# Patient Record
Sex: MALE | Race: WHITE | Employment: FULL TIME | URBAN - NONMETROPOLITAN AREA
[De-identification: names, ages, dates, MRNs, and addresses within clinical notes are randomized per-mention and may not be internally consistent; named-entity substitution may affect disease eponyms.]

---

## 2017-01-04 DIAGNOSIS — M54.2 CERVICALGIA: ICD-10-CM

## 2017-01-04 DIAGNOSIS — M54.16 LUMBAR RADICULOPATHY: ICD-10-CM

## 2017-01-04 DIAGNOSIS — M50.10 HERNIATION OF CERVICAL INTERVERTEBRAL DISC WITH RADICULOPATHY: ICD-10-CM

## 2017-01-04 RX ORDER — HYDROCODONE BITARTRATE AND ACETAMINOPHEN 10; 325 MG/1; MG/1
1 TABLET ORAL EVERY 6 HOURS PRN
Qty: 120 TABLET | Refills: 0 | Status: SHIPPED | OUTPATIENT
Start: 2017-01-04 | End: 2017-02-14 | Stop reason: SDUPTHER

## 2017-01-26 ENCOUNTER — OFFICE VISIT (OUTPATIENT)
Dept: CARDIOLOGY | Age: 52
End: 2017-01-26

## 2017-01-26 VITALS
HEART RATE: 70 BPM | DIASTOLIC BLOOD PRESSURE: 82 MMHG | SYSTOLIC BLOOD PRESSURE: 148 MMHG | WEIGHT: 196 LBS | HEIGHT: 67 IN | BODY MASS INDEX: 30.76 KG/M2

## 2017-01-26 DIAGNOSIS — Z95.2 S/P AVR: Primary | ICD-10-CM

## 2017-01-26 DIAGNOSIS — E78.01 FAMILIAL HYPERCHOLESTEROLEMIA: ICD-10-CM

## 2017-01-26 PROCEDURE — 99213 OFFICE O/P EST LOW 20 MIN: CPT | Performed by: NUCLEAR MEDICINE

## 2017-02-14 ENCOUNTER — TELEPHONE (OUTPATIENT)
Dept: FAMILY MEDICINE CLINIC | Age: 52
End: 2017-02-14

## 2017-02-14 DIAGNOSIS — M54.2 CERVICALGIA: ICD-10-CM

## 2017-02-14 DIAGNOSIS — M50.10 HERNIATION OF CERVICAL INTERVERTEBRAL DISC WITH RADICULOPATHY: ICD-10-CM

## 2017-02-14 DIAGNOSIS — M54.16 LUMBAR RADICULOPATHY: ICD-10-CM

## 2017-02-14 RX ORDER — HYDROCODONE BITARTRATE AND ACETAMINOPHEN 10; 325 MG/1; MG/1
1 TABLET ORAL EVERY 6 HOURS PRN
Qty: 120 TABLET | Refills: 0 | Status: SHIPPED | OUTPATIENT
Start: 2017-02-14 | End: 2017-02-14 | Stop reason: CLARIF

## 2017-02-14 RX ORDER — HYDROCODONE BITARTRATE AND ACETAMINOPHEN 10; 325 MG/1; MG/1
1 TABLET ORAL EVERY 6 HOURS PRN
Qty: 120 TABLET | Refills: 0 | Status: SHIPPED | OUTPATIENT
Start: 2017-02-14 | End: 2017-03-21 | Stop reason: SDUPTHER

## 2017-03-02 ENCOUNTER — OFFICE VISIT (OUTPATIENT)
Dept: PHYSICAL MEDICINE AND REHAB | Age: 52
End: 2017-03-02

## 2017-03-02 VITALS
WEIGHT: 193 LBS | HEIGHT: 67 IN | HEART RATE: 77 BPM | DIASTOLIC BLOOD PRESSURE: 83 MMHG | BODY MASS INDEX: 30.29 KG/M2 | SYSTOLIC BLOOD PRESSURE: 134 MMHG

## 2017-03-02 DIAGNOSIS — M48.061 LUMBAR SPINAL STENOSIS: ICD-10-CM

## 2017-03-02 DIAGNOSIS — M54.16 LUMBAR RADICULITIS: Primary | ICD-10-CM

## 2017-03-02 DIAGNOSIS — M54.12 CERVICAL RADICULITIS: ICD-10-CM

## 2017-03-02 DIAGNOSIS — G89.4 CHRONIC PAIN SYNDROME: ICD-10-CM

## 2017-03-02 DIAGNOSIS — Z98.1 HISTORY OF FUSION OF CERVICAL SPINE: ICD-10-CM

## 2017-03-02 PROCEDURE — 99244 OFF/OP CNSLTJ NEW/EST MOD 40: CPT | Performed by: PAIN MEDICINE

## 2017-03-02 RX ORDER — CELECOXIB 200 MG/1
200 CAPSULE ORAL DAILY
COMMUNITY
Start: 2017-02-27 | End: 2017-08-17

## 2017-03-02 ASSESSMENT — ENCOUNTER SYMPTOMS
SORE THROAT: 0
BACK PAIN: 1
COUGH: 0
CONSTIPATION: 0
DIARRHEA: 0
EYE PAIN: 0
SHORTNESS OF BREATH: 0
CHEST TIGHTNESS: 0
NAUSEA: 0
WHEEZING: 0
VOMITING: 0
PHOTOPHOBIA: 0
RHINORRHEA: 0
BOWEL INCONTINENCE: 0
SINUS PRESSURE: 0
COLOR CHANGE: 0
ABDOMINAL PAIN: 0

## 2017-03-03 ENCOUNTER — TELEPHONE (OUTPATIENT)
Dept: CARDIOLOGY | Age: 52
End: 2017-03-03

## 2017-03-15 ENCOUNTER — OFFICE VISIT (OUTPATIENT)
Dept: FAMILY MEDICINE CLINIC | Age: 52
End: 2017-03-15

## 2017-03-15 VITALS
DIASTOLIC BLOOD PRESSURE: 76 MMHG | HEIGHT: 67 IN | SYSTOLIC BLOOD PRESSURE: 132 MMHG | WEIGHT: 191.6 LBS | OXYGEN SATURATION: 98 % | BODY MASS INDEX: 30.07 KG/M2 | RESPIRATION RATE: 14 BRPM | HEART RATE: 89 BPM

## 2017-03-15 DIAGNOSIS — K28.4 GASTROINTESTINAL HEMORRHAGE ASSOCIATED WITH GASTROJEJUNAL ULCER: ICD-10-CM

## 2017-03-15 DIAGNOSIS — E03.8 OTHER SPECIFIED HYPOTHYROIDISM: ICD-10-CM

## 2017-03-15 DIAGNOSIS — K21.9 GASTROESOPHAGEAL REFLUX DISEASE WITHOUT ESOPHAGITIS: ICD-10-CM

## 2017-03-15 DIAGNOSIS — I10 ESSENTIAL HYPERTENSION: Primary | ICD-10-CM

## 2017-03-15 DIAGNOSIS — F34.1 DYSTHYMIA (OR DEPRESSIVE NEUROSIS): ICD-10-CM

## 2017-03-15 DIAGNOSIS — Z98.890 H/O CERVICAL SPINE SURGERY: ICD-10-CM

## 2017-03-15 DIAGNOSIS — Z95.2 S/P AVR (AORTIC VALVE REPLACEMENT): ICD-10-CM

## 2017-03-15 DIAGNOSIS — Z51.81 MEDICATION MONITORING ENCOUNTER: ICD-10-CM

## 2017-03-15 DIAGNOSIS — R73.01 IFG (IMPAIRED FASTING GLUCOSE): ICD-10-CM

## 2017-03-15 PROCEDURE — 99214 OFFICE O/P EST MOD 30 MIN: CPT | Performed by: FAMILY MEDICINE

## 2017-03-15 RX ORDER — ESOMEPRAZOLE MAGNESIUM 40 MG/1
40 FOR SUSPENSION ORAL 2 TIMES DAILY
Qty: 60 PACKET | Refills: 5 | Status: SHIPPED | OUTPATIENT
Start: 2017-03-15 | End: 2018-06-05 | Stop reason: SDUPTHER

## 2017-03-15 ASSESSMENT — ENCOUNTER SYMPTOMS
BACK PAIN: 1
ABDOMINAL PAIN: 1
SHORTNESS OF BREATH: 0
COUGH: 0
DIARRHEA: 0
CONSTIPATION: 0
NAUSEA: 0
VOMITING: 0

## 2017-03-15 ASSESSMENT — PATIENT HEALTH QUESTIONNAIRE - PHQ9
1. LITTLE INTEREST OR PLEASURE IN DOING THINGS: 0
2. FEELING DOWN, DEPRESSED OR HOPELESS: 0
SUM OF ALL RESPONSES TO PHQ QUESTIONS 1-9: 0
SUM OF ALL RESPONSES TO PHQ9 QUESTIONS 1 & 2: 0

## 2017-03-16 ENCOUNTER — TELEPHONE (OUTPATIENT)
Dept: FAMILY MEDICINE CLINIC | Age: 52
End: 2017-03-16

## 2017-03-16 RX ORDER — LEVOTHYROXINE SODIUM 0.07 MG/1
TABLET ORAL
Qty: 30 TABLET | Refills: 2 | Status: SHIPPED | OUTPATIENT
Start: 2017-03-16 | End: 2017-04-13 | Stop reason: SDUPTHER

## 2017-03-21 DIAGNOSIS — M54.16 LUMBAR RADICULOPATHY: ICD-10-CM

## 2017-03-21 DIAGNOSIS — M54.2 CERVICALGIA: ICD-10-CM

## 2017-03-21 DIAGNOSIS — M50.10 HERNIATION OF CERVICAL INTERVERTEBRAL DISC WITH RADICULOPATHY: ICD-10-CM

## 2017-03-21 RX ORDER — HYDROCODONE BITARTRATE AND ACETAMINOPHEN 10; 325 MG/1; MG/1
1 TABLET ORAL EVERY 6 HOURS PRN
Qty: 120 TABLET | Refills: 0 | Status: SHIPPED | OUTPATIENT
Start: 2017-03-21 | End: 2017-04-21 | Stop reason: SDUPTHER

## 2017-04-13 ENCOUNTER — OFFICE VISIT (OUTPATIENT)
Dept: PHYSICAL MEDICINE AND REHAB | Age: 52
End: 2017-04-13

## 2017-04-13 VITALS
DIASTOLIC BLOOD PRESSURE: 75 MMHG | HEIGHT: 67 IN | BODY MASS INDEX: 29.66 KG/M2 | SYSTOLIC BLOOD PRESSURE: 136 MMHG | WEIGHT: 189 LBS | HEART RATE: 71 BPM

## 2017-04-13 DIAGNOSIS — Z98.1 HISTORY OF FUSION OF CERVICAL SPINE: ICD-10-CM

## 2017-04-13 DIAGNOSIS — G89.4 CHRONIC PAIN SYNDROME: ICD-10-CM

## 2017-04-13 DIAGNOSIS — M54.12 CERVICAL RADICULITIS: ICD-10-CM

## 2017-04-13 DIAGNOSIS — M48.061 LUMBAR SPINAL STENOSIS: ICD-10-CM

## 2017-04-13 DIAGNOSIS — M54.16 LUMBAR RADICULITIS: Primary | ICD-10-CM

## 2017-04-13 PROCEDURE — 99213 OFFICE O/P EST LOW 20 MIN: CPT | Performed by: NURSE PRACTITIONER

## 2017-04-13 RX ORDER — TIZANIDINE 2 MG/1
2 TABLET ORAL EVERY 8 HOURS PRN
Qty: 90 TABLET | Refills: 0 | Status: SHIPPED | OUTPATIENT
Start: 2017-04-13 | End: 2017-05-25 | Stop reason: SDUPTHER

## 2017-04-13 ASSESSMENT — ENCOUNTER SYMPTOMS
SINUS PRESSURE: 0
PHOTOPHOBIA: 0
VOMITING: 0
ANAL BLEEDING: 0
APNEA: 0
TROUBLE SWALLOWING: 0
WHEEZING: 0
CHEST TIGHTNESS: 0
ABDOMINAL DISTENTION: 0
COUGH: 0
RECTAL PAIN: 0
EYE PAIN: 0
FACIAL SWELLING: 0
BLOOD IN STOOL: 0
ABDOMINAL PAIN: 0
NAUSEA: 0
CHOKING: 0
RHINORRHEA: 0
CONSTIPATION: 0
EYE DISCHARGE: 0
SORE THROAT: 0
STRIDOR: 0
BACK PAIN: 1
EYE REDNESS: 0
COLOR CHANGE: 0
VOICE CHANGE: 0
SHORTNESS OF BREATH: 0
DIARRHEA: 0
EYE ITCHING: 0

## 2017-04-20 DIAGNOSIS — Z95.2 H/O HEART VALVE REPLACEMENT WITH MECHANICAL VALVE: ICD-10-CM

## 2017-04-20 RX ORDER — METOPROLOL SUCCINATE 25 MG/1
TABLET, EXTENDED RELEASE ORAL
Qty: 30 TABLET | Refills: 4 | Status: SHIPPED | OUTPATIENT
Start: 2017-04-20 | End: 2017-12-11 | Stop reason: SDUPTHER

## 2017-04-21 DIAGNOSIS — M50.10 HERNIATION OF CERVICAL INTERVERTEBRAL DISC WITH RADICULOPATHY: ICD-10-CM

## 2017-04-21 DIAGNOSIS — M54.2 CERVICALGIA: ICD-10-CM

## 2017-04-21 DIAGNOSIS — M54.16 LUMBAR RADICULOPATHY: ICD-10-CM

## 2017-04-21 RX ORDER — HYDROCODONE BITARTRATE AND ACETAMINOPHEN 10; 325 MG/1; MG/1
1 TABLET ORAL EVERY 6 HOURS PRN
Qty: 120 TABLET | Refills: 0 | Status: SHIPPED | OUTPATIENT
Start: 2017-04-21 | End: 2017-05-18 | Stop reason: SDUPTHER

## 2017-05-03 RX ORDER — BUTALBITAL, ACETAMINOPHEN AND CAFFEINE 50; 325; 40 MG/1; MG/1; MG/1
TABLET ORAL
Qty: 30 TABLET | Refills: 0 | Status: SHIPPED | OUTPATIENT
Start: 2017-05-03 | End: 2018-01-22

## 2017-05-18 DIAGNOSIS — M54.2 CERVICALGIA: ICD-10-CM

## 2017-05-18 DIAGNOSIS — M54.16 LUMBAR RADICULOPATHY: ICD-10-CM

## 2017-05-18 DIAGNOSIS — M50.10 HERNIATION OF CERVICAL INTERVERTEBRAL DISC WITH RADICULOPATHY: ICD-10-CM

## 2017-05-18 RX ORDER — HYDROCODONE BITARTRATE AND ACETAMINOPHEN 10; 325 MG/1; MG/1
1 TABLET ORAL EVERY 6 HOURS PRN
Qty: 120 TABLET | Refills: 0 | Status: SHIPPED | OUTPATIENT
Start: 2017-05-18 | End: 2017-06-15 | Stop reason: SDUPTHER

## 2017-05-25 ENCOUNTER — OFFICE VISIT (OUTPATIENT)
Dept: PHYSICAL MEDICINE AND REHAB | Age: 52
End: 2017-05-25

## 2017-05-25 VITALS
WEIGHT: 183 LBS | HEART RATE: 77 BPM | HEIGHT: 67 IN | BODY MASS INDEX: 28.72 KG/M2 | DIASTOLIC BLOOD PRESSURE: 81 MMHG | SYSTOLIC BLOOD PRESSURE: 143 MMHG

## 2017-05-25 DIAGNOSIS — M48.061 LUMBAR SPINAL STENOSIS: ICD-10-CM

## 2017-05-25 DIAGNOSIS — M54.16 LUMBAR RADICULITIS: ICD-10-CM

## 2017-05-25 DIAGNOSIS — M47.814 SPONDYLOSIS OF THORACIC REGION WITHOUT MYELOPATHY OR RADICULOPATHY: ICD-10-CM

## 2017-05-25 DIAGNOSIS — M54.12 CERVICAL RADICULITIS: ICD-10-CM

## 2017-05-25 DIAGNOSIS — M51.34 THORACIC DEGENERATIVE DISC DISEASE: Primary | ICD-10-CM

## 2017-05-25 DIAGNOSIS — G89.4 CHRONIC PAIN SYNDROME: ICD-10-CM

## 2017-05-25 DIAGNOSIS — Z98.1 HISTORY OF FUSION OF CERVICAL SPINE: ICD-10-CM

## 2017-05-25 PROCEDURE — 99214 OFFICE O/P EST MOD 30 MIN: CPT | Performed by: NURSE PRACTITIONER

## 2017-05-25 RX ORDER — TIZANIDINE 2 MG/1
4 TABLET ORAL EVERY 8 HOURS PRN
Qty: 90 TABLET | Refills: 0 | Status: SHIPPED | OUTPATIENT
Start: 2017-05-25 | End: 2020-02-13

## 2017-05-25 ASSESSMENT — ENCOUNTER SYMPTOMS
RHINORRHEA: 0
EYE PAIN: 0
SORE THROAT: 0
CHOKING: 0
FACIAL SWELLING: 0
APNEA: 0
COLOR CHANGE: 0
PHOTOPHOBIA: 0
NAUSEA: 0
EYE REDNESS: 0
EYE DISCHARGE: 0
STRIDOR: 0
BACK PAIN: 0
SHORTNESS OF BREATH: 0
TROUBLE SWALLOWING: 0
ABDOMINAL DISTENTION: 0
ABDOMINAL PAIN: 0
EYE ITCHING: 0
BLOOD IN STOOL: 0
DIARRHEA: 0
VOICE CHANGE: 0
ANAL BLEEDING: 0
VOMITING: 0
CONSTIPATION: 0
RECTAL PAIN: 0
COUGH: 0
CHEST TIGHTNESS: 0
WHEEZING: 0
SINUS PRESSURE: 0

## 2017-06-13 ENCOUNTER — TELEPHONE (OUTPATIENT)
Dept: PHYSICAL MEDICINE AND REHAB | Age: 52
End: 2017-06-13

## 2017-06-15 ENCOUNTER — OFFICE VISIT (OUTPATIENT)
Dept: FAMILY MEDICINE CLINIC | Age: 52
End: 2017-06-15

## 2017-06-15 VITALS
RESPIRATION RATE: 13 BRPM | WEIGHT: 171.6 LBS | OXYGEN SATURATION: 98 % | HEIGHT: 67 IN | SYSTOLIC BLOOD PRESSURE: 128 MMHG | DIASTOLIC BLOOD PRESSURE: 80 MMHG | HEART RATE: 96 BPM | BODY MASS INDEX: 26.93 KG/M2

## 2017-06-15 DIAGNOSIS — F41.9 CHRONIC ANXIETY: ICD-10-CM

## 2017-06-15 DIAGNOSIS — I10 ESSENTIAL HYPERTENSION: Primary | ICD-10-CM

## 2017-06-15 DIAGNOSIS — M54.2 CERVICALGIA: ICD-10-CM

## 2017-06-15 DIAGNOSIS — E78.2 MIXED HYPERLIPIDEMIA: ICD-10-CM

## 2017-06-15 DIAGNOSIS — Z23 NEED FOR VACCINATION WITH 13-POLYVALENT PNEUMOCOCCAL CONJUGATE VACCINE: ICD-10-CM

## 2017-06-15 DIAGNOSIS — Z11.59 NEED FOR HEPATITIS C SCREENING TEST: ICD-10-CM

## 2017-06-15 DIAGNOSIS — M54.16 LUMBAR RADICULOPATHY: ICD-10-CM

## 2017-06-15 DIAGNOSIS — F34.1 DYSTHYMIA (OR DEPRESSIVE NEUROSIS): ICD-10-CM

## 2017-06-15 DIAGNOSIS — Z11.4 SCREENING FOR HIV WITHOUT PRESENCE OF RISK FACTORS: ICD-10-CM

## 2017-06-15 DIAGNOSIS — Z95.2 S/P AVR (AORTIC VALVE REPLACEMENT): ICD-10-CM

## 2017-06-15 DIAGNOSIS — E03.9 ACQUIRED HYPOTHYROIDISM: ICD-10-CM

## 2017-06-15 DIAGNOSIS — R73.01 IFG (IMPAIRED FASTING GLUCOSE): ICD-10-CM

## 2017-06-15 DIAGNOSIS — Z51.81 MEDICATION MONITORING ENCOUNTER: ICD-10-CM

## 2017-06-15 DIAGNOSIS — M50.10 HERNIATION OF CERVICAL INTERVERTEBRAL DISC WITH RADICULOPATHY: ICD-10-CM

## 2017-06-15 PROCEDURE — 99214 OFFICE O/P EST MOD 30 MIN: CPT | Performed by: FAMILY MEDICINE

## 2017-06-15 PROCEDURE — 90471 IMMUNIZATION ADMIN: CPT | Performed by: FAMILY MEDICINE

## 2017-06-15 PROCEDURE — 90670 PCV13 VACCINE IM: CPT | Performed by: FAMILY MEDICINE

## 2017-06-15 RX ORDER — HYDROCODONE BITARTRATE AND ACETAMINOPHEN 10; 325 MG/1; MG/1
1 TABLET ORAL EVERY 6 HOURS PRN
Qty: 120 TABLET | Refills: 0 | Status: SHIPPED | OUTPATIENT
Start: 2017-06-15 | End: 2017-07-14 | Stop reason: SDUPTHER

## 2017-06-15 ASSESSMENT — ENCOUNTER SYMPTOMS
GASTROINTESTINAL NEGATIVE: 1
BACK PAIN: 1
COUGH: 0
WHEEZING: 0
RESPIRATORY NEGATIVE: 1
SHORTNESS OF BREATH: 0

## 2017-07-14 DIAGNOSIS — M54.2 CERVICALGIA: ICD-10-CM

## 2017-07-14 DIAGNOSIS — M50.10 HERNIATION OF CERVICAL INTERVERTEBRAL DISC WITH RADICULOPATHY: ICD-10-CM

## 2017-07-14 DIAGNOSIS — M54.16 LUMBAR RADICULOPATHY: ICD-10-CM

## 2017-07-14 RX ORDER — HYDROCODONE BITARTRATE AND ACETAMINOPHEN 10; 325 MG/1; MG/1
1 TABLET ORAL EVERY 6 HOURS PRN
Qty: 120 TABLET | Refills: 0 | Status: SHIPPED | OUTPATIENT
Start: 2017-07-14 | End: 2017-08-14 | Stop reason: SDUPTHER

## 2017-07-17 RX ORDER — ALPRAZOLAM 0.5 MG/1
TABLET ORAL
Qty: 90 TABLET | Refills: 3 | Status: SHIPPED | OUTPATIENT
Start: 2017-07-17 | End: 2018-01-23 | Stop reason: SDUPTHER

## 2017-07-17 RX ORDER — CELECOXIB 200 MG/1
CAPSULE ORAL
Qty: 30 CAPSULE | Refills: 4 | Status: SHIPPED | OUTPATIENT
Start: 2017-07-17 | End: 2017-08-17

## 2017-07-25 ENCOUNTER — HOSPITAL ENCOUNTER (OUTPATIENT)
Dept: GENERAL RADIOLOGY | Age: 52
Discharge: HOME | End: 2017-07-25

## 2017-07-25 ENCOUNTER — HOSPITAL ENCOUNTER (OUTPATIENT)
Age: 52
Setting detail: OUTPATIENT SURGERY
Discharge: HOME | End: 2017-07-25
Attending: PAIN MEDICINE | Admitting: PAIN MEDICINE
Payer: COMMERCIAL

## 2017-07-25 ENCOUNTER — ANESTHESIA EVENT (OUTPATIENT)
Dept: OPERATING ROOM | Age: 52
End: 2017-07-25
Payer: COMMERCIAL

## 2017-07-25 ENCOUNTER — ANESTHESIA (OUTPATIENT)
Dept: OPERATING ROOM | Age: 52
End: 2017-07-25
Payer: COMMERCIAL

## 2017-07-25 VITALS
HEIGHT: 67 IN | HEART RATE: 58 BPM | OXYGEN SATURATION: 97 % | TEMPERATURE: 98.5 F | WEIGHT: 164 LBS | DIASTOLIC BLOOD PRESSURE: 64 MMHG | SYSTOLIC BLOOD PRESSURE: 129 MMHG | BODY MASS INDEX: 25.74 KG/M2 | RESPIRATION RATE: 20 BRPM

## 2017-07-25 VITALS — DIASTOLIC BLOOD PRESSURE: 70 MMHG | OXYGEN SATURATION: 99 % | SYSTOLIC BLOOD PRESSURE: 116 MMHG

## 2017-07-25 PROCEDURE — 2580000003 HC RX 258: Performed by: NURSE ANESTHETIST, CERTIFIED REGISTERED

## 2017-07-25 PROCEDURE — 7100000011 HC PHASE II RECOVERY - ADDTL 15 MIN: Performed by: PAIN MEDICINE

## 2017-07-25 PROCEDURE — 64492 INJ PARAVERT F JNT C/T 3 LEV: CPT | Performed by: PAIN MEDICINE

## 2017-07-25 PROCEDURE — 3700000000 HC ANESTHESIA ATTENDED CARE: Performed by: PAIN MEDICINE

## 2017-07-25 PROCEDURE — 64491 INJ PARAVERT F JNT C/T 2 LEV: CPT | Performed by: PAIN MEDICINE

## 2017-07-25 PROCEDURE — 7100000010 HC PHASE II RECOVERY - FIRST 15 MIN: Performed by: PAIN MEDICINE

## 2017-07-25 PROCEDURE — 6360000002 HC RX W HCPCS: Performed by: PAIN MEDICINE

## 2017-07-25 PROCEDURE — 2500000003 HC RX 250 WO HCPCS: Performed by: PAIN MEDICINE

## 2017-07-25 PROCEDURE — 64490 INJ PARAVERT F JNT C/T 1 LEV: CPT | Performed by: PAIN MEDICINE

## 2017-07-25 PROCEDURE — 2500000003 HC RX 250 WO HCPCS: Performed by: NURSE ANESTHETIST, CERTIFIED REGISTERED

## 2017-07-25 PROCEDURE — 6360000002 HC RX W HCPCS: Performed by: NURSE ANESTHETIST, CERTIFIED REGISTERED

## 2017-07-25 PROCEDURE — 3600000058 HC PAIN LEVEL 5 BASE: Performed by: PAIN MEDICINE

## 2017-07-25 RX ORDER — SODIUM CHLORIDE 9 MG/ML
INJECTION, SOLUTION INTRAVENOUS CONTINUOUS PRN
Status: DISCONTINUED | OUTPATIENT
Start: 2017-07-25 | End: 2017-07-25 | Stop reason: SDUPTHER

## 2017-07-25 RX ORDER — BUPIVACAINE HYDROCHLORIDE 2.5 MG/ML
INJECTION, SOLUTION EPIDURAL; INFILTRATION; INTRACAUDAL PRN
Status: DISCONTINUED | OUTPATIENT
Start: 2017-07-25 | End: 2017-07-25 | Stop reason: HOSPADM

## 2017-07-25 RX ORDER — PROPOFOL 10 MG/ML
INJECTION, EMULSION INTRAVENOUS PRN
Status: DISCONTINUED | OUTPATIENT
Start: 2017-07-25 | End: 2017-07-25 | Stop reason: SDUPTHER

## 2017-07-25 RX ORDER — BETAMETHASONE SODIUM PHOSPHATE AND BETAMETHASONE ACETATE 3; 3 MG/ML; MG/ML
INJECTION, SUSPENSION INTRA-ARTICULAR; INTRALESIONAL; INTRAMUSCULAR; SOFT TISSUE PRN
Status: DISCONTINUED | OUTPATIENT
Start: 2017-07-25 | End: 2017-07-25 | Stop reason: HOSPADM

## 2017-07-25 RX ORDER — LIDOCAINE HYDROCHLORIDE 20 MG/ML
INJECTION, SOLUTION INFILTRATION; PERINEURAL PRN
Status: DISCONTINUED | OUTPATIENT
Start: 2017-07-25 | End: 2017-07-25 | Stop reason: SDUPTHER

## 2017-07-25 RX ORDER — LIDOCAINE HYDROCHLORIDE 10 MG/ML
INJECTION, SOLUTION INFILTRATION; PERINEURAL PRN
Status: DISCONTINUED | OUTPATIENT
Start: 2017-07-25 | End: 2017-07-25 | Stop reason: HOSPADM

## 2017-07-25 RX ADMIN — PROPOFOL 30 MG: 10 INJECTION, EMULSION INTRAVENOUS at 13:50

## 2017-07-25 RX ADMIN — SODIUM CHLORIDE: 9 INJECTION, SOLUTION INTRAVENOUS at 13:50

## 2017-07-25 RX ADMIN — PROPOFOL 30 MG: 10 INJECTION, EMULSION INTRAVENOUS at 13:55

## 2017-07-25 RX ADMIN — LIDOCAINE HYDROCHLORIDE 80 MG: 20 INJECTION, SOLUTION INFILTRATION; PERINEURAL at 13:50

## 2017-07-25 ASSESSMENT — PAIN SCALES - GENERAL: PAINLEVEL_OUTOF10: 4

## 2017-07-25 ASSESSMENT — PULMONARY FUNCTION TESTS
PIF_VALUE: 0

## 2017-07-25 ASSESSMENT — PAIN DESCRIPTION - DESCRIPTORS: DESCRIPTORS: NUMBNESS;RADIATING

## 2017-07-25 ASSESSMENT — PAIN - FUNCTIONAL ASSESSMENT: PAIN_FUNCTIONAL_ASSESSMENT: 0-10

## 2017-07-26 ENCOUNTER — TELEPHONE (OUTPATIENT)
Dept: FAMILY MEDICINE CLINIC | Age: 52
End: 2017-07-26

## 2017-07-26 DIAGNOSIS — E78.2 MIXED HYPERLIPIDEMIA: Primary | ICD-10-CM

## 2017-07-26 RX ORDER — ASPIRIN 325 MG
325 TABLET, DELAYED RELEASE (ENTERIC COATED) ORAL DAILY
Qty: 30 TABLET | Refills: 11 | Status: SHIPPED | OUTPATIENT
Start: 2017-07-26 | End: 2018-08-12 | Stop reason: SDUPTHER

## 2017-07-26 RX ORDER — ATORVASTATIN CALCIUM 20 MG/1
20 TABLET, FILM COATED ORAL NIGHTLY
Qty: 30 TABLET | Refills: 11 | Status: SHIPPED | OUTPATIENT
Start: 2017-07-26 | End: 2018-09-27 | Stop reason: SDUPTHER

## 2017-08-14 DIAGNOSIS — M54.16 LUMBAR RADICULOPATHY: ICD-10-CM

## 2017-08-14 DIAGNOSIS — M54.2 CERVICALGIA: ICD-10-CM

## 2017-08-14 DIAGNOSIS — M50.10 HERNIATION OF CERVICAL INTERVERTEBRAL DISC WITH RADICULOPATHY: ICD-10-CM

## 2017-08-14 RX ORDER — HYDROCODONE BITARTRATE AND ACETAMINOPHEN 10; 325 MG/1; MG/1
1 TABLET ORAL EVERY 6 HOURS PRN
Qty: 120 TABLET | Refills: 0 | Status: SHIPPED | OUTPATIENT
Start: 2017-08-14 | End: 2017-09-14 | Stop reason: SDUPTHER

## 2017-08-17 ENCOUNTER — HOSPITAL ENCOUNTER (EMERGENCY)
Age: 52
Discharge: HOME | End: 2017-08-17
Attending: INTERNAL MEDICINE
Payer: COMMERCIAL

## 2017-08-17 ENCOUNTER — OFFICE VISIT (OUTPATIENT)
Dept: PHYSICAL MEDICINE AND REHAB | Age: 52
End: 2017-08-17
Payer: COMMERCIAL

## 2017-08-17 ENCOUNTER — APPOINTMENT (OUTPATIENT)
Dept: GENERAL RADIOLOGY | Age: 52
End: 2017-08-17
Payer: COMMERCIAL

## 2017-08-17 ENCOUNTER — APPOINTMENT (OUTPATIENT)
Dept: CT IMAGING | Age: 52
End: 2017-08-17
Payer: COMMERCIAL

## 2017-08-17 VITALS
WEIGHT: 164.02 LBS | DIASTOLIC BLOOD PRESSURE: 80 MMHG | BODY MASS INDEX: 25.74 KG/M2 | SYSTOLIC BLOOD PRESSURE: 133 MMHG | HEIGHT: 67 IN | HEART RATE: 85 BPM

## 2017-08-17 VITALS
BODY MASS INDEX: 25.43 KG/M2 | WEIGHT: 162 LBS | DIASTOLIC BLOOD PRESSURE: 74 MMHG | RESPIRATION RATE: 14 BRPM | TEMPERATURE: 98.3 F | SYSTOLIC BLOOD PRESSURE: 159 MMHG | HEART RATE: 52 BPM | OXYGEN SATURATION: 99 % | HEIGHT: 67 IN

## 2017-08-17 DIAGNOSIS — M47.816 SPONDYLOSIS OF LUMBAR REGION WITHOUT MYELOPATHY OR RADICULOPATHY: ICD-10-CM

## 2017-08-17 DIAGNOSIS — E03.9 ACQUIRED HYPOTHYROIDISM: Primary | ICD-10-CM

## 2017-08-17 DIAGNOSIS — M47.814 SPONDYLOSIS OF THORACIC REGION WITHOUT MYELOPATHY OR RADICULOPATHY: ICD-10-CM

## 2017-08-17 DIAGNOSIS — M51.34 THORACIC DEGENERATIVE DISC DISEASE: ICD-10-CM

## 2017-08-17 DIAGNOSIS — M48.04 THORACIC STENOSIS: Primary | ICD-10-CM

## 2017-08-17 DIAGNOSIS — M54.16 LUMBAR RADICULITIS: ICD-10-CM

## 2017-08-17 DIAGNOSIS — N39.0 URINARY TRACT INFECTION WITHOUT HEMATURIA, SITE UNSPECIFIED: Primary | ICD-10-CM

## 2017-08-17 LAB
ALBUMIN SERPL-MCNC: 4.2 G/DL (ref 3.5–5.1)
ALP BLD-CCNC: 71 U/L (ref 38–126)
ALT SERPL-CCNC: 8 U/L (ref 11–66)
AMYLASE: 49 U/L (ref 20–104)
ANION GAP SERPL CALCULATED.3IONS-SCNC: 11 MEQ/L (ref 8–16)
ANISOCYTOSIS: ABNORMAL
AST SERPL-CCNC: 11 U/L (ref 5–40)
BACTERIA: ABNORMAL
BASOPHILS # BLD: 0.4 %
BASOPHILS ABSOLUTE: 0.1 THOU/MM3 (ref 0–0.1)
BILIRUB SERPL-MCNC: 0.3 MG/DL (ref 0.3–1.2)
BILIRUBIN DIRECT: < 0.2 MG/DL (ref 0–0.3)
BILIRUBIN URINE: ABNORMAL
BLOOD, URINE: NEGATIVE
BUN BLDV-MCNC: 19 MG/DL (ref 7–22)
CALCIUM SERPL-MCNC: 9.1 MG/DL (ref 8.5–10.5)
CASTS: ABNORMAL /LPF
CASTS: ABNORMAL /LPF
CHARACTER, URINE: CLEAR
CHLORIDE BLD-SCNC: 104 MEQ/L (ref 98–111)
CO2: 26 MEQ/L (ref 23–33)
COLOR: ABNORMAL
CREAT SERPL-MCNC: 0.6 MG/DL (ref 0.4–1.2)
CRYSTALS: ABNORMAL
EKG ATRIAL RATE: 72 BPM
EKG P AXIS: 29 DEGREES
EKG P-R INTERVAL: 150 MS
EKG Q-T INTERVAL: 410 MS
EKG QRS DURATION: 98 MS
EKG QTC CALCULATION (BAZETT): 448 MS
EKG R AXIS: 73 DEGREES
EKG T AXIS: 13 DEGREES
EKG VENTRICULAR RATE: 72 BPM
EOSINOPHIL # BLD: 1.1 %
EOSINOPHILS ABSOLUTE: 0.1 THOU/MM3 (ref 0–0.4)
EPITHELIAL CELLS, UA: ABNORMAL /HPF
GFR SERPL CREATININE-BSD FRML MDRD: > 90 ML/MIN/1.73M2
GLUCOSE BLD-MCNC: 95 MG/DL (ref 70–108)
GLUCOSE, URINE: NEGATIVE MG/DL
HCT VFR BLD CALC: 42.9 % (ref 42–52)
HEMOGLOBIN: 14.5 GM/DL (ref 14–18)
ICTOTEST: NEGATIVE
KETONES, URINE: ABNORMAL
LACTIC ACID: 1.2 MMOL/L (ref 0.5–2.2)
LEUKOCYTE EST, POC: ABNORMAL
LIPASE: 30.2 U/L (ref 5.6–51.3)
LYMPHOCYTES # BLD: 7.5 %
LYMPHOCYTES ABSOLUTE: 1 THOU/MM3 (ref 1–4.8)
MCH RBC QN AUTO: 30.3 PG (ref 27–31)
MCHC RBC AUTO-ENTMCNC: 33.8 GM/DL (ref 33–37)
MCV RBC AUTO: 89.6 FL (ref 80–94)
MISCELLANEOUS LAB TEST RESULT: ABNORMAL
MONOCYTES # BLD: 7.7 %
MONOCYTES ABSOLUTE: 1 THOU/MM3 (ref 0.4–1.3)
NITRITE, URINE: NEGATIVE
NUCLEATED RED BLOOD CELLS: 0 /100 WBC
OSMOLALITY CALCULATION: 283.3 MOSMOL/KG (ref 275–300)
PDW BLD-RTO: 16.1 % (ref 11.5–14.5)
PH UA: 5
PLATELET # BLD: 193 THOU/MM3 (ref 130–400)
PMV BLD AUTO: 9.1 MCM (ref 7.4–10.4)
POTASSIUM SERPL-SCNC: 4.7 MEQ/L (ref 3.5–5.2)
PRO-BNP: 150.9 PG/ML (ref 0–900)
PROTEIN UA: ABNORMAL MG/DL
RBC # BLD: 4.79 MILL/MM3 (ref 4.7–6.1)
RBC # BLD: NORMAL 10*6/UL
RBC URINE: ABNORMAL /HPF
RENAL EPITHELIAL, UA: ABNORMAL
SEG NEUTROPHILS: 83.3 %
SEGMENTED NEUTROPHILS ABSOLUTE COUNT: 11.2 THOU/MM3 (ref 1.8–7.7)
SODIUM BLD-SCNC: 141 MEQ/L (ref 135–145)
SPECIFIC GRAVITY UA: > 1.03 (ref 1–1.03)
TOTAL PROTEIN: 6.8 G/DL (ref 6.1–8)
TROPONIN T: < 0.01 NG/ML
UROBILINOGEN, URINE: 1 EU/DL
WBC # BLD: 13.4 THOU/MM3 (ref 4.8–10.8)
WBC UA: ABNORMAL /HPF
YEAST: ABNORMAL

## 2017-08-17 PROCEDURE — 82248 BILIRUBIN DIRECT: CPT

## 2017-08-17 PROCEDURE — 36415 COLL VENOUS BLD VENIPUNCTURE: CPT

## 2017-08-17 PROCEDURE — 82150 ASSAY OF AMYLASE: CPT

## 2017-08-17 PROCEDURE — 93005 ELECTROCARDIOGRAM TRACING: CPT

## 2017-08-17 PROCEDURE — 2580000003 HC RX 258: Performed by: INTERNAL MEDICINE

## 2017-08-17 PROCEDURE — 87086 URINE CULTURE/COLONY COUNT: CPT

## 2017-08-17 PROCEDURE — 83880 ASSAY OF NATRIURETIC PEPTIDE: CPT

## 2017-08-17 PROCEDURE — 74177 CT ABD & PELVIS W/CONTRAST: CPT

## 2017-08-17 PROCEDURE — 85025 COMPLETE CBC W/AUTO DIFF WBC: CPT

## 2017-08-17 PROCEDURE — 6360000004 HC RX CONTRAST MEDICATION: Performed by: INTERNAL MEDICINE

## 2017-08-17 PROCEDURE — 96374 THER/PROPH/DIAG INJ IV PUSH: CPT

## 2017-08-17 PROCEDURE — 96361 HYDRATE IV INFUSION ADD-ON: CPT

## 2017-08-17 PROCEDURE — 83690 ASSAY OF LIPASE: CPT

## 2017-08-17 PROCEDURE — 83605 ASSAY OF LACTIC ACID: CPT

## 2017-08-17 PROCEDURE — 99285 EMERGENCY DEPT VISIT HI MDM: CPT

## 2017-08-17 PROCEDURE — 71020 XR CHEST STANDARD TWO VW: CPT

## 2017-08-17 PROCEDURE — 6360000002 HC RX W HCPCS: Performed by: INTERNAL MEDICINE

## 2017-08-17 PROCEDURE — 99213 OFFICE O/P EST LOW 20 MIN: CPT | Performed by: NURSE PRACTITIONER

## 2017-08-17 PROCEDURE — 80053 COMPREHEN METABOLIC PANEL: CPT

## 2017-08-17 PROCEDURE — 81001 URINALYSIS AUTO W/SCOPE: CPT

## 2017-08-17 PROCEDURE — 84484 ASSAY OF TROPONIN QUANT: CPT

## 2017-08-17 PROCEDURE — 96375 TX/PRO/DX INJ NEW DRUG ADDON: CPT

## 2017-08-17 RX ORDER — MORPHINE SULFATE 2 MG/ML
2 INJECTION, SOLUTION INTRAMUSCULAR; INTRAVENOUS
Status: DISCONTINUED | OUTPATIENT
Start: 2017-08-17 | End: 2017-08-17 | Stop reason: HOSPADM

## 2017-08-17 RX ORDER — SULFAMETHOXAZOLE AND TRIMETHOPRIM 800; 160 MG/1; MG/1
1 TABLET ORAL 2 TIMES DAILY
Qty: 20 TABLET | Refills: 0 | Status: SHIPPED | OUTPATIENT
Start: 2017-08-17 | End: 2017-08-27

## 2017-08-17 RX ORDER — ONDANSETRON 2 MG/ML
4 INJECTION INTRAMUSCULAR; INTRAVENOUS EVERY 30 MIN PRN
Status: DISCONTINUED | OUTPATIENT
Start: 2017-08-17 | End: 2017-08-17 | Stop reason: HOSPADM

## 2017-08-17 RX ORDER — 0.9 % SODIUM CHLORIDE 0.9 %
1000 INTRAVENOUS SOLUTION INTRAVENOUS ONCE
Status: COMPLETED | OUTPATIENT
Start: 2017-08-17 | End: 2017-08-17

## 2017-08-17 RX ORDER — LEVOTHYROXINE SODIUM 0.07 MG/1
TABLET ORAL
Qty: 30 TABLET | Refills: 1 | Status: SHIPPED | OUTPATIENT
Start: 2017-08-17 | End: 2017-09-14 | Stop reason: SDUPTHER

## 2017-08-17 RX ADMIN — IOPAMIDOL 85 ML: 755 INJECTION, SOLUTION INTRAVENOUS at 12:26

## 2017-08-17 RX ADMIN — SODIUM CHLORIDE 1000 ML: 9 INJECTION, SOLUTION INTRAVENOUS at 12:05

## 2017-08-17 RX ADMIN — MORPHINE SULFATE 2 MG: 2 INJECTION, SOLUTION INTRAMUSCULAR; INTRAVENOUS at 12:05

## 2017-08-17 RX ADMIN — ONDANSETRON 4 MG: 2 INJECTION INTRAMUSCULAR; INTRAVENOUS at 12:06

## 2017-08-17 ASSESSMENT — ENCOUNTER SYMPTOMS
RHINORRHEA: 0
COUGH: 0
EYE PAIN: 0
NAUSEA: 1
PHOTOPHOBIA: 0
BACK PAIN: 0
SORE THROAT: 0
EYE DISCHARGE: 0
NAUSEA: 0
BACK PAIN: 1
CHEST TIGHTNESS: 0
WHEEZING: 0
SINUS PRESSURE: 0
EYE REDNESS: 0
COLOR CHANGE: 0
COUGH: 0
RHINORRHEA: 0
WHEEZING: 0
SORE THROAT: 0
VOMITING: 0
CONSTIPATION: 0
ABDOMINAL PAIN: 1
SHORTNESS OF BREATH: 0
DIARRHEA: 0
DIARRHEA: 0
SHORTNESS OF BREATH: 0
ABDOMINAL PAIN: 1
VOMITING: 0

## 2017-08-17 ASSESSMENT — PAIN DESCRIPTION - LOCATION
LOCATION: ABDOMEN
LOCATION: HEAD

## 2017-08-17 ASSESSMENT — PAIN DESCRIPTION - DESCRIPTORS
DESCRIPTORS: HEADACHE
DESCRIPTORS: ACHING;DULL
DESCRIPTORS: BURNING
DESCRIPTORS: CRAMPING

## 2017-08-17 ASSESSMENT — PAIN SCALES - GENERAL
PAINLEVEL_OUTOF10: 6
PAINLEVEL_OUTOF10: 9
PAINLEVEL_OUTOF10: 8
PAINLEVEL_OUTOF10: 5
PAINLEVEL_OUTOF10: 7

## 2017-08-17 ASSESSMENT — PAIN DESCRIPTION - FREQUENCY: FREQUENCY: CONTINUOUS

## 2017-08-17 ASSESSMENT — PAIN DESCRIPTION - PROGRESSION: CLINICAL_PROGRESSION: GRADUALLY IMPROVING

## 2017-08-17 ASSESSMENT — PAIN DESCRIPTION - ORIENTATION: ORIENTATION: LEFT;LOWER

## 2017-08-17 ASSESSMENT — PAIN DESCRIPTION - PAIN TYPE: TYPE: ACUTE PAIN

## 2017-08-19 LAB — URINE CULTURE, ROUTINE: NORMAL

## 2017-09-12 ENCOUNTER — HOSPITAL ENCOUNTER (OUTPATIENT)
Age: 52
Setting detail: OUTPATIENT SURGERY
Discharge: HOME | End: 2017-09-12
Attending: PAIN MEDICINE | Admitting: PAIN MEDICINE
Payer: COMMERCIAL

## 2017-09-12 ENCOUNTER — ANESTHESIA EVENT (OUTPATIENT)
Dept: OPERATING ROOM | Age: 52
End: 2017-09-12
Payer: COMMERCIAL

## 2017-09-12 ENCOUNTER — APPOINTMENT (OUTPATIENT)
Dept: GENERAL RADIOLOGY | Age: 52
End: 2017-09-12
Attending: PAIN MEDICINE
Payer: COMMERCIAL

## 2017-09-12 ENCOUNTER — ANESTHESIA (OUTPATIENT)
Dept: OPERATING ROOM | Age: 52
End: 2017-09-12
Payer: COMMERCIAL

## 2017-09-12 VITALS — DIASTOLIC BLOOD PRESSURE: 69 MMHG | SYSTOLIC BLOOD PRESSURE: 121 MMHG | OXYGEN SATURATION: 98 %

## 2017-09-12 VITALS
HEART RATE: 57 BPM | WEIGHT: 167.6 LBS | RESPIRATION RATE: 16 BRPM | OXYGEN SATURATION: 97 % | TEMPERATURE: 97.8 F | DIASTOLIC BLOOD PRESSURE: 75 MMHG | HEIGHT: 67 IN | SYSTOLIC BLOOD PRESSURE: 130 MMHG | BODY MASS INDEX: 26.3 KG/M2

## 2017-09-12 PROCEDURE — 7100000011 HC PHASE II RECOVERY - ADDTL 15 MIN: Performed by: PAIN MEDICINE

## 2017-09-12 PROCEDURE — 6360000002 HC RX W HCPCS: Performed by: PAIN MEDICINE

## 2017-09-12 PROCEDURE — 3600000055 HC PAIN LEVEL 3 ADDL 15 MIN: Performed by: PAIN MEDICINE

## 2017-09-12 PROCEDURE — 2580000003 HC RX 258: Performed by: NURSE ANESTHETIST, CERTIFIED REGISTERED

## 2017-09-12 PROCEDURE — 3700000000 HC ANESTHESIA ATTENDED CARE: Performed by: PAIN MEDICINE

## 2017-09-12 PROCEDURE — 7100000010 HC PHASE II RECOVERY - FIRST 15 MIN: Performed by: PAIN MEDICINE

## 2017-09-12 PROCEDURE — 2500000003 HC RX 250 WO HCPCS: Performed by: NURSE ANESTHETIST, CERTIFIED REGISTERED

## 2017-09-12 PROCEDURE — 3209999900 FLUORO FOR SURGICAL PROCEDURES

## 2017-09-12 PROCEDURE — 2580000003 HC RX 258: Performed by: PAIN MEDICINE

## 2017-09-12 PROCEDURE — 3600000054 HC PAIN LEVEL 3 BASE: Performed by: PAIN MEDICINE

## 2017-09-12 PROCEDURE — 3700000001 HC ADD 15 MINUTES (ANESTHESIA): Performed by: PAIN MEDICINE

## 2017-09-12 PROCEDURE — 6360000004 HC RX CONTRAST MEDICATION: Performed by: PAIN MEDICINE

## 2017-09-12 PROCEDURE — 62321 NJX INTERLAMINAR CRV/THRC: CPT | Performed by: PAIN MEDICINE

## 2017-09-12 PROCEDURE — 6360000002 HC RX W HCPCS: Performed by: NURSE ANESTHETIST, CERTIFIED REGISTERED

## 2017-09-12 PROCEDURE — 2500000003 HC RX 250 WO HCPCS: Performed by: PAIN MEDICINE

## 2017-09-12 RX ORDER — PROPOFOL 10 MG/ML
INJECTION, EMULSION INTRAVENOUS PRN
Status: DISCONTINUED | OUTPATIENT
Start: 2017-09-12 | End: 2017-09-12 | Stop reason: SDUPTHER

## 2017-09-12 RX ORDER — LIDOCAINE HYDROCHLORIDE 10 MG/ML
INJECTION, SOLUTION INFILTRATION; PERINEURAL PRN
Status: DISCONTINUED | OUTPATIENT
Start: 2017-09-12 | End: 2017-09-12 | Stop reason: HOSPADM

## 2017-09-12 RX ORDER — LIDOCAINE HYDROCHLORIDE 10 MG/ML
INJECTION, SOLUTION INFILTRATION; PERINEURAL PRN
Status: DISCONTINUED | OUTPATIENT
Start: 2017-09-12 | End: 2017-09-12 | Stop reason: SDUPTHER

## 2017-09-12 RX ORDER — BETAMETHASONE SODIUM PHOSPHATE AND BETAMETHASONE ACETATE 3; 3 MG/ML; MG/ML
INJECTION, SUSPENSION INTRA-ARTICULAR; INTRALESIONAL; INTRAMUSCULAR; SOFT TISSUE PRN
Status: DISCONTINUED | OUTPATIENT
Start: 2017-09-12 | End: 2017-09-12 | Stop reason: HOSPADM

## 2017-09-12 RX ORDER — 0.9 % SODIUM CHLORIDE 0.9 %
VIAL (ML) INJECTION PRN
Status: DISCONTINUED | OUTPATIENT
Start: 2017-09-12 | End: 2017-09-12 | Stop reason: HOSPADM

## 2017-09-12 RX ORDER — SODIUM CHLORIDE 9 MG/ML
INJECTION, SOLUTION INTRAVENOUS CONTINUOUS PRN
Status: DISCONTINUED | OUTPATIENT
Start: 2017-09-12 | End: 2017-09-12 | Stop reason: SDUPTHER

## 2017-09-12 ASSESSMENT — PAIN - FUNCTIONAL ASSESSMENT: PAIN_FUNCTIONAL_ASSESSMENT: 0-10

## 2017-09-12 ASSESSMENT — PULMONARY FUNCTION TESTS
PIF_VALUE: 0

## 2017-09-12 ASSESSMENT — PAIN DESCRIPTION - DESCRIPTORS: DESCRIPTORS: CONSTANT;BURNING;NUMBNESS

## 2017-09-12 ASSESSMENT — PAIN SCALES - GENERAL: PAINLEVEL_OUTOF10: 0

## 2017-09-14 ENCOUNTER — OFFICE VISIT (OUTPATIENT)
Dept: FAMILY MEDICINE CLINIC | Age: 52
End: 2017-09-14
Payer: COMMERCIAL

## 2017-09-14 VITALS
HEIGHT: 67 IN | SYSTOLIC BLOOD PRESSURE: 138 MMHG | HEART RATE: 64 BPM | OXYGEN SATURATION: 98 % | BODY MASS INDEX: 26.74 KG/M2 | DIASTOLIC BLOOD PRESSURE: 70 MMHG | WEIGHT: 170.4 LBS | RESPIRATION RATE: 16 BRPM

## 2017-09-14 DIAGNOSIS — M47.814 SPONDYLOSIS OF THORACIC REGION WITHOUT MYELOPATHY OR RADICULOPATHY: ICD-10-CM

## 2017-09-14 DIAGNOSIS — M48.061 LUMBAR SPINAL STENOSIS: ICD-10-CM

## 2017-09-14 DIAGNOSIS — M54.16 LUMBAR RADICULOPATHY: ICD-10-CM

## 2017-09-14 DIAGNOSIS — F34.1 DYSTHYMIA (OR DEPRESSIVE NEUROSIS): ICD-10-CM

## 2017-09-14 DIAGNOSIS — F41.9 CHRONIC ANXIETY: ICD-10-CM

## 2017-09-14 DIAGNOSIS — F43.21 SITUATIONAL DEPRESSION: ICD-10-CM

## 2017-09-14 DIAGNOSIS — I10 ESSENTIAL HYPERTENSION: ICD-10-CM

## 2017-09-14 DIAGNOSIS — M54.2 CERVICALGIA: ICD-10-CM

## 2017-09-14 DIAGNOSIS — E78.2 MIXED HYPERLIPIDEMIA: ICD-10-CM

## 2017-09-14 DIAGNOSIS — G89.4 CHRONIC PAIN SYNDROME: ICD-10-CM

## 2017-09-14 DIAGNOSIS — M54.16 LUMBAR RADICULITIS: ICD-10-CM

## 2017-09-14 DIAGNOSIS — Z95.2 S/P AVR (AORTIC VALVE REPLACEMENT): ICD-10-CM

## 2017-09-14 DIAGNOSIS — E03.9 ACQUIRED HYPOTHYROIDISM: ICD-10-CM

## 2017-09-14 DIAGNOSIS — M48.04 THORACIC SPINAL STENOSIS: Primary | ICD-10-CM

## 2017-09-14 DIAGNOSIS — Z51.81 MEDICATION MONITORING ENCOUNTER: ICD-10-CM

## 2017-09-14 DIAGNOSIS — M50.10 HERNIATION OF CERVICAL INTERVERTEBRAL DISC WITH RADICULOPATHY: ICD-10-CM

## 2017-09-14 PROCEDURE — 99214 OFFICE O/P EST MOD 30 MIN: CPT | Performed by: FAMILY MEDICINE

## 2017-09-14 RX ORDER — HYDROCODONE BITARTRATE AND ACETAMINOPHEN 10; 325 MG/1; MG/1
1 TABLET ORAL EVERY 6 HOURS PRN
Qty: 120 TABLET | Refills: 0 | Status: SHIPPED | OUTPATIENT
Start: 2017-09-14 | End: 2017-10-11 | Stop reason: SDUPTHER

## 2017-09-14 ASSESSMENT — ENCOUNTER SYMPTOMS
ABDOMINAL PAIN: 1
COUGH: 0
BACK PAIN: 1
SHORTNESS OF BREATH: 0

## 2017-09-28 ENCOUNTER — HOSPITAL ENCOUNTER (OUTPATIENT)
Age: 52
Discharge: HOME | End: 2017-09-28
Payer: COMMERCIAL

## 2017-09-28 DIAGNOSIS — E78.2 MIXED HYPERLIPIDEMIA: ICD-10-CM

## 2017-09-28 LAB
CHOLESTEROL, TOTAL: 101 MG/DL (ref 100–199)
HDLC SERPL-MCNC: 37 MG/DL
LDL CHOLESTEROL CALCULATED: 51 MG/DL
TRIGL SERPL-MCNC: 63 MG/DL (ref 0–199)

## 2017-09-28 PROCEDURE — 80061 LIPID PANEL: CPT

## 2017-09-28 PROCEDURE — 36415 COLL VENOUS BLD VENIPUNCTURE: CPT

## 2017-10-11 ENCOUNTER — IMMUNIZATION (OUTPATIENT)
Dept: FAMILY MEDICINE CLINIC | Age: 52
End: 2017-10-11
Payer: COMMERCIAL

## 2017-10-11 DIAGNOSIS — M54.16 LUMBAR RADICULOPATHY: ICD-10-CM

## 2017-10-11 DIAGNOSIS — Z23 NEED FOR INFLUENZA VACCINATION: Primary | ICD-10-CM

## 2017-10-11 DIAGNOSIS — M50.10 HERNIATION OF CERVICAL INTERVERTEBRAL DISC WITH RADICULOPATHY: ICD-10-CM

## 2017-10-11 DIAGNOSIS — M54.2 CERVICALGIA: ICD-10-CM

## 2017-10-11 DIAGNOSIS — G89.4 CHRONIC PAIN SYNDROME: ICD-10-CM

## 2017-10-11 PROCEDURE — 90471 IMMUNIZATION ADMIN: CPT | Performed by: FAMILY MEDICINE

## 2017-10-11 PROCEDURE — 90688 IIV4 VACCINE SPLT 0.5 ML IM: CPT | Performed by: FAMILY MEDICINE

## 2017-10-11 RX ORDER — HYDROCODONE BITARTRATE AND ACETAMINOPHEN 10; 325 MG/1; MG/1
1 TABLET ORAL EVERY 6 HOURS PRN
Qty: 120 TABLET | Refills: 0 | Status: SHIPPED | OUTPATIENT
Start: 2017-10-11 | End: 2017-10-16 | Stop reason: SDUPTHER

## 2017-10-11 NOTE — TELEPHONE ENCOUNTER
Patient understanding [place] refill not due to the 14 th asking if you will send to [place]. Patient was at the office received flu vaccine today. Patient will check with pharmacy please advise.

## 2017-10-16 DIAGNOSIS — M54.2 CERVICALGIA: ICD-10-CM

## 2017-10-16 DIAGNOSIS — M50.10 HERNIATION OF CERVICAL INTERVERTEBRAL DISC WITH RADICULOPATHY: ICD-10-CM

## 2017-10-16 DIAGNOSIS — M54.16 LUMBAR RADICULOPATHY: ICD-10-CM

## 2017-10-16 DIAGNOSIS — G89.4 CHRONIC PAIN SYNDROME: ICD-10-CM

## 2017-10-16 RX ORDER — HYDROCODONE BITARTRATE AND ACETAMINOPHEN 10; 325 MG/1; MG/1
1 TABLET ORAL EVERY 6 HOURS PRN
Qty: 120 TABLET | Refills: 0 | Status: SHIPPED | OUTPATIENT
Start: 2017-10-16 | End: 2017-11-14 | Stop reason: SDUPTHER

## 2017-10-16 NOTE — TELEPHONE ENCOUNTER
Margot Chakraborty called requesting a refill on the following medications:  Requested Prescriptions     Pending Prescriptions Disp Refills    HYDROcodone-acetaminophen (NORCO)  MG per tablet 120 tablet 0     Sig: Take 1 tablet by mouth every 6 hours as needed for Pain  Fill on 10/14/17. Martita Galan      Pharmacy verified:  [email or web address]      Date of last visit: 9/14/17  Date of next visit (if applicable): 69/80/1224        Date of last fill and quantity (to be completed by clinical staff)  Pharmacy name: [facility]

## 2017-11-14 DIAGNOSIS — M50.10 HERNIATION OF CERVICAL INTERVERTEBRAL DISC WITH RADICULOPATHY: ICD-10-CM

## 2017-11-14 DIAGNOSIS — G89.4 CHRONIC PAIN SYNDROME: ICD-10-CM

## 2017-11-14 DIAGNOSIS — M54.2 CERVICALGIA: ICD-10-CM

## 2017-11-14 DIAGNOSIS — M54.16 LUMBAR RADICULOPATHY: ICD-10-CM

## 2017-11-14 RX ORDER — HYDROCODONE BITARTRATE AND ACETAMINOPHEN 10; 325 MG/1; MG/1
1 TABLET ORAL EVERY 6 HOURS PRN
Qty: 120 TABLET | Refills: 0 | Status: SHIPPED | OUTPATIENT
Start: 2017-11-14 | End: 2017-12-11 | Stop reason: SDUPTHER

## 2017-11-14 NOTE — TELEPHONE ENCOUNTER
Jeff Linn called requesting a refill on the following medications:  Requested Prescriptions     Pending Prescriptions Disp Refills    HYDROcodone-acetaminophen (NORCO)  MG per tablet 120 tablet 0     Sig: Take 1 tablet by mouth every 6 hours as needed for Pain  Fill on 10/14/17. Jeremy Avelar      Pharmacy verified:  [email or web address]      Date of last visit: 9/14/17  Date of next visit (if applicable): 31/33/8640        Date of last fill and quantity (to be completed by clinical staff)  Pharmacy name: [facility]

## 2017-12-11 ENCOUNTER — OFFICE VISIT (OUTPATIENT)
Dept: FAMILY MEDICINE CLINIC | Age: 52
End: 2017-12-11
Payer: COMMERCIAL

## 2017-12-11 ENCOUNTER — TELEPHONE (OUTPATIENT)
Dept: CARDIOLOGY CLINIC | Age: 52
End: 2017-12-11

## 2017-12-11 VITALS
WEIGHT: 170 LBS | HEART RATE: 93 BPM | OXYGEN SATURATION: 98 % | RESPIRATION RATE: 14 BRPM | DIASTOLIC BLOOD PRESSURE: 78 MMHG | SYSTOLIC BLOOD PRESSURE: 128 MMHG | BODY MASS INDEX: 26.68 KG/M2 | HEIGHT: 67 IN

## 2017-12-11 DIAGNOSIS — M50.10 HERNIATION OF CERVICAL INTERVERTEBRAL DISC WITH RADICULOPATHY: ICD-10-CM

## 2017-12-11 DIAGNOSIS — M54.16 LUMBAR RADICULOPATHY: ICD-10-CM

## 2017-12-11 DIAGNOSIS — E78.2 MIXED HYPERLIPIDEMIA: ICD-10-CM

## 2017-12-11 DIAGNOSIS — F34.1 DYSTHYMIA (OR DEPRESSIVE NEUROSIS): ICD-10-CM

## 2017-12-11 DIAGNOSIS — E03.9 ACQUIRED HYPOTHYROIDISM: ICD-10-CM

## 2017-12-11 DIAGNOSIS — R00.2 HEART PALPITATIONS: Primary | ICD-10-CM

## 2017-12-11 DIAGNOSIS — I10 ESSENTIAL HYPERTENSION: Primary | ICD-10-CM

## 2017-12-11 DIAGNOSIS — Z51.81 MEDICATION MONITORING ENCOUNTER: ICD-10-CM

## 2017-12-11 DIAGNOSIS — R06.02 SOB (SHORTNESS OF BREATH): ICD-10-CM

## 2017-12-11 DIAGNOSIS — Z95.2 H/O HEART VALVE REPLACEMENT WITH MECHANICAL VALVE: ICD-10-CM

## 2017-12-11 DIAGNOSIS — G89.4 CHRONIC PAIN SYNDROME: ICD-10-CM

## 2017-12-11 DIAGNOSIS — F41.9 CHRONIC ANXIETY: ICD-10-CM

## 2017-12-11 DIAGNOSIS — M54.2 CERVICALGIA: ICD-10-CM

## 2017-12-11 DIAGNOSIS — Z72.0 TOBACCO ABUSE: ICD-10-CM

## 2017-12-11 PROCEDURE — 99214 OFFICE O/P EST MOD 30 MIN: CPT | Performed by: FAMILY MEDICINE

## 2017-12-11 RX ORDER — HYDROCODONE BITARTRATE AND ACETAMINOPHEN 10; 325 MG/1; MG/1
1 TABLET ORAL EVERY 6 HOURS PRN
Qty: 120 TABLET | Refills: 0 | Status: SHIPPED | OUTPATIENT
Start: 2017-12-11 | End: 2018-01-15 | Stop reason: SDUPTHER

## 2017-12-11 RX ORDER — METOPROLOL SUCCINATE 25 MG/1
TABLET, EXTENDED RELEASE ORAL
Qty: 30 TABLET | Refills: 6 | Status: SHIPPED | OUTPATIENT
Start: 2017-12-11 | End: 2017-12-19 | Stop reason: DRUGHIGH

## 2017-12-11 ASSESSMENT — ENCOUNTER SYMPTOMS
ANAL BLEEDING: 0
CONSTIPATION: 0
BACK PAIN: 1
ABDOMINAL PAIN: 1
BLOOD IN STOOL: 0
SHORTNESS OF BREATH: 0
CHEST TIGHTNESS: 0
NAUSEA: 0
EYES NEGATIVE: 1
DIARRHEA: 0
WHEEZING: 0
VOMITING: 0
ALLERGIC/IMMUNOLOGIC NEGATIVE: 1

## 2017-12-11 NOTE — PROGRESS NOTES
changed or started any medications since your last visit including any over-the-counter medicines, vitamins, or herbal medicines? no   Are you having any side effects from any of your medications? -  no  Have you stopped taking any of your medications? Is so, why? -  no    Have you seen any other physician or provider since your last visit? No  Have you had any other diagnostic tests since your last visit? No  Have you been seen in the emergency room and/or had an admission to a hospital since we last saw you? No  Have you had your routine dental cleaning in the past 6 months? no    Have you activated your Shareable Social account? If not, what are your barriers?  No: declined     Patient Care Team:  Ziggy Flynn MD as PCP - General (Family Medicine)  Ziggy Flynn MD as PCP - [facility] Attributed Provider  Sydney Easley MD as Surgeon (Cardiothoracic Surgery)  Elizabeth Cordova MD as Physician (Gastroenterology)    Current Tobacco Use    History   Smoking Status    Current Every Day Smoker    Years: 25.00    Types: Cigarettes, Cigars   Smokeless Tobacco    Never Used     Ready to quit: No  Counseling given: Yes     Are you motivated to quit? - no  If yes, have you set a date to quite? - no    Have you tried any anti-smoking aids in the past? -  no     1-800-QUIT-NOW (05 [phone])     Medical History Review  Past Medical, Family, and Social History reviewed and does contribute to the patient presenting condition    Health Maintenance   Topic Date Due    DTaP/Tdap/Td vaccine (1 - Tdap) 04/02/1982    Pneumococcal med risk (1 of 1 - PPSV23) 04/02/1982    Lipid screen  09/28/2022    Colon cancer screen colonoscopy  03/27/2026    Flu vaccine  Completed    Hepatitis C screen  Completed    HIV screen  Completed

## 2017-12-11 NOTE — PATIENT INSTRUCTIONS
Patient Education        Stopping Smoking: Care Instructions  Your Care Instructions    Cigarette smokers crave the nicotine in cigarettes. Giving it up is much harder than simply changing a habit. Your body has to stop craving the nicotine. It is hard to quit, but you can do it. There are many tools that people use to quit smoking. You may find that combining tools works best for you. There are several steps to quitting. First you get ready to quit. Then you get support to help you. After that, you learn new skills and behaviors to become a nonsmoker. For many people, a necessary step is getting and using medicine. Your doctor will help you set up the plan that best meets your needs. You may want to attend a smoking cessation program to help you quit smoking. When you choose a program, look for one that has proven success. Ask your doctor for ideas. You will greatly increase your chances of success if you take medicine as well as get counseling or join a cessation program.  Some of the changes you feel when you first quit tobacco are uncomfortable. Your body will miss the nicotine at first, and you may feel short-tempered and grumpy. You may have trouble sleeping or concentrating. Medicine can help you deal with these symptoms. You may struggle with changing your smoking habits and rituals. The last step is the tricky one: Be prepared for the smoking urge to continue for a time. This is a lot to deal with, but keep at it. You will feel better. Follow-up care is a key part of your treatment and safety. Be sure to make and go to all appointments, and call your doctor if you are having problems. It's also a good idea to know your test results and keep a list of the medicines you take. How can you care for yourself at home? · Ask your family, friends, and coworkers for support. You have a better chance of quitting if you have help and support.   · Join a support group, such as Nicotine Anonymous, for people who problems. It's also a good idea to know your test results and keep a list of the medicines you take. How can you care for yourself at home? Medical treatment  · If you stop taking your medicine, your blood pressure will go back up. You may take one or more types of medicine to lower your blood pressure. Be safe with medicines. Take your medicine exactly as prescribed. Call your doctor if you think you are having a problem with your medicine. · Talk to your doctor before you start taking aspirin every day. Aspirin can help certain people lower their risk of a heart attack or stroke. But taking aspirin isn't right for everyone, because it can cause serious bleeding. · See your doctor regularly. You may need to see the doctor more often at first or until your blood pressure comes down. · If you are taking blood pressure medicine, talk to your doctor before you take decongestants or anti-inflammatory medicine, such as ibuprofen. Some of these medicines can raise blood pressure. · Learn how to check your blood pressure at home. Lifestyle changes  · Stay at a healthy weight. This is especially important if you put on weight around the waist. Losing even 10 pounds can help you lower your blood pressure. · If your doctor recommends it, get more exercise. Walking is a good choice. Bit by bit, increase the amount you walk every day. Try for at least 30 minutes on most days of the week. You also may want to swim, bike, or do other activities. · Avoid or limit alcohol. Talk to your doctor about whether you can drink any alcohol. · Try to limit how much sodium you eat to less than 2,300 milligrams (mg) a day. Your doctor may ask you to try to eat less than 1,500 mg a day. · Eat plenty of fruits (such as bananas and oranges), vegetables, legumes, whole grains, and low-fat dairy products. · Lower the amount of saturated fat in your diet. Saturated fat is found in animal products such as milk, cheese, and meat.  Limiting higher or the bottom number is 90 or higher, or both. ? · You think you may be having side effects from your blood pressure medicine. ? · Your blood pressure is usually normal, but it goes above normal at least 2 times. Where can you learn more? Go to [email or web address]://[email or web address]. org and sign in to your [employer] account. Enter M443 in the [facility] box to learn more about \"High Blood Pressure: Care Instructions. \"     If you do not have an account, please click on the \"Sign Up Now\" link. Current as of: September 21, 2016  Content Version: 11.4  © 7154-2379 [employer]. Care instructions adapted under license by [employer]. If you have questions about a medical condition or this instruction, always ask your healthcare professional. [address] any warranty or liability for your use of this information.

## 2017-12-11 NOTE — PROGRESS NOTES
Subjective:      Patient ID: Jessica Henriquez is a 45 y.o. male. HPI  Follow up of chronic conditions. Encounter Diagnoses   Name Primary?  Herniation of cervical intervertebral disc with radiculopathy     Cervicalgia, C5-7     Lumbar radiculopathy     Chronic pain syndrome     H/O heart valve replacement with mechanical valve, 2007     Essential hypertension Yes    Mixed hyperlipidemia     Acquired hypothyroidism     Chronic anxiety     Dysthymia (or depressive neurosis)     Tobacco abuse     Medication monitoring encounter    Controlled Substances Monitoring:     Documentation: No signs of potential drug abuse or diversion identified., Existing medication contract. Sedrick Garvey MD)  Chronic Pain: Functional status reviewed - continues with improved or maintaining ADL's. Sedrick Garvey MD)    HTN is stable with current medications. Pt has no medication side effects nor orthostatic symptoms. BP Readings from Last 3 Encounters:   12/11/17 128/78   09/14/17 138/70   09/12/17 130/75     Lipid values are at excellent benchmarks. He has no difficulties taking Lipitor 20 mg nightly and has had no statin myalgias. Cholesterol, Total (mg/dL)   Date Value   09/28/2017 101     HDL (mg/dL)   Date Value   09/28/2017 37     Triglycerides (mg/dL)   Date Value   09/28/2017 63     Lab Results   Component Value Date    LDLCALC 51 09/28/2017     Lab Results   Component Value Date    TSH 2.740 03/24/2016     He continues to have cervical pain and has been getting some increasing right elbow pain secondary to repetitive lifting and cleaning. I suggested he start using an elastic sleeve on the elbow and attempt to try and support it as well as keep the joint warm.     With colder weather, he has been getting some worsening sciatic pain but taking his pain medications, the hydrocodone decreases the pain by at least 50% and keeps him functional.  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 He is actively looking for a way of getting a different job which is less physically demanding. Unfortunately, he continues to smoke a little under a pack of cigarettes a day and it seems that he simply can't get below that amount on a regular basis. He has a daughter who is a nurse who \"is on me all the time\" to stop smoking. There is been no change in his depressive symptoms but he states that he is doing well on his current dose of Xanax in that it controls his anxiety which he identifies as his major psychological issue. The rest of this patient's conditions are stable. Past medical and surgical hx reviewed. Past Medical History:   Diagnosis Date    Arthritis     general    Bilateral sciatica     Bleeding     with coumadin    CAD (coronary artery disease)     Carpal tunnel syndrome     RIGHT    Cervicalgia, C5-7     Chronic anxiety     Dysthymia (or depressive neurosis) 2/1/2016    ED (erectile dysfunction)     GERD (gastroesophageal reflux disease)     Headache(784.0)     History of blood transfusion 03/2016    HTN (hypertension)     Hyperlipidemia     Hypothyroidism     Iron deficiency anemia, blood loss     Lumbago     Lumbar radiculopathy     S/P AVR, coumadin Tx     Spondylosis of thoracic region without myelopathy or radiculopathy      Past Surgical History:   Procedure Laterality Date    AORTIC VALVE REPLACEMENT  2007    [facility]    AORTIC VALVE REPLACEMENT  07/20/2016    extraction of mechanical valve and replacement with tissue valve    APPENDECTOMY  1980    CARDIAC CATHETERIZATION  5 20 2010    Patent coronary arteries. Possible causes of the patient's chest pain is likely noncardiac at least based on this angiogram. Patient chould be able to proceed w/ scheduled surgery w/ paying attention to anticoagulation and trying to minimize the period of no anticoagulation.     [address]  2012, 6/9/16    [facility]    CARDIOVASCULAR STRESS TEST  4 15 2010    Moderate fixed inferior defect, possibly attenuation, cannot exclude a previous MI. Correlation w/ EKG is recommented. Significant degree of gut uptake which is likely to be the cause of the attenuation artifact seen. EF 46%    CERVICAL FUSION  2010    CERVICAL FUSION  03/09/2016    REMOVAL HARDWARE C5-7, ACDF C4-6 WITH ATLANTIS CORNERSTONE    COLONOSCOPY  2010    CORONARY ARTERY BYPASS GRAFT      DILATATION, ESOPHAGUS      small resection    ENDOSCOPY, COLON, DIAGNOSTIC      NERVE SURGERY Bilateral 07/25/2017    THORACIC FACET BLOCK T7-8, T8-9, T11-12    OTHER SURGICAL HISTORY  05/02/2017    LESI L5    OTHER SURGICAL HISTORY  09/12/2017    thoracic epidural T11    CO INJ,PARAVERTEBRAL L/S,1 LEVEL Bilateral 7/25/2017    T-FACET MBB T7-8, T8-9, T11-12 BILATERAL performed by Devaughn Barrow MD at [address] [facility] DX/THER SBST [address] LUMBAR/SACRAL N/A 9/12/2017    TESI T11 performed by Devaughn Barrow MD at [address] [facility] [address]  2010    8 INCHES REMOVED    UPPER GASTROINTESTINAL ENDOSCOPY  2010     Portions of this note were completed with a voice recording program.  Efforts were made to edit the dictations but occasionally words are mis-transcribed. Review of Systems   Constitutional: Negative. HENT: Negative. Eyes: Negative. Respiratory: Negative for chest tightness, shortness of breath and wheezing. Cardiovascular: Negative for chest pain, palpitations and leg swelling. Gastrointestinal: Positive for abdominal pain. Negative for anal bleeding, blood in stool, constipation, diarrhea, nausea and vomiting. Endocrine: Negative. Genitourinary: Negative. Negative for difficulty urinating. Musculoskeletal: Positive for arthralgias, back pain, myalgias, neck pain and neck stiffness. Skin: Negative. Allergic/Immunologic: Negative.     Neurological: Positive for headaches. Negative for dizziness, light-headedness and numbness. Hematological: Negative. Psychiatric/Behavioral: Positive for dysphoric mood and sleep disturbance. Negative for suicidal ideas. The patient is nervous/anxious. All other systems reviewed and are negative. Objective:   Physical Exam   Constitutional: He is oriented to person, place, and time. He appears well-developed and well-nourished. HENT:   Right Ear: External ear normal.   Left Ear: External ear normal.   Nose: Nose normal.   Mouth/Throat: Oropharynx is clear and moist.   Eyes: Conjunctivae and EOM are normal. Pupils are equal, round, and reactive to light. Neck: Spinous process tenderness and muscular tenderness present. Carotid bruit is not present. No neck rigidity. Decreased range of motion present. No thyromegaly present. Cardiovascular: Normal rate, regular rhythm and normal heart sounds. Exam reveals no gallop. No murmur heard. Pulmonary/Chest: He has wheezes. He has no rales. Abdominal: Soft. Bowel sounds are normal. There is no tenderness. Musculoskeletal: He exhibits tenderness. He exhibits no edema. Cervical back: He exhibits decreased range of motion, tenderness and spasm. Lumbar back: He exhibits decreased range of motion, tenderness and pain. Lymphadenopathy:     He has no cervical adenopathy. Neurological: He is alert and oriented to person, place, and time. Skin: Skin is warm and dry. No rash noted. Psychiatric: His speech is normal and behavior is normal. Judgment and thought content normal. His mood appears anxious. Cognition and memory are normal. He exhibits a depressed mood. Nursing note and vitals reviewed. Assessment:      1. Essential hypertension  Lipid Panel    Basic Metabolic Panel   2. Herniation of cervical intervertebral disc with radiculopathy  HYDROcodone-acetaminophen (NORCO)  MG per tablet   3.  Cervicalgia, C5-7  HYDROcodone-acetaminophen (NORCO)  MG per tablet   4. Lumbar radiculopathy  HYDROcodone-acetaminophen (NORCO)  MG per tablet   5. Chronic pain syndrome  HYDROcodone-acetaminophen (NORCO)  MG per tablet   6. H/O heart valve replacement with mechanical valve, 2007  metoprolol succinate (TOPROL XL) 25 MG extended release tablet   7. Mixed hyperlipidemia  Lipid Panel    Basic Metabolic Panel   8. Acquired hypothyroidism     9. Chronic anxiety     10. Dysthymia (or depressive neurosis)     11. Tobacco abuse     12. Medication monitoring encounter  Lipid Panel    Basic Metabolic Panel           Plan:      Orders Placed This Encounter   Procedures    Lipid Panel     Standing Status:   Future     Standing Expiration Date:   12/11/2018     Order Specific Question:   Is Patient Fasting?/# of Hours     Answer:   12    Basic Metabolic Panel     Standing Status:   Future     Standing Expiration Date:   12/11/2018     Medications Discontinued During This Encounter   Medication Reason    HYDROcodone-acetaminophen (NORCO)  MG per tablet Reorder    metoprolol succinate (TOPROL XL) 25 MG extended release tablet Reorder     Current Outpatient Prescriptions   Medication Sig Dispense Refill    HYDROcodone-acetaminophen (NORCO)  MG per tablet Take 1 tablet by mouth every 6 hours as needed for Pain  Fill on 10/14/17. . 120 tablet 0    metoprolol succinate (TOPROL XL) 25 MG extended release tablet TAKE 1 TABLET BY MOUTH ONE TIME A DAY 30 tablet 6    aspirin 325 MG EC tablet Take 1 tablet by mouth daily 30 tablet 11    atorvastatin (LIPITOR) 20 MG tablet Take 1 tablet by mouth nightly 30 tablet 11    ALPRAZolam (XANAX) 0.5 MG tablet TAKE 1 TABLET BY MOUTH THREE TIMES A DAY AS NEEDED 90 tablet 3    tiZANidine (ZANAFLEX) 2 MG tablet Take 2 tablets by mouth every 8 hours as needed (spasms) 90 tablet 0    butalbital-acetaminophen-caffeine (FIORICET, ESGIC) -40 MG per tablet 1 tablet every 4-6 hours PRN for headache 30 tablet 0    esomeprazole Magnesium (NEXIUM) 40 MG PACK Take 1 packet by mouth 2 times daily 60 packet 5    IRON PO Take 65 mg by mouth daily       docusate sodium (COLACE) 100 MG capsule Take 1 capsule by mouth daily as needed for Constipation 30 capsule 11    gabapentin (NEURONTIN) 300 MG capsule Take 300 mg by mouth 3 times daily      levothyroxine (SYNTHROID) 75 MCG tablet Take 75 mcg by mouth Daily      Multiple Vitamin (MULTIVITAMIN PO) Take 1 tablet by mouth daily        No current facility-administered medications for this visit. Get labs this week. Continue present medications. Discussed use, benefit, and side effects of prescribed medications. Barriers to compliance discussed. All patient questions answered. Pt voiced understanding.

## 2017-12-12 ENCOUNTER — HOSPITAL ENCOUNTER (OUTPATIENT)
Age: 52
Discharge: HOME | End: 2017-12-12
Payer: COMMERCIAL

## 2017-12-12 DIAGNOSIS — I10 ESSENTIAL HYPERTENSION: ICD-10-CM

## 2017-12-12 DIAGNOSIS — Z51.81 MEDICATION MONITORING ENCOUNTER: ICD-10-CM

## 2017-12-12 DIAGNOSIS — E78.2 MIXED HYPERLIPIDEMIA: ICD-10-CM

## 2017-12-12 LAB
ANION GAP SERPL CALCULATED.3IONS-SCNC: 8 MEQ/L (ref 8–16)
BUN BLDV-MCNC: 20 MG/DL (ref 7–22)
CALCIUM SERPL-MCNC: 9 MG/DL (ref 8.5–10.5)
CHLORIDE BLD-SCNC: 104 MEQ/L (ref 98–111)
CHOLESTEROL, TOTAL: 113 MG/DL (ref 100–199)
CO2: 30 MEQ/L (ref 23–33)
CREAT SERPL-MCNC: 0.6 MG/DL (ref 0.4–1.2)
GFR SERPL CREATININE-BSD FRML MDRD: > 90 ML/MIN/1.73M2
GLUCOSE BLD-MCNC: 103 MG/DL (ref 70–108)
HDLC SERPL-MCNC: 36 MG/DL
LDL CHOLESTEROL CALCULATED: 58 MG/DL
POTASSIUM SERPL-SCNC: 4.6 MEQ/L (ref 3.5–5.2)
SODIUM BLD-SCNC: 142 MEQ/L (ref 135–145)
TRIGL SERPL-MCNC: 95 MG/DL (ref 0–199)

## 2017-12-12 PROCEDURE — 36415 COLL VENOUS BLD VENIPUNCTURE: CPT

## 2017-12-12 PROCEDURE — 80061 LIPID PANEL: CPT

## 2017-12-12 PROCEDURE — 80048 BASIC METABOLIC PNL TOTAL CA: CPT

## 2017-12-12 NOTE — TELEPHONE ENCOUNTER
Holter 24 hours  Keep caffeine down   Keep alcohol down   Will decide after holter if need seen earlier

## 2017-12-14 ENCOUNTER — HOSPITAL ENCOUNTER (OUTPATIENT)
Dept: NON INVASIVE DIAGNOSTICS | Age: 52
Discharge: HOME | End: 2017-12-14
Payer: COMMERCIAL

## 2017-12-14 DIAGNOSIS — R06.02 SOB (SHORTNESS OF BREATH): ICD-10-CM

## 2017-12-14 DIAGNOSIS — R00.2 HEART PALPITATIONS: ICD-10-CM

## 2017-12-14 PROCEDURE — 93226 XTRNL ECG REC<48 HR SCAN A/R: CPT

## 2017-12-14 PROCEDURE — 93225 XTRNL ECG REC<48 HRS REC: CPT

## 2017-12-18 NOTE — TELEPHONE ENCOUNTER
Please see holter  Next OV 2/1/18    CONCLUSION:  This is a normal sinus rhythm. Average heart rate 83 beats  per minute, ranging from 55 to 119 beats per minute. No significant pause  of more than 1.6 second noted. Rare ventricular ectopic beats, total of  38, all isolated. Frequent supraventricular ectopic beats, isolated  couplets, and supraventricular bigeminy. Frequent supraventricular ectopic  run has been noted. So basically, the palpitation could be explained by  frequent supraventricular ectopic beats which accounts for 6% of the  cardiac cycle. Consider beta blocker if clinically indicated.

## 2017-12-18 NOTE — PROCEDURES
[address] [place] [address]                                  HOLTER MONITOR    PATIENT NAME: Mariama Hill                    :        1965  MED REC NO:   [id number]                           ROOM:  ACCOUNT NO:   [de-identified]                           ADMIT DATE: 2017  PROVIDER:     BETTY Paez 24-HOURS 31 R Jenni 39:  2017    DURATION:  24 hours and 31 minutes. QUALITY:  Reasonable. INDICATION:  Palpitation. Diary presented and few entries noted. FINDINGS:  The patient is in normal sinus rhythm. Average heart rate is 83  beats per minute ranging from 55 to 119 beats per minute. Maximum R to R  interval is 1.6 seconds at 12 hours. There are 38 ventricular ectopic  beats, all isolated; 7814 supraventricular ectopic beats, of which 6076  isolated, 765 couplet, 62 supraventricular bigeminy, and there are 56 runs  of supraventricular ectopic runs along with composed of 5 beats, rate 133  beats per minute. The fastest composed of 3 beats, rate 170 beats per  minute. DIARY:  Diary presented. The patient was complaining of shortness of  breath at one time, fluttering; at other time, tightness in the chest.  So,  basically, this Holter monitor does not explain the shortness of breath or  the tightness in the chest of the patient, but it may explain fluttering or  palpitations of the patient. There is no consistent correlation. CONCLUSION:  This is a normal sinus rhythm. Average heart rate 83 beats  per minute, ranging from 55 to 119 beats per minute. No significant pause  of more than 1.6 second noted. Rare ventricular ectopic beats, total of  38, all isolated. Frequent supraventricular ectopic beats, isolated  couplets, and supraventricular bigeminy. Frequent supraventricular ectopic  run has been noted.   So basically, the palpitation could be explained by  frequent supraventricular ectopic beats which accounts for 6% of the  cardiac cycle. Consider beta blocker if clinically indicated. Gilma Adams.  Ken Chow M.D.    D: 12/17/2017 54:88:88       T: 12/17/2017 22:24:05     [email or web address]  Job#: [id number]     Doc#: [id number]    CC:

## 2017-12-19 RX ORDER — METOPROLOL SUCCINATE 50 MG/1
50 TABLET, EXTENDED RELEASE ORAL DAILY
COMMUNITY
End: 2017-12-19 | Stop reason: SDUPTHER

## 2017-12-19 RX ORDER — METOPROLOL SUCCINATE 50 MG/1
50 TABLET, EXTENDED RELEASE ORAL DAILY
Qty: 30 TABLET | Refills: 3 | Status: SHIPPED | OUTPATIENT
Start: 2017-12-19 | End: 2018-08-30 | Stop reason: DRUGHIGH

## 2017-12-19 NOTE — TELEPHONE ENCOUNTER
Spoke with pt voiced understanding. Pt states does not drink alcohol. Med list updated. Rx pended for Dr. Wellington Cazares to sign.

## 2018-01-15 DIAGNOSIS — M54.2 CERVICALGIA: ICD-10-CM

## 2018-01-15 DIAGNOSIS — M50.10 HERNIATION OF CERVICAL INTERVERTEBRAL DISC WITH RADICULOPATHY: ICD-10-CM

## 2018-01-15 DIAGNOSIS — G89.4 CHRONIC PAIN SYNDROME: ICD-10-CM

## 2018-01-15 DIAGNOSIS — M54.16 LUMBAR RADICULOPATHY: ICD-10-CM

## 2018-01-15 RX ORDER — HYDROCODONE BITARTRATE AND ACETAMINOPHEN 10; 325 MG/1; MG/1
1 TABLET ORAL EVERY 6 HOURS PRN
Qty: 120 TABLET | Refills: 0 | Status: SHIPPED | OUTPATIENT
Start: 2018-01-15 | End: 2018-02-14

## 2018-01-22 ENCOUNTER — OFFICE VISIT (OUTPATIENT)
Dept: FAMILY MEDICINE CLINIC | Age: 53
End: 2018-01-22
Payer: COMMERCIAL

## 2018-01-22 VITALS
BODY MASS INDEX: 27 KG/M2 | TEMPERATURE: 98.3 F | DIASTOLIC BLOOD PRESSURE: 60 MMHG | HEIGHT: 67 IN | HEART RATE: 78 BPM | RESPIRATION RATE: 16 BRPM | SYSTOLIC BLOOD PRESSURE: 116 MMHG | OXYGEN SATURATION: 99 % | WEIGHT: 172 LBS

## 2018-01-22 DIAGNOSIS — J40 BRONCHITIS: ICD-10-CM

## 2018-01-22 DIAGNOSIS — J01.90 ACUTE RHINOSINUSITIS: Primary | ICD-10-CM

## 2018-01-22 PROCEDURE — 99213 OFFICE O/P EST LOW 20 MIN: CPT | Performed by: FAMILY MEDICINE

## 2018-01-22 RX ORDER — LEVOTHYROXINE SODIUM 0.07 MG/1
TABLET ORAL
Qty: 30 TABLET | Refills: 0 | Status: SHIPPED | OUTPATIENT
Start: 2018-01-22 | End: 2018-06-05 | Stop reason: SDUPTHER

## 2018-01-22 RX ORDER — BENZONATATE 100 MG/1
100 CAPSULE ORAL 3 TIMES DAILY PRN
Qty: 21 CAPSULE | Refills: 0 | Status: SHIPPED | OUTPATIENT
Start: 2018-01-22 | End: 2018-02-16 | Stop reason: SDUPTHER

## 2018-01-22 RX ORDER — AMOXICILLIN AND CLAVULANATE POTASSIUM 875; 125 MG/1; MG/1
1 TABLET, FILM COATED ORAL 2 TIMES DAILY
Qty: 14 TABLET | Refills: 0 | Status: SHIPPED | OUTPATIENT
Start: 2018-01-22 | End: 2018-01-29

## 2018-01-22 ASSESSMENT — ENCOUNTER SYMPTOMS
SHORTNESS OF BREATH: 0
COUGH: 1
RHINORRHEA: 1
CHEST TIGHTNESS: 1
GASTROINTESTINAL NEGATIVE: 1
SINUS PAIN: 1
WHEEZING: 0
SINUS PRESSURE: 1

## 2018-01-22 NOTE — PROGRESS NOTES
Visit Information    Have you changed or started any medications since your last visit including any over-the-counter medicines, vitamins, or herbal medicines? no   Have you stopped taking any of your medications? Is so, why? -  no  Are you having any side effects from any of your medications? - no    Have you seen any other physician or provider since your last visit? yes -  Dr Katy Yoder   Have you had any other diagnostic tests since your last visit?  no   Have you been seen in the emergency room and/or had an admission in a hospital since we last saw you?  no   Have you had your routine dental cleaning in the past 6 months?  no     Do you have an active MyChart account? If no, what is the barrier?   No: pt refused    Patient Care Team:  Evelyn Pat MD as PCP - General (Family Medicine)  Evelyn Pat MD as PCP - [facility] Attributed Provider  Matt Riojas MD as Surgeon (Cardiothoracic Surgery)  Bianka Monterroso MD as Physician (Gastroenterology)    Medical History Review  Past Medical, Family, and Social History reviewed and does not contribute to the patient presenting condition    Health Maintenance   Topic Date Due    DTaP/Tdap/Td vaccine (1 - Tdap) 04/02/1982    Pneumococcal med risk (1 of 1 - PPSV23) 04/02/1982    TSH testing  03/24/2017    A1C test (Diabetic or Prediabetic)  03/23/2018    Lipid screen  12/12/2022    Colon cancer screen colonoscopy  03/27/2026    Flu vaccine  Completed    Hepatitis C screen  Completed    HIV screen  Completed

## 2018-01-22 NOTE — PROGRESS NOTES
Subjective:      Patient ID: America Sol is a 45 y.o. male. HPI:    Chief Complaint   Patient presents with    Cough     green mucous, terrible headache, sore throat, runny nose  onset 2 weeks  took Lata seltzer plus and Robitussin     Pt here for cough, sinus drainage and pressure for 2 weeks. Taking OTC meds which seem to help some. Cough is productive. No wheezing. No fevers. Does have some chills. Patient Active Problem List   Diagnosis    HTN (hypertension)    Lumbago    Lumbar radiculopathy    Cervicalgia, C5-7    Hyperlipidemia    Hypothyroidism    Chronic anxiety    Iron deficiency anemia, blood loss    ED (erectile dysfunction)    Bilateral sciatica    Trigger finger, right, ring     Medication monitoring encounter    Nocturnal leg cramps    GERD (gastroesophageal reflux disease)    IFG (impaired fasting glucose)    Situational depression    Dysthymia (or depressive neurosis)    Herniation of cervical intervertebral disc with radiculopathy    H/O cervical spine surgery, C4-5 fusion, 3/2/16. 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  Tobacco abuse    Lymphomatoid papulosis     Lumbar radiculitis    Lumbar spinal stenosis    Spondylosis of thoracic region without myelopathy or radiculopathy    Thoracic spinal stenosis    Chronic pain syndrome     Past Surgical History:   Procedure Laterality Date    [facility]    AORTIC VALVE REPLACEMENT  07/20/2016    extraction of mechanical valve and replacement with tissue valve    APPENDECTOMY  1980    CARDIAC CATHETERIZATION  5 20 2010    Patent coronary arteries. Possible causes of the patient's chest pain is likely noncardiac at least based on this angiogram. Patient chould be able to proceed w/ scheduled surgery w/ paying attention to anticoagulation and trying to minimize the period of no anticoagulation.  [facility]    CARDIOVASCULAR STRESS TEST  4 15 2010    Moderate fixed inferior defect, possibly attenuation, cannot exclude a previous MI. Correlation w/ EKG is recommented. Significant degree of gut uptake which is likely to be the cause of the attenuation artifact seen. EF 46%    CERVICAL FUSION  2010    CERVICAL FUSION  03/09/2016    REMOVAL HARDWARE C5-7, ACDF C4-6 WITH ATLANTIS CORNERSTONE    COLONOSCOPY  2010    CORONARY ARTERY BYPASS GRAFT      DILATATION, ESOPHAGUS      small resection    ENDOSCOPY, COLON, DIAGNOSTIC      NERVE SURGERY Bilateral 07/25/2017    THORACIC FACET BLOCK T7-8, T8-9, T11-12    OTHER SURGICAL HISTORY  05/02/2017    LESI L5    OTHER SURGICAL HISTORY  09/12/2017    thoracic epidural T11    NM INJ,PARAVERTEBRAL L/S,1 LEVEL Bilateral 7/25/2017    T-FACET MBB T7-8, T8-9, T11-12 BILATERAL performed by Trixie Pemberton MD at [address] [facility] DX/THER SB[address] LUMBAR/SACRAL N/A 9/12/2017    TESI T11 performed by Trixie Pemberton MD at [address] [facility] [address]  2010    8 INCHES REMOVED    UPPER GASTROINTESTINAL ENDOSCOPY  2010     Prior to Admission medications    Medication Sig Start Date End Date Taking? Authorizing Provider   HYDROcodone-acetaminophen (NORCO)  MG per tablet Take 1 tablet by mouth every 6 hours as needed for Pain for up to 30 days Fill on 10/14/17. . 1/15/18 2/14/18 Yes Aga Weber MD   metoprolol succinate (TOPROL XL) 50 MG extended release tablet Take 1 tablet by mouth daily 12/19/17  Yes Peggy Conley MD   aspirin 325 MG EC tablet Take 1 tablet by mouth daily 7/26/17  Yes Aga Weber MD   atorvastatin (LIPITOR) 20 MG tablet Take 1 tablet by mouth nightly 7/26/17  Yes Aga Weber MD   ALPRAZolam Werner Ninfa) 0.5 MG tablet TAKE 1 TABLET BY MOUTH THREE TIMES A DAY AS NEEDED 7/17/17  Yes Aga Weber MD   tiZANidine (ZANAFLEX) 2 MG tablet Take 2

## 2018-01-22 NOTE — LETTER
[facility] [address] [place] [address] Road [facility]  Phone: [phone]  Fax: 97 Derrickv[address] Buck Schwartz DO        January 22, 2018     Patient: Andre Mclaughlin   YOB: 1965   Date of Visit: 1/22/2018       To Whom It May Concern: It is my medical opinion that P[employer] Loges may return to work on 1/23/18. If you have any questions or concerns, please don't hesitate to call.     Sincerely,        Lucy December, DO

## 2018-01-23 ENCOUNTER — TELEPHONE (OUTPATIENT)
Dept: FAMILY MEDICINE CLINIC | Age: 53
End: 2018-01-23

## 2018-01-23 ENCOUNTER — HOSPITAL ENCOUNTER (OUTPATIENT)
Age: 53
Discharge: HOME | End: 2018-01-23
Payer: COMMERCIAL

## 2018-01-23 DIAGNOSIS — E03.9 ACQUIRED HYPOTHYROIDISM: ICD-10-CM

## 2018-01-23 LAB — TSH SERPL DL<=0.05 MIU/L-ACNC: 2.02 UIU/ML (ref 0.4–4.2)

## 2018-01-23 PROCEDURE — 36415 COLL VENOUS BLD VENIPUNCTURE: CPT

## 2018-01-23 PROCEDURE — 84443 ASSAY THYROID STIM HORMONE: CPT

## 2018-01-23 RX ORDER — ALPRAZOLAM 0.5 MG/1
0.5 TABLET ORAL 3 TIMES DAILY PRN
Qty: 90 TABLET | Refills: 3 | Status: SHIPPED | OUTPATIENT
Start: 2018-01-23 | End: 2018-08-12 | Stop reason: SDUPTHER

## 2018-02-16 ENCOUNTER — OFFICE VISIT (OUTPATIENT)
Dept: FAMILY MEDICINE CLINIC | Age: 53
End: 2018-02-16
Payer: COMMERCIAL

## 2018-02-16 VITALS
DIASTOLIC BLOOD PRESSURE: 72 MMHG | BODY MASS INDEX: 26.97 KG/M2 | HEIGHT: 67 IN | OXYGEN SATURATION: 99 % | WEIGHT: 171.8 LBS | HEART RATE: 84 BPM | SYSTOLIC BLOOD PRESSURE: 120 MMHG | RESPIRATION RATE: 20 BRPM | TEMPERATURE: 98.1 F

## 2018-02-16 DIAGNOSIS — J01.90 ACUTE RHINOSINUSITIS: Primary | ICD-10-CM

## 2018-02-16 DIAGNOSIS — J40 BRONCHITIS: ICD-10-CM

## 2018-02-16 DIAGNOSIS — M54.2 CERVICALGIA: ICD-10-CM

## 2018-02-16 PROCEDURE — 99213 OFFICE O/P EST LOW 20 MIN: CPT | Performed by: FAMILY MEDICINE

## 2018-02-16 RX ORDER — LEVOFLOXACIN 750 MG/1
750 TABLET ORAL DAILY
Qty: 5 TABLET | Refills: 0 | Status: SHIPPED | OUTPATIENT
Start: 2018-02-16 | End: 2018-02-21

## 2018-02-16 RX ORDER — PREDNISONE 20 MG/1
20 TABLET ORAL 2 TIMES DAILY
Qty: 10 TABLET | Refills: 0 | Status: SHIPPED | OUTPATIENT
Start: 2018-02-16 | End: 2018-02-21

## 2018-02-16 RX ORDER — HYDROCODONE BITARTRATE AND ACETAMINOPHEN 10; 325 MG/1; MG/1
1 TABLET ORAL EVERY 6 HOURS PRN
COMMUNITY
End: 2018-02-16 | Stop reason: SDUPTHER

## 2018-02-16 RX ORDER — HYDROCODONE BITARTRATE AND ACETAMINOPHEN 10; 325 MG/1; MG/1
1 TABLET ORAL EVERY 6 HOURS PRN
Qty: 120 TABLET | Refills: 0 | Status: SHIPPED | OUTPATIENT
Start: 2018-02-16 | End: 2018-03-14 | Stop reason: SDUPTHER

## 2018-02-16 RX ORDER — BENZONATATE 100 MG/1
100 CAPSULE ORAL 3 TIMES DAILY PRN
Qty: 15 CAPSULE | Refills: 0 | Status: SHIPPED | OUTPATIENT
Start: 2018-02-16 | End: 2018-02-21

## 2018-02-16 RX ORDER — ALBUTEROL SULFATE 90 UG/1
2 AEROSOL, METERED RESPIRATORY (INHALATION) EVERY 4 HOURS PRN
Qty: 1 INHALER | Refills: 0 | Status: SHIPPED | OUTPATIENT
Start: 2018-02-16 | End: 2018-04-11 | Stop reason: ALTCHOICE

## 2018-02-16 ASSESSMENT — ENCOUNTER SYMPTOMS
GASTROINTESTINAL NEGATIVE: 1
SHORTNESS OF BREATH: 1
COUGH: 1
WHEEZING: 1
SINUS PAIN: 1
SINUS PRESSURE: 1

## 2018-02-16 NOTE — PROGRESS NOTES
Subjective:      Patient ID: Triston Rivera is a 45 y.o. male. HPI:    Chief Complaint   Patient presents with    Facial Pain     x 3 weeks    Headache    Chest Congestion    Cough    Pharyngitis     Pt here for above symptoms for 3 weeks. Recently treated for sinus infection in January, felt fine after the abx but about a week later, symptoms returned. Taking Lata-seltzer plus. No fevers. Increased wheezing. Still smoking. No inhalers. Patient Active Problem List   Diagnosis    HTN (hypertension)    Lumbago    Lumbar radiculopathy    Cervicalgia, C5-7    Hyperlipidemia    Hypothyroidism    Chronic anxiety    Iron deficiency anemia, blood loss    ED (erectile dysfunction)    Bilateral sciatica    Trigger finger, right, ring     Medication monitoring encounter    Nocturnal leg cramps    GERD (gastroesophageal reflux disease)    IFG (impaired fasting glucose)    Situational depression    Dysthymia (or depressive neurosis)    Herniation of cervical intervertebral disc with radiculopathy    H/O cervical spine surgery, C4-5 fusion, 3/2/16. 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  Tobacco abuse    Lymphomatoid papulosis     Lumbar radiculitis    Lumbar spinal stenosis    Spondylosis of thoracic region without myelopathy or radiculopathy    Thoracic spinal stenosis    Chronic pain syndrome     Past Surgical History:   Procedure Laterality Date    [facility]    AORTIC VALVE REPLACEMENT  07/20/2016    extraction of mechanical valve and replacement with tissue valve    APPENDECTOMY  1980    CARDIAC CATHETERIZATION  5 20 2010    Patent coronary arteries.  Possible causes of the patient's chest pain is likely noncardiac at least based on this angiogram. Patient chould be able to proceed w/ scheduled surgery w/ paying attention to anticoagulation and Gabriel Fernandes MD   atorvastatin (LIPITOR) 20 MG tablet Take 1 tablet by mouth nightly 7/26/17  Yes Amador Le MD   tiZANidine (ZANAFLEX) 2 MG tablet Take 2 tablets by mouth every 8 hours as needed (spasms) 5/25/17  Yes Cathi Roblero CNP   esomeprazole Magnesium (NEXIUM) 40 MG PACK Take 1 packet by mouth 2 times daily 3/15/17  Yes Amador Le MD   IRON PO Take 65 mg by mouth daily    Yes Historical Provider, MD   docusate sodium (COLACE) 100 MG capsule Take 1 capsule by mouth daily as needed for Constipation 12/5/16  Yes Amador Le MD   gabapentin (NEURONTIN) 300 MG capsule Take 300 mg by mouth 3 times daily   Yes Historical Provider, MD   levothyroxine (SYNTHROID) 75 MCG tablet Take 75 mcg by mouth Daily   Yes Historical Provider, MD   Multiple Vitamin (MULTIVITAMIN PO) Take 1 tablet by mouth daily    Yes Historical Provider, MD         Review of Systems   Constitutional: Negative. Negative for fever. HENT: Positive for congestion, sinus pain and sinus pressure. Respiratory: Positive for cough, shortness of breath and wheezing. Cardiovascular: Negative. Gastrointestinal: Negative. Musculoskeletal: Negative. All other systems reviewed and are negative. Objective:   Physical Exam   Constitutional: He is oriented to person, place, and time. He appears well-developed and well-nourished. HENT:   Head: Normocephalic and atraumatic. Right Ear: A middle ear effusion is present. Left Ear: A middle ear effusion is present. Nose: Mucosal edema and rhinorrhea present. Right sinus exhibits maxillary sinus tenderness and frontal sinus tenderness. Mouth/Throat: Oropharynx is clear and moist and mucous membranes are normal.   Cardiovascular: Normal rate, regular rhythm and normal heart sounds. No murmur heard. Pulmonary/Chest: Effort normal. He has decreased breath sounds. He has wheezes. Abdominal: Soft. Bowel sounds are normal.   Musculoskeletal: He exhibits no edema.    Neurological:

## 2018-03-14 ENCOUNTER — TELEPHONE (OUTPATIENT)
Dept: FAMILY MEDICINE CLINIC | Age: 53
End: 2018-03-14

## 2018-03-14 ENCOUNTER — OFFICE VISIT (OUTPATIENT)
Dept: FAMILY MEDICINE CLINIC | Age: 53
End: 2018-03-14
Payer: COMMERCIAL

## 2018-03-14 VITALS
RESPIRATION RATE: 16 BRPM | BODY MASS INDEX: 26.59 KG/M2 | DIASTOLIC BLOOD PRESSURE: 74 MMHG | SYSTOLIC BLOOD PRESSURE: 148 MMHG | HEART RATE: 83 BPM | WEIGHT: 169.4 LBS | TEMPERATURE: 98.1 F | HEIGHT: 67 IN

## 2018-03-14 DIAGNOSIS — R39.11 URINARY HESITANCY: ICD-10-CM

## 2018-03-14 DIAGNOSIS — M54.2 CERVICALGIA: ICD-10-CM

## 2018-03-14 DIAGNOSIS — Z95.2 S/P AVR (AORTIC VALVE REPLACEMENT): ICD-10-CM

## 2018-03-14 DIAGNOSIS — I10 ESSENTIAL HYPERTENSION: Primary | ICD-10-CM

## 2018-03-14 DIAGNOSIS — E78.2 MIXED HYPERLIPIDEMIA: ICD-10-CM

## 2018-03-14 DIAGNOSIS — K21.9 GASTROESOPHAGEAL REFLUX DISEASE WITHOUT ESOPHAGITIS: ICD-10-CM

## 2018-03-14 DIAGNOSIS — E03.9 ACQUIRED HYPOTHYROIDISM: ICD-10-CM

## 2018-03-14 DIAGNOSIS — F34.1 DYSTHYMIA (OR DEPRESSIVE NEUROSIS): ICD-10-CM

## 2018-03-14 DIAGNOSIS — N52.01 ERECTILE DYSFUNCTION DUE TO ARTERIAL INSUFFICIENCY: ICD-10-CM

## 2018-03-14 DIAGNOSIS — Z51.81 MEDICATION MONITORING ENCOUNTER: ICD-10-CM

## 2018-03-14 DIAGNOSIS — D50.8 IRON DEFICIENCY ANEMIA SECONDARY TO INADEQUATE DIETARY IRON INTAKE: ICD-10-CM

## 2018-03-14 DIAGNOSIS — M54.16 LUMBAR RADICULOPATHY: ICD-10-CM

## 2018-03-14 DIAGNOSIS — Z72.0 TOBACCO ABUSE: ICD-10-CM

## 2018-03-14 DIAGNOSIS — F41.9 CHRONIC ANXIETY: ICD-10-CM

## 2018-03-14 LAB
BILIRUBIN, POC: NORMAL
BLOOD URINE, POC: NEGATIVE
CLARITY, POC: CLEAR
COLOR, POC: NORMAL
GLUCOSE URINE, POC: NEGATIVE
KETONES, POC: NORMAL
LEUKOCYTE EST, POC: NEGATIVE
NITRITE, POC: NEGATIVE
PH, POC: 5
PROTEIN, POC: NORMAL
SPECIFIC GRAVITY, POC: 1.02
UROBILINOGEN, POC: 0.2

## 2018-03-14 PROCEDURE — 81003 URINALYSIS AUTO W/O SCOPE: CPT | Performed by: FAMILY MEDICINE

## 2018-03-14 PROCEDURE — 99214 OFFICE O/P EST MOD 30 MIN: CPT | Performed by: FAMILY MEDICINE

## 2018-03-14 RX ORDER — TAMSULOSIN HYDROCHLORIDE 0.4 MG/1
0.4 CAPSULE ORAL DAILY
Qty: 30 CAPSULE | Refills: 3 | Status: SHIPPED | OUTPATIENT
Start: 2018-03-14 | End: 2018-03-21 | Stop reason: ALTCHOICE

## 2018-03-14 RX ORDER — HYDROCODONE BITARTRATE AND ACETAMINOPHEN 10; 325 MG/1; MG/1
1 TABLET ORAL EVERY 6 HOURS PRN
Qty: 120 TABLET | Refills: 0 | Status: SHIPPED | OUTPATIENT
Start: 2018-03-14 | End: 2018-04-16 | Stop reason: SDUPTHER

## 2018-03-14 ASSESSMENT — ENCOUNTER SYMPTOMS
SHORTNESS OF BREATH: 0
BACK PAIN: 1
EYES NEGATIVE: 1
WHEEZING: 0
NAUSEA: 0
ABDOMINAL PAIN: 1
DIARRHEA: 0
STRIDOR: 0

## 2018-03-14 NOTE — PROGRESS NOTES
Tobacco Use Visit Information:    Have you changed or started any medications since your last visit including any over-the-counter medicines, vitamins, or herbal medicines? no   Are you having any side effects from any of your medications? -  no  Have you stopped taking any of your medications? Is so, why? -  no    Have you seen any other physician or provider since your last visit? No  Have you had any other diagnostic tests since your last visit? No  Have you been seen in the emergency room and/or had an admission to a hospital since we last saw you? No  Have you had your routine dental cleaning in the past 6 months? no    Have you activated your [employer] account? If not, what are your barriers?  Yes     Patient Care Team:  Laya Doherty MD as PCP - General (Family Medicine)  Laya Doherty MD as PCP - S Attributed Provider  Israel Frank MD as Surgeon (Cardiothoracic Surgery)  Michael Orozco MD as Physician (Gastroenterology)    Current Tobacco Use    History   Smoking Status    Current Every Day Smoker    Years: 25.00    Types: Cigarettes, Cigars   Smokeless Tobacco    Never Used     Ready to quit: No  Counseling given: Yes     Are you motivated to quit? - no  If yes, have you set a date to quite? - no    Have you tried any anti-smoking aids in the past? -  no     1-800-QUIT-NOW (01 [phone])     Medical History Review  Past Medical, Family, and Social History reviewed and does contribute to the patient presenting condition    Health Maintenance   Topic Date Due    DTaP/Tdap/Td vaccine (1 - Tdap) 04/02/1982    Pneumococcal med risk (1 of 1 - PPSV23) 04/02/1982    Shingles Vaccine (1 of 2 - 2 Dose Series) 04/02/2013    A1C test (Diabetic or Prediabetic)  03/23/2018    TSH testing  01/23/2019    Lipid screen  12/12/2022    Colon cancer screen colonoscopy  03/27/2026    Flu vaccine  Completed    Hepatitis C screen  Completed    HIV screen  Completed

## 2018-03-14 NOTE — PROGRESS NOTES
get full-time employment at a job that he is doing part-time currently I believe he would go with that which would pay more and be less physically demanding. He continues to have issues with erectile dysfunction but he also continues to smoke and the combination of that and opiate therapy along with multiple cardiac medications, the likelihood of having reasonable erectile function has many issues working against him. He states that he tries to cut down on smoking but whenever he is nervous or feels stressed, he returns to smoking without giving a second thought. I don't have high hopes at this point of him quitting. He has been noticing increasing urinary hesitation over the last year or 2. This has not been associated with dysuria hematuria or pyuria. He does not get up frequently to urinate at night but during the day he notices symptoms which are suggestive of incomplete bladder emptying. I will start him on Flomax and see how he responds to the medication. He will be continued if he tolerates it and gets a good clinical response. He continues to have fatigue issues but his thyroid function has been good. He continues to be a very poor sleeper which I believe contributes to her mount of his fatigue also. His stress coping mechanisms are not very good so there are many things that do stress him out and keep him from being able to go to sleep because \"I can't turn my mind off\". He is taking his alprazolam 0.5 mg 3 times a day currently. The rest of this patient's conditions are stable. Past medical and surgical hx reviewed.   Past Medical History:   Diagnosis Date    Arthritis     general    Bilateral sciatica     Bleeding     with coumadin    CAD (coronary artery disease)     Carpal tunnel syndrome     RIGHT    Cervicalgia, C5-7     Chronic anxiety     Dysthymia (or depressive neurosis) 2/1/2016    ED (erectile dysfunction)     GERD (gastroesophageal reflux disease)     HWVSMLUG(255.3)     History of blood transfusion 03/2016    HTN (hypertension)     Hyperlipidemia     Hypothyroidism     Iron deficiency anemia, blood loss     Lumbago     Lumbar radiculopathy     S/P AVR, coumadin Tx     Spondylosis of thoracic region without myelopathy or radiculopathy      Past Surgical History:   Procedure Laterality Date    [facility]  [facility]    AORTIC VALVE REPLACEMENT  07/20/2016    extraction of mechanical valve and replacement with tissue valve    APPENDECTOMY  1980    CARDIAC CATHETERIZATION  5 20 2010    Patent coronary arteries. Possible causes of the patient's chest pain is likely noncardiac at least based on this angiogram. Patient chould be able to proceed w/ scheduled surgery w/ paying attention to anticoagulation and trying to minimize the period of no anticoagulation.  CARDIAC CATHETERIZATION  2012, 6/9/16    [facility]    CARDIOVASCULAR STRESS TEST  4 15 2010    Moderate fixed inferior defect, possibly attenuation, cannot exclude a previous MI. Correlation w/ EKG is recommented. Significant degree of gut uptake which is likely to be the cause of the attenuation artifact seen.  EF 46%    CERVICAL FUSION  2010    CERVICAL FUSION  03/09/2016    REMOVAL HARDWARE C5-7, ACDF C4-6 WITH ATLANTIS CORNERSTONE    COLONOSCOPY  2010    CORONARY ARTERY BYPASS GRAFT      DILATATION, ESOPHAGUS      small resection    ENDOSCOPY, COLON, DIAGNOSTIC      NERVE SURGERY Bilateral 07/25/2017    THORACIC FACET BLOCK T7-8, T8-9, T11-12    OTHER SURGICAL HISTORY  05/02/2017    LESI L5    OTHER SURGICAL HISTORY  09/12/2017    thoracic epidural T11    [place] INJ,PARAVERTEBRAL L/S,1 LEVEL Bilateral 7/25/2017    T-FACET MBB T7-8, T8-9, T11-12 BILATERAL performed by Audra Sanchez MD at [address] DX/THER SBST EPIDURAL/SUBARACH LUMBAR/SACRAL N/A 9/12/2017    TESI T11 performed by Audra Sanchez MD at [address] [facility]

## 2018-03-20 ENCOUNTER — TELEPHONE (OUTPATIENT)
Dept: FAMILY MEDICINE CLINIC | Age: 53
End: 2018-03-20

## 2018-03-20 DIAGNOSIS — R39.11 URINARY HESITANCY: Primary | ICD-10-CM

## 2018-03-21 RX ORDER — TERAZOSIN 2 MG/1
2 CAPSULE ORAL NIGHTLY
Qty: 30 CAPSULE | Refills: 3 | Status: SHIPPED | OUTPATIENT
Start: 2018-03-21 | End: 2019-01-12 | Stop reason: SDUPTHER

## 2018-03-21 NOTE — TELEPHONE ENCOUNTER
Patient returned call. Yes Flomax was helping with bladder emptying. Advised a medication change to Hytrin low dose. If no call patient will check with [employer] [facility]. after 1. Please advise.

## 2018-03-28 RX ORDER — DOCUSATE SODIUM 100 MG/1
CAPSULE, LIQUID FILLED ORAL
Qty: 30 CAPSULE | Refills: 10 | Status: SHIPPED | OUTPATIENT
Start: 2018-03-28

## 2018-04-11 ENCOUNTER — OFFICE VISIT (OUTPATIENT)
Dept: FAMILY MEDICINE CLINIC | Age: 53
End: 2018-04-11
Payer: COMMERCIAL

## 2018-04-11 VITALS
WEIGHT: 166.5 LBS | HEIGHT: 66 IN | HEART RATE: 76 BPM | SYSTOLIC BLOOD PRESSURE: 136 MMHG | BODY MASS INDEX: 26.76 KG/M2 | RESPIRATION RATE: 16 BRPM | OXYGEN SATURATION: 94 % | DIASTOLIC BLOOD PRESSURE: 76 MMHG

## 2018-04-11 DIAGNOSIS — M48.062 SPINAL STENOSIS OF LUMBAR REGION WITH NEUROGENIC CLAUDICATION: ICD-10-CM

## 2018-04-11 DIAGNOSIS — M54.16 LUMBAR RADICULOPATHY: ICD-10-CM

## 2018-04-11 DIAGNOSIS — R39.11 URINARY HESITANCY: Primary | ICD-10-CM

## 2018-04-11 PROCEDURE — G0444 DEPRESSION SCREEN ANNUAL: HCPCS | Performed by: FAMILY MEDICINE

## 2018-04-11 PROCEDURE — 99213 OFFICE O/P EST LOW 20 MIN: CPT | Performed by: FAMILY MEDICINE

## 2018-04-11 ASSESSMENT — PATIENT HEALTH QUESTIONNAIRE - PHQ9
SUM OF ALL RESPONSES TO PHQ QUESTIONS 1-9: 9
3. TROUBLE FALLING OR STAYING ASLEEP: 3
6. FEELING BAD ABOUT YOURSELF - OR THAT YOU ARE A FAILURE OR HAVE LET YOURSELF OR YOUR FAMILY DOWN: 0
5. POOR APPETITE OR OVEREATING: 0
1. LITTLE INTEREST OR PLEASURE IN DOING THINGS: 3
9. THOUGHTS THAT YOU WOULD BE BETTER OFF DEAD, OR OF HURTING YOURSELF: 0
10. IF YOU CHECKED OFF ANY PROBLEMS, HOW DIFFICULT HAVE THESE PROBLEMS MADE IT FOR YOU TO DO YOUR WORK, TAKE CARE OF THINGS AT HOME, OR GET ALONG WITH OTHER PEOPLE: 0
8. MOVING OR SPEAKING SO SLOWLY THAT OTHER PEOPLE COULD HAVE NOTICED. OR THE OPPOSITE, BEING SO FIGETY OR RESTLESS THAT YOU HAVE BEEN MOVING AROUND A LOT MORE THAN USUAL: 3
SUM OF ALL RESPONSES TO PHQ9 QUESTIONS 1 & 2: 3
7. TROUBLE CONCENTRATING ON THINGS, SUCH AS READING THE NEWSPAPER OR WATCHING TELEVISION: 0
4. FEELING TIRED OR HAVING LITTLE ENERGY: 0
2. FEELING DOWN, DEPRESSED OR HOPELESS: 0

## 2018-04-11 ASSESSMENT — ENCOUNTER SYMPTOMS: BACK PAIN: 1

## 2018-04-16 ENCOUNTER — HOSPITAL ENCOUNTER (OUTPATIENT)
Dept: MRI IMAGING | Age: 53
Discharge: HOME | End: 2018-04-16
Payer: COMMERCIAL

## 2018-04-16 DIAGNOSIS — S33.140A SUBLUXATION OF L4-L5 LUMBAR VERTEBRA, INITIAL ENCOUNTER: ICD-10-CM

## 2018-04-16 DIAGNOSIS — M54.5 LOW BACK PAIN, UNSPECIFIED BACK PAIN LATERALITY, UNSPECIFIED CHRONICITY, WITH SCIATICA PRESENCE UNSPECIFIED: ICD-10-CM

## 2018-04-16 DIAGNOSIS — M54.2 CERVICALGIA: ICD-10-CM

## 2018-04-16 PROCEDURE — 72148 MRI LUMBAR SPINE W/O DYE: CPT

## 2018-04-16 RX ORDER — HYDROCODONE BITARTRATE AND ACETAMINOPHEN 10; 325 MG/1; MG/1
1 TABLET ORAL EVERY 6 HOURS PRN
Qty: 120 TABLET | Refills: 0 | Status: SHIPPED | OUTPATIENT
Start: 2018-04-16 | End: 2018-05-17 | Stop reason: SDUPTHER

## 2018-05-17 DIAGNOSIS — M54.2 CERVICALGIA: ICD-10-CM

## 2018-05-17 RX ORDER — HYDROCODONE BITARTRATE AND ACETAMINOPHEN 10; 325 MG/1; MG/1
1 TABLET ORAL EVERY 6 HOURS PRN
Qty: 120 TABLET | Refills: 0 | Status: SHIPPED | OUTPATIENT
Start: 2018-05-17 | End: 2018-06-15 | Stop reason: SDUPTHER

## 2018-06-05 DIAGNOSIS — K28.4 GASTROINTESTINAL HEMORRHAGE ASSOCIATED WITH GASTROJEJUNAL ULCER: ICD-10-CM

## 2018-06-05 DIAGNOSIS — K21.9 GASTROESOPHAGEAL REFLUX DISEASE WITHOUT ESOPHAGITIS: ICD-10-CM

## 2018-06-06 RX ORDER — ESOMEPRAZOLE MAGNESIUM 40 MG/1
CAPSULE, DELAYED RELEASE ORAL
Qty: 60 CAPSULE | Refills: 4 | Status: SHIPPED | OUTPATIENT
Start: 2018-06-06 | End: 2019-07-16 | Stop reason: SDUPTHER

## 2018-06-06 RX ORDER — LEVOTHYROXINE SODIUM 0.07 MG/1
TABLET ORAL
Qty: 30 TABLET | Refills: 0 | Status: SHIPPED | OUTPATIENT
Start: 2018-06-06 | End: 2018-08-12 | Stop reason: SDUPTHER

## 2018-06-15 DIAGNOSIS — M54.2 CERVICALGIA: ICD-10-CM

## 2018-06-15 RX ORDER — HYDROCODONE BITARTRATE AND ACETAMINOPHEN 10; 325 MG/1; MG/1
1 TABLET ORAL EVERY 6 HOURS PRN
Qty: 120 TABLET | Refills: 0 | Status: SHIPPED | OUTPATIENT
Start: 2018-06-15 | End: 2018-07-16 | Stop reason: SDUPTHER

## 2018-06-28 ENCOUNTER — HOSPITAL ENCOUNTER (OUTPATIENT)
Age: 53
Discharge: HOME | End: 2018-06-28
Payer: COMMERCIAL

## 2018-06-28 LAB
ALBUMIN SERPL-MCNC: 4.1 G/DL (ref 3.5–5.1)
ALP BLD-CCNC: 58 U/L (ref 38–126)
ALT SERPL-CCNC: 9 U/L (ref 11–66)
ANION GAP SERPL CALCULATED.3IONS-SCNC: 11 MEQ/L (ref 8–16)
AST SERPL-CCNC: 11 U/L (ref 5–40)
BASOPHILS # BLD: 0.5 %
BASOPHILS ABSOLUTE: 0 THOU/MM3 (ref 0–0.1)
BILIRUB SERPL-MCNC: 0.3 MG/DL (ref 0.3–1.2)
BUN BLDV-MCNC: 22 MG/DL (ref 7–22)
C-REACTIVE PROTEIN: 0.26 MG/DL (ref 0–1)
CALCIUM SERPL-MCNC: 9.1 MG/DL (ref 8.5–10.5)
CHLORIDE BLD-SCNC: 105 MEQ/L (ref 98–111)
CO2: 25 MEQ/L (ref 23–33)
CREAT SERPL-MCNC: 0.6 MG/DL (ref 0.4–1.2)
EOSINOPHIL # BLD: 1.5 %
EOSINOPHILS ABSOLUTE: 0.1 THOU/MM3 (ref 0–0.4)
ERYTHROCYTE [DISTWIDTH] IN BLOOD BY AUTOMATED COUNT: 14.8 % (ref 11.5–14.5)
ERYTHROCYTE [DISTWIDTH] IN BLOOD BY AUTOMATED COUNT: 48.2 FL (ref 35–45)
GFR SERPL CREATININE-BSD FRML MDRD: > 90 ML/MIN/1.73M2
GLUCOSE BLD-MCNC: 94 MG/DL (ref 70–108)
HCT VFR BLD CALC: 39.9 % (ref 42–52)
HEMOGLOBIN: 13.5 GM/DL (ref 14–18)
IMMATURE GRANS (ABS): 0.01 THOU/MM3 (ref 0–0.07)
IMMATURE GRANULOCYTES: 0.1 %
LYMPHOCYTES # BLD: 17.9 %
LYMPHOCYTES ABSOLUTE: 1.3 THOU/MM3 (ref 1–4.8)
MCH RBC QN AUTO: 30.4 PG (ref 26–33)
MCHC RBC AUTO-ENTMCNC: 33.8 GM/DL (ref 32.2–35.5)
MCV RBC AUTO: 89.9 FL (ref 80–94)
MONOCYTES # BLD: 9.3 %
MONOCYTES ABSOLUTE: 0.7 THOU/MM3 (ref 0.4–1.3)
NUCLEATED RED BLOOD CELLS: 0 /100 WBC
PLATELET # BLD: 234 THOU/MM3 (ref 130–400)
PMV BLD AUTO: 10.3 FL (ref 9.4–12.4)
POTASSIUM SERPL-SCNC: 3.9 MEQ/L (ref 3.5–5.2)
RBC # BLD: 4.44 MILL/MM3 (ref 4.7–6.1)
RHEUMATOID FACTOR: < 10 IU/ML (ref 0–13)
SEDIMENTATION RATE, ERYTHROCYTE: 16 MM/HR (ref 0–10)
SEG NEUTROPHILS: 70.7 %
SEGMENTED NEUTROPHILS ABSOLUTE COUNT: 5.3 THOU/MM3 (ref 1.8–7.7)
SODIUM BLD-SCNC: 141 MEQ/L (ref 135–145)
TOTAL PROTEIN: 6.8 G/DL (ref 6.1–8)
URIC ACID: 4.9 MG/DL (ref 3.7–7)
VITAMIN D 25-HYDROXY: 27 NG/ML (ref 30–100)
WBC # BLD: 7.5 THOU/MM3 (ref 4.8–10.8)

## 2018-06-28 PROCEDURE — 85025 COMPLETE CBC W/AUTO DIFF WBC: CPT

## 2018-06-28 PROCEDURE — 80053 COMPREHEN METABOLIC PANEL: CPT

## 2018-06-28 PROCEDURE — 86430 RHEUMATOID FACTOR TEST QUAL: CPT

## 2018-06-28 PROCEDURE — 84550 ASSAY OF BLOOD/URIC ACID: CPT

## 2018-06-28 PROCEDURE — 82306 VITAMIN D 25 HYDROXY: CPT

## 2018-06-28 PROCEDURE — 86812 HLA TYPING A B OR C: CPT

## 2018-06-28 PROCEDURE — 86140 C-REACTIVE PROTEIN: CPT

## 2018-06-28 PROCEDURE — 86200 CCP ANTIBODY: CPT

## 2018-06-28 PROCEDURE — 36415 COLL VENOUS BLD VENIPUNCTURE: CPT

## 2018-06-28 PROCEDURE — 85651 RBC SED RATE NONAUTOMATED: CPT

## 2018-06-28 PROCEDURE — 86038 ANTINUCLEAR ANTIBODIES: CPT

## 2018-06-30 LAB
ANA SCREEN: NORMAL
CYCLIC CITRULLIN PEPTIDE AB: 9 UNITS (ref 0–19)
HLA-B27: NORMAL

## 2018-07-16 DIAGNOSIS — M54.2 CERVICALGIA: ICD-10-CM

## 2018-07-16 NOTE — TELEPHONE ENCOUNTER
Lexa Velazquez called requesting a refill on the following medications:  Requested Prescriptions     Pending Prescriptions Disp Refills    HYDROcodone-acetaminophen (NORCO)  MG per tablet 120 tablet 0     Sig: Take 1 tablet by mouth every 6 hours as needed for Pain for up to 30 days. .     Pharmacy verified:  [email or web address]      Date of last visit: 4/11/2018  Date of next visit (if applicable): 8/76/1299

## 2018-07-17 RX ORDER — HYDROCODONE BITARTRATE AND ACETAMINOPHEN 10; 325 MG/1; MG/1
1 TABLET ORAL EVERY 6 HOURS PRN
Qty: 120 TABLET | Refills: 0 | Status: SHIPPED | OUTPATIENT
Start: 2018-07-17 | End: 2018-08-15 | Stop reason: SDUPTHER

## 2018-08-09 DIAGNOSIS — M54.2 CERVICALGIA: ICD-10-CM

## 2018-08-09 DIAGNOSIS — M50.10 HERNIATION OF CERVICAL INTERVERTEBRAL DISC WITH RADICULOPATHY: ICD-10-CM

## 2018-08-09 DIAGNOSIS — M54.16 LUMBAR RADICULOPATHY: ICD-10-CM

## 2018-08-09 RX ORDER — CELECOXIB 200 MG/1
CAPSULE ORAL
Qty: 30 CAPSULE | Refills: 3 | Status: SHIPPED | OUTPATIENT
Start: 2018-08-09 | End: 2019-02-22 | Stop reason: SDUPTHER

## 2018-08-12 DIAGNOSIS — E78.2 MIXED HYPERLIPIDEMIA: ICD-10-CM

## 2018-08-12 DIAGNOSIS — F41.9 ANXIETY: Primary | ICD-10-CM

## 2018-08-13 RX ORDER — ASPIRIN 325 MG
TABLET, DELAYED RELEASE (ENTERIC COATED) ORAL
Qty: 30 TABLET | Refills: 10 | Status: SHIPPED | OUTPATIENT
Start: 2018-08-13 | End: 2021-02-25 | Stop reason: DRUGHIGH

## 2018-08-13 RX ORDER — LEVOTHYROXINE SODIUM 0.07 MG/1
TABLET ORAL
Qty: 30 TABLET | Refills: 0 | Status: SHIPPED | OUTPATIENT
Start: 2018-08-13 | End: 2018-09-27 | Stop reason: SDUPTHER

## 2018-08-13 RX ORDER — ALPRAZOLAM 0.5 MG/1
TABLET ORAL
Qty: 90 TABLET | Refills: 2 | Status: SHIPPED | OUTPATIENT
Start: 2018-08-13 | End: 2019-01-03 | Stop reason: SDUPTHER

## 2018-08-15 DIAGNOSIS — M54.2 CERVICALGIA: ICD-10-CM

## 2018-08-15 RX ORDER — HYDROCODONE BITARTRATE AND ACETAMINOPHEN 10; 325 MG/1; MG/1
1 TABLET ORAL EVERY 6 HOURS PRN
Qty: 120 TABLET | Refills: 0 | Status: SHIPPED | OUTPATIENT
Start: 2018-08-15 | End: 2018-09-13 | Stop reason: SDUPTHER

## 2018-08-17 ENCOUNTER — OFFICE VISIT (OUTPATIENT)
Dept: CARDIOLOGY CLINIC | Age: 53
End: 2018-08-17
Payer: COMMERCIAL

## 2018-08-17 VITALS
SYSTOLIC BLOOD PRESSURE: 134 MMHG | HEIGHT: 67 IN | HEART RATE: 70 BPM | WEIGHT: 174.5 LBS | DIASTOLIC BLOOD PRESSURE: 68 MMHG | BODY MASS INDEX: 27.39 KG/M2

## 2018-08-17 DIAGNOSIS — R06.09 DYSPNEA ON EXERTION: ICD-10-CM

## 2018-08-17 DIAGNOSIS — I10 ESSENTIAL HYPERTENSION: ICD-10-CM

## 2018-08-17 DIAGNOSIS — Z95.2 S/P AVR (AORTIC VALVE REPLACEMENT): ICD-10-CM

## 2018-08-17 DIAGNOSIS — R42 DIZZINESS: Primary | ICD-10-CM

## 2018-08-17 PROCEDURE — 99213 OFFICE O/P EST LOW 20 MIN: CPT | Performed by: NUCLEAR MEDICINE

## 2018-08-17 PROCEDURE — 93000 ELECTROCARDIOGRAM COMPLETE: CPT | Performed by: NUCLEAR MEDICINE

## 2018-08-17 NOTE — PROGRESS NOTES
[address] [facility] [address]  [place] [address]  Dept: [phone]  Dept Fax: [phone]  Loc: [phone]    Visit Date: 8/17/2018    Nichole iVtal is a 52 y.o. male who presents today for:  Chief Complaint   Patient presents with    Check-Up    Hypertension    Cardiac Valve Problem    Hyperlipidemia   known redo AVR  Some chest pain at times  Some times exertional   Limited by his back   Not very active  Some dyspnea on exertion   Redo AVR 2016        HPI:  HPI  Past Medical History:   Diagnosis Date    Arthritis     general    Bilateral sciatica     Bleeding     with coumadin    CAD (coronary artery disease)     Carpal tunnel syndrome     RIGHT    Cervicalgia, C5-7     Chronic anxiety     Dysthymia (or depressive neurosis) 2/1/2016    ED (erectile dysfunction)     GERD (gastroesophageal reflux disease)     Headache(784.0)     History of blood transfusion 03/2016    HTN (hypertension)     Hyperlipidemia     Hypothyroidism     Iron deficiency anemia, blood loss     Lumbago     Lumbar radiculopathy     S/P AVR, coumadin Tx     Spondylosis of thoracic region without myelopathy or radiculopathy       Past Surgical History:   Procedure Laterality Date    [facility]  [facility]    [facility]    AORTIC VALVE REPLACEMENT  07/20/2016    extraction of mechanical valve and replacement with tissue valve    APPENDECTOMY  1980    CARDIAC CATHETERIZATION  5 20 2010    Patent coronary arteries. Possible causes of the patient's chest pain is likely noncardiac at least based on this angiogram. Patient chould be able to proceed w/ scheduled surgery w/ paying attention to anticoagulation and trying to minimize the period of no anticoagulation.      CARDIAC CATHETERIZATION  2012, 6/9/16    [facility]    CARDIOVASCULAR STRESS TEST  4 15 2010    Moderate fixed inferior defect, possibly attenuation, cannot exclude a previous MI. Correlation w/ EKG is recommented. Significant degree of gut uptake which is likely to be the cause of the attenuation artifact seen. EF 46%    CERVICAL FUSION  2010    CERVICAL FUSION  03/09/2016    REMOVAL HARDWARE C5-7, ACDF C4-6 WITH ATLANTIS CORNERSTONE    COLONOSCOPY  2010    CORONARY ARTERY BYPASS GRAFT      DILATATION, ESOPHAGUS      small resection    ENDOSCOPY, COLON, DIAGNOSTIC      NERVE SURGERY Bilateral 07/25/2017    THORACIC FACET BLOCK T7-8, T8-9, T11-12    OTHER SURGICAL HISTORY  05/02/2017    LESI L5    OTHER SURGICAL HISTORY  09/12/2017    thoracic epidural T11    [place] INJ,PARAVERTEBRAL L/S,1 LEVEL Bilateral 7/25/2017    T-FACET MBB T7-8, T8-9, T11-12 BILATERAL performed by Isaac Santiago MD at [address] [facility] DX/THER SBST EPIDURAL/SUBARACH LUMBAR/SACRAL N/A 9/12/2017    TESI T11 performed by Isaac Santiago MD at [address] [facility] [address]  2010    8 INCHES REMOVED    UPPER GASTROINTESTINAL ENDOSCOPY  2010     Family History   Problem Relation Age of Onset    Cancer Mother     Diabetes Father     Cancer Father     Heart Disease Father 28        CABG    High Blood Pressure Father     Diabetes Sister         AS A CHILD    Kidney Disease Sister     High Blood Pressure Sister     Cancer Brother     Stroke Neg Hx      Social History   Substance Use Topics    Smoking status: Current Every Day Smoker     Years: 25.00     Types: Cigarettes, Cigars    Smokeless tobacco: Never Used    Alcohol use 0.0 oz/week      Comment: not for 3 mo      Current Outpatient Prescriptions   Medication Sig Dispense Refill    HYDROcodone-acetaminophen (NORCO)  MG per tablet Take 1 tablet by mouth every 6 hours as needed for Pain for up to 30 days. . 120 tablet 0    levothyroxine (SYNTHROID) 75 MCG tablet TAKE 1 TABLET BY MOUTH IN THE MORNING  30 tablet 0    V-R ASPIRIN  MG EC tablet TAKE 1 TABLET BY MOUTH ONE TIME A DAY  30 tablet 10  ALPRAZolam (XANAX) 0.5 MG tablet TAKE 1 TABLET BY MOUTH THREE TIMES A DAY AS NEEDED FOR SLEEP FOR UP TO 90 DAYS 90 tablet 2    celecoxib (CELEBREX) 200 MG capsule TAKE 1 CAPSULE BY MOUTH ONE TIME A DAY  30 capsule 3    esomeprazole (NEXIUM) 40 MG delayed release capsule TAKE 1 CAPSULE BY MOUTH TWO TIMES A DAY  60 capsule 4     MG capsule TAKE 1 CAPSULE BY MOUTH ONE TIME A DAY AS NEEDED for constipation 30 capsule 10    terazosin (HYTRIN) 2 MG capsule Take 1 capsule by mouth nightly 30 capsule 3    metoprolol succinate (TOPROL XL) 50 MG extended release tablet Take 1 tablet by mouth daily 30 tablet 3    atorvastatin (LIPITOR) 20 MG tablet Take 1 tablet by mouth nightly 30 tablet 11    tiZANidine (ZANAFLEX) 2 MG tablet Take 2 tablets by mouth every 8 hours as needed (spasms) 90 tablet 0    IRON PO Take 65 mg by mouth daily       gabapentin (NEURONTIN) 300 MG capsule Take 300 mg by mouth 3 times daily      Multiple Vitamin (MULTIVITAMIN PO) Take 1 tablet by mouth daily        No current facility-administered medications for this visit.       No Known Allergies  Health Maintenance   Topic Date Due    DTaP/Tdap/Td vaccine (1 - Tdap) 04/02/1982    Pneumococcal med risk (1 of 1 - PPSV23) 04/02/1982    Shingles Vaccine (1 of 2 - 2 Dose Series) 04/02/2013    A1C test (Diabetic or Prediabetic)  03/23/2018    Flu vaccine (1) 09/01/2018    TSH testing  01/23/2019    Lipid screen  12/12/2022    Colon cancer screen colonoscopy  03/27/2026    Hepatitis C screen  Completed    HIV screen  Completed       Subjective:  Review of Systems  General:   No fever, no chills, No fatigue or weight loss  Pulmonary:    some dyspnea, no wheezing  Cardiac:    Does h ave some  recent chest pain,   GI:     No nausea or vomiting, no abdominal pain  Neuro:    No dizziness or light headedness,   Musculoskeletal:  No recent active issues  Extremities:   No edema, good peripheral pulses      Objective:  Physical Exam  BP 134/68   Pulse 70   Ht 5' 7\" (1.702 m)   Wt 174 lb 8 oz (79.2 kg)   BMI 27.33 kg/m²   General:   Well developed, well nourished  Lungs:   Clear to auscultation  Heart:    Normal S1 S2, Slight murmur. no rubs, no gallops  Abdomen:   Soft, non tender, no organomegalies, positive bowel sounds  Extremities:   No edema, no cyanosis, good peripheral pulses  Neurological:   Awake, alert, oriented. No obvious focal deficits  Musculoskelatal:  No obvious deformities    Assessment:      Diagnosis Orders   1. Dizziness  EKG 12 lead   2. Essential hypertension     3. S/P AVR (aortic valve replacement)     atypical chest pain   ? ? Non cardiac  Dyspnea  ECG in office was done today. I reviewed the ECG. No acute findings      Plan:  No Follow-up on file. Echo   Continue risk factor modification and medical management  Thank you for allowing me to participate in the care of your patient. Please don't hesitate to contact me regarding any further issues related to the patient care    Orders Placed:  Orders Placed This Encounter   Procedures    EKG 12 lead     Order Specific Question:   Reason for Exam?     Answer: Other       Medications Prescribed:  No orders of the defined types were placed in this encounter. Discussed use, benefit, and side effects of prescribed medications. All patient questions answered. Pt voiced understanding. Instructed to continue current medications, diet and exercise. Continue risk factor modification and medical management. Patient agreed with treatment plan. Follow up as directed.     Electronically signed by Eveline Toney MD on 8/17/2018 at 10:45 AM

## 2018-08-23 ENCOUNTER — HOSPITAL ENCOUNTER (OUTPATIENT)
Dept: NON INVASIVE DIAGNOSTICS | Age: 53
Discharge: HOME | End: 2018-08-23
Payer: COMMERCIAL

## 2018-08-23 DIAGNOSIS — R06.09 DYSPNEA ON EXERTION: ICD-10-CM

## 2018-08-23 DIAGNOSIS — R42 DIZZINESS: ICD-10-CM

## 2018-08-23 DIAGNOSIS — I10 ESSENTIAL HYPERTENSION: ICD-10-CM

## 2018-08-23 LAB
LV EF: 45 %
LVEF MODALITY: NORMAL

## 2018-08-23 PROCEDURE — 93306 TTE W/DOPPLER COMPLETE: CPT

## 2018-08-30 ENCOUNTER — OFFICE VISIT (OUTPATIENT)
Dept: FAMILY MEDICINE CLINIC | Age: 53
End: 2018-08-30
Payer: COMMERCIAL

## 2018-08-30 VITALS
HEART RATE: 72 BPM | BODY MASS INDEX: 27.83 KG/M2 | SYSTOLIC BLOOD PRESSURE: 144 MMHG | HEIGHT: 67 IN | WEIGHT: 177.3 LBS | DIASTOLIC BLOOD PRESSURE: 68 MMHG | RESPIRATION RATE: 16 BRPM

## 2018-08-30 DIAGNOSIS — F41.9 CHRONIC ANXIETY: ICD-10-CM

## 2018-08-30 DIAGNOSIS — M48.04 THORACIC SPINAL STENOSIS: ICD-10-CM

## 2018-08-30 DIAGNOSIS — Z95.2 S/P AVR (AORTIC VALVE REPLACEMENT): ICD-10-CM

## 2018-08-30 DIAGNOSIS — M50.10 HERNIATION OF CERVICAL INTERVERTEBRAL DISC WITH RADICULOPATHY: ICD-10-CM

## 2018-08-30 DIAGNOSIS — E03.9 ACQUIRED HYPOTHYROIDISM: ICD-10-CM

## 2018-08-30 DIAGNOSIS — F34.1 DYSTHYMIA (OR DEPRESSIVE NEUROSIS): ICD-10-CM

## 2018-08-30 DIAGNOSIS — M54.16 LUMBAR RADICULITIS: ICD-10-CM

## 2018-08-30 DIAGNOSIS — F43.21 SITUATIONAL DEPRESSION: ICD-10-CM

## 2018-08-30 DIAGNOSIS — I10 ESSENTIAL HYPERTENSION: Primary | ICD-10-CM

## 2018-08-30 DIAGNOSIS — G89.4 CHRONIC PAIN SYNDROME: ICD-10-CM

## 2018-08-30 PROCEDURE — 99214 OFFICE O/P EST MOD 30 MIN: CPT | Performed by: FAMILY MEDICINE

## 2018-08-30 RX ORDER — METOPROLOL SUCCINATE 25 MG/1
TABLET, EXTENDED RELEASE ORAL
Refills: 6 | COMMUNITY
Start: 2018-07-12 | End: 2020-10-08 | Stop reason: SDUPTHER

## 2018-08-30 RX ORDER — CLOBETASOL PROPIONATE 0.5 MG/G
CREAM TOPICAL
Refills: 2 | COMMUNITY
Start: 2018-06-15

## 2018-08-30 RX ORDER — BUPROPION HYDROCHLORIDE 100 MG/1
100 TABLET ORAL 2 TIMES DAILY
Qty: 60 TABLET | Refills: 3 | Status: SHIPPED | OUTPATIENT
Start: 2018-08-30 | End: 2020-10-08 | Stop reason: SDUPTHER

## 2018-08-30 RX ORDER — MELATONIN
2000 DAILY
COMMUNITY

## 2018-08-30 ASSESSMENT — ENCOUNTER SYMPTOMS
ALLERGIC/IMMUNOLOGIC NEGATIVE: 1
BACK PAIN: 1
SHORTNESS OF BREATH: 0
COUGH: 0

## 2018-08-30 NOTE — PROGRESS NOTES
ongoing issue and he continues to be followed by pain management clinic. He continues to work full-time but it is a physical job at [employer]. He is  therefore is involved in a lot of lifting of product. He tried to do some side work out at [employer]. He had signed on with a halfway service but apparently was unable to make his time schedule workout. He admits that his anxiety levels have continued to rise so hopefully starting treatment of his depression will also impact his level of anxiety and a positive manner. The rest of this patient's conditions are stable. Past medical and surgical hx reviewed. Past Medical History:   Diagnosis Date    Arthritis     general    Bilateral sciatica     Bleeding     with coumadin    CAD (coronary artery disease)     Carpal tunnel syndrome     RIGHT    Cervicalgia, C5-7     Chronic anxiety     Dysthymia (or depressive neurosis) 2/1/2016    ED (erectile dysfunction)     GERD (gastroesophageal reflux disease)     Headache(784.0)     History of blood transfusion 03/2016    HTN (hypertension)     Hyperlipidemia     Hypothyroidism     Iron deficiency anemia, blood loss     Lumbago     Lumbar radiculopathy     Osteoarthritis (arthritis due to wear and tear of joints)     S/P AVR, coumadin Tx     Spondylosis of thoracic region without myelopathy or radiculopathy      Past Surgical History:   Procedure Laterality Date    AORTIC VALVE [facility]PLACEMENT  2007    [facility]    AORTIC VALVE REPLACEMENT  07/20/2016    extraction of mechanical valve and replacement with tissue valve    APPENDECTOMY  1980    CARDIAC CATHETERIZATION  5 20 2010    Patent coronary arteries. Possible causes of the patient's chest pain is likely noncardiac at least based on this angiogram. Patient chould be able to proceed w/ scheduled surgery w/ paying attention to anticoagulation and trying to minimize the period of no anticoagulation.      CARDIAC [facility]HETERIZATION  2012, 6/9/16    [facility]    CARDIOVASCULAR STRESS TEST  4 15 2010    Moderate fixed inferior defect, possibly attenuation, cannot exclude a previous MI. Correlation w/ EKG is recommented. Significant degree of gut uptake which is likely to be the cause of the attenuation artifact seen. EF 46%    CERVICAL FUSION  2010    CERVICAL FUSION  03/09/2016    REMOVAL HARDWARE C5-7, ACDF C4-6 WITH ATLANTIS CORNERSTONE    COLONOSCOPY  2010    CORONARY ARTERY BYPASS GRAFT      DILATATION, ESOPHAGUS      small resection    ENDOSCOPY, COLON, DIAGNOSTIC      NERVE SURGERY Bilateral 07/25/2017    THORACIC FACET BLOCK T7-8, T8-9, T11-12    OTHER SURGICAL HISTORY  05/02/2017    LESI L5    OTHER SURGICAL HISTORY  09/12/2017    thoracic epidural T11    TX INJ,PARAVERTEBRAL L/S,1 LEVEL Bilateral 7/25/2017    T-FACET MBB T7-8, T8-9, T11-12 BILATERAL performed by Mic Whatley MD at [address] [facility] DX/THER [address] LUMBAR/SACRAL N/A 9/12/2017    TESI T11 performed by Mic Whatley MD at [address] [facility] [address]  2010    8 INCHES REMOVED    UPPER GASTROINTESTINAL ENDOSCOPY  2010     Portions of this note were completed with a voice recording program.  Efforts were made to edit the dictations but occasionally words are mis-transcribed. Review of Systems   Constitutional: Positive for fatigue. HENT: Negative. Respiratory: Negative for cough and shortness of breath. Cardiovascular: Negative for chest pain, palpitations and leg swelling. Endocrine: Negative. Genitourinary:        See HPI. Musculoskeletal: Positive for arthralgias, back pain and neck pain. Allergic/Immunologic: Negative. Neurological: Positive for headaches. Negative for dizziness. Hematological: Negative. Does not bruise/bleed easily. Psychiatric/Behavioral: Positive for dysphoric mood. The patient is nervous/anxious.     All other systems reviewed and are negative. Objective:   Physical Exam   Constitutional: He is oriented to person, place, and time. He appears well-developed and well-nourished. HENT:   Right Ear: External ear normal.   Left Ear: External ear normal.   Nose: Nose normal.   Mouth/Throat: Oropharynx is clear and moist.   Eyes: Conjunctivae are normal.   Neck: No thyromegaly present. Cardiovascular: Normal rate, regular rhythm and normal heart sounds. Exam reveals no gallop. No murmur heard. AVR click is present. Pulmonary/Chest: Effort normal and breath sounds normal. He has no wheezes. He has no rales. Abdominal: Soft. Bowel sounds are normal. There is no tenderness. Musculoskeletal: He exhibits no edema. Lymphadenopathy:     He has no cervical adenopathy. Neurological: He is alert and oriented to person, place, and time. Skin: Skin is warm and dry. No rash noted. Psychiatric: His speech is normal. Thought content normal. His mood appears anxious. Cognition and memory are normal. He exhibits a depressed mood. He expresses no homicidal and no suicidal ideation. He expresses no suicidal plans and no homicidal plans. Nursing note and vitals reviewed. Assessment:       Diagnosis Orders   1. Essential hypertension     2. Dysthymia (or depressive neurosis)  buPROPion (WELLBUTRIN) 100 MG tablet   3. Acquired hypothyroidism     4. Chronic anxiety     5. Chronic pain syndrome     6. Herniation of cervical intervertebral disc with radiculopathy     7. Thoracic spinal stenosis     8. Lumbar radiculitis     9. S/P AVR (aortic valve replacement), 7/20/16. 10. Situational depression  buPROPion (WELLBUTRIN) 100 MG tablet           Plan:      No orders of the defined types were placed in this encounter.     Medications Discontinued During This Encounter   Medication Reason    metoprolol succinate (TOPROL XL) 50 MG extended release tablet DOSE ADJUSTMENT     Current Outpatient Prescriptions   Medication Sig Dispense Refill    clobetasol (TEMOVATE) 0.05 % cream APPLY TOPICALLY TO RIGHT INDEX FINGER 3 TIMES DAILY  2    metoprolol succinate (TOPROL XL) 25 MG extended release tablet TAKE 1 TABLET BY MOUTH ONE TIME A DAY  6    Cholecalciferol (VITAMIN D3) 1000 units TABS Take 2,000 Units by mouth Daily      buPROPion (WELLBUTRIN) 100 MG tablet Take 1 tablet by mouth 2 times daily 60 tablet 3    HYDROcodone-acetaminophen (NORCO)  MG per tablet Take 1 tablet by mouth every 6 hours as needed for Pain for up to 30 days. . 120 tablet 0    levothyroxine (SYNTHROID) 75 MCG tablet TAKE 1 TABLET BY MOUTH IN THE MORNING  30 tablet 0    V-R ASPIRIN  MG EC tablet TAKE 1 TABLET BY MOUTH ONE TIME A DAY  30 tablet 10    ALPRAZolam (XANAX) 0.5 MG tablet TAKE 1 TABLET BY MOUTH THREE TIMES A DAY AS NEEDED FOR SLEEP FOR UP TO 90 DAYS 90 tablet 2    celecoxib (CELEBREX) 200 MG capsule TAKE 1 CAPSULE BY MOUTH ONE TIME A DAY  30 capsule 3    esomeprazole (NEXIUM) 40 MG delayed release capsule TAKE 1 CAPSULE BY MOUTH TWO TIMES A DAY  60 capsule 4     MG capsule TAKE 1 CAPSULE BY MOUTH ONE TIME A DAY AS NEEDED for constipation 30 capsule 10    terazosin (HYTRIN) 2 MG capsule Take 1 capsule by mouth nightly 30 capsule 3    atorvastatin (LIPITOR) 20 MG tablet Take 1 tablet by mouth nightly 30 tablet 11    tiZANidine (ZANAFLEX) 2 MG tablet Take 2 tablets by mouth every 8 hours as needed (spasms) 90 tablet 0    IRON PO Take 65 mg by mouth daily       gabapentin (NEURONTIN) 300 MG capsule Take 300 mg by mouth 3 times daily      Multiple Vitamin (MULTIVITAMIN PO) Take 1 tablet by mouth daily        No current facility-administered medications for this visit. Try to cut back on smoking. Start bupropion 100 mg twice daily. Continue present medications. Discussed use, benefit, and side effects of prescribed medications. Barriers to compliance discussed. All patient questions answered.   Pt voiced understanding.           Rosalind Eric MD

## 2018-09-13 DIAGNOSIS — M54.2 CERVICALGIA: ICD-10-CM

## 2018-09-13 RX ORDER — HYDROCODONE BITARTRATE AND ACETAMINOPHEN 10; 325 MG/1; MG/1
1 TABLET ORAL EVERY 6 HOURS PRN
Qty: 120 TABLET | Refills: 0 | Status: SHIPPED | OUTPATIENT
Start: 2018-09-13 | End: 2018-10-15 | Stop reason: SDUPTHER

## 2018-09-13 NOTE — TELEPHONE ENCOUNTER
Francesca Little called requesting a refill on the following medications:  Requested Prescriptions     Pending Prescriptions Disp Refills    HYDROcodone-acetaminophen (NORCO)  MG per tablet 120 tablet 0     Sig: Take 1 tablet by mouth every 6 hours as needed for Pain for up to 30 days. .     Pharmacy verified:  [facility]     Date of last visit: 8/30/18  Date of next visit (if applicable): 79/5/9201

## 2018-09-27 DIAGNOSIS — E78.2 MIXED HYPERLIPIDEMIA: ICD-10-CM

## 2018-09-27 RX ORDER — ATORVASTATIN CALCIUM 20 MG/1
TABLET, FILM COATED ORAL
Qty: 90 TABLET | Refills: 10 | Status: SHIPPED | OUTPATIENT
Start: 2018-09-27 | End: 2020-01-13 | Stop reason: SDUPTHER

## 2018-09-27 RX ORDER — LEVOTHYROXINE SODIUM 0.07 MG/1
TABLET ORAL
Qty: 30 TABLET | Refills: 0 | Status: SHIPPED | OUTPATIENT
Start: 2018-09-27 | End: 2018-11-16 | Stop reason: SDUPTHER

## 2018-10-15 DIAGNOSIS — M54.2 CERVICALGIA: ICD-10-CM

## 2018-10-15 RX ORDER — HYDROCODONE BITARTRATE AND ACETAMINOPHEN 10; 325 MG/1; MG/1
1 TABLET ORAL EVERY 6 HOURS PRN
Qty: 120 TABLET | Refills: 0 | Status: SHIPPED | OUTPATIENT
Start: 2018-10-15 | End: 2018-11-15 | Stop reason: SDUPTHER

## 2018-11-08 ENCOUNTER — OFFICE VISIT (OUTPATIENT)
Dept: FAMILY MEDICINE CLINIC | Age: 53
End: 2018-11-08
Payer: COMMERCIAL

## 2018-11-08 VITALS
WEIGHT: 190.2 LBS | RESPIRATION RATE: 16 BRPM | HEIGHT: 67 IN | HEART RATE: 64 BPM | BODY MASS INDEX: 29.85 KG/M2 | DIASTOLIC BLOOD PRESSURE: 62 MMHG | SYSTOLIC BLOOD PRESSURE: 130 MMHG

## 2018-11-08 DIAGNOSIS — M54.16 LUMBAR RADICULOPATHY: ICD-10-CM

## 2018-11-08 DIAGNOSIS — M50.10 HERNIATION OF CERVICAL INTERVERTEBRAL DISC WITH RADICULOPATHY: ICD-10-CM

## 2018-11-08 DIAGNOSIS — Z23 NEED FOR INFLUENZA VACCINATION: Primary | ICD-10-CM

## 2018-11-08 DIAGNOSIS — Z51.81 MEDICATION MONITORING ENCOUNTER: ICD-10-CM

## 2018-11-08 DIAGNOSIS — F34.1 DYSTHYMIA (OR DEPRESSIVE NEUROSIS): ICD-10-CM

## 2018-11-08 DIAGNOSIS — K21.9 GASTROESOPHAGEAL REFLUX DISEASE WITHOUT ESOPHAGITIS: ICD-10-CM

## 2018-11-08 DIAGNOSIS — E78.2 MIXED HYPERLIPIDEMIA: ICD-10-CM

## 2018-11-08 DIAGNOSIS — Z72.0 TOBACCO ABUSE: ICD-10-CM

## 2018-11-08 DIAGNOSIS — E03.9 ACQUIRED HYPOTHYROIDISM: ICD-10-CM

## 2018-11-08 DIAGNOSIS — I10 ESSENTIAL HYPERTENSION: ICD-10-CM

## 2018-11-08 PROCEDURE — 90471 IMMUNIZATION ADMIN: CPT | Performed by: FAMILY MEDICINE

## 2018-11-08 PROCEDURE — 99214 OFFICE O/P EST MOD 30 MIN: CPT | Performed by: FAMILY MEDICINE

## 2018-11-08 PROCEDURE — 90688 IIV4 VACCINE SPLT 0.5 ML IM: CPT | Performed by: FAMILY MEDICINE

## 2018-11-08 ASSESSMENT — ENCOUNTER SYMPTOMS
ALLERGIC/IMMUNOLOGIC NEGATIVE: 1
SHORTNESS OF BREATH: 0
BACK PAIN: 1
ABDOMINAL PAIN: 0
RESPIRATORY NEGATIVE: 1
COUGH: 0
GASTROINTESTINAL NEGATIVE: 1

## 2018-11-08 NOTE — PROGRESS NOTES
cancer screen colonoscopy  03/27/2026    Hepatitis C screen  Completed    HIV screen  Completed     Tobacco Use Visit Information:    Have you changed or started any medications since your last visit including any over-the-counter medicines, vitamins, or herbal medicines? no   Are you having any side effects from any of your medications? -  no  Have you stopped taking any of your medications? Is so, why? -  no    Have you seen any other physician or provider since your last visit? No  Have you had any other diagnostic tests since your last visit? No  Have you been seen in the emergency room and/or had an admission to a hospital since we last saw you? No  Have you had your routine dental cleaning in the past 6 months? no    Have you activated your [employer] account? If not, what are your barriers?  No: pt choice     Patient Care Team:  Vic Soler MD as PCP - General (Family Medicine)  Vic Soler MD as PCP - [facility] Attributed Provider  Luis Contreras MD as Surgeon (Cardiothoracic Surgery)  Teressa Barthel, MD as Physician (Gastroenterology)    Current Tobacco Use    History   Smoking Status    Current Every Day Smoker    Packs/day: 1.00    Years: 25.00    Types: Cigarettes, Cigars   Smokeless Tobacco    Never Used     Ready to quit: Yes  Counseling given: Yes     Are you motivated to quit? - yes  If yes, have you set a date to quite? - no    Have you tried any anti-smoking aids in the past? -  yes - Wellbutrin     1-800-QUIT-NOW ([phone])     Medical History Review  Past Medical, Family, and Social History reviewed and does contribute to the patient presenting condition    Health Maintenance   Topic Date Due    DTaP/Tdap/Td vaccine (1 - Tdap) 04/02/1982    Pneumococcal med risk (1 of 1 - PPSV23) 04/02/1982    Shingles Vaccine (1 of 2 - 2 Dose Series) 04/02/2013    A1C test (Diabetic or Prediabetic)  03/23/2018    Flu vaccine (1) 09/01/2018    TSH testing  01/23/2019    Lipid screen  12/12/2022   
prescriptions requested or ordered in this encounter       All patient questions answered. Patient voiced understanding. Quality Measures    Body mass index is 29.79 kg/m². Elevated. Weight control planned discussed Healthy diet and regular exercise. BP: 130/62 Blood pressure is normal. Treatment plan consists of No treatment change needed.     Lab Results   Component Value Date    LDLCALC 58 12/12/2017    (goal LDL reduction with dx if diabetes is 50% LDL reduction)      PHQ Scores 4/11/2018 3/15/2017 9/2/2016   PHQ2 Score 3 0 0   PHQ9 Score 9 0 0     Interpretation of Total Score Depression Severity: 1-4 = Minimal depression, 5-9 = Mild depression, 10-14 = Moderate depression, 15-19 = Moderately severe depression, 20-27 = Severe depression            Ana Szymanski MD

## 2018-11-15 DIAGNOSIS — M54.2 CERVICALGIA: Primary | ICD-10-CM

## 2018-11-15 DIAGNOSIS — M54.16 LUMBAR RADICULOPATHY: ICD-10-CM

## 2018-11-15 RX ORDER — HYDROCODONE BITARTRATE AND ACETAMINOPHEN 10; 325 MG/1; MG/1
1 TABLET ORAL EVERY 6 HOURS PRN
Qty: 120 TABLET | Refills: 0 | Status: SHIPPED | OUTPATIENT
Start: 2018-11-15 | End: 2018-12-13 | Stop reason: SDUPTHER

## 2018-11-15 RX ORDER — OXYCODONE HYDROCHLORIDE AND ACETAMINOPHEN 5; 325 MG/1; MG/1
1 TABLET ORAL EVERY 4 HOURS PRN
Qty: 30 TABLET | Refills: 0 | Status: CANCELLED | OUTPATIENT
Start: 2018-11-15 | End: 2018-11-22

## 2018-12-10 ENCOUNTER — TELEPHONE (OUTPATIENT)
Dept: FAMILY MEDICINE CLINIC | Age: 53
End: 2018-12-10

## 2018-12-13 ENCOUNTER — TELEPHONE (OUTPATIENT)
Dept: FAMILY MEDICINE CLINIC | Age: 53
End: 2018-12-13

## 2018-12-13 DIAGNOSIS — M54.16 LUMBAR RADICULOPATHY: ICD-10-CM

## 2018-12-13 DIAGNOSIS — M54.2 CERVICALGIA: ICD-10-CM

## 2018-12-13 RX ORDER — HYDROCODONE BITARTRATE AND ACETAMINOPHEN 10; 325 MG/1; MG/1
1 TABLET ORAL EVERY 6 HOURS PRN
Qty: 120 TABLET | Refills: 0 | Status: SHIPPED | OUTPATIENT
Start: 2018-12-13 | End: 2019-01-12

## 2018-12-19 ENCOUNTER — HOSPITAL ENCOUNTER (EMERGENCY)
Age: 53
Discharge: HOME | End: 2018-12-19
Payer: COMMERCIAL

## 2018-12-19 VITALS
DIASTOLIC BLOOD PRESSURE: 86 MMHG | SYSTOLIC BLOOD PRESSURE: 176 MMHG | RESPIRATION RATE: 16 BRPM | OXYGEN SATURATION: 96 % | WEIGHT: 170 LBS | TEMPERATURE: 97.6 F | BODY MASS INDEX: 26.63 KG/M2 | HEART RATE: 73 BPM

## 2018-12-19 DIAGNOSIS — J01.00 ACUTE MAXILLARY SINUSITIS, RECURRENCE NOT SPECIFIED: Primary | ICD-10-CM

## 2018-12-19 PROCEDURE — 99214 OFFICE O/P EST MOD 30 MIN: CPT

## 2018-12-19 PROCEDURE — 99212 OFFICE O/P EST SF 10 MIN: CPT | Performed by: NURSE PRACTITIONER

## 2018-12-19 RX ORDER — AMOXICILLIN AND CLAVULANATE POTASSIUM 875; 125 MG/1; MG/1
1 TABLET, FILM COATED ORAL 2 TIMES DAILY
Qty: 14 TABLET | Refills: 0 | Status: SHIPPED | OUTPATIENT
Start: 2018-12-19 | End: 2018-12-26

## 2018-12-19 RX ORDER — FLUTICASONE PROPIONATE 50 MCG
1 SPRAY, SUSPENSION (ML) NASAL DAILY
Qty: 1 BOTTLE | Refills: 0 | Status: SHIPPED | OUTPATIENT
Start: 2018-12-19 | End: 2020-02-13

## 2018-12-19 ASSESSMENT — ENCOUNTER SYMPTOMS
SHORTNESS OF BREATH: 0
RHINORRHEA: 1
COUGH: 1
VOMITING: 0
FACIAL SWELLING: 0
ABDOMINAL PAIN: 0
NAUSEA: 0
TROUBLE SWALLOWING: 0
COLOR CHANGE: 0
SINUS PAIN: 1
WHEEZING: 0
DIARRHEA: 0
SINUS PRESSURE: 1
SORE THROAT: 1

## 2018-12-19 ASSESSMENT — PAIN DESCRIPTION - LOCATION: LOCATION: FACE

## 2018-12-19 ASSESSMENT — PAIN SCALES - GENERAL: PAINLEVEL_OUTOF10: 7

## 2018-12-19 ASSESSMENT — PAIN DESCRIPTION - DESCRIPTORS: DESCRIPTORS: ACHING;BURNING

## 2018-12-19 NOTE — ED PROVIDER NOTES
[facility] 89  Urgent Care Encounter       CHIEF COMPLAINT       Chief Complaint   Patient presents with    Cough     onset 1 week -     Headache     sinus \"burning\"       Nurses Notes reviewed and I agree except as noted in the HPI. HISTORY OF PRESENT ILLNESS   Jil Quiroga is a 52 y.o. male who presents To the urgent care for complaints of cough, sinus congestion, sinus pain, congestion. The patient states is been ongoing for 2 weeks. He is been using over-the-counter Mucinex without relief. The patient denies chest pain, shortness of breath, nausea, vomiting, diarrhea, abdominal pain. HPI    REVIEW OF SYSTEMS     Review of Systems   Constitutional: Positive for activity change. Negative for appetite change, chills, fever and unexpected weight change. HENT: Positive for congestion, rhinorrhea, sinus pain, sinus pressure and sore throat. Negative for ear pain, facial swelling and trouble swallowing. Respiratory: Positive for cough. Negative for shortness of breath and wheezing. Cardiovascular: Negative for chest pain. Gastrointestinal: Negative for abdominal pain, diarrhea, nausea and vomiting. Skin: Negative for color change, pallor and rash.        PAST MEDICAL HISTORY         Diagnosis Date    Arthritis     general    Bilateral sciatica     Bleeding     with coumadin    CAD (coronary artery disease)     Carpal tunnel syndrome     RIGHT    Cervicalgia, C5-7     Chronic anxiety     Dysthymia (or depressive neurosis) 2/1/2016    ED (erectile dysfunction)     GERD (gastroesophageal reflux disease)     Headache(784.0)     History of blood transfusion 03/2016    HTN (hypertension)     Hyperlipidemia     Hypothyroidism     Iron deficiency anemia, blood loss     Lumbago     Lumbar radiculopathy     Osteoarthritis (arthritis due to wear and tear of joints)     S/P AVR, coumadin Tx     Spondylosis of thoracic region without myelopathy or radiculopathy SURGICALHISTORY     Patient  has a past surgical history that includes cardiovascular stress test (4 15 2010); Appendectomy (1980); Colonoscopy (2010); Small intestine surgery (2010); Upper gastrointestinal endoscopy (2010); Aortic valve replacement (2007); Endoscopy, colon, diagnostic; cervical fusion (2010); cervical fusion (03/09/2016); Cardiac catheterization (5 20 2010); Cardiac catheterization (2012, 6/9/16); Dilatation, esophagus; Aortic valve replacement (07/20/2016); Coronary artery bypass graft; other surgical history (05/02/2017); Nerve Surgery (Bilateral, 07/25/2017); pr inj,paravertebral l/s,1 level (Bilateral, 7/25/2017); other surgical history (09/12/2017); and pr njx dx/ther sbst epidural/subarach lumbar/sacral (N/A, 9/12/2017). CURRENT MEDICATIONS       Discharge Medication List as of 12/19/2018  2:22 PM      CONTINUE these medications which have NOT CHANGED    Details   HYDROcodone-acetaminophen (NORCO)  MG per tablet Take 1 tablet by mouth every 6 hours as needed for Pain for up to 30 days. ., Disp-120 tablet, R-0Normal      levothyroxine (SYNTHROID) 75 MCG tablet TAKE 1 TABLET BY MOUTH IN THE MORNING , Disp-30 tablet, R-5Normal      atorvastatin (LIPITOR) 20 MG tablet TAKE 1 TABLET BY MOUTH ONE TIME A DAY AT NIGHT, Disp-90 tablet, R-10Normal      clobetasol (TEMOVATE) 0.05 % cream APPLY TOPICALLY TO RIGHT INDEX FINGER 3 TIMES DAILY, R-2, Historical Med      metoprolol succinate (TOPROL XL) 25 MG extended release tablet TAKE 1 TABLET BY MOUTH ONE TIME A DAY, R-6Historical Med      Cholecalciferol (VITAMIN D3) 1000 units TABS Take 2,000 Units by mouth DailyHistorical Med      buPROPion (WELLBUTRIN) 100 MG tablet Take 1 tablet by mouth 2 times daily, Disp-60 tablet, R-3Normal      V-R ASPIRIN  MG EC tablet TAKE 1 TABLET BY MOUTH ONE TIME A DAY , Disp-30 tablet, R-10Normal      celecoxib (CELEBREX) 200 MG capsule TAKE 1 CAPSULE BY MOUTH ONE TIME A DAY , Disp-30 capsule, R-3Normal place, and time. Vital signs are normal. He appears well-developed and well-nourished. He is active and cooperative. Non-toxic appearance. He does not have a sickly appearance. He appears ill. No distress. HENT:   Head: Normocephalic and atraumatic. Right Ear: Hearing, external ear and ear canal normal. A middle ear effusion is present. Left Ear: Hearing, external ear and ear canal normal. A middle ear effusion is present. Nose: Rhinorrhea present. Right sinus exhibits maxillary sinus tenderness and frontal sinus tenderness. Left sinus exhibits maxillary sinus tenderness and frontal sinus tenderness. Mouth/Throat: Uvula is midline and mucous membranes are normal. Posterior oropharyngeal erythema present. Cardiovascular: Normal rate and regular rhythm. Pulmonary/Chest: Effort normal and breath sounds normal.   Lymphadenopathy:        Head (right side): No submental, no submandibular, no tonsillar, no preauricular, no posterior auricular and no occipital adenopathy present. Head (left side): No submental, no submandibular, no tonsillar, no preauricular, no posterior auricular and no occipital adenopathy present. He has no cervical adenopathy. He has no axillary adenopathy. Neurological: He is alert and oriented to person, place, and time. Skin: Skin is warm, dry and intact. No rash noted. Nursing note and vitals reviewed. DIAGNOSTIC RESULTS     Labs:No results found for this visit on 12/19/18. IMAGING:    No orders to display         EKG:      URGENT CARE COURSE:     Vitals:    12/19/18 1359   BP: (!) 176/86   Pulse: 73   Resp: 16   Temp: 97.6 °F (36.4 °C)   TempSrc: Temporal   SpO2: 96%   Weight: 170 lb (77.1 kg)       Medications - No data to display         PROCEDURES:  None    FINAL IMPRESSION      1. Acute maxillary sinusitis, recurrence not specified          DISPOSITION/ PLAN     The patient's physical exam is consistent with acute maxillary sinusitis.   He was

## 2019-01-03 DIAGNOSIS — F41.9 ANXIETY: ICD-10-CM

## 2019-01-03 RX ORDER — ALPRAZOLAM 0.5 MG/1
TABLET ORAL
Qty: 90 TABLET | Refills: 1 | Status: SHIPPED | OUTPATIENT
Start: 2019-01-03 | End: 2019-05-05 | Stop reason: SDUPTHER

## 2019-01-12 ENCOUNTER — HOSPITAL ENCOUNTER (EMERGENCY)
Age: 54
Discharge: HOME | End: 2019-01-12
Payer: COMMERCIAL

## 2019-01-12 VITALS
RESPIRATION RATE: 18 BRPM | TEMPERATURE: 97.7 F | DIASTOLIC BLOOD PRESSURE: 67 MMHG | SYSTOLIC BLOOD PRESSURE: 145 MMHG | HEART RATE: 58 BPM | WEIGHT: 175 LBS | BODY MASS INDEX: 27.47 KG/M2 | OXYGEN SATURATION: 99 % | HEIGHT: 67 IN

## 2019-01-12 DIAGNOSIS — Z95.2 H/O HEART VALVE REPLACEMENT WITH MECHANICAL VALVE: ICD-10-CM

## 2019-01-12 DIAGNOSIS — K02.9 PAIN DUE TO DENTAL CARIES: Primary | ICD-10-CM

## 2019-01-12 DIAGNOSIS — R39.11 URINARY HESITANCY: ICD-10-CM

## 2019-01-12 PROCEDURE — 96372 THER/PROPH/DIAG INJ SC/IM: CPT

## 2019-01-12 PROCEDURE — 6360000002 HC RX W HCPCS: Performed by: NURSE PRACTITIONER

## 2019-01-12 PROCEDURE — 6370000000 HC RX 637 (ALT 250 FOR IP): Performed by: NURSE PRACTITIONER

## 2019-01-12 PROCEDURE — 99282 EMERGENCY DEPT VISIT SF MDM: CPT

## 2019-01-12 RX ORDER — LABETALOL HYDROCHLORIDE 5 MG/ML
20 INJECTION, SOLUTION INTRAVENOUS ONCE
Status: DISCONTINUED | OUTPATIENT
Start: 2019-01-12 | End: 2019-01-12

## 2019-01-12 RX ORDER — KETOROLAC TROMETHAMINE 30 MG/ML
30 INJECTION, SOLUTION INTRAMUSCULAR; INTRAVENOUS ONCE
Status: COMPLETED | OUTPATIENT
Start: 2019-01-12 | End: 2019-01-12

## 2019-01-12 RX ADMIN — PENICILLIN G BENZATHINE 1.2 MILLION UNITS: 1200000 INJECTION, SUSPENSION INTRAMUSCULAR at 04:18

## 2019-01-12 RX ADMIN — LIDOCAINE HYDROCHLORIDE 15 ML: 20 SOLUTION ORAL; TOPICAL at 04:18

## 2019-01-12 RX ADMIN — KETOROLAC TROMETHAMINE 30 MG: 30 INJECTION, SOLUTION INTRAMUSCULAR at 04:18

## 2019-01-12 ASSESSMENT — ENCOUNTER SYMPTOMS
RHINORRHEA: 0
SHORTNESS OF BREATH: 0
NAUSEA: 0
PHOTOPHOBIA: 0
SORE THROAT: 0
COUGH: 0
FACIAL SWELLING: 0
VOMITING: 0
ABDOMINAL PAIN: 0

## 2019-01-12 ASSESSMENT — PAIN DESCRIPTION - PAIN TYPE: TYPE: ACUTE PAIN

## 2019-01-12 ASSESSMENT — PAIN DESCRIPTION - LOCATION: LOCATION: TEETH

## 2019-01-12 ASSESSMENT — PAIN DESCRIPTION - ORIENTATION: ORIENTATION: RIGHT

## 2019-01-12 ASSESSMENT — PAIN SCALES - GENERAL: PAINLEVEL_OUTOF10: 8

## 2019-01-13 ENCOUNTER — NURSE TRIAGE (OUTPATIENT)
Dept: ADMINISTRATIVE | Age: 54
End: 2019-01-13

## 2019-01-14 ENCOUNTER — CARE COORDINATION (OUTPATIENT)
Dept: CASE MANAGEMENT | Age: 54
End: 2019-01-14

## 2019-01-14 DIAGNOSIS — M54.16 LUMBAR RADICULOPATHY: ICD-10-CM

## 2019-01-14 DIAGNOSIS — M54.2 CERVICALGIA: ICD-10-CM

## 2019-01-14 RX ORDER — TERAZOSIN 2 MG/1
CAPSULE ORAL
Qty: 30 CAPSULE | Refills: 2 | Status: SHIPPED | OUTPATIENT
Start: 2019-01-14 | End: 2019-10-07 | Stop reason: SDUPTHER

## 2019-01-14 RX ORDER — HYDROCODONE BITARTRATE AND ACETAMINOPHEN 10; 325 MG/1; MG/1
1 TABLET ORAL EVERY 6 HOURS PRN
Qty: 120 TABLET | Refills: 0 | Status: SHIPPED | OUTPATIENT
Start: 2019-01-14 | End: 2019-02-14 | Stop reason: SDUPTHER

## 2019-01-14 RX ORDER — METOPROLOL SUCCINATE 25 MG/1
TABLET, EXTENDED RELEASE ORAL
Qty: 30 TABLET | Refills: 5 | Status: SHIPPED | OUTPATIENT
Start: 2019-01-14 | End: 2019-02-07 | Stop reason: SDUPTHER

## 2019-01-23 ENCOUNTER — TELEPHONE (OUTPATIENT)
Dept: FAMILY MEDICINE CLINIC | Age: 54
End: 2019-01-23

## 2019-02-07 ENCOUNTER — HOSPITAL ENCOUNTER (OUTPATIENT)
Age: 54
Discharge: HOME | End: 2019-02-07
Payer: COMMERCIAL

## 2019-02-07 ENCOUNTER — OFFICE VISIT (OUTPATIENT)
Dept: FAMILY MEDICINE CLINIC | Age: 54
End: 2019-02-07
Payer: COMMERCIAL

## 2019-02-07 VITALS
HEART RATE: 68 BPM | WEIGHT: 182.7 LBS | RESPIRATION RATE: 16 BRPM | BODY MASS INDEX: 28.61 KG/M2 | OXYGEN SATURATION: 95 % | DIASTOLIC BLOOD PRESSURE: 62 MMHG | SYSTOLIC BLOOD PRESSURE: 120 MMHG

## 2019-02-07 DIAGNOSIS — Z51.81 MEDICATION MONITORING ENCOUNTER: ICD-10-CM

## 2019-02-07 DIAGNOSIS — R73.01 IFG (IMPAIRED FASTING GLUCOSE): ICD-10-CM

## 2019-02-07 DIAGNOSIS — M48.062 SPINAL STENOSIS OF LUMBAR REGION WITH NEUROGENIC CLAUDICATION: ICD-10-CM

## 2019-02-07 DIAGNOSIS — E03.9 ACQUIRED HYPOTHYROIDISM: ICD-10-CM

## 2019-02-07 DIAGNOSIS — Z95.2 S/P AVR (AORTIC VALVE REPLACEMENT): ICD-10-CM

## 2019-02-07 DIAGNOSIS — F34.1 DYSTHYMIA (OR DEPRESSIVE NEUROSIS): ICD-10-CM

## 2019-02-07 DIAGNOSIS — M54.16 LUMBAR RADICULOPATHY: ICD-10-CM

## 2019-02-07 DIAGNOSIS — Z98.890 H/O CERVICAL SPINE SURGERY: ICD-10-CM

## 2019-02-07 DIAGNOSIS — I10 ESSENTIAL HYPERTENSION: ICD-10-CM

## 2019-02-07 DIAGNOSIS — Z72.0 TOBACCO ABUSE: ICD-10-CM

## 2019-02-07 DIAGNOSIS — E78.2 MIXED HYPERLIPIDEMIA: ICD-10-CM

## 2019-02-07 DIAGNOSIS — I10 ESSENTIAL HYPERTENSION: Primary | ICD-10-CM

## 2019-02-07 LAB
BASOPHILS # BLD: 0.6 %
BASOPHILS ABSOLUTE: 0 THOU/MM3 (ref 0–0.1)
EOSINOPHIL # BLD: 3.3 %
EOSINOPHILS ABSOLUTE: 0.2 THOU/MM3 (ref 0–0.4)
ERYTHROCYTE [DISTWIDTH] IN BLOOD BY AUTOMATED COUNT: 15.7 % (ref 11.5–14.5)
ERYTHROCYTE [DISTWIDTH] IN BLOOD BY AUTOMATED COUNT: 53.1 FL (ref 35–45)
HBA1C MFR BLD: 5.6 %
HCT VFR BLD CALC: 42.5 % (ref 42–52)
HEMOGLOBIN: 13.7 GM/DL (ref 14–18)
IMMATURE GRANS (ABS): 0.02 THOU/MM3 (ref 0–0.07)
IMMATURE GRANULOCYTES: 0.3 %
LYMPHOCYTES # BLD: 16.4 %
LYMPHOCYTES ABSOLUTE: 1.2 THOU/MM3 (ref 1–4.8)
MCH RBC QN AUTO: 29.9 PG (ref 26–33)
MCHC RBC AUTO-ENTMCNC: 32.2 GM/DL (ref 32.2–35.5)
MCV RBC AUTO: 92.8 FL (ref 80–94)
MONOCYTES # BLD: 11.7 %
MONOCYTES ABSOLUTE: 0.9 THOU/MM3 (ref 0.4–1.3)
NUCLEATED RED BLOOD CELLS: 0 /100 WBC
PLATELET # BLD: 202 THOU/MM3 (ref 130–400)
PMV BLD AUTO: 11.3 FL (ref 9.4–12.4)
RBC # BLD: 4.58 MILL/MM3 (ref 4.7–6.1)
SEG NEUTROPHILS: 67.7 %
SEGMENTED NEUTROPHILS ABSOLUTE COUNT: 4.9 THOU/MM3 (ref 1.8–7.7)
TSH SERPL DL<=0.05 MIU/L-ACNC: 1.51 UIU/ML (ref 0.4–4.2)
WBC # BLD: 7.3 THOU/MM3 (ref 4.8–10.8)

## 2019-02-07 PROCEDURE — 84443 ASSAY THYROID STIM HORMONE: CPT

## 2019-02-07 PROCEDURE — 99214 OFFICE O/P EST MOD 30 MIN: CPT | Performed by: FAMILY MEDICINE

## 2019-02-07 PROCEDURE — 36415 COLL VENOUS BLD VENIPUNCTURE: CPT

## 2019-02-07 PROCEDURE — 83036 HEMOGLOBIN GLYCOSYLATED A1C: CPT | Performed by: FAMILY MEDICINE

## 2019-02-07 PROCEDURE — 85025 COMPLETE CBC W/AUTO DIFF WBC: CPT

## 2019-02-07 ASSESSMENT — ENCOUNTER SYMPTOMS
GASTROINTESTINAL NEGATIVE: 1
COUGH: 0
SHORTNESS OF BREATH: 0
BACK PAIN: 1
RESPIRATORY NEGATIVE: 1

## 2019-02-11 RX ORDER — GABAPENTIN 300 MG/1
CAPSULE ORAL
Qty: 90 CAPSULE | Refills: 5 | Status: SHIPPED | OUTPATIENT
Start: 2019-02-11 | End: 2020-10-08 | Stop reason: SDUPTHER

## 2019-02-14 DIAGNOSIS — M54.2 CERVICALGIA: ICD-10-CM

## 2019-02-14 DIAGNOSIS — M54.16 LUMBAR RADICULOPATHY: ICD-10-CM

## 2019-02-14 RX ORDER — HYDROCODONE BITARTRATE AND ACETAMINOPHEN 10; 325 MG/1; MG/1
1 TABLET ORAL EVERY 6 HOURS PRN
Qty: 120 TABLET | Refills: 0 | Status: SHIPPED | OUTPATIENT
Start: 2019-02-14 | End: 2019-03-15 | Stop reason: SDUPTHER

## 2019-03-11 ENCOUNTER — HOSPITAL ENCOUNTER (OUTPATIENT)
Age: 54
Discharge: HOME | End: 2019-03-11
Payer: COMMERCIAL

## 2019-03-11 LAB
ALBUMIN SERPL-MCNC: 4.1 G/DL (ref 3.5–5.1)
ALP BLD-CCNC: 71 U/L (ref 38–126)
ALT SERPL-CCNC: 12 U/L (ref 11–66)
ANION GAP SERPL CALCULATED.3IONS-SCNC: 13 MEQ/L (ref 8–16)
AST SERPL-CCNC: 12 U/L (ref 5–40)
BILIRUB SERPL-MCNC: 0.3 MG/DL (ref 0.3–1.2)
BUN BLDV-MCNC: 16 MG/DL (ref 7–22)
CALCIUM SERPL-MCNC: 8.8 MG/DL (ref 8.5–10.5)
CHLORIDE BLD-SCNC: 109 MEQ/L (ref 98–111)
CHOLESTEROL, TOTAL: 88 MG/DL (ref 100–199)
CO2: 23 MEQ/L (ref 23–33)
CREAT SERPL-MCNC: 0.6 MG/DL (ref 0.4–1.2)
ERYTHROCYTE [DISTWIDTH] IN BLOOD BY AUTOMATED COUNT: 15.8 % (ref 11.5–14.5)
ERYTHROCYTE [DISTWIDTH] IN BLOOD BY AUTOMATED COUNT: 54.4 FL (ref 35–45)
GFR SERPL CREATININE-BSD FRML MDRD: > 90 ML/MIN/1.73M2
GLUCOSE BLD-MCNC: 108 MG/DL (ref 70–108)
HCT VFR BLD CALC: 40.7 % (ref 42–52)
HDLC SERPL-MCNC: 36 MG/DL
HEMOGLOBIN: 13.2 GM/DL (ref 14–18)
LDL CHOLESTEROL CALCULATED: 47 MG/DL
MCH RBC QN AUTO: 30.5 PG (ref 26–33)
MCHC RBC AUTO-ENTMCNC: 32.4 GM/DL (ref 32.2–35.5)
MCV RBC AUTO: 94 FL (ref 80–94)
PLATELET # BLD: 193 THOU/MM3 (ref 130–400)
PMV BLD AUTO: 11.3 FL (ref 9.4–12.4)
POTASSIUM SERPL-SCNC: 4.4 MEQ/L (ref 3.5–5.2)
RBC # BLD: 4.33 MILL/MM3 (ref 4.7–6.1)
SODIUM BLD-SCNC: 145 MEQ/L (ref 135–145)
TOTAL PROTEIN: 6.7 G/DL (ref 6.1–8)
TRIGL SERPL-MCNC: 27 MG/DL (ref 0–199)
TSH SERPL DL<=0.05 MIU/L-ACNC: 2.01 UIU/ML (ref 0.4–4.2)
WBC # BLD: 9.1 THOU/MM3 (ref 4.8–10.8)

## 2019-03-11 PROCEDURE — 84443 ASSAY THYROID STIM HORMONE: CPT

## 2019-03-11 PROCEDURE — 80061 LIPID PANEL: CPT

## 2019-03-11 PROCEDURE — 80053 COMPREHEN METABOLIC PANEL: CPT

## 2019-03-11 PROCEDURE — 36415 COLL VENOUS BLD VENIPUNCTURE: CPT

## 2019-03-11 PROCEDURE — 85027 COMPLETE CBC AUTOMATED: CPT

## 2019-03-15 DIAGNOSIS — M54.16 LUMBAR RADICULOPATHY: ICD-10-CM

## 2019-03-15 DIAGNOSIS — M54.2 CERVICALGIA: ICD-10-CM

## 2019-03-15 RX ORDER — HYDROCODONE BITARTRATE AND ACETAMINOPHEN 10; 325 MG/1; MG/1
1 TABLET ORAL EVERY 6 HOURS PRN
Qty: 120 TABLET | Refills: 0 | Status: SHIPPED | OUTPATIENT
Start: 2019-03-15 | End: 2019-04-17 | Stop reason: SDUPTHER

## 2019-04-11 ENCOUNTER — APPOINTMENT (OUTPATIENT)
Dept: GENERAL RADIOLOGY | Age: 54
End: 2019-04-11
Payer: COMMERCIAL

## 2019-04-11 ENCOUNTER — HOSPITAL ENCOUNTER (EMERGENCY)
Age: 54
Discharge: HOME | End: 2019-04-12
Payer: COMMERCIAL

## 2019-04-11 VITALS
BODY MASS INDEX: 28.04 KG/M2 | TEMPERATURE: 98.4 F | HEART RATE: 71 BPM | RESPIRATION RATE: 17 BRPM | HEIGHT: 68 IN | DIASTOLIC BLOOD PRESSURE: 84 MMHG | SYSTOLIC BLOOD PRESSURE: 157 MMHG | WEIGHT: 185 LBS | OXYGEN SATURATION: 97 %

## 2019-04-11 DIAGNOSIS — M25.551 RIGHT HIP PAIN: Primary | ICD-10-CM

## 2019-04-11 PROCEDURE — 72100 X-RAY EXAM L-S SPINE 2/3 VWS: CPT

## 2019-04-11 PROCEDURE — 99283 EMERGENCY DEPT VISIT LOW MDM: CPT

## 2019-04-11 PROCEDURE — 73502 X-RAY EXAM HIP UNI 2-3 VIEWS: CPT

## 2019-04-11 ASSESSMENT — PAIN DESCRIPTION - ORIENTATION: ORIENTATION: RIGHT

## 2019-04-11 ASSESSMENT — PAIN DESCRIPTION - LOCATION: LOCATION: GROIN;HIP

## 2019-04-11 ASSESSMENT — PAIN DESCRIPTION - PAIN TYPE: TYPE: CHRONIC PAIN

## 2019-04-12 PROCEDURE — 6370000000 HC RX 637 (ALT 250 FOR IP): Performed by: NURSE PRACTITIONER

## 2019-04-12 PROCEDURE — 6360000002 HC RX W HCPCS: Performed by: NURSE PRACTITIONER

## 2019-04-12 PROCEDURE — 96372 THER/PROPH/DIAG INJ SC/IM: CPT

## 2019-04-12 RX ORDER — KETOROLAC TROMETHAMINE 30 MG/ML
30 INJECTION, SOLUTION INTRAMUSCULAR; INTRAVENOUS ONCE
Status: COMPLETED | OUTPATIENT
Start: 2019-04-12 | End: 2019-04-12

## 2019-04-12 RX ORDER — ORPHENADRINE CITRATE 100 MG/1
100 TABLET, EXTENDED RELEASE ORAL 2 TIMES DAILY
Qty: 20 TABLET | Refills: 0 | Status: SHIPPED | OUTPATIENT
Start: 2019-04-12 | End: 2019-04-22

## 2019-04-12 RX ORDER — LIDOCAINE 4 G/G
1 PATCH TOPICAL ONCE
Status: DISCONTINUED | OUTPATIENT
Start: 2019-04-12 | End: 2019-04-12 | Stop reason: HOSPADM

## 2019-04-12 RX ORDER — ORPHENADRINE CITRATE 100 MG/1
100 TABLET, EXTENDED RELEASE ORAL 2 TIMES DAILY
Status: DISCONTINUED | OUTPATIENT
Start: 2019-04-12 | End: 2019-04-12 | Stop reason: HOSPADM

## 2019-04-12 RX ADMIN — ORPHENADRINE CITRATE 100 MG: 100 TABLET, EXTENDED RELEASE ORAL at 01:27

## 2019-04-12 RX ADMIN — KETOROLAC TROMETHAMINE 30 MG: 30 INJECTION, SOLUTION INTRAMUSCULAR; INTRAVENOUS at 01:27

## 2019-04-12 ASSESSMENT — ENCOUNTER SYMPTOMS
SHORTNESS OF BREATH: 0
DIARRHEA: 0
SORE THROAT: 0
EYE DISCHARGE: 0
RHINORRHEA: 0
ABDOMINAL PAIN: 0
EYE REDNESS: 0
WHEEZING: 0
BACK PAIN: 0
NAUSEA: 0
COUGH: 0
VOMITING: 0

## 2019-04-12 ASSESSMENT — PAIN SCALES - GENERAL: PAINLEVEL_OUTOF10: 9

## 2019-04-12 NOTE — ED TRIAGE NOTES
Patient to ED for exacerbation of chronic pain. Patient has followed with pain management in the past, states that  He has had nerves burned, takes norco for chronic pain. Patient denies injury, states that he was on his feet all day.

## 2019-04-12 NOTE — ED PROVIDER NOTES
[facility]  eMERGENCY dEPARTMENT eNCOUnter          CHIEF COMPLAINT       Chief Complaint   Patient presents with    Groin Pain    Hip Pain       Nurses Notes reviewed and I agree except as noted in the HPI. HISTORY OF PRESENT ILLNESS    Marco Antonio Xiong is a 62 y.o. male who presents to the Emergency Department for the evaluation of right groin pain. The patient states that his symptoms started earlier today at the end of his work day when he attempted to climb into his truck. The patient states that he was unable to lift his leg using his groin muscles. The patient states that he has remained unable to lift his left leg since that time secondary to his pain. The patient describes his pain as an intermittent shooting pain which he rates as a 9/10. The patient's pain only occurs with certain movements. The patient's pain improves with standing. The patient admits taking a Norco for his pain at 1800 last night without improvement. The patient admits cramping to his left leg last night and chronic back pain which intermittently flares. The patient denies any hematochezia, melena, or any other signs or symptoms at this time. The patient admits a history of multiple pinched nerves, sciatica, nerve ablations in his back, CABG, and a porcine aortic valve replacement. 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 The HPI was provided by the patient. REVIEW OF SYSTEMS     Review of Systems   Constitutional: Negative for appetite change, chills, fatigue and fever. HENT: Negative for congestion, ear pain, rhinorrhea and sore throat. Eyes: Negative for discharge, redness and visual disturbance. Respiratory: Negative for cough, shortness of breath and wheezing. Cardiovascular: Negative for chest pain, palpitations and leg swelling. Gastrointestinal: Negative for abdominal pain, diarrhea, nausea and vomiting. Genitourinary: Negative for decreased urine volume, difficulty urinating, dysuria and hematuria. Musculoskeletal: Negative for arthralgias, back pain, joint swelling and neck pain. Right groin pain   Skin: Negative for pallor and rash. Allergic/Immunologic: Negative for environmental allergies. Neurological: Negative for dizziness, syncope, weakness, light-headedness, numbness and headaches. Hematological: Negative for adenopathy. Psychiatric/Behavioral: Negative for confusion and suicidal ideas. The patient is not nervous/anxious. PAST MEDICAL HISTORY    has a past medical history of Arthritis, Bilateral sciatica, Bleeding, CAD (coronary artery disease), Carpal tunnel syndrome, Cervicalgia, C5-7, Chronic anxiety, Dysthymia (or depressive neurosis), ED (erectile dysfunction), GERD (gastroesophageal reflux disease), Headache(784.0), History of blood transfusion, HTN (hypertension), Hyperlipidemia, Hypothyroidism, Iron deficiency anemia, blood loss, Lumbago, Lumbar radiculopathy, Osteoarthritis (arthritis due to wear and tear of joints), S/P AVR, coumadin Tx, and Spondylosis of thoracic region without myelopathy or radiculopathy. SURGICAL HISTORY      has a past surgical history that includes cardiovascular stress test (4 15 2010); Appendectomy (1980); Colonoscopy (2010); Small intestine surgery (2010); Upper gastrointestinal endoscopy (2010); Aortic valve replacement (2007); Endoscopy, colon, diagnostic; cervical fusion (2010); cervical fusion (03/09/2016); Cardiac catheterization (5 20 2010); Cardiac catheterization (2012, 6/9/16); Dilatation, esophagus; Aortic valve replacement (07/20/2016); Coronary artery bypass graft; other surgical history (05/02/2017); Nerve Surgery (Bilateral, 07/25/2017); pr inj,paravertebral l/s,1 level (Bilateral, 7/25/2017); other surgical history (09/12/2017); and pr njx dx/ther sbst epidural/subarach lumbar/sacral (N/A, 9/12/2017).     CURRENT MEDICATIONS Discharge Medication List as of 4/12/2019  1:18 AM      CONTINUE these medications which have NOT CHANGED    Details   HYDROcodone-acetaminophen (NORCO)  MG per tablet Take 1 tablet by mouth every 6 hours as needed for Pain for up to 30 days. , Disp-120 tablet, R-0Normal      celecoxib (CELEBREX) 200 MG capsule TAKE 1 CAPSULE BY MOUTH ONE TIME A DAY , Disp-30 capsule, R-5Normal      !! gabapentin (NEURONTIN) 300 MG capsule TAKE 1 CAPSULE BY MOUTH THREE TIMES A DAY , Disp-90 capsule, R-5Normal      terazosin (HYTRIN) 2 MG capsule TAKE 1 CAPSULE BY MOUTH ONE TIME A DAY AT NIGHT, Disp-30 capsule, R-2Normal      lidocaine viscous (XYLOCAINE) 2 % solution Take 15 mLs by mouth as needed for Irritation or Dental Pain Apply to a cotton ball and place on affected tooth as needed for pain, Disp-1 Bottle, R-0Print      ALPRAZolam (XANAX) 0.5 MG tablet TAKE 1 TABLET BY MOUTH THREE TIMES A DAY AS NEEDED for sleep, Disp-90 tablet, R-1Normal      fluticasone (FLONASE) 50 MCG/ACT nasal spray 1 spray by Nasal route daily for 7 days, Disp-1 Bottle, R-0Normal      levothyroxine (SYNTHROID) 75 MCG tablet TAKE 1 TABLET BY MOUTH IN THE MORNING , Disp-30 tablet, R-5Normal      atorvastatin (LIPITOR) 20 MG tablet TAKE 1 TABLET BY MOUTH ONE TIME A DAY AT NIGHT, Disp-90 tablet, R-10Normal      clobetasol (TEMOVATE) 0.05 % cream APPLY TOPICALLY TO RIGHT INDEX FINGER 3 TIMES DAILY, R-2, Historical Med      metoprolol succinate (TOPROL XL) 25 MG extended release tablet TAKE 1 TABLET BY MOUTH ONE TIME A DAY, R-6Historical Med      Cholecalciferol (VITAMIN D3) 1000 units TABS Take 2,000 Units by mouth DailyHistorical Med      buPROPion (WELLBUTRIN) 100 MG tablet Take 1 tablet by mouth 2 times daily, Disp-60 tablet, R-3Normal      V-R ASPIRIN  MG EC tablet TAKE 1 TABLET BY MOUTH ONE TIME A DAY , Disp-30 tablet, R-10Normal      esomeprazole (NEXIUM) 40 MG delayed release capsule TAKE 1 CAPSULE BY MOUTH TWO TIMES A DAY , Disp-60 capsule, R-4Normal       MG capsule TAKE 1 CAPSULE BY MOUTH ONE TIME A DAY AS NEEDED for constipation, Disp-30 capsule, R-10Normal      tiZANidine (ZANAFLEX) 2 MG tablet Take 2 tablets by mouth every 8 hours as needed (spasms), Disp-90 tablet, R-0Normal      IRON PO Take 65 mg by mouth daily Historical Med      !! gabapentin (NEURONTIN) 300 MG capsule Take 300 mg by mouth 3 times daily      Multiple Vitamin (MULTIVITAMIN PO) Take 1 tablet by mouth daily        ! ! - Potential duplicate medications found. Please discuss with provider. ALLERGIES     has No Known Allergies. FAMILY HISTORY     indicated that his mother is . He indicated that his father is . He indicated that his sister is . He indicated that his brother is alive. He indicated that the status of his neg hx is unknown.   family history includes Cancer in his brother, father, and mother; Diabetes in his father and sister; Heart Disease (age of onset: 28) in his father; High Blood Pressure in his father and sister; Kidney Disease in his sister. SOCIAL HISTORY      reports that he has been smoking cigarettes and cigars. He has a 25.00 pack-year smoking history. He has never used smokeless tobacco. He reports that he drinks alcohol. He reports that he does not use drugs. PHYSICAL EXAM     INITIAL VITALS:  height is 5' 8\" (1.727 m) and weight is 185 lb (83.9 kg). His oral temperature is 98.4 °F (36.9 °C). His blood pressure is 157/84 (abnormal) and his pulse is 71. His respiration is 17 and oxygen saturation is 97%. Physical Exam   Constitutional: He is oriented to person, place, and time. He appears well-developed and well-nourished. Non-toxic appearance. HENT:   Head: Normocephalic and atraumatic.    Right Ear: Tympanic membrane and external ear normal.   Left Ear: Tympanic membrane and external ear normal.   Nose: Nose normal.   Mouth/Throat: Oropharynx is clear and moist and mucous membranes are normal. range of motion to the right leg secondary to the reported pain. Neurological: He is alert and oriented to person, place, and time. He exhibits normal muscle tone. Coordination normal.   Skin: Skin is warm and dry. No rash noted. He is not diaphoretic. Nursing note and vitals reviewed. DIFFERENTIAL DIAGNOSIS:   Neuropathy, exacerbation of chronic pain, muscle spasm,     DIAGNOSTIC RESULTS     EKG: All EKG's are interpreted by the Emergency Department Physician who either signs or Co-signs this chart in the absence of a cardiologist.    None    RADIOLOGY: non-plainfilm images(s) such as CT, Ultrasound and MRI are read by the radiologist.       XR LUMBAR SPINE (2-3 VIEWS)   Final Result   1. Mild progressive disc space narrowing at L5-S1 and facet arthropathy. 2. No acute appearing fracture deformity. 3. Stable mild superior endplate changes of L2. **This report has been created using voice recognition software. It may contain minor errors which are inherent in voice recognition technology. **      Final report electronically signed by Dr. Jazmin Yanes on 4/12/2019 12:15 AM      XR HIP 2-3 VW W PELVIS RIGHT   Final Result   1. Negative exam for fracture or dislocation of the right hip. 2. The bony pelvis is unremarkable for acute pathology. 3. Degenerative arthropathy of left hip also noted      Final report electronically signed by Dr. Jazmin Yanes on 4/12/2019 12:10 AM          LABS:     Labs Reviewed - No data to display    EMERGENCY DEPARTMENT COURSE:   Vitals:    Vitals:    04/11/19 2223   BP: (!) 157/84   Pulse: 71   Resp: 17   Temp: 98.4 °F (36.9 °C)   TempSrc: Oral   SpO2: 97%   Weight: 185 lb (83.9 kg)   Height: 5' 8\" (1.727 m)       1:16 AM: The patient was seen and evaluated. MDM:  The patient was seen and evaluated in a timely manner for right groin pain. His vital signs were stable with some hypertension noted.  The patient's blood pressure was elevated while in the ED and at the time of discharge. The patient has a medical history of hypertension and is compliant with their antihypertensive medications. The patient was informed of their elevated blood pressure while in the ED and was advised to follow up with their PCP for blood pressure recheck and further management of their antihypertensive medications. During the physical exam I noted the patient had subjective right hip pain and a limited range of motion secondary to that pain. I ordered x-rays of the lumbar spin and pelvis. The x-rays showed chronic, degenerative changes, without acute findings. Imaging results were reviewed and discussed with the patient. Within the department, the patient was treated with toradol. The patient responded well to treatment, and I noted his condition to improve. I decided that the patient could be discharged home with instructions to follow up with Dr. Jaime Shrestha, neurosurgeon, and his PCP as written below. I wrote him a prescription for norflex which will be taken as directed. I explained my proposed course of discharge to the patient, and he verbalized understanding and agreement with my proposed plan. All his questions were addressed at bedside. The patient will return to the emergency department for any new or worsening symptoms. CRITICAL CARE:   None     CONSULTS:  None    PROCEDURES:  None    FINAL IMPRESSION      1. Right hip pain          DISPOSITION/PLAN   Discharge home in stable condition.      PATIENT REFERRED TO:  Carey Galvez MD  [facility], [address] [place] [address]          Sergio Lindquist MD  [address] [phone]            DISCHARGE MEDICATIONS:  Discharge Medication List as of 4/12/2019  1:18 AM      START taking these medications    Details   orphenadrine (NORFLEX) 100 MG extended release tablet Take 1 tablet by mouth 2 times daily for 10 days, Disp-20 tablet, R-0Print             (Please note that portions of

## 2019-04-15 ENCOUNTER — TELEPHONE (OUTPATIENT)
Dept: FAMILY MEDICINE CLINIC | Age: 54
End: 2019-04-15

## 2019-04-16 ENCOUNTER — CARE COORDINATION (OUTPATIENT)
Dept: CASE MANAGEMENT | Age: 54
End: 2019-04-16

## 2019-04-17 DIAGNOSIS — M54.2 CERVICALGIA: ICD-10-CM

## 2019-04-17 DIAGNOSIS — M54.16 LUMBAR RADICULOPATHY: ICD-10-CM

## 2019-04-17 RX ORDER — HYDROCODONE BITARTRATE AND ACETAMINOPHEN 10; 325 MG/1; MG/1
1 TABLET ORAL EVERY 6 HOURS PRN
Qty: 120 TABLET | Refills: 0 | Status: SHIPPED | OUTPATIENT
Start: 2019-04-17 | End: 2019-05-16 | Stop reason: SDUPTHER

## 2019-04-17 NOTE — TELEPHONE ENCOUNTER
Francisca Mcintosh called requesting a refill on the following medications:  Requested Prescriptions     Pending Prescriptions Disp Refills    HYDROcodone-acetaminophen (NORCO)  MG per tablet 120 tablet 0     Sig: Take 1 tablet by mouth every 6 hours as needed for Pain for up to 30 days.      Pharmacy verified:  [email or web address]      Date of last visit: 2/7/19  Date of next visit (if applicable): 9/5/5407

## 2019-05-05 DIAGNOSIS — F41.9 ANXIETY: ICD-10-CM

## 2019-05-06 RX ORDER — ALPRAZOLAM 0.5 MG/1
TABLET ORAL
Qty: 90 TABLET | Refills: 0 | Status: SHIPPED | OUTPATIENT
Start: 2019-05-06 | End: 2019-06-14 | Stop reason: SDUPTHER

## 2019-05-09 ENCOUNTER — OFFICE VISIT (OUTPATIENT)
Dept: FAMILY MEDICINE CLINIC | Age: 54
End: 2019-05-09
Payer: COMMERCIAL

## 2019-05-09 VITALS
SYSTOLIC BLOOD PRESSURE: 110 MMHG | BODY MASS INDEX: 29.82 KG/M2 | RESPIRATION RATE: 20 BRPM | HEART RATE: 72 BPM | DIASTOLIC BLOOD PRESSURE: 50 MMHG | HEIGHT: 67 IN | WEIGHT: 190 LBS

## 2019-05-09 DIAGNOSIS — G89.4 CHRONIC PAIN SYNDROME: ICD-10-CM

## 2019-05-09 DIAGNOSIS — R73.01 IFG (IMPAIRED FASTING GLUCOSE): ICD-10-CM

## 2019-05-09 DIAGNOSIS — E78.2 MIXED HYPERLIPIDEMIA: ICD-10-CM

## 2019-05-09 DIAGNOSIS — F34.1 DYSTHYMIA (OR DEPRESSIVE NEUROSIS): ICD-10-CM

## 2019-05-09 DIAGNOSIS — I10 ESSENTIAL HYPERTENSION: Primary | ICD-10-CM

## 2019-05-09 DIAGNOSIS — M54.16 LUMBAR RADICULOPATHY: ICD-10-CM

## 2019-05-09 DIAGNOSIS — Z51.81 MEDICATION MONITORING ENCOUNTER: ICD-10-CM

## 2019-05-09 DIAGNOSIS — Z95.2 S/P AVR (AORTIC VALVE REPLACEMENT): ICD-10-CM

## 2019-05-09 DIAGNOSIS — F41.9 ANXIETY: ICD-10-CM

## 2019-05-09 DIAGNOSIS — M50.10 HERNIATION OF CERVICAL INTERVERTEBRAL DISC WITH RADICULOPATHY: ICD-10-CM

## 2019-05-09 DIAGNOSIS — E03.9 ACQUIRED HYPOTHYROIDISM: ICD-10-CM

## 2019-05-09 DIAGNOSIS — M48.062 SPINAL STENOSIS OF LUMBAR REGION WITH NEUROGENIC CLAUDICATION: ICD-10-CM

## 2019-05-09 PROCEDURE — 99214 OFFICE O/P EST MOD 30 MIN: CPT | Performed by: FAMILY MEDICINE

## 2019-05-09 ASSESSMENT — ENCOUNTER SYMPTOMS
COUGH: 0
GASTROINTESTINAL NEGATIVE: 1
BACK PAIN: 1
SHORTNESS OF BREATH: 0
RESPIRATORY NEGATIVE: 1
ALLERGIC/IMMUNOLOGIC NEGATIVE: 1

## 2019-05-09 NOTE — PROGRESS NOTES
Chronic Disease Visit Information    BP Readings from Last 3 Encounters:   04/11/19 (!) 157/84   02/07/19 120/62   01/12/19 (!) 145/67          Hemoglobin A1C (%)   Date Value   02/07/2019 5.6   03/23/2017 5.5   07/19/2016 5.3     LDL Calculated (mg/dL)   Date Value   03/11/2019 47     HDL (mg/dL)   Date Value   03/11/2019 36     BUN (mg/dL)   Date Value   03/11/2019 16     CREATININE (mg/dL)   Date Value   03/11/2019 0.6     Glucose (mg/dL)   Date Value   03/11/2019 108            Have you changed or started any medications since your last visit including any over-the-counter medicines, vitamins, or herbal medicines? no   Are you having any side effects from any of your medications? -  no  Have you stopped taking any of your medications? Is so, why? -  yes - tx completed    Have you seen any other physician or provider since your last visit? No  Have you had any other diagnostic tests since your last visit? Yes - Records Obtained  Have you been seen in the emergency room and/or had an admission to a hospital since we last saw you? Yes - Records Obtained  Have you had your annual diabetic retinal (eye) exam? No  Have you had your routine dental cleaning in the past 6 months? yes - 3/2019    Have you activated your [employer] account? If not, what are your barriers?  No: pt choice     Patient Care Team:  Marcos Yang MD as PCP - General (Family Medicine)  Marcos Yang MD as PCP - S Attributed Provider  Rosa Luther MD as Surgeon (Cardiothoracic Surgery)  Sangeetha Sapp MD as Physician (Gastroenterology)         Medical History Review  Past Medical, Family, and Social History reviewed and does contribute to the patient presenting condition    Health Maintenance   Topic Date Due    DTaP/Tdap/Td vaccine (1 - Tdap) 04/02/1982    Shingles Vaccine (1 of 2) 04/02/2013    Pneumococcal 0-64 years Vaccine (2 of 3 - PPSV23) 08/10/2017    A1C test (Diabetic or Prediabetic)  02/07/2020    TSH testing  03/11/2020    Lipid screen  03/11/2024    Colon cancer screen colonoscopy  03/27/2026    Flu vaccine  Completed    Hepatitis C screen  Completed    HIV screen  Completed

## 2019-05-09 NOTE — PROGRESS NOTES
Subjective:      Patient ID: Shawna Cohen is a 62 y.o. male. HPI  Follow up of chronic conditions. Encounter Diagnoses   Name Primary?  Essential hypertension Yes    Acquired hypothyroidism     Mixed hyperlipidemia     Anxiety     Lumbar radiculopathy     IFG (impaired fasting glucose)     S/P AVR (aortic valve replacement), 7/20/16.  Spinal stenosis of lumbar region with neurogenic claudication     Herniation of cervical intervertebral disc with radiculopathy     Dysthymia (or depressive neurosis)     Chronic pain syndrome     Medication monitoring encounter      HTN is stable with current medications. Pt has no medication side effects nor orthostatic symptoms. BP Readings from Last 3 Encounters:   05/09/19 (!) 110/50   04/11/19 (!) 157/84   02/07/19 120/62     He remains euthyroid on levothyroxine 75 µg daily. Lab Results   Component Value Date    TSH 2.010 03/11/2019     His impaired fasting glucose remains stable with a recent hemoglobin A1c of 5.6%. Lab Results   Component Value Date    LABA1C 5.6 02/07/2019     No results found for: EAG     Is continued to be well-controlled with atorvastatin 20 mg nightly. He has a lot of chronic pain but states he doesn't feel like it's statin myalgia pain. He has been getting some nocturnal cramps and nonmedication approaches to control of these were discussed including quinine/tonic water, yellow mustard, and apple cider vinegar. Cholesterol, Total (mg/dL)   Date Value   03/11/2019 88 (L)     HDL (mg/dL)   Date Value   03/11/2019 36     Triglycerides (mg/dL)   Date Value   03/11/2019 27     Lab Results   Component Value Date    LDLCALC 47 03/11/2019     Otherwise, his chronic pain is controlled with his current dose of Norco 10/325 every 6 hours and the combination with gabapentin.   His chiropractor continues to adjust them for additional pain management and is suggesting he start massage therapy at least monthly as an additional means of muscle and spinal pain management. Depressive neurosis/chronic anxiety controlled on present medications. He continues to try to back off smoking but when he does, he starts putting weight back on. He is up 8 pounds in the last 3 months and although this is difficult as a means of trying to avoid smoking. He has a daughter who is a nurse who \"is always on me\" trying to have him quit. He does notice increased spinal lumbar pain with weight gain. Wt Readings from Last 3 Encounters:   05/09/19 190 lb (86.2 kg)   04/11/19 185 lb (83.9 kg)   02/07/19 182 lb 11.2 oz (82.9 kg)   Body mass index is 29.76 kg/m². The rest of this patient's conditions are stable. Past medical and surgical hx reviewed. Past Medical History:   Diagnosis Date    Arthritis     general    Bilateral sciatica     Bleeding     with coumadin    CAD (coronary artery disease)     Carpal tunnel syndrome     RIGHT    Cervicalgia, C5-7     Chronic anxiety     Dysthymia (or depressive neurosis) 2/1/2016    ED (erectile dysfunction)     GERD (gastroesophageal reflux disease)     Headache(784.0)     History of blood transfusion 03/2016    HTN (hypertension)     Hyperlipidemia     Hypothyroidism     Iron deficiency anemia, blood loss     Lumbago     Lumbar radiculopathy     Osteoarthritis (arthritis due to wear and tear of joints)     S/P AVR, coumadin Tx     Spondylosis of thoracic region without myelopathy or radiculopathy      Past Surgical History:   Procedure Laterality Date    [facility]    AORTIC VALVE REPLACEMENT  07/20/2016    extraction of mechanical valve and replacement with tissue valve    APPENDECTOMY  1980    CARDIAC CATHETERIZATION  5 20 2010    Patent coronary arteries.  Possible causes of the patient's chest pain is likely noncardiac at least based on this angiogram. Patient chould be able to proceed w/ scheduled surgery w/ paying attention to anticoagulation and trying to minimize the period of no anticoagulation.  CARDIAC CATHETERIZATION  2012, 6/9/16    [facility]    CARDIOVASCULAR STRESS TEST  4 15 2010    Moderate fixed inferior defect, possibly attenuation, cannot exclude a previous MI. Correlation w/ EKG is recommented. Significant degree of gut uptake which is likely to be the cause of the attenuation artifact seen. EF 46%    CERVICAL FUSION  2010    CERVICAL FUSION  03/09/2016    REMOVAL HARDWARE C5-7, ACDF C4-6 WITH ATLANTIS CORNERSTONE    COLONOSCOPY  2010    CORONARY ARTERY BYPASS GRAFT      DILATATION, ESOPHAGUS      small resection    ENDOSCOPY, COLON, DIAGNOSTIC      NERVE SURGERY Bilateral 07/25/2017    THORACIC FACET BLOCK T7-8, T8-9, T11-12    OTHER SURGICAL HISTORY  05/02/2017    LESI L5    OTHER SURGICAL HISTORY  09/12/2017    thoracic epidural T11    ND INJ,PARAVERTEBRAL L/S,1 LEVEL Bilateral 7/25/2017    T-FACET MBB T7-8, T8-9, T11-12 BILATERAL performed by Nakul Patel MD at [facility] DX/THER [address] LUMBAR/SACRAL N/A 9/12/2017    TESI T11 performed by Nakul Patel MD at [address] [facility] [address]  2010    [address] EXTRACTION  02/2019    UPPER GASTROINTESTINAL ENDOSCOPY  2010     Portions of this note were completed with a voice recording program.  Efforts were made to edit the dictations but occasionally words are mis-transcribed. Review of Systems   Constitutional: Negative. HENT: Negative. Negative for congestion. Respiratory: Negative. Negative for cough and shortness of breath. Cardiovascular: Negative for chest pain, palpitations and leg swelling. Gastrointestinal: Negative. Genitourinary: Negative. Negative for difficulty urinating. Musculoskeletal: Positive for arthralgias, back pain and neck pain. Negative for gait problem. Skin: Negative. Allergic/Immunologic: Negative.     Neurological: Positive for Essential hypertension     2. Acquired hypothyroidism     3. Mixed hyperlipidemia     4. Anxiety     5. Lumbar radiculopathy     6. IFG (impaired fasting glucose)     7. S/P AVR (aortic valve replacement), 7/20/16. 8. Spinal stenosis of lumbar region with neurogenic claudication     9. Herniation of cervical intervertebral disc with radiculopathy     10. Dysthymia (or depressive neurosis)     11. Chronic pain syndrome     12. Medication monitoring encounter             Plan:      No orders of the defined types were placed in this encounter. Medications Discontinued During This Encounter   Medication Reason    gabapentin (NEURONTIN) 849 MG capsule DUPLICATE    lidocaine viscous (XYLOCAINE) 2 % solution Therapy completed     Current Outpatient Medications   Medication Sig Dispense Refill    ALPRAZolam (XANAX) 0.5 MG tablet TAKE 1 TABLET BY MOUTH THREE TIMES A DAY AS NEEDED FOR SLEEP 90 tablet 0    HYDROcodone-acetaminophen (NORCO)  MG per tablet Take 1 tablet by mouth every 6 hours as needed for Pain for up to 30 days.  120 tablet 0    celecoxib (CELEBREX) 200 MG capsule TAKE 1 CAPSULE BY MOUTH ONE TIME A DAY  30 capsule 5    gabapentin (NEURONTIN) 300 MG capsule TAKE 1 CAPSULE BY MOUTH THREE TIMES A DAY  90 capsule 5    terazosin (HYTRIN) 2 MG capsule TAKE 1 CAPSULE BY MOUTH ONE TIME A DAY AT NIGHT 30 capsule 2    levothyroxine (SYNTHROID) 75 MCG tablet TAKE 1 TABLET BY MOUTH IN THE MORNING  30 tablet 5    atorvastatin (LIPITOR) 20 MG tablet TAKE 1 TABLET BY MOUTH ONE TIME A DAY AT NIGHT 90 tablet 10    clobetasol (TEMOVATE) 0.05 % cream APPLY TOPICALLY TO RIGHT INDEX FINGER 3 TIMES DAILY  2    metoprolol succinate (TOPROL XL) 25 MG extended release tablet TAKE 1 TABLET BY MOUTH ONE TIME A DAY  6    Cholecalciferol (VITAMIN D3) 1000 units TABS Take 2,000 Units by mouth Daily      buPROPion (WELLBUTRIN) 100 MG tablet Take 1 tablet by mouth 2 times daily 60 tablet 3    V-R ASPIRIN  MG EC tablet TAKE 1 TABLET BY MOUTH ONE TIME A DAY  30 tablet 10    esomeprazole (NEXIUM) 40 MG delayed release capsule TAKE 1 CAPSULE BY MOUTH TWO TIMES A DAY  60 capsule 4     MG capsule TAKE 1 CAPSULE BY MOUTH ONE TIME A DAY AS NEEDED for constipation 30 capsule 10    tiZANidine (ZANAFLEX) 2 MG tablet Take 2 tablets by mouth every 8 hours as needed (spasms) 90 tablet 0    IRON PO Take 65 mg by mouth daily       Multiple Vitamin (MULTIVITAMIN PO) Take 1 tablet by mouth daily       fluticasone (FLONASE) 50 MCG/ACT nasal spray 1 spray by Nasal route daily for 7 days 1 Bottle 0     No current facility-administered medications for this visit. Continue present medications. Get vascular screening at [facility] - outpatient. Consider tonic water, yellow mustard or apple cider vinegar for muscle spasms. Discussed use, benefit, and side effects of prescribed medications. Barriers to compliance discussed. All patient questions answered. Pt voiced understanding.             Crispin Dubois MD

## 2019-05-09 NOTE — PATIENT INSTRUCTIONS
You may receive a survey about your visit with us today. The feedback from our patients helps us identify what is working well and where the service to all patients can be enhanced. Thank you! Patient Education        Continue present medications. Get vascular screening at [facility] - outpatient. Consider tonic water, yellow mustard or apple cider vinegar for muscle spasms. Nighttime Leg Cramps: Care Instructions  Your Care Instructions    Nighttime leg cramps happen when a leg muscle tightens up suddenly. This most often happens in the calf. But cramps in the thigh or foot are also common. Cramps often occur just as you fall asleep or wake up. Leg cramps can be painful. They can last a few seconds to a few minutes. Though they are common, experts don't know exactly what causes them. To treat muscle cramps, you can stretch and massage the muscle. If cramps keep coming back, your doctor may prescribe medicine that relaxes your muscles. Follow-up care is a key part of your treatment and safety. Be sure to make and go to all appointments, and call your doctor if you are having problems. It's also a good idea to know your test results and keep a list of the medicines you take. How can you care for yourself at home? · To stop a leg cramp, sit down and straighten your leg as you bend your foot up toward your knee. It may help to place a rolled towel under the ball of your foot and, while you hold the towel at both ends, gently pull the towel toward you while you keep your knee straight. This stretches the calf muscles. The cramp usually goes away after a few minutes. · Take a warm shower or bath to relax the muscle. Some people find that a heating pad placed on the muscle can also help. Others get relief by rubbing the calf with an ice pack. · Stretch your muscles every day, especially before and after exercise and at bedtime. Regular stretching can relax your muscles and may prevent cramps.   · Do not suddenly increase the amount of exercise you get. Increase your exercise a little each week. · If your doctor prescribes medicine, take it exactly as prescribed. Call your doctor if you think you are having a problem with your medicine. · Ask your doctor if you can take an over-the-counter pain medicine, such as acetaminophen (Tylenol), ibuprofen (Advil, Motrin), or naproxen (Aleve). Be safe with medicines. Read and follow all instructions on the label. · Drink plenty of fluids. If you have kidney, heart, or liver disease and have to limit fluids, talk with your doctor before you increase the amount of fluids you drink. When should you call for help? Watch closely for changes in your health, and be sure to contact your doctor if:    · You often have muscle cramps that do not go away after home treatment.     · Your muscle cramps often wake you up at night.     · You do not get better as expected. Where can you learn more? Go to https://[employer]. org and sign in to your [employer] account. Enter S910 in the BeavEx box to learn more about \"Nighttime Leg Cramps: Care Instructions. \"     If you do not have an account, please click on the \"Sign Up Now\" link. Current as of: Carina 3, 2018  Content Version: 12.0  © 6104-1807 Healthwise, Incorporated. Care instructions adapted under license by [facility] [place] ([facility]). If you have questions about a medical condition or this instruction, always ask your healthcare professional. [place] [address] any warranty or liability for your use of this information.

## 2019-05-16 DIAGNOSIS — M54.16 LUMBAR RADICULOPATHY: ICD-10-CM

## 2019-05-16 DIAGNOSIS — M54.2 CERVICALGIA: ICD-10-CM

## 2019-05-16 RX ORDER — HYDROCODONE BITARTRATE AND ACETAMINOPHEN 10; 325 MG/1; MG/1
1 TABLET ORAL EVERY 6 HOURS PRN
Qty: 120 TABLET | Refills: 0 | Status: SHIPPED | OUTPATIENT
Start: 2019-05-16 | End: 2019-06-14 | Stop reason: SDUPTHER

## 2019-05-16 NOTE — TELEPHONE ENCOUNTER
[address] called requesting a refill on the following medications:  Requested Prescriptions     Pending Prescriptions Disp Refills    HYDROcodone-acetaminophen (NORCO)  MG per tablet 120 tablet 0     Sig: Take 1 tablet by mouth every 6 hours as needed for Pain for up to 30 days. Pharmacy verified: [facility]   . pv      Date of last visit: 5/9/19  Date of next visit (if applicable): 4/51/6237

## 2019-06-14 DIAGNOSIS — M54.2 CERVICALGIA: ICD-10-CM

## 2019-06-14 DIAGNOSIS — M54.16 LUMBAR RADICULOPATHY: ICD-10-CM

## 2019-06-14 DIAGNOSIS — F41.9 ANXIETY: ICD-10-CM

## 2019-06-14 RX ORDER — ALPRAZOLAM 0.5 MG/1
TABLET ORAL
Qty: 90 TABLET | Refills: 0 | Status: SHIPPED | OUTPATIENT
Start: 2019-06-14 | End: 2019-08-15 | Stop reason: SDUPTHER

## 2019-06-14 RX ORDER — HYDROCODONE BITARTRATE AND ACETAMINOPHEN 10; 325 MG/1; MG/1
1 TABLET ORAL EVERY 6 HOURS PRN
Qty: 120 TABLET | Refills: 0 | Status: SHIPPED | OUTPATIENT
Start: 2019-06-14 | End: 2019-07-14

## 2019-07-16 DIAGNOSIS — K21.9 GASTROESOPHAGEAL REFLUX DISEASE WITHOUT ESOPHAGITIS: ICD-10-CM

## 2019-07-16 DIAGNOSIS — M54.2 CERVICALGIA: ICD-10-CM

## 2019-07-16 DIAGNOSIS — M54.16 LUMBAR RADICULOPATHY: ICD-10-CM

## 2019-07-16 DIAGNOSIS — K28.4 GASTROINTESTINAL HEMORRHAGE ASSOCIATED WITH GASTROJEJUNAL ULCER: ICD-10-CM

## 2019-07-16 RX ORDER — ESOMEPRAZOLE MAGNESIUM 40 MG/1
CAPSULE, DELAYED RELEASE ORAL
Qty: 60 CAPSULE | Refills: 4 | Status: SHIPPED | OUTPATIENT
Start: 2019-07-16 | End: 2020-02-13 | Stop reason: SDUPTHER

## 2019-07-16 RX ORDER — HYDROCODONE BITARTRATE AND ACETAMINOPHEN 10; 325 MG/1; MG/1
1 TABLET ORAL EVERY 6 HOURS PRN
Qty: 120 TABLET | Refills: 0 | Status: SHIPPED | OUTPATIENT
Start: 2019-07-16 | End: 2019-08-15 | Stop reason: SDUPTHER

## 2019-08-15 ENCOUNTER — OFFICE VISIT (OUTPATIENT)
Dept: CARDIOLOGY CLINIC | Age: 54
End: 2019-08-15
Payer: COMMERCIAL

## 2019-08-15 ENCOUNTER — OFFICE VISIT (OUTPATIENT)
Dept: FAMILY MEDICINE CLINIC | Age: 54
End: 2019-08-15
Payer: COMMERCIAL

## 2019-08-15 VITALS
SYSTOLIC BLOOD PRESSURE: 122 MMHG | RESPIRATION RATE: 16 BRPM | WEIGHT: 188 LBS | HEART RATE: 76 BPM | HEIGHT: 67 IN | DIASTOLIC BLOOD PRESSURE: 68 MMHG | BODY MASS INDEX: 29.51 KG/M2

## 2019-08-15 VITALS
DIASTOLIC BLOOD PRESSURE: 80 MMHG | WEIGHT: 186 LBS | BODY MASS INDEX: 29.19 KG/M2 | HEIGHT: 67 IN | HEART RATE: 66 BPM | SYSTOLIC BLOOD PRESSURE: 138 MMHG

## 2019-08-15 DIAGNOSIS — F41.9 CHRONIC ANXIETY: ICD-10-CM

## 2019-08-15 DIAGNOSIS — K28.4 GASTROINTESTINAL HEMORRHAGE ASSOCIATED WITH GASTROJEJUNAL ULCER: ICD-10-CM

## 2019-08-15 DIAGNOSIS — F34.1 DYSTHYMIA (OR DEPRESSIVE NEUROSIS): ICD-10-CM

## 2019-08-15 DIAGNOSIS — I10 ESSENTIAL HYPERTENSION: Primary | ICD-10-CM

## 2019-08-15 DIAGNOSIS — M54.16 LUMBAR RADICULOPATHY: ICD-10-CM

## 2019-08-15 DIAGNOSIS — E03.9 ACQUIRED HYPOTHYROIDISM: ICD-10-CM

## 2019-08-15 DIAGNOSIS — M54.2 CERVICALGIA: ICD-10-CM

## 2019-08-15 DIAGNOSIS — G89.4 CHRONIC PAIN SYNDROME: ICD-10-CM

## 2019-08-15 DIAGNOSIS — E78.2 MIXED HYPERLIPIDEMIA: ICD-10-CM

## 2019-08-15 DIAGNOSIS — E78.01 FAMILIAL HYPERCHOLESTEROLEMIA: ICD-10-CM

## 2019-08-15 DIAGNOSIS — R73.01 IFG (IMPAIRED FASTING GLUCOSE): ICD-10-CM

## 2019-08-15 DIAGNOSIS — Z95.2 S/P AVR (AORTIC VALVE REPLACEMENT): ICD-10-CM

## 2019-08-15 PROCEDURE — 99213 OFFICE O/P EST LOW 20 MIN: CPT | Performed by: NUCLEAR MEDICINE

## 2019-08-15 PROCEDURE — 93000 ELECTROCARDIOGRAM COMPLETE: CPT | Performed by: NUCLEAR MEDICINE

## 2019-08-15 PROCEDURE — 99214 OFFICE O/P EST MOD 30 MIN: CPT | Performed by: FAMILY MEDICINE

## 2019-08-15 RX ORDER — HYDROCODONE BITARTRATE AND ACETAMINOPHEN 10; 325 MG/1; MG/1
1 TABLET ORAL EVERY 6 HOURS PRN
Qty: 120 TABLET | Refills: 0 | Status: SHIPPED | OUTPATIENT
Start: 2019-08-15 | End: 2019-09-16 | Stop reason: SDUPTHER

## 2019-08-15 RX ORDER — ALPRAZOLAM 0.5 MG/1
TABLET ORAL
Qty: 90 TABLET | Refills: 5 | Status: SHIPPED | OUTPATIENT
Start: 2019-08-15 | End: 2020-02-15

## 2019-08-15 ASSESSMENT — ENCOUNTER SYMPTOMS
BACK PAIN: 1
GASTROINTESTINAL NEGATIVE: 1
BLOOD IN STOOL: 0
COUGH: 0
RESPIRATORY NEGATIVE: 1
ALLERGIC/IMMUNOLOGIC NEGATIVE: 1
SHORTNESS OF BREATH: 0

## 2019-08-15 NOTE — PROGRESS NOTES
previous MI. Correlation w/ EKG is recommented. Significant degree of gut uptake which is likely to be the cause of the attenuation artifact seen. EF 46%    CERVICAL FUSION  2010    CERVICAL FUSION  03/09/2016    REMOVAL HARDWARE C5-7, ACDF C4-6 WITH ATLANTIS CORNERSTONE    COLONOSCOPY  2010    CORONARY ARTERY BYPASS GRAFT      DILATATION, ESOPHAGUS      small resection    ENDOSCOPY, COLON, DIAGNOSTIC      NERVE SURGERY Bilateral 07/25/2017    THORACIC FACET BLOCK T7-8, T8-9, T11-12    OTHER SURGICAL HISTORY  05/02/2017    LESI L5    OTHER SURGICAL HISTORY  09/12/2017    thoracic epidural T11    [place] INJ,PARAVERTEBRAL L/S,1 LEVEL Bilateral 7/25/2017    T-FACET MBB T7-8, T8-9, T11-12 BILATERAL performed by Yung Salazar MD at [address] [place] [address]/THER [address] LUMBAR/SACRAL N/A 9/12/2017    TESI T11 performed by Yung Salazar MD at [address] [facility] [address]  2010    8 INCHES REMOVED    TOOTH EXTRACTION  02/2019    UPPER GASTROINTESTINAL ENDOSCOPY  2010     Family History   Problem Relation Age of Onset    Cancer Mother     Diabetes Father     Cancer Father     Heart Disease Father 28        CABG    High Blood Pressure Father     Diabetes Sister         AS A CHILD    Kidney Disease Sister     High Blood Pressure Sister     Cancer Brother     COPD Sister     Heart Disease Sister     Stroke Neg Hx      Social History     Tobacco Use    Smoking status: Current Every Day Smoker     Packs/day: 0.25     Years: 25.00     Pack years: 6.25     Types: Cigarettes, Cigars     Start date: 6/9/1978    Smokeless tobacco: Never Used   Substance Use Topics    Alcohol use: Yes     Alcohol/week: 0.0 standard drinks     Comment: not for 3 mo      Current Outpatient Medications   Medication Sig Dispense Refill    HYDROcodone-acetaminophen (NORCO)  MG per tablet Take 1 tablet by mouth every 6 hours as needed for Pain for up to 30 days. continue current medications, diet and exercise. Continue risk factor modification and medical management. Patient agreed with treatment plan. Follow up as directed.     Electronically signedby Shimon Salter MD on 8/15/2019 at 7:56 AM

## 2019-08-15 NOTE — PROGRESS NOTES
 A1C test (Diabetic or Prediabetic)  02/07/2020    TSH testing  03/11/2020    Lipid screen  03/11/2024    Colon cancer screen colonoscopy  03/27/2026    Hepatitis C screen  Completed    HIV screen  Completed
pulses. No extrasystoles are present. Exam reveals no gallop. No murmur heard. Pulmonary/Chest: Effort normal and breath sounds normal. He has no wheezes. He has no rhonchi. He has no rales. Abdominal: Soft. Bowel sounds are normal.   Musculoskeletal: He exhibits no edema. Lumbar back: He exhibits decreased range of motion, tenderness and pain. He exhibits no swelling and no spasm. Lymphadenopathy:     He has no cervical adenopathy. Neurological: He is alert and oriented to person, place, and time. Skin: Skin is warm and dry. Psychiatric: His speech is normal and behavior is normal. Judgment and thought content normal. His mood appears anxious. Cognition and memory are normal. Cognition and memory are not impaired. Nursing note and vitals reviewed. Assessment:       Diagnosis Orders   1. Essential hypertension     2. Cervicalgia, C5-7  HYDROcodone-acetaminophen (NORCO)  MG per tablet   3. Lumbar radiculopathy  HYDROcodone-acetaminophen (NORCO)  MG per tablet   4. Acquired hypothyroidism     5. Gastrointestinal hemorrhage associated with gastrojejunal ulcer     6. Chronic pain syndrome     7. Dysthymia (or depressive neurosis)     8. Mixed hyperlipidemia     9. IFG (impaired fasting glucose)     10. Chronic anxiety             Plan:      No orders of the defined types were placed in this encounter. Medications Discontinued During This Encounter   Medication Reason    HYDROcodone-acetaminophen (NORCO)  MG per tablet REORDER    ALPRAZolam (XANAX) 0.5 MG tablet REORDER     Current Outpatient Medications   Medication Sig Dispense Refill    HYDROcodone-acetaminophen (NORCO)  MG per tablet Take 1 tablet by mouth every 6 hours as needed for Pain for up to 30 days.  120 tablet 0    ALPRAZolam (XANAX) 0.5 MG tablet TAKE 1 TABLET BY MOUTH THREE TIMES A DAY AS NEEDED FOR SLEEP 90 tablet 5    esomeprazole (NEXIUM) 40 MG delayed release capsule TAKE 1 CAPSULE BY MOUTH TWO

## 2019-09-16 ENCOUNTER — TELEPHONE (OUTPATIENT)
Dept: FAMILY MEDICINE CLINIC | Age: 54
End: 2019-09-16

## 2019-09-16 DIAGNOSIS — M54.16 LUMBAR RADICULOPATHY: ICD-10-CM

## 2019-09-16 DIAGNOSIS — M54.2 CERVICALGIA: ICD-10-CM

## 2019-09-16 RX ORDER — HYDROCODONE BITARTRATE AND ACETAMINOPHEN 10; 325 MG/1; MG/1
1 TABLET ORAL EVERY 6 HOURS PRN
Qty: 120 TABLET | Refills: 0 | Status: SHIPPED | OUTPATIENT
Start: 2019-09-16 | End: 2019-10-17 | Stop reason: SDUPTHER

## 2019-10-07 DIAGNOSIS — R39.11 URINARY HESITANCY: ICD-10-CM

## 2019-10-07 RX ORDER — LEVOTHYROXINE SODIUM 0.07 MG/1
TABLET ORAL
Qty: 30 TABLET | Refills: 5 | Status: SHIPPED | OUTPATIENT
Start: 2019-10-07 | End: 2020-10-08 | Stop reason: SDUPTHER

## 2019-10-07 RX ORDER — TERAZOSIN 2 MG/1
CAPSULE ORAL
Qty: 30 CAPSULE | Refills: 2 | Status: SHIPPED | OUTPATIENT
Start: 2019-10-07 | End: 2020-02-13

## 2019-10-14 ENCOUNTER — OFFICE VISIT (OUTPATIENT)
Dept: FAMILY MEDICINE CLINIC | Age: 54
End: 2019-10-14
Payer: COMMERCIAL

## 2019-10-14 VITALS
BODY MASS INDEX: 30.17 KG/M2 | HEART RATE: 76 BPM | SYSTOLIC BLOOD PRESSURE: 130 MMHG | DIASTOLIC BLOOD PRESSURE: 68 MMHG | WEIGHT: 192.6 LBS | RESPIRATION RATE: 16 BRPM

## 2019-10-14 DIAGNOSIS — E78.2 MIXED HYPERLIPIDEMIA: ICD-10-CM

## 2019-10-14 DIAGNOSIS — Z23 NEED FOR INFLUENZA VACCINATION: ICD-10-CM

## 2019-10-14 DIAGNOSIS — F17.200 SMOKING: Primary | ICD-10-CM

## 2019-10-14 DIAGNOSIS — Z95.2 S/P AVR (AORTIC VALVE REPLACEMENT): ICD-10-CM

## 2019-10-14 PROCEDURE — 90686 IIV4 VACC NO PRSV 0.5 ML IM: CPT | Performed by: NURSE PRACTITIONER

## 2019-10-14 PROCEDURE — 90471 IMMUNIZATION ADMIN: CPT | Performed by: NURSE PRACTITIONER

## 2019-10-14 PROCEDURE — 99213 OFFICE O/P EST LOW 20 MIN: CPT | Performed by: NURSE PRACTITIONER

## 2019-10-14 RX ORDER — VARENICLINE TARTRATE 25 MG
KIT ORAL
Qty: 53 TABLET | Refills: 0 | Status: SHIPPED | OUTPATIENT
Start: 2019-10-14 | End: 2019-11-14

## 2019-10-14 RX ORDER — VARENICLINE TARTRATE 1 MG/1
1 TABLET, FILM COATED ORAL 2 TIMES DAILY
Qty: 60 TABLET | Refills: 4 | Status: SHIPPED | OUTPATIENT
Start: 2019-10-14 | End: 2020-08-20

## 2019-10-15 ASSESSMENT — ENCOUNTER SYMPTOMS
COUGH: 0
SHORTNESS OF BREATH: 0
ABDOMINAL PAIN: 0
NAUSEA: 0

## 2019-10-17 ENCOUNTER — TELEPHONE (OUTPATIENT)
Dept: FAMILY MEDICINE CLINIC | Age: 54
End: 2019-10-17

## 2019-10-17 DIAGNOSIS — M54.2 CERVICALGIA: ICD-10-CM

## 2019-10-17 DIAGNOSIS — M54.16 LUMBAR RADICULOPATHY: ICD-10-CM

## 2019-10-17 RX ORDER — HYDROCODONE BITARTRATE AND ACETAMINOPHEN 10; 325 MG/1; MG/1
1 TABLET ORAL EVERY 6 HOURS PRN
Qty: 120 TABLET | Refills: 0 | Status: SHIPPED | OUTPATIENT
Start: 2019-10-17 | End: 2019-11-14 | Stop reason: SDUPTHER

## 2019-11-14 ENCOUNTER — OFFICE VISIT (OUTPATIENT)
Dept: FAMILY MEDICINE CLINIC | Age: 54
End: 2019-11-14
Payer: COMMERCIAL

## 2019-11-14 VITALS
DIASTOLIC BLOOD PRESSURE: 72 MMHG | HEART RATE: 68 BPM | SYSTOLIC BLOOD PRESSURE: 128 MMHG | RESPIRATION RATE: 16 BRPM | BODY MASS INDEX: 29.84 KG/M2 | WEIGHT: 190.5 LBS

## 2019-11-14 DIAGNOSIS — F41.9 CHRONIC ANXIETY: ICD-10-CM

## 2019-11-14 DIAGNOSIS — M54.16 LUMBAR RADICULOPATHY: ICD-10-CM

## 2019-11-14 DIAGNOSIS — K21.9 GASTROESOPHAGEAL REFLUX DISEASE WITHOUT ESOPHAGITIS: ICD-10-CM

## 2019-11-14 DIAGNOSIS — E03.9 ACQUIRED HYPOTHYROIDISM: ICD-10-CM

## 2019-11-14 DIAGNOSIS — M54.2 CERVICALGIA: ICD-10-CM

## 2019-11-14 DIAGNOSIS — Z51.81 MEDICATION MONITORING ENCOUNTER: ICD-10-CM

## 2019-11-14 DIAGNOSIS — F34.1 DYSTHYMIA (OR DEPRESSIVE NEUROSIS): ICD-10-CM

## 2019-11-14 DIAGNOSIS — M48.062 SPINAL STENOSIS OF LUMBAR REGION WITH NEUROGENIC CLAUDICATION: ICD-10-CM

## 2019-11-14 DIAGNOSIS — I10 ESSENTIAL HYPERTENSION: Primary | ICD-10-CM

## 2019-11-14 DIAGNOSIS — E78.2 MIXED HYPERLIPIDEMIA: ICD-10-CM

## 2019-11-14 PROCEDURE — 99214 OFFICE O/P EST MOD 30 MIN: CPT | Performed by: FAMILY MEDICINE

## 2019-11-14 RX ORDER — HYDROCODONE BITARTRATE AND ACETAMINOPHEN 10; 325 MG/1; MG/1
1 TABLET ORAL EVERY 6 HOURS PRN
Qty: 120 TABLET | Refills: 0 | Status: SHIPPED | OUTPATIENT
Start: 2019-11-14 | End: 2019-12-16 | Stop reason: SDUPTHER

## 2019-11-14 ASSESSMENT — ENCOUNTER SYMPTOMS
SHORTNESS OF BREATH: 0
BACK PAIN: 1
GASTROINTESTINAL NEGATIVE: 1
ALLERGIC/IMMUNOLOGIC NEGATIVE: 1
ABDOMINAL PAIN: 0
RESPIRATORY NEGATIVE: 1

## 2019-12-16 DIAGNOSIS — M54.2 CERVICALGIA: ICD-10-CM

## 2019-12-16 DIAGNOSIS — M54.16 LUMBAR RADICULOPATHY: ICD-10-CM

## 2019-12-16 RX ORDER — HYDROCODONE BITARTRATE AND ACETAMINOPHEN 10; 325 MG/1; MG/1
1 TABLET ORAL EVERY 6 HOURS PRN
Qty: 120 TABLET | Refills: 0 | Status: SHIPPED | OUTPATIENT
Start: 2019-12-16 | End: 2020-01-15 | Stop reason: SDUPTHER

## 2020-01-03 RX ORDER — CELECOXIB 200 MG/1
CAPSULE ORAL
Qty: 30 CAPSULE | Refills: 5 | Status: SHIPPED | OUTPATIENT
Start: 2020-01-03 | End: 2020-07-30

## 2020-01-13 RX ORDER — ATORVASTATIN CALCIUM 20 MG/1
TABLET, FILM COATED ORAL
Qty: 90 TABLET | Refills: 10 | Status: SHIPPED | OUTPATIENT
Start: 2020-01-13 | End: 2022-01-10 | Stop reason: SDUPTHER

## 2020-01-15 RX ORDER — HYDROCODONE BITARTRATE AND ACETAMINOPHEN 10; 325 MG/1; MG/1
1 TABLET ORAL EVERY 6 HOURS PRN
Qty: 120 TABLET | Refills: 0 | Status: SHIPPED | OUTPATIENT
Start: 2020-01-15 | End: 2020-02-13 | Stop reason: SDUPTHER

## 2020-02-13 ENCOUNTER — OFFICE VISIT (OUTPATIENT)
Dept: FAMILY MEDICINE CLINIC | Age: 55
End: 2020-02-13
Payer: COMMERCIAL

## 2020-02-13 VITALS
HEART RATE: 72 BPM | RESPIRATION RATE: 20 BRPM | WEIGHT: 191.4 LBS | DIASTOLIC BLOOD PRESSURE: 76 MMHG | SYSTOLIC BLOOD PRESSURE: 134 MMHG | BODY MASS INDEX: 29.98 KG/M2

## 2020-02-13 PROCEDURE — 99214 OFFICE O/P EST MOD 30 MIN: CPT | Performed by: FAMILY MEDICINE

## 2020-02-13 RX ORDER — HYDROCODONE BITARTRATE AND ACETAMINOPHEN 10; 325 MG/1; MG/1
1 TABLET ORAL EVERY 6 HOURS PRN
Qty: 120 TABLET | Refills: 0 | Status: SHIPPED | OUTPATIENT
Start: 2020-02-13 | End: 2020-03-12 | Stop reason: SDUPTHER

## 2020-02-13 RX ORDER — ESOMEPRAZOLE MAGNESIUM 40 MG/1
CAPSULE, DELAYED RELEASE ORAL
Qty: 60 CAPSULE | Refills: 4 | Status: SHIPPED | OUTPATIENT
Start: 2020-02-13 | End: 2020-08-20 | Stop reason: ALTCHOICE

## 2020-02-13 SDOH — ECONOMIC STABILITY: FOOD INSECURITY: WITHIN THE PAST 12 MONTHS, YOU WORRIED THAT YOUR FOOD WOULD RUN OUT BEFORE YOU GOT MONEY TO BUY MORE.: SOMETIMES TRUE

## 2020-02-13 SDOH — ECONOMIC STABILITY: TRANSPORTATION INSECURITY
IN THE PAST 12 MONTHS, HAS THE LACK OF TRANSPORTATION KEPT YOU FROM MEDICAL APPOINTMENTS OR FROM GETTING MEDICATIONS?: NO

## 2020-02-13 SDOH — ECONOMIC STABILITY: FOOD INSECURITY: WITHIN THE PAST 12 MONTHS, THE FOOD YOU BOUGHT JUST DIDN'T LAST AND YOU DIDN'T HAVE MONEY TO GET MORE.: SOMETIMES TRUE

## 2020-02-13 SDOH — ECONOMIC STABILITY: INCOME INSECURITY: HOW HARD IS IT FOR YOU TO PAY FOR THE VERY BASICS LIKE FOOD, HOUSING, MEDICAL CARE, AND HEATING?: SOMEWHAT HARD

## 2020-02-13 SDOH — ECONOMIC STABILITY: TRANSPORTATION INSECURITY
IN THE PAST 12 MONTHS, HAS LACK OF TRANSPORTATION KEPT YOU FROM MEETINGS, WORK, OR FROM GETTING THINGS NEEDED FOR DAILY LIVING?: NO

## 2020-02-13 ASSESSMENT — ENCOUNTER SYMPTOMS
SHORTNESS OF BREATH: 0
WHEEZING: 0
EYES NEGATIVE: 1
GASTROINTESTINAL NEGATIVE: 1
DIARRHEA: 0
COUGH: 0
ALLERGIC/IMMUNOLOGIC NEGATIVE: 1
NAUSEA: 0

## 2020-02-13 NOTE — PROGRESS NOTES
Visit Information    Have you changed or started any medications since your last visit including any over-the-counter medicines, vitamins, or herbal medicines? no   Are you having any side effects from any of your medications? -  no  Have you stopped taking any of your medications? Is so, why? -  yes - see updated med list    Have you seen any other physician or provider since your last visit? No  Have you had any other diagnostic tests since your last visit? No  Have you been seen in the emergency room and/or had an admission to a hospital since we last saw you? No  Have you had your routine dental cleaning in the past 6 months? n/a    Have you activated your Doc[employer] account? If not, what are your barriers?  No declined    Patient Care Team:  Wilber Hilton MD as PCP - General (Family Medicine)  Wilber Hilton MD as PCP - [facility]  Michele Salter MD as Surgeon (Cardiothoracic Surgery)  Lenore Watts MD as Physician (Gastroenterology)  Beryl Hammans, MD as Consulting Physician (Cardiology)  Nitesh Ho MD (Dermatology)  Imtiaz George DPM as Consulting Physician (Podiatry)    Medical History Review  Past Medical, Family, and Social History reviewed and does contribute to the patient presenting condition    Health Maintenance   Topic Date Due    DTaP/Tdap/Td vaccine (1 - Tdap) 04/02/1974    Shingles Vaccine (1 of 2) 04/02/2013    Pneumococcal 0-64 years Vaccine (2 of 3 - PPSV23) 08/10/2017    A1C test (Diabetic or Prediabetic)  02/07/2020    Lipid screen  03/11/2020    TSH testing  03/11/2020    Colon cancer screen colonoscopy  03/27/2026    Flu vaccine  Completed    Hepatitis C screen  Completed    HIV screen  Completed    Hepatitis A vaccine  Aged Out    Hepatitis B vaccine  Aged Out    Hib vaccine  Aged Out    Meningococcal (ACWY) vaccine  Aged Out

## 2020-02-13 NOTE — PROGRESS NOTES
bleeding episodes. His weight is remaining stable around 190 and he states this is a good weight for him from a strength standpoint. Wt Readings from Last 3 Encounters:   02/13/20 191 lb 6.4 oz (86.8 kg)   11/14/19 190 lb 8 oz (86.4 kg)   10/14/19 192 lb 9.6 oz (87.4 kg)   Body mass index is 29.98 kg/m². I    The rest of this patient's conditions are stable. Past medical and surgical hx reviewed. Past Medical History:   Diagnosis Date    Arthritis     general    Bilateral sciatica     Bleeding     with coumadin    CAD (coronary artery disease)     Carpal tunnel syndrome     RIGHT    Cervicalgia, C5-7     Chronic anxiety     Dysthymia (or depressive neurosis) 2/1/2016    ED (erectile dysfunction)     GERD (gastroesophageal reflux disease)     Headache(784.0)     History of blood transfusion 03/2016    HTN (hypertension)     Hyperlipidemia     Hypothyroidism     Iron deficiency anemia, blood loss     Lumbago     Lumbar radiculopathy     Osteoarthritis (arthritis due to wear and tear of joints)     S/P AVR, coumadin Tx     Spondylosis of thoracic region without myelopathy or radiculopathy      Past Surgical History:   Procedure Laterality Date    AORT[facility] VALVE [facility]PLACEMENT  2007    [facility]    AORTIC VALVE REPLACEMENT  07/20/2016    extraction of mechanical valve and replacement with tissue valve    APPENDECTOMY  1980    CARDIAC CATHETERIZATION  5 20 2010    Patent coronary arteries. Possible causes of the patient's chest pain is likely noncardiac at least based on this angiogram. Patient chould be able to proceed w/ scheduled surgery w/ paying attention to anticoagulation and trying to minimize the period of no anticoagulation.  CARDIAC CATHETERIZATION  2012, 6/9/16    [facility]    CARDIOVASCULAR STRESS TEST  4 15 2010    Moderate fixed inferior defect, possibly attenuation, cannot exclude a previous MI. Correlation w/ EKG is recommented.  Significant degree of gut uptake which is likely to be the cause of the attenuation artifact seen. EF 46%    CERVICAL FUSION  2010    CERVICAL FUSION  03/09/2016    REMOVAL HARDWARE C5-7, ACDF C4-6 WITH ATLANTIS CORNERSTONE    COLONOSCOPY  2010    CORONARY ARTERY BYPASS GRAFT      DILATATION, ESOPHAGUS      small resection    ENDOSCOPY, COLON, DIAGNOSTIC      NERVE SURGERY Bilateral 07/25/2017    THORACIC FACET BLOCK T7-8, T8-9, T11-12    OTHER SURGICAL HISTORY  05/02/2017    LESI L5    OTHER SURGICAL HISTORY  09/12/2017    thoracic epidural T11    [place] INJ,PARAVERTEBRAL L/S,1 LEVEL Bilateral 7/25/2017    T-FACET MBB T7-8, T8-9, T11-12 BILATERAL performed by Jessica Graves MD at [address] [facility] DX/THER [address] LUMBAR/SACRAL N/A 9/12/2017    TESI T11 performed by Jessica Graves MD at [address] [facility] [address]  2010    [address] EXTRACTION  02/2019    UPPER GASTROINTESTINAL ENDOSCOPY  2010     Portions of this note were completed with a voice recording program.  Efforts were made to edit the dictations but occasionally words are mis-transcribed. Review of Systems   Constitutional: Negative. Negative for fatigue. HENT: Negative. Negative for congestion. Eyes: Negative. Respiratory: Negative for cough, shortness of breath and wheezing. Cardiovascular: Negative. Negative for leg swelling. Gastrointestinal: Negative. Negative for diarrhea and nausea. Endocrine: Negative. Negative for polydipsia and polyphagia. Genitourinary: Negative. Musculoskeletal: Positive for arthralgias, neck pain and neck stiffness. Skin: Negative. Allergic/Immunologic: Negative. Neurological: Positive for headaches. Negative for dizziness, syncope, facial asymmetry and light-headedness. Hematological: Does not bruise/bleed easily. Psychiatric/Behavioral: Negative. All other systems reviewed and are negative.       Objective:   Physical Exam  Vitals Essential hypertension     2. Cervicalgia, C5-7  HYDROcodone-acetaminophen (NORCO)  MG per tablet   3. Lumbar radiculopathy  HYDROcodone-acetaminophen (NORCO)  MG per tablet   4. Gastroesophageal reflux disease without esophagitis  esomeprazole (NEXIUM) 40 MG delayed release capsule   5. Gastrointestinal hemorrhage associated with gastrojejunal ulcer  esomeprazole (NEXIUM) 40 MG delayed release capsule   6. Mixed hyperlipidemia     7. Acquired hypothyroidism     8. Chronic anxiety     9. Spinal stenosis of lumbar region with neurogenic claudication     10. Medication monitoring encounter             Plan:      No orders of the defined types were placed in this encounter. Medications Discontinued During This Encounter   Medication Reason    HYDROcodone-acetaminophen (NORCO)  MG per tablet REORDER    esomeprazole (NEXIUM) 40 MG delayed release capsule REORDER    fluticasone (FLONASE) 50 MCG/ACT nasal spray LIST CLEANUP    terazosin (HYTRIN) 2 MG capsule LIST CLEANUP    tiZANidine (ZANAFLEX) 2 MG tablet LIST CLEANUP     Current Outpatient Medications   Medication Sig Dispense Refill    HYDROcodone-acetaminophen (NORCO)  MG per tablet Take 1 tablet by mouth every 6 hours as needed for Pain for up to 30 days.  120 tablet 0    esomeprazole (NEXIUM) 40 MG delayed release capsule TAKE 1 CAPSULE BY MOUTH TWO TIMES A DAY 60 capsule 4    atorvastatin (LIPITOR) 20 MG tablet TAKE 1 TABLET BY MOUTH ONE TIME A DAY AT NIGHT 90 tablet 10    celecoxib (CELEBREX) 200 MG capsule TAKE 1 CAPSULE BY MOUTH ONE TIME A DAY 30 capsule 5    varenicline (CHANTIX CONTINUING MONTH FLAVIA) 1 MG tablet Take 1 tablet by mouth 2 times daily 60 tablet 4    levothyroxine (SYNTHROID) 75 MCG tablet TAKE 1 TABLET BY MOUTH IN THE MORNING 30 tablet 5    ALPRAZolam (XANAX) 0.5 MG tablet TAKE 1 TABLET BY MOUTH THREE TIMES A DAY AS NEEDED FOR SLEEP 90 tablet 5    gabapentin (NEURONTIN) 300 MG capsule TAKE 1 CAPSULE BY MOUTH

## 2020-02-26 RX ORDER — ALPRAZOLAM 0.5 MG/1
0.5 TABLET ORAL 3 TIMES DAILY PRN
Qty: 90 TABLET | Refills: 3 | Status: SHIPPED | OUTPATIENT
Start: 2020-02-26 | End: 2020-10-05

## 2020-03-12 RX ORDER — HYDROCODONE BITARTRATE AND ACETAMINOPHEN 10; 325 MG/1; MG/1
1 TABLET ORAL EVERY 6 HOURS PRN
Qty: 120 TABLET | Refills: 0 | Status: SHIPPED | OUTPATIENT
Start: 2020-03-12 | End: 2020-04-13 | Stop reason: SDUPTHER

## 2020-04-13 RX ORDER — HYDROCODONE BITARTRATE AND ACETAMINOPHEN 10; 325 MG/1; MG/1
1 TABLET ORAL EVERY 6 HOURS PRN
Qty: 120 TABLET | Refills: 0 | Status: SHIPPED | OUTPATIENT
Start: 2020-04-13 | End: 2020-05-13 | Stop reason: SDUPTHER

## 2020-04-27 ENCOUNTER — HOSPITAL ENCOUNTER (OUTPATIENT)
Age: 55
Discharge: HOME | End: 2020-04-27
Payer: COMMERCIAL

## 2020-04-27 LAB
ERYTHROCYTE [DISTWIDTH] IN BLOOD BY AUTOMATED COUNT: 14.8 % (ref 11.5–14.5)
ERYTHROCYTE [DISTWIDTH] IN BLOOD BY AUTOMATED COUNT: 51.4 FL (ref 35–45)
HCT VFR BLD CALC: 43.5 % (ref 42–52)
HEMOGLOBIN: 14 GM/DL (ref 14–18)
MCH RBC QN AUTO: 30.2 PG (ref 26–33)
MCHC RBC AUTO-ENTMCNC: 32.2 GM/DL (ref 32.2–35.5)
MCV RBC AUTO: 93.8 FL (ref 80–94)
PLATELET # BLD: 205 THOU/MM3 (ref 130–400)
PMV BLD AUTO: 11.8 FL (ref 9.4–12.4)
RBC # BLD: 4.64 MILL/MM3 (ref 4.7–6.1)
WBC # BLD: 12.2 THOU/MM3 (ref 4.8–10.8)

## 2020-04-27 PROCEDURE — 85027 COMPLETE CBC AUTOMATED: CPT

## 2020-04-27 PROCEDURE — 36415 COLL VENOUS BLD VENIPUNCTURE: CPT

## 2020-05-13 RX ORDER — HYDROCODONE BITARTRATE AND ACETAMINOPHEN 10; 325 MG/1; MG/1
1 TABLET ORAL EVERY 6 HOURS PRN
Qty: 120 TABLET | Refills: 0 | Status: SHIPPED | OUTPATIENT
Start: 2020-05-13 | End: 2020-06-15 | Stop reason: SDUPTHER

## 2020-06-15 ENCOUNTER — TELEPHONE (OUTPATIENT)
Dept: FAMILY MEDICINE CLINIC | Age: 55
End: 2020-06-15

## 2020-06-15 RX ORDER — HYDROCODONE BITARTRATE AND ACETAMINOPHEN 10; 325 MG/1; MG/1
1 TABLET ORAL EVERY 6 HOURS PRN
Qty: 120 TABLET | Refills: 0 | Status: SHIPPED | OUTPATIENT
Start: 2020-06-15 | End: 2020-07-15 | Stop reason: SDUPTHER

## 2020-07-09 ENCOUNTER — HOSPITAL ENCOUNTER (OUTPATIENT)
Age: 55
Discharge: HOME | End: 2020-07-09
Payer: COMMERCIAL

## 2020-07-09 ENCOUNTER — OFFICE VISIT (OUTPATIENT)
Dept: FAMILY MEDICINE CLINIC | Age: 55
End: 2020-07-09
Payer: COMMERCIAL

## 2020-07-09 VITALS
DIASTOLIC BLOOD PRESSURE: 74 MMHG | BODY MASS INDEX: 28.52 KG/M2 | WEIGHT: 182.1 LBS | HEART RATE: 64 BPM | SYSTOLIC BLOOD PRESSURE: 128 MMHG | TEMPERATURE: 97.9 F | RESPIRATION RATE: 16 BRPM

## 2020-07-09 LAB
ALBUMIN SERPL-MCNC: 4 G/DL (ref 3.5–5.1)
ALP BLD-CCNC: 70 U/L (ref 38–126)
ALT SERPL-CCNC: 13 U/L (ref 11–66)
ANION GAP SERPL CALCULATED.3IONS-SCNC: 11 MEQ/L (ref 8–16)
AST SERPL-CCNC: 17 U/L (ref 5–40)
AVERAGE GLUCOSE: 114 MG/DL (ref 70–126)
BASOPHILS # BLD: 0.6 %
BASOPHILS ABSOLUTE: 0.1 THOU/MM3 (ref 0–0.1)
BILIRUB SERPL-MCNC: 0.3 MG/DL (ref 0.3–1.2)
BUN BLDV-MCNC: 15 MG/DL (ref 7–22)
CALCIUM SERPL-MCNC: 9 MG/DL (ref 8.5–10.5)
CHLORIDE BLD-SCNC: 103 MEQ/L (ref 98–111)
CHOLESTEROL, FASTING: 119 MG/DL (ref 100–199)
CO2: 27 MEQ/L (ref 23–33)
CREAT SERPL-MCNC: 0.7 MG/DL (ref 0.4–1.2)
EOSINOPHIL # BLD: 1.6 %
EOSINOPHILS ABSOLUTE: 0.2 THOU/MM3 (ref 0–0.4)
ERYTHROCYTE [DISTWIDTH] IN BLOOD BY AUTOMATED COUNT: 15.4 % (ref 11.5–14.5)
ERYTHROCYTE [DISTWIDTH] IN BLOOD BY AUTOMATED COUNT: 51.8 FL (ref 35–45)
GFR SERPL CREATININE-BSD FRML MDRD: > 90 ML/MIN/1.73M2
GLUCOSE FASTING: 97 MG/DL (ref 70–108)
HBA1C MFR BLD: 5.8 % (ref 4.4–6.4)
HCT VFR BLD CALC: 44.7 % (ref 42–52)
HDLC SERPL-MCNC: 41 MG/DL
HEMOGLOBIN: 14.9 GM/DL (ref 14–18)
IMMATURE GRANS (ABS): 0.03 THOU/MM3 (ref 0–0.07)
IMMATURE GRANULOCYTES: 0.3 %
LDL CHOLESTEROL CALCULATED: 68 MG/DL
LYMPHOCYTES # BLD: 12.3 %
LYMPHOCYTES ABSOLUTE: 1.5 THOU/MM3 (ref 1–4.8)
MCH RBC QN AUTO: 30.7 PG (ref 26–33)
MCHC RBC AUTO-ENTMCNC: 33.3 GM/DL (ref 32.2–35.5)
MCV RBC AUTO: 92 FL (ref 80–94)
MONOCYTES # BLD: 7.6 %
MONOCYTES ABSOLUTE: 0.9 THOU/MM3 (ref 0.4–1.3)
NUCLEATED RED BLOOD CELLS: 0 /100 WBC
PLATELET # BLD: 228 THOU/MM3 (ref 130–400)
PMV BLD AUTO: 11.2 FL (ref 9.4–12.4)
POTASSIUM SERPL-SCNC: 4.6 MEQ/L (ref 3.5–5.2)
RBC # BLD: 4.86 MILL/MM3 (ref 4.7–6.1)
SEG NEUTROPHILS: 77.6 %
SEGMENTED NEUTROPHILS ABSOLUTE COUNT: 9.2 THOU/MM3 (ref 1.8–7.7)
SODIUM BLD-SCNC: 141 MEQ/L (ref 135–145)
TOTAL PROTEIN: 6.7 G/DL (ref 6.1–8)
TRIGLYCERIDE, FASTING: 49 MG/DL (ref 0–199)
TSH SERPL DL<=0.05 MIU/L-ACNC: 2.33 UIU/ML (ref 0.4–4.2)
WBC # BLD: 11.8 THOU/MM3 (ref 4.8–10.8)

## 2020-07-09 PROCEDURE — 80061 LIPID PANEL: CPT

## 2020-07-09 PROCEDURE — 36415 COLL VENOUS BLD VENIPUNCTURE: CPT

## 2020-07-09 PROCEDURE — 84443 ASSAY THYROID STIM HORMONE: CPT

## 2020-07-09 PROCEDURE — 85025 COMPLETE CBC W/AUTO DIFF WBC: CPT

## 2020-07-09 PROCEDURE — 83036 HEMOGLOBIN GLYCOSYLATED A1C: CPT

## 2020-07-09 PROCEDURE — 80053 COMPREHEN METABOLIC PANEL: CPT

## 2020-07-09 PROCEDURE — 99214 OFFICE O/P EST MOD 30 MIN: CPT | Performed by: FAMILY MEDICINE

## 2020-07-09 ASSESSMENT — ENCOUNTER SYMPTOMS
BACK PAIN: 1
GASTROINTESTINAL NEGATIVE: 1
COUGH: 0
ALLERGIC/IMMUNOLOGIC NEGATIVE: 1
RESPIRATORY NEGATIVE: 1

## 2020-07-09 NOTE — RESULT ENCOUNTER NOTE
Hemoglobin A1c is 5.8% which is higher than previous but not diabetic. This is in the range of prediabetes so so trying to control carbohydrates on a daily basis is indicated.

## 2020-07-09 NOTE — PROGRESS NOTES
Subjective:      Patient ID: Avi Poon is a 60 y.o. male. HPI  Follow up of chronic conditions. Encounter Diagnoses   Name Primary?  Essential hypertension Yes    Mixed hyperlipidemia     Acquired hypothyroidism     Chronic anxiety     Spinal stenosis of lumbar region with neurogenic claudication     IFG (impaired fasting glucose)     Dysthymia (or depressive neurosis)     Chronic pain syndrome     Medication monitoring encounter     Lymphomatoid papulosis ([address]      HTN is stable with current medications. Pt has no medication side effects nor orthostatic symptoms. BP Readings from Last 3 Encounters:   07/09/20 128/74   02/13/20 134/76   11/14/19 128/72     Patient continues to do well on atorvastatin 20 mg nightly without myalgias. Cholesterol, Total (mg/dL)   Date Value   03/11/2019 88 (L)     HDL (mg/dL)   Date Value   07/09/2020 41     Triglycerides (mg/dL)   Date Value   03/11/2019 27     Lab Results   Component Value Date    LDLCALC 68 07/09/2020     His impaired fasting glucose is not quite as well-controlled as it has previously been. His hemoglobin A1c has risen to the current 5.8% from a previous 5.6% . The need to watch his carbs was discussed. Lab Results   Component Value Date    LABA1C 5.8 07/09/2020     No results found for: EAG    Back pain under reasonable control with current pain medications. 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 So far he has not had an exacerbation of his sciatica. The combination of hydrocodone and gabapentin is working well and keeping him functional.    His chronic anxiety continues to respond well to alprazolam and his dysphoria to Wellbutrin 100 mg twice daily. The rest of this patient's conditions are stable. Past medical and surgical hx reviewed.   Past Medical History: Diagnosis Date    Arthritis     general    Bilateral sciatica     Bleeding     with coumadin    CAD (coronary artery disease)     Carpal tunnel syndrome     RIGHT    Cervicalgia, C5-7     Chronic anxiety     Dysthymia (or depressive neurosis) 2/1/2016    ED (erectile dysfunction)     GERD (gastroesophageal reflux disease)     Headache(784.0)     History of blood transfusion 03/2016    HTN (hypertension)     Hyperlipidemia     Hypothyroidism     Iron deficiency anemia, blood loss     Lumbago     Lumbar radiculopathy     Osteoarthritis (arthritis due to wear and tear of joints)     S/P AVR, coumadin Tx     Spondylosis 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 Possible causes of the patient's chest pain is likely noncardiac at least based on this angiogram. Patient chould be able to proceed w/ scheduled surgery w/ paying attention to anticoagulation and trying to minimize the period of no anticoagulation.  CARDIAC CATHETERIZATION  2012, 6/9/16    [facility]    CARDIOVASCULAR STRESS TEST  4 15 2010    Moderate fixed inferior defect, possibly attenuation, cannot exclude a previous MI. Correlation w/ EKG is recommented. Significant degree of gut uptake which is likely to be the cause of the attenuation artifact seen.  EF 46%    CERVICAL FUSION  2010    CERVICAL FUSION  03/09/2016    REMOVAL HARDWARE C5-7, ACDF C4-6 WITH ATLANTIS CORNERSTONE    COLONOSCOPY  2010    CORONARY ARTERY BYPASS GRAFT      DILATATION, ESOPHAGUS      small resection    ENDOSCOPY, COLON, DIAGNOSTIC      NERVE SURGERY Bilateral 07/25/2017    THORACIC FACET BLOCK T7-8, T8-9, T11-12    OTHER SURGICAL HISTORY  05/02/2017    LESI L5    OTHER SURGICAL HISTORY  09/12/2017    thoracic epidural T11    KS INJ,PARAVERTEBRAL L/S,1 LEVEL Bilateral 7/25/2017    T-FACET MBB T7-8, T8-9, T11-12 BILATERAL performed by Ophelia Prince MD at [facility] DX/THER SBST EPIDURAL/SUBARACH LUMBAR/SACRAL N/A 9/12/2017    TESI T11 performed by Ophelia Prince MD at [facility] [address]  2010    [address] EXTRACTION  02/2019    UPPER GASTROINTESTINAL ENDOSCOPY  2010     Portions of this note were completed with a voice recording program.  Efforts were made to edit the dictations but occasionally words are mis-transcribed. Review of Systems   Constitutional: Negative. HENT: Negative. Respiratory: Negative. Negative for cough. Cardiovascular: Negative. Negative for chest pain, palpitations and leg swelling. Gastrointestinal: Negative. Endocrine: Negative. Negative for polydipsia, polyphagia and polyuria. Genitourinary: Negative. Musculoskeletal: Positive for arthralgias and back pain. Negative for gait problem. Allergic/Immunologic: Negative. Neurological: Negative for dizziness, light-headedness and headaches. Hematological: Negative. Psychiatric/Behavioral: Positive for dysphoric mood. Negative for sleep disturbance. The patient is nervous/anxious. All other systems reviewed and are negative. Objective:   Physical Exam  Vitals signs and nursing note reviewed. Constitutional:       Appearance: He is normal weight. HENT:      Right Ear: Tympanic membrane, ear canal and external ear normal.      Left Ear: Tympanic membrane and external ear normal.      Nose: Nose normal.      Mouth/Throat:      Mouth: Mucous membranes are moist.      Pharynx: Oropharynx is clear. Eyes:      Conjunctiva/sclera: Conjunctivae normal.   Neck:      Vascular: No carotid bruit.    Cardiovascular:      Rate and Rhythm: Normal rate and regular rhythm. Pulses: Normal pulses. Heart sounds: Normal heart sounds. No murmur. No gallop. Pulmonary:      Effort: Pulmonary effort is normal.      Breath sounds: Normal breath sounds. Abdominal:      General: Bowel sounds are normal.   Musculoskeletal:         General: No swelling. Lumbar back: He exhibits decreased range of motion and tenderness. He exhibits no pain and no spasm. Right lower leg: No edema. Left lower leg: No edema. Lymphadenopathy:      Cervical: No cervical adenopathy. Skin:     General: Skin is warm and dry. Capillary Refill: Capillary refill takes less than 2 seconds. Neurological:      General: No focal deficit present. Mental Status: He is alert and oriented to person, place, and time. Psychiatric:         Mood and Affect: Mood normal.         Assessment:       Diagnosis Orders   1. Essential hypertension  CBC With Auto Differential    Comprehensive Metabolic Panel, Fasting    Lipid, Fasting   2. Mixed hyperlipidemia  Lipid, Fasting   3. Acquired hypothyroidism  CBC With Auto Differential    TSH With Reflex Ft4   4. Chronic anxiety     5. Spinal stenosis of lumbar region with neurogenic claudication     6. IFG (impaired fasting glucose)  Comprehensive Metabolic Panel, Fasting    Hemoglobin A1C   7. Dysthymia (or depressive neurosis)     8. Chronic pain syndrome     9. Medication monitoring encounter  CBC With Auto Differential    Comprehensive Metabolic Panel, Fasting    Lipid, Fasting    TSH With Reflex Ft4   10.  Lymphomatoid papulosis ([facility] [address]  CBC With Auto Differential           Plan:      Orders Placed This Encounter   Procedures    CBC With Auto Differential     Standing Status:   Future     Number of Occurrences:   1     Standing Expiration Date:   7/9/2021    Comprehensive Metabolic Panel, Fasting     Standing Status:   Future     Number of Occurrences:   1     Standing Expiration Date:   7/9/2021    Lipid, Fasting     Standing Status:   Future     Number of Occurrences:   1     Standing Expiration Date:   7/9/2021    TSH With Reflex Ft4     Standing Status:   Future     Number of Occurrences:   1     Standing Expiration Date:   7/9/2021    Hemoglobin A1C     Standing Status:   Future     Number of Occurrences:   1     Standing Expiration Date:   7/9/2021     There are no discontinued medications. Current Outpatient Medications   Medication Sig Dispense Refill    HYDROcodone-acetaminophen (NORCO)  MG per tablet Take 1 tablet by mouth every 6 hours as needed for Pain for up to 30 days. 120 tablet 0    ALPRAZolam (XANAX) 0.5 MG tablet Take 1 tablet by mouth 3 times daily as needed for Sleep or Anxiety for up to 180 days.  90 tablet 3    esomeprazole (NEXIUM) 40 MG delayed release capsule TAKE 1 CAPSULE BY MOUTH TWO TIMES A DAY 60 capsule 4    atorvastatin (LIPITOR) 20 MG tablet TAKE 1 TABLET BY MOUTH ONE TIME A DAY AT NIGHT 90 tablet 10    celecoxib (CELEBREX) 200 MG capsule TAKE 1 CAPSULE BY MOUTH ONE TIME A DAY 30 capsule 5    levothyroxine (SYNTHROID) 75 MCG tablet TAKE 1 TABLET BY MOUTH IN THE MORNING 30 tablet 5    clobetasol (TEMOVATE) 0.05 % cream APPLY TOPICALLY TO RIGHT INDEX FINGER 3 TIMES DAILY  2    metoprolol succinate (TOPROL XL) 25 MG extended release tablet TAKE 1 TABLET BY MOUTH ONE TIME A DAY  6    Cholecalciferol (VITAMIN D3) 1000 units TABS Take 2,000 Units by mouth Daily      buPROPion (WELLBUTRIN) 100 MG tablet Take 1 tablet by mouth 2 times daily 60 tablet 3    V-R ASPIRIN  MG EC tablet TAKE 1 TABLET BY MOUTH ONE TIME A DAY  30 tablet 10     MG capsule TAKE 1 CAPSULE BY MOUTH ONE TIME A DAY AS NEEDED for constipation 30 capsule 10    IRON PO Take 65 mg by mouth daily       Multiple Vitamin (MULTIVITAMIN PO) Take 1 tablet by mouth daily       varenicline (CHANTIX CONTINUING MONTH FLAVIA) 1 MG tablet Take 1 tablet by mouth 2 times daily (Patient not taking: Reported on 7/9/2020) 60 tablet 4    gabapentin (NEURONTIN) 300 MG capsule TAKE 1 CAPSULE BY MOUTH THREE TIMES A DAY  90 capsule 5     No current facility-administered medications for this visit. Get lab today. Continue present medications. Discussed use, benefit, and side effects of prescribed medications. Barriers to compliance discussed. All patient questions answered. Pt voiced understanding.           Toni Vega MD

## 2020-07-09 NOTE — PATIENT INSTRUCTIONS
You may receive a survey about your visit with us today. The feedback from our patients helps us identify what is working well and where the service to all patients can be enhanced. Thank you! Get lab today. Continue present medications. Tobacco Cessation Programs     Telephonic behavior modification  Cherylene Chars ([phone])   Counseling service for those who are ready to quit using tobacco.     Available for uninsured [place] residents, [facility] recipients, pregnant women, or patients whose health plans or employers are members of the [address] [place] [address] behavior modification   http://[place]. Quitlogix. org   Online support program to help patients through each step of the quitting process. Available 24 hours a day 7 days a week. Provides up to date researched based tool, step-by-step guides, and motivational messages. Online behavior modification   www.lungusa.org/stop-smoking/how-to-quit   HelpLine: 4-800-LUNG-[place] (874-1269)   Email questions to:  Brock@Pic[employer]. org    Website offers resources to help tobacco users figure out their reasons for quitting and then take the big step of quitting for good. Hypnosis   Location: [facility], [place] [address] [address], [place]   Contact: Liliam Ryan, PhD at [phone]   Hypnosis for tobacco cessation   Cost $225 for the initial session and $175 for each session afterwards. Most patients require 6-8 sessions. There is the option to submit through the patients insurance. Hypnosis and behavior modification   Location: [facility],  [address] [place] [address], [place]   Contact: Gregg Payne, PhD at [phone]  [address] [place] [address] Counseling and hypnosis for nicotine addition   Cost: For uninsured patients:  Please call above phone number  Cost for insured patients depends on patients insurance plan.     Behavior modification   Location: [facility], [address] [facility] [address] [place], [address]: [address] one-on-one appointments between the patient and a respiratory therapist.  The four appointments span over three weeks. The respiratory therapist schedules one of the appointments to occur 48 hours after the patients quit date.  Cost $100 total for the four sessions.   Tobacco cessation products are not included in the cost and are not provided by [facility].

## 2020-07-09 NOTE — PROGRESS NOTES
Chronic Disease Visit Information    BP Readings from Last 3 Encounters:   07/09/20 128/74   02/13/20 134/76   11/14/19 128/72          Hemoglobin A1C (%)   Date Value   02/07/2019 5.6   03/23/2017 5.5   07/19/2016 5.3     LDL Calculated (mg/dL)   Date Value   03/11/2019 47     HDL (mg/dL)   Date Value   03/11/2019 36     BUN (mg/dL)   Date Value   03/11/2019 16     CREATININE (mg/dL)   Date Value   03/11/2019 0.6     Glucose (mg/dL)   Date Value   03/11/2019 108            Have you changed or started any medications since your last visit including any over-the-counter medicines, vitamins, or herbal medicines? no   Are you having any side effects from any of your medications? -  no  Have you stopped taking any of your medications? Is so, why? -  no    Have you seen any other physician or provider since your last visit? Yes--Records requested Dr Danita Ledbetter  Have you had any other diagnostic tests since your last visit? No  Have you been seen in the emergency room and/or had an admission to a hospital since we last saw you? No  Have you had your annual diabetic retinal (eye) exam? No  Have you had your routine dental cleaning in the past 6 months? n/a    Have you activated your [facility] account? If not, what are your barriers?  No: declines     Patient Care Team:  Adrian Rocha MD as PCP - General (Family Medicine)  Adrian Rocha MD as PCP - [facility]  Allyson Diaz MD as Surgeon (Cardiothoracic Surgery)  Tripp Zacarias MD as Physician (Gastroenterology)  Juan Pablo Bridges MD as Consulting Physician (Cardiology)  Kayden Dunham MD (Dermatology)  Imtiaz Armendariz DPM as Consulting Physician (Podiatry)         Medical History Review  Past Medical, Family, and Social History reviewed and does contribute to the patient presenting condition    Health Maintenance   Topic Date Due    DTaP/Tdap/Td vaccine (1 - Tdap) 04/02/1982    Shingles Vaccine (1 of 2) 04/02/2013    Pneumococcal 0-64 years Vaccine (2 of 3 - PPSV23) 08/10/2017    A1C test (Diabetic or Prediabetic)  02/07/2020    Lipid screen  03/11/2020    TSH testing  03/11/2020    Flu vaccine (1) 09/01/2020    Colon cancer screen colonoscopy  03/27/2026    Hepatitis C screen  Completed    HIV screen  Completed    Hepatitis A vaccine  Aged Out    Hepatitis B vaccine  Aged Out    Hib vaccine  Aged Out    Meningococcal (ACWY) vaccine  Aged Out

## 2020-07-15 RX ORDER — HYDROCODONE BITARTRATE AND ACETAMINOPHEN 10; 325 MG/1; MG/1
1 TABLET ORAL EVERY 6 HOURS PRN
Qty: 120 TABLET | Refills: 0 | Status: SHIPPED | OUTPATIENT
Start: 2020-07-15 | End: 2020-08-14

## 2020-07-15 NOTE — TELEPHONE ENCOUNTER
Pt called office requesting a [place] refill to [facility]. If no call back, pt will check with the pharmacy after 2pm. Refill if appropriate.

## 2020-07-30 ENCOUNTER — NURSE ONLY (OUTPATIENT)
Dept: LAB | Age: 55
End: 2020-07-30

## 2020-07-30 ENCOUNTER — HOSPITAL ENCOUNTER (OUTPATIENT)
Age: 55
Discharge: HOME | End: 2020-07-30
Payer: COMMERCIAL

## 2020-07-30 ENCOUNTER — OFFICE VISIT (OUTPATIENT)
Dept: FAMILY MEDICINE CLINIC | Age: 55
End: 2020-07-30
Payer: COMMERCIAL

## 2020-07-30 VITALS
TEMPERATURE: 97.4 F | HEART RATE: 72 BPM | WEIGHT: 180 LBS | DIASTOLIC BLOOD PRESSURE: 78 MMHG | SYSTOLIC BLOOD PRESSURE: 126 MMHG | BODY MASS INDEX: 28.19 KG/M2 | RESPIRATION RATE: 16 BRPM

## 2020-07-30 LAB
ERYTHROCYTE [DISTWIDTH] IN BLOOD BY AUTOMATED COUNT: 15.9 % (ref 11.5–14.5)
ERYTHROCYTE [DISTWIDTH] IN BLOOD BY AUTOMATED COUNT: 54.3 FL (ref 35–45)
HCT VFR BLD CALC: 48.7 % (ref 42–52)
HEMOGLOBIN: 16 GM/DL (ref 14–18)
MCH RBC QN AUTO: 30.7 PG (ref 26–33)
MCHC RBC AUTO-ENTMCNC: 32.9 GM/DL (ref 32.2–35.5)
MCV RBC AUTO: 93.5 FL (ref 80–94)
PLATELET # BLD: 264 THOU/MM3 (ref 130–400)
PMV BLD AUTO: 11.1 FL (ref 9.4–12.4)
RBC # BLD: 5.21 MILL/MM3 (ref 4.7–6.1)
WBC # BLD: 17.8 THOU/MM3 (ref 4.8–10.8)

## 2020-07-30 PROCEDURE — 99214 OFFICE O/P EST MOD 30 MIN: CPT | Performed by: FAMILY MEDICINE

## 2020-07-30 PROCEDURE — 85027 COMPLETE CBC AUTOMATED: CPT

## 2020-07-30 PROCEDURE — 36415 COLL VENOUS BLD VENIPUNCTURE: CPT

## 2020-07-30 RX ORDER — SULFAMETHOXAZOLE AND TRIMETHOPRIM 800; 160 MG/1; MG/1
1 TABLET ORAL 2 TIMES DAILY
COMMUNITY
Start: 2020-07-23 | End: 2021-01-07

## 2020-07-30 RX ORDER — SUCRALFATE 1 G/1
1 TABLET ORAL 4 TIMES DAILY
Qty: 60 TABLET | Refills: 0 | Status: SHIPPED | OUTPATIENT
Start: 2020-07-30 | End: 2020-08-20

## 2020-07-30 RX ORDER — POLYETHYLENE GLYCOL 3350 17 G/17G
17 POWDER, FOR SOLUTION ORAL DAILY
Qty: 510 G | Refills: 0 | Status: SHIPPED | OUTPATIENT
Start: 2020-07-30 | End: 2020-08-29

## 2020-07-30 ASSESSMENT — ENCOUNTER SYMPTOMS
NAUSEA: 1
ABDOMINAL PAIN: 1
DIARRHEA: 0
BLOOD IN STOOL: 0
RESPIRATORY NEGATIVE: 1
CONSTIPATION: 1

## 2020-07-30 NOTE — PROGRESS NOTES
Subjective:      Patient ID: Maribel Mcfadden is a 60 y.o. male. HPI:    Chief Complaint   Patient presents with   Hesham Lopez     had a black BM Monday, has not had a BP since    Mouth Lesions    Fatigue    Abdominal Pain    Nausea     Pt here with multiple concerns today. Pt c/o large lesion to upper lip x 2 weeks. Has been seeing Dr. Frida Sweet who took a biopsy of it last week. Had follow up this morning, pulled some of the scab off today. Biopsy negative for cancer, now being sent for additional testing. Thinking herpetic in nature. Pt also c/o abdominal discomfort and pain for the last month. Pain is located in the stomach region. Has been fighting constipation. On chronic opioid therapy. He is taking OTC stool softener with limited relief. A few days ago he noticed black stool. Has not had a BM since then. He is no longer on Celebrex and ASA. 1-2 cups coffee daily. Colonoscopy in 2016. Has had multiple EGD's in the past, no hx of PUD. Was bleeding out of small bowel so 8 inches was removed in 2010. He is on Nexium BID. Local GI:  Dr. Edmundo Doherty    He is on iron tablets. No Pepto-Bismol. Patient Active Problem List   Diagnosis    HTN (hypertension)    Lumbago    Lumbar radiculopathy    Cervicalgia, C5-7    Hyperlipidemia    Hypothyroidism    Chronic anxiety    ED (erectile dysfunction)    Bilateral sciatica    Trigger finger, right, ring     Medication monitoring encounter    Nocturnal leg cramps    GERD (gastroesophageal reflux disease)    IFG (impaired fasting glucose)    Situational depression    Dysthymia (or depressive neurosis)    Herniation of cervical intervertebral disc with radiculopathy    H/O cervical spine surgery, C4-5 fusion, 3/2/16. 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     Tobacco abuse    Lymphomatoid papulosis     Lumbar radiculitis    Lumbar spinal Admission medications    Medication Sig Start Date End Date Taking? Authorizing Provider   sulfamethoxazole-trimethoprim (BACTRIM DS;SEPTRA DS) 800-160 MG per tablet Take 1 tablet by mouth 2 times daily 7/23/20  Yes Historical Provider, MD   HYDROcodone-acetaminophen (NORCO)  MG per tablet Take 1 tablet by mouth every 6 hours as needed for Pain for up to 30 days. 7/15/20 8/14/20 Yes Tess Gonsalez MD   ALPRAZolam Tulio Born) 0.5 MG tablet Take 1 tablet by mouth 3 times daily as needed for Sleep or Anxiety for up to 180 days.  2/26/20 8/24/20 Yes Tess Gonsalez MD   esomeprazole ([employer]) 40 MG delayed release capsule TAKE 1 CAPSULE BY MOUTH TWO TIMES A DAY 2/13/20  Yes Tess Gonsalez MD   atorvastatin (LIPITOR) 20 MG tablet TAKE 1 TABLET BY MOUTH ONE TIME A DAY AT NIGHT 1/13/20  Yes Tess Gonsalez MD   celecoxib (CELEBREX) 200 MG capsule TAKE 1 CAPSULE BY MOUTH ONE TIME A DAY 1/3/20  Yes Tess Gonsalez MD   levothyroxine (SYNTHROID) 75 MCG tablet TAKE 1 TABLET BY MOUTH IN THE MORNING 10/7/19  Yes Tess Gonsalez MD   clobetasol (TEMOVATE) 0.05 % cream APPLY TOPICALLY TO RIGHT INDEX FINGER 3 TIMES DAILY 6/15/18  Yes Historical Provider, MD   metoprolol succinate (TOPROL XL) 25 MG extended release tablet TAKE 1 TABLET BY MOUTH ONE TIME A DAY 7/12/18  Yes Historical Provider, MD   Cholecalciferol (VITAMIN D3) 1000 units TABS Take 2,000 Units by mouth Daily   Yes Historical Provider, MD   buPROPion (WELLBUTRIN) 100 MG tablet Take 1 tablet by mouth 2 times daily 8/30/18  Yes Tess Gonsalez MD    MG capsule TAKE 1 CAPSULE BY MOUTH ONE TIME A DAY AS NEEDED for constipation 3/28/18  Yes Tess Gonsalez MD   IRON PO Take 65 mg by mouth daily    Yes Historical Provider, MD   Multiple Vitamin (MULTIVITAMIN PO) Take 1 tablet by mouth daily    Yes Historical Provider, MD   varenicline (CHANTIX CONTINUING MONTH FLAVIA) 1 MG tablet Take 1 tablet by mouth 2 times daily  Patient not taking: Reported on 7/9/2020 10/14/19   [place] Even, APRN - CNP   gabapentin (NEURONTIN) 300 MG capsule TAKE 1 CAPSULE BY MOUTH THREE TIMES A DAY  2/11/19 2/13/20  Toni Vega MD   V-R ASPIRIN  MG EC tablet TAKE 1 TABLET BY MOUTH ONE TIME A DAY  8/13/18   Toni Vega MD         Review of Systems   Constitutional: Negative. HENT: Negative. Sore on upper lip     Respiratory: Negative. Cardiovascular: Negative. Gastrointestinal: Positive for abdominal pain, constipation and nausea. Negative for blood in stool (dark stools) and diarrhea. Musculoskeletal: Negative. All other systems reviewed and are negative. Objective:   Physical Exam  Vitals signs and nursing note reviewed. Constitutional:       Appearance: He is well-developed. HENT:      Head: Normocephalic and atraumatic. Right Ear: Tympanic membrane normal.      Left Ear: Tympanic membrane normal.      Mouth/Throat:     Cardiovascular:      Rate and Rhythm: Normal rate and regular rhythm. Heart sounds: Normal heart sounds. No murmur. Pulmonary:      Effort: Pulmonary effort is normal.      Breath sounds: Normal breath sounds. Abdominal:      General: Bowel sounds are normal. There is no distension. Palpations: Abdomen is soft. Tenderness: There is abdominal tenderness in the epigastric area. There is no guarding or rebound. Skin:     General: Skin is warm and dry. Neurological:      Mental Status: He is alert and oriented to person, place, and time. Psychiatric:         Behavior: Behavior normal.             Assessment:       Diagnosis Orders   1. Dark stools     2. Dyspepsia  sucralfate (CARAFATE) 1 GM tablet   3. Gastroesophageal reflux disease, esophagitis presence not specified     4. Constipation, unspecified constipation type  polyethylene glycol (GLYCOLAX) 17 GM/SCOOP powder   5.  Oral lesion             Plan:      -  Multiple concerns today, each addressed at length  -  Continue Nexium BID  -  Star Miralax and Carafate  -  [place] diet  -  No NSAIDs for now  -  #5 per Dr. Vandana Roldan, herpetic lesion likely  -  RTO prn for now, will update me on progress in a few days        Maximino Scott, DO

## 2020-07-30 NOTE — PROGRESS NOTES
Visit Information    Have you changed or started any medications since your last visit including any over-the-counter medicines, vitamins, or herbal medicines? no   Are you having any side effects from any of your medications? -  no  Have you stopped taking any of your medications? Is so, why? -  yes - see updated med list    Have you seen any other physician or provider since your last visit? Yes - Records Requested  Have you had any other diagnostic tests since your last visit? Yes - Records Obtained  Have you been seen in the emergency room and/or had an admission to a hospital since we last saw you? No  Have you had your routine dental cleaning in the past 6 months? n/a    Have you activated your [employer] account? If not, what are your barriers?  No: declined     Patient Care Team:  Joey Wilkes MD as PCP - General (Family Medicine)  Joey Wilkes MD as PCP - [address] [place] [address] Provider  Seven Nava MD as Surgeon (Cardiothoracic Surgery)  Martinez Gunter MD as Physician (Gastroenterology)  Cadence Donis MD as Consulting Physician (Cardiology)  Junior Reyes MD (Dermatology)  Imtiaz Barrientos DPM as Consulting Physician (Podiatry)    Medical History Review  Past Medical, Family, and Social History reviewed and does not contribute to the patient presenting condition    Health Maintenance   Topic Date Due    DTaP/Tdap/Td vaccine (1 - Tdap) 04/02/1982    Shingles Vaccine (1 of 2) 04/02/2013    Pneumococcal 0-64 years Vaccine (2 of 3 - PPSV23) 08/10/2017    Flu vaccine (1) 09/01/2020    A1C test (Diabetic or Prediabetic)  07/09/2021    Lipid screen  07/09/2021    TSH testing  07/09/2021    Colon cancer screen colonoscopy  03/27/2026    Hepatitis C screen  Completed    HIV screen  Completed    Hepatitis A vaccine  Aged Out    Hepatitis B vaccine  Aged Out    Hib vaccine  Aged Out    Meningococcal (ACWY) vaccine  Aged Out

## 2020-08-01 LAB
AEROBIC CULTURE: NORMAL
GRAM STAIN RESULT: NORMAL

## 2020-08-04 LAB — MISC. #1 REFERENCE GROUP TEST: NORMAL

## 2020-08-14 ENCOUNTER — TELEPHONE (OUTPATIENT)
Dept: FAMILY MEDICINE CLINIC | Age: 55
End: 2020-08-14

## 2020-08-14 RX ORDER — HYDROCODONE BITARTRATE AND ACETAMINOPHEN 10; 325 MG/1; MG/1
1 TABLET ORAL EVERY 6 HOURS PRN
Qty: 120 TABLET | Refills: 0 | Status: SHIPPED | OUTPATIENT
Start: 2020-08-14 | End: 2020-09-15 | Stop reason: SDUPTHER

## 2020-08-14 NOTE — TELEPHONE ENCOUNTER
Pt called office requesting a refill of Norco to [facility]. If no call back, he will check with the pharmacy after 12pm. Please advise.

## 2020-08-20 ENCOUNTER — HOSPITAL ENCOUNTER (OUTPATIENT)
Dept: NON INVASIVE DIAGNOSTICS | Age: 55
Discharge: HOME | End: 2020-08-20
Payer: COMMERCIAL

## 2020-08-20 ENCOUNTER — OFFICE VISIT (OUTPATIENT)
Dept: CARDIOLOGY CLINIC | Age: 55
End: 2020-08-20
Payer: COMMERCIAL

## 2020-08-20 ENCOUNTER — HOSPITAL ENCOUNTER (OUTPATIENT)
Dept: CT IMAGING | Age: 55
Discharge: HOME | End: 2020-08-20
Payer: COMMERCIAL

## 2020-08-20 VITALS
HEART RATE: 78 BPM | SYSTOLIC BLOOD PRESSURE: 138 MMHG | WEIGHT: 186 LBS | HEIGHT: 67 IN | DIASTOLIC BLOOD PRESSURE: 70 MMHG | BODY MASS INDEX: 29.19 KG/M2

## 2020-08-20 LAB
LV EF: 55 %
LVEF MODALITY: NORMAL
POC CREATININE WHOLE BLOOD: 0.8 MG/DL (ref 0.5–1.2)

## 2020-08-20 PROCEDURE — 82565 ASSAY OF CREATININE: CPT

## 2020-08-20 PROCEDURE — 6360000004 HC RX CONTRAST MEDICATION: Performed by: NUCLEAR MEDICINE

## 2020-08-20 PROCEDURE — 93000 ELECTROCARDIOGRAM COMPLETE: CPT | Performed by: NUCLEAR MEDICINE

## 2020-08-20 PROCEDURE — 99214 OFFICE O/P EST MOD 30 MIN: CPT | Performed by: NUCLEAR MEDICINE

## 2020-08-20 PROCEDURE — 71275 CT ANGIOGRAPHY CHEST: CPT

## 2020-08-20 PROCEDURE — 93306 TTE W/DOPPLER COMPLETE: CPT

## 2020-08-20 RX ORDER — PANTOPRAZOLE SODIUM 40 MG/1
40 TABLET, DELAYED RELEASE ORAL DAILY
COMMUNITY
End: 2020-08-20 | Stop reason: SDUPTHER

## 2020-08-20 RX ORDER — INDOMETHACIN 25 MG/1
25 CAPSULE ORAL DAILY
Qty: 10 CAPSULE | Refills: 0 | Status: SHIPPED | OUTPATIENT
Start: 2020-08-20 | End: 2020-08-25 | Stop reason: SDUPTHER

## 2020-08-20 RX ORDER — INDOMETHACIN 25 MG/1
25 CAPSULE ORAL DAILY
COMMUNITY
End: 2020-08-20 | Stop reason: SDUPTHER

## 2020-08-20 RX ORDER — PANTOPRAZOLE SODIUM 40 MG/1
40 TABLET, DELAYED RELEASE ORAL DAILY
Qty: 30 TABLET | Refills: 0 | Status: SHIPPED | OUTPATIENT
Start: 2020-08-20 | End: 2020-08-25 | Stop reason: SDUPTHER

## 2020-08-20 RX ADMIN — IOPAMIDOL 80 ML: 755 INJECTION, SOLUTION INTRAVENOUS at 09:21

## 2020-08-20 NOTE — PROGRESS NOTES
[facility],[address]  [address] [place] [id number]  Dept: [phone]  Dept Fax: [phone]  Loc: [phone]    Visit Date: 8/20/2020    Angel Pan is a 60 y.o. male who presents todayfor:  Chief Complaint   Patient presents with    Check-Up    Cardiac Valve Problem    Chest Pain    Hypertension    Hyperlipidemia     Has had some more chest pain lately   Right sided  Resting   Not exertional   Some times middle chest   Sharp   No triggers  Movement makes it worse  ASA makes it better  Steady   No radiation per se  Some back pain   Some dyspnea  Know redo AVR with a tissue valve  BP is stable  No dizziness  No syncope  On statins for hyperlipidemia      HPI:  HPI  Past Medical History:   Diagnosis Date    Arthritis     general    Bilateral sciatica     Bleeding     with coumadin    CAD (coronary artery disease)     Carpal tunnel syndrome     RIGHT    Cervicalgia, C5-7     Chronic anxiety     Dysthymia (or depressive neurosis) 2/1/2016    ED (erectile dysfunction)     GERD (gastroesophageal reflux disease)     Headache(784.0)     History of blood transfusion 03/2016    HTN (hypertension)     Hyperlipidemia     Hypothyroidism     Iron deficiency anemia, blood loss     Lumbago     Lumbar radiculopathy     Osteoarthritis (arthritis due to wear and tear of joints)     S/P AVR, coumadin Tx     Spondylosis of thoracic region without myelopathy or radiculopathy       Past Surgical History:   Procedure Laterality Date    [facility]  2007    [facility]    AORTIC VALVE REPLACEMENT  07/20/2016    extraction of mechanical valve and replacement with tissue valve    APPENDECTOMY  1980    CARDIAC CATHETERIZATION  5 20 2010    Patent coronary arteries.  Possible causes of the patient's chest pain is likely noncardiac at least based on this angiogram. Patient chould be able to proceed w/ scheduled surgery w/ paying attention to anticoagulation and trying to minimize the period of no anticoagulation.  CARDIAC CATHETERIZATION  2012, 6/9/16    [facility]    CARDIOVASCULAR STRESS TEST  4 15 2010    Moderate fixed inferior defect, possibly attenuation, cannot exclude a previous MI. Correlation w/ EKG is recommented. Significant degree of gut uptake which is likely to be the cause of the attenuation artifact seen. EF 46%    CERVICAL FUSION  2010    CERVICAL FUSION  03/09/2016    REMOVAL HARDWARE C5-7, ACDF C4-6 WITH ATLANTIS CORNERSTONE    COLONOSCOPY  2010    CORONARY ARTERY BYPASS GRAFT      DILATATION, ESOPHAGUS      small resection    ENDOSCOPY, COLON, DIAGNOSTIC      NERVE SURGERY Bilateral 07/25/2017    THORACIC FACET BLOCK T7-8, T8-9, T11-12    OTHER SURGICAL HISTORY  05/02/2017    LESI L5    OTHER SURGICAL HISTORY  09/12/2017    thoracic epidural T11    PA INJ,PARAVERTEBRAL L/S,1 LEVEL Bilateral 7/25/2017    T-FACET MBB T7-8, T8-9, T11-12 BILATERAL performed by Alessio Doty MD at [address] [place] [address] DX/THER SBST [address] LUMBAR/SACRAL N/A 9/12/2017    TESI T11 performed by Alessio Doty MD at [address] [facility] [address]  2010    8 INCHES REMOVED    TOOTH EXTRACTION  02/2019    UPPER GASTROINTESTINAL ENDOSCOPY  2010     Family History   Problem Relation Age of Onset    Cancer Mother     Diabetes Father     Cancer Father     Heart Disease Father 28        CABG    High Blood Pressure Father     Diabetes Sister         AS A CHILD    Kidney Disease Sister     High Blood Pressure Sister     Cancer Brother     COPD Sister     Heart Disease Sister     Stroke Neg Hx      Social History     Tobacco Use    Smoking status: Current Every Day Smoker     Packs/day: 0.25     Years: 25.00     Pack years: 6.25     Types: Cigarettes, Cigars     Start date: 6/9/1978    Smokeless tobacco: Never Used   Substance Use Topics    Alcohol use:  Yes Alcohol/week: 0.0 standard drinks     Comment: not for 3 mo      Current Outpatient Medications   Medication Sig Dispense Refill    HYDROcodone-acetaminophen (NORCO)  MG per tablet Take 1 tablet by mouth every 6 hours as needed for Pain for up to 30 days. 120 tablet 0    sulfamethoxazole-trimethoprim (BACTRIM DS;SEPTRA DS) 800-160 MG per tablet Take 1 tablet by mouth 2 times daily      polyethylene glycol (GLYCOLAX) 17 GM/SCOOP powder Take 17 g by mouth daily 510 g 0    ALPRAZolam (XANAX) 0.5 MG tablet Take 1 tablet by mouth 3 times daily as needed for Sleep or Anxiety for up to 180 days. 90 tablet 3    esomeprazole (NEXIUM) 40 MG delayed release capsule TAKE 1 CAPSULE BY MOUTH TWO TIMES A DAY 60 capsule 4    atorvastatin (LIPITOR) 20 MG tablet TAKE 1 TABLET BY MOUTH ONE TIME A DAY AT NIGHT 90 tablet 10    levothyroxine (SYNTHROID) 75 MCG tablet TAKE 1 TABLET BY MOUTH IN THE MORNING 30 tablet 5    gabapentin (NEURONTIN) 300 MG capsule TAKE 1 CAPSULE BY MOUTH THREE TIMES A DAY  90 capsule 5    clobetasol (TEMOVATE) 0.05 % cream APPLY TOPICALLY TO RIGHT INDEX FINGER 3 TIMES DAILY  2    metoprolol succinate (TOPROL XL) 25 MG extended release tablet TAKE 1 TABLET BY MOUTH ONE TIME A DAY  6    Cholecalciferol (VITAMIN D3) 1000 units TABS Take 2,000 Units by mouth Daily      buPROPion (WELLBUTRIN) 100 MG tablet Take 1 tablet by mouth 2 times daily 60 tablet 3    V-R ASPIRIN  MG EC tablet TAKE 1 TABLET BY MOUTH ONE TIME A DAY  30 tablet 10     MG capsule TAKE 1 CAPSULE BY MOUTH ONE TIME A DAY AS NEEDED for constipation 30 capsule 10    IRON PO Take 65 mg by mouth daily       Multiple Vitamin (MULTIVITAMIN PO) Take 1 tablet by mouth daily        No current facility-administered medications for this visit.       No Known Allergies  Health Maintenance   Topic Date Due    DTaP/Tdap/Td vaccine (1 - Tdap) 04/02/1982    Shingles Vaccine (1 of 2) 04/02/2013    Pneumococcal 0-64 years Vaccine (2 of 3 - PPSV23) 08/10/2017    Flu vaccine (1) 09/01/2020    A1C test (Diabetic or Prediabetic)  07/09/2021    Lipid screen  07/09/2021    TSH testing  07/09/2021    Colon cancer screen colonoscopy  03/27/2026    Hepatitis C screen  Completed    HIV screen  Completed    Hepatitis A vaccine  Aged Out    Hepatitis B vaccine  Aged Out    Hib vaccine  Aged Out    Meningococcal (ACWY) vaccine  Aged Out       Subjective:  Review of Systems  General:   No fever, no chills, some fatigue or weight loss  Pulmonary:    some more dyspnea, no wheezing  Cardiac:    Did have recent chest pain,   GI:     No nausea or vomiting, no abdominal pain  Neuro:     No dizziness or light headedness,   Musculoskeletal:  No recent active issues  Extremities:   No edema, no obvious claudication       Objective:  Physical Exam  /70   Pulse 78   Ht 5' 7\" (1.702 m)   Wt 186 lb (84.4 kg)   BMI 29.13 kg/m²   General:   Well developed, well nourished  Lungs:   Clear to auscultation  Heart:    Normal S1 S2, Slight murmur. no rubs, no gallops  Abdomen:   Soft, non tender, no organomegalies, positive bowel sounds  Extremities:   No edema, no cyanosis, good peripheral pulses  Neurological:   Awake, alert, oriented. No obvious focal deficits  Musculoskelatal:  No obvious deformities    Assessment:      Diagnosis Orders   1. Essential hypertension  EKG 12 lead   2. S/P AVR (aortic valve replacement)  EKG 12 lead   3. Familial hypercholesterolemia  EKG 12 lead   4. Precordial pain     chest pain  ?/ muscular  Vs chest  Vs Cardiac  ECG in office was done today. I reviewed the ECG. No acute findings      Plan:  No follow-ups on file. CTA chest   Echo   NSAIDs  Patient was advised to report to the ER if he has recurrent symptoms with specific instructions given about severity and duration of symptoms  Continue risk factor modification and medical management  Thank you for allowing me to participate in the care of your patient.  Please don't hesitate to contact me regarding any further issues related to the patient care    Orders Placed:  Orders Placed This Encounter   Procedures    EKG 12 lead     Order Specific Question:   Reason for Exam?     Answer: Other       Medications Prescribed:  No orders of the defined types were placed in this encounter. Discussed use, benefit, and side effects of prescribed medications. All patient questions answered. Pt voicedunderstanding. Instructed to continue current medications, diet and exercise. Continue risk factor modification and medical management. Patient agreed with treatment plan. Follow up as directed.     Electronically signedby Jarvis Andrews MD on 8/20/2020 at 8:06 AM

## 2020-08-24 NOTE — TELEPHONE ENCOUNTER
Pt had echo and CTA chest done last week, asking for results as he is still having chest pain on right side? Any other recommendations?

## 2020-08-24 NOTE — TELEPHONE ENCOUNTER
Cardiac look okay   See PCP   In the mean time indocin 25 tid for a week and protonix 40 daily for a month

## 2020-08-25 RX ORDER — INDOMETHACIN 25 MG/1
25 CAPSULE ORAL
Qty: 21 CAPSULE | Refills: 0 | Status: SHIPPED | OUTPATIENT
Start: 2020-08-25 | End: 2020-10-08 | Stop reason: SDUPTHER

## 2020-08-25 RX ORDER — PANTOPRAZOLE SODIUM 40 MG/1
40 TABLET, DELAYED RELEASE ORAL DAILY
Qty: 30 TABLET | Refills: 0 | Status: SHIPPED | OUTPATIENT
Start: 2020-08-25 | End: 2020-10-08 | Stop reason: SDUPTHER

## 2020-08-25 RX ORDER — INDOMETHACIN 25 MG/1
25 CAPSULE ORAL 3 TIMES DAILY
COMMUNITY
End: 2020-08-25 | Stop reason: SDUPTHER

## 2020-09-15 RX ORDER — HYDROCODONE BITARTRATE AND ACETAMINOPHEN 10; 325 MG/1; MG/1
1 TABLET ORAL EVERY 6 HOURS PRN
Qty: 120 TABLET | Refills: 0 | Status: SHIPPED | OUTPATIENT
Start: 2020-09-15 | End: 2020-10-08 | Stop reason: SDUPTHER

## 2020-09-15 NOTE — TELEPHONE ENCOUNTER
Patient calling and requesting refill of [place] to [facility]. Will check with pharmacy after 1pm for refill.   Please refill if appropriate

## 2020-10-05 RX ORDER — ALPRAZOLAM 0.5 MG/1
TABLET ORAL
Qty: 90 TABLET | Refills: 1 | Status: SHIPPED | OUTPATIENT
Start: 2020-10-05 | End: 2021-02-08 | Stop reason: SDUPTHER

## 2020-10-08 ENCOUNTER — OFFICE VISIT (OUTPATIENT)
Dept: FAMILY MEDICINE CLINIC | Age: 55
End: 2020-10-08
Payer: COMMERCIAL

## 2020-10-08 VITALS
WEIGHT: 185.3 LBS | SYSTOLIC BLOOD PRESSURE: 120 MMHG | DIASTOLIC BLOOD PRESSURE: 76 MMHG | TEMPERATURE: 96.7 F | RESPIRATION RATE: 14 BRPM | OXYGEN SATURATION: 98 % | BODY MASS INDEX: 29.02 KG/M2 | HEART RATE: 71 BPM

## 2020-10-08 PROCEDURE — 90471 IMMUNIZATION ADMIN: CPT | Performed by: FAMILY MEDICINE

## 2020-10-08 PROCEDURE — 99214 OFFICE O/P EST MOD 30 MIN: CPT | Performed by: FAMILY MEDICINE

## 2020-10-08 PROCEDURE — 90686 IIV4 VACC NO PRSV 0.5 ML IM: CPT | Performed by: FAMILY MEDICINE

## 2020-10-08 RX ORDER — LEVOTHYROXINE SODIUM 0.07 MG/1
75 TABLET ORAL DAILY
Qty: 90 TABLET | Refills: 3 | Status: SHIPPED | OUTPATIENT
Start: 2020-10-08 | End: 2021-01-14 | Stop reason: SDUPTHER

## 2020-10-08 RX ORDER — HYDROCODONE BITARTRATE AND ACETAMINOPHEN 10; 325 MG/1; MG/1
1 TABLET ORAL EVERY 6 HOURS PRN
Qty: 120 TABLET | Refills: 0 | Status: SHIPPED | OUTPATIENT
Start: 2020-10-08 | End: 2020-11-17 | Stop reason: SDUPTHER

## 2020-10-08 RX ORDER — METOPROLOL SUCCINATE 25 MG/1
25 TABLET, EXTENDED RELEASE ORAL DAILY
Qty: 90 TABLET | Refills: 3 | Status: SHIPPED | OUTPATIENT
Start: 2020-10-08 | End: 2022-01-10 | Stop reason: SDUPTHER

## 2020-10-08 RX ORDER — GABAPENTIN 300 MG/1
CAPSULE ORAL
Qty: 270 CAPSULE | Refills: 3 | Status: SHIPPED | OUTPATIENT
Start: 2020-10-08 | End: 2021-04-15 | Stop reason: SDUPTHER

## 2020-10-08 RX ORDER — BUPROPION HYDROCHLORIDE 100 MG/1
100 TABLET ORAL 2 TIMES DAILY
Qty: 180 TABLET | Refills: 3 | Status: SHIPPED | OUTPATIENT
Start: 2020-10-08

## 2020-10-08 RX ORDER — PANTOPRAZOLE SODIUM 40 MG/1
40 TABLET, DELAYED RELEASE ORAL DAILY
Qty: 90 TABLET | Refills: 3 | Status: SHIPPED | OUTPATIENT
Start: 2020-10-08 | End: 2020-10-30 | Stop reason: ALTCHOICE

## 2020-10-08 RX ORDER — INDOMETHACIN 25 MG/1
25 CAPSULE ORAL
Qty: 21 CAPSULE | Refills: 0 | Status: SHIPPED | OUTPATIENT
Start: 2020-10-08

## 2020-10-08 ASSESSMENT — ENCOUNTER SYMPTOMS
COUGH: 0
SHORTNESS OF BREATH: 0
GASTROINTESTINAL NEGATIVE: 1
WHEEZING: 0
RESPIRATORY NEGATIVE: 1

## 2020-10-08 NOTE — PROGRESS NOTES
Subjective:      Patient ID: Monty Mackenzie is a 60 y.o. male. HPI  Follow up of chronic conditions. Encounter Diagnoses   Name Primary?  Dysthymia (or depressive neurosis)     Situational depression     Cervicalgia, C5-7     Lumbar radiculopathy     Anxiety     Essential hypertension Yes    Mixed hyperlipidemia     Acquired hypothyroidism     IFG (impaired fasting glucose)     Medication monitoring encounter     Needs flu shot     Dyspepsia      HTN is stable with current medications. Pt has no medication side effects nor orthostatic symptoms. BP Readings from Last 3 Encounters:   10/08/20 120/76   08/20/20 138/70   07/30/20 126/78     Patient continues to tolerate atorvastatin 20 mg daily without myalgias. Cholesterol, Total (mg/dL)   Date Value   03/11/2019 88 (L)     HDL (mg/dL)   Date Value   07/09/2020 41     Triglycerides (mg/dL)   Date Value   03/11/2019 27     Lab Results   Component Value Date    LDLCALC 68 07/09/2020       Patient remains euthyroid on levothyroxine 75 mcg daily. He has no breakthrough symptoms of palpitations sweats or insomnia. Lab Results   Component Value Date    TSH 2.330 07/09/2020     His impaired fasting glucose remains under control with current hemoglobin A1c of 5.8%. Lab Results   Component Value Date    LABA1C 5.8 07/09/2020     No results found for: EAG    His anxiety and depression symptoms continue to respond well to the combination of alprazolam and bupropion. The rest of this patient's conditions are stable. Past medical and surgical hx reviewed.   Past Medical History:   Diagnosis Date    Arthritis     general    Bilateral sciatica     Bleeding     with coumadin    CAD (coronary artery disease)     Carpal tunnel syndrome     RIGHT    Cervicalgia, C5-7     Chronic anxiety     Dysthymia (or depressive neurosis) 2/1/2016    ED (erectile dysfunction)     ED (erectile dysfunction)     GERD (gastroesophageal reflux disease)     YXZHFQQS(722.6)     History of blood transfusion 03/2016    HTN (hypertension)     Hyperlipidemia     Hypothyroidism     Iron deficiency anemia, blood loss     Lumbago     Lumbar radiculopathy     Osteoarthritis (arthritis due to wear and tear of joints)     S/P AVR, coumadin Tx     Spondylosis of thoracic region without myelopathy or radiculopathy      Past Surgical History:   Procedure Laterality Date    [facility]  [facility]    AORTIC VALVE REPLACEMENT  07/20/2016    extraction of mechanical valve and replacement with tissue valve    APPENDECTOMY  1980    CARDIAC CATHETERIZATION  5 20 2010    Patent coronary arteries. Possible causes of the patient's chest pain is likely noncardiac at least based on this angiogram. Patient chould be able to proceed w/ scheduled surgery w/ paying attention to anticoagulation and trying to minimize the period of no anticoagulation.  CARDIAC CATHETERIZATION  2012, 6/9/16    [facility]    CARDIOVASCULAR STRESS TEST  4 15 2010    Moderate fixed inferior defect, possibly attenuation, cannot exclude a previous MI. Correlation w/ EKG is recommented. Significant degree of gut uptake which is likely to be the cause of the attenuation artifact seen.  EF 46%    CERVICAL FUSION  2010    CERVICAL FUSION  03/09/2016    REMOVAL HARDWARE C5-7, ACDF C4-6 WITH ATLANTIS CORNERSTONE    COLONOSCOPY  2010    CORONARY ARTERY BYPASS GRAFT      DILATATION, ESOPHAGUS      small resection    ENDOSCOPY, COLON, DIAGNOSTIC      NERVE SURGERY Bilateral 07/25/2017    THORACIC FACET BLOCK T7-8, T8-9, T11-12    OTHER SURGICAL HISTORY  05/02/2017    LESI L5    OTHER SURGICAL HISTORY  09/12/2017    thoracic epidural T11    GA INJ,PARAVERTEBRAL L/S,1 LEVEL Bilateral 7/25/2017    T-FACET MBB T7-8, T8-9, T11-12 BILATERAL performed by Ady Sheffield MD at [address] [place] [address] DX/THER SBST EPIDURAL/SUBARACH LUMBAR/SACRAL N/A 9/12/2017    TESI T11 performed by Lisette Noguera MD at [address] [facility] [address]  2010    [address] EXTRACTION  02/2019    UPPER GASTROINTESTINAL ENDOSCOPY  2010     Portions of this note were completed with a voice recording program.  Efforts were made to edit the dictations but occasionally words are mis-transcribed. Review of Systems   Constitutional: Negative for activity change and appetite change. HENT: Negative. Negative for congestion. Respiratory: Negative. Negative for cough, shortness of breath and wheezing. Cardiovascular: Negative. Negative for chest pain, palpitations and leg swelling. Gastrointestinal: Negative. Endocrine: Negative for polydipsia, polyphagia and polyuria. Genitourinary: Negative. Musculoskeletal: Positive for arthralgias. Allergic/Immunologic: Negative for environmental allergies. Neurological: Negative. Negative for dizziness, facial asymmetry and headaches. All other systems reviewed and are negative. Objective:   Physical Exam  Vitals signs and nursing note reviewed. HENT:      Head: Normocephalic. Right Ear: Ear canal normal.      Left Ear: Tympanic membrane and external ear normal.      Nose: Nose normal.      Mouth/Throat:      Mouth: Mucous membranes are moist.      Pharynx: Oropharynx is clear. Eyes:      Conjunctiva/sclera: Conjunctivae normal.      Pupils: Pupils are equal, round, and reactive to light. Neck:      Vascular: No carotid bruit. Cardiovascular:      Rate and Rhythm: Normal rate and regular rhythm. Pulses: Normal pulses. Heart sounds: Normal heart sounds. No murmur. No gallop. Pulmonary:      Effort: Pulmonary effort is normal.      Breath sounds: Normal breath sounds. Abdominal:      General: Abdomen is flat. Bowel sounds are normal.   Genitourinary:      Musculoskeletal:      Right lower leg: No edema. Left lower leg: No edema.    Skin:     General: Skin is warm and gabapentin (NEURONTIN) 300 MG capsule TAKE 1 CAPSULE BY MOUTH THREE TIMES A  capsule 3    HYDROcodone-acetaminophen (NORCO)  MG per tablet Take 1 tablet by mouth every 6 hours as needed for Pain for up to 30 days. 120 tablet 0    indomethacin (INDOCIN) 25 MG capsule Take 1 capsule by mouth 3 times daily (with meals) Take 1 tablet daily for 10 days. 21 capsule 0    levothyroxine (SYNTHROID) 75 MCG tablet Take 1 tablet by mouth Daily 90 tablet 3    metoprolol succinate (TOPROL XL) 25 MG extended release tablet Take 1 tablet by mouth daily 90 tablet 3    pantoprazole (PROTONIX) 40 MG tablet Take 1 tablet by mouth daily 90 tablet 3    ALPRAZolam (XANAX) 0.5 MG tablet TAKE 1 TABLET BY MOUTH THREE TIMES A DAY AS NEEDED FOR ANXIETY OR SLEEP 90 tablet 1    atorvastatin (LIPITOR) 20 MG tablet TAKE 1 TABLET BY MOUTH ONE TIME A DAY AT NIGHT 90 tablet 10    clobetasol (TEMOVATE) 0.05 % cream APPLY TOPICALLY TO RIGHT INDEX FINGER 3 TIMES DAILY  2    Cholecalciferol (VITAMIN D3) 1000 units TABS Take 2,000 Units by mouth Daily      V-R ASPIRIN  MG EC tablet TAKE 1 TABLET BY MOUTH ONE TIME A DAY  30 tablet 10     MG capsule TAKE 1 CAPSULE BY MOUTH ONE TIME A DAY AS NEEDED for constipation 30 capsule 10    IRON PO Take 65 mg by mouth daily       Multiple Vitamin (MULTIVITAMIN PO) Take 1 tablet by mouth daily       sulfamethoxazole-trimethoprim (BACTRIM DS;SEPTRA DS) 800-160 MG per tablet Take 1 tablet by mouth 2 times daily       No current facility-administered medications for this visit. Continue present medications. Continue present level of physical activity as tolerated. Mitali Mcgee received counseling on the following healthy behaviors: nutrition    Patient given educational materials on Hypertension    I have instructed Mitali Many to complete a self tracking handout on Blood Pressures  and instructed them to bring it with them to his next appointment.      Discussed use, benefit, and side effects of prescribed medications. Barriers to medication compliance addressed. All patient questions answered. Pt voiced understanding.            Susan Marino MD

## 2020-10-08 NOTE — PATIENT INSTRUCTIONS
You may receive a survey regarding the care you received during your visit. Your input is valuable to us. We encourage you to complete and return your survey. We hope you will choose us in the future for your healthcare needs. Continue present medications. Continue present level of physical activity as tolerated. Tobacco Cessation Programs     Telephonic behavior modification  [employer] ([phone])   Counseling service for those who are ready to quit using tobacco.     Available for uninsured [place] residents, [place] recipients, pregnant women, or patients whose health plans or employers are members of the [address] [place] [address] behavior modification   http://[place]. Quitlogix. org   Online support program to help patients through each step of the quitting process. Available 24 hours a day 7 days a week. Provides up to date researched based tool, step-by-step guides, and motivational messages. Online behavior modification   www.lungusa.org/stop-smoking/how-to-quit   HelpLine: [facility]-LUNG[facility] (409-0506)   Email questions to:  Soo@Spotie. [employer]    Website offers resources to help tobacco users figure out their reasons for quitting and then take the big step of quitting for good. Hypnosis   Location: [facility] [place], [place]   Contact: Alen Bey, PhD at [phone]   Hypnosis for tobacco cessation   Cost $225 for the initial session and $175 for each session afterwards. Most patients require 6-8 sessions. There is the option to submit through the patients insurance. Hypnosis and behavior modification   Location: [place] [address],  [address] [facility] [place] [facility] [place]   Contact: Thierry Villasenor, PhD at [phone]  [facility] and hypnosis for nicotine addition   Cost: For uninsured patients:  Please call above phone number  Cost for insured patients depends on patients insurance plan.     Behavior modification   Location: [facility], [address] [place], [place]   Contact: Mary Lou 80 include four one-on-one appointments between the patient and a respiratory therapist.  The four appointments span over three weeks. The respiratory therapist schedules one of the appointments to occur 48 hours after the patients quit date.  Cost $100 total for the four sessions.   Tobacco cessation products are not included in the cost and are not provided by [facility].

## 2020-10-08 NOTE — PROGRESS NOTES
Chronic Disease Visit Information    BP Readings from Last 3 Encounters:   10/08/20 120/76   08/20/20 138/70   07/30/20 126/78          Hemoglobin A1C (%)   Date Value   07/09/2020 5.8   02/07/2019 5.6   03/23/2017 5.5     LDL Calculated (mg/dL)   Date Value   07/09/2020 68     HDL (mg/dL)   Date Value   07/09/2020 41     BUN (mg/dL)   Date Value   07/09/2020 15     CREATININE (mg/dL)   Date Value   07/09/2020 0.7     Glucose (mg/dL)   Date Value   03/11/2019 108            Have you changed or started any medications since your last visit including any over-the-counter medicines, vitamins, or herbal medicines? no   Are you having any side effects from any of your medications? -  no  Have you stopped taking any of your medications? Is so, why? -  no    Have you seen any other physician or provider since your last visit? Yes - Records Requested  Have you had any other diagnostic tests since your last visit? No  Have you been seen in the emergency room and/or had an admission to a hospital since we last saw you? No  Have you had your annual diabetic retinal (eye) exam? No  Have you had your routine dental cleaning in the past 6 months? no    Have you activated your [employer] account? If not, what are your barriers?  No:      Patient Care Team:  David Hunter MD as PCP - General (Family Medicine)  David Hunter MD as PCP - [facility]  Lisa Mcdaniel MD as Surgeon (Cardiothoracic Surgery)  Capri Raymond MD as Physician (Gastroenterology)  Danika Joshi MD as Consulting Physician (Cardiology)  Angelica Leonard MD (Dermatology)  Imtiaz De La O DPM as Consulting Physician (Podiatry)         Medical History Review  Past Medical, Family, and Social History reviewed and does contribute to the patient presenting condition    Health Maintenance   Topic Date Due    DTaP/Tdap/Td vaccine (1 - Tdap) 04/02/1982    Shingles Vaccine (1 of 2) 04/02/2013    Pneumococcal 0-64 years Vaccine (2 of 3 - PPSV23) 08/10/2017    Flu vaccine (1) 09/01/2020    A1C test (Diabetic or Prediabetic)  07/09/2021    Lipid screen  07/09/2021    TSH testing  07/09/2021    Colon cancer screen colonoscopy  03/27/2026    Hepatitis C screen  Completed    HIV screen  Completed    Hepatitis A vaccine  Aged Out    Hepatitis B vaccine  Aged Out    Hib vaccine  Aged Out    Meningococcal (ACWY) vaccine  Aged Out       After obtaining consent, and per orders of Dr. Kirby Signs, injection of Afluria 0.5 ML given IM in Left deltoid by Morris Davey ([facility]). Patient instructed to  report any adverse reaction to me immediately. Patient tolerated well. VIS given.     Immunizations Administered     Name Date Dose Route    Influenza, Quadv, IM, PF (6 mo and older Fluzone, Flulaval, Fluarix, and 3 yrs and older Afluria) 10/8/2020 0.5 mL Intramuscular    Site: Deltoid- Left    Lot: [id number]    NDC: 06349-948-44

## 2020-10-27 ENCOUNTER — APPOINTMENT (OUTPATIENT)
Dept: GENERAL RADIOLOGY | Age: 55
End: 2020-10-27
Payer: COMMERCIAL

## 2020-10-27 ENCOUNTER — HOSPITAL ENCOUNTER (EMERGENCY)
Age: 55
Discharge: HOME | End: 2020-10-27
Attending: EMERGENCY MEDICINE
Payer: COMMERCIAL

## 2020-10-27 VITALS
RESPIRATION RATE: 12 BRPM | OXYGEN SATURATION: 98 % | HEIGHT: 67 IN | TEMPERATURE: 97 F | WEIGHT: 180 LBS | DIASTOLIC BLOOD PRESSURE: 80 MMHG | SYSTOLIC BLOOD PRESSURE: 160 MMHG | HEART RATE: 75 BPM | BODY MASS INDEX: 28.25 KG/M2

## 2020-10-27 PROCEDURE — 73630 X-RAY EXAM OF FOOT: CPT

## 2020-10-27 PROCEDURE — 99282 EMERGENCY DEPT VISIT SF MDM: CPT

## 2020-10-27 ASSESSMENT — ENCOUNTER SYMPTOMS
EYE PAIN: 0
EYE ITCHING: 0
BLOOD IN STOOL: 0
CHEST TIGHTNESS: 0
EYE REDNESS: 0
SINUS PRESSURE: 0
EYE DISCHARGE: 0
ABDOMINAL DISTENTION: 0
TROUBLE SWALLOWING: 0
CONSTIPATION: 0
NAUSEA: 0
WHEEZING: 0
BACK PAIN: 0
ABDOMINAL PAIN: 0
RHINORRHEA: 0
PHOTOPHOBIA: 0
SORE THROAT: 0
CHOKING: 0
DIARRHEA: 0
COUGH: 0
SHORTNESS OF BREATH: 0
VOICE CHANGE: 0
VOMITING: 0

## 2020-10-27 ASSESSMENT — PAIN DESCRIPTION - ORIENTATION: ORIENTATION: LEFT

## 2020-10-27 ASSESSMENT — PAIN DESCRIPTION - PAIN TYPE: TYPE: ACUTE PAIN

## 2020-10-27 ASSESSMENT — PAIN SCALES - GENERAL: PAINLEVEL_OUTOF10: 5

## 2020-10-27 ASSESSMENT — PAIN DESCRIPTION - LOCATION: LOCATION: FOOT

## 2020-10-27 ASSESSMENT — PAIN DESCRIPTION - DESCRIPTORS: DESCRIPTORS: ACHING

## 2020-10-28 NOTE — ED PROVIDER NOTES
dizziness, tremors, seizures, syncope, facial asymmetry, weakness, light-headedness, numbness and headaches. Hematological: Negative for adenopathy. Does not bruise/bleed easily. Psychiatric/Behavioral: Negative for agitation, hallucinations and suicidal ideas. The patient is not nervous/anxious. PAST MEDICAL HISTORY    has a past medical history of Arthritis, Bilateral sciatica, Bleeding, CAD (coronary artery disease), Carpal tunnel syndrome, Cervicalgia, C5-7, Chronic anxiety, Dysthymia (or depressive neurosis), ED (erectile dysfunction), ED (erectile dysfunction), GERD (gastroesophageal reflux disease), Headache(784.0), History of blood transfusion, HTN (hypertension), Hyperlipidemia, Hypothyroidism, Iron deficiency anemia, blood loss, Lumbago, Lumbar radiculopathy, Osteoarthritis (arthritis due to wear and tear of joints), S/P AVR, coumadin Tx, and Spondylosis of thoracic region without myelopathy or radiculopathy. SURGICAL HISTORY      has a past surgical history that includes cardiovascular stress test (4 15 2010); Appendectomy (1980); Colonoscopy (2010); Small intestine surgery (2010); Upper gastrointestinal endoscopy (2010); Aortic valve replacement (2007); Endoscopy, colon, diagnostic; cervical fusion (2010); cervical fusion (03/09/2016); Cardiac catheterization (5 20 2010); Cardiac catheterization (2012, 6/9/16); Dilatation, esophagus; Aortic valve replacement (07/20/2016); Coronary artery bypass graft; other surgical history (05/02/2017); Nerve Surgery (Bilateral, 07/25/2017); pr inj,paravertebral l/s,1 level (Bilateral, 7/25/2017); other surgical history (09/12/2017); pr njx dx/ther sbst epidural/subarach lumbar/sacral (N/A, 9/12/2017); and Tooth Extraction (02/2019).     CURRENT MEDICATIONS       Previous Medications    ALPRAZOLAM (XANAX) 0.5 MG TABLET    TAKE 1 TABLET BY MOUTH THREE TIMES A DAY AS NEEDED FOR ANXIETY OR SLEEP    ATORVASTATIN (LIPITOR) 20 MG TABLET    TAKE 1 TABLET BY smoking about 42 years ago. He has a 6.25 pack-year smoking history. He has never used smokeless tobacco. He reports current alcohol use. He reports that he does not use drugs. PHYSICAL EXAM     INITIAL VITALS:  height is 5' 7\" (1.702 m) and weight is 180 lb (81.6 kg). His oral temperature is 97 °F (36.1 °C). His blood pressure is 160/80 (abnormal) and his pulse is 75. His respiration is 12 and oxygen saturation is 98%. Physical Exam  Vitals signs and nursing note reviewed. Constitutional:       General: He is not in acute distress. Appearance: He is well-developed. He is not diaphoretic. HENT:      Head: Normocephalic and atraumatic. Right Ear: External ear normal.      Left Ear: External ear normal.      Nose: Nose normal.   Eyes:      General: Lids are normal. No scleral icterus. Right eye: No discharge. Left eye: No discharge. Conjunctiva/sclera: Conjunctivae normal.      Right eye: No exudate. Left eye: No exudate. Pupils: Pupils are equal, round, and reactive to light. Neck:      Musculoskeletal: Normal range of motion and neck supple. Normal range of motion. Thyroid: No thyromegaly. Vascular: No JVD. Trachea: No tracheal deviation. Cardiovascular:      Rate and Rhythm: Normal rate and regular rhythm. Pulses: Normal pulses. Dorsalis pedis pulses are 2+ on the right side and 2+ on the left side. Posterior tibial pulses are 2+ on the right side and 2+ on the left side. Heart sounds: Normal heart sounds, S1 normal and S2 normal. No murmur. No friction rub. No gallop. Pulmonary:      Effort: Pulmonary effort is normal. No respiratory distress. Breath sounds: Normal breath sounds. No stridor. No wheezing or rales. Chest:      Chest wall: No tenderness. Abdominal:      General: Bowel sounds are normal. There is no distension. Palpations: Abdomen is soft. There is no mass. Tenderness:  There is no have pain medication at home. He sees Dr. Chun Ortega for podiatry. There is no fractures of the toe. He does have a significant blood blister on the dorsal aspect of his fourth digit of his left foot. He is instructed not to unroofed this. He is instructed to follow-up with podiatry. Patient understood and agreed with the plan. Patient is placed in a postop shoe given several days off work instructed to use his pain medication at home. Patient is subsequently discharged home in good condition. Patient has a bruise on his fourth digit of his left foot. He has been placed in a postop shoe he is instructed to wear this for comfort. He is instructed to follow-up with podiatry. He is instructed not to unroofed the blood blister until cleared by podiatry. He is instructed to use this at home pain medication as prescribed. He is instructed return to the nearest emergency room immediately for any new or worsening complaints. CRITICAL CARE:   None    CONSULTS:  None    PROCEDURES:  None    FINAL IMPRESSION      1.  Contusion of foot including toes, left, initial encounter          DISPOSITION/PLAN   Discharge    PATIENT REFERRED TO:  Charbel De Jesus [facility], [address]  [phone]    Call in 1 day      [place] [phone]    Call in 1 day        DISCHARGE MEDICATIONS:  New Prescriptions    No medications on file       (Please note that portions of this note were completed with a voice recognition program.  Efforts were made to edit the dictations but occasionally words are mis-transcribed.)    Armaan Buchanan, [address] [facility],   10/28/20 [id number]

## 2020-10-29 ENCOUNTER — TELEPHONE (OUTPATIENT)
Dept: FAMILY MEDICINE CLINIC | Age: 55
End: 2020-10-29

## 2020-10-29 NOTE — LETTER
October 29, 2020    [address] [place] [address]    Dear Emily Myers,    This letter is regarding your Emergency Department (ED) visit at [place] [address] on 10/27/20. Dr.Jay Theo Walker wanted to make sure that you understand your discharge instructions and that you were able to fill any prescriptions that may have been ordered for you. Please contact the office at the above phone number if the ED advised you to follow up with Dr.Jay Theo Walker, or if you have any further questions or needs. Also did you know -   *Visiting the ED for a non-emergency could result in higher co-pays than you would normally be subject to paying? [facility]) practices can often offer you an appointment on the same day that you call for acute issues. *We have some [facility] offices that offer Walk-in appointments; check our website for availability in your community, [email or web address]. [email or web address].      *Evisits are now available for patients for through [facility]. If you do not have MyChart and are interested, please contact the office and a staff member may assist you or go to [email or web address].     Sincerely,   Aniceto Blank MD and your [facility]

## 2020-10-30 ENCOUNTER — TELEPHONE (OUTPATIENT)
Dept: FAMILY MEDICINE CLINIC | Age: 55
End: 2020-10-30

## 2020-10-30 ENCOUNTER — TELEPHONE (OUTPATIENT)
Dept: ADMINISTRATIVE | Age: 55
End: 2020-10-30

## 2020-10-30 RX ORDER — ESOMEPRAZOLE MAGNESIUM 40 MG/1
40 CAPSULE, DELAYED RELEASE ORAL
Qty: 90 CAPSULE | Refills: 3 | Status: SHIPPED | OUTPATIENT
Start: 2020-10-30 | End: 2021-02-25 | Stop reason: ALTCHOICE

## 2020-10-30 NOTE — TELEPHONE ENCOUNTER
Patient said his medication esomeprazole 40 mg tab was cancelled, he have refills until 2021, patient went to pharmacy to pick medication up and they told him it was cancelled, patient would like to know why please advise.

## 2020-10-30 NOTE — TELEPHONE ENCOUNTER
Patient calling to get a refill on esomeprazole (NEXIUM) 40 MG delayed release capsule. I ask the patient if someone from the office spoke to him today about medication, he stated yes, but he called Dr Mark Pat office and they stated the medication he was on was temporary and he can return back to the Nexium.  For questions call patient at [facility] [address] [place], [address] [facility] [phone]

## 2020-10-30 NOTE — TELEPHONE ENCOUNTER
That was apparently changed by his cardiologist, Dr. Dr. John Cook.   He is listed as having changed it back in August.

## 2020-11-17 ENCOUNTER — TELEPHONE (OUTPATIENT)
Dept: FAMILY MEDICINE CLINIC | Age: 55
End: 2020-11-17

## 2020-11-17 RX ORDER — HYDROCODONE BITARTRATE AND ACETAMINOPHEN 10; 325 MG/1; MG/1
1 TABLET ORAL EVERY 6 HOURS PRN
Qty: 120 TABLET | Refills: 0 | Status: SHIPPED | OUTPATIENT
Start: 2020-11-17 | End: 2020-12-17

## 2020-11-17 NOTE — TELEPHONE ENCOUNTER
Pt is calling office again requesting this refill to [facility]. If no call back, he will check with the pharmacy after 1pm. Refill if appropriate.

## 2020-12-16 RX ORDER — HYDROCODONE BITARTRATE AND ACETAMINOPHEN 10; 325 MG/1; MG/1
1 TABLET ORAL EVERY 6 HOURS PRN
Qty: 120 TABLET | Refills: 0 | Status: SHIPPED | OUTPATIENT
Start: 2020-12-16 | End: 2021-01-14 | Stop reason: SDUPTHER

## 2020-12-17 ENCOUNTER — HOSPITAL ENCOUNTER (OUTPATIENT)
Age: 55
Discharge: HOME | End: 2020-12-17
Payer: COMMERCIAL

## 2020-12-17 LAB
ALBUMIN SERPL-MCNC: 4.1 G/DL (ref 3.5–5.1)
ALP BLD-CCNC: 71 U/L (ref 38–126)
ALT SERPL-CCNC: 7 U/L (ref 11–66)
ANION GAP SERPL CALCULATED.3IONS-SCNC: 10 MEQ/L (ref 8–16)
AST SERPL-CCNC: 12 U/L (ref 5–40)
BILIRUB SERPL-MCNC: 0.2 MG/DL (ref 0.3–1.2)
BUN BLDV-MCNC: 15 MG/DL (ref 7–22)
CALCIUM SERPL-MCNC: 9.2 MG/DL (ref 8.5–10.5)
CHLORIDE BLD-SCNC: 105 MEQ/L (ref 98–111)
CO2: 26 MEQ/L (ref 23–33)
CREAT SERPL-MCNC: 0.7 MG/DL (ref 0.4–1.2)
ERYTHROCYTE [DISTWIDTH] IN BLOOD BY AUTOMATED COUNT: 15.1 % (ref 11.5–14.5)
ERYTHROCYTE [DISTWIDTH] IN BLOOD BY AUTOMATED COUNT: 50.4 FL (ref 35–45)
GFR SERPL CREATININE-BSD FRML MDRD: > 90 ML/MIN/1.73M2
GLUCOSE BLD-MCNC: 121 MG/DL (ref 70–108)
HCT VFR BLD CALC: 42.7 % (ref 42–52)
HEMOGLOBIN: 13.9 GM/DL (ref 14–18)
MCH RBC QN AUTO: 29.8 PG (ref 26–33)
MCHC RBC AUTO-ENTMCNC: 32.6 GM/DL (ref 32.2–35.5)
MCV RBC AUTO: 91.4 FL (ref 80–94)
PLATELET # BLD: 231 THOU/MM3 (ref 130–400)
PMV BLD AUTO: 10.8 FL (ref 9.4–12.4)
POTASSIUM SERPL-SCNC: 4.1 MEQ/L (ref 3.5–5.2)
RBC # BLD: 4.67 MILL/MM3 (ref 4.7–6.1)
SODIUM BLD-SCNC: 141 MEQ/L (ref 135–145)
TOTAL PROTEIN: 6.8 G/DL (ref 6.1–8)
TSH SERPL DL<=0.05 MIU/L-ACNC: 1.82 UIU/ML (ref 0.4–4.2)
WBC # BLD: 11.2 THOU/MM3 (ref 4.8–10.8)

## 2020-12-17 PROCEDURE — 36415 COLL VENOUS BLD VENIPUNCTURE: CPT

## 2020-12-17 PROCEDURE — 80053 COMPREHEN METABOLIC PANEL: CPT

## 2020-12-17 PROCEDURE — 85027 COMPLETE CBC AUTOMATED: CPT

## 2020-12-17 PROCEDURE — 84443 ASSAY THYROID STIM HORMONE: CPT

## 2020-12-21 ENCOUNTER — OFFICE VISIT (OUTPATIENT)
Dept: FAMILY MEDICINE CLINIC | Age: 55
End: 2020-12-21
Payer: COMMERCIAL

## 2020-12-21 VITALS
SYSTOLIC BLOOD PRESSURE: 136 MMHG | WEIGHT: 185.2 LBS | RESPIRATION RATE: 20 BRPM | TEMPERATURE: 96.8 F | BODY MASS INDEX: 29.01 KG/M2 | DIASTOLIC BLOOD PRESSURE: 72 MMHG | HEART RATE: 88 BPM

## 2020-12-21 PROBLEM — C85.90 MALIGNANT LYMPHOMA, NON-HODGKIN'S (HCC): Status: ACTIVE | Noted: 2020-12-21

## 2020-12-21 PROCEDURE — 99214 OFFICE O/P EST MOD 30 MIN: CPT | Performed by: FAMILY MEDICINE

## 2020-12-21 ASSESSMENT — ENCOUNTER SYMPTOMS
RESPIRATORY NEGATIVE: 1
GASTROINTESTINAL NEGATIVE: 1

## 2020-12-21 NOTE — PROGRESS NOTES
Visit Information    Have you changed or started any medications since your last visit including any over-the-counter medicines, vitamins, or herbal medicines? no   Are you having any side effects from any of your medications? -  no  Have you stopped taking any of your medications? Is so, why? -  no    Have you seen any other physician or provider since your last visit? No  Have you had any other diagnostic tests since your last visit? Yes - Records Obtained  Have you been seen in the emergency room and/or had an admission to a hospital since we last saw you? Yes - Records Obtained  Have you had your routine dental cleaning in the past 6 months? no    Have you activated your [employer] account? If not, what are your barriers?  No: pt choice      Patient Care Team:  Crescencio Patel DO as PCP - General (Family Medicine)  Crescencio Patel DO as PCP - [facility] Provider  Michelle Flores MD as Surgeon (Cardiothoracic Surgery)  Koko Masterson MD as Physician (Gastroenterology)  Meghna Oliver MD as Consulting Physician (Cardiology)  Umm Hernadez MD (Dermatology)  Asim Weston DPM as Consulting Physician (Podiatry)    Medical History Review  Past Medical, Family, and Social History reviewed and does not contribute to the patient presenting condition    Health Maintenance   Topic Date Due    DTaP/Tdap/Td vaccine (1 - Tdap) 04/02/1982    Shingles Vaccine (1 of 2) 04/02/2013    Pneumococcal 0-64 years Vaccine (2 of 3 - PPSV23) 08/10/2017    A1C test (Diabetic or Prediabetic)  07/09/2021    Lipid screen  07/09/2021    TSH testing  12/17/2021    Colon cancer screen colonoscopy  03/27/2026    Flu vaccine  Completed    Hepatitis C screen  Completed    HIV screen  Completed    Hepatitis A vaccine  Aged Out    Hepatitis B vaccine  Aged Out    Hib vaccine  Aged Out    Meningococcal (ACWY) vaccine  Aged Out

## 2020-12-21 NOTE — PROGRESS NOTES
Subjective:      Patient ID: Alpa Herrera is a 60 y.o. male. HPI:    Chief Complaint   Patient presents with    Other     soress on abdomen and the back of right upper leg    Fatigue     Pt here for sores on his abdomen and his right leg for the last few weeks. Was seen by Dr. Marguerite Hare recently who gave him a steroid shot and cream.    Pt at that time also c/o chronic fatigue issues for which labs were drawn. Pt was told to follow up with me to review. Pt works at [employer] and works long hours which consists of physical labor. Pt is on multiple medications that can cause fatigue. He is on Toprol, BP looks good. BP Readings from Last 3 Encounters:   12/21/20 136/72   10/27/20 (!) 160/80   10/08/20 120/76     He is also on gabapentin, Xanax, and regular Norco.  Low T likely on chronic opiate therapy. Weight is stable. Wt Readings from Last 3 Encounters:   12/21/20 185 lb 3.2 oz (84 kg)   10/27/20 180 lb (81.6 kg)   10/08/20 185 lb 4.8 oz (84.1 kg)       Patient Active Problem List   Diagnosis    HTN (hypertension)    Lumbago    Lumbar radiculopathy    Cervicalgia, C5-7    Hyperlipidemia    Hypothyroidism    Chronic anxiety    ED (erectile dysfunction)    Bilateral sciatica    Trigger finger, right, ring     Medication monitoring encounter    Nocturnal leg cramps    GERD (gastroesophageal reflux disease)    IFG (impaired fasting glucose)    Situational depression    Dysthymia (or depressive neurosis)    Herniation of cervical intervertebral disc with radiculopathy    H/O cervical spine surgery, C4-5 fusion, 3/2/16. 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     Tobacco abuse    Lymphomatoid papulosis     Lumbar radiculitis    Lumbar spinal stenosis    Spondylosis of thoracic region without myelopathy or radiculopathy    Thoracic spinal stenosis    Chronic pain syndrome    Urinary hesitancy Past Surgical History:   Procedure Laterality Date    AORTIC VALVE REPLACEMENT  2007    [facility]    AORTIC VALVE REPLACEMENT  07/20/2016    extraction of mechanical valve and replacement with tissue valve    APPENDECTOMY  1980    CARDIAC CATHETERIZATION  5 20 2010    Patent coronary arteries. Possible causes of the patient's chest pain is likely noncardiac at least based on this angiogram. Patient chould be able to proceed w/ scheduled surgery w/ paying attention to anticoagulation and trying to minimize the period of no anticoagulation.  CARDIAC CATHETERIZATION  2012, 6/9/16    [facility]    CARDIOVASCULAR STRESS TEST  4 15 2010    Moderate fixed inferior defect, possibly attenuation, cannot exclude a previous MI. Correlation w/ EKG is recommented. Significant degree of gut uptake which is likely to be the cause of the attenuation artifact seen. EF 46%    CERVICAL FUSION  2010    CERVICAL FUSION  03/09/2016    REMOVAL HARDWARE C5-7, ACDF C4-6 WITH ATLANTIS CORNERSTONE    COLONOSCOPY  2010    CORONARY ARTERY BYPASS GRAFT      DILATATION, ESOPHAGUS      small resection    ENDOSCOPY, COLON, DIAGNOSTIC      NERVE SURGERY Bilateral 07/25/2017    THORACIC FACET BLOCK T7-8, T8-9, T11-12    OTHER SURGICAL HISTORY  05/02/2017    LESI L5    OTHER SURGICAL HISTORY  09/12/2017    thoracic epidural T11    CT INJ,PARAVERTEBRAL L/S,1 LEVEL Bilateral 7/25/2017    T-FACET MBB T7-8, T8-9, T11-12 BILATERAL performed by Carli Manuel MD at [facility] [address] LUMBAR/SACRAL N/A 9/12/2017    TESI T11 performed by Carli Manuel MD at [address] [facility] [address]  2010    [address] EXTRACTION  02/2019    UPPER GASTROINTESTINAL ENDOSCOPY  2010     Prior to Admission medications    Medication Sig Start Date End Date Taking?  Authorizing Provider HYDROcodone-acetaminophen (NORCO)  MG per tablet Take 1 tablet by mouth every 6 hours as needed for Pain for up to 30 days. 12/16/20 1/15/21 Yes David Hunetr MD   esomeprazole ([address]) 40 MG delayed release capsule Take 1 capsule by mouth every morning (before breakfast) 10/30/20  Yes David Hunter MD   buPROPion [facility]) 100 MG tablet Take 1 tablet by mouth 2 times daily 10/8/20  Yes David Hunter MD   gabapentin (NEURONTIN) 300 MG capsule TAKE 1 CAPSULE BY MOUTH THREE TIMES A DAY 10/8/20 4/8/21 Yes David Hunter MD   indomethacin (INDOCIN) 25 MG capsule Take 1 capsule by mouth 3 times daily (with meals) Take 1 tablet daily for 10 days.  10/8/20  Yes David Hunter MD   levothyroxine (SYNTHROID) 75 MCG tablet Take 1 tablet by mouth Daily 10/8/20  Yes David Hunter MD   metoprolol succinate (TOPROL XL) 25 MG extended release tablet Take 1 tablet by mouth daily 10/8/20  Yes David Hunter MD   ALPRAZolam Esther Pile) 0.5 MG tablet TAKE 1 TABLET BY MOUTH THREE TIMES A DAY AS NEEDED FOR ANXIETY OR SLEEP 10/5/20 4/3/21 Yes David Hunter MD   sulfamethoxazole-trimethoprim (BACTRIM DS;SEPTRA DS) 800-160 MG per tablet Take 1 tablet by mouth 2 times daily 7/23/20  Yes Historical Provider, MD   atorvastatin (LIPITOR) 20 MG tablet TAKE 1 TABLET BY MOUTH ONE TIME A DAY AT NIGHT 1/13/20  Yes David Hunter MD   clobetasol (TEMOVATE) 0.05 % cream APPLY TOPICALLY TO RIGHT INDEX FINGER 3 TIMES DAILY 6/15/18  Yes Historical Provider, MD   Cholecalciferol (VITAMIN D3) 1000 units TABS Take 2,000 Units by mouth Daily   Yes Historical Provider, MD   V-R ASPIRIN  MG EC tablet TAKE 1 TABLET BY MOUTH ONE TIME A DAY  8/13/18  Yes David Hunter MD    MG capsule TAKE 1 CAPSULE BY MOUTH ONE TIME A DAY AS NEEDED for constipation 3/28/18  Yes David Hunter MD   IRON PO Take 65 mg by mouth daily    Yes Historical Provider, MD   Multiple Vitamin (MULTIVITAMIN PO) Take 1 tablet by mouth daily    Yes Historical Provider, MD -  Ran through the list, polypharmacy likely cause of his fatigue  -  Hx of chronic opioid use, question low T  -  Follow up with specialists as scheduled  -  KKA next month, will discuss checking testosterone again at that time if still fighting fatigue          Lanre Gilbert, DO

## 2021-01-07 ENCOUNTER — OFFICE VISIT (OUTPATIENT)
Dept: FAMILY MEDICINE CLINIC | Age: 56
End: 2021-01-07
Payer: COMMERCIAL

## 2021-01-07 VITALS
DIASTOLIC BLOOD PRESSURE: 74 MMHG | HEART RATE: 72 BPM | SYSTOLIC BLOOD PRESSURE: 130 MMHG | TEMPERATURE: 96.9 F | BODY MASS INDEX: 29.32 KG/M2 | RESPIRATION RATE: 20 BRPM | WEIGHT: 187.2 LBS

## 2021-01-07 DIAGNOSIS — F34.1 DYSTHYMIA (OR DEPRESSIVE NEUROSIS): ICD-10-CM

## 2021-01-07 DIAGNOSIS — R53.82 CHRONIC FATIGUE: ICD-10-CM

## 2021-01-07 DIAGNOSIS — Z98.890 H/O CERVICAL SPINE SURGERY: ICD-10-CM

## 2021-01-07 DIAGNOSIS — Z95.2 S/P AVR (AORTIC VALVE REPLACEMENT): ICD-10-CM

## 2021-01-07 DIAGNOSIS — I10 ESSENTIAL HYPERTENSION: ICD-10-CM

## 2021-01-07 DIAGNOSIS — E03.9 ACQUIRED HYPOTHYROIDISM: ICD-10-CM

## 2021-01-07 DIAGNOSIS — R25.2 LEG CRAMPS: Primary | ICD-10-CM

## 2021-01-07 DIAGNOSIS — C86.6 LYMPHOMATOID PAPULOSIS (HCC): ICD-10-CM

## 2021-01-07 DIAGNOSIS — F41.9 CHRONIC ANXIETY: ICD-10-CM

## 2021-01-07 PROCEDURE — 99214 OFFICE O/P EST MOD 30 MIN: CPT | Performed by: FAMILY MEDICINE

## 2021-01-07 ASSESSMENT — ENCOUNTER SYMPTOMS
GASTROINTESTINAL NEGATIVE: 1
RESPIRATORY NEGATIVE: 1

## 2021-01-07 NOTE — PROGRESS NOTES
Visit Information    Have you changed or started any medications since your last visit including any over-the-counter medicines, vitamins, or herbal medicines? no   Are you having any side effects from any of your medications? -  no  Have you stopped taking any of your medications? Is so, why? -  yes - see updated med list    Have you seen any other physician or provider since your last visit? Yes - Records Requested  Have you had any other diagnostic tests since your last visit? No  Have you been seen in the emergency room and/or had an admission to a hospital since we last saw you? No  Have you had your routine dental cleaning in the past 6 months? no    Have you activated your [employer] account? If not, what are your barriers?  No: declined     Patient Care Team:  Joesph Mcneil DO as PCP - General (Family Medicine)  Joesph Mcneil DO as PCP - [facility] Provider  Lexi Collazo MD as Surgeon (Cardiothoracic Surgery)  Walter Mckeon MD as Physician (Gastroenterology)  Rishabh Clarke MD as Consulting Physician (Cardiology)  Josephine Corrales MD (Dermatology)  Hayley Caldwell DPM as Consulting Physician (Podiatry)    Medical History Review  Past Medical, Family, and Social History reviewed and does contribute to the patient presenting condition    Health Maintenance   Topic Date Due    DTaP/Tdap/Td vaccine (1 - Tdap) 04/02/1982    Shingles Vaccine (1 of 2) 04/02/2013    Pneumococcal 0-64 years Vaccine (2 of 3 - PPSV23) 08/10/2017    A1C test (Diabetic or Prediabetic)  07/09/2021    Lipid screen  07/09/2021    TSH testing  12/17/2021    Colon cancer screen colonoscopy  03/27/2026    Flu vaccine  Completed    Hepatitis C screen  Completed    HIV screen  Completed    Hepatitis A vaccine  Aged Out    Hepatitis B vaccine  Aged Out    Hib vaccine  Aged Out    Meningococcal (ACWY) vaccine  Aged Out

## 2021-01-07 NOTE — PROGRESS NOTES
2021    Jaswant Cevallos (:  1965) is a 60 y.o. male, here for a routine evaluation. Chief Complaint   Patient presents with    3 Month Follow-Up     IFG    Hypertension    Hyperlipidemia    Pain     Doing well overall. BPs and weights stable. BP Readings from Last 3 Encounters:   21 130/74   20 136/72   10/27/20 (!) 160/80     Wt Readings from Last 3 Encounters:   21 187 lb 3.2 oz (84.9 kg)   20 185 lb 3.2 oz (84 kg)   10/27/20 180 lb (81.6 kg)     Having some muscle cramps in his legs, has been eating bananas and drinks plenty of water. Back pain stable on his current medications. Tolerating the Norco, trying to limit use. Patient Active Problem List   Diagnosis    HTN (hypertension)    Lumbago    Lumbar radiculopathy    Cervicalgia, C5-7    Hyperlipidemia    Hypothyroidism    Chronic anxiety    ED (erectile dysfunction)    Bilateral sciatica    Trigger finger, right, ring     Medication monitoring encounter    Nocturnal leg cramps    GERD (gastroesophageal reflux disease)    IFG (impaired fasting glucose)    Situational depression    Dysthymia (or depressive neurosis)    Herniation of cervical intervertebral disc with radiculopathy    H/O cervical spine surgery, C4-5 fusion, 3/2/16. 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  Tobacco abuse    Lymphomatoid papulosis     Lumbar radiculitis    Lumbar spinal stenosis    Spondylosis of thoracic region without myelopathy or radiculopathy    Thoracic spinal stenosis    Chronic pain syndrome    Urinary hesitancy    Malignant lymphoma, non-Hodgkin's (HCC)       Review of Systems   Constitutional: Negative. HENT: Negative. Respiratory: Negative. Cardiovascular: Negative. Gastrointestinal: Negative. Musculoskeletal: Negative. Leg cramps   All other systems reviewed and are negative. Prior to Visit Medications    Medication Sig Taking? Authorizing Provider   HYDROcodone-acetaminophen (NORCO)  MG per tablet Take 1 tablet by mouth every 6 hours as needed for Pain for up to 30 days. Yes Navneet Le MD   esomeprazole (NEXIUM) 40 MG delayed release capsule Take 1 capsule by mouth every morning (before breakfast) Yes Navneet Le MD   buPROPion [facility]) 100 MG tablet Take 1 tablet by mouth 2 times daily Yes Navneet Le MD   gabapentin (NEURONTIN) 300 MG capsule TAKE 1 CAPSULE BY MOUTH THREE TIMES A DAY Yes Navneet Le MD   indomethacin (INDOCIN) 25 MG capsule Take 1 capsule by mouth 3 times daily (with meals) Take 1 tablet daily for 10 days.  Yes Navneet Le MD   levothyroxine (SYNTHROID) 75 MCG tablet Take 1 tablet by mouth Daily Yes Navneet Le MD   metoprolol succinate (TOPROL XL) 25 MG extended release tablet Take 1 tablet by mouth daily Yes Navneet Le MD   ALPRAZolam Ghanshyam Gal) 0.5 MG tablet TAKE 1 TABLET BY MOUTH THREE TIMES A DAY AS NEEDED FOR ANXIETY OR SLEEP Yes Navneet Le MD   atorvastatin (LIPITOR) 20 MG tablet TAKE 1 TABLET BY MOUTH ONE TIME A DAY AT NIGHT Yes Navneet Le MD   clobetasol (TEMOVATE) 0.05 % cream APPLY TOPICALLY TO RIGHT INDEX FINGER 3 TIMES DAILY Yes Historical Provider, MD   Cholecalciferol (VITAMIN D3) 1000 units TABS Take 2,000 Units by mouth Daily Yes Historical Provider, MD   V-R ASPIRIN  MG EC tablet TAKE 1 TABLET BY MOUTH ONE TIME A DAY  Yes Navneet Le MD    MG capsule TAKE 1 CAPSULE BY MOUTH ONE TIME A DAY AS NEEDED for constipation Yes Navneet Le MD   IRON PO Take 65 mg by mouth daily  Yes Historical Provider, MD   Multiple Vitamin (MULTIVITAMIN PO) Take 1 tablet by mouth daily  Yes Historical Provider, MD        No Known Allergies    Past Medical History:   Diagnosis Date    Arthritis     general    Bilateral sciatica     Bleeding     with coumadin    CAD (coronary artery disease)     Carpal tunnel syndrome RIGHT    Cervicalgia, C5-7     Chronic anxiety     Dysthymia (or depressive neurosis) 2/1/2016    ED (erectile dysfunction)     ED (erectile dysfunction)     GERD (gastroesophageal reflux disease)     Headache(784.0)     History of blood transfusion 03/2016    HTN (hypertension)     Hyperlipidemia     Hypothyroidism     Iron deficiency anemia, blood loss     Lumbago     Lumbar radiculopathy     Osteoarthritis (arthritis due to wear and tear of joints)     S/P AVR, coumadin Tx     Spondylosis of thoracic region without myelopathy or radiculopathy        Past Surgical History:   Procedure Laterality Date    AORTIC VALVE REPLACEMENT  2007    [facility]    AORTIC VALVE REPLACEMENT  07/20/2016    extraction of mechanical valve and replacement with tissue valve    APPENDECTOMY  1980    CARDIAC CATHETERIZATION  5 20 2010    Patent coronary arteries. Possible causes of the patient's chest pain is likely noncardiac at least based on this angiogram. Patient chould be able to proceed w/ scheduled surgery w/ paying attention to anticoagulation and trying to minimize the period of no anticoagulation.  CARDIAC CATHETERIZATION  2012, 6/9/16    [facility]    CARDIOVASCULAR STRESS TEST  4 15 2010    Moderate fixed inferior defect, possibly attenuation, cannot exclude a previous MI. Correlation w/ EKG is recommented. Significant degree of gut uptake which is likely to be the cause of the attenuation artifact seen.  EF 46%    CERVICAL FUSION  2010    CERVICAL FUSION  03/09/2016    REMOVAL HARDWARE C5-7, ACDF C4-6 WITH ATLANTIS CORNERSTONE    COLONOSCOPY  2010    CORONARY ARTERY BYPASS GRAFT      DILATATION, ESOPHAGUS      small resection    ENDOSCOPY, COLON, DIAGNOSTIC      NERVE SURGERY Bilateral 07/25/2017    THORACIC FACET BLOCK T7-8, T8-9, T11-12    OTHER SURGICAL HISTORY  05/02/2017    LESI L5    OTHER SURGICAL HISTORY  09/12/2017    thoracic epidural T11  CO INJ,PARAVERTEBRAL L/S,1 LEVEL Bilateral 7/25/2017    T-FACET MBB T7-8, T8-9, T11-12 BILATERAL performed by Omar Thompson MD at [address] [facility] DX/THER SBST EPIDURAL/SUBARACH LUMBAR/SACRAL N/A 9/12/2017    TESI T11 performed by Omar Thompson MD at [address] [facility] [address]  2010    [address] EXTRACTION  02/2019    UPPER GASTROINTESTINAL ENDOSCOPY  2010       Social History     Socioeconomic History    Marital status:      Spouse name: Not on file    Number of children: 2    Years of education: 15    Highest education level: High school graduate   Occupational History    Not on file   Social Needs    Financial resource strain: Somewhat hard    Food insecurity     Worry: Sometimes true     Inability: Sometimes true    Transportation needs     Medical: No     Non-medical: No   Tobacco Use    Smoking status: Current Every Day Smoker     Packs/day: 0.25     Years: 25.00     Pack years: 6.25     Types: Cigarettes, Cigars     Start date: 6/9/1978    Smokeless tobacco: Never Used   Substance and Sexual Activity    Alcohol use:  Yes     Alcohol/week: 0.0 standard drinks     Comment: not for 3 mo    Drug use: No    Sexual activity: Never   Lifestyle    Physical activity     Days per week: Not on file     Minutes per session: Not on file    Stress: Not on file   Relationships    Social connections     Talks on phone: Not on file     Gets together: Not on file     Attends [employer] service: Not on file     Active member of club or organization: Not on file     Attends meetings of clubs or organizations: Not on file     Relationship status: Not on file    Intimate partner violence     Fear of current or ex partner: Not on file     Emotionally abused: Not on file     Physically abused: Not on file     Forced sexual activity: Not on file   Other Topics Concern    Not on file   Social History Narrative    Not on file Family History   Problem Relation Age of Onset    Cancer Mother     Diabetes Father     Cancer Father     Heart Disease Father 28        CABG    High Blood Pressure Father     Diabetes Sister         AS A CHILD    Kidney Disease Sister     High Blood Pressure Sister     Cancer Brother     COPD Sister     Heart Disease Sister     Stroke Neg Hx        ADVANCE DIRECTIVE: N, <no information>    Vitals:    01/07/21 0819   BP: 130/74   Site: Left Upper Arm   Position: Sitting   Cuff Size: Medium Adult   Pulse: 72   Resp: 20   Temp: 96.9 °F (36.1 °C)   TempSrc: Temporal   Weight: 187 lb 3.2 oz (84.9 kg)     Estimated body mass index is 29.32 kg/m² as calculated from the following:    Height as of 10/27/20: 5' 7\" (1.702 m). Weight as of this encounter: 187 lb 3.2 oz (84.9 kg). Physical Exam  Vitals signs and nursing note reviewed. Constitutional:       General: He is not in acute distress. Appearance: Normal appearance. He is well-developed. HENT:      Head: Normocephalic and atraumatic. Right Ear: Tympanic membrane normal.      Left Ear: Tympanic membrane normal.   Eyes:      Conjunctiva/sclera: Conjunctivae normal.   Neck:      Musculoskeletal: Neck supple. Cardiovascular:      Rate and Rhythm: Normal rate and regular rhythm. Heart sounds: Normal heart sounds. No murmur. Pulmonary:      Effort: Pulmonary effort is normal.      Breath sounds: Normal breath sounds. No wheezing, rhonchi or rales. Abdominal:      General: There is no distension. Skin:     General: Skin is warm and dry. Findings: No rash (on exposed surfaces). Neurological:      General: No focal deficit present. Mental Status: He is alert. Psychiatric:         Attention and Perception: Attention normal.         Mood and Affect: Mood normal.         Speech: Speech normal.         Behavior: Behavior normal. Behavior is cooperative. Thought Content:  Thought content normal. Judgment: Judgment normal.         No flowsheet data found. Lab Results   Component Value Date    CHOL 88 03/11/2019    CHOL 113 12/12/2017    CHOL 101 09/28/2017    CHOLFAST 119 07/09/2020    TRIG 27 03/11/2019    TRIG 95 12/12/2017    TRIG 63 09/28/2017    TRIGLYCFAST 49 07/09/2020    HDL 41 07/09/2020    HDL 36 03/11/2019    HDL 36 12/12/2017    LDLCALC 68 07/09/2020    LDLCALC 47 03/11/2019    LDLCALC 58 12/12/2017    GLUF 97 07/09/2020    GLUCOSE 121 12/17/2020    LABA1C 5.8 07/09/2020    LABA1C 5.6 02/07/2019    LABA1C 5.5 03/23/2017       The ASCVD Risk score (Nic Nixon., et al., 2013) failed to calculate for the following reasons: The valid total cholesterol range is 130 to 320 mg/dL    Immunization History   Administered Date(s) Administered    Influenza Virus Vaccine 11/04/2015    Influenza, Francisco Monge, 6-35 Months, IM (Fluzone,Afluria) 10/05/2016    Influenza, Francisco Monge, IM, (6 mo and older Fluzone, Flulaval, Fluarix and 3 yrs and older Afluria) 10/11/2017, 11/08/2018    Influenza, Quadv, IM, PF (6 mo and older Fluzone, Flulaval, Fluarix, and 3 yrs and older Afluria) 10/14/2019, 10/08/2020    Pneumococcal Conjugate 13-valent (Clementeen Fabry) 06/15/2017       Health Maintenance   Topic Date Due    DTaP/Tdap/Td vaccine (1 - Tdap) 04/02/1982    Shingles Vaccine (1 of 2) 04/02/2013    Pneumococcal 0-64 years Vaccine (2 of 3 - PPSV23) 08/10/2017    A1C test (Diabetic or Prediabetic)  07/09/2021    Lipid screen  07/09/2021    TSH testing  12/17/2021    Colon cancer screen colonoscopy  03/27/2026    Flu vaccine  Completed    Hepatitis C screen  Completed    HIV screen  Completed    Hepatitis A vaccine  Aged Out    Hepatitis B vaccine  Aged Out    Hib vaccine  Aged Out    Meningococcal (ACWY) vaccine  Aged Out       ASSESSMENT/PLAN:  1. Leg cramps  2. Lymphomatoid papulosis ([address]  3. Chronic fatigue  4. Essential hypertension  5. Acquired hypothyroidism  6.  Dysthymia (or depressive neurosis) 7. H/O cervical spine surgery, C4-5 fusion, 3/2/16. 8. Chronic anxiety  9. S/P AVR (aortic valve replacement), 7/20/16.    -  Chronic medical problems stable  -  Continue current medications  -  Follow up with specialists as scheduled  -  Labs reviewed, look fine  -  Start OTC magnesium for #1    Return in about 3 months (around 4/7/2021) for HTN. An electronic signature was used to authenticate this note.     --Klever Camp,  on 1/7/2021 at 8:33 AM

## 2021-01-07 NOTE — PATIENT INSTRUCTIONS
You may receive a survey regarding the care you received during your visit. Your input is valuable to us. We encourage you to complete and return your survey. We hope you will choose us in the future for your healthcare needs. Tobacco Cessation Programs     Telephonic behavior modification  ? 1-800-QUIT-NOW (532-0987)  ? Counseling service for those who are ready to quit using tobacco.    ? Available for uninsured [place] residents, [place] recipients, pregnant women, or patients whose health plans or employers are members of the [address] [place] [address] behavior modification  ? http://[place]. Quitlogix. org  ? Online support program to help patients through each step of the quitting process. Available 24 hours a day 7 days a week. Provides up to date researched based tool, step-by-step guides, and motivational messages. Online behavior modification  ? www.lungusa.org/stop-smoking/how-to-quit  ? HelpLine: 1-800-LUNG-USA (718-1862)  ? Email questions to:  Micki@[employer]. [employer]   ? Website offers resources to help tobacco users figure out their reasons for quitting and then take the big step of quitting for good. Hypnosis  ? Location: [address] [place] [address], [employer] [employer] [email or web address]  ? Contact: Keyla Stokes, PhD at [phone]  ? Hypnosis for tobacco cessation  ? Cost $225 for the initial session and $175 for each session afterwards. Most patients require 6-8 sessions. There is the option to submit through the patients insurance. Hypnosis and behavior modification  ? Location: [place] [address],  [facility] [address] [place], [place]  ? Contact: Francesco Gonzalez, PhD at [phone]  ? Counseling and hypnosis for nicotine addition  ? Cost: For uninsured patients:  Please call above phone number  Cost for insured patients depends on patients insurance plan. Behavior modification  ? Location: [facility], [address] [place] [address]., [place]  ?  Contact: [phone] · Fat and calcium start to build up in your arteries. This buildup is called hardening of the arteries. It can cause many problems including a heart attack and stroke. · Arteries also carry blood and oxygen to organs like your eyes, kidneys, and brain. If high blood pressure damages those arteries, it can lead to vision loss, kidney disease, stroke, and a higher risk of dementia. How can you prevent high blood pressure? · Stay at a healthy weight. · Try to limit how much sodium you eat to less than 2,300 milligrams (mg) a day. If you limit your sodium to 1,500 mg a day, you can lower your blood pressure even more. ? Buy foods that are labeled \"unsalted,\" \"sodium-free,\" or \"low-sodium. \" Foods labeled \"reduced-sodium\" and \"light sodium\" may still have too much sodium. ? Flavor your food with garlic, lemon juice, onion, vinegar, herbs, and spices instead of salt. Do not use soy sauce, steak sauce, onion salt, garlic salt, mustard, or ketchup on your food. ? Use less salt (or none) when recipes call for it. You can often use half the salt a recipe calls for without losing flavor. · Be physically active. Get at least 30 minutes of exercise on most days of the week. Walking is a good choice. You also may want to do other activities, such as running, swimming, cycling, or playing tennis or team sports. · Limit alcohol to 2 drinks a day for men and 1 drink a day for women. · Eat plenty of fruits, vegetables, and low-fat dairy products. Eat less saturated and total fats. How is high blood pressure treated? · Your doctor will suggest making lifestyle changes to help your heart. For example, your doctor may ask you to eat healthy foods, quit smoking, lose extra weight, and be more active. · If lifestyle changes don't help enough, your doctor may recommend that you take medicine. · When blood pressure is very high, medicines are needed to lower it. Follow-up care is a key part of your treatment and safety. Be sure to make and go to all appointments, and call your doctor if you are having problems. It's also a good idea to know your test results and keep a list of the medicines you take. Where can you learn more? Go to https://chpepiceweb.Baby World Language. org and sign in to your [employer] account. Enter P501 in the [employer] box to learn more about \"Learning About High Blood Pressure. \"     If you do not have an account, please click on the \"Sign Up Now\" link. Current as of: December 16, 2019               Content Version: 12.6  © 3162-5684 [employer]. Care instructions adapted under license by [facility] ([facility]). If you have questions about a medical condition or this instruction, always ask your healthcare professional. [address] any warranty or liability for your use of this information.

## 2021-01-14 DIAGNOSIS — M54.2 CERVICALGIA: ICD-10-CM

## 2021-01-14 DIAGNOSIS — E03.9 ACQUIRED HYPOTHYROIDISM: ICD-10-CM

## 2021-01-14 DIAGNOSIS — M54.16 LUMBAR RADICULOPATHY: ICD-10-CM

## 2021-01-14 RX ORDER — HYDROCODONE BITARTRATE AND ACETAMINOPHEN 10; 325 MG/1; MG/1
1 TABLET ORAL EVERY 6 HOURS PRN
Qty: 120 TABLET | Refills: 0 | Status: SHIPPED | OUTPATIENT
Start: 2021-01-14 | End: 2021-02-17 | Stop reason: SDUPTHER

## 2021-01-14 RX ORDER — LEVOTHYROXINE SODIUM 0.07 MG/1
75 TABLET ORAL DAILY
Qty: 90 TABLET | Refills: 3 | Status: SHIPPED | OUTPATIENT
Start: 2021-01-14 | End: 2022-02-11

## 2021-01-14 NOTE — TELEPHONE ENCOUNTER
Freida Rogers called requesting a refill on the following medications:  Requested Prescriptions     Pending Prescriptions Disp Refills    levothyroxine (SYNTHROID) 75 MCG tablet 90 tablet 3     Sig: Take 1 tablet by mouth Daily    HYDROcodone-acetaminophen (NORCO)  MG per tablet 120 tablet 0     Sig: Take 1 tablet by mouth every 6 hours as needed for Pain for up to 30 days.      Pharmacy verified:  [facility]      Date of last visit: 01/7/21  Date of next visit (if applicable): 7/1/2493

## 2021-02-08 DIAGNOSIS — F41.9 ANXIETY: ICD-10-CM

## 2021-02-08 RX ORDER — ALPRAZOLAM 0.5 MG/1
TABLET ORAL
Qty: 90 TABLET | Refills: 2 | Status: SHIPPED | OUTPATIENT
Start: 2021-02-08 | End: 2021-06-17 | Stop reason: SDUPTHER

## 2021-02-08 NOTE — TELEPHONE ENCOUNTER
Fax received from [facility] requesting a refill on the patients Xanax 0.5ml. Order pended for your signature.

## 2021-02-17 ENCOUNTER — TELEPHONE (OUTPATIENT)
Dept: FAMILY MEDICINE CLINIC | Age: 56
End: 2021-02-17

## 2021-02-17 DIAGNOSIS — M54.2 CERVICALGIA: ICD-10-CM

## 2021-02-17 DIAGNOSIS — M54.16 LUMBAR RADICULOPATHY: ICD-10-CM

## 2021-02-17 RX ORDER — HYDROCODONE BITARTRATE AND ACETAMINOPHEN 10; 325 MG/1; MG/1
1 TABLET ORAL EVERY 6 HOURS PRN
Qty: 120 TABLET | Refills: 0 | Status: SHIPPED | OUTPATIENT
Start: 2021-02-17 | End: 2021-03-16 | Stop reason: SDUPTHER

## 2021-02-17 NOTE — TELEPHONE ENCOUNTER
Patient requesting a medication refill.   Medication: HYDROcodone-acetaminophen ([facility])  MG per tablet   Pharmacy: [address] [place] [address].   Last office visit: 1/17/2021  Next office visit: 4/8/2021

## 2021-02-25 ENCOUNTER — OFFICE VISIT (OUTPATIENT)
Dept: CARDIOLOGY CLINIC | Age: 56
End: 2021-02-25
Payer: COMMERCIAL

## 2021-02-25 ENCOUNTER — TELEPHONE (OUTPATIENT)
Dept: CARDIOLOGY CLINIC | Age: 56
End: 2021-02-25

## 2021-02-25 ENCOUNTER — HOSPITAL ENCOUNTER (OUTPATIENT)
Age: 56
Discharge: HOME | End: 2021-02-25
Payer: COMMERCIAL

## 2021-02-25 VITALS
BODY MASS INDEX: 29.35 KG/M2 | DIASTOLIC BLOOD PRESSURE: 70 MMHG | HEART RATE: 72 BPM | HEIGHT: 67 IN | SYSTOLIC BLOOD PRESSURE: 162 MMHG | WEIGHT: 187 LBS

## 2021-02-25 DIAGNOSIS — R06.02 SHORTNESS OF BREATH: Primary | ICD-10-CM

## 2021-02-25 DIAGNOSIS — Z95.2 S/P AVR: ICD-10-CM

## 2021-02-25 DIAGNOSIS — I10 ESSENTIAL HYPERTENSION: ICD-10-CM

## 2021-02-25 DIAGNOSIS — R06.02 SHORTNESS OF BREATH: ICD-10-CM

## 2021-02-25 LAB
ERYTHROCYTE [DISTWIDTH] IN BLOOD BY AUTOMATED COUNT: 16.7 % (ref 11.5–14.5)
ERYTHROCYTE [DISTWIDTH] IN BLOOD BY AUTOMATED COUNT: 57.3 FL (ref 35–45)
HCT VFR BLD CALC: 42 % (ref 42–52)
HEMOGLOBIN: 13.3 GM/DL (ref 14–18)
MCH RBC QN AUTO: 29.6 PG (ref 26–33)
MCHC RBC AUTO-ENTMCNC: 31.7 GM/DL (ref 32.2–35.5)
MCV RBC AUTO: 93.3 FL (ref 80–94)
PLATELET # BLD: 262 THOU/MM3 (ref 130–400)
PMV BLD AUTO: 10.9 FL (ref 9.4–12.4)
RBC # BLD: 4.5 MILL/MM3 (ref 4.7–6.1)
WBC # BLD: 9.8 THOU/MM3 (ref 4.8–10.8)

## 2021-02-25 PROCEDURE — 99214 OFFICE O/P EST MOD 30 MIN: CPT | Performed by: NUCLEAR MEDICINE

## 2021-02-25 PROCEDURE — 85027 COMPLETE CBC AUTOMATED: CPT

## 2021-02-25 PROCEDURE — 36415 COLL VENOUS BLD VENIPUNCTURE: CPT

## 2021-02-25 RX ORDER — ESOMEPRAZOLE MAGNESIUM 40 MG/1
40 CAPSULE, DELAYED RELEASE ORAL SEE ADMIN INSTRUCTIONS
Qty: 90 CAPSULE | Refills: 3 | Status: SHIPPED | OUTPATIENT
Start: 2021-02-25 | End: 2022-03-08 | Stop reason: SDUPTHER

## 2021-02-25 RX ORDER — ASPIRIN 81 MG/1
81 TABLET ORAL DAILY
Qty: 90 TABLET | Refills: 3 | Status: SHIPPED | OUTPATIENT
Start: 2021-02-25 | End: 2022-08-03

## 2021-02-25 RX ORDER — ESOMEPRAZOLE MAGNESIUM 40 MG/1
40 CAPSULE, DELAYED RELEASE ORAL SEE ADMIN INSTRUCTIONS
COMMUNITY
End: 2021-02-25 | Stop reason: SDUPTHER

## 2021-02-25 RX ORDER — ASPIRIN 81 MG/1
81 TABLET ORAL DAILY
COMMUNITY
End: 2021-02-25 | Stop reason: SDUPTHER

## 2021-02-25 NOTE — PROGRESS NOTES
[id number] [address]  [address]  [facility] [address]  Dept: [phone]  Dept Fax: [phone]  Loc: [phone]    Visit Date: 2/25/2021    Karon Samaniego is a 60 y.o. male who presents todayfor:  Chief Complaint   Patient presents with    Check-Up    Hypertension    Cardiac Valve Problem    Shortness of Breath   he thinks he is anemic  Did have multiple GI bleeding before  Not checked before  Known redo AVR   Tissue valve  No chest pain  Some fatigue   Some dyspnea  some chest pain   Some dizziness  No syncope  Bp is stable   Higher today   On full ASA and PPi     HPI:  HPI  Past Medical History:   Diagnosis Date    Arthritis     general    Bilateral sciatica     Bleeding     with coumadin    CAD (coronary artery disease)     Carpal tunnel syndrome     RIGHT    Cervicalgia, C5-7     Chronic anxiety     Dysthymia (or depressive neurosis) 2/1/2016    ED (erectile dysfunction)     ED (erectile dysfunction)     GERD (gastroesophageal reflux disease)     Headache(784.0)     History of blood transfusion 03/2016    HTN (hypertension)     Hyperlipidemia     Hypothyroidism     Iron deficiency anemia, blood loss     Lumbago     Lumbar radiculopathy     Osteoarthritis (arthritis due to wear and tear of joints)     S/P AVR, coumadin Tx     Spondylosis of thoracic region without myelopathy or radiculopathy       Past Surgical History:   Procedure Laterality Date    [facility]    AORTIC VALVE REPLACEMENT  07/20/2016    extraction of mechanical valve and replacement with tissue valve    APPENDECTOMY  1980    CARDIAC CATHETERIZATION  5 20 2010    Patent coronary arteries.  Possible causes of the patient's chest pain is likely noncardiac at least based on this angiogram. Patient chould be able to proceed w/ scheduled surgery w/ paying attention to anticoagulation and trying to minimize the period of no anticoagulation.  CARD[facility]AC [facility]HETERIZATION  2012, 6/9/16    [facility]    CARDIOVASCULAR STRESS TEST  4 15 2010    Moderate fixed inferior defect, possibly attenuation, cannot exclude a previous MI. Correlation w/ EKG is recommented. Significant degree of gut uptake which is likely to be the cause of the attenuation artifact seen. EF 46%    CERVICAL FUSION  2010    CERVICAL FUSION  03/09/2016    REMOVAL HARDWARE C5-7, ACDF C4-6 WITH ATLANTIS CORNERSTONE    COLONOSCOPY  2010    CORONARY ARTERY BYPASS GRAFT      DILATATION, ESOPHAGUS      small resection    ENDOSCOPY, COLON, DIAGNOSTIC      NERVE SURGERY Bilateral 07/25/2017    THORACIC FACET BLOCK T7-8, T8-9, T11-12    OTHER SURGICAL HISTORY  05/02/2017    LESI L5    OTHER SURGICAL HISTORY  09/12/2017    thoracic epidural T11    DE INJ,PARAVERTEBRAL L/S,1 LEVEL Bilateral 7/25/2017    T-FACET MBB T7-8, T8-9, T11-12 BILATERAL performed by Chinyere Orozco MD at [address] [place] [address] DX/THER SB[address] LUMBAR/SACRAL N/A 9/12/2017    TESI T11 performed by Chinyere Orozco MD at [address] [facility] [address]  2010    8 INCHES REMOVED    TOOTH EXTRACTION  02/2019    UPPER GASTROINTESTINAL ENDOSCOPY  2010     Family History   Problem Relation Age of Onset    Cancer Mother     Diabetes Father     Cancer Father     Heart Disease Father 28        CABG    High Blood Pressure Father     Diabetes Sister         AS A CHILD    Kidney Disease Sister     High Blood Pressure Sister     Cancer Brother     COPD Sister     Heart Disease Sister     Stroke Neg Hx      Social History     Tobacco Use    Smoking status: Current Every Day Smoker     Packs/day: 0.25     Years: 25.00     Pack years: 6.25     Types: Cigarettes, Cigars     Start date: 6/9/1978    Smokeless tobacco: Never Used   Substance Use Topics    Alcohol use:  Yes     Alcohol/week: 0.0 standard drinks     Comment: not for 3 mo Current Outpatient Medications   Medication Sig Dispense Refill    HYDROcodone-acetaminophen (NORCO)  MG per tablet Take 1 tablet by mouth every 6 hours as needed for Pain for up to 30 days. 120 tablet 0    ALPRAZolam (XANAX) 0.5 MG tablet TAKE 1 TABLET BY MOUTH THREE TIMES A DAY AS NEEDED FOR ANXIETY OR SLEEP 90 tablet 2    levothyroxine (SYNTHROID) 75 MCG tablet Take 1 tablet by mouth Daily 90 tablet 3    esomeprazole (NEXIUM) 40 MG delayed release capsule Take 1 capsule by mouth every morning (before breakfast) 90 capsule 3    buPROPion (WELLBUTRIN) 100 MG tablet Take 1 tablet by mouth 2 times daily 180 tablet 3    gabapentin (NEURONTIN) 300 MG capsule TAKE 1 CAPSULE BY MOUTH THREE TIMES A  capsule 3    indomethacin (INDOCIN) 25 MG capsule Take 1 capsule by mouth 3 times daily (with meals) Take 1 tablet daily for 10 days. 21 capsule 0    metoprolol succinate (TOPROL XL) 25 MG extended release tablet Take 1 tablet by mouth daily 90 tablet 3    atorvastatin (LIPITOR) 20 MG tablet TAKE 1 TABLET BY MOUTH ONE TIME A DAY AT NIGHT 90 tablet 10    clobetasol (TEMOVATE) 0.05 % cream APPLY TOPICALLY TO RIGHT INDEX FINGER 3 TIMES DAILY  2    Cholecalciferol (VITAMIN D3) 1000 units TABS Take 2,000 Units by mouth Daily      V-R ASPIRIN  MG EC tablet TAKE 1 TABLET BY MOUTH ONE TIME A DAY  30 tablet 10     MG capsule TAKE 1 CAPSULE BY MOUTH ONE TIME A DAY AS NEEDED for constipation 30 capsule 10    IRON PO Take 65 mg by mouth daily       Multiple Vitamin (MULTIVITAMIN PO) Take 1 tablet by mouth daily        No current facility-administered medications for this visit.       No Known Allergies  Health Maintenance   Topic Date Due    DTaP/Tdap/Td vaccine (1 - Tdap) 04/02/1982    Shingles Vaccine (1 of 2) 04/02/2013    Pneumococcal 0-64 years Vaccine (2 of 3 - PPSV23) 08/10/2017    A1C test (Diabetic or Prediabetic)  07/09/2021    Lipid screen  07/09/2021    TSH testing  12/17/2021  Colon cancer screen colonoscopy  03/27/2026    Flu vaccine  Completed    Hepatitis C screen  Completed    HIV screen  Completed    Hepatitis A vaccine  Aged Out    Hepatitis B vaccine  Aged Out    Hib vaccine  Aged Out    Meningococcal (ACWY) vaccine  Aged Out       Subjective:  Review of Systems  General:   No fever, no chills, some fatigue or weight loss  Pulmonary:    some dyspnea, no wheezing  Cardiac:    Denies recent chest pain,   GI:     No nausea or vomiting, no abdominal pain  Neuro:    No dizziness or light headedness,   Musculoskeletal:  No recent active issues  Extremities:   No edema, no obvious claudication       Objective:  Physical Exam  BP (!) 162/70   Pulse 72   Ht 5' 7\" (1.702 m)   Wt 187 lb (84.8 kg)   BMI 29.29 kg/m²   General:   Well developed, well nourished  Lungs:    Clear to auscultation  Heart:    Normal S1 S2, Slight murmur. no rubs, no gallops  Abdomen:   Soft, non tender, no organomegalies, positive bowel sounds  Extremities:   No edema, no cyanosis, good peripheral pulses  Neurological:   Awake, alert, oriented. No obvious focal deficits  Musculoskelatal:  No obvious deformities    Assessment:      Diagnosis Orders   1. Shortness of breath     2. Essential hypertension     3. S/P AVR     as above  Possible GI issues   ? ? Bleeding       Plan:  No follow-ups on file. We will change nexium to bid for 2 weeks  We will change ASA to 81 mg   Check CBC  Refer to GI if needed  Continue risk factor modification and medical management  Thank you for allowing me to participate in the care of your patient. Please don't hesitate to contact me regarding any further issues related to the patient care    Orders Placed:  No orders of the defined types were placed in this encounter. Medications Prescribed:  No orders of the defined types were placed in this encounter. Discussed use, benefit, and side effects of prescribed medications. All patient questions answered.  Pt voicedunderstanding. Instructed to continue current medications, diet and exercise. Continue risk factor modification and medical management. Patient agreed with treatment plan. Follow up as directed.     Electronically signedby Paul Aldridge MD on 2/25/2021 at 9:11 AM

## 2021-03-16 DIAGNOSIS — M54.16 LUMBAR RADICULOPATHY: ICD-10-CM

## 2021-03-16 DIAGNOSIS — M54.2 CERVICALGIA: ICD-10-CM

## 2021-03-16 RX ORDER — HYDROCODONE BITARTRATE AND ACETAMINOPHEN 10; 325 MG/1; MG/1
1 TABLET ORAL EVERY 6 HOURS PRN
Qty: 120 TABLET | Refills: 0 | Status: SHIPPED | OUTPATIENT
Start: 2021-03-16 | End: 2021-04-15 | Stop reason: SDUPTHER

## 2021-03-16 NOTE — TELEPHONE ENCOUNTER
Waldemar Stovall called requesting a refill on the following medications:  Requested Prescriptions     Pending Prescriptions Disp Refills    HYDROcodone-acetaminophen (NORCO)  MG per tablet 120 tablet 0     Sig: Take 1 tablet by mouth every 6 hours as needed for Pain for up to 30 days.      Pharmacy verified:  [email or web address]      Date of last visit: 1/7/21  Date of next visit (if applicable): 5/7/2533

## 2021-04-08 ENCOUNTER — NURSE ONLY (OUTPATIENT)
Dept: LAB | Age: 56
End: 2021-04-08

## 2021-04-15 ENCOUNTER — OFFICE VISIT (OUTPATIENT)
Dept: FAMILY MEDICINE CLINIC | Age: 56
End: 2021-04-15
Payer: COMMERCIAL

## 2021-04-15 VITALS
BODY MASS INDEX: 29.23 KG/M2 | WEIGHT: 186.6 LBS | HEART RATE: 72 BPM | RESPIRATION RATE: 16 BRPM | SYSTOLIC BLOOD PRESSURE: 138 MMHG | DIASTOLIC BLOOD PRESSURE: 80 MMHG

## 2021-04-15 DIAGNOSIS — R53.82 CHRONIC FATIGUE: ICD-10-CM

## 2021-04-15 DIAGNOSIS — I10 ESSENTIAL HYPERTENSION: ICD-10-CM

## 2021-04-15 DIAGNOSIS — F41.9 ANXIETY: ICD-10-CM

## 2021-04-15 DIAGNOSIS — M54.2 CERVICALGIA: ICD-10-CM

## 2021-04-15 DIAGNOSIS — M54.16 LUMBAR RADICULOPATHY: Primary | ICD-10-CM

## 2021-04-15 DIAGNOSIS — E03.9 ACQUIRED HYPOTHYROIDISM: ICD-10-CM

## 2021-04-15 DIAGNOSIS — F34.1 DYSTHYMIA (OR DEPRESSIVE NEUROSIS): ICD-10-CM

## 2021-04-15 DIAGNOSIS — C86.6 LYMPHOMATOID PAPULOSIS (HCC): ICD-10-CM

## 2021-04-15 PROCEDURE — 99214 OFFICE O/P EST MOD 30 MIN: CPT | Performed by: FAMILY MEDICINE

## 2021-04-15 RX ORDER — HYDROCODONE BITARTRATE AND ACETAMINOPHEN 10; 325 MG/1; MG/1
1 TABLET ORAL EVERY 6 HOURS PRN
Qty: 120 TABLET | Refills: 0 | Status: SHIPPED | OUTPATIENT
Start: 2021-04-15 | End: 2021-05-17 | Stop reason: SDUPTHER

## 2021-04-15 RX ORDER — GABAPENTIN 300 MG/1
CAPSULE ORAL
Qty: 270 CAPSULE | Refills: 3 | Status: SHIPPED | OUTPATIENT
Start: 2021-04-15 | End: 2022-07-20

## 2021-04-15 SDOH — ECONOMIC STABILITY: INCOME INSECURITY: HOW HARD IS IT FOR YOU TO PAY FOR THE VERY BASICS LIKE FOOD, HOUSING, MEDICAL CARE, AND HEATING?: VERY HARD

## 2021-04-15 ASSESSMENT — ENCOUNTER SYMPTOMS
RESPIRATORY NEGATIVE: 1
GASTROINTESTINAL NEGATIVE: 1

## 2021-04-15 NOTE — PROGRESS NOTES
Chronic Disease Visit Information    BP Readings from Last 3 Encounters:   04/15/21 138/80   02/25/21 (!) 162/70   01/07/21 130/74          Hemoglobin A1C (%)   Date Value   07/09/2020 5.8   02/07/2019 5.6   03/23/2017 5.5     LDL Calculated (mg/dL)   Date Value   07/09/2020 68     HDL (mg/dL)   Date Value   07/09/2020 41     BUN (mg/dL)   Date Value   12/17/2020 15     CREATININE (mg/dL)   Date Value   12/17/2020 0.7     Glucose (mg/dL)   Date Value   12/17/2020 121 (H)            Have you changed or started any medications since your last visit including any over-the-counter medicines, vitamins, or herbal medicines? no   Are you having any side effects from any of your medications? -  no  Have you stopped taking any of your medications? Is so, why? -  no    Have you seen any other physician or provider since your last visit? Yes- Records Requested  Have you had any other diagnostic tests since your last visit? Yes - Records Requested  Have you been seen in the emergency room and/or had an admission to a hospital since we last saw you? No  Have you had your annual diabetic retinal (eye) exam? No  Have you had your routine dental cleaning in the past 6 months? no    Have you activated your [employer] account? If not, what are your barriers?  No: declines     Patient Care Team:  Ursula Daugherty DO as PCP - General (Family Medicine)  Ursula Daugherty DO as PCP - [facility]  Jenny Horner MD as Surgeon (Cardiothoracic Surgery)  Justine Barker MD as Physician (Gastroenterology)  Treasure Pemberton MD as Consulting Physician (Cardiology)  Paula Dean MD (Dermatology)  Barbara Sahu DPM as Consulting Physician (Podiatry)  Walt Gottron, MD as Consulting Physician (Gastroenterology)         Medical History Review  Past Medical, Family, and Social History reviewed and does contribute to the patient presenting condition    Health Maintenance   Topic Date Due    COVID-19 Vaccine (1) Never done    DTaP/Tdap/Td vaccine (1 - Tdap) Never done    Shingles Vaccine (1 of 2) Never done    Pneumococcal 0-64 years Vaccine (2 of 3 - PPSV23) 08/10/2017    A1C test (Diabetic or Prediabetic)  07/09/2021    Lipid screen  07/09/2021    TSH testing  12/17/2021    Colon cancer screen colonoscopy  03/27/2026    Flu vaccine  Completed    Hepatitis C screen  Completed    HIV screen  Completed    Hepatitis A vaccine  Aged Out    Hepatitis B vaccine  Aged Out    Hib vaccine  Aged Out    Meningococcal (ACWY) vaccine  Aged Out

## 2021-04-15 NOTE — PROGRESS NOTES
4/15/2021    Nichole Luna (:  1965) is a 60 y.o. male, here for a preventive medicine evaluation. Chief Complaint   Patient presents with    3 Month Follow-Up    Hypertension    Other     sore on face, seeing Dr Gurmeet Watts for    Other     handicap placard     3 month eval.  Doing well overall. Back pain stable on current regimen. Taking Norco scheduled. Previous pain eval in the past, shots ineffective. Mood controlled on current meds. BPs controlled. BP Readings from Last 3 Encounters:   04/15/21 138/80   21 (!) 162/70   21 130/74     Wt Readings from Last 3 Encounters:   04/15/21 186 lb 9.6 oz (84.6 kg)   21 187 lb (84.8 kg)   21 187 lb 3.2 oz (84.9 kg)     GERD controlled on Nexium. Patient Active Problem List   Diagnosis    HTN (hypertension)    Lumbago    Lumbar radiculopathy    Cervicalgia, C5-7    Hyperlipidemia    Hypothyroidism    Chronic anxiety    ED (erectile dysfunction)    Bilateral sciatica    Trigger finger, right, ring     Medication monitoring encounter    Nocturnal leg cramps    GERD (gastroesophageal reflux disease)    IFG (impaired fasting glucose)    Situational depression    Dysthymia (or depressive neurosis)    Herniation of cervical intervertebral disc with radiculopathy    H/O cervical spine surgery, C4-5 fusion, 3/2/16. 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  Tobacco abuse    Lymphomatoid papulosis     Lumbar radiculitis    Lumbar spinal stenosis    Spondylosis of thoracic region without myelopathy or radiculopathy    Thoracic spinal stenosis    Chronic pain syndrome    Urinary hesitancy    Malignant lymphoma, non-Hodgkin's (HCC)       Review of Systems   Constitutional: Negative. HENT: Negative. Respiratory: Negative. Cardiovascular: Negative. Gastrointestinal: Negative. Musculoskeletal: Negative.     All other systems reviewed and are negative. Prior to Visit Medications    Medication Sig Taking? Authorizing Provider   gabapentin (NEURONTIN) 300 MG capsule TAKE 1 CAPSULE BY MOUTH THREE TIMES A DAY Yes Myron Roberson, DO   HYDROcodone-acetaminophen (NORCO)  MG per tablet Take 1 tablet by mouth every 6 hours as needed for Pain for up to 30 days. Yes Myron Roberson, DO   aspirin 81 MG EC tablet Take 1 tablet by mouth daily Yes [address], MD   esomeprazole (NEXIUM) 40 MG delayed release capsule Take 1 capsule by mouth See Admin Instructions Take twice a day for two weeks. Then daily. Yes [address], MD   ALPRAZolam (XANAX) 0.5 MG tablet TAKE 1 TABLET BY MOUTH THREE TIMES A DAY AS NEEDED FOR ANXIETY OR SLEEP Yes Myron Roberson, DO   levothyroxine (SYNTHROID) 75 MCG tablet Take 1 tablet by mouth Daily Yes Myron Roberson, DO   buPROPion [facility]) 100 MG tablet Take 1 tablet by mouth 2 times daily Yes Shankar Alvarenga MD   indomethacin (INDOCIN) 25 MG capsule Take 1 capsule by mouth 3 times daily (with meals) Take 1 tablet daily for 10 days.  Yes Shankar Alvarenga MD   metoprolol succinate (TOPROL XL) 25 MG extended release tablet Take 1 tablet by mouth daily Yes Shankar Alvarenga MD   atorvastatin (LIPITOR) 20 MG tablet TAKE 1 TABLET BY MOUTH ONE TIME A DAY AT NIGHT Yes Shankar Alvarenga MD   clobetasol (TEMOVATE) 0.05 % cream APPLY TOPICALLY TO RIGHT INDEX FINGER 3 TIMES DAILY Yes Historical Provider, MD   Cholecalciferol (VITAMIN D3) 1000 units TABS Take 2,000 Units by mouth Daily Yes Historical Provider, MD   IRON PO Take 65 mg by mouth daily  Yes Historical Provider, MD   Multiple Vitamin (MULTIVITAMIN PO) Take 1 tablet by mouth daily  Yes Historical Provider, MD    MG capsule TAKE 1 CAPSULE BY MOUTH ONE TIME A DAY AS NEEDED for constipation  Patient not taking: Reported on 4/15/2021  Shankar Alvarenga MD        No Known Allergies    Past Medical History:   Diagnosis Date    Arthritis general    Bilateral sciatica     Bleeding     with coumadin    CAD (coronary artery disease)     Carpal tunnel syndrome     RIGHT    Cervicalgia, C5-7     Chronic anxiety     Dysthymia (or depressive neurosis) 2/1/2016    ED (erectile dysfunction)     ED (erectile dysfunction)     GERD (gastroesophageal reflux disease)     Headache(784.0)     History of blood transfusion 03/2016    HTN (hypertension)     Hyperlipidemia     Hypothyroidism     Iron deficiency anemia, blood loss     Lumbago     Lumbar radiculopathy     Osteoarthritis (arthritis due to wear and tear of joints)     S/P AVR, coumadin Tx     Spondylosis of thoracic region without myelopathy or radiculopathy        Past Surgical History:   Procedure Laterality Date    [facility]  [facility]    AORTIC VALVE REPLACEMENT  07/20/2016    extraction of mechanical valve and replacement with tissue valve    APPENDECTOMY  1980    CARDIAC CATHETERIZATION  5 20 2010    Patent coronary arteries. Possible causes of the patient's chest pain is likely noncardiac at least based on this angiogram. Patient chould be able to proceed w/ scheduled surgery w/ paying attention to anticoagulation and trying to minimize the period of no anticoagulation.  CARDIAC CATHETERIZATION  2012, 6/9/16    [facility]    CARDIOVASCULAR STRESS TEST  4 15 2010    Moderate fixed inferior defect, possibly attenuation, cannot exclude a previous MI. Correlation w/ EKG is recommented. Significant degree of gut uptake which is likely to be the cause of the attenuation artifact seen.  EF 46%    CERVICAL FUSION  2010    CERVICAL FUSION  03/09/2016    REMOVAL HARDWARE C5-7, ACDF C4-6 WITH ATLANTIS CORNERSTONE    COLONOSCOPY  2010    CORONARY ARTERY BYPASS GRAFT      DILATATION, ESOPHAGUS      small resection    ENDOSCOPY, COLON, DIAGNOSTIC      NERVE SURGERY Bilateral 07/25/2017    THORACIC FACET BLOCK T7-8, T8-9, T11-12    OTHER SURGICAL HISTORY  05/02/2017    LESI L5    OTHER SURGICAL HISTORY  09/12/2017    thoracic epidural T11    CT INJ,PARAVERTEBRAL L/S,1 LEVEL Bilateral 7/25/2017    T-FACET MBB T7-8, T8-9, T11-12 BILATERAL performed by Bishop Yusef MD at [facility] DX/THER SBST EPIDURAL/SUBARACH LUMBAR/SACRAL N/A 9/12/2017    TESI T11 performed by Bishop Yusef MD at [address] [facility] [address]  2010    [address] EXTRACTION  02/2019    UPPER GASTROINTESTINAL ENDOSCOPY  2010       Social History     Socioeconomic History    Marital status:      Spouse name: Not on file    Number of children: 2    Years of education: 15    Highest education level: High school graduate   Occupational History    Not on file   Social Needs    Financial resource strain: Very hard    Food insecurity     Worry: Sometimes true     Inability: Sometimes true    Transportation needs     Medical: No     Non-medical: No   Tobacco Use    Smoking status: Current Every Day Smoker     Packs/day: 0.25     Years: 25.00     Pack years: 6.25     Types: Cigarettes, Cigars     Start date: 6/9/1978    Smokeless tobacco: Never Used   Substance and Sexual Activity    Alcohol use:  Yes     Alcohol/week: 0.0 standard drinks     Comment: not for 3 mo    Drug use: No    Sexual activity: Never   Lifestyle    Physical activity     Days per week: Not on file     Minutes per session: Not on file    Stress: Not on file   Relationships    Social connections     Talks on phone: Not on file     Gets together: Not on file     Attends Judaism service: Not on file     Active member of club or organization: Not on file     Attends meetings of clubs or organizations: Not on file     Relationship status: Not on file    Intimate partner violence     Fear of current or ex partner: Not on file     Emotionally abused: Not on file     Physically abused: Not on file     Forced sexual activity: Not on file   Other Topics Concern    Not on file   Social History Narrative    Not on file        Family History   Problem Relation Age of Onset    Cancer Mother     Diabetes Father     Cancer Father     Heart Disease Father 28        CABG    High Blood Pressure Father     Diabetes Sister         AS A CHILD    Kidney Disease Sister     High Blood Pressure Sister     Cancer Brother     COPD Sister     Heart Disease Sister     Stroke Neg Hx        ADVANCE DIRECTIVE: N, <no information>    Vitals:    04/15/21 0817   BP: 138/80   Site: Left Upper Arm   Position: Sitting   Cuff Size: Large Adult   Pulse: 72   Resp: 16   Weight: 186 lb 9.6 oz (84.6 kg)     Estimated body mass index is 29.23 kg/m² as calculated from the following:    Height as of 2/25/21: 5' 7\" (1.702 m). Weight as of this encounter: 186 lb 9.6 oz (84.6 kg). Physical Exam  Vitals signs and nursing note reviewed. Constitutional:       General: He is not in acute distress. Appearance: Normal appearance. He is well-developed. HENT:      Head: Normocephalic and atraumatic. Right Ear: Tympanic membrane normal.      Left Ear: Tympanic membrane normal.   Eyes:      Conjunctiva/sclera: Conjunctivae normal.   Neck:      Musculoskeletal: Neck supple. Cardiovascular:      Rate and Rhythm: Normal rate and regular rhythm. Heart sounds: Normal heart sounds. No murmur. Pulmonary:      Effort: Pulmonary effort is normal.      Breath sounds: Normal breath sounds. No wheezing, rhonchi or rales. Abdominal:      General: There is no distension. Skin:     General: Skin is warm and dry. Findings: No rash (on exposed surfaces). Neurological:      General: No focal deficit present. Mental Status: He is alert. Psychiatric:         Attention and Perception: Attention normal.         Mood and Affect: Mood normal.         Speech: Speech normal.         Behavior: Behavior normal. Behavior is cooperative.          Thought Content: Thought content normal.         Judgment: Judgment normal.         No flowsheet data found. Lab Results   Component Value Date    CHOL 88 03/11/2019    CHOL 113 12/12/2017    CHOL 101 09/28/2017    CHOLFAST 119 07/09/2020    TRIG 27 03/11/2019    TRIG 95 12/12/2017    TRIG 63 09/28/2017    TRIGLYCFAST 49 07/09/2020    HDL 41 07/09/2020    HDL 36 03/11/2019    HDL 36 12/12/2017    LDLCALC 68 07/09/2020    LDLCALC 47 03/11/2019    LDLCALC 58 12/12/2017    GLUF 97 07/09/2020    GLUCOSE 121 12/17/2020    LABA1C 5.8 07/09/2020    LABA1C 5.6 02/07/2019    LABA1C 5.5 03/23/2017       The ASCVD Risk score (Pat Garcia, et al., 2013) failed to calculate for the following reasons: The valid total cholesterol range is 130 to 320 mg/dL    Immunization History   Administered Date(s) Administered    Influenza Virus Vaccine 11/04/2015    Influenza, Shiv Muller, 6-35 Months, IM (Fluzone,Afluria) 10/05/2016    Influenza, Shiv Yohana, IM, (6 mo and older Fluzone, Flulaval, Fluarix and 3 yrs and older Afluria) 10/11/2017, 11/08/2018    Influenza, Quadv, IM, PF (6 mo and older Fluzone, Flulaval, Fluarix, and 3 yrs and older Afluria) 10/14/2019, 10/08/2020    Pneumococcal Conjugate 13-valent (Netta Lebron) 06/15/2017       Health Maintenance   Topic Date Due    COVID-19 Vaccine (1) Never done    DTaP/Tdap/Td vaccine (1 - Tdap) Never done    Shingles Vaccine (1 of 2) Never done    Pneumococcal 0-64 years Vaccine (2 of 3 - PPSV23) 08/10/2017    A1C test (Diabetic or Prediabetic)  07/09/2021    Lipid screen  07/09/2021    TSH testing  12/17/2021    Colon cancer screen colonoscopy  03/27/2026    Flu vaccine  Completed    Hepatitis C screen  Completed    HIV screen  Completed    Hepatitis A vaccine  Aged Out    Hepatitis B vaccine  Aged Out    Hib vaccine  Aged Out    Meningococcal (ACWY) vaccine  Aged Out       ASSESSMENT/PLAN:  1.  Lumbar radiculopathy  -     gabapentin (NEURONTIN) 300 MG capsule; TAKE 1 CAPSULE BY MOUTH THREE TIMES A DAY, Disp-270 capsule, R-3Normal  -     HYDROcodone-acetaminophen (NORCO)  MG per tablet; Take 1 tablet by mouth every 6 hours as needed for Pain for up to 30 days. , Disp-120 tablet, R-0Normal  2. Cervicalgia, C5-7  -     gabapentin (NEURONTIN) 300 MG capsule; TAKE 1 CAPSULE BY MOUTH THREE TIMES A DAY, Disp-270 capsule, R-3Normal  -     HYDROcodone-acetaminophen (NORCO)  MG per tablet; Take 1 tablet by mouth every 6 hours as needed for Pain for up to 30 days. , Disp-120 tablet, R-0Normal  3. Anxiety  4. Acquired hypothyroidism  5. Lymphomatoid papulosis ([facility] [address]  6. Chronic fatigue  7. Essential hypertension  8. Dysthymia (or depressive neurosis)    -  Chronic medical problems stable  -  Continue current medications  -  Follow up with specialists as scheduled    Return in about 3 months (around 7/15/2021) for HTN. Plan labs, UDS next OV. An electronic signature was used to authenticate this note.     --Nahid Barrientos DO on 4/15/2021 at 8:40 AM

## 2021-04-23 ENCOUNTER — HOSPITAL ENCOUNTER (OUTPATIENT)
Dept: ULTRASOUND IMAGING | Age: 56
Discharge: HOME | End: 2021-04-23
Payer: COMMERCIAL

## 2021-04-23 ENCOUNTER — HOSPITAL ENCOUNTER (OUTPATIENT)
Dept: NUCLEAR MEDICINE | Age: 56
Discharge: HOME | End: 2021-04-23
Payer: COMMERCIAL

## 2021-04-23 DIAGNOSIS — R10.13 ABDOMINAL PAIN, EPIGASTRIC: ICD-10-CM

## 2021-04-23 PROCEDURE — 3430000000 HC RX DIAGNOSTIC RADIOPHARMACEUTICAL: Performed by: INTERNAL MEDICINE

## 2021-04-23 PROCEDURE — 78226 HEPATOBILIARY SYSTEM IMAGING: CPT

## 2021-04-23 PROCEDURE — A9537 TC99M MEBROFENIN: HCPCS | Performed by: INTERNAL MEDICINE

## 2021-04-23 PROCEDURE — 76705 ECHO EXAM OF ABDOMEN: CPT

## 2021-04-23 RX ADMIN — Medication 8.3 MILLICURIE: at 07:40

## 2021-04-29 ENCOUNTER — TELEPHONE (OUTPATIENT)
Dept: CARDIOLOGY CLINIC | Age: 56
End: 2021-04-29

## 2021-05-17 DIAGNOSIS — M54.2 CERVICALGIA: ICD-10-CM

## 2021-05-17 DIAGNOSIS — M54.16 LUMBAR RADICULOPATHY: ICD-10-CM

## 2021-05-17 RX ORDER — HYDROCODONE BITARTRATE AND ACETAMINOPHEN 10; 325 MG/1; MG/1
1 TABLET ORAL EVERY 6 HOURS PRN
Qty: 120 TABLET | Refills: 0 | Status: SHIPPED | OUTPATIENT
Start: 2021-05-17 | End: 2021-06-17 | Stop reason: SDUPTHER

## 2021-05-17 NOTE — TELEPHONE ENCOUNTER
The patient called in requesting a refill on his [place] to go to [place]. Order pended for your signature.

## 2021-06-17 DIAGNOSIS — F41.9 ANXIETY: ICD-10-CM

## 2021-06-17 DIAGNOSIS — M54.2 CERVICALGIA: ICD-10-CM

## 2021-06-17 DIAGNOSIS — M54.16 LUMBAR RADICULOPATHY: ICD-10-CM

## 2021-06-17 RX ORDER — HYDROCODONE BITARTRATE AND ACETAMINOPHEN 10; 325 MG/1; MG/1
1 TABLET ORAL EVERY 6 HOURS PRN
Qty: 120 TABLET | Refills: 0 | Status: SHIPPED | OUTPATIENT
Start: 2021-06-17 | End: 2021-07-15 | Stop reason: SDUPTHER

## 2021-06-17 RX ORDER — ALPRAZOLAM 0.5 MG/1
TABLET ORAL
Qty: 90 TABLET | Refills: 2 | Status: SHIPPED | OUTPATIENT
Start: 2021-06-17 | End: 2021-12-28

## 2021-07-15 ENCOUNTER — OFFICE VISIT (OUTPATIENT)
Dept: FAMILY MEDICINE CLINIC | Age: 56
End: 2021-07-15
Payer: COMMERCIAL

## 2021-07-15 VITALS
HEART RATE: 74 BPM | WEIGHT: 189.6 LBS | BODY MASS INDEX: 29.7 KG/M2 | RESPIRATION RATE: 16 BRPM | OXYGEN SATURATION: 98 % | SYSTOLIC BLOOD PRESSURE: 142 MMHG | DIASTOLIC BLOOD PRESSURE: 70 MMHG

## 2021-07-15 DIAGNOSIS — M48.062 SPINAL STENOSIS OF LUMBAR REGION WITH NEUROGENIC CLAUDICATION: Primary | ICD-10-CM

## 2021-07-15 DIAGNOSIS — F34.1 DYSTHYMIA (OR DEPRESSIVE NEUROSIS): ICD-10-CM

## 2021-07-15 DIAGNOSIS — G89.4 CHRONIC PAIN SYNDROME: ICD-10-CM

## 2021-07-15 DIAGNOSIS — Z95.2 S/P AVR (AORTIC VALVE REPLACEMENT): ICD-10-CM

## 2021-07-15 DIAGNOSIS — E03.9 ACQUIRED HYPOTHYROIDISM: ICD-10-CM

## 2021-07-15 DIAGNOSIS — Z98.890 H/O CERVICAL SPINE SURGERY: ICD-10-CM

## 2021-07-15 DIAGNOSIS — M54.16 LUMBAR RADICULOPATHY: ICD-10-CM

## 2021-07-15 DIAGNOSIS — M54.2 CERVICALGIA: ICD-10-CM

## 2021-07-15 DIAGNOSIS — F41.9 CHRONIC ANXIETY: ICD-10-CM

## 2021-07-15 DIAGNOSIS — C86.6 LYMPHOMATOID PAPULOSIS (HCC): ICD-10-CM

## 2021-07-15 DIAGNOSIS — I10 ESSENTIAL HYPERTENSION: ICD-10-CM

## 2021-07-15 DIAGNOSIS — E78.2 MIXED HYPERLIPIDEMIA: ICD-10-CM

## 2021-07-15 PROCEDURE — 99214 OFFICE O/P EST MOD 30 MIN: CPT | Performed by: FAMILY MEDICINE

## 2021-07-15 RX ORDER — GABAPENTIN 400 MG/1
400 CAPSULE ORAL 3 TIMES DAILY
Qty: 270 CAPSULE | Refills: 0 | Status: SHIPPED | OUTPATIENT
Start: 2021-07-15 | End: 2022-03-08

## 2021-07-15 RX ORDER — HYDROCODONE BITARTRATE AND ACETAMINOPHEN 10; 325 MG/1; MG/1
1 TABLET ORAL EVERY 8 HOURS PRN
Qty: 90 TABLET | Refills: 0 | Status: SHIPPED | OUTPATIENT
Start: 2021-07-15 | End: 2021-08-16 | Stop reason: SDUPTHER

## 2021-07-15 SDOH — ECONOMIC STABILITY: FOOD INSECURITY: WITHIN THE PAST 12 MONTHS, THE FOOD YOU BOUGHT JUST DIDN'T LAST AND YOU DIDN'T HAVE MONEY TO GET MORE.: NEVER TRUE

## 2021-07-15 SDOH — ECONOMIC STABILITY: FOOD INSECURITY: WITHIN THE PAST 12 MONTHS, YOU WORRIED THAT YOUR FOOD WOULD RUN OUT BEFORE YOU GOT MONEY TO BUY MORE.: NEVER TRUE

## 2021-07-15 ASSESSMENT — ENCOUNTER SYMPTOMS
GASTROINTESTINAL NEGATIVE: 1
BACK PAIN: 1
RESPIRATORY NEGATIVE: 1

## 2021-07-15 NOTE — PROGRESS NOTES
7/15/2021    Shemar Benavides (:  1965) is a 60 y.o. male, here for a preventive medicine evaluation. Chief Complaint   Patient presents with    3 Month Follow-Up    Hypertension     3 month eval.    Doing ok overall. Having issues with bl feet, sees Dr. Chong Tinajero for this. Having ongoing leg cramps and itching in his legs. Sciatic nerve continues to bother him, worse on the right side. Recently seen by Dr. Chandrakant Crawford, dx'd with \"fatty liver\". BPs and weights stable. BP Readings from Last 3 Encounters:   07/15/21 (!) 142/70   04/15/21 138/80   21 (!) 162/70     Wt Readings from Last 3 Encounters:   07/15/21 189 lb 9.6 oz (86 kg)   04/15/21 186 lb 9.6 oz (84.6 kg)   21 187 lb (84.8 kg)       Patient Active Problem List   Diagnosis    HTN (hypertension)    Lumbago    Lumbar radiculopathy    Cervicalgia, C5-7    Hyperlipidemia    Hypothyroidism    Chronic anxiety    ED (erectile dysfunction)    Bilateral sciatica    Trigger finger, right, ring     Medication monitoring encounter    Nocturnal leg cramps    GERD (gastroesophageal reflux disease)    IFG (impaired fasting glucose)    Situational depression    Dysthymia (or depressive neurosis)    Herniation of cervical intervertebral disc with radiculopathy    H/O cervical spine surgery, C4-5 fusion, 3/2/16. 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  Tobacco abuse    Lymphomatoid papulosis     Lumbar radiculitis    Lumbar spinal stenosis    Spondylosis of thoracic region without myelopathy or radiculopathy    Thoracic spinal stenosis    Chronic pain syndrome    Urinary hesitancy    Malignant lymphoma, non-Hodgkin's (HCC)       Review of Systems   Constitutional: Negative. HENT: Negative. Respiratory: Negative. Cardiovascular: Negative. Gastrointestinal: Negative. Musculoskeletal: Positive for arthralgias and back pain. Neurological: Positive for weakness and numbness (bl legs and arms). All other systems reviewed and are negative. Prior to Visit Medications    Medication Sig Taking? Authorizing Provider   gabapentin (NEURONTIN) 400 MG capsule Take 1 capsule by mouth 3 times daily for 90 days. Yes Mónica Freeman, DO   HYDROcodone-acetaminophen [facility])  MG per tablet Take 1 tablet by mouth every 8 hours as needed for Pain for up to 30 days. Yes Mónica Freeman, DO   ALPRAZolam Saddie John) 0.5 MG tablet TAKE 1 TABLET BY MOUTH THREE TIMES A DAY AS NEEDED FOR ANXIETY OR SLEEP Yes Mónica Freeman, DO   gabapentin (NEURONTIN) 300 MG capsule TAKE 1 CAPSULE BY MOUTH THREE TIMES A DAY Yes Mónica Freeman, DO   aspirin 81 MG EC tablet Take 1 tablet by mouth daily Yes Karl Preston MD   esomeprazole (NEXIUM) 40 MG delayed release capsule Take 1 capsule by mouth See Admin Instructions Take twice a day for two weeks. Then daily. Yes Karl Preston MD   levothyroxine (SYNTHROID) 75 MCG tablet Take 1 tablet by mouth Daily Yes Mónica Freeman, DO   buPROPion [facility]) 100 MG tablet Take 1 tablet by mouth 2 times daily Yes Dev Scott MD   indomethacin (INDOCIN) 25 MG capsule Take 1 capsule by mouth 3 times daily (with meals) Take 1 tablet daily for 10 days.  Yes Dev Scott MD   metoprolol succinate (TOPROL XL) 25 MG extended release tablet Take 1 tablet by mouth daily Yes Dev Scott MD   atorvastatin (LIPITOR) 20 MG tablet TAKE 1 TABLET BY MOUTH ONE TIME A DAY AT NIGHT Yes Dev Scott MD   clobetasol (TEMOVATE) 0.05 % cream APPLY TOPICALLY TO RIGHT INDEX FINGER 3 TIMES DAILY Yes Historical Provider, MD   Cholecalciferol (VITAMIN D3) 1000 units TABS Take 2,000 Units by mouth Daily Yes Historical Provider, MD    MG capsule TAKE 1 CAPSULE BY MOUTH ONE TIME A DAY AS NEEDED for constipation Yes Dev Scott MD   IRON PO Take 65 mg by mouth daily  Yes Historical Provider, MD   Multiple Vitamin (MULTIVITAMIN PO) Take 1 tablet by mouth daily  Yes Historical Provider, MD        No Known Allergies    Past Medical History:   Diagnosis Date    Arthritis     general    Bilateral sciatica     Bleeding     with coumadin    CAD (coronary artery disease)     Carpal tunnel syndrome     RIGHT    Cervicalgia, C5-7     Chronic anxiety     Dysthymia (or depressive neurosis) 2/1/2016    ED (erectile dysfunction)     ED (erectile dysfunction)     GERD (gastroesophageal reflux disease)     Headache(784.0)     History of blood transfusion 03/2016    HTN (hypertension)     Hyperlipidemia     Hypothyroidism     Iron deficiency anemia, blood loss     Lumbago     Lumbar radiculopathy     Osteoarthritis (arthritis due to wear and tear of joints)     S/P AVR, coumadin Tx     Spondylosis 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 Possible causes of the patient's chest pain is likely noncardiac at least based on this angiogram. Patient chould be able to proceed w/ scheduled surgery w/ paying attention to anticoagulation and trying to minimize the period of no anticoagulation.  CARDIAC CATHETERIZATION  2012, 6/9/16    [facility]    CARDIOVASCULAR STRESS TEST  4 15 2010    Moderate fixed inferior defect, possibly attenuation, cannot exclude a previous MI. Correlation w/ EKG is recommented. Significant degree of gut uptake which is likely to be the cause of the attenuation artifact seen.  EF 46%    CERVICAL FUSION  2010    CERVICAL FUSION  03/09/2016    REMOVAL HARDWARE C5-7, ACDF C4-6 WITH ATLANTIS CORNERSTONE    COLONOSCOPY  2010    CORONARY ARTERY BYPASS GRAFT      DILATATION, ESOPHAGUS      small resection    ENDOSCOPY, COLON, DIAGNOSTIC      NERVE SURGERY Bilateral 07/25/2017    THORACIC FACET BLOCK T7-8, T8-9, T11-12    OTHER SURGICAL HISTORY  05/02/2017    LESI L5    OTHER SURGICAL HISTORY  09/12/2017    thoracic epidural T11    NJ INJ,PARAVERTEBRAL L/S,1 LEVEL Bilateral 7/25/2017    T-FACET MBB T7-8, T8-9, T11-12 BILATERAL performed by Carli Manuel MD at [address] [facility] DX/THER SBST EPIDURAL/SUBARACH LUMBAR/SACRAL N/A 9/12/2017    TESI T11 performed by Carli Manuel MD at [address] [facility] [address]  2010    [address] EXTRACTION  02/2019    UPPER GASTROINTESTINAL ENDOSCOPY  2010       Social History     Socioeconomic History    Marital status:      Spouse name: Not on file    Number of children: 2    Years of education: 12    Highest education level: High school graduate   Occupational History    Not on file   Tobacco Use    Smoking status: Current Every Day Smoker     Packs/day: 0.25     Years: 25.00     Pack years: 6.25     Types: Cigarettes, Cigars     Start date: 6/9/1978    Smokeless tobacco: Never Used   Vaping Use    Vaping Use: Never used   Substance and Sexual Activity    Alcohol use: Yes     Alcohol/week: 0.0 standard drinks     Comment: not for 3 mo    Drug use: No    Sexual activity: Never   Other Topics Concern    Not on file   Social History Narrative    Not on file     Social Determinants of Health     Financial Resource Strain: High Risk    Difficulty of Paying Living Expenses: Very hard   Food Insecurity: No Food Insecurity    Worried About Running Out of Food in the Last Year: Never true    Martinez of Food in the Last Year: Never true   Transportation Needs:     Lack of Transportation (Medical):      Lack of Transportation (Non-Medical):    Physical Activity:     Days of Exercise per Week:     Minutes of Exercise per Session:    Stress:     Feeling of Stress :    Social Connections:     Frequency of Communication with Friends and Family:     Frequency of Social Gatherings with Friends and Family:     Attends Synagogue Services:     Active Member of Clubs or Organizations:     Attends Club or Organization Meetings:     Marital Status:    Intimate Partner Violence:     Fear of Current or Ex-Partner:     Emotionally Abused:     Physically Abused:     Sexually Abused:         Family History   Problem Relation Age of Onset    Cancer Mother     Diabetes Father     Cancer Father     Heart Disease Father 28        CABG    High Blood Pressure Father     Diabetes Sister         AS A CHILD    Kidney Disease Sister     High Blood Pressure Sister     Cancer Brother     COPD Sister     Heart Disease Sister     Stroke Neg Hx        ADVANCE DIRECTIVE: N, <no information>    Vitals:    07/15/21 0857   BP: (!) 142/70   Pulse: 74   Resp: 16   SpO2: 98%   Weight: 189 lb 9.6 oz (86 kg)     Estimated body mass index is 29.7 kg/m² as calculated from the following:    Height as of 2/25/21: 5' 7\" (1.702 m). Weight as of this encounter: 189 lb 9.6 oz (86 kg). Physical Exam  Vitals and nursing note reviewed. Constitutional:       General: He is not in acute distress. Appearance: Normal appearance. He is well-developed. HENT:      Head: Normocephalic and atraumatic. Right Ear: Tympanic membrane normal.      Left Ear: Tympanic membrane normal.   Eyes:      Conjunctiva/sclera: Conjunctivae normal.   Cardiovascular:      Rate and Rhythm: Normal rate and regular rhythm. Heart sounds: Normal heart sounds. No murmur heard. Pulmonary:      Effort: Pulmonary effort is normal.      Breath sounds: Normal breath sounds. No wheezing, rhonchi or rales. Abdominal:      General: There is no distension. Musculoskeletal:      Cervical back: Neck supple. Comments: No acute change in MSK exam   Skin:     General: Skin is warm and dry.       Findings: No rash (on exposed surfaces). Neurological:      General: No focal deficit present. Mental Status: He is alert. Psychiatric:         Attention and Perception: Attention normal.         Mood and Affect: Mood normal.         Speech: Speech normal.         Behavior: Behavior normal. Behavior is cooperative. Thought Content: Thought content normal.         Judgment: Judgment normal.         No flowsheet data found. Lab Results   Component Value Date    CHOL 88 03/11/2019    CHOL 113 12/12/2017    CHOL 101 09/28/2017    CHOLFAST 119 07/09/2020    TRIG 27 03/11/2019    TRIG 95 12/12/2017    TRIG 63 09/28/2017    TRIGLYCFAST 49 07/09/2020    HDL 41 07/09/2020    HDL 36 03/11/2019    HDL 36 12/12/2017    LDLCALC 68 07/09/2020    LDLCALC 47 03/11/2019    LDLCALC 58 12/12/2017    GLUF 97 07/09/2020    GLUCOSE 121 12/17/2020    LABA1C 5.8 07/09/2020    LABA1C 5.6 02/07/2019    LABA1C 5.5 03/23/2017       The ASCVD Risk score (Lisette Henson, et al., 2013) failed to calculate for the following reasons:     The valid total cholesterol range is 130 to 320 mg/dL    Immunization History   Administered Date(s) Administered    Influenza Virus Vaccine 11/04/2015    InfluenzaOctavio, 6-35 Months, IM (Fluzone,Afluria) 10/05/2016    InfluenzaOctavio, IM, (6 mo and older Fluzone, Flulaval, Fluarix and 3 yrs and older Afluria) 10/11/2017, 11/08/2018    Influenza, Quadv, IM, PF (6 mo and older Fluzone, Flulaval, Fluarix, and 3 yrs and older Afluria) 10/14/2019, 10/08/2020    Pneumococcal Conjugate 13-valent (Humphrey Marie) 06/15/2017       Health Maintenance   Topic Date Due    COVID-19 Vaccine (1) Never done    DTaP/Tdap/Td vaccine (1 - Tdap) Never done    Shingles Vaccine (1 of 2) Never done    Pneumococcal 0-64 years Vaccine (2 of 4 - PPSV23) 08/10/2017    A1C test (Diabetic or Prediabetic)  07/09/2021    Lipid screen  07/09/2021    Flu vaccine (1) 09/01/2021    TSH testing  12/17/2021    Colon cancer screen colonoscopy  03/27/2026    Hepatitis C screen  Completed    HIV screen  Completed    Hepatitis A vaccine  Aged Out    Hepatitis B vaccine  Aged Out    Hib vaccine  Aged Out    Meningococcal (ACWY) vaccine  Aged Out          ASSESSMENT/PLAN:  1. Spinal stenosis of lumbar region with neurogenic claudication  -     Rolf Bustillo DO, Pain Medicine, [place]  -     Handicap placard  2. Lumbar radiculopathy  -     Rolf Bustillo DO, Pain Medicine, [place]  -     HYDROcodone-acetaminophen ([address])  MG per tablet; Take 1 tablet by mouth every 8 hours as needed for Pain for up to 30 days. , Disp-90 tablet, R-0Normal  3. Chronic pain syndrome  -     [facility], Rolf, DO, Pain Medicine, [address]  4. H/O cervical spine surgery  -     [facility], Rolf, DO, Pain Medicine, [address]  5. Acquired hypothyroidism  6. Lymphomatoid papulosis ([facility] [address]  7. Essential hypertension  8. Dysthymia (or depressive neurosis)  9. Mixed hyperlipidemia  10. Chronic anxiety  11. S/P AVR (aortic valve replacement)  12. Cervicalgia, C5-7  -     HYDROcodone-acetaminophen (NORCO)  MG per tablet; Take 1 tablet by mouth every 8 hours as needed for Pain for up to 30 days. , Disp-90 tablet, R-0Normal    -  Chronic medical problems stable  -  Continue current medications, will lower Norco to TID in preparation for Dr. Jayesh Hernandez to continue the weaning process  -  Increase gabapentin to 400 mg TID  -  Follow up with specialists as scheduled  -  Refer to Dr. Jayesh Hernandez      Return in about 3 months (around 10/15/2021) for HTN. An electronic signature was used to authenticate this note.     --Belén Betts DO on 7/15/2021 at 9:53 AM

## 2021-07-25 ENCOUNTER — HOSPITAL ENCOUNTER (EMERGENCY)
Age: 56
Discharge: HOME | End: 2021-07-25
Payer: COMMERCIAL

## 2021-07-25 VITALS
HEART RATE: 67 BPM | DIASTOLIC BLOOD PRESSURE: 86 MMHG | BODY MASS INDEX: 29.19 KG/M2 | OXYGEN SATURATION: 100 % | SYSTOLIC BLOOD PRESSURE: 185 MMHG | RESPIRATION RATE: 16 BRPM | HEIGHT: 67 IN | TEMPERATURE: 96.9 F | WEIGHT: 186 LBS

## 2021-07-25 DIAGNOSIS — K04.7 DENTAL INFECTION: Primary | ICD-10-CM

## 2021-07-25 PROCEDURE — 99213 OFFICE O/P EST LOW 20 MIN: CPT

## 2021-07-25 PROCEDURE — 99213 OFFICE O/P EST LOW 20 MIN: CPT | Performed by: NURSE PRACTITIONER

## 2021-07-25 RX ORDER — LIDOCAINE HYDROCHLORIDE 20 MG/ML
15 SOLUTION OROPHARYNGEAL PRN
Qty: 100 ML | Refills: 1 | Status: SHIPPED | OUTPATIENT
Start: 2021-07-25

## 2021-07-25 RX ORDER — PENICILLIN V POTASSIUM 500 MG/1
500 TABLET ORAL 4 TIMES DAILY
Qty: 40 TABLET | Refills: 0 | Status: SHIPPED | OUTPATIENT
Start: 2021-07-25 | End: 2021-08-04

## 2021-07-25 RX ORDER — IBUPROFEN 400 MG/1
400 TABLET ORAL EVERY 6 HOURS PRN
COMMUNITY

## 2021-07-25 ASSESSMENT — ENCOUNTER SYMPTOMS
VOMITING: 0
RHINORRHEA: 0
SHORTNESS OF BREATH: 0
DIARRHEA: 0
NAUSEA: 0
SORE THROAT: 0
CHEST TIGHTNESS: 0
COUGH: 0

## 2021-07-25 ASSESSMENT — PAIN - FUNCTIONAL ASSESSMENT: PAIN_FUNCTIONAL_ASSESSMENT: PREVENTS OR INTERFERES SOME ACTIVE ACTIVITIES AND ADLS

## 2021-07-25 ASSESSMENT — PAIN SCALES - GENERAL: PAINLEVEL_OUTOF10: 5

## 2021-07-25 ASSESSMENT — PAIN DESCRIPTION - ORIENTATION: ORIENTATION: RIGHT;LOWER

## 2021-07-25 ASSESSMENT — PAIN DESCRIPTION - LOCATION: LOCATION: MOUTH

## 2021-07-25 ASSESSMENT — PAIN DESCRIPTION - DESCRIPTORS: DESCRIPTORS: DISCOMFORT;ACHING

## 2021-07-25 ASSESSMENT — PAIN DESCRIPTION - PAIN TYPE: TYPE: ACUTE PAIN

## 2021-07-25 NOTE — ED TRIAGE NOTES
Patient ambulated to room with complaint of right lower dental pain. States he has been seeing [facility] for his teeth removal and he will have partial plate.  States his tooth broke Friday

## 2021-07-25 NOTE — ED PROVIDER NOTES
Patient seen and evaluated for dental pain. [facility]  Urgent Care Encounter       CHIEF COMPLAINT       Chief Complaint   Patient presents with    Dental Pain       Nurses Notes reviewed and I agree except as noted in the HPI. HISTORY OF PRESENT ILLNESS   Shawna Harden is a 60 y.o. male who presents to the [facility] urgent care for evaluation of dental pain. He reports it started last night. He reports it was aggravated by a cracked tooth. He does report he plans on having his lower teeth removed and dentures placed in the near future. He is noted to have dental caries, missing teeth, and a dental abscess. The history is provided by the patient. No  was used. REVIEW OF SYSTEMS     Review of Systems   Constitutional: Negative for activity change, appetite change, chills, fatigue and fever. HENT: Positive for dental problem. Negative for ear discharge, ear pain, rhinorrhea and sore throat. Respiratory: Negative for cough, chest tightness and shortness of breath. Cardiovascular: Negative for chest pain. Gastrointestinal: Negative for diarrhea, nausea and vomiting. Genitourinary: Negative for dysuria. Skin: Negative for rash. Allergic/Immunologic: Negative for environmental allergies and food allergies. Neurological: Negative for dizziness and headaches.        PAST MEDICAL HISTORY         Diagnosis Date    Arthritis     general    Bilateral sciatica     Bleeding     with coumadin    CAD (coronary artery disease)     Carpal tunnel syndrome     RIGHT    Cervicalgia, C5-7     Chronic anxiety     Dysthymia (or depressive neurosis) 2/1/2016    ED (erectile dysfunction)     ED (erectile dysfunction)     GERD (gastroesophageal reflux disease)     Headache(784.0)     History of blood transfusion 03/2016    HTN (hypertension)     Hyperlipidemia     Hypothyroidism     Iron deficiency anemia, blood loss     Lumbago     Lumbar radiculopathy     Osteoarthritis (arthritis due to wear and tear of joints)     S/P AVR, coumadin Tx     Spondylosis of thoracic region without myelopathy or radiculopathy        SURGICALHISTORY     Patient  has a past surgical history that includes cardiovascular stress test (4 15 2010); Appendectomy (1980); Colonoscopy (2010); Small intestine surgery (2010); Upper gastrointestinal endoscopy (2010); Aortic valve replacement (2007); Endoscopy, colon, diagnostic; cervical fusion (2010); cervical fusion (03/09/2016); Cardiac catheterization (5 20 2010); Cardiac catheterization (2012, 6/9/16); Dilatation, esophagus; Aortic valve replacement (07/20/2016); Coronary artery bypass graft; other surgical history (05/02/2017); Nerve Surgery (Bilateral, 07/25/2017); pr inj,paravertebral l/s,1 level (Bilateral, 7/25/2017); other surgical history (09/12/2017); pr njx dx/ther sbst epidural/subarach lumbar/sacral (N/A, 9/12/2017); and Tooth Extraction (02/2019). CURRENT MEDICATIONS       Discharge Medication List as of 7/25/2021  4:36 PM      CONTINUE these medications which have NOT CHANGED    Details   ibuprofen (ADVIL;MOTRIN) 400 MG tablet Take 400 mg by mouth every 6 hours as needed for PainHistorical Med      !! gabapentin (NEURONTIN) 400 MG capsule Take 1 capsule by mouth 3 times daily for 90 days. , Disp-270 capsule, R-0Normal      HYDROcodone-acetaminophen (NORCO)  MG per tablet Take 1 tablet by mouth every 8 hours as needed for Pain for up to 30 days. , Disp-90 tablet, R-0Normal      ALPRAZolam (XANAX) 0.5 MG tablet TAKE 1 TABLET BY MOUTH THREE TIMES A DAY AS NEEDED FOR ANXIETY OR SLEEP, Disp-90 tablet, R-2Normal      !! gabapentin (NEURONTIN) 300 MG capsule TAKE 1 CAPSULE BY MOUTH THREE TIMES A DAY, Disp-270 capsule, R-3Normal      aspirin 81 MG EC tablet Take 1 tablet by mouth daily, Disp-90 tablet, R-3Normal      esomeprazole (NEXIUM) 40 MG delayed release capsule Take 1 capsule by mouth See Admin Disease (age of onset: 28) in his father; High Blood Pressure in his father and sister; Kidney Disease in his sister. SOCIAL HISTORY     Patient  reports that he has been smoking cigarettes and cigars. He started smoking about 43 years ago. He has a 6.25 pack-year smoking history. He has never used smokeless tobacco. He reports current alcohol use. He reports that he does not use drugs. PHYSICAL EXAM     ED TRIAGE VITALS  BP: (!) 185/86, Temp: 96.9 °F (36.1 °C), Pulse: 67, Resp: 16, SpO2: 100 %,Estimated body mass index is 29.13 kg/m² as calculated from the following:    Height as of this encounter: 5' 7\" (1.702 m). Weight as of this encounter: 186 lb (84.4 kg). ,No LMP for male patient. Physical Exam  Vitals and nursing note reviewed. Constitutional:       General: He is not in acute distress. Appearance: Normal appearance. He is not ill-appearing, toxic-appearing or diaphoretic. HENT:      Head: Normocephalic. Right Ear: Ear canal and external ear normal.      Left Ear: Ear canal and external ear normal.      Nose: Nose normal. No congestion or rhinorrhea. Mouth/Throat:      Mouth: Mucous membranes are moist.      Dentition: Abnormal dentition. Does not have dentures. Dental tenderness, gingival swelling, dental caries and dental abscesses present. No gum lesions. Pharynx: Oropharynx is clear. No oropharyngeal exudate or posterior oropharyngeal erythema. Cardiovascular:      Rate and Rhythm: Normal rate. Pulses: Normal pulses. Pulmonary:      Effort: Pulmonary effort is normal. No respiratory distress. Breath sounds: No stridor. No wheezing or rhonchi. Abdominal:      General: Abdomen is flat. Bowel sounds are normal.      Palpations: Abdomen is soft. Musculoskeletal:         General: No swelling or tenderness. Normal range of motion. Cervical back: Normal range of motion. Neurological:      General: No focal deficit present.       Mental Status: He is

## 2021-07-26 ENCOUNTER — TELEPHONE (OUTPATIENT)
Dept: FAMILY MEDICINE CLINIC | Age: 56
End: 2021-07-26

## 2021-07-26 NOTE — LETTER
[facility], [address]  Phone: [phone]  Fax: [phone]     July 26, 2021    Deni Mckeon  [address]    Dear Dave Carina,    Thank you for choosing our [facility] on 7/25/21. Your Provider wanted to make sure that you understand your discharge instructions and that you were able to fill any prescriptions that may have been ordered for you. Please contact the office at the above phone number if you were advised to follow up with your Provider, or if you have any further questions or needs. Also did you know -                              [facility] ([facility]) practices can often offer you an appointment on the same day that you call for acute issues. *We have some [phone] [facility] offices that offer Walk-in appointments; check our website for availability in your community, [email or web address].      *Evisits are now available for patients through [address] [facility] ([facility]) also offers video visits through [address] If you do not have MyChart and are interested, please contact the office and a staff member may assist you or go to [email or web address].       Sincerely,     Tiffany Norman DO and your [facility]

## 2021-08-09 ENCOUNTER — TELEPHONE (OUTPATIENT)
Dept: FAMILY MEDICINE CLINIC | Age: 56
End: 2021-08-09

## 2021-08-09 NOTE — TELEPHONE ENCOUNTER
----- Message from Pari Wade sent at 8/9/2021  9:40 AM EDT -----  Subject: Message to Provider    QUESTIONS  Information for Provider? Patient said Pain Management cannot get him in   until 10/28/2021 and wants to see about extending his pain meds out until   he gets in for his appointment (he said to please leave a message of your   answer)  ---------------------------------------------------------------------------  --------------  [address]  What is the best way for the office to contact you? OK to leave message on   voicemail  Preferred Call Back Phone Number? [phone]  ---------------------------------------------------------------------------  --------------  SCRIPT ANSWERS  Relationship to Patient?  Self

## 2021-08-16 DIAGNOSIS — M54.2 CERVICALGIA: ICD-10-CM

## 2021-08-16 DIAGNOSIS — M54.16 LUMBAR RADICULOPATHY: ICD-10-CM

## 2021-08-16 RX ORDER — HYDROCODONE BITARTRATE AND ACETAMINOPHEN 10; 325 MG/1; MG/1
1 TABLET ORAL EVERY 8 HOURS PRN
Qty: 90 TABLET | Refills: 0 | Status: SHIPPED | OUTPATIENT
Start: 2021-08-16 | End: 2021-09-16 | Stop reason: SDUPTHER

## 2021-09-08 ENCOUNTER — HOSPITAL ENCOUNTER (OUTPATIENT)
Age: 56
Discharge: HOME | End: 2021-09-08
Payer: COMMERCIAL

## 2021-09-08 LAB
ALBUMIN SERPL-MCNC: 4.4 G/DL (ref 3.5–5.1)
ALP BLD-CCNC: 72 U/L (ref 38–126)
ALT SERPL-CCNC: 14 U/L (ref 11–66)
ANION GAP SERPL CALCULATED.3IONS-SCNC: 12 MEQ/L (ref 8–16)
AST SERPL-CCNC: 19 U/L (ref 5–40)
BILIRUB SERPL-MCNC: 0.2 MG/DL (ref 0.3–1.2)
BUN BLDV-MCNC: 19 MG/DL (ref 7–22)
CALCIUM SERPL-MCNC: 9.2 MG/DL (ref 8.5–10.5)
CHLORIDE BLD-SCNC: 104 MEQ/L (ref 98–111)
CO2: 24 MEQ/L (ref 23–33)
CREAT SERPL-MCNC: 0.9 MG/DL (ref 0.4–1.2)
ERYTHROCYTE [DISTWIDTH] IN BLOOD BY AUTOMATED COUNT: 16.8 % (ref 11.5–14.5)
ERYTHROCYTE [DISTWIDTH] IN BLOOD BY AUTOMATED COUNT: 54.8 FL (ref 35–45)
GFR SERPL CREATININE-BSD FRML MDRD: 87 ML/MIN/1.73M2
GLUCOSE BLD-MCNC: 98 MG/DL (ref 70–108)
HCT VFR BLD CALC: 40.6 % (ref 42–52)
HEMOGLOBIN: 13.2 GM/DL (ref 14–18)
MCH RBC QN AUTO: 28.9 PG (ref 26–33)
MCHC RBC AUTO-ENTMCNC: 32.5 GM/DL (ref 32.2–35.5)
MCV RBC AUTO: 89 FL (ref 80–94)
PLATELET # BLD: 293 THOU/MM3 (ref 130–400)
PMV BLD AUTO: 11.1 FL (ref 9.4–12.4)
POTASSIUM SERPL-SCNC: 4.2 MEQ/L (ref 3.5–5.2)
RBC # BLD: 4.56 MILL/MM3 (ref 4.7–6.1)
SODIUM BLD-SCNC: 140 MEQ/L (ref 135–145)
TOTAL PROTEIN: 7.1 G/DL (ref 6.1–8)
WBC # BLD: 7.3 THOU/MM3 (ref 4.8–10.8)

## 2021-09-08 PROCEDURE — 80053 COMPREHEN METABOLIC PANEL: CPT

## 2021-09-08 PROCEDURE — 36415 COLL VENOUS BLD VENIPUNCTURE: CPT

## 2021-09-08 PROCEDURE — 85027 COMPLETE CBC AUTOMATED: CPT

## 2021-09-16 DIAGNOSIS — M54.2 CERVICALGIA: ICD-10-CM

## 2021-09-16 DIAGNOSIS — M54.16 LUMBAR RADICULOPATHY: ICD-10-CM

## 2021-09-16 RX ORDER — HYDROCODONE BITARTRATE AND ACETAMINOPHEN 10; 325 MG/1; MG/1
1 TABLET ORAL EVERY 8 HOURS PRN
Qty: 90 TABLET | Refills: 0 | Status: SHIPPED | OUTPATIENT
Start: 2021-09-16 | End: 2021-10-18 | Stop reason: SDUPTHER

## 2021-09-16 NOTE — TELEPHONE ENCOUNTER
The patient called in and is requesting a refill on his [place] to go to [facility]. Order pended for your signature. If no call back the patient will check with his pharmacy after 2pm today.

## 2021-10-18 DIAGNOSIS — M54.16 LUMBAR RADICULOPATHY: ICD-10-CM

## 2021-10-18 DIAGNOSIS — M54.2 CERVICALGIA: ICD-10-CM

## 2021-10-18 RX ORDER — HYDROCODONE BITARTRATE AND ACETAMINOPHEN 10; 325 MG/1; MG/1
1 TABLET ORAL EVERY 8 HOURS PRN
Qty: 9 TABLET | Refills: 0 | Status: SHIPPED | OUTPATIENT
Start: 2021-10-18 | End: 2021-10-21

## 2021-10-18 NOTE — TELEPHONE ENCOUNTER
Requested Prescriptions     Pending Prescriptions Disp Refills    HYDROcodone-acetaminophen (NORCO)  MG per tablet 90 tablet 0     Sig: Take 1 tablet by mouth every 8 hours as needed for Pain for up to 30 days. If no call back patient will check with Walt Coronado at 12 noon today.

## 2021-10-20 ENCOUNTER — OFFICE VISIT (OUTPATIENT)
Dept: PHYSICAL MEDICINE AND REHAB | Age: 56
End: 2021-10-20
Payer: COMMERCIAL

## 2021-10-20 VITALS
SYSTOLIC BLOOD PRESSURE: 136 MMHG | WEIGHT: 186 LBS | HEIGHT: 67 IN | BODY MASS INDEX: 29.19 KG/M2 | DIASTOLIC BLOOD PRESSURE: 78 MMHG

## 2021-10-20 DIAGNOSIS — M47.816 LUMBAR SPONDYLOSIS: ICD-10-CM

## 2021-10-20 DIAGNOSIS — M47.816 LUMBAR FACET ARTHROPATHY: Primary | ICD-10-CM

## 2021-10-20 DIAGNOSIS — G89.4 CHRONIC PAIN SYNDROME: ICD-10-CM

## 2021-10-20 DIAGNOSIS — M54.16 LUMBAR RADICULOPATHY: ICD-10-CM

## 2021-10-20 DIAGNOSIS — M48.062 SPINAL STENOSIS OF LUMBAR REGION WITH NEUROGENIC CLAUDICATION: ICD-10-CM

## 2021-10-20 PROCEDURE — 99205 OFFICE O/P NEW HI 60 MIN: CPT | Performed by: NURSE PRACTITIONER

## 2021-10-20 ASSESSMENT — ENCOUNTER SYMPTOMS
RHINORRHEA: 0
EYE DISCHARGE: 0
CONSTIPATION: 1
NAUSEA: 0
BACK PAIN: 1
CHOKING: 0
SORE THROAT: 0
ABDOMINAL PAIN: 0
VOICE CHANGE: 0
CHEST TIGHTNESS: 0
APNEA: 0
PHOTOPHOBIA: 0
RESPIRATORY NEGATIVE: 1
VOMITING: 0
STRIDOR: 0
SHORTNESS OF BREATH: 0
SINUS PAIN: 0
COLOR CHANGE: 0
ANAL BLEEDING: 0
ABDOMINAL DISTENTION: 0
FACIAL SWELLING: 0
SINUS PRESSURE: 0
EYE REDNESS: 0
DIARRHEA: 0
WHEEZING: 0
EYE PAIN: 0
EYE ITCHING: 0
BLOOD IN STOOL: 0
COUGH: 0
RECTAL PAIN: 0
TROUBLE SWALLOWING: 0
EYES NEGATIVE: 1

## 2021-10-20 NOTE — PROGRESS NOTES
[address]  Dept: [phone]  Dept Fax: [phone]: [phone]    Visit Date: 10/20/2021    Roslyn Sparrow is a 60 y.o. male who is referred for pain management evaluation and treatment per Dr. Valentino Hernandez. CAGE and CAGE-AID Questions   1. In the last three months, have you felt you should cut down or stop drinking or using drugs? Yes []        No [x]     2. In the last three months, has anyone annoyed you or gotten on your nerves by telling you to cut down or stop drinking or using drugs? Yes []        No [x]     3. In the last three months, have you felt guilty or bad about how much you drink or use drugs? Yes []        No [x]     4. In the last three months, have you been waking up wanting to have an alcoholic drink or use drugs? Yes []        No [x]        Opioid Risk Tool:  Clinician Form       1. Family History of Substance Abuse: Female Male    Alcohol   []1   []3    Illegal drugs   []2   []3    Prescription drugs     []4   []4   2. Personal History of Substance Abuse:          Alcohol   []3   []3    Illegal drugs   []4   []4    Prescription drugs     []5   []5   3. Age (mao box if between 12 and 39):     []1   []1   4. History of Preadolescent Sexual Abuse:     []3   []0   5. Psychological Disease:      Attention deficit disorder, obsessive-compulsive disorder, bipolar, schizophrenia   []2   []2      Depression     []1   []1    Scoring Totals 0      Total Score  Low Risk  Moderate Risk  High Risk   Risk Category   0 - 3   4 - 7   8 or Above      Patient states symptoms interfere with:  A.  General Activity:  yes   B. Mood: yes    C. Walking Ability:   yes   D. Normal Work (Includes both work outside the home and housework):   yes    E.  Relations with Other People:  yes   F. Sleep:   yes   G.  Enjoyment of Life:  yes       HPI:     Chief Complaint: mid and low back pain and leg pain     HPI   Patient referred her from his pcp for lumbar and cervical pain. He reports no complaints on neck pain and states that hit resolved since neck surgery in 2010. 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 He used to be a patient here in 2017 and 2018 and had multiple LESI and, T-facet MBB, TESI without relief. He states that there was an issue with FU so then he went to [facility] in 2018 and 2019 and had multiple injections and L-facet without any relief. Has pain in low back across and radiating down buttocks and down bilateral legs to feet anteriorly . Pain is constant in low back its described as a constant dull stabbing pain with pins and needles and in legs numb and tinging. Pain is more bothersome in low back- 60% bothersome in low backcompared to 40% in legs. Pain is rated anywhere from 5-8/10. Patient pain increases with bending, lifting, twisting , walking, standing, sitting and getting up and down. Pain is relieved with laying flat and pain medications long term from Dr. Breann Keene was prescribed Norco 10/3255 now on TID prn used to be on QID prn. States that Dr. Breann Keene retired so Dr. Minh Valdez took over and does not want to prescribe pain medications anymore so he was sent here to continue medication management. I discussed I would need to discuss with Dr. José Luis Garcia but this may not be possible. Had physical therapy years ago without relief, following chiropractor which helps last seen 2 weeks ago, heat, ice, tylenol, not a candidate for NSAIDS d/t heart issues, states does a lot of stretching, massage therapy, on [place] 10/325 TID prn from pcp and also on Xanax. Also on Neurontin 400 mg TID. Had any procedure in back without relief from 2 different pain clinics.      Pain interferes with sleep, pedicle of L5.       At L5-S1, there are minimal facet degenerative changes. There is no spinal canal or foraminal stenosis.       There are no suspicious findings in the visualized aspects of the retroperitoneum and paraspinal soft tissues.           Impression       1. Mild spinal canal stenosis at the L1-2 level. This is relatively stable. 2. Degenerative changes in the left facet joint at the L4-5 level. There is some associated edema. The patient has No Known Allergies. Subjective:      Review of Systems   Constitutional: Positive for fatigue. Negative for activity change, appetite change, chills, diaphoresis, fever and unexpected weight change. HENT: Negative. Negative for congestion, dental problem, drooling, ear discharge, ear pain, facial swelling, hearing loss, mouth sores, nosebleeds, postnasal drip, rhinorrhea, sinus pressure, sinus pain, sneezing, sore throat, tinnitus, trouble swallowing and voice change. Eyes: Negative. Negative for photophobia, pain, discharge, redness, itching and visual disturbance. Respiratory: Negative. Negative for apnea, cough, choking, chest tightness, shortness of breath, wheezing and stridor. Cardiovascular: Negative. Negative for chest pain, palpitations and leg swelling. CAD   Gastrointestinal: Positive for constipation. Negative for abdominal distention, abdominal pain, anal bleeding, blood in stool, diarrhea, nausea, rectal pain and vomiting. Endocrine: Negative for cold intolerance, heat intolerance, polydipsia, polyphagia and polyuria. Genitourinary: Negative. Negative for decreased urine volume, difficulty urinating, discharge, dysuria, enuresis, flank pain, frequency, genital sores, hematuria, penile pain, penile swelling, scrotal swelling, testicular pain and urgency. Musculoskeletal: Positive for arthralgias, back pain, gait problem and myalgias. Negative for joint swelling, neck pain and neck stiffness.         Ambulating without assist devices    Skin: Negative. Negative for color change, pallor, rash and wound. Allergic/Immunologic: Negative for environmental allergies, food allergies and immunocompromised state. Neurological: Positive for weakness and numbness. Negative for dizziness, tremors, seizures, syncope, facial asymmetry, speech difficulty, light-headedness and headaches. Hematological: Negative for adenopathy. Does not bruise/bleed easily. Psychiatric/Behavioral: Positive for sleep disturbance. Negative for agitation, behavioral problems, confusion, decreased concentration, dysphoric mood, hallucinations, self-injury and suicidal ideas. The patient is nervous/anxious. The patient is not hyperactive. Objective:     Vitals:    10/20/21 1015   BP: 136/78   Weight: 186 lb (84.4 kg)   Height: 5' 7\" (1.702 m)       Physical Exam  Vitals and nursing note reviewed. Constitutional:       General: He is not in acute distress. Appearance: Normal appearance. He is well-developed. He is not diaphoretic. HENT:      Head: Normocephalic and atraumatic. Right Ear: External ear normal.      Left Ear: External ear normal.      Nose: Nose normal.      Mouth/Throat:      Mouth: Mucous membranes are moist.      Pharynx: No oropharyngeal exudate. Eyes:      General: No scleral icterus. Right eye: No discharge. Left eye: No discharge. Conjunctiva/sclera: Conjunctivae normal.      Pupils: Pupils are equal, round, and reactive to light. Neck:      Thyroid: No thyromegaly. Cardiovascular:      Rate and Rhythm: Normal rate and regular rhythm. Heart sounds: Murmur heard. No friction rub. No gallop. Comments: CLICK PRESENT  Pulmonary:      Effort: Pulmonary effort is normal. No respiratory distress. Breath sounds: Normal breath sounds. No wheezing or rales. Chest:      Chest wall: No tenderness. Abdominal:      General: Bowel sounds are normal. There is no distension. Palpations: Abdomen is soft. Tenderness: There is no abdominal tenderness. There is no guarding or rebound. Musculoskeletal:         General: Tenderness present. Right shoulder: Tenderness present. Normal range of motion. Left shoulder: Tenderness present. Normal range of motion. Right elbow: Tenderness present. Left elbow: Tenderness present. No medial epicondyle tenderness. Cervical back: Full passive range of motion without pain and normal range of motion. Rigidity and spasms present. No edema, erythema or tenderness. No muscular tenderness. Normal range of motion. Thoracic back: Spasms and tenderness present. Lumbar back: Tenderness and bony tenderness present. Decreased range of motion. Negative right straight leg raise test and negative left straight leg raise test.        Back:       Right hip: No tenderness. Left hip: No tenderness. Right knee: Normal range of motion. No tenderness. Left knee: Normal range of motion. No tenderness. Right ankle: No swelling. Left ankle: No swelling. Right foot: No swelling. Left foot: No swelling. Skin:     General: Skin is warm. Coloration: Skin is not pale. Findings: No erythema or rash. Neurological:      General: No focal deficit present. Mental Status: He is alert and oriented to person, place, and time. He is not disoriented. Cranial Nerves: No cranial nerve deficit. Sensory: No sensory deficit. Motor: No atrophy or abnormal muscle tone. Coordination: Coordination normal.      Gait: Gait normal.      Deep Tendon Reflexes: Reflexes are normal and symmetric. Comments: SLR mild positive RLE   Psychiatric:         Attention and Perception: He is attentive. Mood and Affect: Mood normal. Mood is not anxious or depressed. Affect is not labile, blunt, angry or inappropriate. Speech: He is communicative.  Speech is not rapid and UDS ordered today. Patient currently receives pain medications from pcc Norco 10/325 TID prn and he states they will not prescribe anymore. I will need to discuss with Dr. Lisette Torrez if we can take over but mostly we will not prescribe these long term opioid pain medications as goal is not to treat symptoms but treat problem.  Ordered physical therapy with dry needle therapy      Previous Treatments tried:  · PT: Yes,  any benefit? Long time ago   · NSAIDs: No,  any benefit? Not a candidate   · Chiropractic: Yes,  any benefit? Yes  · Muscle relaxants: No  · Narcotics: Yes,  any benefit? Yes  · Spine surgeon consult: No  · Any Implants: No    Meds. Prescribed:   No orders of the defined types were placed in this encounter. Return in about 6 weeks (around 12/1/2021), or if symptoms worsen or fail to improve, for After lumbar MRI and therapies . Time spent with patient was 60 minutes more than 50% was spent  Counseling/coordinated the patient'scare.     Electronically signed by JASWINDER Larios CNP on 10/20/2021 at 11:00 AM

## 2021-10-21 ENCOUNTER — OFFICE VISIT (OUTPATIENT)
Dept: FAMILY MEDICINE CLINIC | Age: 56
End: 2021-10-21
Payer: COMMERCIAL

## 2021-10-21 VITALS
DIASTOLIC BLOOD PRESSURE: 62 MMHG | RESPIRATION RATE: 16 BRPM | BODY MASS INDEX: 29.66 KG/M2 | WEIGHT: 189.4 LBS | SYSTOLIC BLOOD PRESSURE: 134 MMHG | HEART RATE: 68 BPM

## 2021-10-21 DIAGNOSIS — F41.9 CHRONIC ANXIETY: ICD-10-CM

## 2021-10-21 DIAGNOSIS — E78.2 MIXED HYPERLIPIDEMIA: ICD-10-CM

## 2021-10-21 DIAGNOSIS — F34.1 DYSTHYMIA (OR DEPRESSIVE NEUROSIS): ICD-10-CM

## 2021-10-21 DIAGNOSIS — M48.062 SPINAL STENOSIS OF LUMBAR REGION WITH NEUROGENIC CLAUDICATION: Primary | ICD-10-CM

## 2021-10-21 DIAGNOSIS — I10 ESSENTIAL HYPERTENSION: ICD-10-CM

## 2021-10-21 DIAGNOSIS — C86.6 LYMPHOMATOID PAPULOSIS (HCC): ICD-10-CM

## 2021-10-21 DIAGNOSIS — G89.4 CHRONIC PAIN SYNDROME: ICD-10-CM

## 2021-10-21 DIAGNOSIS — E03.9 ACQUIRED HYPOTHYROIDISM: ICD-10-CM

## 2021-10-21 PROCEDURE — 99214 OFFICE O/P EST MOD 30 MIN: CPT | Performed by: FAMILY MEDICINE

## 2021-10-21 RX ORDER — HYDROCODONE BITARTRATE AND ACETAMINOPHEN 5; 325 MG/1; MG/1
1 TABLET ORAL 2 TIMES DAILY
Qty: 60 TABLET | Refills: 0 | Status: SHIPPED | OUTPATIENT
Start: 2021-10-21 | End: 2021-11-19 | Stop reason: SDUPTHER

## 2021-10-21 ASSESSMENT — ENCOUNTER SYMPTOMS
GASTROINTESTINAL NEGATIVE: 1
RESPIRATORY NEGATIVE: 1

## 2021-10-21 NOTE — PATIENT INSTRUCTIONS
You may receive a survey about your visit with us today. The feedback from our patients helps us identify what is working well and where the service to all patients can be enhanced. Thank you! Tobacco Cessation Programs     Telephonic behavior modification  Mayfield Manny ([phone])   Counseling service for those who are ready to quit using tobacco.     Available for uninsured [place] residents, [place] recipients, pregnant women, or patients whose health plans or employers are members of the [address] [place] [address] behavior modification   http://[place]. Quitlogix. org   Online support program to help patients through each step of the quitting process. Available 24 hours a day 7 days a week. Provides up to date researched based tool, step-by-step guides, and motivational messages. Online behavior modification   www.lungusa.org/stop-smoking/how-to-quit   HelpLine: 1-800-LUNG-[place] (504-4871)   Email questions to:  Dakota@Clay.io. org    Website offers resources to help tobacco users figure out their reasons for quitting and then take the big step of quitting for good. Hypnosis   [facility] [address] [place] [address], [facility], [place]   Contact: Claudy Roldan, PhD at [phone]   Hypnosis for tobacco cessation   Cost $225 for the initial session and $175 for each session afterwards. Most patients require 6-8 sessions. There is the option to submit through the patients insurance. Hypnosis and behavior modification   Location: [facility],  [address] [place] [address] [employer], [place]   Contact: India Dorman, PhD at [phone]  [facility] and hypnosis for nicotine addition   Cost: For uninsured patients:  Please call above phone number  Cost for insured patients depends on patients insurance plan.     Behavior modification   Location: [facility], [address] [facility] [address] [place] [address] [employer], [address]: [address] four one-on-one appointments between the patient and a respiratory therapist.  The four appointments span over three weeks. The respiratory therapist schedules one of the appointments to occur 48 hours after the patients quit date.  Cost $100 total for the four sessions.   Tobacco cessation products are not included in the cost and are not provided by [facility] [place], [facility].

## 2021-10-21 NOTE — PROGRESS NOTES
Thalia Palacio (:  1965) is a 60 y.o. male,Established patient, here for evaluation of the following chief complaint(s):  3 Month Follow-Up and Hypertension        Subjective   SUBJECTIVE/OBJECTIVE:  HPI:    Chief Complaint   Patient presents with    3 Month Follow-Up    Hypertension     3 month eval.    Met with Pain Management who is not filling his Norco at this time, correspondence reviewed. PT and dry needling ordered. BPs and weight stable. BP Readings from Last 3 Encounters:   10/21/21 134/62   10/20/21 136/78   21 (!) 185/86     Wt Readings from Last 3 Encounters:   10/21/21 189 lb 6.4 oz (85.9 kg)   10/20/21 186 lb (84.4 kg)   21 186 lb (84.4 kg)     Mood controlled on current medications. Patient Active Problem List   Diagnosis    HTN (hypertension)    Lumbago    Lumbar radiculopathy    Cervicalgia, C5-7    Hyperlipidemia    Hypothyroidism    Chronic anxiety    ED (erectile dysfunction)    Bilateral sciatica    Trigger finger, right, ring     Medication monitoring encounter    Nocturnal leg cramps    GERD (gastroesophageal reflux disease)    IFG (impaired fasting glucose)    Situational depression    Dysthymia (or depressive neurosis)    Herniation of cervical intervertebral disc with radiculopathy    H/O cervical spine surgery, C4-5 fusion, 3/2/16. 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     Tobacco abuse    Lymphomatoid papulosis     Lumbar radiculitis    Lumbar spinal stenosis    Spondylosis of thoracic region without myelopathy or radiculopathy    Thoracic spinal stenosis    Chronic pain syndrome    Urinary hesitancy    Malignant lymphoma, non-Hodgkin's ([facility] [address]     Past Surgical History:   Procedure Laterality Date    AORTIC VALVE REPLACEMENT      [facility]    AORTIC VALVE REPLACEMENT  2016    extraction of mechanical valve and replacement with tissue valve    APPENDECTOMY  1980    CARDIAC CATHETERIZATION  5 20 2010    Patent coronary arteries. Possible causes of the patient's chest pain is likely noncardiac at least based on this angiogram. Patient chould be able to proceed w/ scheduled surgery w/ paying attention to anticoagulation and trying to minimize the period of no anticoagulation.  CARDIAC CATHETERIZATION  2012, 6/9/16    [facility]    CARDIOVASCULAR STRESS TEST  4 15 2010    Moderate fixed inferior defect, possibly attenuation, cannot exclude a previous MI. Correlation w/ EKG is recommented. Significant degree of gut uptake which is likely to be the cause of the attenuation artifact seen. EF 46%    CERVICAL FUSION  2010    CERVICAL FUSION  03/09/2016    REMOVAL HARDWARE C5-7, ACDF C4-6 WITH ATLANTIS CORNERSTONE    COLONOSCOPY  2010    CORONARY ARTERY BYPASS GRAFT      DILATATION, ESOPHAGUS      small resection    ENDOSCOPY, COLON, DIAGNOSTIC      NERVE SURGERY Bilateral 07/25/2017    THORACIC FACET BLOCK T7-8, T8-9, T11-12    OTHER SURGICAL HISTORY  05/02/2017    LESI L5    OTHER SURGICAL HISTORY  09/12/2017    thoracic epidural T11    NE INJ,PARAVERTEBRAL L/S,1 LEVEL Bilateral 7/25/2017    T-FACET MBB T7-8, T8-9, T11-12 BILATERAL performed by Eugene Coronel MD at [address] [place] [address] DX/THER [address] LUMBAR/SACRAL N/A 9/12/2017    TESI T11 performed by Eugene Coronel MD at [address] [facility] [address]  2010    [address] EXTRACTION  02/2019    UPPER GASTROINTESTINAL ENDOSCOPY  2010     Social History     Tobacco Use    Smoking status: Current Every Day Smoker     Packs/day: 0.25     Years: 25.00     Pack years: 6.25     Types: Cigarettes, Cigars     Start date: 6/9/1978    Smokeless tobacco: Never Used   Vaping Use    Vaping Use: Never used   Substance Use Topics    Alcohol use:  Yes     Alcohol/week: 0.0 standard drinks     Comment: not for 3 mo    Drug use: No         Review of Systems   Constitutional: Negative. HENT: Negative. Respiratory: Negative. Cardiovascular: Negative. Gastrointestinal: Negative. Musculoskeletal: Negative. All other systems reviewed and are negative. Objective   Physical Exam  Vitals and nursing note reviewed. Constitutional:       General: He is not in acute distress. Appearance: Normal appearance. He is well-developed. HENT:      Head: Normocephalic and atraumatic. Right Ear: Tympanic membrane normal.      Left Ear: Tympanic membrane normal.   Eyes:      Conjunctiva/sclera: Conjunctivae normal.   Cardiovascular:      Rate and Rhythm: Normal rate and regular rhythm. Heart sounds: Normal heart sounds. No murmur heard. Pulmonary:      Effort: Pulmonary effort is normal.      Breath sounds: Normal breath sounds. No wheezing, rhonchi or rales. Abdominal:      General: There is no distension. Musculoskeletal:      Cervical back: Neck supple. Skin:     General: Skin is warm and dry. Findings: No rash (on exposed surfaces). Neurological:      General: No focal deficit present. Mental Status: He is alert. Psychiatric:         Attention and Perception: Attention normal.         Mood and Affect: Mood normal.         Speech: Speech normal.         Behavior: Behavior normal. Behavior is cooperative. Thought Content: Thought content normal.         Judgment: Judgment normal.               ASSESSMENT/PLAN:  1. Spinal stenosis of lumbar region with neurogenic claudication  -     HYDROcodone-acetaminophen (NORCO) 5-325 MG per tablet; Take 1 tablet by mouth 2 times daily for 30 days. , Disp-60 tablet, R-0Normal  2. Essential hypertension  3. Acquired hypothyroidism  4. Mixed hyperlipidemia  5. Dysthymia (or depressive neurosis)  6. Chronic anxiety  7. Lymphomatoid papulosis ([facility] [address]  8.  Chronic pain syndrome    -  Chronic medical problems stable  -  Continue current medications  -  Follow up with specialists as scheduled  -  Willing to fill another rx for Lindsay Burt, will start the weaning process at this time    Return in about 3 months (around 1/21/2022) for Chronic pain syndrome. An electronic signature was used to authenticate this note.     --Na Mcekon, DO

## 2021-11-03 ENCOUNTER — HOSPITAL ENCOUNTER (OUTPATIENT)
Dept: MRI IMAGING | Age: 56
Discharge: HOME | End: 2021-11-03
Payer: COMMERCIAL

## 2021-11-03 DIAGNOSIS — M48.062 SPINAL STENOSIS OF LUMBAR REGION WITH NEUROGENIC CLAUDICATION: ICD-10-CM

## 2021-11-03 DIAGNOSIS — M54.16 LUMBAR RADICULOPATHY: ICD-10-CM

## 2021-11-03 DIAGNOSIS — G89.4 CHRONIC PAIN SYNDROME: ICD-10-CM

## 2021-11-03 DIAGNOSIS — M47.816 LUMBAR FACET ARTHROPATHY: ICD-10-CM

## 2021-11-03 PROCEDURE — 72148 MRI LUMBAR SPINE W/O DYE: CPT

## 2021-11-05 ENCOUNTER — HOSPITAL ENCOUNTER (OUTPATIENT)
Dept: PHYSICAL THERAPY | Age: 56
Setting detail: THERAPIES SERIES
Discharge: HOME | End: 2021-11-05
Payer: COMMERCIAL

## 2021-11-05 PROCEDURE — 97162 PT EVAL MOD COMPLEX 30 MIN: CPT

## 2021-11-05 NOTE — PROGRESS NOTES
** PLEASE SIGN, DATE AND TIME CERTIFICATION BELOW AND RETURN TO [facility] OUTPATIENT REHABILITATION (FAX #: [phone]). ATTEST/CO-SIGN IF ACCESSING VIA [employer]. THANK YOU.**    I certify that I have examined the patient below and determined that Physical Medicine and Rehabilitation service is necessary and that I approve the established plan of care for up to 90 days or as specifically noted. Attestation, signature or co-signature of physician indicates approval of certification requirements.    ________________________ ____________ __________  Physician Signature   Date   Time  [address] [facility]  PHYSICAL THERAPY  [x] EVALUATION  [] DAILY NOTE (LAND) [] DAILY NOTE (AQUATIC ) [] PROGRESS NOTE [] DISCHARGE NOTE    [x] [facility]   [] [place] [address]    [] [facility]   [] [place] Crimes    Date: 2021  Patient Name:  Nkechi Kirkpatrick  : 1965  MRN: [id number]  CSN: [id number]    Referring Practitioner JASWINDER Smith*   Diagnosis Spondylosis without myelopathy or radiculopathy, lumbar region [M47.816]  Radiculopathy, lumbar region [M54.16]  Spinal stenosis, lumbar region with neurogenic claudication [M48.062]  Chronic pain syndrome [G89.4]    Treatment Diagnosis Left lateral lumbar pain with radiculopathy, limited functional mobility with flexion, extension, bending, lifting, twisting. Date of Evaluation 21    Additional Pertinent History Arthritis, CAD, HTN, thoracic spondylosis, bilateral sciatica, cervicalgia C5-C7, depression, lumbar radiculopathy, S/P aortic valve replacement, cervical fusion ,  ACDF C4-C6, history of nerve ablations, nerve blocks.         Functional Outcome Measure Used Modified Oswestry   Functional Outcome Score 38%, 19/50  (21)       Insurance: Primary: Payor: Kathy Kerr /  /  / ,   Secondary:    Authorization Information: PRE CERTIFICATION REQUIRED: YES, AFTER 30 VISITS WITH [facility]. CALL [phone]  INSURANCE THERAPY BENEFIT:  30 VISITS LIMIT  AQUATIC THERAPY COVERED: YES  MODALITIES COVERED:  YES  TELEHEALTH COVERED:    REFERENCE NUMBER: [id number]   Visit # 1, 1/10 for progress note   Visits Allowed: 30   Recertification Date: 41/75/7940   Physician Follow-Up: 12/8/2021 Houston Monreal CNP   Physician Orders: PT for PT eval and Dry needling    History of Present Illness: Patient reports of problems with his back with sciatica for past 10-15 years. Notes pain location at right low back and left low back. Notes left anterior thigh pain and radiating pain to left foot. Primarily on left side but can be on right side as well. Noting leg cramps as well. Pain is constant with difficulty sleeping. Notes walking, completing activities from down on knees to getting back up to standing, bending forward picking objects off floor, sweeping/mopping, prolonged sitting. Difficulty driving >2 hours with increased sciatic nerve pain. Prefers to sleep in sidelying but after a period of time cannot tolerate sleeping on back as long as sidelying. To manages completes stretches at home due to AM stiffness with DKTC, rotation. Pain medication: Norco, Gabapentin. MRI completed. 11/2/2021   1. 4 mm of anterolisthesis of L4 on L5. There is bone marrow edema bilaterally in the facet joints and pedicles. There is mild spinal canal stenosis and left greater than right foraminal stenosis. 2. Mild spinal canal stenosis at the T12-L1 and L1-L2 levels.              SUBJECTIVE: Patient reports of problems with his back with sciatica for past 10-15 years. Notes pain location at right low back and left low back. Notes left anterior thigh pain and radiating pain to left foot. Primarily on left side but can be on right side as well. Noting leg cramps as well. Pain is constant with difficulty sleeping.  Notes walking, completing activities from down on knees to getting back up to standing, bending forward picking objects off floor, sweeping/mopping, prolonged sitting. Difficulty driving >2 hours with increased sciatic nerve pain. Prefers to sleep in sidelying but after a period of time cannot tolerate sleeping on back as long as sidelying. To manages completes stretches at home due to AM stiffness with DKTC, rotation. Pain medication: Norco, Gabapentin. Chiropractic adjustments and massage. MRI completed. 11/2/2021     Social/Functional History and Current Status:  Medications and Allergies have been reviewed and are listed on Medical History Questionnaire. Colby Kim lives alone in a single story home with a level surface to enter. Task Previous Current   ADLs  Independent Independent   IADL's Independent Independent modified with pain lifting, bending, twisting, getting down to floor and back up. Ambulation Independent Independent   Transfers Independent Independent   Recreation Independent- fishing, walk reservoir, hunting,  [employer] and clean truck   Community Integration Independent Independent   Driving Active  Active    Work [employer]. 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 see previous stair climbing, lifting, twisting, bending, pushing several carts at once, stocking shelves. Etc. Lifting, bending     Objective:    OBSERVATION   Pain 8/10 left lateral lumbar with radiating pain LLE to foot. Palpation Note moderate muscle gaurding with significant tension and pain with palpation along left lateral lumbar, L4L5S1 levels, left sciatic nerve irritation and tenderness. Also tension and guarding at thoracic paraspinals. Sensation Left anterior thigh, posterior lateral calf, tingling all 5 toes. Edema no   Bed Mobility Modified with spasm, pain with position changes on mat table.  Cues for proper technique   Transfers Modified with significant gaurding and pain from seated to standing position, standing to sitting   Ambulation Note patient ambulates with forward flexed trunk. Slight decreased wt shift through LLE. Stairs Not assessed   Balance Not assessed        POSTURE    No Deficit Deficit Comments   Forward Head  x    Rounded Shoulders  x    Kyphosis      Lordosis  x Reduced with flattened lumbar spine with gaurding. Lateral Shift x     Scoliosis x     Iliac Crest x     PSIS x     ASIS x     Leg Length Discrp x     Slumped Sitting  x        TRUNK RANGE OF MOTION   Flexion: 35-40 degrees trunk flexion, fingertips to distal patellar level from floor. Extension: Painful even at neutral alignment of lumbar spine. Cannot extend spine past neutral without increased left lateral lumbar and sciatic symptoms LLe. Lateral Flexion Left:    Lateral Flexion Right:    Rotation Left:    Rotation Right:    Trunk Range of Motion is [employer] [place]  []     LOWER EXTREMITY RANGE OF MOTION    Left Right Comments   Hip Flexion knee to chest 110 degrees 120 degrees Pain in lumbar region. Hip Extension      Hip ABDuction      Hip ADDuction      Hip Internal Rotation      Hip External Rotation      Hip Range of Motion is [employer] [place]  [x]      Knee Flexion      Knee Extension      Knee Range of Motion is [employer] [place]  [x]       LOWER EXTREMITY STRENGTH    Left Right Comments   Hip Flexion 4/5 5/5    Hip Abduction      Hip Adduction      Hip Extension      Hip External Rotation      Knee Flexion 5/5  5/5    Knee Extension 5/5 5/5    Ankle DF 5/5 5/5    Ankle PF 5/5 5/5    LE strength is WFL []      CORE STRENGTH   B SLR TEST:          Seconds  Not assessed with SLR. Note increased pain with Single SLR, poor awareness of optimal neutral pelvis and core control with testing. SPECIAL TESTS (+/-)    Left Right Comments   SLR  40 degrees + 40 degrees +     90/90 Hamstring length      SKTC + +    DKTC + +    Slump      SI Compression/Distraction - -    MJ - -    FADIR - -          TREATMENT   Precautions: anteriolisthesis of L4 on L5 4 mm, lumbar stenosis, no extension.  Optimal lumbar neutral position and slight flexion responder   Pain: 8/10 left lateral lumbar with radiculopathy LLE that is constant. X in shaded column indicates activity completed today   Modalities Parameters/  Location  Notes                     Manual Therapy Time/Technique  Notes                     Exercise/Intervention   Notes                                                                                  Specific Interventions Next Treatment: Dry needling, US, soft tissue mobilization, therap ex core control increased awareness of optimal lumbar neutral, NO extension    Activity/Treatment Tolerance:  []  Patient tolerated treatment well  []  Patient limited by fatigue  [x]  Patient limited by pain   []  Patient limited by medical complications  []  Other:     Assessment: PT evaluation completed today for lumbar pain with radiculopathy LLE. Patient presents with major restriction with lumbar ROM specifically > in extension and painful. Decreased hip ROM in flexion and SLR with increased pain symptoms and radiculopathy LLE/RLE. Notes spasm and guarding of trunk paraspinals, piriformis muscles left >right, Increased with position changes sit to stand, bed mobility and ambulation. Will follow 2x per week to address above listed deficits. Body Structures/Functions/Activity Limitations: impaired activity tolerance, impaired ROM, impaired sensation, impaired strength, pain, abnormal gait and abnormal posture  Prognosis: good      Goals    Patient Goal: To have less pain, tolerated activities better without increased back pain. LLE symptoms    Short Term Goals: 4 weeks  1. Patient to report of decreased pain levels from 8/10 to 5/10 at left lateral lumbar, decreased radiculopathy and sciatic nerve irritation with standing, walking, bending.    2. Patient to demonstrate improved lumbar ROM with flexion from 40 degrees trunk flexion and fingertips to lower patella to 60 degrees flexion with fingertips to mid shin level in order to have increased ease with mobility with position changes sit to stand, bed mobility skills bending. 3. Patient to demonstrate increased core control with increased awareness of optimal lumbar neutral, increased strength at core musculature, motor control/mobilizationa of fascial slings. 4. Patient to demonstrate increased functional mobility with sit<>stand, bed mobility, walking and standing tolerance. Long Term Goals: 8 weeks  1. Modified Oswestry from 38% to no greater than 25%  2. Patient to demonstrate independence in pain management of low back pain with mobility stategies with proper body mechanics, exercises as instructed in sessions. Patient Education:   [x]  HEP/Education Completed: Plan of Care, Goals,    Med[employer] Access Code:  []  No new Education completed  []  Reviewed Prior HEP      [x]  Patient verbalized and/or demonstrated understanding of education provided. []  Patient unable to verbalize and/or demonstrate understanding of education provided. Will continue education. []  Barriers to learning:     PLAN:  Treatment Recommendations: Strengthening, Range of Motion, Balance Training, Functional Mobility Training, Transfer Training, Neuromuscular Re-education, Manual Therapy - Soft Tissue Mobilization, Pain Management, Home Exercise Program, Patient Education, Aquatics and Modalities    [x]  Plan of care initiated. Plan to see patient 2 times per week for 8 weeks to address the treatment planned outlined above.   []  Continue with current plan of care  []  Modify plan of care as follows:    []  Hold pending physician visit  []  Discharge    Time In 0705   Time Out 0745   Timed Code Minutes: 0 min   Total Treatment Time: 40 min     Electronically Signed by: Missy Baltazar PT

## 2021-11-17 ENCOUNTER — HOSPITAL ENCOUNTER (OUTPATIENT)
Dept: PHYSICAL THERAPY | Age: 56
Setting detail: THERAPIES SERIES
Discharge: HOME | End: 2021-11-17
Payer: COMMERCIAL

## 2021-11-17 PROCEDURE — 97140 MANUAL THERAPY 1/> REGIONS: CPT

## 2021-11-17 PROCEDURE — 97110 THERAPEUTIC EXERCISES: CPT

## 2021-11-17 NOTE — PROGRESS NOTES
[address] [facility]  PHYSICAL THERAPY  [] EVALUATION  [x] DAILY NOTE (LAND) [] DAILY NOTE (AQUATIC ) [] PROGRESS NOTE [] DISCHARGE NOTE    [x] OUTPATIENT REHABILITATION CENTER [facility]   [] [place] [address]    [] [facility]   [] Laury Morataya    Date: 2021  Patient Name:  Claudette Chyle  : 1965  MRN: [id number]  CSN: [id number]    Referring Practitioner JASWINDER Alvarenga*   Diagnosis Spondylosis without myelopathy or radiculopathy, lumbar region [M47.816]  Radiculopathy, lumbar region [M54.16]  Spinal stenosis, lumbar region with neurogenic claudication [M48.062]  Chronic pain syndrome [G89.4]    Treatment Diagnosis Left lateral lumbar pain with radiculopathy, limited functional mobility with flexion, extension, bending, lifting, twisting. Date of Evaluation 21    Additional Pertinent History Arthritis, CAD, HTN, thoracic spondylosis, bilateral sciatica, cervicalgia C5-C7, depression, lumbar radiculopathy, S/P aortic valve replacement, cervical fusion ,  ACDF C4-C6, history of nerve ablations, nerve blocks. Functional Outcome Measure Used Modified Oswestry   Functional Outcome Score 38%, 19/50  (21)       Insurance: Primary: Payor: Sourav Smith /  /  / ,   Secondary:    Authorization Information: PRE CERTIFICATION REQUIRED: YES, AFTER 30 [address]. CALL [phone]  INSURANCE THERAPY BENEFIT:  30 VISITS LIMIT  AQUATIC THERAPY COVERED: YES  MODALITIES COVERED:  YES  TELEHEALTH COVERED:    REFERENCE NUMBER: [id number]   Visit # 2, 2/10 for progress note   Visits Allowed: 30   Recertification Date:    Physician Follow-Up: 2021 Miguel Su CNP   Physician Orders: PT for PT eval and Dry needling    History of Present Illness: Patient reports of problems with his back with sciatica for past 10-15 years. Notes pain location at right low back and left low back.  Notes left anterior thigh pain and radiating pain mechanics, exercises as instructed in sessions. Patient Education:   [x]  HEP/Education Completed: PPT with progressions   Medbridge Access Code:  []  No new Education completed  []  Reviewed Prior HEP      [x]  Patient verbalized and/or demonstrated understanding of education provided. []  Patient unable to verbalize and/or demonstrate understanding of education provided. Will continue education. []  Barriers to learning:     PLAN:  Treatment Recommendations: Strengthening, Range of Motion, Balance Training, Functional Mobility Training, Transfer Training, Neuromuscular Re-education, Manual Therapy - Soft Tissue Mobilization, Pain Management, Home Exercise Program, Patient Education, Aquatics and Modalities    []  Plan of care initiated. Plan to see patient 2 times per week for 8 weeks to address the treatment planned outlined above.   [x]  Continue with current plan of care  []  Modify plan of care as follows:    []  Hold pending physician visit  []  Discharge    Time In 1643   Time Out 1725   Timed Code Minutes: 42 min   Total Treatment Time: 42 min     Electronically Signed by: Tamanna Horne PTA

## 2021-11-19 ENCOUNTER — HOSPITAL ENCOUNTER (OUTPATIENT)
Dept: PHYSICAL THERAPY | Age: 56
Setting detail: THERAPIES SERIES
Discharge: HOME | End: 2021-11-19
Payer: COMMERCIAL

## 2021-11-19 ENCOUNTER — HOSPITAL ENCOUNTER (OUTPATIENT)
Age: 56
Discharge: HOME | End: 2021-11-19
Payer: COMMERCIAL

## 2021-11-19 ENCOUNTER — TELEPHONE (OUTPATIENT)
Dept: FAMILY MEDICINE CLINIC | Age: 56
End: 2021-11-19

## 2021-11-19 DIAGNOSIS — M48.062 SPINAL STENOSIS OF LUMBAR REGION WITH NEUROGENIC CLAUDICATION: ICD-10-CM

## 2021-11-19 LAB
ALT SERPL-CCNC: 21 U/L (ref 11–66)
AST SERPL-CCNC: 25 U/L (ref 5–40)
BUN BLDV-MCNC: 19 MG/DL (ref 7–22)
CREAT SERPL-MCNC: 0.7 MG/DL (ref 0.4–1.2)
ERYTHROCYTE [DISTWIDTH] IN BLOOD BY AUTOMATED COUNT: 17.5 % (ref 11.5–14.5)
ERYTHROCYTE [DISTWIDTH] IN BLOOD BY AUTOMATED COUNT: 59.9 FL (ref 35–45)
GFR SERPL CREATININE-BSD FRML MDRD: > 90 ML/MIN/1.73M2
HCT VFR BLD CALC: 42.3 % (ref 42–52)
HEMOGLOBIN: 13.5 GM/DL (ref 14–18)
MCH RBC QN AUTO: 29.9 PG (ref 26–33)
MCHC RBC AUTO-ENTMCNC: 31.9 GM/DL (ref 32.2–35.5)
MCV RBC AUTO: 93.6 FL (ref 80–94)
PLATELET # BLD: 260 THOU/MM3 (ref 130–400)
PMV BLD AUTO: 10.9 FL (ref 9.4–12.4)
RBC # BLD: 4.52 MILL/MM3 (ref 4.7–6.1)
WBC # BLD: 9.2 THOU/MM3 (ref 4.8–10.8)

## 2021-11-19 PROCEDURE — 97110 THERAPEUTIC EXERCISES: CPT

## 2021-11-19 PROCEDURE — 84450 TRANSFERASE (AST) (SGOT): CPT

## 2021-11-19 PROCEDURE — 82565 ASSAY OF CREATININE: CPT

## 2021-11-19 PROCEDURE — 84520 ASSAY OF UREA NITROGEN: CPT

## 2021-11-19 PROCEDURE — 36415 COLL VENOUS BLD VENIPUNCTURE: CPT

## 2021-11-19 PROCEDURE — 85027 COMPLETE CBC AUTOMATED: CPT

## 2021-11-19 PROCEDURE — 97140 MANUAL THERAPY 1/> REGIONS: CPT

## 2021-11-19 PROCEDURE — 84460 ALANINE AMINO (ALT) (SGPT): CPT

## 2021-11-19 RX ORDER — HYDROCODONE BITARTRATE AND ACETAMINOPHEN 5; 325 MG/1; MG/1
1 TABLET ORAL DAILY
Qty: 30 TABLET | Refills: 0 | Status: SHIPPED | OUTPATIENT
Start: 2021-11-19 | End: 2021-12-19

## 2021-11-19 NOTE — PROGRESS NOTES
[address] [facility]  PHYSICAL THERAPY  [] EVALUATION  [x] DAILY NOTE (LAND) [] DAILY NOTE (AQUATIC)  [] PROGRESS NOTE [] DISCHARGE NOTE    [x] OUTPATIENT REHABILITATION CENTER [facility]   [] [place] [address]    [] [facility]   [] Benedicto King    Date: 2021  Patient Name:  Sharlene Abbott  : 1965  MRN: [id number]  CSN: [id number]    Referring Practitioner JASWINDER Pearson*   Diagnosis Spondylosis without myelopathy or radiculopathy, lumbar region [M47.816]  Radiculopathy, lumbar region [M54.16]  Spinal stenosis, lumbar region with neurogenic claudication [M48.062]  Chronic pain syndrome [G89.4]    Treatment Diagnosis Left lateral lumbar pain with radiculopathy, limited functional mobility with flexion, extension, bending, lifting, twisting. Date of Evaluation 21    Additional Pertinent History Arthritis, CAD, HTN, thoracic spondylosis, bilateral sciatica, cervicalgia C5-C7, depression, lumbar radiculopathy, S/P aortic valve replacement, cervical fusion ,  ACDF C4-C6, history of nerve ablations, nerve blocks. Functional Outcome Measure Used Modified Oswestry   Functional Outcome Score 38%, 19/50  (21)       Insurance: Primary: Payor: Teri Lopez 150 /  /  / ,   Secondary:    Authorization Information: PRE CERTIFICATION REQUIRED: YES, AFTER 30 [place] [address]. CALL [phone]  INSURANCE THERAPY BENEFIT:  30 VISITS LIMIT  AQUATIC THERAPY COVERED: YES  MODALITIES COVERED:  YES  TELEHEALTH COVERED:    REFERENCE NUMBER: [id number]   Visit # 3, 3/10 for progress note   Visits Allowed: 30   Recertification Date:    Physician Follow-Up: 2021 Virginia Hernandez CNP   Physician Orders: PT for PT eval and Dry needling    History of Present Illness: Patient reports of problems with his back with sciatica for past 10-15 years. Notes pain location at right low back and left low back.  Notes left anterior thigh pain and radiating pain to left foot. Primarily on left side but can be on right side as well. Noting leg cramps as well. Pain is constant with difficulty sleeping. Notes walking, completing activities from down on knees to getting back up to standing, bending forward picking objects off floor, sweeping/mopping, prolonged sitting. Difficulty driving >2 hours with increased sciatic nerve pain. Prefers to sleep in sidelying but after a period of time cannot tolerate sleeping on back as long as sidelying. To manages completes stretches at home due to AM stiffness with DKTC, rotation. Pain medication: Norco, Gabapentin. MRI completed. 11/2/2021   1. 4 mm of anterolisthesis of L4 on L5. There is bone marrow edema bilaterally in the facet joints and pedicles. There is mild spinal canal stenosis and left greater than right foraminal stenosis. 2. Mild spinal canal stenosis at the T12-L1 and L1-L2 levels.              SUBJECTIVE:  Patient states he continues to have horrible muscle cramps in bilateral thighs. Patient states he is really hurting today. Reports he was at work earlier today and has to go back again tonight. TREATMENT   Precautions: anteriolisthesis of L4 on L5 4 mm, lumbar stenosis, no extension.  Optimal lumbar neutral position and slight flexion responder   Pain: 8/10 Lower back with radicular symptoms down bilateral LEs (Left > Right)    X in shaded column indicates activity completed today   Modalities Parameters/  Location  Notes                     Manual Therapy Time/Technique  Notes   STM to lumbar paraspinals, QL, and superior gluteals with patient prone and 2 pillows under abdomen  20 minutes  X                Exercise/Intervention   Notes   PPT  15x 5 sec  X    PPT with ball squeeze, resisted hip abduction in hook-lying with orange band 15x 5 sec  X    PPT with marching 3x  X Attempted but started having Right hip pain and thought he was going to have a muscle cramp          Educated and reviewed body mechanics and sleeping positions with patient   X                                                Specific Interventions Next Treatment: Dry needling, US, soft tissue mobilization, therap ex core control increased awareness of optimal lumbar neutral, NO extension    Activity/Treatment Tolerance:  []  Patient tolerated treatment well  []  Patient limited by fatigue  [x]  Patient limited by pain   []  Patient limited by medical complications  []  Other:     Assessment: Started session with gentle core strengthening with minimal cueing needed for correct technique. Patient felt better at end fo session after manual work. Antalgic gait noted. Goals    Patient Goal: To have less pain, tolerated activities better without increased back pain. LLE symptoms    Short Term Goals: 4 weeks  1. Patient to report of decreased pain levels from 8/10 to 5/10 at left lateral lumbar, decreased radiculopathy and sciatic nerve irritation with standing, walking, bending. 2. Patient to demonstrate improved lumbar ROM with flexion from 40 degrees trunk flexion and fingertips to lower patella to 60 degrees flexion with fingertips to mid shin level in order to have increased ease with mobility with position changes sit to stand, bed mobility skills bending. 3. Patient to demonstrate increased core control with increased awareness of optimal lumbar neutral, increased strength at core musculature, motor control/mobilizationa of fascial slings. 4. Patient to demonstrate increased functional mobility with sit<>stand, bed mobility, walking and standing tolerance. Long Term Goals: 8 weeks  1. Modified Oswestry from 38% to no greater than 25%  2. Patient to demonstrate independence in pain management of low back pain with mobility stategies with proper body mechanics, exercises as instructed in sessions.      Patient Education:   [x]  HEP/Education Completed: PPT with progressions   Medbridge Access Code:  []  No new Education completed  [] Reviewed Prior HEP      [x]  Patient verbalized and/or demonstrated understanding of education provided. []  Patient unable to verbalize and/or demonstrate understanding of education provided. Will continue education. []  Barriers to learning:     PLAN:  Treatment Recommendations: Strengthening, Range of Motion, Balance Training, Functional Mobility Training, Transfer Training, Neuromuscular Re-education, Manual Therapy - Soft Tissue Mobilization, Pain Management, Home Exercise Program, Patient Education, Aquatics and Modalities    []  Plan of care initiated. Plan to see patient 2 times per week for 8 weeks to address the treatment planned outlined above.   [x]  Continue with current plan of care  []  Modify plan of care as follows:    []  Hold pending physician visit  []  Discharge    Time In 1615   Time Out 1655   Timed Code Minutes: 40 min   Total Treatment Time: 40 min     Electronically Signed by: Seble Alegre PTA

## 2021-11-19 NOTE — TELEPHONE ENCOUNTER
Pt called office requesting a refill of his [facility] to [facility]. If no call back he will check with the pharmacy after 1pm. Refill if appropriate.

## 2021-11-24 ENCOUNTER — APPOINTMENT (OUTPATIENT)
Dept: PHYSICAL THERAPY | Age: 56
End: 2021-11-24
Payer: COMMERCIAL

## 2021-11-29 ENCOUNTER — HOSPITAL ENCOUNTER (OUTPATIENT)
Dept: PHYSICAL THERAPY | Age: 56
Setting detail: THERAPIES SERIES
Discharge: HOME | End: 2021-11-29
Payer: COMMERCIAL

## 2021-11-29 PROCEDURE — 97035 APP MDLTY 1+ULTRASOUND EA 15: CPT

## 2021-11-29 PROCEDURE — 97110 THERAPEUTIC EXERCISES: CPT

## 2021-11-29 NOTE — PROGRESS NOTES
to left foot. Primarily on left side but can be on right side as well. Noting leg cramps as well. Pain is constant with difficulty sleeping. Notes walking, completing activities from down on knees to getting back up to standing, bending forward picking objects off floor, sweeping/mopping, prolonged sitting. Difficulty driving >2 hours with increased sciatic nerve pain. Prefers to sleep in sidelying but after a period of time cannot tolerate sleeping on back as long as sidelying. To manages completes stretches at home due to AM stiffness with DKTC, rotation. Pain medication: Norco, Gabapentin. MRI completed. 11/2/2021   1. 4 mm of anterolisthesis of L4 on L5. There is bone marrow edema bilaterally in the facet joints and pedicles. There is mild spinal canal stenosis and left greater than right foraminal stenosis. 2. Mild spinal canal stenosis at the T12-L1 and L1-L2 levels.              SUBJECTIVE:  Patient reports of pain level 7-8/10 with sciatic, left leg symptoms, bilateral feet tingling. Neck issues and tingling in arms as well. Has worked a lot and been flared up. Patient is on blood thinner and has had a heart valve replacement and explained not a good candidate for DN due to blood thinner and history of heart valve replacement. TREATMENT   Precautions: anteriolisthesis of L4 on L5 4 mm, lumbar stenosis, no extension. Optimal lumbar neutral position and slight flexion responder   Pain: 8/10 Lower back with radicular symptoms down bilateral LEs (Left > Right)    X in shaded column indicates activity completed today   Modalities Parameters/  Location  Notes   US 0.8 w/cm2 left lateral lumbar, right lateral lumbar, left posterior hip/upper gluteal region. 0.8 w/cm2 for 8 minutes x Right sidelying position with pillow between knees.                 Manual Therapy Time/Technique  Notes   STM to lumbar paraspinals, QL, and superior gluteals with patient prone and 2 pillows under abdomen  20 minutes Exercise/Intervention   Notes   PPT  15x 5 sec      PPT with ball squeeze, resisted hip abduction in hook-lying with orange band 15x 5 sec      PPT with marching 3x   Attempted but started having Right hip pain and thought he was going to have a muscle cramp          Educated and reviewed body mechanics and sleeping positions with patient              Seated posterior pelvic tilt x10  x    Seated reciprocal marches with abdominals engaged x10  x    Seated LAQs with abdominal engaged x10  x                    Specific Interventions Next Treatment: Dry needling, US, soft tissue mobilization, therap ex core control increased awareness of optimal lumbar neutral, NO extension    Activity/Treatment Tolerance:  []  Patient tolerated treatment well  []  Patient limited by fatigue  [x]  Patient limited by pain   []  Patient limited by medical complications  []  Other:     Assessment: Patient not to have dry needling due to contraindications for being on a blood thinner and having a heart valve replacement. Instead completed US to bilateral lumbar paraspinals and left posterior upper glut, glut medius region. Initiated seated stabilization due to discomfort laying on side and back. He also complained of cervical pain and BUE symptoms at arms and elbows. Tolerated seated ex well. Reports of decreased pain level 5-6/10 at end of session without shooting pain at left sciatica region by end of session. Goals    Patient Goal: To have less pain, tolerated activities better without increased back pain. LLE symptoms    Short Term Goals: 4 weeks  1. Patient to report of decreased pain levels from 8/10 to 5/10 at left lateral lumbar, decreased radiculopathy and sciatic nerve irritation with standing, walking, bending.    2. Patient to demonstrate improved lumbar ROM with flexion from 40 degrees trunk flexion and fingertips to lower patella to 60 degrees flexion with fingertips to mid shin level in order to have increased ease with mobility with position changes sit to stand, bed mobility skills bending. 3. Patient to demonstrate increased core control with increased awareness of optimal lumbar neutral, increased strength at core musculature, motor control/mobilizationa of fascial slings. 4. Patient to demonstrate increased functional mobility with sit<>stand, bed mobility, walking and standing tolerance. Long Term Goals: 8 weeks  1. Modified Oswestry from 38% to no greater than 25%  2. Patient to demonstrate independence in pain management of low back pain with mobility stategies with proper body mechanics, exercises as instructed in sessions. Patient Education:   [x]  HEP/Education Completed: pace activities. Continue HEP.  "[employer] Access Code:  []  No new Education completed  []  Reviewed Prior HEP      [x]  Patient verbalized and/or demonstrated understanding of education provided. []  Patient unable to verbalize and/or demonstrate understanding of education provided. Will continue education. []  Barriers to learning:     PLAN:  Treatment Recommendations: Strengthening, Range of Motion, Balance Training, Functional Mobility Training, Transfer Training, Neuromuscular Re-education, Manual Therapy - Soft Tissue Mobilization, Pain Management, Home Exercise Program, Patient Education, Aquatics and Modalities    []  Plan of care initiated. Plan to see patient 2 times per week for 8 weeks to address the treatment planned outlined above.   [x]  Continue with current plan of care  []  Modify plan of care as follows:    []  Hold pending physician visit  []  Discharge    Time In 1500   Time Out 1535   Timed Code Minutes: 35   Total Treatment Time: 35     Electronically Signed by: Dara Bose, PT

## 2021-12-09 ENCOUNTER — HOSPITAL ENCOUNTER (OUTPATIENT)
Age: 56
Discharge: HOME | End: 2021-12-09
Payer: COMMERCIAL

## 2021-12-09 ENCOUNTER — HOSPITAL ENCOUNTER (OUTPATIENT)
Dept: PHYSICAL THERAPY | Age: 56
Setting detail: THERAPIES SERIES
Discharge: HOME | End: 2021-12-09
Payer: COMMERCIAL

## 2021-12-09 LAB
ALT SERPL-CCNC: 12 U/L (ref 11–66)
AST SERPL-CCNC: 17 U/L (ref 5–40)
BUN BLDV-MCNC: 16 MG/DL (ref 7–22)
CREAT SERPL-MCNC: 0.7 MG/DL (ref 0.4–1.2)
ERYTHROCYTE [DISTWIDTH] IN BLOOD BY AUTOMATED COUNT: 17.1 % (ref 11.5–14.5)
ERYTHROCYTE [DISTWIDTH] IN BLOOD BY AUTOMATED COUNT: 58.8 FL (ref 35–45)
GFR SERPL CREATININE-BSD FRML MDRD: > 90 ML/MIN/1.73M2
HCT VFR BLD CALC: 41.1 % (ref 42–52)
HEMOGLOBIN: 13.2 GM/DL (ref 14–18)
MCH RBC QN AUTO: 30.3 PG (ref 26–33)
MCHC RBC AUTO-ENTMCNC: 32.1 GM/DL (ref 32.2–35.5)
MCV RBC AUTO: 94.5 FL (ref 80–94)
PLATELET # BLD: 245 THOU/MM3 (ref 130–400)
PMV BLD AUTO: 10.9 FL (ref 9.4–12.4)
RBC # BLD: 4.35 MILL/MM3 (ref 4.7–6.1)
WBC # BLD: 7.4 THOU/MM3 (ref 4.8–10.8)

## 2021-12-09 PROCEDURE — 97140 MANUAL THERAPY 1/> REGIONS: CPT

## 2021-12-09 PROCEDURE — 84450 TRANSFERASE (AST) (SGOT): CPT

## 2021-12-09 PROCEDURE — 36415 COLL VENOUS BLD VENIPUNCTURE: CPT

## 2021-12-09 PROCEDURE — 85027 COMPLETE CBC AUTOMATED: CPT

## 2021-12-09 PROCEDURE — 82565 ASSAY OF CREATININE: CPT

## 2021-12-09 PROCEDURE — 84520 ASSAY OF UREA NITROGEN: CPT

## 2021-12-09 PROCEDURE — 84460 ALANINE AMINO (ALT) (SGPT): CPT

## 2021-12-09 PROCEDURE — 97110 THERAPEUTIC EXERCISES: CPT

## 2021-12-09 PROCEDURE — 97035 APP MDLTY 1+ULTRASOUND EA 15: CPT

## 2021-12-09 NOTE — PROGRESS NOTES
[address] [facility]  PHYSICAL THERAPY  [] EVALUATION  [x] DAILY NOTE (LAND) [] DAILY NOTE (AQUATIC)  [] PROGRESS NOTE [] DISCHARGE NOTE    [x] OUTPATIENT REHABILITATION C[facility]   [] [place] [address]    [] [facility]   [] Benedicto King    Date: 2021  Patient Name:  Sharlene Abbott  : 1965  MRN: [id number]  CSN: [id number]    Referring Practitioner JASWINDER Pearson*   Diagnosis Spondylosis without myelopathy or radiculopathy, lumbar region [M47.816]  Radiculopathy, lumbar region [M54.16]  Spinal stenosis, lumbar region with neurogenic claudication [M48.062]  Chronic pain syndrome [G89.4]    Treatment Diagnosis Left lateral lumbar pain with radiculopathy, limited functional mobility with flexion, extension, bending, lifting, twisting. Date of Evaluation 21    Additional Pertinent History Arthritis, CAD, HTN, thoracic spondylosis, bilateral sciatica, cervicalgia C5-C7, depression, lumbar radiculopathy, S/P aortic valve replacement, cervical fusion ,  ACDF C4-C6, history of nerve ablations, nerve blocks. Functional Outcome Measure Used Modified Oswestry   Functional Outcome Score 38%, 19/50  (21)       Insurance: Primary: Payor: Teri Lopez 150 /  /  / ,   Secondary:    Authorization Information: PRE CERTIFICATION REQUIRED: YES, AFTER 30 [address]. CALL [phone]  INSURANCE THERAPY BENEFIT:  30 VISITS LIMIT  AQUATIC THERAPY COVERED: YES  MODALITIES COVERED:  YES  TELEHEALTH COVERED:    REFERENCE NUMBER: [id number]   Visit # 4, 4/10 for progress note   Visits Allowed: 30   Recertification Date:    Physician Follow-Up: 2021 Vriginia Hernandez CNP   Physician Orders: PT for PT eval and Dry needling    History of Present Illness: Patient reports of problems with his back with sciatica for past 10-15 years. Notes pain location at right low back and left low back.  Notes left anterior thigh pain and radiating pain to left foot. Primarily on left side but can be on right side as well. Noting leg cramps as well. Pain is constant with difficulty sleeping. Notes walking, completing activities from down on knees to getting back up to standing, bending forward picking objects off floor, sweeping/mopping, prolonged sitting. Difficulty driving >2 hours with increased sciatic nerve pain. Prefers to sleep in sidelying but after a period of time cannot tolerate sleeping on back as long as sidelying. To manages completes stretches at home due to AM stiffness with DKTC, rotation. Pain medication: Norco, Gabapentin. MRI completed. 11/2/2021   1. 4 mm of anterolisthesis of L4 on L5. There is bone marrow edema bilaterally in the facet joints and pedicles. There is mild spinal canal stenosis and left greater than right foraminal stenosis. 2. Mild spinal canal stenosis at the T12-L1 and L1-L2 levels.              SUBJECTIVE:  Patient states pain is 7/10 Left lower back worse than the Right. Patient reports he had mild relief from the ultrasound last session but thought that the massage was more helpful. Patient states he gets temporary relief from therapy but work usually flares him back up. TREATMENT   Precautions: anteriolisthesis of L4 on L5 4 mm, lumbar stenosis, no extension. Optimal lumbar neutral position and slight flexion responder   Pain: 7/10 Lower back with radicular symptoms down bilateral LEs (Left > Right)    X in shaded column indicates activity completed today   Modalities Parameters/  Location  Notes   Ultrasound to Bilateral lumbar, posterior hip/upper gluteal region. 100% continuous, 1 MHz, 1 w/cm2 x10 minutes X Prone with 2 pillows under abdomen;  Completed prior to manual techniques               Manual Therapy Time/Technique  Notes   STM to lumbar paraspinals, QL, and superior gluteals with patient prone and 2 pillows under abdomen  15 minutes  X                Exercise/Intervention   Notes   PPT  15x 5 sec PPT with ball squeeze, resisted hip abduction in hook-lying with orange band 15x 5 sec      PPT with marching 3x   Attempted but started having Right hip pain and thought he was going to have a muscle cramp          Educated and reviewed body mechanics and sleeping positions with patient              Seated posterior pelvic tilt x10  X    Seated reciprocal marches with abdominals engaged x10  X    Seated LAQs with abdominal engaged x10  X                    Specific Interventions Next Treatment: Dry needling, US, soft tissue mobilization, therap ex core control increased awareness of optimal lumbar neutral, NO extension    Activity/Treatment Tolerance:  []  Patient tolerated treatment well  []  Patient limited by fatigue  [x]  Patient limited by pain   []  Patient limited by medical complications  []  Other:     Assessment:  Patient wanting to have dry needling completed but this therapist does not perform dry needling treatment. Patient tolerated seated stabilization exercises without increased pain or symptoms. Performed ultrasound to lower back followed by manual techniques. Patient felt more loose and pain was decreased to 6/10 at end of session. No radicular symptoms noted. Goals    Patient Goal: To have less pain, tolerated activities better without increased back pain. LLE symptoms    Short Term Goals: 4 weeks  1. Patient to report of decreased pain levels from 8/10 to 5/10 at left lateral lumbar, decreased radiculopathy and sciatic nerve irritation with standing, walking, bending. 2. Patient to demonstrate improved lumbar ROM with flexion from 40 degrees trunk flexion and fingertips to lower patella to 60 degrees flexion with fingertips to mid shin level in order to have increased ease with mobility with position changes sit to stand, bed mobility skills bending.    3. Patient to demonstrate increased core control with increased awareness of optimal lumbar neutral, increased strength at core

## 2021-12-15 ENCOUNTER — HOSPITAL ENCOUNTER (OUTPATIENT)
Dept: PHYSICAL THERAPY | Age: 56
Setting detail: THERAPIES SERIES
Discharge: HOME | End: 2021-12-15
Payer: COMMERCIAL

## 2021-12-15 ENCOUNTER — OFFICE VISIT (OUTPATIENT)
Dept: PHYSICAL MEDICINE AND REHAB | Age: 56
End: 2021-12-15
Payer: COMMERCIAL

## 2021-12-15 VITALS
BODY MASS INDEX: 29.66 KG/M2 | SYSTOLIC BLOOD PRESSURE: 136 MMHG | WEIGHT: 189 LBS | HEIGHT: 67 IN | DIASTOLIC BLOOD PRESSURE: 82 MMHG

## 2021-12-15 DIAGNOSIS — M54.17 LUMBOSACRAL RADICULITIS: ICD-10-CM

## 2021-12-15 DIAGNOSIS — M48.062 SPINAL STENOSIS OF LUMBAR REGION WITH NEUROGENIC CLAUDICATION: ICD-10-CM

## 2021-12-15 DIAGNOSIS — M54.16 LUMBAR RADICULOPATHY: ICD-10-CM

## 2021-12-15 DIAGNOSIS — M46.1 SACROILIAC INFLAMMATION (HCC): Primary | ICD-10-CM

## 2021-12-15 DIAGNOSIS — M47.816 LUMBAR FACET ARTHROPATHY: ICD-10-CM

## 2021-12-15 DIAGNOSIS — M47.816 LUMBAR SPONDYLOSIS: ICD-10-CM

## 2021-12-15 DIAGNOSIS — G89.4 CHRONIC PAIN SYNDROME: ICD-10-CM

## 2021-12-15 PROCEDURE — 97140 MANUAL THERAPY 1/> REGIONS: CPT

## 2021-12-15 PROCEDURE — 99214 OFFICE O/P EST MOD 30 MIN: CPT | Performed by: PAIN MEDICINE

## 2021-12-15 RX ORDER — HYDROCODONE BITARTRATE AND ACETAMINOPHEN 5; 325 MG/1; MG/1
1 TABLET ORAL 2 TIMES DAILY PRN
Qty: 60 TABLET | Refills: 0 | Status: SHIPPED | OUTPATIENT
Start: 2021-12-15 | End: 2022-01-31 | Stop reason: SDUPTHER

## 2021-12-15 ASSESSMENT — ENCOUNTER SYMPTOMS
PHOTOPHOBIA: 0
EYE DISCHARGE: 0
DIARRHEA: 0
SINUS PAIN: 0
STRIDOR: 0
CONSTIPATION: 1
SORE THROAT: 0
ABDOMINAL PAIN: 0
VOICE CHANGE: 0
VOMITING: 0
RHINORRHEA: 0
COUGH: 0
EYES NEGATIVE: 1
EYE ITCHING: 0
FACIAL SWELLING: 0
BACK PAIN: 1
EYE REDNESS: 0
SHORTNESS OF BREATH: 0
RESPIRATORY NEGATIVE: 1
EYE PAIN: 0
ANAL BLEEDING: 0
COLOR CHANGE: 0
WHEEZING: 0
APNEA: 0
CHEST TIGHTNESS: 0
RECTAL PAIN: 0
CHOKING: 0
TROUBLE SWALLOWING: 0
ABDOMINAL DISTENTION: 0
SINUS PRESSURE: 0
BLOOD IN STOOL: 0
NAUSEA: 0

## 2021-12-15 NOTE — PROGRESS NOTES
[address] [facility] [address]  Dept: [phone]  Dept Fax: [phone]: [phone]    Visit Date: 12/15/2021    Functionality Assessment/Goals Worksheet     On a scale of 0 (Does not Interfere) to 10 (Completely Interferes)     1. Which number describes how during the past week pain has interfered with       the following:  A. General Activity:  5  B. Mood: 6  C. Walking Ability:  9  D. Normal Work (Includes both work outside the home and housework):  8  E. Relations with Other People:   5  F. Sleep:   8  G. Enjoyment of Life:   9    2. Patient Prefers to Take their Pain Medications:     [x]  On a regular basis   []  Only when necessary    []  Does not take pain medications    3. What are the Patient's Goals/Expectations for Visiting Pain Management? [x]  Learn about my pain    [x]  Receive Medication   []  Physical Therapy     []  Treat Depression   []  Receive Injections    [x]  Treat Sleep   []  Deal with Anxiety and Stress   []  Treat Opoid Dependence/Addiction   []  Other:      HPI:   Cindy Hardin is a 60 y.o. male is here today for    Chief Complaint: Low back pain, Mid back pain and Left leg pain, right leg. SI pain    HPI   Pt here for 2 month FU, had physical therapy and dry needling completed. Lumbar MRI completed. Patient states no relief from PT or dry needling. Today was his first dry needling on lumbar and left buttocks/hips. Has pain in low back across and radiating down buttocks and down bilateral legs to feet anteriorly, left leg worse. Pain is constant in low back its described as a constant dull stabbing pain with pins and needles and in legs numb and tinging. States he has pain into the tail bone as well. Denies loss of bowel or bladder. States lower back pain 60% and BLE 40% L>R. Denies any bladder or bowel dysfunction.     He had Norco 5/325, once daily from PCP. Taking them only as needed, has a few left. Last filled 11/19/21. He denies any ER visits since last visit. Pain scale with out pain medications or at its worst is 9/10. Pain scale with pain medications or at its best is 6/10. Last dose of Norco was two days ago  Drug screen reviewed from 10/21 and was appropriate. Xanax from Dr Kelly Barrera, previous PCP. New PCP discussing stopping xanax- he only takes them 3-4 times a week. The patienthas No Known Allergies. Lumbar MRI 11/03/21    FINDINGS:           There is 4 mm of anterolisthesis of L4 on L5. The other lumbar vertebral bodies are normally aligned. There is bone marrow edema in the pedicles bilaterally at the L4-5 level. There is also some degenerative marrow edema in the facet joints. No    suspicious osseous lesions are present. [place] are no compression fractures.  No pars defects are noted.  There is disc desiccation throughout.       The distal spinal cord, conus medullaris and cauda equina are normal.        There are no gross abnormalities in the visualized aspects of the distal thoracic spine.       On the axial images, at T12-L1, there is a diffuse disc bulge. There are mild facet degenerative changes. There is mild spinal canal stenosis. There is no foraminal stenosis.       At L1-L2, there is a diffuse disc bulge. There are facet degenerative changes. There is mild spinal canal stenosis. There is no foraminal stenosis.       At L2-L3, there are very mild facet degenerative changes. There is no focal disc abnormality. There is no spinal canal or foraminal stenosis.       At L3-L4, there are very mild facet degenerative changes. There is no focal disc abnormality. There is no spinal canal or foraminal stenosis.       At L4-L5, there are facet degenerative changes. There is uncovering of the disc. There is mild spinal canal stenosis. There is narrowing of the left subarticular recess.  There is moderate severity left and mild right foraminal stenosis.       At L5-S1, there is no spinal canal or foraminal stenosis.       There are no suspicious findings in the visualized aspects of the retroperitoneum and paraspinal soft tissues.               Impression       1. 4 mm of anterolisthesis of L4 on L5. There is bone marrow edema bilaterally in the facet joints and pedicles. There is mild spinal canal stenosis and left greater than right foraminal stenosis. 2. Mild spinal canal stenosis at the T12-L1 and L1-L2 levels. Subjective:      Review of Systems   Constitutional: Positive for activity change and fatigue. Negative for appetite change, chills, diaphoresis, fever and unexpected weight change. HENT: Negative. Negative for congestion, dental problem, drooling, ear discharge, ear pain, facial swelling, hearing loss, mouth sores, nosebleeds, postnasal drip, rhinorrhea, sinus pressure, sinus pain, sneezing, sore throat, tinnitus, trouble swallowing and voice change. Eyes: Negative. Negative for photophobia, pain, discharge, redness, itching and visual disturbance. Respiratory: Negative. Negative for apnea, cough, choking, chest tightness, shortness of breath, wheezing and stridor. Cardiovascular: Negative. Negative for chest pain, palpitations and leg swelling. CAD   Gastrointestinal: Positive for constipation. Negative for abdominal distention, abdominal pain, anal bleeding, blood in stool, diarrhea, nausea, rectal pain and vomiting. Endocrine: Negative for cold intolerance, heat intolerance, polydipsia, polyphagia and polyuria. Genitourinary: Negative. Negative for decreased urine volume, difficulty urinating, dysuria, enuresis, flank pain, frequency, genital sores, hematuria, penile discharge, penile pain, penile swelling, scrotal swelling, testicular pain and urgency. Musculoskeletal: Positive for arthralgias, back pain, gait problem and myalgias.  Negative for joint swelling, neck pain and neck stiffness. Ambulating without assist devices    Skin: Negative. Negative for color change, pallor, rash and wound. Allergic/Immunologic: Negative for environmental allergies, food allergies and immunocompromised state. Neurological: Positive for weakness and numbness. Negative for dizziness, tremors, seizures, syncope, facial asymmetry, speech difficulty, light-headedness and headaches. Hematological: Negative for adenopathy. Does not bruise/bleed easily. Psychiatric/Behavioral: Positive for sleep disturbance. Negative for agitation, behavioral problems, confusion, decreased concentration, dysphoric mood, hallucinations, self-injury and suicidal ideas. The patient is nervous/anxious. The patient is not hyperactive. Objective:     Vitals:    12/15/21 1540   BP: 136/82   Weight: 189 lb (85.7 kg)   Height: 5' 7\" (1.702 m)       Physical Exam  Vitals and nursing note reviewed. Constitutional:       General: He is not in acute distress. Appearance: Normal appearance. He is well-developed. He is not diaphoretic. HENT:      Head: Normocephalic and atraumatic. Right Ear: External ear normal.      Left Ear: External ear normal.      Nose: Nose normal.      Mouth/Throat:      Mouth: Mucous membranes are moist.      Pharynx: No oropharyngeal exudate. Eyes:      General: No scleral icterus. Right eye: No discharge. Left eye: No discharge. Conjunctiva/sclera: Conjunctivae normal.      Pupils: Pupils are equal, round, and reactive to light. Neck:      Thyroid: No thyromegaly. Cardiovascular:      Rate and Rhythm: Normal rate and regular rhythm. Heart sounds: Murmur heard. No friction rub. No gallop. Comments: CLICK PRESENT  Pulmonary:      Effort: Pulmonary effort is normal. No respiratory distress. Breath sounds: Normal breath sounds. No wheezing or rales. Chest:      Chest wall: No tenderness.    Abdominal:      General: Bowel sounds are normal. There is no distension. Palpations: Abdomen is soft. Tenderness: There is no abdominal tenderness. There is no guarding or rebound. Musculoskeletal:         General: Tenderness present. Right shoulder: Tenderness present. Normal range of motion. Left shoulder: Tenderness present. Normal range of motion. Right elbow: Tenderness present. Left elbow: Tenderness present. No medial epicondyle tenderness. Cervical back: Full passive range of motion without pain and normal range of motion. Rigidity and spasms present. No edema, erythema or tenderness. No muscular tenderness. Normal range of motion. Thoracic back: Spasms and tenderness present. Lumbar back: Tenderness and bony tenderness present. Decreased range of motion. Negative right straight leg raise test and negative left straight leg raise test.        Back:       Right hip: No tenderness. Left hip: No tenderness. Right knee: Normal range of motion. No tenderness. Left knee: Normal range of motion. No tenderness. Right ankle: No swelling. Left ankle: No swelling. Right foot: No swelling. Left foot: No swelling. Skin:     General: Skin is warm. Coloration: Skin is not pale. Findings: No erythema or rash. Neurological:      General: No focal deficit present. Mental Status: He is alert and oriented to person, place, and time. He is not disoriented. Cranial Nerves: No cranial nerve deficit. Sensory: No sensory deficit. Motor: No atrophy or abnormal muscle tone. Coordination: Coordination normal.      Gait: Gait normal.      Deep Tendon Reflexes: Reflexes are normal and symmetric. Comments: SLR mild positive RLE   Psychiatric:         Attention and Perception: He is attentive. Mood and Affect: Mood normal. Mood is not anxious or depressed. Affect is not labile, blunt, angry or inappropriate.          Speech: He is communicative. Speech is not rapid and pressured, delayed, slurred or tangential.         Behavior: Behavior is not agitated, slowed, aggressive, withdrawn, hyperactive or combative. Thought Content: Thought content is not paranoid or delusional. Thought content does not include homicidal or suicidal ideation. Thought content does not include homicidal or suicidal plan. Cognition and Memory: Memory is not impaired. He does not exhibit impaired recent memory or impaired remote memory. Judgment: Judgment is not impulsive or inappropriate. MJ  Patricks test  positive  Yeoman's  positive  Gaenslen's  positive  Kemps  positive  Phalens Reverse Phalens  no  Tinels  no  Spurlings  no       Assessment:     1. Sacroiliac inflammation ([facility] [address]    2. Lumbosacral radiculitis    3. Lumbar radiculopathy    4. Lumbar facet arthropathy    5. Lumbar spondylosis    6. Spinal stenosis of lumbar region with neurogenic claudication    7. Chronic pain syndrome            Plan:      · OARRS reviewed. Current MED: 5  · Patient was not offered naloxone for home. · Discussed long term side effects of medications, tolerance, dependency and addiction. · Previous UDS reviewed  · UDS preformed today for compliance. · Patient told can not receive any pain medications from any other source. · No evidence of abuse, diversion or aberrant behavior.  Medications and/or procedures to improve function and quality of life- patient understanding with this and that may not be pain free   Discussed with patient about safe storage of medications at home   Discussed possible weaning of medication dosing dependent on treatment/procedure results.  Testing: reviewed Lumbar MRI    Procedures: discussed  LESI, TFLESI, caudal epidural, SI injections  . Proceed with bilateral SI injection for diagnostic purpose.  Continue dry needling with therapy.     Discussed with patient about risks with procedure including infection, reaction to medication, increased pain, or bleeding.  Medications: Reviewed   If patient is on blood thinners will need approval to hold yes or no: ASA 81mg     Meds. Prescribed:   Orders Placed This Encounter   Medications    HYDROcodone-acetaminophen (NORCO) 5-325 MG per tablet     Sig: Take 1 tablet by mouth 2 times daily as needed for Pain for up to 30 days. Take lowest dose possible to manage pain     Dispense:  60 tablet     Refill:  0     Reduce doses taken as pain becomes manageable       Return Bilateral SI injection.            Electronically signed by Andrés Sutherland MD on12/15/2021 at 4:43 PM

## 2021-12-15 NOTE — PROGRESS NOTES
[address] [facility]  PHYSICAL THERAPY  [] EVALUATION  [x] DAILY NOTE (LAND) [] DAILY NOTE (AQUATIC)  [] PROGRESS NOTE [] DISCHARGE NOTE    [x] OUTPATIENT REHABILITATION CENTER [facility]   [] [place] [address]    [] [facility]   [] Benedicto King    Date: 12/15/2021  Patient Name:  Sharlene Abbott  : 1965  MRN: [id number]  CSN: [id number]    Referring Practitioner JASWINDER Pearson*   Diagnosis Spondylosis without myelopathy or radiculopathy, lumbar region [M47.816]  Radiculopathy, lumbar region [M54.16]  Spinal stenosis, lumbar region with neurogenic claudication [M48.062]  Chronic pain syndrome [G89.4]    Treatment Diagnosis Left lateral lumbar pain with radiculopathy, limited functional mobility with flexion, extension, bending, lifting, twisting. Date of Evaluation 21    Additional Pertinent History Arthritis, CAD, HTN, thoracic spondylosis, bilateral sciatica, cervicalgia C5-C7, depression, lumbar radiculopathy, S/P aortic valve replacement, cervical fusion ,  ACDF C4-C6, history of nerve ablations, nerve blocks. Functional Outcome Measure Used Modified Oswestry   Functional Outcome Score 38%, 19/50  (21)       Insurance: Primary: Payor: [facility] /  /  / ,   Secondary:    Authorization Information: PRE CERTIFICATION REQUIRED: YES, AFTER 30 [place] [address]. CALL [phone]  INSURANCE THERAPY BENEFIT:  30 VISITS LIMIT  AQUATIC THERAPY COVERED: YES  MODALITIES COVERED:  YES  TELEHEALTH COVERED:    REFERENCE NUMBER: [id number]   Visit # 5, 5/10 for progress note   Visits Allowed: 30   Recertification Date:    Physician Follow-Up: 2021 Virginia Hernandez CNP   Physician Orders: PT for PT eval and Dry needling    History of Present Illness: Patient reports of problems with his back with sciatica for past 10-15 years. Notes pain location at right low back and left low back.  Notes left anterior thigh pain and radiating pain to left foot. Primarily on left side but can be on right side as well. Noting leg cramps as well. Pain is constant with difficulty sleeping. Notes walking, completing activities from down on knees to getting back up to standing, bending forward picking objects off floor, sweeping/mopping, prolonged sitting. Difficulty driving >2 hours with increased sciatic nerve pain. Prefers to sleep in sidelying but after a period of time cannot tolerate sleeping on back as long as sidelying. To manages completes stretches at home due to AM stiffness with DKTC, rotation. Pain medication: Norco, Gabapentin. MRI completed. 11/2/2021   1. 4 mm of anterolisthesis of L4 on L5. There is bone marrow edema bilaterally in the facet joints and pedicles. There is mild spinal canal stenosis and left greater than right foraminal stenosis. 2. Mild spinal canal stenosis at the T12-L1 and L1-L2 levels.              SUBJECTIVE: Patient states he would like to try the dry needling. Noted continued pain without any changes. Noting pain at lower back right/left and pain down left leg from buttock. Noting spasms continue at his back and legs. TREATMENT   Precautions: anteriolisthesis of L4 on L5 4 mm, lumbar stenosis, no extension. Optimal lumbar neutral position and slight flexion responder   Pain: 7/10 Lower back with radicular symptoms down bilateral LEs (Left > Right)    X in shaded column indicates activity completed today   Modalities Parameters/  Location  Notes   Ultrasound to Bilateral lumbar, posterior hip/upper gluteal region. 100% continuous, 1 MHz, 1 w/cm2 x10 minutes X Prone with 2 pillows under abdomen;  Completed prior to manual techniques               Manual Therapy Time/Technique  Notes   STM to lumbar paraspinals, QL, and superior gluteals with patient prone and 2 pillows under abdomen  15 minutes  X          Dry needling 40 mm needles at paravertebral points L3-L5  Non manipulated: 5 minutes x    Dry needling 60 mm needles left gluteal: inferior glut homeostatic point pistoned-manipulated. manipulated x    Dry needling 60 mm needles left glut medius, mid gluteal region pistoned, manipulated manipulated x                Exercise/Intervention   Notes   PPT  15x 5 sec      PPT with ball squeeze, resisted hip abduction in hook-lying with orange band 15x 5 sec      PPT with marching 3x   Attempted but started having Right hip pain and thought he was going to have a muscle cramp          Educated and reviewed body mechanics and sleeping positions with patient              Seated posterior pelvic tilt x10      Seated reciprocal marches with abdominals engaged x10      Seated LAQs with abdominal engaged x10                      Specific Interventions Next Treatment: Dry needling, US, soft tissue mobilization, therap ex core control increased awareness of optimal lumbar neutral, NO extension    Activity/Treatment Tolerance:  []  Patient tolerated treatment well  []  Patient limited by fatigue  [x]  Patient limited by pain   []  Patient limited by medical complications  []  Other:     Assessment: Initiated dry needling today to lumbar paravertebral points and left inferior glut homeostatic point, piriformis and glut med region. This was then followed by soft tissue mobilization to lower thoracic and lumbar paravertebrals, left glut muscles region. Patient reported of feeling more relaxed without change in pain level following session. Note significant gaurding due to pain with bed mobility, sit<>stand and ambulation. Goals    Patient Goal: To have less pain, tolerated activities better without increased back pain. LLE symptoms    Short Term Goals: 4 weeks  1. Patient to report of decreased pain levels from 8/10 to 5/10 at left lateral lumbar, decreased radiculopathy and sciatic nerve irritation with standing, walking, bending.    2. Patient to demonstrate improved lumbar ROM with flexion from 40 degrees trunk flexion and fingertips to lower patella to 60 degrees flexion with fingertips to mid shin level in order to have increased ease with mobility with position changes sit to stand, bed mobility skills bending. 3. Patient to demonstrate increased core control with increased awareness of optimal lumbar neutral, increased strength at core musculature, motor control/mobilizationa of fascial slings. 4. Patient to demonstrate increased functional mobility with sit<>stand, bed mobility, walking and standing tolerance. Long Term Goals: 8 weeks  1. Modified Oswestry from 38% to no greater than 25%  2. Patient to demonstrate independence in pain management of low back pain with mobility stategies with proper body mechanics, exercises as instructed in sessions. Patient Education:   [x]  HEP/Education Completed: pace activities. Continue HEP. Monitor symptoms following dry needling and soft tissue mobilization.  [employer] Access Code:  []  No new Education completed  []  Reviewed Prior HEP      [x]  Patient verbalized and/or demonstrated understanding of education provided. []  Patient unable to verbalize and/or demonstrate understanding of education provided. Will continue education. []  Barriers to learning:     PLAN:  Treatment Recommendations: Strengthening, Range of Motion, Balance Training, Functional Mobility Training, Transfer Training, Neuromuscular Re-education, Manual Therapy - Soft Tissue Mobilization, Pain Management, Home Exercise Program, Patient Education, Aquatics and Modalities    []  Plan of care initiated. Plan to see patient 2 times per week for 8 weeks to address the treatment planned outlined above.   [x]  Continue with current plan of care  []  Modify plan of care as follows:    []  Hold pending physician visit  []  Discharge    Time In [phone]   Time Out 0908   Timed Code Minutes: 18   Total Treatment Time: 33     Electronically Signed by: Marquis Jadon PT

## 2021-12-16 ENCOUNTER — TELEPHONE (OUTPATIENT)
Dept: PHYSICAL MEDICINE AND REHAB | Age: 56
End: 2021-12-16

## 2021-12-20 NOTE — TELEPHONE ENCOUNTER
Pt. Contacted. Procedure and follow up scheduled. Educated on pre-procedure instructions, also mailed. Verbalized understanding. Pt denies taking any blood thinners except ASA 81 mg. (no cardiac events in the last 6 months) prior to scheduling procedure.

## 2021-12-22 ENCOUNTER — HOSPITAL ENCOUNTER (OUTPATIENT)
Dept: PHYSICAL THERAPY | Age: 56
Setting detail: THERAPIES SERIES
Discharge: HOME | End: 2021-12-22
Payer: COMMERCIAL

## 2021-12-22 PROCEDURE — G0283 ELEC STIM OTHER THAN WOUND: HCPCS

## 2021-12-22 PROCEDURE — 97124 MASSAGE THERAPY: CPT

## 2021-12-22 NOTE — PROGRESS NOTES
[facility]  PHYSICAL [facility]ERAPY  [] EVALUATION  [x] DAILY NOTE (LAND) [] DAILY NOTE (AQUATIC)  [] PROGRESS NOTE [] DISCHARGE NOTE    [x] OUTPATIENT REHABILITATION CENTER [facility]   [] [place] [address]    [] [facility]   [] Laury Morataya    Date: 2021  Patient Name:  Claudette Chyle  : 1965  MRN: [id number]  CSN: [id number]    Referring Practitioner JASWINDER Alvarenga*   Diagnosis Spondylosis without myelopathy or radiculopathy, lumbar region [M47.816]  Radiculopathy, lumbar region [M54.16]  Spinal stenosis, lumbar region with neurogenic claudication [M48.062]  Chronic pain syndrome [G89.4]    Treatment Diagnosis Left lateral lumbar pain with radiculopathy, limited functional mobility with flexion, extension, bending, lifting, twisting. Date of Evaluation 21    Additional Pertinent History Arthritis, CAD, HTN, thoracic spondylosis, bilateral sciatica, cervicalgia C5-C7, depression, lumbar radiculopathy, S/P aortic valve replacement, cervical fusion ,  ACDF C4-C6, history of nerve ablations, nerve blocks. Functional Outcome Measure Used Modified Oswestry   Functional Outcome Score 38%, 19/50  (21)       Insurance: Primary: Payor: [employer] /  /  / ,   Secondary:    Authorization Information: PRE CERTIFICATION REQUIRED: YES, AFTER 30 [place] [address]. CALL [phone]  INSURANCE THERAPY BENEFIT:  30 VISITS LIMIT  AQUATIC THERAPY COVERED: YES  MODALITIES COVERED:  YES  TELEHEALTH COVERED:    REFERENCE NUMBER: [id number]   Visit # 6, 6/10 for progress note   Visits Allowed: 30   Recertification Date:    Physician Follow-Up: 2021 Miguel Su CNP   Physician Orders: PT for PT eval and Dry needling    History of Present Illness: Patient reports of problems with his back with sciatica for past 10-15 years. Notes pain location at right low back and left low back.  Notes left anterior thigh pain and radiating pain to left foot. Primarily on left side but can be on right side as well. Noting leg cramps as well. Pain is constant with difficulty sleeping. Notes walking, completing activities from down on knees to getting back up to standing, bending forward picking objects off floor, sweeping/mopping, prolonged sitting. Difficulty driving >2 hours with increased sciatic nerve pain. Prefers to sleep in sidelying but after a period of time cannot tolerate sleeping on back as long as sidelying. To manages completes stretches at home due to AM stiffness with DKTC, rotation. Pain medication: Norco, Gabapentin. MRI completed. 11/2/2021   1. 4 mm of anterolisthesis of L4 on L5. There is bone marrow edema bilaterally in the facet joints and pedicles. There is mild spinal canal stenosis and left greater than right foraminal stenosis. 2. Mild spinal canal stenosis at the T12-L1 and L1-L2 levels.              SUBJECTIVE: Patient having 9/10 low back pain and LLE symptoms at buttock and left anterior thigh. Noting increased cramping at back and legs. Had difficulty sleeping last night due to spasms and leg cramps. 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 This continues to aggravate his back and leg symptoms. Noted no changes with DN last session with pain and muscles feeling the same. States he has a nerve block/injection on January 3, 2022 with Dr. Kimberly Yan. TREATMENT   Precautions: anteriolisthesis of L4 on L5 4 mm, lumbar stenosis, no extension. Optimal lumbar neutral position and slight flexion responder   Pain: 9/10 Lower back with radicular symptoms down bilateral LEs (Left > Right)    X in shaded column indicates activity completed today   Modalities Parameters/  Location  Notes   Ultrasound to Bilateral lumbar, posterior hip/upper gluteal region.   100% continuous, 1 MHz, 1 w/cm2 x10 minutes  Prone with 2 pillows under abdomen; Completed prior to manual techniques   Interferential estim bilateral lumbar and mid gluteal region, sciatic notch region. 4 pads on 2 channels with 2 pads each. 20 minutes intensity from 11 to 18 v x hooklying in 90/90 position for unloading/decompression to decrease pain. Manual Therapy Time/Technique  Notes   STM to lumbar paraspinals, QL, and superior gluteals with  10 minutes  X Seated in chair facing treatment table. Dry needling 40 mm needles at paravertebral points L3-L5  Non manipulated: 5 minutes     Dry needling 60 mm needles left gluteal: inferior glut homeostatic point pistoned-manipulated. manipulated     Dry needling 60 mm needles left glut medius, mid gluteal region pistoned, manipulated manipulated                 Exercise/Intervention   Notes   PPT  15x 5 sec      PPT with ball squeeze, resisted hip abduction in hook-lying with orange band 15x 5 sec      PPT with marching 3x   Attempted but started having Right hip pain and thought he was going to have a muscle cramp          Educated and reviewed body mechanics and sleeping positions with patient              Seated posterior pelvic tilt x10      Seated reciprocal marches with abdominals engaged x10      Seated LAQs with abdominal engaged x10                      Specific Interventions Next Treatment: Dry needling, US, soft tissue mobilization, therap ex core control increased awareness of optimal lumbar neutral, NO extension    Activity/Treatment Tolerance:  []  Patient tolerated treatment well  []  Patient limited by fatigue  [x]  Patient limited by pain   []  Patient limited by medical complications  []  Other:     Assessment: Patient noting no change from dry needling. Initiated HP with interferential estim to low back in 90/90 position. Noting patient having difficulty walking into clinic with decreased wt shift through LLE. Difficulty with sit<>stand. Ended with soft tissue mobilization in seated position. Held on ex today. Pain level decreased to 7/10 by end of session. Goals    Patient Goal: To have less pain, tolerated activities better without increased back pain. LLE symptoms    Short Term Goals: 4 weeks  1. Patient to report of decreased pain levels from 8/10 to 5/10 at left lateral lumbar, decreased radiculopathy and sciatic nerve irritation with standing, walking, bending. 2. Patient to demonstrate improved lumbar ROM with flexion from 40 degrees trunk flexion and fingertips to lower patella to 60 degrees flexion with fingertips to mid shin level in order to have increased ease with mobility with position changes sit to stand, bed mobility skills bending. 3. Patient to demonstrate increased core control with increased awareness of optimal lumbar neutral, increased strength at core musculature, motor control/mobilizationa of fascial slings. 4. Patient to demonstrate increased functional mobility with sit<>stand, bed mobility, walking and standing tolerance. Long Term Goals: 8 weeks  1. Modified Oswestry from 38% to no greater than 25%  2. Patient to demonstrate independence in pain management of low back pain with mobility stategies with proper body mechanics, exercises as instructed in sessions. Patient Education:   [x]  HEP/Education Completed: pace activities. Continue HEP. Monitor symptoms following dry needling and soft tissue mobilization.  [employer] Access Code:  []  No new Education completed  []  Reviewed Prior HEP      [x]  Patient verbalized and/or demonstrated understanding of education provided. []  Patient unable to verbalize and/or demonstrate understanding of education provided. Will continue education.   []  Barriers to learning:     PLAN:  Treatment Recommendations: Strengthening, Range of Motion, Balance Training, Functional Mobility Training, Transfer Training, Neuromuscular Re-education, Manual Therapy - Soft Tissue Mobilization, Pain Management, Home Exercise Program, Patient Education, Aquatics and Modalities    []  Plan of care initiated. Plan to see patient 2 times per week for 8 weeks to address the treatment planned outlined above.   [x]  Continue with current plan of care  []  Modify plan of care as follows:    []  Hold pending physician visit  []  Discharge    Time In 1557   Time Out 1630   Timed Code Minutes: 20   Total Treatment Time: 33     Electronically Signed by: Sarah Rae PT

## 2021-12-29 ENCOUNTER — HOSPITAL ENCOUNTER (OUTPATIENT)
Dept: PHYSICAL THERAPY | Age: 56
Setting detail: THERAPIES SERIES
End: 2021-12-29
Payer: COMMERCIAL

## 2021-12-29 ENCOUNTER — HOSPITAL ENCOUNTER (OUTPATIENT)
Dept: PHYSICAL THERAPY | Age: 56
Setting detail: THERAPIES SERIES
Discharge: HOME | End: 2021-12-29
Payer: COMMERCIAL

## 2021-12-29 ENCOUNTER — PREP FOR PROCEDURE (OUTPATIENT)
Dept: PHYSICAL MEDICINE AND REHAB | Age: 56
End: 2021-12-29

## 2021-12-29 PROCEDURE — 97124 MASSAGE THERAPY: CPT

## 2021-12-29 PROCEDURE — 97110 THERAPEUTIC EXERCISES: CPT

## 2021-12-29 PROCEDURE — 97032 APPL MODALITY 1+ESTIM EA 15: CPT

## 2021-12-29 NOTE — PROGRESS NOTES
[address] [facility]  PHYSICAL THERAPY  [] EVALUATION  [x] DAILY NOTE (LAND) [] DAILY NOTE (AQUATIC)  [] PROGRESS NOTE [] DISCHARGE NOTE    [x] OUTPATIENT REHABILITATION CENTER [facility]   [] [place] [address]    [] [facility]   [] Severiano Stapler    Date: 2021  Patient Name:  Selina Alba  : 1965  MRN: [id number]  CSN: [id number]    Referring Practitioner JASWINDER English*   Diagnosis Spondylosis without myelopathy or radiculopathy, lumbar region [M47.816]  Radiculopathy, lumbar region [M54.16]  Spinal stenosis, lumbar region with neurogenic claudication [M48.062]  Chronic pain syndrome [G89.4]    Treatment Diagnosis Left lateral lumbar pain with radiculopathy, limited functional mobility with flexion, extension, bending, lifting, twisting. Date of Evaluation 21    Additional Pertinent History Arthritis, CAD, HTN, thoracic spondylosis, bilateral sciatica, cervicalgia C5-C7, depression, lumbar radiculopathy, S/P aortic valve replacement, cervical fusion ,  ACDF C4-C6, history of nerve ablations, nerve blocks. Functional Outcome Measure Used Modified Oswestry   Functional Outcome Score 38%, 19/50  (21)       Insurance: Primary: Payor: [facility] /  /  / ,   Secondary:    Authorization Information: PRE CERTIFICATION REQUIRED: YES, AFTER 30 [place] [address]. CALL [phone]  INSURANCE THERAPY BENEFIT:  30 VISITS LIMIT  AQUATIC THERAPY COVERED: YES  MODALITIES COVERED:  YES  TELEHEALTH COVERED:    REFERENCE NUMBER: [id number]   Visit # 7, 7/10 for progress note   Visits Allowed: 30   Recertification Date:    Physician Follow-Up: 2021 Thi Valencia CNP   Physician Orders: PT for PT eval and Dry needling    History of Present Illness: Patient reports of problems with his back with sciatica for past 10-15 years. Notes pain location at right low back and left low back.  Notes left anterior thigh pain and radiating pain to left foot. Primarily on left side but can be on right side as well. Noting leg cramps as well. Pain is constant with difficulty sleeping. Notes walking, completing activities from down on knees to getting back up to standing, bending forward picking objects off floor, sweeping/mopping, prolonged sitting. Difficulty driving >2 hours with increased sciatic nerve pain. Prefers to sleep in sidelying but after a period of time cannot tolerate sleeping on back as long as sidelying. To manages completes stretches at home due to AM stiffness with DKTC, rotation. Pain medication: Norco, Gabapentin. MRI completed. 11/2/2021   1. 4 mm of anterolisthesis of L4 on L5. There is bone marrow edema bilaterally in the facet joints and pedicles. There is mild spinal canal stenosis and left greater than right foraminal stenosis. 2. Mild spinal canal stenosis at the T12-L1 and L1-L2 levels.              SUBJECTIVE: Patient reports he feels he has so much arthritis flaring him up this morning. He reports his legs are cramping and tight. TREATMENT   Precautions: anteriolisthesis of L4 on L5 4 mm, lumbar stenosis, no extension. Optimal lumbar neutral position and slight flexion responder   Pain: 7-8/10 Lower back with radicular symptoms down bilateral LEs (Left > Right)    X in shaded column indicates activity completed today   Modalities Parameters/  Location  Notes   Ultrasound to Bilateral lumbar, posterior hip/upper gluteal region. 100% continuous, 1 MHz, 1 w/cm2 x10 minutes  Prone with 2 pillows under abdomen; Completed prior to manual techniques   Interferential estim bilateral lumbar and mid gluteal region, sciatic notch region. 4 pads on 2 channels with 2 pads each. 20 minutes intensity 21 x hooklying in 90/90 position for unloading/decompression to decrease pain.           Manual Therapy Time/Technique  Notes   STM to lumbar paraspinals, QL, and superior gluteals with  10 minutes  X Seated in chair facing treatment table.          Dry needling 40 mm needles at paravertebral points L3-L5  Non manipulated: 5 minutes     Dry needling 60 mm needles left gluteal: inferior glut homeostatic point pistoned-manipulated. manipulated     Dry needling 60 mm needles left glut medius, mid gluteal region pistoned, manipulated manipulated                 Exercise/Intervention   Notes   PPT  15x 5 sec      PPT with ball squeeze, resisted hip abduction in hook-lying with orange band 15x 5 sec      PPT with marching 3x   Attempted but started having Right hip pain and thought he was going to have a muscle cramp          Educated and reviewed body mechanics and sleeping positions with patient              Seated posterior pelvic tilt x10  x    Seated reciprocal marches with abdominals engaged x10  x    Seated LAQs with abdominal engaged x10  x                    Specific Interventions Next Treatment: Dry needling, US, soft tissue mobilization, therap ex core control increased awareness of optimal lumbar neutral, NO extension    Activity/Treatment Tolerance:  []  Patient tolerated treatment well  []  Patient limited by fatigue  [x]  Patient limited by pain   []  Patient limited by medical complications  []  Other:     Assessment: Clara Becerril reporting pain had decreased to 6/10 by end of session. Reports has to leave and go to work from therapy today. Goals    Patient Goal: To have less pain, tolerated activities better without increased back pain. LLE symptoms    Short Term Goals: 4 weeks  1. Patient to report of decreased pain levels from 8/10 to 5/10 at left lateral lumbar, decreased radiculopathy and sciatic nerve irritation with standing, walking, bending. 2. Patient to demonstrate improved lumbar ROM with flexion from 40 degrees trunk flexion and fingertips to lower patella to 60 degrees flexion with fingertips to mid shin level in order to have increased ease with mobility with position changes sit to stand, bed mobility skills bending. 3. Patient to demonstrate increased core control with increased awareness of optimal lumbar neutral, increased strength at core musculature, motor control/mobilizationa of fascial slings. 4. Patient to demonstrate increased functional mobility with sit<>stand, bed mobility, walking and standing tolerance. Long Term Goals: 8 weeks  1. Modified Oswestry from 38% to no greater than 25%  2. Patient to demonstrate independence in pain management of low back pain with mobility stategies with proper body mechanics, exercises as instructed in sessions. Patient Education:   []  HEP/Education Completed: pace activities. Continue HEP. Monitor symptoms following dry needling and soft tissue mobilization.  Head [employer] Access Code:  []  No new Education completed  [x]  Reviewed Prior HEP      [x]  Patient verbalized and/or demonstrated understanding of education provided. []  Patient unable to verbalize and/or demonstrate understanding of education provided. Will continue education. []  Barriers to learning:     PLAN:  Treatment Recommendations: Strengthening, Range of Motion, Balance Training, Functional Mobility Training, Transfer Training, Neuromuscular Re-education, Manual Therapy - Soft Tissue Mobilization, Pain Management, Home Exercise Program, Patient Education, Aquatics and Modalities    []  Plan of care initiated. Plan to see patient 2 times per week for 8 weeks to address the treatment planned outlined above.   [x]  Continue with current plan of care  []  Modify plan of care as follows:    []  Hold pending physician visit  []  Discharge    Time In 0700   Time Out 0740   Timed Code Minutes: 25   Total Treatment Time: 40     Electronically Signed by: Glorya Sacks, PT

## 2021-12-30 ENCOUNTER — TELEPHONE (OUTPATIENT)
Dept: PHYSICAL MEDICINE AND REHAB | Age: 56
End: 2021-12-30

## 2021-12-30 NOTE — TELEPHONE ENCOUNTER
Pt has procedure schedule for Monday 1/3/22. 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 He stats he has 2 open wounds on buttom and left foot.     Please advise

## 2022-01-03 ENCOUNTER — HOSPITAL ENCOUNTER (OUTPATIENT)
Age: 57
Setting detail: OUTPATIENT SURGERY
Discharge: HOME | End: 2022-01-03
Attending: PAIN MEDICINE | Admitting: PAIN MEDICINE
Payer: COMMERCIAL

## 2022-01-03 ENCOUNTER — ANESTHESIA EVENT (OUTPATIENT)
Dept: OPERATING ROOM | Age: 57
End: 2022-01-03
Payer: COMMERCIAL

## 2022-01-03 ENCOUNTER — APPOINTMENT (OUTPATIENT)
Dept: GENERAL RADIOLOGY | Age: 57
End: 2022-01-03
Attending: PAIN MEDICINE
Payer: COMMERCIAL

## 2022-01-03 ENCOUNTER — APPOINTMENT (OUTPATIENT)
Dept: PHYSICAL THERAPY | Age: 57
End: 2022-01-03
Payer: COMMERCIAL

## 2022-01-03 ENCOUNTER — ANESTHESIA (OUTPATIENT)
Dept: OPERATING ROOM | Age: 57
End: 2022-01-03
Payer: COMMERCIAL

## 2022-01-03 VITALS
HEIGHT: 67 IN | WEIGHT: 178.2 LBS | SYSTOLIC BLOOD PRESSURE: 130 MMHG | BODY MASS INDEX: 27.97 KG/M2 | HEART RATE: 69 BPM | TEMPERATURE: 97.5 F | DIASTOLIC BLOOD PRESSURE: 70 MMHG | OXYGEN SATURATION: 98 % | RESPIRATION RATE: 16 BRPM

## 2022-01-03 VITALS
OXYGEN SATURATION: 99 % | DIASTOLIC BLOOD PRESSURE: 79 MMHG | RESPIRATION RATE: 18 BRPM | SYSTOLIC BLOOD PRESSURE: 166 MMHG

## 2022-01-03 PROCEDURE — 6360000002 HC RX W HCPCS: Performed by: PAIN MEDICINE

## 2022-01-03 PROCEDURE — 3700000000 HC ANESTHESIA ATTENDED CARE: Performed by: PAIN MEDICINE

## 2022-01-03 PROCEDURE — 3600000054 HC PAIN LEVEL 3 BASE: Performed by: PAIN MEDICINE

## 2022-01-03 PROCEDURE — 6360000004 HC RX CONTRAST MEDICATION: Performed by: PAIN MEDICINE

## 2022-01-03 PROCEDURE — 7100000010 HC PHASE II RECOVERY - FIRST 15 MIN: Performed by: PAIN MEDICINE

## 2022-01-03 PROCEDURE — 7100000011 HC PHASE II RECOVERY - ADDTL 15 MIN: Performed by: PAIN MEDICINE

## 2022-01-03 PROCEDURE — 2709999900 HC NON-CHARGEABLE SUPPLY: Performed by: PAIN MEDICINE

## 2022-01-03 PROCEDURE — 27096 INJECT SACROILIAC JOINT: CPT | Performed by: PAIN MEDICINE

## 2022-01-03 PROCEDURE — 2500000003 HC RX 250 WO HCPCS: Performed by: PAIN MEDICINE

## 2022-01-03 PROCEDURE — 6360000002 HC RX W HCPCS

## 2022-01-03 PROCEDURE — 3209999900 FLUORO FOR SURGICAL PROCEDURES

## 2022-01-03 RX ORDER — PROPOFOL 10 MG/ML
INJECTION, EMULSION INTRAVENOUS PRN
Status: DISCONTINUED | OUTPATIENT
Start: 2022-01-03 | End: 2022-01-03 | Stop reason: SDUPTHER

## 2022-01-03 RX ORDER — TRIAMCINOLONE ACETONIDE 40 MG/ML
INJECTION, SUSPENSION INTRA-ARTICULAR; INTRAMUSCULAR PRN
Status: DISCONTINUED | OUTPATIENT
Start: 2022-01-03 | End: 2022-01-03 | Stop reason: ALTCHOICE

## 2022-01-03 RX ORDER — BUPIVACAINE HYDROCHLORIDE 2.5 MG/ML
INJECTION, SOLUTION EPIDURAL; INFILTRATION; INTRACAUDAL PRN
Status: DISCONTINUED | OUTPATIENT
Start: 2022-01-03 | End: 2022-01-03 | Stop reason: ALTCHOICE

## 2022-01-03 RX ORDER — LIDOCAINE HYDROCHLORIDE 10 MG/ML
INJECTION, SOLUTION INFILTRATION; PERINEURAL PRN
Status: DISCONTINUED | OUTPATIENT
Start: 2022-01-03 | End: 2022-01-03 | Stop reason: ALTCHOICE

## 2022-01-03 RX ADMIN — PROPOFOL 120 MG: 10 INJECTION, EMULSION INTRAVENOUS at 13:47

## 2022-01-03 ASSESSMENT — PULMONARY FUNCTION TESTS
PIF_VALUE: 0

## 2022-01-03 ASSESSMENT — PAIN - FUNCTIONAL ASSESSMENT: PAIN_FUNCTIONAL_ASSESSMENT: 0-10

## 2022-01-03 ASSESSMENT — PAIN SCALES - GENERAL: PAINLEVEL_OUTOF10: 0

## 2022-01-03 NOTE — PROGRESS NOTES
1354-Patient to Phase II via cart. Report received from [facility]. Patient drowsy but responsive. Vitals obtained and stable. Respirations even and unlabored on room air. Patient denies pain, nausea, numbness and tingling. Patient able to move all extremities. No drainage noted at injection sites. Patient instructed to stay in bed. Instructed on call light use. 1400-Patient provided with snack and drink. Denies needs  1420-IV removed with no complications. Patient getting dressed at bedside. Ride notified. 1430-Patient meets discharge criteria. Discharged in stable condition with responsible . All belongings given to patient. Patient ambulated to car with assistance from RN. Patient tolerated well.

## 2022-01-03 NOTE — ANESTHESIA PRE PROCEDURE
DO   aspirin 81 MG EC tablet Take 1 tablet by mouth daily 2/25/21   Tanisha Neri MD   indomethacin (INDOCIN) 25 MG capsule Take 1 capsule by mouth 3 times daily (with meals) Take 1 tablet daily for 10 days. 10/8/20   Padmini Gates MD   clobetasol (TEMOVATE) 0.05 % cream APPLY TOPICALLY TO RIGHT INDEX FINGER 3 TIMES DAILY 6/15/18   Historical Provider, MD   Cholecalciferol (VITAMIN D3) 1000 units TABS Take 2,000 Units by mouth Daily    Historical Provider, MD    MG capsule TAKE 1 CAPSULE BY MOUTH ONE TIME A DAY AS NEEDED for constipation 3/28/18   Padmini Gates MD   IRON PO Take 65 mg by mouth daily     Historical Provider, MD   Multiple Vitamin (MULTIVITAMIN PO) Take 1 tablet by mouth daily     Historical Provider, MD       Current medications:    No current facility-administered medications for this encounter. Allergies:  No Known Allergies    Problem List:    Patient Active Problem List   Diagnosis Code    HTN (hypertension) I10    Lumbago M54.50    Lumbar radiculopathy M54.16    Cervicalgia, C5-7 M54.2    Hyperlipidemia E78.5    Hypothyroidism E03.9    Chronic anxiety F41.9    ED (erectile dysfunction) N52.9    Bilateral sciatica M54.31, M54.32    Trigger finger, right, ring  M65.30    Medication monitoring encounter Z51.81    Nocturnal leg cramps G47.62    GERD (gastroesophageal reflux disease) K21.9    IFG (impaired fasting glucose) R73.01    Situational depression F43.21    Dysthymia (or depressive neurosis) F34.1    Herniation of cervical intervertebral disc with radiculopathy M50.10    H/O cervical spine surgery, C4-5 fusion, 3/2/16. V90.515    Gastrointestinal hemorrhage associated with gastrojejunal ulcer K28.4    S/P AVR (aortic valve replacement), 7/20/16.  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    Lumbar radiculitis M54.16    Lumbar spinal stenosis M48.061    Spondylosis of thoracic region without myelopathy or radiculopathy M47.814    Thoracic spinal stenosis M48.04    Chronic pain syndrome G89.4    Urinary hesitancy R39.11    Malignant lymphoma, non-Hodgkin's (HCC) C85.90       Past Medical History:        Diagnosis Date    Arthritis     general    Bilateral sciatica     Bleeding     with coumadin    CAD (coronary artery disease)     Carpal tunnel syndrome     RIGHT    Cervicalgia, C5-7     Chronic anxiety     Dysthymia (or depressive neurosis) 2/1/2016    ED (erectile dysfunction)     ED (erectile dysfunction)     GERD (gastroesophageal reflux disease)     Headache(784.0)     History of blood transfusion 03/2016    HTN (hypertension)     Hyperlipidemia     Hypothyroidism     Iron deficiency anemia, blood loss     Lumbago     Lumbar radiculopathy     Osteoarthritis (arthritis due to wear and tear of joints)     S/P AVR, coumadin Tx     Spondylosis of thoracic region without myelopathy or radiculopathy        Past Surgical History:        Procedure Laterality Date    AORTIC VALVE REPLACEMENT  2007    [facility]    AORTIC VALVE REPLACEMENT  07/20/2016    extraction of mechanical valve and replacement with tissue valve    APPENDECTOMY  1980    CARDIAC CATHETERIZATION  5 20 2010    Patent coronary arteries. Possible causes of the patient's chest pain is likely noncardiac at least based on this angiogram. Patient chould be able to proceed w/ scheduled surgery w/ paying attention to anticoagulation and trying to minimize the period of no anticoagulation.  CARDIAC CATHETERIZATION  2012, 6/9/16    [facility]    CARDIOVASCULAR STRESS TEST  4 15 2010    Moderate fixed inferior defect, possibly attenuation, cannot exclude a previous MI. Correlation w/ EKG is recommented. Significant degree of gut uptake which is likely to be the cause of the attenuation artifact seen.  EF 46%    CERVICAL FUSION  2010    CERVICAL FUSION  03/09/2016    REMOVAL HARDWARE C5-7, ACDF C4-6 WITH ATLANTIS CORNERSTONE    COLONOSCOPY  2010    CORONARY ARTERY BYPASS GRAFT      DILATATION, ESOPHAGUS      small resection    ENDOSCOPY, COLON, DIAGNOSTIC      NERVE SURGERY Bilateral 07/25/2017    THORACIC FACET BLOCK T7-8, T8-9, T11-12    OTHER SURGICAL HISTORY  05/02/2017    LESI L5    OTHER SURGICAL HISTORY  09/12/2017    thoracic epidural T11    DC INJ,PARAVERTEBRAL L/S,1 LEVEL Bilateral 7/25/2017    T-FACET MBB T7-8, T8-9, T11-12 BILATERAL performed by Sinan Garvey MD at [address] [facility] DX/THER [address] LUMBAR/SACRAL N/A 9/12/2017    TESI T11 performed by Sinan Garvey MD at [address] [facility] [address]  2010    [address] EXTRACTION  02/2019    UPPER GASTROINTESTINAL ENDOSCOPY  2010       Social History:    Social History     Tobacco Use    Smoking status: Current Every Day Smoker     Packs/day: 0.25     Years: 25.00     Pack years: 6.25     Types: Cigarettes, Cigars     Start date: 6/9/1978    Smokeless tobacco: Never Used   Substance Use Topics    Alcohol use:  Yes     Alcohol/week: 0.0 standard drinks     Comment: not for 3 mo                                Ready to quit: Not Answered  Counseling given: Not Answered      Vital Signs (Current):   Vitals:    01/03/22 1331   BP: (!) 155/88   Pulse: 73   Resp: 16   Temp: 98.4 °F (36.9 °C)   TempSrc: Skin   SpO2: 97%   Weight: 178 lb 3.2 oz (80.8 kg)   Height: 5' 7\" (1.702 m)                                              BP Readings from Last 3 Encounters:   01/03/22 (!) 155/88   12/15/21 136/82   10/21/21 134/62       NPO Status: Time of last liquid consumption: 0600                        Time of last solid consumption: 2000                        Date of last liquid consumption: 01/03/22                        Date of last solid food consumption: 01/02/22    BMI:   Wt Readings from Last 3 Encounters:   01/03/22 178 lb 3.2 oz (80.8 kg)   12/15/21 189 lb (85.7 kg)   10/21/21 189 lb 6.4 oz (85.9 kg)     Body mass Other Findings:             Anesthesia Plan      MAC     ASA 3       Induction: intravenous. MIPS: Postoperative opioids intended. Anesthetic plan and risks discussed with patient. Plan discussed with CRNA.                   [address] Wilian, DO   1/3/2022

## 2022-01-03 NOTE — ANESTHESIA POSTPROCEDURE EVALUATION
Department of Anesthesiology  Postprocedure Note    Patient: Glennette Severs  MRN: [id number]  YOB: 1965  Date of evaluation: 1/3/2022  Time:  2:41 PM     Procedure Summary     Date: 01/03/22 Room / Location: [address] [place] [address] 03 / [facility]    Anesthesia Start: 7991 Anesthesia Stop: 5566    Procedure: Bilateral SI injection (Bilateral ) Diagnosis: (THORACIC SPONDYLOSIS)    Surgeons: Giancarlo Alejandro MD Responsible Provider: Chriss Gee DO    Anesthesia Type: MAC ASA Status: 3          Anesthesia Type: MAC    Anna Phase I:      Anna Phase II: Anna Score: 9    Last vitals: Reviewed and per EMR flowsheets.        Anesthesia Post Evaluation    Patient location during evaluation: bedside  Patient participation: complete - patient participated  Level of consciousness: awake and alert  Airway patency: patent  Nausea & Vomiting: no vomiting and no nausea  Complications: no  Cardiovascular status: hemodynamically stable  Respiratory status: acceptable  Hydration status: stable

## 2022-01-03 NOTE — H&P
H&P    Pt here for 2 month FU, had physical therapy and dry needling completed. Lumbar MRI completed.      Patient states no relief from PT or dry needling. Today was his first dry needling on lumbar and left buttocks/hips.     Has pain in low back across and radiating down buttocks and down bilateral legs to feet anteriorly, left leg worse. Pain is constant in low back its described as a constant dull stabbing pain with pins and needles and in legs numb and tinging. States he has pain into the tail bone as well. Denies loss of bowel or bladder. States lower back pain 60% and BLE 40% L>R.     Denies any bladder or bowel dysfunction.     He had Norco 5/325, once daily from PCP. Taking them only as needed, has a few left. Last filled 11/19/21. He denies any ER visits since last visit.     Pain scale with out pain medications or at its worst is 9/10. Pain scale with pain medications or at its best is 6/10. Last dose of Norco was two days ago  Drug screen reviewed from 10/21 and was appropriate. Xanax from Dr Evie Ty, previous PCP. New PCP discussing stopping xanax- he only takes them 3-4 times a week.         The patienthas No Known Allergies.      Lumbar MRI 11/03/21     FINDINGS:           There is 4 mm of anterolisthesis of L4 on L5. The other lumbar vertebral bodies are normally aligned. There is bone marrow edema in the pedicles bilaterally at the L4-5 level. There is also some degenerative marrow edema in the facet joints. No    suspicious osseous lesions are present. Satira Cones are no compression fractures.  No pars defects are noted.  There is disc desiccation throughout.       The distal spinal cord, conus medullaris and cauda equina are normal.        There are no gross abnormalities in the visualized aspects of the distal thoracic spine.       On the axial images, at T12-L1, there is a diffuse disc bulge. There are mild facet degenerative changes. There is mild spinal canal stenosis.  There is no foraminal stenosis.       At L1-L2, there is a diffuse disc bulge. There are facet degenerative changes. There is mild spinal canal stenosis. There is no foraminal stenosis.       At L2-L3, there are very mild facet degenerative changes. There is no focal disc abnormality. There is no spinal canal or foraminal stenosis.       At L3-L4, there are very mild facet degenerative changes. There is no focal disc abnormality. There is no spinal canal or foraminal stenosis.       At L4-L5, there are facet degenerative changes. There is uncovering of the disc. There is mild spinal canal stenosis. There is narrowing of the left subarticular recess. There is moderate severity left and mild right foraminal stenosis.       At L5-S1, there is no spinal canal or foraminal stenosis.       There are no suspicious findings in the visualized aspects of the retroperitoneum and paraspinal soft tissues.               Impression       1. 4 mm of anterolisthesis of L4 on L5. There is bone marrow edema bilaterally in the facet joints and pedicles. There is mild spinal canal stenosis and left greater than right foraminal stenosis. 2. Mild spinal canal stenosis at the T12-L1 and L1-L2 levels.            Subjective:      Review of Systems   Constitutional: Positive for activity change and fatigue. Negative for appetite change, chills, diaphoresis, fever and unexpected weight change. HENT: Negative. Negative for congestion, dental problem, drooling, ear discharge, ear pain, facial swelling, hearing loss, mouth sores, nosebleeds, postnasal drip, rhinorrhea, sinus pressure, sinus pain, sneezing, sore throat, tinnitus, trouble swallowing and voice change. Eyes: Negative. Negative for photophobia, pain, discharge, redness, itching and visual disturbance. Respiratory: Negative. Negative for apnea, cough, choking, chest tightness, shortness of breath, wheezing and stridor. Cardiovascular: Negative.   Negative for chest pain, palpitations and leg swelling. CAD   Gastrointestinal: Positive for constipation. Negative for abdominal distention, abdominal pain, anal bleeding, blood in stool, diarrhea, nausea, rectal pain and vomiting. Endocrine: Negative for cold intolerance, heat intolerance, polydipsia, polyphagia and polyuria. Genitourinary: Negative. Negative for decreased urine volume, difficulty urinating, dysuria, enuresis, flank pain, frequency, genital sores, hematuria, penile discharge, penile pain, penile swelling, scrotal swelling, testicular pain and urgency. Musculoskeletal: Positive for arthralgias, back pain, gait problem and myalgias. Negative for joint swelling, neck pain and neck stiffness. Ambulating without assist devices    Skin: Negative. Negative for color change, pallor, rash and wound. Allergic/Immunologic: Negative for environmental allergies, food allergies and immunocompromised state. Neurological: Positive for weakness and numbness. Negative for dizziness, tremors, seizures, syncope, facial asymmetry, speech difficulty, light-headedness and headaches. Hematological: Negative for adenopathy. Does not bruise/bleed easily. Psychiatric/Behavioral: Positive for sleep disturbance. Negative for agitation, behavioral problems, confusion, decreased concentration, dysphoric mood, hallucinations, self-injury and suicidal ideas. The patient is nervous/anxious. The patient is not hyperactive.          Objective:      Vitals       Vitals:     12/15/21 1540   BP: 136/82   Weight: 189 lb (85.7 kg)   Height: 5' 7\" (1.702 m)            Physical Exam  Vitals and nursing note reviewed. Constitutional:       General: He is not in acute distress. Appearance: Normal appearance. He is well-developed. He is not diaphoretic. HENT:      Head: Normocephalic and atraumatic.       Right Ear: External ear normal.      Left Ear: External ear normal.      Nose: Nose normal.      Mouth/Throat:      Mouth: Mucous membranes are moist.      Pharynx: No oropharyngeal exudate. Eyes:      General: No scleral icterus. Right eye: No discharge. Left eye: No discharge. Conjunctiva/sclera: Conjunctivae normal.      Pupils: Pupils are equal, round, and reactive to light. Neck:      Thyroid: No thyromegaly. Cardiovascular:      Rate and Rhythm: Normal rate and regular rhythm. Heart sounds: Murmur heard. No friction rub. No gallop. Comments: CLICK PRESENT  Pulmonary:      Effort: Pulmonary effort is normal. No respiratory distress. Breath sounds: Normal breath sounds. No wheezing or rales. Chest:      Chest wall: No tenderness. Abdominal:      General: Bowel sounds are normal. There is no distension. Palpations: Abdomen is soft. Tenderness: There is no abdominal tenderness. There is no guarding or rebound. Musculoskeletal:         General: Tenderness present. Right shoulder: Tenderness present. Normal range of motion. Left shoulder: Tenderness present. Normal range of motion. Right elbow: Tenderness present. Left elbow: Tenderness present. No medial epicondyle tenderness. Cervical back: Full passive range of motion without pain and normal range of motion. Rigidity and spasms present. No edema, erythema or tenderness. No muscular tenderness. Normal range of motion. Thoracic back: Spasms and tenderness present. Lumbar back: Tenderness and bony tenderness present. Decreased range of motion. Negative right straight leg raise test and negative left straight leg raise test.        Back:       Right hip: No tenderness. Left hip: No tenderness. Right knee: Normal range of motion. No tenderness. Left knee: Normal range of motion. No tenderness. Right ankle: No swelling. Left ankle: No swelling. Right foot: No swelling. Left foot: No swelling. Skin:     General: Skin is warm. Coloration: Skin is not pale.       Findings: No erythema or rash. Neurological:      General: No focal deficit present. Mental Status: He is alert and oriented to person, place, and time. He is not disoriented. Cranial Nerves: No cranial nerve deficit. Sensory: No sensory deficit. Motor: No atrophy or abnormal muscle tone. Coordination: Coordination normal.      Gait: Gait normal.      Deep Tendon Reflexes: Reflexes are normal and symmetric. Comments: SLR mild positive RLE   Psychiatric:         Attention and Perception: He is attentive. Mood and Affect: Mood normal. Mood is not anxious or depressed. Affect is not labile, blunt, angry or inappropriate. Speech: He is communicative. Speech is not rapid and pressured, delayed, slurred or tangential.         Behavior: Behavior is not agitated, slowed, aggressive, withdrawn, hyperactive or combative. Thought Content: Thought content is not paranoid or delusional. Thought content does not include homicidal or suicidal ideation. Thought content does not include homicidal or suicidal plan. Cognition and Memory: Memory is not impaired. He does not exhibit impaired recent memory or impaired remote memory. Judgment: Judgment is not impulsive or inappropriate.         MJ  Patricks test  positive  Yeoman's  positive  Gaenslen's  positive  Kemps  positive  Phalens Reverse Phalens  no  Tinels  no  Spurlings  no     Assessment:      1. Sacroiliac inflammation ([address]    2. Lumbosacral radiculitis    3. Lumbar radiculopathy    4. Lumbar facet arthropathy    5. Lumbar spondylosis    6. Spinal stenosis of lumbar region with neurogenic claudication    7. Chronic pain syndrome       Plan:      · OARRS reviewed. Current MED: 5  · Patient was not offered naloxone for home. · Discussed long term side effects of medications, tolerance, dependency and addiction. · Previous UDS reviewed  · UDS preformed today for compliance.   · Patient told can not receive any pain medications from any other source. · No evidence of abuse, diversion or aberrant behavior. · Medications and/or procedures to improve function and quality of life- patient understanding with this and that may not be pain free  · Discussed with patient about safe storage of medications at home  · Discussed possible weaning of medication dosing dependent on treatment/procedure results. · Testing: reviewed Lumbar MRI   · Procedures: discussed  LESI, TFLESI, caudal epidural, SI injections  . Proceed with bilateral SI injection for diagnostic purpose. · Continue dry needling with therapy. · Discussed with patient about risks with procedure including infection, reaction to medication, increased pain, or bleeding.   · Medications: Reviewed  · If patient is on blood thinners will need approval to hold yes or no: ASA 81mg              Return Bilateral SI injection.

## 2022-01-03 NOTE — OP NOTE
Pre-Procedure Note    Patient Name: Monico Kehr   YOB: 1965  Medical Record Number: [id number]  Date: 1/3/22     Indication:  SI pain  Consent: On file. Vital Signs:   Vitals:    01/03/22 1331   BP: (!) 155/88   Pulse: 73   Resp: 16   Temp: 98.4 °F (36.9 °C)   SpO2: 97%       Past Medical History:   has a past medical history of Arthritis, Bilateral sciatica, Bleeding, CAD (coronary artery disease), Carpal tunnel syndrome, Cervicalgia, C5-7, Chronic anxiety, Dysthymia (or depressive neurosis), ED (erectile dysfunction), ED (erectile dysfunction), GERD (gastroesophageal reflux disease), Headache(784.0), History of blood transfusion, HTN (hypertension), Hyperlipidemia, Hypothyroidism, Iron deficiency anemia, blood loss, Lumbago, Lumbar radiculopathy, Osteoarthritis (arthritis due to wear and tear of joints), S/P AVR, coumadin Tx, and Spondylosis of thoracic region without myelopathy or radiculopathy. Past Surgical History:   has a past surgical history that includes cardiovascular stress test (4 15 2010); Appendectomy (1980); Colonoscopy (2010); Small intestine surgery (2010); Upper gastrointestinal endoscopy (2010); Aortic valve replacement (2007); Endoscopy, colon, diagnostic; cervical fusion (2010); cervical fusion (03/09/2016); Cardiac catheterization (5 20 2010); Cardiac catheterization (2012, 6/9/16); Dilatation, esophagus; Aortic valve replacement (07/20/2016); Coronary artery bypass graft; other surgical history (05/02/2017); Nerve Surgery (Bilateral, 07/25/2017); pr inj,paravertebral l/s,1 level (Bilateral, 7/25/2017); other surgical history (09/12/2017); pr njx dx/ther sbst epidural/subarach lumbar/sacral (N/A, 9/12/2017); and Tooth Extraction (02/2019). Pre-Sedation Documentation and Exam:   Vital signs have been reviewed (see flow sheet for vitals).      Sedation/ Anesthesia Plan: MAC    Patient is an appropriate candidate for plan of sedation: yes    Preoperative Diagnosis: Bilateral sacroiliitis. Postoperative Diagnosis: Bilateral  Sacroiliitis. Procedure Performed:  Bilateral sacroiliac joint injection under fluoroscopy guidance # 1. Indication for the Procedure: The patient failed conservative management  for pain in low back. The patient is tender over the Bilateral SI joint. Neal's test is positive on the Bilateral side. As patient is not responding to conservative management and pain is interfering with activities of daily living we decided to proceed with SI joint injection. The procedure and risks were discussed with the patient and an informed consent was obtained. Procedure:     A meaningful communication was kept up with the patient throughout the procedure. The patient is placed in prone position. Skin over the back was prepped and draped in sterile manner. Then using fluoroscopy the Bilateral sacroiliac joint was identified. Then the angle of the fluoroscopy was adjusted such that the view of the caudal aspect of the joint space was optimized. Then skin and deep tissues over the caudal aspect of the joint were infiltrated with 10 ml of 1% lidocaine. The #22-gauge, 3-1/2 inch spinal needle was introduced through the skin wheal and directed such that the tip of the needle lies in the joint space. This was confirmed by injecting 0.5 ml of Omnipaque-180 through the needle and observing the spread of the contrast along the joint space. Then after negative aspiration a total of 40 mg of kenalong with 2 ml of  0.25% Marcaine was injected through the needle in divided doses. The needle is removed and a Band-Aid was placed over the needle insertion site. EBL-0  The patient tolerated the procedure well and vital signs remained stable. The patient was discharged home in stable condition and will be followed in the pain clinic in the next few weeks or further planning.     Electronically signed by Iris Cosme MD on 1/3/22 at 1:52 PM EST

## 2022-01-04 ENCOUNTER — OFFICE VISIT (OUTPATIENT)
Dept: FAMILY MEDICINE CLINIC | Age: 57
End: 2022-01-04
Payer: COMMERCIAL

## 2022-01-04 VITALS
SYSTOLIC BLOOD PRESSURE: 138 MMHG | DIASTOLIC BLOOD PRESSURE: 78 MMHG | BODY MASS INDEX: 28.65 KG/M2 | WEIGHT: 182.9 LBS | OXYGEN SATURATION: 98 % | HEART RATE: 83 BPM | RESPIRATION RATE: 16 BRPM

## 2022-01-04 DIAGNOSIS — F34.1 DYSTHYMIA (OR DEPRESSIVE NEUROSIS): ICD-10-CM

## 2022-01-04 DIAGNOSIS — I10 ESSENTIAL HYPERTENSION: ICD-10-CM

## 2022-01-04 DIAGNOSIS — E78.2 MIXED HYPERLIPIDEMIA: ICD-10-CM

## 2022-01-04 DIAGNOSIS — Z00.00 WELL ADULT HEALTH CHECK: Primary | ICD-10-CM

## 2022-01-04 DIAGNOSIS — F43.21 SITUATIONAL DEPRESSION: ICD-10-CM

## 2022-01-04 DIAGNOSIS — M46.1 SACROILIAC INFLAMMATION (HCC): ICD-10-CM

## 2022-01-04 DIAGNOSIS — M48.062 SPINAL STENOSIS OF LUMBAR REGION WITH NEUROGENIC CLAUDICATION: ICD-10-CM

## 2022-01-04 DIAGNOSIS — E03.9 ACQUIRED HYPOTHYROIDISM: ICD-10-CM

## 2022-01-04 DIAGNOSIS — G89.4 CHRONIC PAIN SYNDROME: ICD-10-CM

## 2022-01-04 PROCEDURE — 99396 PREV VISIT EST AGE 40-64: CPT | Performed by: FAMILY MEDICINE

## 2022-01-04 RX ORDER — HYDROCODONE BITARTRATE AND ACETAMINOPHEN 5; 325 MG/1; MG/1
1 TABLET ORAL 2 TIMES DAILY PRN
Qty: 60 TABLET | Refills: 0 | Status: CANCELLED | OUTPATIENT
Start: 2022-01-04 | End: 2022-02-03

## 2022-01-04 RX ORDER — ATORVASTATIN CALCIUM 20 MG/1
TABLET, FILM COATED ORAL
Qty: 90 TABLET | Refills: 10 | Status: CANCELLED | OUTPATIENT
Start: 2022-01-04

## 2022-01-04 RX ORDER — LEVOTHYROXINE SODIUM 0.07 MG/1
75 TABLET ORAL DAILY
Qty: 90 TABLET | Refills: 3 | Status: CANCELLED | OUTPATIENT
Start: 2022-01-04

## 2022-01-04 RX ORDER — METOPROLOL SUCCINATE 25 MG/1
25 TABLET, EXTENDED RELEASE ORAL DAILY
Qty: 90 TABLET | Refills: 3 | Status: CANCELLED | OUTPATIENT
Start: 2022-01-04

## 2022-01-04 RX ORDER — INDOMETHACIN 25 MG/1
25 CAPSULE ORAL
Qty: 21 CAPSULE | Refills: 0 | Status: CANCELLED | OUTPATIENT
Start: 2022-01-04

## 2022-01-04 RX ORDER — BUPROPION HYDROCHLORIDE 100 MG/1
100 TABLET ORAL 2 TIMES DAILY
Qty: 180 TABLET | Refills: 3 | Status: CANCELLED | OUTPATIENT
Start: 2022-01-04

## 2022-01-04 ASSESSMENT — ENCOUNTER SYMPTOMS
GASTROINTESTINAL NEGATIVE: 1
RESPIRATORY NEGATIVE: 1
BACK PAIN: 1

## 2022-01-04 ASSESSMENT — PATIENT HEALTH QUESTIONNAIRE - PHQ9
5. POOR APPETITE OR OVEREATING: 0
SUM OF ALL RESPONSES TO PHQ9 QUESTIONS 1 & 2: 0
7. TROUBLE CONCENTRATING ON THINGS, SUCH AS READING THE NEWSPAPER OR WATCHING TELEVISION: 0
6. FEELING BAD ABOUT YOURSELF - OR THAT YOU ARE A FAILURE OR HAVE LET YOURSELF OR YOUR FAMILY DOWN: 0
3. TROUBLE FALLING OR STAYING ASLEEP: 0
SUM OF ALL RESPONSES TO PHQ QUESTIONS 1-9: 0
8. MOVING OR SPEAKING SO SLOWLY THAT OTHER PEOPLE COULD HAVE NOTICED. OR THE OPPOSITE, BEING SO FIGETY OR RESTLESS THAT YOU HAVE BEEN MOVING AROUND A LOT MORE THAN USUAL: 0
1. LITTLE INTEREST OR PLEASURE IN DOING THINGS: 0
9. THOUGHTS THAT YOU WOULD BE BETTER OFF DEAD, OR OF HURTING YOURSELF: 0
2. FEELING DOWN, DEPRESSED OR HOPELESS: 0
4. FEELING TIRED OR HAVING LITTLE ENERGY: 0
10. IF YOU CHECKED OFF ANY PROBLEMS, HOW DIFFICULT HAVE THESE PROBLEMS MADE IT FOR YOU TO DO YOUR WORK, TAKE CARE OF THINGS AT HOME, OR GET ALONG WITH OTHER PEOPLE: 0
SUM OF ALL RESPONSES TO PHQ QUESTIONS 1-9: 0

## 2022-01-04 NOTE — PROGRESS NOTES
2022    Citlali Kruger (:  1965) is a 60 y.o. male, here for a preventive medicine evaluation. Chief Complaint   Patient presents with   [facility]     for Chief's with form     Medication Refill     Annual wellness. No concerns. BP Readings from Last 3 Encounters:   22 138/78   22 130/70   22 (!) 166/79     Wt Readings from Last 3 Encounters:   22 182 lb 14.4 oz (83 kg)   22 178 lb 3.2 oz (80.8 kg)   12/15/21 189 lb (85.7 kg)     Has injection with Dr. Mar Mcardle yesterday, no response yet. Patient Active Problem List   Diagnosis    HTN (hypertension)    Lumbago    Lumbar radiculopathy    Cervicalgia, C5-7    Hyperlipidemia    Hypothyroidism    Chronic anxiety    ED (erectile dysfunction)    Bilateral sciatica    Trigger finger, right, ring     Medication monitoring encounter    Nocturnal leg cramps    GERD (gastroesophageal reflux disease)    IFG (impaired fasting glucose)    Situational depression    Dysthymia (or depressive neurosis)    Herniation of cervical intervertebral disc with radiculopathy    H/O cervical spine surgery, C4-5 fusion, 3/2/16. 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  Tobacco abuse    Lymphomatoid papulosis     Lumbar radiculitis    Lumbar spinal stenosis    Spondylosis of thoracic region without myelopathy or radiculopathy    Thoracic spinal stenosis    Chronic pain syndrome    Urinary hesitancy    Malignant lymphoma, non-Hodgkin's (HCC)    Sacroiliac inflammation (HCC)       Review of Systems   Constitutional: Negative. HENT: Negative. Respiratory: Negative. Cardiovascular: Negative. Gastrointestinal: Negative. Musculoskeletal: Positive for back pain. All other systems reviewed and are negative. Prior to Visit Medications    Medication Sig Taking?  Authorizing Provider   ALPRAZolam Shannan Olmos 0.5 MG tablet TAKE 1 TABLET BY MOUTH THREE TIMES A DAY AS NEEDED FOR ANXIETY OR SLEEP Yes Christiana Harding, APRN - CNP   HYDROcodone-acetaminophen (NORCO) 5-325 MG per tablet Take 1 tablet by mouth 2 times daily as needed for Pain for up to 30 days. Take lowest dose possible to manage pain Yes Iris Kamara MD   ibuprofen (ADVIL;MOTRIN) 400 MG tablet Take 400 mg by mouth every 6 hours as needed for Pain Yes Historical Provider, MD   lidocaine viscous hcl (XYLOCAINE) 2 % SOLN solution Take 15 mLs by mouth as needed for Irritation Yes Nadeem Jones, APRN - CNP   aspirin 81 MG EC tablet Take 1 tablet by mouth daily Yes Adrian Reaves MD   esomeprazole (NEXIUM) 40 MG delayed release capsule Take 1 capsule by mouth See Admin Instructions Take twice a day for two weeks. Then daily. Yes Adrian Reaves MD   levothyroxine (SYNTHROID) 75 MCG tablet Take 1 tablet by mouth Daily Yes Zaria Alaniz DO   buPROPion [facility]) 100 MG tablet Take 1 tablet by mouth 2 times daily Yes Cortland Dance, MD   indomethacin (INDOCIN) 25 MG capsule Take 1 capsule by mouth 3 times daily (with meals) Take 1 tablet daily for 10 days.  Yes Cortland Dance, MD   metoprolol succinate (TOPROL XL) 25 MG extended release tablet Take 1 tablet by mouth daily Yes Cortland Dance, MD   atorvastatin (LIPITOR) 20 MG tablet TAKE 1 TABLET BY MOUTH ONE TIME A DAY AT NIGHT Yes Cortland Dance, MD   clobetasol (TEMOVATE) 0.05 % cream APPLY TOPICALLY TO RIGHT INDEX FINGER 3 TIMES DAILY Yes Historical Provider, MD   Cholecalciferol (VITAMIN D3) 1000 units TABS Take 2,000 Units by mouth Daily Yes Historical Provider, MD    MG capsule TAKE 1 CAPSULE BY MOUTH ONE TIME A DAY AS NEEDED for constipation Yes Cortland Dance, MD   IRON PO Take 65 mg by mouth daily  Yes Historical Provider, MD   Multiple Vitamin (MULTIVITAMIN PO) Take 1 tablet by mouth daily  Yes Historical Provider, MD   gabapentin (NEURONTIN) 400 MG capsule Take 1 capsule by mouth 3 times daily for 90 days. Heloise Socks, DO   gabapentin (NEURONTIN) 300 MG capsule TAKE 1 CAPSULE BY MOUTH THREE TIMES A DAY  Heloise Socks, DO        No Known Allergies    Past Medical History:   Diagnosis Date    Arthritis     general    Bilateral sciatica     Bleeding     with coumadin    CAD (coronary artery disease)     Carpal tunnel syndrome     RIGHT    Cervicalgia, C5-7     Chronic anxiety     Dysthymia (or depressive neurosis) 2/1/2016    ED (erectile dysfunction)     ED (erectile dysfunction)     GERD (gastroesophageal reflux disease)     Headache(784.0)     History of blood transfusion 03/2016    HTN (hypertension)     Hyperlipidemia     Hypothyroidism     Iron deficiency anemia, blood loss     Lumbago     Lumbar radiculopathy     Osteoarthritis (arthritis due to wear and tear of joints)     S/P AVR, coumadin Tx     Spondylosis of thoracic region without myelopathy or radiculopathy        Past Surgical History:   Procedure Laterality Date    AORTIC VALVE REPLACEMENT  2007    [facility]    AORTIC VALVE REPLACEMENT  07/20/2016    extraction of mechanical valve and replacement with tissue valve    APPENDECTOMY  1980    CARDIAC CATHETERIZATION  5 20 2010    Patent coronary arteries. Possible causes of the patient's chest pain is likely noncardiac at least based on this angiogram. Patient chould be able to proceed w/ scheduled surgery w/ paying attention to anticoagulation and trying to minimize the period of no anticoagulation.  CARDIAC CATHETERIZATION  2012, 6/9/16    [facility]    CARDIOVASCULAR STRESS TEST  4 15 2010    Moderate fixed inferior defect, possibly attenuation, cannot exclude a previous MI. Correlation w/ EKG is recommented. Significant degree of gut uptake which is likely to be the cause of the attenuation artifact seen.  EF 46%    CERVICAL FUSION  2010    CERVICAL FUSION  03/09/2016    REMOVAL HARDWARE C5-7, ACDF C4-6 WITH ATLANTIS CORNERSTONE    COLONOSCOPY 2010    CORONARY ARTERY BYPASS GRAFT      DILATATION, ESOPHAGUS      small resection    ENDOSCOPY, COLON, DIAGNOSTIC      NERVE SURGERY Bilateral 07/25/2017    THORACIC FACET BLOCK T7-8, T8-9, T11-12    OTHER SURGICAL HISTORY  05/02/2017    LESI L5    OTHER SURGICAL HISTORY  09/12/2017    thoracic epidural T11    MO INJ,PARAVERTEBRAL L/S,1 LEVEL Bilateral 7/25/2017    T-FACET MBB T7-8, T8-9, T11-12 BILATERAL performed by Jeff Coleman MD at [address] [place] [address] LUMBAR/SACRAL N/A 9/12/2017    TESI T11 performed by Jeff Coleman MD at [address] [facility] [address]  2010    8 INCHES REMOVED    TOOTH EXTRACTION  02/2019    UPPER GASTROINTESTINAL ENDOSCOPY  2010       Social History     Socioeconomic History    Marital status:      Spouse name: Not on file    Number of children: 2    Years of education: 12    Highest education level: High school graduate   Occupational History    Not on file   Tobacco Use    Smoking status: Current Every Day Smoker     Packs/day: 0.25     Years: 25.00     Pack years: 6.25     Types: Cigarettes, Cigars     Start date: 6/9/1978    Smokeless tobacco: Never Used   Vaping Use    Vaping Use: Never used   Substance and Sexual Activity    Alcohol use: Yes     Alcohol/week: 0.0 standard drinks     Comment: not for 3 mo    Drug use: No    Sexual activity: Never   Other Topics Concern    Not on file   Social History Narrative    Not on file     Social Determinants of Health     Financial Resource Strain: High Risk    Difficulty of Paying Living Expenses: Very hard   Food Insecurity: No Food Insecurity    Worried About Running Out of Food in the Last Year: Never true    [place] of Food in the Last Year: Never true   Transportation Needs:     Lack of Transportation (Medical): Not on file    Lack of Transportation (Non-Medical):  Not on file   Physical Activity:     Days of Exercise per Week: Not on file    Minutes of Exercise per Session: Not on file   Stress:     Feeling of Stress : Not on file   Social Connections:     Frequency of Communication with Friends and Family: Not on file    Frequency of Social Gatherings with Friends and Family: Not on file    Attends Holiness Services: Not on file    Active Member of [address] [place] [address] [employer] or Organizations: Not on file    Attends Club or Organization Meetings: Not on file    Marital Status: Not on file   Intimate Partner Violence:     Fear of Current or Ex-Partner: Not on file    Emotionally Abused: Not on file    Physically Abused: Not on file    Sexually Abused: Not on file   Housing Stability:     Unable to Pay for Housing in the Last Year: Not on file    Number of [place] in the Last Year: Not on file    Unstable Housing in the Last Year: Not on file        Family History   Problem Relation Age of Onset    Cancer Mother     Diabetes Father     Cancer Father     Heart Disease Father 28        CABG    High Blood Pressure Father     Diabetes Sister         AS A CHILD    Kidney Disease Sister     High Blood Pressure Sister     Cancer Brother     COPD Sister     Heart Disease Sister     Stroke Neg Hx        ADVANCE DIRECTIVE: N, <no information>    Vitals:    01/04/22 1044   BP: 138/78   Pulse: 83   Resp: 16   SpO2: 98%   Weight: 182 lb 14.4 oz (83 kg)     Estimated body mass index is 28.65 kg/m² as calculated from the following:    Height as of 1/3/22: 5' 7\" (1.702 m). Weight as of this encounter: 182 lb 14.4 oz (83 kg). Physical Exam  Vitals and nursing note reviewed. Constitutional:       General: He is not in acute distress. Appearance: Normal appearance. He is well-developed. HENT:      Head: Normocephalic and atraumatic.       Right Ear: Tympanic membrane normal.      Left Ear: Tympanic membrane normal.   Eyes:      Conjunctiva/sclera: Conjunctivae normal.   Cardiovascular:      Rate and Rhythm: Normal rate and regular rhythm. Heart sounds: Normal heart sounds. No murmur heard. Pulmonary:      Effort: Pulmonary effort is normal.      Breath sounds: Normal breath sounds. No wheezing, rhonchi or rales. Abdominal:      General: There is no distension. Musculoskeletal:      Cervical back: Neck supple. Skin:     General: Skin is warm and dry. Findings: No rash (on exposed surfaces). Neurological:      General: No focal deficit present. Mental Status: He is alert. Psychiatric:         Attention and Perception: Attention normal.         Mood and Affect: Mood normal.         Speech: Speech normal.         Behavior: Behavior normal. Behavior is cooperative. Thought Content: Thought content normal.         Judgment: Judgment normal.         No flowsheet data found. Lab Results   Component Value Date    CHOL 88 03/11/2019    CHOL 113 12/12/2017    CHOL 101 09/28/2017    CHOLFAST 119 07/09/2020    TRIG 27 03/11/2019    TRIG 95 12/12/2017    TRIG 63 09/28/2017    TRIGLYCFAST 49 07/09/2020    HDL 41 07/09/2020    HDL 36 03/11/2019    HDL 36 12/12/2017    LDLCALC 68 07/09/2020    LDLCALC 47 03/11/2019    LDLCALC 58 12/12/2017    GLUF 97 07/09/2020    GLUCOSE 98 09/08/2021    LABA1C 5.8 07/09/2020    LABA1C 5.6 02/07/2019    LABA1C 5.5 03/23/2017       The ASCVD Risk score (Lolly Castaneda et al., 2013) failed to calculate for the following reasons:     The valid total cholesterol range is 130 to 320 mg/dL    Immunization History   Administered Date(s) Administered    Influenza Virus Vaccine 11/04/2015    Influenza, Gregorio Peace, 6-35 Months, IM (Fluzone,Afluria) 10/05/2016    Influenza, Gregorio Peace, IM, (6 mo and older Fluzone, Flulaval, Fluarix and 3 yrs and older Afluria) 10/11/2017, 11/08/2018    Influenza, Gregorio Peace, IM, PF (6 mo and older Fluzone, Flulaval, Fluarix, and 3 yrs and older Afluria) 10/14/2019, 10/08/2020, 09/16/2021    Pneumococcal Conjugate 13-valent Ángel Guzman) 06/15/2017       [facility] Maintenance   Topic Date Due    Depression Monitoring  Never done    COVID-19 Vaccine (1) Never done    DTaP/Tdap/Td vaccine (1 - Tdap) Never done    Shingles Vaccine (1 of 2) Never done    Pneumococcal 0-64 years Vaccine (2 of 4 - PPSV23) 08/10/2017    A1C test (Diabetic or Prediabetic)  07/09/2021    Lipid screen  07/09/2021    TSH testing  12/17/2021    Colon cancer screen colonoscopy  03/27/2026    Flu vaccine  Completed    Hepatitis C screen  Completed    HIV screen  Completed    Hepatitis A vaccine  Aged Out    Hepatitis B vaccine  Aged Out    Hib vaccine  Aged Out    Meningococcal (ACWY) vaccine  Aged Out          ASSESSMENT/PLAN:  1. Well adult health check  -     Lipid Panel w/ Reflex Direct LDL; Future  -     Comprehensive Metabolic Panel; Future  -     Hemoglobin A1C; Future  -     TSH RFX ON ABNORMAL TO FREE T4; Future  -     PSA screening; Future  2. Mixed hyperlipidemia  3. Dysthymia (or depressive neurosis)  4. Situational depression  5. Spinal stenosis of lumbar region with neurogenic claudication  6. Chronic pain syndrome  7. Sacroiliac inflammation ([facility] [address]  8. Acquired hypothyroidism  9. Essential hypertension    -  Healthy lifestyle discussed  -  Chronic medical problems stable  -  Continue current medications  -  Follow up with specialists as scheduled  -  Check labs above, will call    Return in about 1 year (around 1/4/2023) for Wellness. An electronic signature was used to authenticate this note.     --Enoc Hampton, DO on 1/4/2022 at 11:22 AM

## 2022-01-06 ENCOUNTER — HOSPITAL ENCOUNTER (EMERGENCY)
Age: 57
Discharge: HOME | End: 2022-01-06
Payer: COMMERCIAL

## 2022-01-06 ENCOUNTER — HOSPITAL ENCOUNTER (OUTPATIENT)
Age: 57
Discharge: HOME | End: 2022-01-06
Payer: COMMERCIAL

## 2022-01-06 VITALS
BODY MASS INDEX: 28.66 KG/M2 | SYSTOLIC BLOOD PRESSURE: 182 MMHG | DIASTOLIC BLOOD PRESSURE: 85 MMHG | RESPIRATION RATE: 18 BRPM | TEMPERATURE: 98.3 F | WEIGHT: 183 LBS | OXYGEN SATURATION: 99 % | HEART RATE: 64 BPM

## 2022-01-06 DIAGNOSIS — J06.9 VIRAL URI WITH COUGH: Primary | ICD-10-CM

## 2022-01-06 DIAGNOSIS — Z00.00 WELL ADULT HEALTH CHECK: ICD-10-CM

## 2022-01-06 DIAGNOSIS — Z20.822 LAB TEST NEGATIVE FOR COVID-19 VIRUS: ICD-10-CM

## 2022-01-06 LAB
ALBUMIN SERPL-MCNC: 4.6 G/DL (ref 3.5–5.1)
ALP BLD-CCNC: 83 U/L (ref 38–126)
ALT SERPL-CCNC: 11 U/L (ref 11–66)
ALT SERPL-CCNC: 11 U/L (ref 11–66)
ANION GAP SERPL CALCULATED.3IONS-SCNC: 11 MEQ/L (ref 8–16)
AST SERPL-CCNC: 14 U/L (ref 5–40)
AST SERPL-CCNC: 15 U/L (ref 5–40)
AVERAGE GLUCOSE: 111 MG/DL (ref 70–126)
BILIRUB SERPL-MCNC: 0.3 MG/DL (ref 0.3–1.2)
BUN BLDV-MCNC: 14 MG/DL (ref 7–22)
BUN BLDV-MCNC: 14 MG/DL (ref 7–22)
CALCIUM SERPL-MCNC: 9.6 MG/DL (ref 8.5–10.5)
CHLORIDE BLD-SCNC: 106 MEQ/L (ref 98–111)
CHOLESTEROL, TOTAL: 150 MG/DL (ref 100–199)
CO2: 25 MEQ/L (ref 23–33)
CREAT SERPL-MCNC: 0.7 MG/DL (ref 0.4–1.2)
CREAT SERPL-MCNC: 0.7 MG/DL (ref 0.4–1.2)
ERYTHROCYTE [DISTWIDTH] IN BLOOD BY AUTOMATED COUNT: 17.2 % (ref 11.5–14.5)
ERYTHROCYTE [DISTWIDTH] IN BLOOD BY AUTOMATED COUNT: 58.8 FL (ref 35–45)
GFR SERPL CREATININE-BSD FRML MDRD: > 90 ML/MIN/1.73M2
GFR SERPL CREATININE-BSD FRML MDRD: > 90 ML/MIN/1.73M2
GLUCOSE BLD-MCNC: 99 MG/DL (ref 70–108)
HBA1C MFR BLD: 5.7 % (ref 4.4–6.4)
HCT VFR BLD CALC: 45.7 % (ref 42–52)
HDLC SERPL-MCNC: 43 MG/DL
HEMOGLOBIN: 14.3 GM/DL (ref 14–18)
LDL CHOLESTEROL CALCULATED: 97 MG/DL
MCH RBC QN AUTO: 29.7 PG (ref 26–33)
MCHC RBC AUTO-ENTMCNC: 31.3 GM/DL (ref 32.2–35.5)
MCV RBC AUTO: 95 FL (ref 80–94)
PLATELET # BLD: 241 THOU/MM3 (ref 130–400)
PMV BLD AUTO: 11 FL (ref 9.4–12.4)
POTASSIUM SERPL-SCNC: 5 MEQ/L (ref 3.5–5.2)
PROSTATE SPECIFIC ANTIGEN: 1.48 NG/ML (ref 0–1)
RBC # BLD: 4.81 MILL/MM3 (ref 4.7–6.1)
SARS-COV-2, NAA: NOT  DETECTED
SODIUM BLD-SCNC: 142 MEQ/L (ref 135–145)
TOTAL PROTEIN: 6.8 G/DL (ref 6.1–8)
TRIGL SERPL-MCNC: 51 MG/DL (ref 0–199)
TSH SERPL DL<=0.05 MIU/L-ACNC: 2.44 UIU/ML (ref 0.4–4.2)
WBC # BLD: 10.8 THOU/MM3 (ref 4.8–10.8)

## 2022-01-06 PROCEDURE — 87635 SARS-COV-2 COVID-19 AMP PRB: CPT

## 2022-01-06 PROCEDURE — 80053 COMPREHEN METABOLIC PANEL: CPT

## 2022-01-06 PROCEDURE — 83036 HEMOGLOBIN GLYCOSYLATED A1C: CPT

## 2022-01-06 PROCEDURE — G0463 HOSPITAL OUTPT CLINIC VISIT: HCPCS

## 2022-01-06 PROCEDURE — 84443 ASSAY THYROID STIM HORMONE: CPT

## 2022-01-06 PROCEDURE — 80061 LIPID PANEL: CPT

## 2022-01-06 PROCEDURE — 84520 ASSAY OF UREA NITROGEN: CPT

## 2022-01-06 PROCEDURE — 99213 OFFICE O/P EST LOW 20 MIN: CPT

## 2022-01-06 PROCEDURE — 84460 ALANINE AMINO (ALT) (SGPT): CPT

## 2022-01-06 PROCEDURE — 82565 ASSAY OF CREATININE: CPT

## 2022-01-06 PROCEDURE — 36415 COLL VENOUS BLD VENIPUNCTURE: CPT

## 2022-01-06 PROCEDURE — 99213 OFFICE O/P EST LOW 20 MIN: CPT | Performed by: NURSE PRACTITIONER

## 2022-01-06 PROCEDURE — 85027 COMPLETE CBC AUTOMATED: CPT

## 2022-01-06 PROCEDURE — 84450 TRANSFERASE (AST) (SGOT): CPT

## 2022-01-06 PROCEDURE — G0103 PSA SCREENING: HCPCS

## 2022-01-06 ASSESSMENT — ENCOUNTER SYMPTOMS
RHINORRHEA: 1
SINUS PRESSURE: 1
WHEEZING: 0
EYE DISCHARGE: 0
EYE REDNESS: 0
SINUS PAIN: 0
NAUSEA: 0
SHORTNESS OF BREATH: 0
COUGH: 1
CHEST TIGHTNESS: 0
SORE THROAT: 1
DIARRHEA: 0
VOMITING: 0
TROUBLE SWALLOWING: 0

## 2022-01-06 NOTE — ED PROVIDER NOTES
[phone]       Chief Complaint   Patient presents with    Cough     sore throat, sinus pressure       Nurses Notes reviewed and I agree except as noted in the HPI. HISTORY OF PRESENT ILLNESS   Silverio Abraham is a 60 y.o. male who presents for evaluation of COVID-19. Onset of symptoms 2 days ago, unchanged. Cough is intermittent, dry. No fever, wheezing, shortness of breath. Associated sinus congestion sore throat. No sore throat at this time. Patient exposed to COVID-19. Patient requesting testing. REVIEW OF SYSTEMS     Review of Systems   Constitutional: Negative for chills, diaphoresis, fatigue and fever. HENT: Positive for congestion, postnasal drip, rhinorrhea, sinus pressure and sore throat. Negative for ear pain, sinus pain and trouble swallowing. Eyes: Negative for discharge and redness. Respiratory: Positive for cough. Negative for chest tightness, shortness of breath and wheezing. Cardiovascular: Negative for chest pain. Gastrointestinal: Negative for diarrhea, nausea and vomiting. Genitourinary: Negative for decreased urine volume. Musculoskeletal: Negative for neck pain and neck stiffness. Skin: Negative for rash. Neurological: Negative for headaches. Hematological: Negative for adenopathy. Psychiatric/Behavioral: Negative for sleep disturbance.        PAST MEDICAL HISTORY         Diagnosis Date    Arthritis     general    Bilateral sciatica     Bleeding     with coumadin    CAD (coronary artery disease)     Carpal tunnel syndrome     RIGHT    Cervicalgia, C5-7     Chronic anxiety     Dysthymia (or depressive neurosis) 2/1/2016    ED (erectile dysfunction)     ED (erectile dysfunction)     GERD (gastroesophageal reflux disease)     Headache(784.0)     History of blood transfusion 03/2016    HTN (hypertension)     Hyperlipidemia     Hypothyroidism     Iron deficiency anemia, blood loss     Lumbago     Lumbar radiculopathy     Osteoarthritis (arthritis due to wear and tear of joints)     S/P AVR, coumadin Tx     Spondylosis of thoracic region without myelopathy or radiculopathy        SURGICAL HISTORY     Patient  has a past surgical history that includes cardiovascular stress test (4 15 2010); Appendectomy (1980); Colonoscopy (2010); Small intestine surgery (2010); Upper gastrointestinal endoscopy (2010); Aortic valve replacement (2007); Endoscopy, colon, diagnostic; cervical fusion (2010); cervical fusion (03/09/2016); Cardiac catheterization (5 20 2010); Cardiac catheterization (2012, 6/9/16); Dilatation, esophagus; Aortic valve replacement (07/20/2016); Coronary artery bypass graft; other surgical history (05/02/2017); Nerve Surgery (Bilateral, 07/25/2017); pr inj,paravertebral l/s,1 level (Bilateral, 7/25/2017); other surgical history (09/12/2017); pr njx dx/ther sbst epidural/subarach lumbar/sacral (N/A, 9/12/2017); Tooth Extraction (02/2019); and Back Injection (Bilateral, 1/3/2022). CURRENT MEDICATIONS       Discharge Medication List as of 1/6/2022  9:40 AM      CONTINUE these medications which have NOT CHANGED    Details   ALPRAZolam (XANAX) 0.5 MG tablet TAKE 1 TABLET BY MOUTH THREE TIMES A DAY AS NEEDED FOR ANXIETY OR SLEEP, Disp-90 tablet, R-2Normal      HYDROcodone-acetaminophen (NORCO) 5-325 MG per tablet Take 1 tablet by mouth 2 times daily as needed for Pain for up to 30 days. Take lowest dose possible to manage pain, Disp-60 tablet, R-0Normal      ibuprofen (ADVIL;MOTRIN) 400 MG tablet Take 400 mg by mouth every 6 hours as needed for PainHistorical Med      lidocaine viscous hcl (XYLOCAINE) 2 % SOLN solution Take 15 mLs by mouth as needed for Irritation, Disp-100 mL, R-1Normal      gabapentin (NEURONTIN) 400 MG capsule Take 1 capsule by mouth 3 times daily for 90 days. , Disp-270 capsule, R-0Normal      gabapentin (NEURONTIN) 300 MG capsule TAKE 1 CAPSULE BY MOUTH THREE TIMES A DAY, Disp-270 capsule, R-3Normal      aspirin 81 MG EC tablet Take 1 tablet by mouth daily, Disp-90 tablet, R-3Normal      esomeprazole (NEXIUM) 40 MG delayed release capsule Take 1 capsule by mouth See Admin Instructions Take twice a day for two weeks. Then daily. , Disp-90 capsule, R-3Normal      levothyroxine (SYNTHROID) 75 MCG tablet Take 1 tablet by mouth Daily, Disp-90 tablet, R-3Normal      buPROPion (WELLBUTRIN) 100 MG tablet Take 1 tablet by mouth 2 times daily, Disp-180 tablet,R-3Normal      indomethacin (INDOCIN) 25 MG capsule Take 1 capsule by mouth 3 times daily (with meals) Take 1 tablet daily for 10 days. , Disp-21 capsule,R-0Normal      metoprolol succinate (TOPROL XL) 25 MG extended release tablet Take 1 tablet by mouth daily, Disp-90 tablet,R-3Normal      atorvastatin (LIPITOR) 20 MG tablet TAKE 1 TABLET BY MOUTH ONE TIME A DAY AT NIGHT, Disp-90 tablet, R-10Normal      clobetasol (TEMOVATE) 0.05 % cream APPLY TOPICALLY TO RIGHT INDEX FINGER 3 TIMES DAILY, R-2, Historical Med      Cholecalciferol (VITAMIN D3) 1000 units TABS Take 2,000 Units by mouth DailyHistorical Med       MG capsule TAKE 1 CAPSULE BY MOUTH ONE TIME A DAY AS NEEDED for constipation, Disp-30 capsule, R-10Normal      IRON PO Take 65 mg by mouth daily Historical Med      Multiple Vitamin (MULTIVITAMIN PO) Take 1 tablet by mouth daily              ALLERGIES     Patient is has No Known Allergies. FAMILY HISTORY     Patient'sfamily history includes COPD in his sister; Cancer in his brother, father, and mother; Diabetes in his father and sister; Heart Disease in his sister; Heart Disease (age of onset: 28) in his father; High Blood Pressure in his father and sister; Kidney Disease in his sister. SOCIAL HISTORY     Patient  reports that he has been smoking cigarettes and cigars. He started smoking about 43 years ago. He has a 6.25 pack-year smoking history.  He has never used smokeless tobacco. He reports current alcohol use. He reports that he does not use drugs. PHYSICAL EXAM     ED TRIAGE VITALS  BP: (!) 182/85, Temp: 98.3 °F (36.8 °C), Pulse: 64, Resp: 18, SpO2: 99 %  Physical Exam  Vitals and nursing note reviewed. Constitutional:       General: He is not in acute distress. Appearance: Normal appearance. He is well-developed. He is not ill-appearing, toxic-appearing or diaphoretic. HENT:      Head: Normocephalic and atraumatic. Jaw: No trismus. Right Ear: Hearing, tympanic membrane, ear canal and external ear normal. No mastoid tenderness. No hemotympanum. Tympanic membrane is not perforated, erythematous or bulging. Left Ear: Hearing, tympanic membrane, ear canal and external ear normal. No mastoid tenderness. No hemotympanum. Tympanic membrane is not perforated, erythematous or bulging. Nose: Congestion present. No rhinorrhea. Mouth/Throat:      Mouth: Mucous membranes are moist.      Pharynx: Oropharynx is clear. Uvula midline. Tonsils: No tonsillar abscesses. Eyes:      General: No scleral icterus. Conjunctiva/sclera: Conjunctivae normal.   Neck:      Thyroid: No thyromegaly. Trachea: Trachea normal.   Cardiovascular:      Rate and Rhythm: Normal rate and regular rhythm. No extrasystoles are present. Chest Wall: PMI is not displaced. Heart sounds: Normal heart sounds. No murmur heard. No friction rub. No gallop. Pulmonary:      Effort: Pulmonary effort is normal. No accessory muscle usage or respiratory distress. Breath sounds: Normal breath sounds. Chest:   Breasts:      Right: No supraclavicular adenopathy. Left: No supraclavicular adenopathy. Musculoskeletal:      Cervical back: Normal range of motion and neck supple. Lymphadenopathy:      Head:      Right side of head: No submental, submandibular, tonsillar, preauricular, posterior auricular or occipital adenopathy.       Left side of head: No submental, submandibular, tonsillar, preauricular, posterior auricular or occipital adenopathy. Cervical: No cervical adenopathy. Upper Body:      Right upper body: No supraclavicular adenopathy. Left upper body: No supraclavicular adenopathy. Skin:     General: Skin is warm and dry. Coloration: Skin is not pale. Findings: No rash. Comments: Skin intact, warm and dry to touch, no rashes noted on exposed surfaces. Neurological:      Mental Status: He is alert and oriented to person, place, and time. He is not disoriented. Psychiatric:         Mood and Affect: Mood normal.         Behavior: Behavior is cooperative. DIAGNOSTIC RESULTS   Labs:   Results for orders placed or performed during the hospital encounter of 01/06/22   COVID-19, Rapid   Result Value Ref Range    SARS-CoV-2, WAYLON NOT  DETECTED NOT DETECTED       IMAGING:  No orders to display     URGENT CARE COURSE:     Vitals:    01/06/22 0916 01/06/22 0917   BP:  (!) 182/85   Pulse: 64    Resp: 18    Temp: 98.3 °F (36.8 °C)    TempSrc: Temporal    SpO2: 99%    Weight: 183 lb (83 kg)        Medications - No data to display  PROCEDURES:  None  FINALIMPRESSION      1. Viral URI with cough    2. Lab test negative for COVID-19 virus        DISPOSITION/PLAN   DISPOSITION Decision To Discharge 01/06/2022 09:37:52 AM  Nontoxic, no distress. COVID-19 negative. No adventitious lung sounds. Exam consistent with viral URI with cough. Continue over-the-counter treatment. If symptoms worsen go to ER. PATIENT REFERRED TO:  [place] [address]  [phone]      Follow up as needed. Continue OTC med. If worse go to ER.     DISCHARGE MEDICATIONS:  Discharge Medication List as of 1/6/2022  9:40 AM        Discharge Medication List as of 1/6/2022  9:40 AM          JASWINDER Mena - JONATHAN Caldwell, JASWINDER - CNP  01/06/22 [address] Rula Delvalle, JASWINDER - CNP  01/06/22 4426

## 2022-01-06 NOTE — Clinical Note
Osorio Amado was seen and treated in our emergency department on 1/6/2022. He may return to work on 01/07/2022. If you have any questions or concerns, please don't hesitate to call.       Tony Samaniego, APRN - CNP

## 2022-01-07 ENCOUNTER — HOSPITAL ENCOUNTER (OUTPATIENT)
Dept: PHYSICAL THERAPY | Age: 57
Setting detail: THERAPIES SERIES
End: 2022-01-07
Payer: COMMERCIAL

## 2022-01-07 ENCOUNTER — TELEPHONE (OUTPATIENT)
Dept: FAMILY MEDICINE CLINIC | Age: 57
End: 2022-01-07

## 2022-01-07 NOTE — LETTER
Oscarsofía  Jonathan  [facility], [address]  Phone: [phone]  Fax: [phone]     January 7, 2022    [address] [place] [address]    Dear Amanda Jimenez,    Thank you for choosing our [facility] on 1/6/22. Your Provider wanted to make sure that you understand your discharge instructions and that you were able to fill any prescriptions that may have been ordered for you. Please contact the office at the above phone number if you were advised to follow up with your Provider, or if you have any further questions or needs. Also did you know -                              [facility] ([facility]) practices can often offer you an appointment on the same day that you call for acute issues. *We have some [address] [facility] offices that offer Walk-in appointments; check our website for availability in your community, [email or web address].      *Evisits are now available for patients through [address] [facility] ([facility]) also offers video visits through [address] If you do not have MyChart and are interested, please contact the office and a staff member may assist you or go to [email or web address].       Sincerely,     Nina Sanchez DO and your [facility]

## 2022-01-10 ENCOUNTER — TELEPHONE (OUTPATIENT)
Dept: FAMILY MEDICINE CLINIC | Age: 57
End: 2022-01-10

## 2022-01-10 DIAGNOSIS — I10 ESSENTIAL HYPERTENSION: ICD-10-CM

## 2022-01-10 DIAGNOSIS — E78.2 MIXED HYPERLIPIDEMIA: ICD-10-CM

## 2022-01-10 RX ORDER — ATORVASTATIN CALCIUM 20 MG/1
TABLET, FILM COATED ORAL
Qty: 90 TABLET | Refills: 3 | Status: SHIPPED | OUTPATIENT
Start: 2022-01-10 | End: 2022-04-21

## 2022-01-10 RX ORDER — METOPROLOL SUCCINATE 25 MG/1
25 TABLET, EXTENDED RELEASE ORAL DAILY
Qty: 90 TABLET | Refills: 3 | Status: SHIPPED | OUTPATIENT
Start: 2022-01-10 | End: 2022-03-28 | Stop reason: SDUPTHER

## 2022-01-10 NOTE — TELEPHONE ENCOUNTER
Pt called office requesting results from his labs done 1/6/22. Pt also needs refill of his Metoprolol and Atorvastatin. Please advise.

## 2022-01-14 ENCOUNTER — HOSPITAL ENCOUNTER (OUTPATIENT)
Dept: PHYSICAL THERAPY | Age: 57
Setting detail: THERAPIES SERIES
Discharge: HOME | End: 2022-01-14
Payer: COMMERCIAL

## 2022-01-14 PROCEDURE — 97110 THERAPEUTIC EXERCISES: CPT

## 2022-01-14 NOTE — DISCHARGE SUMMARY
[address] [facility]  PHYSICAL THERAPY  [] EVALUATION  [x] DAILY NOTE (LAND) [] DAILY NOTE (AQUATIC)  [] PROGRESS NOTE [x] DISCHARGE NOTE    [x] OUTPATIENT REHABILITATION CENTER [facility]   [] [place] [address]    [] [facility]   [] Radha Ellis    Date: 2022  Patient Name:  Caren Reid  : 1965  MRN: [id number]  CSN: [id number]    Referring Practitioner JASWINDER Chaudhary*   Diagnosis Spondylosis without myelopathy or radiculopathy, lumbar region [M47.816]  Radiculopathy, lumbar region [M54.16]  Spinal stenosis, lumbar region with neurogenic claudication [M48.062]  Chronic pain syndrome [G89.4]    Treatment Diagnosis Left lateral lumbar pain with radiculopathy, limited functional mobility with flexion, extension, bending, lifting, twisting. Date of Evaluation 21    Additional Pertinent History Arthritis, CAD, HTN, thoracic spondylosis, bilateral sciatica, cervicalgia C5-C7, depression, lumbar radiculopathy, S/P aortic valve replacement, cervical fusion ,  ACDF C4-C6, history of nerve ablations, nerve blocks. Functional Outcome Measure Used Modified Oswestry   Functional Outcome Score 38%, 19/50  (21)       Insurance: Primary: Payor: Erin Reza /  /  / ,   Secondary:    Authorization Information: PRE CERTIFICATION REQUIRED: YES, AFTER 30 [address]. CALL [phone]  INSURANCE THERAPY BENEFIT:  30 VISITS LIMIT  AQUATIC THERAPY COVERED: YES  MODALITIES COVERED:  YES  TELEHEALTH COVERED:    REFERENCE NUMBER: [id number]   Visit # 8, 0/ for progress note   Visits Allowed: 30   Recertification Date:    Physician Follow-Up: 2021 Keyla Conte CNP   Physician Orders: PT for PT eval and Dry needling    History of Present Illness: Patient reports of problems with his back with sciatica for past 10-15 years. Notes pain location at right low back and left low back.  Notes left anterior thigh pain and radiating pain to left foot. Primarily on left side but can be on right side as well. Noting leg cramps as well. Pain is constant with difficulty sleeping. Notes walking, completing activities from down on knees to getting back up to standing, bending forward picking objects off floor, sweeping/mopping, prolonged sitting. Difficulty driving >2 hours with increased sciatic nerve pain. Prefers to sleep in sidelying but after a period of time cannot tolerate sleeping on back as long as sidelying. To manages completes stretches at home due to AM stiffness with DKTC, rotation. Pain medication: Norco, Gabapentin. MRI completed. 11/2/2021   1. 4 mm of anterolisthesis of L4 on L5. There is bone marrow edema bilaterally in the facet joints and pedicles. There is mild spinal canal stenosis and left greater than right foraminal stenosis. 2. Mild spinal canal stenosis at the T12-L1 and L1-L2 levels.              SUBJECTIVE: Patient reports that he had back injections for pain management on 1/4/2022. Did note less pain at back. But notes pain has moved down to tailbone area and left hip area. Low back pain is 4/10. Patient reports that he is tolerating work a little better with less hours this last week. He has been sick with Upper respiratory infection last week and that is why he missed his session last week. TREATMENT   Precautions: anteriolisthesis of L4 on L5 4 mm, lumbar stenosis, no extension. Optimal lumbar neutral position and slight flexion responder   Pain: 7-8/10 Lower back with radicular symptoms down bilateral LEs (Left > Right)    X in shaded column indicates activity completed today   Modalities Parameters/  Location  Notes   Ultrasound to Bilateral lumbar, posterior hip/upper gluteal region. 100% continuous, 1 MHz, 1 w/cm2 x10 minutes  Prone with 2 pillows under abdomen; Completed prior to manual techniques   Interferential estim bilateral lumbar and mid gluteal region, sciatic notch region.  4 pads on 2 channels with 2 pads each. 20 minutes intensity 21  hooklying in 90/90 position for unloading/decompression to decrease pain. Hot pack  With exercises. x    Manual Therapy Time/Technique  Notes   STM to lumbar paraspinals, QL, and superior gluteals with  10 minutes   Seated in chair facing treatment table. Dry needling 40 mm needles at paravertebral points L3-L5  Non manipulated: 5 minutes     Dry needling 60 mm needles left gluteal: inferior glut homeostatic point pistoned-manipulated. manipulated     Dry needling 60 mm needles left glut medius, mid gluteal region pistoned, manipulated manipulated                 Exercise/Intervention   Notes   PPT  10x 5 sec  x    PPT with ball squeeze, resisted hip abduction in hook-lying with orange band 15x 5 sec  x    PPT with marching 3x   Attempted but started having Right hip pain and thought he was going to have a muscle cramp          Educated and reviewed body mechanics and sleeping positions with patient              Seated posterior pelvic tilt x10      Seated reciprocal marches with abdominals engaged x10  x    Seated LAQs with abdominal engaged x10  x                    Specific Interventions Next Treatment: Dry needling, US, soft tissue mobilization, therap ex core control increased awareness of optimal lumbar neutral, NO extension    Activity/Treatment Tolerance:  []  Patient tolerated treatment well  []  Patient limited by fatigue  [x]  Patient limited by pain   []  Patient limited by medical complications  []  Other:     Assessment: PN for discharge today. Patient was unable to progress in PT due to back pain and LLE symptoms. He followed up with pain management for injections on 1/4/2022 and has been having less low back pain and less left leg symptoms. All that he notices now is tailbone and hip pain. Feels like the injections helped him and is a little more mobility with transfers, walking and standing.  He continues to have difficulty tolerating work with lifting activities. He did not meet goals for with exception of lumbar ROM with improvement and goal met. Transfers and ambulation have improved as well. Oswestry score only improved by 2%. Will discharge at this time due to not meeting goals and pain management injections helping patient more. Patient has HEP to follow. Goals    Patient Goal: To have less pain, tolerated activities better without increased back pain. LLE symptoms  GOAL MET since injection on 1/4/22. But feels PT did not help his pain much from sessions. Continues to have LLE symptoms but less back pain since injection. Short Term Goals: 4 weeks  1. Patient to report of decreased pain levels from 8/10 to 5/10 at left lateral lumbar, decreased radiculopathy and sciatic nerve irritation with standing, walking, bending. GOAL MET since injection notes pain less in low back and left LE. Pain level low back 4-5/10 . Noting more pain at tailbone region and left hip now. 2. Patient to demonstrate improved lumbar ROM with flexion from 40 degrees trunk flexion and fingertips to lower patella to 60 degrees flexion with fingertips to mid shin level in order to have increased ease with mobility with position changes sit to stand, bed mobility skills bending. GOAL MET able to forward flex 60 degrees now with less pain since injection. 3. Patient to demonstrate increased core control with increased awareness of optimal lumbar neutral, increased strength at core musculature, motor control/mobilizationa of fascial slings. GOAL NOT MET while in PT noted limited progression with exercises due to back pain and focused more on pain management modalities and relief positions. 4. Patient to demonstrate increased functional mobility with sit<>stand, bed mobility, walking and standing tolerance. GOAL MET Noting improved functional mobility with sit<>stand, bed mobility and with walking. Note patient does hold his left hip when walking.      Long Term Goals: 8 weeks  1. Modified Oswestry from 38% to no greater than 25%  GOAL NOT MET Oswestry to 36%     2. Patient to demonstrate independence in pain management of low back pain with mobility stategies with proper body mechanics, exercises as instructed in sessions. GOAL MET Patient understands pain management with positions to manage his pain and also conservative ex. Patient Education:   []  HEP/Education Completed: pace activities. Continue HEP. Monitor symptoms following dry needling and soft tissue mobilization.  [employer] Access Code:  []  No new Education completed  [x]  Reviewed Prior HEP      [x]  Patient verbalized and/or demonstrated understanding of education provided. []  Patient unable to verbalize and/or demonstrate understanding of education provided. Will continue education. []  Barriers to learning:     PLAN:  Treatment Recommendations: Strengthening, Range of Motion, Balance Training, Functional Mobility Training, Transfer Training, Neuromuscular Re-education, Manual Therapy - Soft Tissue Mobilization, Pain Management, Home Exercise Program, Patient Education, Aquatics and Modalities    []  Plan of care initiated. Plan to see patient 2 times per week for 8 weeks to address the treatment planned outlined above.   []  Continue with current plan of care  []  Modify plan of care as follows:    []  Hold pending physician visit  [x]  Discharge    Time In 1047   Time Out 1115   Timed Code Minutes: 28   Total Treatment Time: 28     Electronically Signed by: Joie Curry PT

## 2022-01-31 DIAGNOSIS — M54.17 LUMBOSACRAL RADICULITIS: ICD-10-CM

## 2022-01-31 DIAGNOSIS — M47.816 LUMBAR SPONDYLOSIS: ICD-10-CM

## 2022-01-31 DIAGNOSIS — M46.1 SACROILIAC INFLAMMATION (HCC): ICD-10-CM

## 2022-01-31 DIAGNOSIS — M48.062 SPINAL STENOSIS OF LUMBAR REGION WITH NEUROGENIC CLAUDICATION: ICD-10-CM

## 2022-01-31 DIAGNOSIS — M54.16 LUMBAR RADICULOPATHY: ICD-10-CM

## 2022-01-31 DIAGNOSIS — M47.816 LUMBAR FACET ARTHROPATHY: ICD-10-CM

## 2022-01-31 DIAGNOSIS — G89.4 CHRONIC PAIN SYNDROME: ICD-10-CM

## 2022-01-31 RX ORDER — HYDROCODONE BITARTRATE AND ACETAMINOPHEN 5; 325 MG/1; MG/1
1 TABLET ORAL 2 TIMES DAILY PRN
Qty: 60 TABLET | Refills: 0 | Status: SHIPPED | OUTPATIENT
Start: 2022-01-31 | End: 2022-03-10 | Stop reason: SDUPTHER

## 2022-01-31 NOTE — TELEPHONE ENCOUNTER
OARRS reviewed. UDS: + for  . Gabapentin Norco and Xanax  Last seen: 10/20/2021.  Follow-up:   Future Appointments   Date Time Provider Coleen Sherrill   2/16/2022  8:15 AM JASWINDER Carter - CNP [facility] - [place]

## 2022-02-10 ENCOUNTER — HOSPITAL ENCOUNTER (OUTPATIENT)
Age: 57
Discharge: HOME | End: 2022-02-10
Payer: COMMERCIAL

## 2022-02-10 DIAGNOSIS — E03.9 ACQUIRED HYPOTHYROIDISM: ICD-10-CM

## 2022-02-10 LAB
ALT SERPL-CCNC: 13 U/L (ref 11–66)
AST SERPL-CCNC: 15 U/L (ref 5–40)
BUN BLDV-MCNC: 13 MG/DL (ref 7–22)
CREAT SERPL-MCNC: 0.7 MG/DL (ref 0.4–1.2)
ERYTHROCYTE [DISTWIDTH] IN BLOOD BY AUTOMATED COUNT: 17.1 % (ref 11.5–14.5)
ERYTHROCYTE [DISTWIDTH] IN BLOOD BY AUTOMATED COUNT: 58.8 FL (ref 35–45)
GFR SERPL CREATININE-BSD FRML MDRD: > 90 ML/MIN/1.73M2
HCT VFR BLD CALC: 41.8 % (ref 42–52)
HEMOGLOBIN: 13.5 GM/DL (ref 14–18)
MCH RBC QN AUTO: 30.7 PG (ref 26–33)
MCHC RBC AUTO-ENTMCNC: 32.3 GM/DL (ref 32.2–35.5)
MCV RBC AUTO: 95 FL (ref 80–94)
PLATELET # BLD: 257 THOU/MM3 (ref 130–400)
PMV BLD AUTO: 10.7 FL (ref 9.4–12.4)
RBC # BLD: 4.4 MILL/MM3 (ref 4.7–6.1)
WBC # BLD: 7.6 THOU/MM3 (ref 4.8–10.8)

## 2022-02-10 PROCEDURE — 85027 COMPLETE CBC AUTOMATED: CPT

## 2022-02-10 PROCEDURE — 36415 COLL VENOUS BLD VENIPUNCTURE: CPT

## 2022-02-10 PROCEDURE — 84520 ASSAY OF UREA NITROGEN: CPT

## 2022-02-10 PROCEDURE — 82565 ASSAY OF CREATININE: CPT

## 2022-02-10 PROCEDURE — 84460 ALANINE AMINO (ALT) (SGPT): CPT

## 2022-02-10 PROCEDURE — 84450 TRANSFERASE (AST) (SGOT): CPT

## 2022-02-11 RX ORDER — LEVOTHYROXINE SODIUM 0.07 MG/1
TABLET ORAL
Qty: 90 TABLET | Refills: 0 | Status: SHIPPED | OUTPATIENT
Start: 2022-02-11 | End: 2022-05-16

## 2022-02-16 ENCOUNTER — TELEPHONE (OUTPATIENT)
Dept: PHYSICAL MEDICINE AND REHAB | Age: 57
End: 2022-02-16

## 2022-02-16 ENCOUNTER — OFFICE VISIT (OUTPATIENT)
Dept: PHYSICAL MEDICINE AND REHAB | Age: 57
End: 2022-02-16
Payer: COMMERCIAL

## 2022-02-16 VITALS
BODY MASS INDEX: 28.72 KG/M2 | HEIGHT: 67 IN | WEIGHT: 183 LBS | SYSTOLIC BLOOD PRESSURE: 118 MMHG | DIASTOLIC BLOOD PRESSURE: 84 MMHG

## 2022-02-16 DIAGNOSIS — M47.816 LUMBAR FACET ARTHROPATHY: ICD-10-CM

## 2022-02-16 DIAGNOSIS — G89.4 CHRONIC PAIN SYNDROME: ICD-10-CM

## 2022-02-16 DIAGNOSIS — M48.062 SPINAL STENOSIS OF LUMBAR REGION WITH NEUROGENIC CLAUDICATION: ICD-10-CM

## 2022-02-16 DIAGNOSIS — M54.16 LUMBAR RADICULOPATHY: ICD-10-CM

## 2022-02-16 DIAGNOSIS — M46.1 SACROILIAC INFLAMMATION (HCC): Primary | ICD-10-CM

## 2022-02-16 DIAGNOSIS — M47.816 LUMBAR SPONDYLOSIS: ICD-10-CM

## 2022-02-16 PROCEDURE — 99214 OFFICE O/P EST MOD 30 MIN: CPT | Performed by: NURSE PRACTITIONER

## 2022-02-16 ASSESSMENT — ENCOUNTER SYMPTOMS
ABDOMINAL PAIN: 0
STRIDOR: 0
RHINORRHEA: 0
VOMITING: 0
EYE REDNESS: 0
EYES NEGATIVE: 1
APNEA: 0
NAUSEA: 0
ANAL BLEEDING: 0
RECTAL PAIN: 0
DIARRHEA: 0
GASTROINTESTINAL NEGATIVE: 1
FACIAL SWELLING: 0
EYE ITCHING: 0
SINUS PRESSURE: 0
PHOTOPHOBIA: 0
COUGH: 0
EYE DISCHARGE: 0
CONSTIPATION: 0
CHOKING: 0
BLOOD IN STOOL: 0
WHEEZING: 1
TROUBLE SWALLOWING: 0
BACK PAIN: 1
SHORTNESS OF BREATH: 0
COLOR CHANGE: 0
VOICE CHANGE: 0
ABDOMINAL DISTENTION: 0
SORE THROAT: 0
EYE PAIN: 0
CHEST TIGHTNESS: 0
SINUS PAIN: 0

## 2022-02-16 NOTE — PROGRESS NOTES
[address] [facility] [address]  Dept: [phone]  Dept Fax: [phone]: [phone]    Visit Date: 2/16/2022    Functionality Assessment/Goals Worksheet     On a scale of 0 (Does not Interfere) to 10 (Completely Interferes)     1. Which number describes how during the past week pain has interfered with       the following:  A. General Activity:  6  B. Mood: 6  C. Walking Ability:  7  D. Normal Work (Includes both work outside the home and housework):  6  E. Relations with Other People:   5  F. Sleep:   5  G. Enjoyment of Life:   5    2. Patient Prefers to Take their Pain Medications:     [x]  On a regular basis   []  Only when necessary    []  Does not take pain medications    3. What are the Patient's Goals/Expectations for Visiting Pain Management? []  Learn about my pain    []  Receive Medication   []  Physical Therapy     []  Treat Depression   []  Receive Injections    []  Treat Sleep   []  Deal with Anxiety and Stress   []  Treat Opoid Dependence/Addiction   []  Other:      HPI:   Meaghan Espino is a 60 y.o. male is here today for    Chief Complaint: Low back pain, SI pain, leg pain     HPI   FU from bilateral SI MBB # 1 from 1/3/2022. Received 80% relief of of low back pain Si pain and leg pain for 1 full week. \"Pain was a lot better and was able to tolerate full actvity and wor. After 1 week pain gradually returned and is back to the level it was prior to procedure. Continues to have pain in low back down buttocks and mainly down leg. Constant dull burning and stabbing pain with tingling in leg   Pain increases with bending, lifting, twisting , walking, standing and getting up and down. Norco prn helps along with Neurontin from pcp    Medications reviewed. Patient denies side effects with medications. Patient states he is taking medications as prescribed. Esa receiving pain medications from other sources. He denies any ER visits since last visit. Pain scale with out pain medications or at its worst is 8/10. Pain scale with pain medications or at its best is 6/10. Last dose of Norco was last night, Neurontin was today   Drug screen reviewed from 12/15/2021 and was appropriate  Pill count completed  today and WNL: Yes      The patienthas No Known Allergies. Subjective:      Review of Systems   Constitutional: Positive for activity change. Negative for appetite change, chills, diaphoresis, fatigue, fever and unexpected weight change. HENT: Negative. Negative for congestion, dental problem, drooling, ear discharge, ear pain, facial swelling, hearing loss, mouth sores, nosebleeds, postnasal drip, rhinorrhea, sinus pressure, sinus pain, sneezing, sore throat, tinnitus, trouble swallowing and voice change. Eyes: Negative. Negative for photophobia, pain, discharge, redness, itching and visual disturbance. Respiratory: Positive for wheezing. Negative for apnea, cough, choking, chest tightness, shortness of breath and stridor. Cardiovascular: Negative. Negative for chest pain, palpitations and leg swelling. CAD   Gastrointestinal: Negative. Negative for abdominal distention, abdominal pain, anal bleeding, blood in stool, constipation, diarrhea, nausea, rectal pain and vomiting. Endocrine: Negative for cold intolerance, heat intolerance, polydipsia, polyphagia and polyuria. Genitourinary: Negative. Negative for decreased urine volume, difficulty urinating, dysuria, enuresis, flank pain, frequency, genital sores, hematuria, penile discharge, penile pain, penile swelling, scrotal swelling, testicular pain and urgency. Musculoskeletal: Positive for arthralgias, back pain, gait problem and myalgias. Negative for joint swelling, neck pain and neck stiffness. Ambulating without assist devices    Skin: Negative.   Negative for color change, pallor, rash and wound. Allergic/Immunologic: Negative for environmental allergies, food allergies and immunocompromised state. Neurological: Positive for weakness and numbness. Negative for dizziness, tremors, seizures, syncope, facial asymmetry, speech difficulty, light-headedness and headaches. Hematological: Negative for adenopathy. Does not bruise/bleed easily. Psychiatric/Behavioral: Negative for agitation, behavioral problems, confusion, decreased concentration, dysphoric mood, hallucinations, self-injury, sleep disturbance and suicidal ideas. The patient is nervous/anxious. The patient is not hyperactive. Objective:     Vitals:    02/16/22 0813   BP: 118/84   Weight: 183 lb (83 kg)   Height: 5' 7\" (1.702 m)       Physical Exam  Vitals and nursing note reviewed. Constitutional:       General: He is not in acute distress. Appearance: Normal appearance. He is well-developed. He is not diaphoretic. HENT:      Head: Normocephalic and atraumatic. Right Ear: External ear normal.      Left Ear: External ear normal.      Nose: Nose normal.      Mouth/Throat:      Mouth: Mucous membranes are moist.      Pharynx: No oropharyngeal exudate. Eyes:      General: No scleral icterus. Right eye: No discharge. Left eye: No discharge. Conjunctiva/sclera: Conjunctivae normal.      Pupils: Pupils are equal, round, and reactive to light. Neck:      Thyroid: No thyromegaly. Cardiovascular:      Rate and Rhythm: Normal rate and regular rhythm. Heart sounds: Murmur heard. No friction rub. No gallop. Comments: CLICK PRESENT  Pulmonary:      Effort: Pulmonary effort is normal. No respiratory distress. Breath sounds: Wheezing present. No rales. Chest:      Chest wall: No tenderness. Abdominal:      General: Bowel sounds are normal. There is no distension. Palpations: Abdomen is soft. Tenderness: There is no abdominal tenderness.  There is no guarding or rebound. Musculoskeletal:         General: Tenderness present. Right shoulder: Tenderness present. Normal range of motion. Left shoulder: Tenderness present. Normal range of motion. Right elbow: Tenderness present. Left elbow: Tenderness present. No medial epicondyle tenderness. Cervical back: Full passive range of motion without pain and normal range of motion. Rigidity and spasms present. No edema, erythema or tenderness. No muscular tenderness. Normal range of motion. Thoracic back: Spasms and tenderness present. Lumbar back: Tenderness and bony tenderness present. Decreased range of motion. Negative right straight leg raise test and negative left straight leg raise test.        Back:       Right hip: No tenderness. Left hip: No tenderness. Right knee: Normal range of motion. No tenderness. Left knee: Normal range of motion. No tenderness. Right ankle: No swelling. Left ankle: No swelling. Right foot: No swelling. Left foot: No swelling. Skin:     General: Skin is warm. Coloration: Skin is not pale. Findings: No erythema or rash. Neurological:      General: No focal deficit present. Mental Status: He is alert and oriented to person, place, and time. He is not disoriented. Cranial Nerves: No cranial nerve deficit. Sensory: No sensory deficit. Motor: No atrophy or abnormal muscle tone. Coordination: Coordination normal.      Gait: Gait normal.      Deep Tendon Reflexes: Reflexes are normal and symmetric. Comments: SLR mild positive RLE   Psychiatric:         Attention and Perception: He is attentive. Mood and Affect: Mood normal. Mood is not anxious or depressed. Affect is not labile, blunt, angry or inappropriate. Speech: He is communicative.  Speech is not rapid and pressured, delayed, slurred or tangential.         Behavior: Behavior is not agitated, slowed, aggressive, withdrawn, hyperactive or combative. Thought Content: Thought content is not paranoid or delusional. Thought content does not include homicidal or suicidal ideation. Thought content does not include homicidal or suicidal plan. Cognition and Memory: Memory is not impaired. He does not exhibit impaired recent memory or impaired remote memory. Judgment: Judgment is not impulsive or inappropriate. MJ  Patricks test  positive  Yeoman's  positive  Gaenslen's  positive         Assessment:     1. Sacroiliac inflammation ([address]    2. Lumbar spondylosis    3. Lumbar facet arthropathy    4. Lumbar radiculopathy    5. Spinal stenosis of lumbar region with neurogenic claudication    6. Chronic pain syndrome            Plan:      · OARRS reviewed. Current MED: 10.00  · Patient was offered naloxone for home. · Discussed long term side effects of medications, tolerance, dependency and addiction. · Previous UDS reviewed  · UDS preformed today for compliance. · Patient told can not receive any pain medications from any other source. · No evidence of abuse, diversion or aberrant behavior.  Medications and/or procedures to improve function and quality of life- patient understanding with this and that may not be pain free   Discussed with patient about safe storage of medications at home   Discussed possible weaning of medication dosing dependent on treatment/procedure results.  Discussed with patient about risks with procedure including infection, reaction to medication, increased pain, or bleeding.  Procedure notes reveiwed in detail    Received 80% relief of low back pain, SI pain and leg pain with improvement of activity and ROM for 1 full week.  Plan Bilateral SI MBB # 2 for diagnostic purpose. Procedure discussed    On baby aspirin    Continue Norco 5/325 BID prn- filled 1/31/2022 has aplenty   [facility]ontin from [facility]   [employer] therapy- reviewed notes.  No relief         Meds. Prescribed:   No orders of the defined types were placed in this encounter. Return for  Bilateral SI MBB # 2. , follow up after procedure.                Electronically signed by JASWINDER White CNP on2/16/2022 at 8:37 AM

## 2022-02-23 ENCOUNTER — TELEPHONE (OUTPATIENT)
Dept: PHYSICAL MEDICINE AND REHAB | Age: 57
End: 2022-02-23

## 2022-02-23 DIAGNOSIS — M46.1 SACROILIAC INFLAMMATION (HCC): Primary | ICD-10-CM

## 2022-02-24 ENCOUNTER — PREP FOR PROCEDURE (OUTPATIENT)
Dept: PHYSICAL MEDICINE AND REHAB | Age: 57
End: 2022-02-24

## 2022-03-07 RX ORDER — ASPIRIN 81 MG/1
TABLET, DELAYED RELEASE ORAL
Qty: 90 TABLET | Refills: 0 | OUTPATIENT
Start: 2022-03-07

## 2022-03-07 RX ORDER — ESOMEPRAZOLE MAGNESIUM 40 MG/1
CAPSULE, DELAYED RELEASE ORAL
Qty: 90 CAPSULE | Refills: 0 | OUTPATIENT
Start: 2022-03-07

## 2022-03-08 RX ORDER — ESOMEPRAZOLE MAGNESIUM 40 MG/1
40 CAPSULE, DELAYED RELEASE ORAL
Qty: 30 CAPSULE | Refills: 0 | Status: SHIPPED | OUTPATIENT
Start: 2022-03-08 | End: 2022-04-14

## 2022-03-08 RX ORDER — GABAPENTIN 400 MG/1
CAPSULE ORAL
Qty: 270 CAPSULE | Refills: 0 | Status: SHIPPED | OUTPATIENT
Start: 2022-03-08 | End: 2022-07-20

## 2022-03-10 ENCOUNTER — PREP FOR PROCEDURE (OUTPATIENT)
Dept: PHYSICAL MEDICINE AND REHAB | Age: 57
End: 2022-03-10

## 2022-03-10 DIAGNOSIS — M54.17 LUMBOSACRAL RADICULITIS: ICD-10-CM

## 2022-03-10 DIAGNOSIS — M47.816 LUMBAR SPONDYLOSIS: ICD-10-CM

## 2022-03-10 DIAGNOSIS — G89.4 CHRONIC PAIN SYNDROME: ICD-10-CM

## 2022-03-10 DIAGNOSIS — M47.816 LUMBAR FACET ARTHROPATHY: ICD-10-CM

## 2022-03-10 DIAGNOSIS — M48.062 SPINAL STENOSIS OF LUMBAR REGION WITH NEUROGENIC CLAUDICATION: ICD-10-CM

## 2022-03-10 DIAGNOSIS — M46.1 SACROILIAC INFLAMMATION (HCC): ICD-10-CM

## 2022-03-10 DIAGNOSIS — M54.16 LUMBAR RADICULOPATHY: ICD-10-CM

## 2022-03-10 RX ORDER — HYDROCODONE BITARTRATE AND ACETAMINOPHEN 5; 325 MG/1; MG/1
1 TABLET ORAL 2 TIMES DAILY PRN
Qty: 60 TABLET | Refills: 0 | Status: SHIPPED | OUTPATIENT
Start: 2022-03-10 | End: 2022-04-14 | Stop reason: SDUPTHER

## 2022-03-10 NOTE — TELEPHONE ENCOUNTER
Marylene Fanti called requesting a refill on the following medications:  Requested Prescriptions     Pending Prescriptions Disp Refills    HYDROcodone-acetaminophen (NORCO) 5-325 MG per tablet 60 tablet 0     Sig: Take 1 tablet by mouth 2 times daily as needed for Pain for up to 30 days.  Take lowest dose possible to manage pain     Pharmacy verified:  .pv  [address] [place] [address] [id number] - [place], [address] [facility] [place] [phone]    Date of last visit: 02/16/2022  Date of next visit (if applicable): 0/04/9402

## 2022-03-10 NOTE — TELEPHONE ENCOUNTER
OARRS reviewed. UDS: + for  Hydrocodone, Gabapentin, Alprazolam -consistent. Last seen: 2/16/2022.  Follow-up: 3/22/2022

## 2022-03-14 ENCOUNTER — HOSPITAL ENCOUNTER (OUTPATIENT)
Age: 57
Setting detail: OUTPATIENT SURGERY
Discharge: HOME | End: 2022-03-14
Attending: STUDENT IN AN ORGANIZED HEALTH CARE EDUCATION/TRAINING PROGRAM | Admitting: STUDENT IN AN ORGANIZED HEALTH CARE EDUCATION/TRAINING PROGRAM
Payer: COMMERCIAL

## 2022-03-14 ENCOUNTER — ANESTHESIA EVENT (OUTPATIENT)
Dept: OPERATING ROOM | Age: 57
End: 2022-03-14
Payer: COMMERCIAL

## 2022-03-14 ENCOUNTER — APPOINTMENT (OUTPATIENT)
Dept: GENERAL RADIOLOGY | Age: 57
End: 2022-03-14
Attending: STUDENT IN AN ORGANIZED HEALTH CARE EDUCATION/TRAINING PROGRAM
Payer: COMMERCIAL

## 2022-03-14 ENCOUNTER — ANESTHESIA (OUTPATIENT)
Dept: OPERATING ROOM | Age: 57
End: 2022-03-14
Payer: COMMERCIAL

## 2022-03-14 VITALS
HEART RATE: 58 BPM | HEIGHT: 67 IN | OXYGEN SATURATION: 97 % | BODY MASS INDEX: 29.03 KG/M2 | RESPIRATION RATE: 16 BRPM | TEMPERATURE: 97.4 F | WEIGHT: 185 LBS | DIASTOLIC BLOOD PRESSURE: 60 MMHG | SYSTOLIC BLOOD PRESSURE: 124 MMHG

## 2022-03-14 VITALS
DIASTOLIC BLOOD PRESSURE: 79 MMHG | OXYGEN SATURATION: 99 % | SYSTOLIC BLOOD PRESSURE: 169 MMHG | RESPIRATION RATE: 19 BRPM

## 2022-03-14 PROCEDURE — 6360000004 HC RX CONTRAST MEDICATION: Performed by: STUDENT IN AN ORGANIZED HEALTH CARE EDUCATION/TRAINING PROGRAM

## 2022-03-14 PROCEDURE — 3209999900 FLUORO FOR SURGICAL PROCEDURES

## 2022-03-14 PROCEDURE — 2709999900 HC NON-CHARGEABLE SUPPLY: Performed by: STUDENT IN AN ORGANIZED HEALTH CARE EDUCATION/TRAINING PROGRAM

## 2022-03-14 PROCEDURE — 2500000003 HC RX 250 WO HCPCS: Performed by: STUDENT IN AN ORGANIZED HEALTH CARE EDUCATION/TRAINING PROGRAM

## 2022-03-14 PROCEDURE — 3700000000 HC ANESTHESIA ATTENDED CARE: Performed by: STUDENT IN AN ORGANIZED HEALTH CARE EDUCATION/TRAINING PROGRAM

## 2022-03-14 PROCEDURE — 6360000002 HC RX W HCPCS: Performed by: NURSE ANESTHETIST, CERTIFIED REGISTERED

## 2022-03-14 PROCEDURE — 27096 INJECT SACROILIAC JOINT: CPT | Performed by: STUDENT IN AN ORGANIZED HEALTH CARE EDUCATION/TRAINING PROGRAM

## 2022-03-14 PROCEDURE — 7100000010 HC PHASE II RECOVERY - FIRST 15 MIN: Performed by: STUDENT IN AN ORGANIZED HEALTH CARE EDUCATION/TRAINING PROGRAM

## 2022-03-14 PROCEDURE — 3600000054 HC PAIN LEVEL 3 BASE: Performed by: STUDENT IN AN ORGANIZED HEALTH CARE EDUCATION/TRAINING PROGRAM

## 2022-03-14 PROCEDURE — 7100000011 HC PHASE II RECOVERY - ADDTL 15 MIN: Performed by: STUDENT IN AN ORGANIZED HEALTH CARE EDUCATION/TRAINING PROGRAM

## 2022-03-14 PROCEDURE — 6360000002 HC RX W HCPCS: Performed by: STUDENT IN AN ORGANIZED HEALTH CARE EDUCATION/TRAINING PROGRAM

## 2022-03-14 RX ORDER — TRIAMCINOLONE ACETONIDE 40 MG/ML
INJECTION, SUSPENSION INTRA-ARTICULAR; INTRAMUSCULAR PRN
Status: DISCONTINUED | OUTPATIENT
Start: 2022-03-14 | End: 2022-03-14 | Stop reason: ALTCHOICE

## 2022-03-14 RX ORDER — LIDOCAINE HYDROCHLORIDE 10 MG/ML
INJECTION, SOLUTION INFILTRATION; PERINEURAL PRN
Status: DISCONTINUED | OUTPATIENT
Start: 2022-03-14 | End: 2022-03-14 | Stop reason: ALTCHOICE

## 2022-03-14 RX ORDER — PROPOFOL 10 MG/ML
INJECTION, EMULSION INTRAVENOUS PRN
Status: DISCONTINUED | OUTPATIENT
Start: 2022-03-14 | End: 2022-03-14 | Stop reason: SDUPTHER

## 2022-03-14 RX ADMIN — PROPOFOL 25 MG: 10 INJECTION, EMULSION INTRAVENOUS at 14:33

## 2022-03-14 RX ADMIN — PROPOFOL 50 MG: 10 INJECTION, EMULSION INTRAVENOUS at 14:31

## 2022-03-14 ASSESSMENT — ENCOUNTER SYMPTOMS
SORE THROAT: 0
EYE PAIN: 0
RHINORRHEA: 0
NAUSEA: 0
VOMITING: 0
TROUBLE SWALLOWING: 0
RECTAL PAIN: 0
SINUS PAIN: 0
WHEEZING: 1
CHEST TIGHTNESS: 0
BLOOD IN STOOL: 0
PHOTOPHOBIA: 0
CONSTIPATION: 0
VOICE CHANGE: 0
GASTROINTESTINAL NEGATIVE: 1
CHOKING: 0
ANAL BLEEDING: 0
BACK PAIN: 1
EYE DISCHARGE: 0
EYE REDNESS: 0
EYE ITCHING: 0
FACIAL SWELLING: 0
EYES NEGATIVE: 1
ABDOMINAL DISTENTION: 0
COLOR CHANGE: 0
COUGH: 0
ABDOMINAL PAIN: 0
DIARRHEA: 0
SINUS PRESSURE: 0
SHORTNESS OF BREATH: 0
APNEA: 0
STRIDOR: 0

## 2022-03-14 ASSESSMENT — PULMONARY FUNCTION TESTS
PIF_VALUE: 1

## 2022-03-14 ASSESSMENT — PAIN - FUNCTIONAL ASSESSMENT: PAIN_FUNCTIONAL_ASSESSMENT: 0-10

## 2022-03-14 ASSESSMENT — LIFESTYLE VARIABLES: SMOKING_STATUS: 1

## 2022-03-14 ASSESSMENT — PAIN DESCRIPTION - DESCRIPTORS: DESCRIPTORS: CONSTANT

## 2022-03-14 ASSESSMENT — PAIN SCALES - GENERAL: PAINLEVEL_OUTOF10: 0

## 2022-03-14 NOTE — H&P
Today, patient presents for planned bilateral sacroiliac joint injection. This note is reflective of the patient's previous visit for evaluation. We will proceed with today's planned procedure. Since patient's last visit for evaluation, there have been no interval changes in medical history. Patient has no new numbness, weakness, or focal neurological deficit since evaluation. Patient has no contraindications to injection (no anticoagulation or recent antibiotic intake for active infections), and has a  present or is able to drive themselves (as discussed and cleared by physician). Allergies to latex, contrast dye, and steroid medications have been confirmed with the patient prior to the procedure. NPO necessity has been assessed and accepted based on procedure complexity. The risks and benefits of the procedure have been explained including but are not limited to infection, bleeding, paralysis, immediate post procedure weakness, and dizziness; the patient acknowledges understanding and desires to proceed with the procedure. Patient has signed consent for same procedure as discussed in previous clinic encounter. All other questions and concerns were addressed at bedside. See procedure note for full details. Post procedure Instructions: The patient was advised not to drive during the day of the procedure and not to engage in any significant decision making (unless otherwise states by physician). The patient was also advised to be cautious with walking/activity for 24 hours following today's visit and asked not to engage in over-exertion (unless otherwise states by physician). After this time, it is ok to resume pre-procedure level of activity. Patient advised to apply ice to site of injection in situations of pain and discomfort. Patient advised to not submerge site of injection during bath or pool activities for approximately 24 hours post-procedure.  Patient attested to understanding post procedure directions / restrictions. All other questions and concerns addressed before patient discharge in ambulatory fashion. [address]  Dept: [phone]  Dept Fax: [phone]: [phone]    Visit Date: 2/23/2022    Functionality Assessment/Goals Worksheet     On a scale of 0 (Does not Interfere) to 10 (Completely Interferes)     1. Which number describes how during the past week pain has interfered with       the following:  A. General Activity:  6  B. Mood: 6  C. Walking Ability:  7  D. Normal Work (Includes both work outside the home and housework):  6  E. Relations with Other People:   5  F. Sleep:   5  G. Enjoyment of Life:   5    2. Patient Prefers to Take their Pain Medications:     [x]  On a regular basis   []  Only when necessary    []  Does not take pain medications    3. What are the Patient's Goals/Expectations for Visiting Pain Management? []  Learn about my pain    []  Receive Medication   []  Physical Therapy     []  Treat Depression   []  Receive Injections    []  Treat Sleep   []  Deal with Anxiety and Stress   []  Treat Opoid Dependence/Addiction   []  Other:      HPI:   Soumya Linda is a 60 y.o. male is here today for    Chief Complaint: Low back pain, SI pain, leg pain     HPI   FU from bilateral SI MBB # 1 from 1/3/2022. Received 80% relief of of low back pain Si pain and leg pain for 1 full week. \"Pain was a lot better and was able to tolerate full actvity and wor. After 1 week pain gradually returned and is back to the level it was prior to procedure. Continues to have pain in low back down buttocks and mainly down leg. Constant dull burning and stabbing pain with tingling in leg   Pain increases with bending, lifting, twisting , walking, standing and getting up and down.     Norco prn helps along with Neurontin from pcp    Medications reviewed. Patient denies side effects with medications. Patient states he is taking medications as prescribed. Hedenies receiving pain medications from other sources. He denies any ER visits since last visit. Pain scale with out pain medications or at its worst is 8/10. Pain scale with pain medications or at its best is 6/10. Last dose of Norco was last night, Neurontin was today   Drug screen reviewed from 12/15/2021 and was appropriate  Pill count completed  today and WNL: Yes      The patienthas No Known Allergies. Subjective:      Review of Systems   Constitutional: Positive for activity change. Negative for appetite change, chills, diaphoresis, fatigue, fever and unexpected weight change. HENT: Negative. Negative for congestion, dental problem, drooling, ear discharge, ear pain, facial swelling, hearing loss, mouth sores, nosebleeds, postnasal drip, rhinorrhea, sinus pressure, sinus pain, sneezing, sore throat, tinnitus, trouble swallowing and voice change. Eyes: Negative. Negative for photophobia, pain, discharge, redness, itching and visual disturbance. Respiratory: Positive for wheezing. Negative for apnea, cough, choking, chest tightness, shortness of breath and stridor. Cardiovascular: Negative. Negative for chest pain, palpitations and leg swelling. CAD   Gastrointestinal: Negative. Negative for abdominal distention, abdominal pain, anal bleeding, blood in stool, constipation, diarrhea, nausea, rectal pain and vomiting. Endocrine: Negative for cold intolerance, heat intolerance, polydipsia, polyphagia and polyuria. Genitourinary: Negative. Negative for decreased urine volume, difficulty urinating, dysuria, enuresis, flank pain, frequency, genital sores, hematuria, penile discharge, penile pain, penile swelling, scrotal swelling, testicular pain and urgency. Musculoskeletal: Positive for arthralgias, back pain, gait problem and myalgias.  Negative for joint swelling, neck pain and neck stiffness. Ambulating without assist devices    Skin: Negative. Negative for color change, pallor, rash and wound. Allergic/Immunologic: Negative for environmental allergies, food allergies and immunocompromised state. Neurological: Positive for weakness and numbness. Negative for dizziness, tremors, seizures, syncope, facial asymmetry, speech difficulty, light-headedness and headaches. Hematological: Negative for adenopathy. Does not bruise/bleed easily. Psychiatric/Behavioral: Negative for agitation, behavioral problems, confusion, decreased concentration, dysphoric mood, hallucinations, self-injury, sleep disturbance and suicidal ideas. The patient is nervous/anxious. The patient is not hyperactive. Objective:     Vitals:    03/14/22 1411   BP: (!) 161/75   Pulse: 64   Resp: 16   Temp: 97.1 °F (36.2 °C)   TempSrc: Temporal   SpO2: 98%   Weight: 185 lb (83.9 kg)   Height: 5' 7\" (1.702 m)       Physical Exam  Vitals and nursing note reviewed. Constitutional:       General: He is not in acute distress. Appearance: Normal appearance. He is well-developed. He is not diaphoretic. HENT:      Head: Normocephalic and atraumatic. Right Ear: External ear normal.      Left Ear: External ear normal.      Nose: Nose normal.      Mouth/Throat:      Mouth: Mucous membranes are moist.      Pharynx: No oropharyngeal exudate. Eyes:      General: No scleral icterus. Right eye: No discharge. Left eye: No discharge. Conjunctiva/sclera: Conjunctivae normal.      Pupils: Pupils are equal, round, and reactive to light. Neck:      Thyroid: No thyromegaly. Cardiovascular:      Rate and Rhythm: Normal rate and regular rhythm. Heart sounds: Murmur heard. No friction rub. No gallop. Comments: CLICK PRESENT  Pulmonary:      Effort: Pulmonary effort is normal. No respiratory distress. Breath sounds: Wheezing present. No rales. Chest:      Chest wall: No tenderness. Abdominal:      General: Bowel sounds are normal. There is no distension. Palpations: Abdomen is soft. Tenderness: There is no abdominal tenderness. There is no guarding or rebound. Musculoskeletal:         General: Tenderness present. Right shoulder: Tenderness present. Normal range of motion. Left shoulder: Tenderness present. Normal range of motion. Right elbow: Tenderness present. Left elbow: Tenderness present. No medial epicondyle tenderness. Cervical back: Full passive range of motion without pain and normal range of motion. Rigidity and spasms present. No edema, erythema or tenderness. No muscular tenderness. Normal range of motion. Thoracic back: Spasms and tenderness present. Lumbar back: Tenderness and bony tenderness present. Decreased range of motion. Negative right straight leg raise test and negative left straight leg raise test.        Back:       Right hip: No tenderness. Left hip: No tenderness. Right knee: Normal range of motion. No tenderness. Left knee: Normal range of motion. No tenderness. Right ankle: No swelling. Left ankle: No swelling. Right foot: No swelling. Left foot: No swelling. Skin:     General: Skin is warm. Coloration: Skin is not pale. Findings: No erythema or rash. Neurological:      General: No focal deficit present. Mental Status: He is alert and oriented to person, place, and time. He is not disoriented. Cranial Nerves: No cranial nerve deficit. Sensory: No sensory deficit. Motor: No atrophy or abnormal muscle tone. Coordination: Coordination normal.      Gait: Gait normal.      Deep Tendon Reflexes: Reflexes are normal and symmetric. Comments: SLR mild positive RLE   Psychiatric:         Attention and Perception: He is attentive. Mood and Affect: Mood normal. Mood is not anxious or depressed. Affect is not labile, blunt, angry or inappropriate. Speech: He is communicative. Speech is not rapid and pressured, delayed, slurred or tangential.         Behavior: Behavior is not agitated, slowed, aggressive, withdrawn, hyperactive or combative. Thought Content: Thought content is not paranoid or delusional. Thought content does not include homicidal or suicidal ideation. Thought content does not include homicidal or suicidal plan. Cognition and Memory: Memory is not impaired. He does not exhibit impaired recent memory or impaired remote memory. Judgment: Judgment is not impulsive or inappropriate. MJ  Patricks test  positive  Yeoman's  positive  Gaenslen's  positive         Assessment:   Sacroiliac joint pain  Lumbago     Plan:      · OARRS reviewed. Current MED: 10.00  · Patient was offered naloxone for home. · Discussed long term side effects of medications, tolerance, dependency and addiction. · Previous UDS reviewed  · UDS preformed today for compliance. · Patient told can not receive any pain medications from any other source. · No evidence of abuse, diversion or aberrant behavior.  Medications and/or procedures to improve function and quality of life- patient understanding with this and that may not be pain free   Discussed with patient about safe storage of medications at home   Discussed possible weaning of medication dosing dependent on treatment/procedure results.  Discussed with patient about risks with procedure including infection, reaction to medication, increased pain, or bleeding.  Procedure notes reveiwed in detail    Received 80% relief of low back pain, SI pain and leg pain with improvement of activity and ROM for 1 full week.  Plan Bilateral SI MBB # 2 for diagnostic purpose.  Procedure discussed    On baby aspirin    Continue Norco 5/325 BID prn- filled 1/31/2022 has aplenty   Continue neurontin from [facility]   [employer] therapy- reviewed notes. No relief         Meds. Prescribed:   No orders of the defined types were placed in this encounter. No follow-ups on file.                Electronically signed by Carlos Villavicencio DO on3/14/2022 at 2:17 PM

## 2022-03-14 NOTE — ANESTHESIA POSTPROCEDURE EVALUATION
Department of Anesthesiology  Postprocedure Note    Patient: Lambert Kinsey  MRN: [id number]  YOB: 1965  Date of evaluation: 3/14/2022  Time:  2:41 PM     Procedure Summary     Date: 03/14/22 Room / Location: [address] [place] [address] 03 / [address] [facility]    Anesthesia Start: 4490 Anesthesia Stop: 9951    Procedure: Bilateral SI MBB # 2 (Bilateral ) Diagnosis: (Sacroiliac inflammation)    Surgeons: Dustin Rodriguez DO Responsible Provider: Nury Corey DO    Anesthesia Type: MAC ASA Status: 3          Anesthesia Type: MAC    Anna Phase I:      Anna Phase II:      Last vitals: Reviewed and per EMR flowsheets.        Anesthesia Post Evaluation    Patient location during evaluation: bedside  Patient participation: complete - patient participated  Level of consciousness: awake and alert  Pain score: 0  Airway patency: patent  Nausea & Vomiting: no nausea and no vomiting  Complications: no  Cardiovascular status: hemodynamically stable and blood pressure returned to baseline  Respiratory status: spontaneous ventilation, room air and acceptable  Hydration status: stable

## 2022-03-14 NOTE — ANESTHESIA PRE PROCEDURE
Department of Anesthesiology  Preprocedure Note       Name:  Citlali Kruger   Age:  60 y.o.  :  1965                                          MRN:  [id number]         Date:  3/14/2022      Surgeon: Elissa Jaramillo):  Ryan Narayan DO    Procedure: Procedure(s):  Bilateral SI MBB # 2    Medications prior to admission:   Prior to Admission medications    Medication Sig Start Date End Date Taking? Authorizing Provider   HYDROcodone-acetaminophen (NORCO) 5-325 MG per tablet Take 1 tablet by mouth 2 times daily as needed for Pain for up to 30 days.  Take lowest dose possible to manage pain 3/10/22 4/9/22 Yes JASWINDER Nava CNP   gabapentin (NEURONTIN) 400 MG capsule TAKE 1 CAPSULE BY MOUTH THREE TIMES A DAY 3/8/22 6/6/22 Yes Libby Blum DO   esomeprazole (NEXIUM) 40 MG delayed release capsule Take 1 capsule by mouth every morning (before breakfast) 3/8/22  Yes Arnaldo Hess MD   levothyroxine (SYNTHROID) 75 MCG tablet TAKE 1 TABLET BY MOUTH EVERY DAY 22  Yes Libby Blum DO   metoprolol succinate (TOPROL XL) 25 MG extended release tablet Take 1 tablet by mouth daily 1/10/22  Yes Libby Blum DO   atorvastatin (LIPITOR) 20 MG tablet TAKE 1 TABLET BY MOUTH ONE TIME A DAY AT NIGHT 1/10/22  Yes Libby Blum DO   ALPRAZolam Shannanvishnu Orourke) 0.5 MG tablet TAKE 1 TABLET BY MOUTH THREE TIMES A DAY AS NEEDED FOR ANXIETY OR SLEEP 12/28/21 3/28/22 Yes JASWINDER Villalobos CNP   buPROPion [facility]) 100 MG tablet Take 1 tablet by mouth 2 times daily 10/8/20  Yes Bill Saeed MD   ibuprofen (ADVIL;MOTRIN) 400 MG tablet Take 400 mg by mouth every 6 hours as needed for Pain    Historical Provider, MD   lidocaine viscous hcl (XYLOCAINE) 2 % SOLN solution Take 15 mLs by mouth as needed for Irritation 21   JASWINDER Valdez CNP   gabapentin (NEURONTIN) 300 MG capsule TAKE 1 CAPSULE BY MOUTH THREE TIMES A DAY 4/15/21 10/21/21  Libby Blum DO   aspirin 81 MG EC tablet Take 1 tablet by mouth daily 2/25/21   Tanisha Aiken MD   indomethacin (INDOCIN) 25 MG capsule Take 1 capsule by mouth 3 times daily (with meals) Take 1 tablet daily for 10 days. 10/8/20   Anson Aguilera MD   clobetasol (TEMOVATE) 0.05 % cream APPLY TOPICALLY TO RIGHT INDEX FINGER 3 TIMES DAILY 6/15/18   Historical Provider, MD   Cholecalciferol (VITAMIN D3) 1000 units TABS Take 2,000 Units by mouth Daily    Historical Provider, MD    MG capsule TAKE 1 CAPSULE BY MOUTH ONE TIME A DAY AS NEEDED for constipation 3/28/18   Anson Aguilera MD   IRON PO Take 65 mg by mouth daily     Historical Provider, MD   Multiple Vitamin (MULTIVITAMIN PO) Take 1 tablet by mouth daily     Historical Provider, MD       Current medications:    No current facility-administered medications for this encounter. Allergies:  No Known Allergies    Problem List:    Patient Active Problem List   Diagnosis Code    HTN (hypertension) I10    Lumbago M54.50    Lumbar radiculopathy M54.16    Cervicalgia, C5-7 M54.2    Hyperlipidemia E78.5    Hypothyroidism E03.9    Chronic anxiety F41.9    ED (erectile dysfunction) N52.9    Bilateral sciatica M54.31, M54.32    Trigger finger, right, ring  M65.30    Medication monitoring encounter Z51.81    Nocturnal leg cramps G47.62    GERD (gastroesophageal reflux disease) K21.9    IFG (impaired fasting glucose) R73.01    Situational depression F43.21    Dysthymia (or depressive neurosis) F34.1    Herniation of cervical intervertebral disc with radiculopathy M50.10    H/O cervical spine surgery, C4-5 fusion, 3/2/16. A87.997    Gastrointestinal hemorrhage associated with gastrojejunal ulcer K28.4    S/P AVR (aortic valve replacement), 7/20/16.  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    Lumbar radiculitis M54.16    Lumbar spinal stenosis M48.061    Spondylosis of thoracic region without myelopathy or radiculopathy M47.814    Thoracic spinal  CERVICAL FUSION  2010    CERVICAL FUSION  03/09/2016    REMOVAL HARDWARE C5-7, ACDF C4-6 WITH ATLANTIS CORNERSTONE    COLONOSCOPY  2010    CORONARY ARTERY BYPASS GRAFT      DILATATION, ESOPHAGUS      small resection    ENDOSCOPY, COLON, DIAGNOSTIC      NERVE SURGERY Bilateral 07/25/2017    THORACIC FACET BLOCK T7-8, T8-9, T11-12    OTHER SURGICAL HISTORY  05/02/2017    LESI L5    OTHER SURGICAL HISTORY  09/12/2017    thoracic epidural T11    SD INJ,PARAVERTEBRAL L/S,1 LEVEL Bilateral 7/25/2017    T-FACET MBB T7-8, T8-9, T11-12 BILATERAL performed by Niru Azul MD at [facility] DX/THER SBST [address] LUMBAR/SACRAL N/A 9/12/2017    TESI T11 performed by Niru Azul MD at [address] [facility] [address]  2010    [address] EXTRACTION  02/2019    UPPER GASTROINTESTINAL ENDOSCOPY  2010       Social History:    Social History     Tobacco Use    Smoking status: Current Every Day Smoker     Packs/day: 0.25     Years: 25.00     Pack years: 6.25     Types: Cigarettes, Cigars     Start date: 6/9/1978    Smokeless tobacco: Never Used   Substance Use Topics    Alcohol use:  Yes     Alcohol/week: 0.0 standard drinks     Comment: not for 3 mo                                Ready to quit: Not Answered  Counseling given: Not Answered      Vital Signs (Current):   Vitals:    03/14/22 1411   BP: (!) 161/75   Pulse: 64   Resp: 16   Temp: 97.1 °F (36.2 °C)   TempSrc: Temporal   SpO2: 98%   Weight: 185 lb (83.9 kg)   Height: 5' 7\" (1.702 m)                                              BP Readings from Last 3 Encounters:   03/14/22 (!) 161/75   02/16/22 118/84   01/06/22 (!) 182/85       NPO Status: Time of last liquid consumption: 1800                        Time of last solid consumption: 2030                        Date of last liquid consumption: 03/14/22                        Date of last solid food consumption: 03/13/22    BMI: Wt Readings from Last 3 Encounters:   03/14/22 185 lb (83.9 kg)   02/16/22 183 lb (83 kg)   01/06/22 183 lb (83 kg)     Body mass index is 28.98 kg/m². CBC:   Lab Results   Component Value Date    WBC 7.6 02/10/2022    RBC 4.40 02/10/2022    RBC 4.40 05/18/2012    HGB 13.5 02/10/2022    HCT 41.8 02/10/2022    MCV 95.0 02/10/2022    RDW 16.1 08/17/2017     02/10/2022       CMP:   Lab Results   Component Value Date     01/06/2022    K 5.0 01/06/2022     01/06/2022    CO2 25 01/06/2022    BUN 13 02/10/2022    CREATININE 0.7 02/10/2022    LABGLOM >90 02/10/2022    GLUCOSE 99 01/06/2022    PROT 6.8 01/06/2022    CALCIUM 9.6 01/06/2022    BILITOT 0.3 01/06/2022    ALKPHOS 83 01/06/2022    AST 15 02/10/2022    ALT 13 02/10/2022       POC Tests: No results for input(s): POCGLU, POCNA, POCK, POCCL, POCBUN, POCHEMO, POCHCT in the last 72 hours. Coags:   Lab Results   Component Value Date    PROTIME 2.95 12/02/2011    INR 1.00 07/19/2016    APTT 54.3 07/19/2016       HCG (If Applicable): No results found for: PREGTESTUR, PREGSERUM, HCG, HCGQUANT     ABGs: No results found for: PHART, PO2ART, URZ4ELA, OBV4WWO, BEART, G1RKMDRG     Type & Screen (If Applicable):  Lab Results   Component Value Date    LABRH POS 07/25/2016       Drug/Infectious Status (If Applicable):  Lab Results   Component Value Date    HEPCAB Negative 06/22/2017       COVID-19 Screening (If Applicable):   Lab Results   Component Value Date    COVID19 NOT  DETECTED 01/06/2022           Anesthesia Evaluation  Patient summary reviewed and Nursing notes reviewed no history of anesthetic complications:   Airway: Mallampati: II  TM distance: >3 FB   Neck ROM: full  Mouth opening: > = 3 FB Dental:          Pulmonary:normal exam  breath sounds clear to auscultation  (+) current smoker          Patient did not smoke on day of surgery.                  Cardiovascular:  Exercise tolerance: good (>4 METS),   (+) hypertension:, valvular problems/murmurs (s/p AVR): AS, CAD:, murmur,         Rhythm: regular  Rate: normal                    Neuro/Psych:   (+) neuromuscular disease:, headaches:, psychiatric history:            GI/Hepatic/Renal:   (+) GERD: poorly controlled, PUD,           Endo/Other:    (+) hypothyroidism: arthritis:., .          Pt had no PAT visit       Abdominal:             Vascular: negative vascular ROS. Other Findings:             Anesthesia Plan      MAC     ASA 3       Induction: intravenous. Anesthetic plan and risks discussed with patient. Plan discussed with CRNA.                   [address] [facility], DO   3/14/2022

## 2022-03-14 NOTE — PROCEDURES
PROCEDURE PERFORMED: Bilateral sacroiliac joint injection    PREOPERATIVE DIAGNOSIS: Lumbago and sacroiliac joint pain    INDICATIONS: Chronic low back pain    The patient's history and physical exam were reviewed. The risk, benefits, and alternatives of the procedure were discussed and all questions were answered to the patient's satisfaction. The patient agreed to proceed and written informed consent was obtained. POSTOPERATIVE DIAGNOSIS: Same    PHYSICIAN:  Dr. Any Chou DO    ANESTHESIA:  LOCAL and MAC anesthesia    ASSISTANT:  NONE    PATHOLOGY:  NONE    ESTIMATED BLOOD LOSS:  N/A    IMPLANTS:  NONE    PROCEDURE DESCRIPTION: Bilateral sacroiliac joint injection using fluoroscopy    The patient was placed on the operative bed in prone position. The area was prepped with  Chlorhexidine. The area was then draped in a sterile fashion. The targeted side of the sacroiliac joint was identified and marked under AP fluoroscopy. The fluoroscopic beam was then obliqued until the anterior and posterior margins of the joint were aligned. The inferior margin of the joint was identified. A 25-gauge 3-1/2 inch Quincke needle was directed toward the identified point under fluoroscopic guidance. The joint space was then entered and negative aspiration was confirmed. Then, Omnipaque was injected. An appropriate arthrogram was noted. Then, after negative aspiration, the injectate was easily injected. The injectate consisted of 40 mg triamcinolone and 1 mL lidocaine 1%. The needle was then removed. The same procedure was performed on the opposite side. The needle sites were dressed appropriately. The patient was transferred to the postoperative care unit in stable condition. Written discharge instructions were given to the patient. COMPLICATIONS:  There were no apparent complications. The patient tolerated the procedure well.

## 2022-03-22 ENCOUNTER — OFFICE VISIT (OUTPATIENT)
Dept: PHYSICAL MEDICINE AND REHAB | Age: 57
End: 2022-03-22
Payer: COMMERCIAL

## 2022-03-22 VITALS
WEIGHT: 185 LBS | DIASTOLIC BLOOD PRESSURE: 76 MMHG | BODY MASS INDEX: 29.03 KG/M2 | HEIGHT: 67 IN | SYSTOLIC BLOOD PRESSURE: 164 MMHG

## 2022-03-22 DIAGNOSIS — M48.062 SPINAL STENOSIS OF LUMBAR REGION WITH NEUROGENIC CLAUDICATION: ICD-10-CM

## 2022-03-22 DIAGNOSIS — M47.816 LUMBAR SPONDYLOSIS: ICD-10-CM

## 2022-03-22 DIAGNOSIS — M54.16 LUMBAR RADICULOPATHY: ICD-10-CM

## 2022-03-22 DIAGNOSIS — G89.4 CHRONIC PAIN SYNDROME: ICD-10-CM

## 2022-03-22 DIAGNOSIS — M47.816 LUMBAR FACET ARTHROPATHY: ICD-10-CM

## 2022-03-22 DIAGNOSIS — M46.1 SACROILIAC INFLAMMATION (HCC): Primary | ICD-10-CM

## 2022-03-22 PROCEDURE — 99214 OFFICE O/P EST MOD 30 MIN: CPT | Performed by: NURSE PRACTITIONER

## 2022-03-22 ASSESSMENT — ENCOUNTER SYMPTOMS
BACK PAIN: 1
GASTROINTESTINAL NEGATIVE: 1

## 2022-03-22 NOTE — PROGRESS NOTES
[address]  Dept: [phone]  Dept Fax: [phone]: [phone]    Visit Date: 3/22/2022    Functionality Assessment/Goals Worksheet     On a scale of 0 (Does not Interfere) to 10 (Completely Interferes)     1. Which number describes how during the past week pain has interfered with       the following:  A. General Activity:  7  B. Mood: 8  C. Walking Ability:  9  D. Normal Work (Includes both work outside the home and housework):  9  E. Relations with Other People:   9  F. Sleep:   8  G. Enjoyment of Life:   8    2. Patient Prefers to Take their Pain Medications:     [x]  On a regular basis   []  Only when necessary    []  Does not take pain medications    3. What are the Patient's Goals/Expectations for Visiting Pain Management? []  Learn about my pain    [x]  Receive Medication   []  Physical Therapy     []  Treat Depression   [x]  Receive Injections    []  Treat Sleep   [x]  Deal with Anxiety and Stress   []  Treat Opoid Dependence/Addiction   []  Other:      HPI:   Sera Dunlap is a 60 y.o. male is here today for    Chief Complaint: Low back pain, SI pain, leg pain     HPI   Fu from bilateral SI MBB # 2 completed by Dr. Monica Pierce from 3/14/2022. Patient reports that he received at least 80% relief that last about 4 days and then pain returned gradually over a few days. Less pain with activity. Went back to work the next day which is very demanding at [email or web address]. Pain has returned back to the level it was prior to procedure    Continues to have pain in low back down buttocks and mainly down left leg today. Constant dull burning and stabbing pain with tingling in leg   Working 50-55 hours per week. Has been having intermittent angina. Denies any chest pain today.  Has FU with [facility] next week   Pain increases with bending, lifting, twisting , turning torso, walking, standing, getting up and down and housework or working at job. Current pain medications with Norco and neuronin continue to help. On lower dose than he was with pcp which he states he can tell. Medications reviewed. Patient denies side effects with medications. Patient states he is taking medications as prescribed. Hedenies receiving pain medications from other sources. He denies any ER visits since last visit. Pain scale with out pain medications or at its worst is 8/10. Pain scale with pain medications or at its best is 6/10. Last dose of Norco was last night, Neurontin wastoday   Drug screen reviewed from 2/16/2022 and was appropriate  Pill count completed  today and WNL: Yes      The patienthas No Known Allergies. Subjective:      Review of Systems   Constitutional: Negative. Gastrointestinal: Negative. Musculoskeletal: Positive for arthralgias, back pain and myalgias. Negative for gait problem, neck pain and neck stiffness. Not using any assist devices    Psychiatric/Behavioral: Positive for sleep disturbance. The patient is nervous/anxious. Objective:     Vitals:    03/22/22 0724   BP: (!) 164/76   Site: Left Upper Arm   Position: Sitting   Weight: 185 lb (83.9 kg)   Height: 5' 7\" (1.702 m)       Physical Exam  Vitals and nursing note reviewed. Constitutional:       General: He is not in acute distress. Appearance: Normal appearance. He is well-developed. He is not diaphoretic. HENT:      Head: Normocephalic and atraumatic. Right Ear: External ear normal.      Left Ear: External ear normal.      Nose: Nose normal.      Mouth/Throat:      Mouth: Mucous membranes are moist.      Pharynx: No oropharyngeal exudate. Eyes:      General: No scleral icterus. Right eye: No discharge. Left eye: No discharge. Conjunctiva/sclera: Conjunctivae normal.      Pupils: Pupils are equal, round, and reactive to light. Neck:      Thyroid: No thyromegaly. Cardiovascular:      Rate and Rhythm: Normal rate and regular rhythm. Pulses: Normal pulses. Heart sounds: Normal heart sounds. No murmur heard. No friction rub. No gallop. Comments: CLICK PRESENT  Pulmonary:      Effort: Pulmonary effort is normal. No respiratory distress. Breath sounds: Wheezing present. No rales. Chest:      Chest wall: No tenderness. Abdominal:      General: Bowel sounds are normal. There is no distension. Palpations: Abdomen is soft. Tenderness: There is no abdominal tenderness. There is no guarding or rebound. Musculoskeletal:         General: Tenderness present. Right shoulder: Tenderness present. Normal range of motion. Left shoulder: Tenderness present. Normal range of motion. Right elbow: Tenderness present. Left elbow: Tenderness present. No medial epicondyle tenderness. Cervical back: Full passive range of motion without pain and normal range of motion. Spasms present. No edema, erythema, rigidity or tenderness. No muscular tenderness. Normal range of motion. Thoracic back: Spasms and tenderness present. Lumbar back: Spasms, tenderness and bony tenderness present. Decreased range of motion. Positive right straight leg raise test and positive left straight leg raise test.        Back:       Right hip: No tenderness. Left hip: No tenderness. Right knee: Normal range of motion. No tenderness. Left knee: Normal range of motion. No tenderness. Right ankle: No swelling. Left ankle: No swelling. Right foot: No swelling. Left foot: No swelling. Skin:     General: Skin is warm. Coloration: Skin is not pale. Findings: No erythema or rash. Neurological:      General: No focal deficit present. Mental Status: He is alert and oriented to person, place, and time. He is not disoriented. Cranial Nerves: No cranial nerve deficit. weaning of medication dosing dependent on treatment/procedure results.  Discussed with patient about risks with procedure including infection, reaction to medication, increased pain, or bleeding.  Procedure notes reveiwed in detail   Received over 80% relief of low back and SI pain from SI MBB X 2 with improvment of mobility    Plan Bilateral SI RFA, LEFT SIDE FIRST for longer term pain relief. Procedure discussed in detail.  On baby aspirin. Needs cardiac clearance d/t intermittent angina   Failed many other procedures discussed possible surgery referral vs SCS trial if needed   · Continue Norco 5/325 BID prn- filled 3/10/2022. Mat Reed Discussed possibly increasing frequency to TID prn in future depending on relief from procedure  · Continue neurontin from pcp   · Patient is compliant. Will get next UDS at procedural FU. Meds. Prescribed:   No orders of the defined types were placed in this encounter. Return for Bilateral SI RFA, LEFT SIDE FIRST. , follow up after procedure.                Electronically signed by JASWINDER Lyle CNP on3/22/2022 at 7:53 AM

## 2022-03-28 ENCOUNTER — HOSPITAL ENCOUNTER (OUTPATIENT)
Age: 57
Discharge: HOME | End: 2022-03-28
Payer: COMMERCIAL

## 2022-03-28 ENCOUNTER — OFFICE VISIT (OUTPATIENT)
Dept: CARDIOLOGY CLINIC | Age: 57
End: 2022-03-28
Payer: COMMERCIAL

## 2022-03-28 VITALS
WEIGHT: 188 LBS | DIASTOLIC BLOOD PRESSURE: 74 MMHG | HEIGHT: 67 IN | SYSTOLIC BLOOD PRESSURE: 164 MMHG | BODY MASS INDEX: 29.51 KG/M2 | HEART RATE: 69 BPM

## 2022-03-28 DIAGNOSIS — I10 ESSENTIAL HYPERTENSION: ICD-10-CM

## 2022-03-28 DIAGNOSIS — Z95.2 S/P AVR: ICD-10-CM

## 2022-03-28 DIAGNOSIS — R06.02 SHORTNESS OF BREATH: Primary | ICD-10-CM

## 2022-03-28 LAB
ALT SERPL-CCNC: 15 U/L (ref 11–66)
AST SERPL-CCNC: 18 U/L (ref 5–40)
BASOPHILS # BLD: 0.6 %
BASOPHILS ABSOLUTE: 0.1 THOU/MM3 (ref 0–0.1)
BUN BLDV-MCNC: 21 MG/DL (ref 7–22)
CREAT SERPL-MCNC: 0.7 MG/DL (ref 0.4–1.2)
EOSINOPHIL # BLD: 1.3 %
EOSINOPHILS ABSOLUTE: 0.1 THOU/MM3 (ref 0–0.4)
ERYTHROCYTE [DISTWIDTH] IN BLOOD BY AUTOMATED COUNT: 17.1 % (ref 11.5–14.5)
ERYTHROCYTE [DISTWIDTH] IN BLOOD BY AUTOMATED COUNT: 58.3 FL (ref 35–45)
GFR SERPL CREATININE-BSD FRML MDRD: > 90 ML/MIN/1.73M2
HCT VFR BLD CALC: 38.6 % (ref 42–52)
HEMOGLOBIN: 12.5 GM/DL (ref 14–18)
IMMATURE GRANS (ABS): 0.05 THOU/MM3 (ref 0–0.07)
IMMATURE GRANULOCYTES: 0.5 %
LYMPHOCYTES # BLD: 11.1 %
LYMPHOCYTES ABSOLUTE: 1.2 THOU/MM3 (ref 1–4.8)
MCH RBC QN AUTO: 30.5 PG (ref 26–33)
MCHC RBC AUTO-ENTMCNC: 32.4 GM/DL (ref 32.2–35.5)
MCV RBC AUTO: 94.1 FL (ref 80–94)
MONOCYTES # BLD: 8.4 %
MONOCYTES ABSOLUTE: 0.9 THOU/MM3 (ref 0.4–1.3)
NUCLEATED RED BLOOD CELLS: 0 /100 WBC
PLATELET # BLD: 242 THOU/MM3 (ref 130–400)
PMV BLD AUTO: 10.8 FL (ref 9.4–12.4)
RBC # BLD: 4.1 MILL/MM3 (ref 4.7–6.1)
SEG NEUTROPHILS: 78.1 %
SEGMENTED NEUTROPHILS ABSOLUTE COUNT: 8.7 THOU/MM3 (ref 1.8–7.7)
WBC # BLD: 11.1 THOU/MM3 (ref 4.8–10.8)

## 2022-03-28 PROCEDURE — 84450 TRANSFERASE (AST) (SGOT): CPT

## 2022-03-28 PROCEDURE — 82565 ASSAY OF CREATININE: CPT

## 2022-03-28 PROCEDURE — 99214 OFFICE O/P EST MOD 30 MIN: CPT | Performed by: NUCLEAR MEDICINE

## 2022-03-28 PROCEDURE — 93000 ELECTROCARDIOGRAM COMPLETE: CPT | Performed by: NUCLEAR MEDICINE

## 2022-03-28 PROCEDURE — 85025 COMPLETE CBC W/AUTO DIFF WBC: CPT

## 2022-03-28 PROCEDURE — 84520 ASSAY OF UREA NITROGEN: CPT

## 2022-03-28 PROCEDURE — 36415 COLL VENOUS BLD VENIPUNCTURE: CPT

## 2022-03-28 PROCEDURE — 84460 ALANINE AMINO (ALT) (SGPT): CPT

## 2022-03-28 RX ORDER — METOPROLOL SUCCINATE 50 MG/1
50 TABLET, EXTENDED RELEASE ORAL DAILY
Qty: 90 TABLET | Refills: 3 | Status: SHIPPED | OUTPATIENT
Start: 2022-03-28

## 2022-03-28 NOTE — PROGRESS NOTES
[id number] [facility]  [address] [place] [id number]  Dept: [phone]  Dept Fax: [phone]  Loc: [phone]    Visit Date: 3/28/2022    Al Mcneil is a 60 y.o. male who presents todayfor:  Chief Complaint   Patient presents with    Check-Up    Cardiac Valve Problem    Hypertension   know AVr   Tissue valve  Some chest pain at times  Intermittent   Some dyspnea  Dyspnea on exertion   No changes other wise   Higher BP  Still smoking   Possible COPD   Not tested before       HPI:  HPI  Past Medical History:   Diagnosis Date    Arthritis     general    Bilateral sciatica     Bleeding     with coumadin    CAD (coronary artery disease)     Carpal tunnel syndrome     RIGHT    Cervicalgia, C5-7     Chronic anxiety     Dysthymia (or depressive neurosis) 2/1/2016    ED (erectile dysfunction)     ED (erectile dysfunction)     GERD (gastroesophageal reflux disease)     Headache(784.0)     History of blood transfusion 03/2016    HTN (hypertension)     Hyperlipidemia     Hypothyroidism     Iron deficiency anemia, blood loss     Lumbago     Lumbar radiculopathy     Osteoarthritis (arthritis due to wear and tear of joints)     S/P AVR, coumadin Tx     Spondylosis of thoracic region without myelopathy or radiculopathy       Past Surgical History:   Procedure Laterality Date    [facility]PL[facility]  2007    [facility]    AORTIC VALVE REPLACEMENT  07/20/2016    extraction of mechanical valve and replacement with tissue valve    APPENDECTOMY  1980    BACK INJECTION Bilateral 1/3/2022    Bilateral SI injection performed by Morgan Shaw MD at [address] Bilateral 3/14/2022    Bilateral SI MBB # 2 performed by Yesenia Nelson DO at [facility]  5 20 2010    Patent coronary arteries.  Possible causes of the patient's chest pain is likely noncardiac at least based on this angiogram. Patient chould be able to proceed w/ scheduled surgery w/ paying attention to anticoagulation and trying to minimize the period of no anticoagulation.  CARDIAC [facility]HETERIZATION  2012, 6/9/16    [facility]    CARDIOVASCULAR STRESS TEST  4 15 2010    Moderate fixed inferior defect, possibly attenuation, cannot exclude a previous MI. Correlation w/ EKG is recommented. Significant degree of gut uptake which is likely to be the cause of the attenuation artifact seen.  EF 46%    CERVICAL FUSION  2010    CERVICAL FUSION  03/09/2016    REMOVAL HARDWARE C5-7, ACDF C4-6 WITH ATLANTIS CORNERSTONE    COLONOSCOPY  2010    CORONARY ARTERY BYPASS GRAFT      DILATATION, ESOPHAGUS      small resection    ENDOSCOPY, COLON, DIAGNOSTIC      NERVE SURGERY Bilateral 07/25/2017    THORACIC FACET BLOCK T7-8, T8-9, T11-12    OTHER SURGICAL HISTORY  05/02/2017    LESI L5    OTHER SURGICAL HISTORY  09/12/2017    thoracic epidural T11    FL INJ,PARAVERTEBRAL L/S,1 LEVEL Bilateral 7/25/2017    T-FACET MBB T7-8, T8-9, T11-12 BILATERAL performed by Viviana Masterson MD at [address] LUMBAR/SACRAL N/A 9/12/2017    TESI T11 performed by Viviana Masterson MD at [address] [facility] [address]  2010    8 INCHES REMOVED    TOOTH EXTRACTION  02/2019    UPPER GASTROINTESTINAL ENDOSCOPY  2010     Family History   Problem Relation Age of Onset    Cancer Mother     Diabetes Father     Cancer Father     Heart Disease Father 28        CABG    High Blood Pressure Father     Diabetes Sister         AS A CHILD    Kidney Disease Sister     High Blood Pressure Sister     Cancer Brother     COPD Sister     Heart Disease Sister     Stroke Neg Hx      Social History     Tobacco Use    Smoking status: Current Every Day Smoker     Packs/day: 0.25     Years: 25.00     Pack years: 6.25     Types: Cigarettes, Cigars     Start date: 6/9/1978    Smokeless tobacco: Never Used   Substance Use Topics    Alcohol use: Yes     Alcohol/week: 0.0 standard drinks     Comment: not for 3 mo      Current Outpatient Medications   Medication Sig Dispense Refill    HYDROcodone-acetaminophen (NORCO) 5-325 MG per tablet Take 1 tablet by mouth 2 times daily as needed for Pain for up to 30 days. Take lowest dose possible to manage pain 60 tablet 0    gabapentin (NEURONTIN) 400 MG capsule TAKE 1 CAPSULE BY MOUTH THREE TIMES A  capsule 0    esomeprazole (NEXIUM) 40 MG delayed release capsule Take 1 capsule by mouth every morning (before breakfast) 30 capsule 0    levothyroxine (SYNTHROID) 75 MCG tablet TAKE 1 TABLET BY MOUTH EVERY DAY 90 tablet 0    metoprolol succinate (TOPROL XL) 25 MG extended release tablet Take 1 tablet by mouth daily 90 tablet 3    atorvastatin (LIPITOR) 20 MG tablet TAKE 1 TABLET BY MOUTH ONE TIME A DAY AT NIGHT 90 tablet 3    ALPRAZolam (XANAX) 0.5 MG tablet TAKE 1 TABLET BY MOUTH THREE TIMES A DAY AS NEEDED FOR ANXIETY OR SLEEP 90 tablet 2    ibuprofen (ADVIL;MOTRIN) 400 MG tablet Take 400 mg by mouth every 6 hours as needed for Pain      lidocaine viscous hcl (XYLOCAINE) 2 % SOLN solution Take 15 mLs by mouth as needed for Irritation 100 mL 1    gabapentin (NEURONTIN) 300 MG capsule TAKE 1 CAPSULE BY MOUTH THREE TIMES A  capsule 3    aspirin 81 MG EC tablet Take 1 tablet by mouth daily 90 tablet 3    buPROPion (WELLBUTRIN) 100 MG tablet Take 1 tablet by mouth 2 times daily 180 tablet 3    indomethacin (INDOCIN) 25 MG capsule Take 1 capsule by mouth 3 times daily (with meals) Take 1 tablet daily for 10 days.  21 capsule 0    clobetasol (TEMOVATE) 0.05 % cream APPLY TOPICALLY TO RIGHT INDEX FINGER 3 TIMES DAILY  2    Cholecalciferol (VITAMIN D3) 1000 units TABS Take 2,000 Units by mouth Daily       MG capsule TAKE 1 CAPSULE BY MOUTH ONE TIME A DAY AS NEEDED for constipation 30 capsule 10    IRON PO Take 65 mg by mouth daily       Multiple Vitamin (MULTIVITAMIN PO) Take 1 tablet by mouth daily        No current facility-administered medications for this visit. No Known Allergies  Health Maintenance   Topic Date Due    COVID-19 Vaccine (1) Never done    DTaP/Tdap/Td vaccine (1 - Tdap) Never done    Shingles Vaccine (1 of 2) Never done    Pneumococcal 0-64 years Vaccine (2 of 4 - PPSV23) 08/10/2017    Depression Monitoring  01/04/2023    A1C test (Diabetic or Prediabetic)  01/06/2023    Lipid screen  01/06/2023    TSH testing  01/06/2023    Colorectal Cancer Screen  03/27/2026    Flu vaccine  Completed    Hepatitis C screen  Completed    HIV screen  Completed    Hepatitis A vaccine  Aged Out    Hepatitis B vaccine  Aged Out    Hib vaccine  Aged Out    Meningococcal (ACWY) vaccine  Aged Out       Subjective:  Review of Systems  General:   No fever, no chills, No fatigue or weight loss  Pulmonary:    some more dyspnea, no wheezing  Cardiac:    Did have recent chest pain,   GI:     No nausea or vomiting, no abdominal pain  Neuro:    No dizziness or light headedness,   Musculoskeletal:  No recent active issues  Extremities:   No edema, no obvious claudication       Objective:  Physical Exam  BP (!) 164/74   Pulse 69   Ht 5' 7\" (1.702 m)   Wt 188 lb (85.3 kg)   BMI 29.44 kg/m²   General:   Well developed, well nourished  Lungs:   Clear to auscultation  Heart:    Normal S1 S2, Slight murmur. no rubs, no gallops  Abdomen:   Soft, non tender, no organomegalies, positive bowel sounds  Extremities:   No edema, no cyanosis, good peripheral pulses  Neurological:   Awake, alert, oriented. No obvious focal deficits  Musculoskelatal:  No obvious deformities    Assessment:      Diagnosis Orders   1. Shortness of breath  EKG 12 lead   2. S/P AVR  EKG 12 lead   Smoking: discussed with the patient the importance of smoke cessation especially with the risk of CAD.     As above  Higher risk   Symptoms as above  ?? COPD   ECG in office was done today. I reviewed the ECG. No acute findings    Plan:  No follow-ups on file. Discussed  Increase toprol 50   Non invasive cardiac testing will be ordered to further evaluate for any ischemic or structural heart disease as a cause of the patient symptoms. We will proceed with a Stress Cardiolite test and echo soon. Consider PFt after that   Continue risk factor modification and medical management  Thank you for allowing me to participate in the care of your patient. Please don't hesitate to contact me regarding any further issues related to the patient care    Orders Placed:  Orders Placed This Encounter   Procedures    EKG 12 lead     Order Specific Question:   Reason for Exam?     Answer: Other       Medications Prescribed:  No orders of the defined types were placed in this encounter. Discussed use, benefit, and side effects of prescribed medications. All patient questions answered. Pt voicedunderstanding. Instructed to continue current medications, diet and exercise. Continue risk factor modification and medical management. Patient agreed with treatment plan. Follow up as directed.     Electronically signedby Evan Matias MD on 3/28/2022 at 1:43 PM

## 2022-04-08 ENCOUNTER — HOSPITAL ENCOUNTER (OUTPATIENT)
Dept: NON INVASIVE DIAGNOSTICS | Age: 57
Discharge: HOME | End: 2022-04-08
Payer: COMMERCIAL

## 2022-04-08 ENCOUNTER — TELEPHONE (OUTPATIENT)
Dept: CARDIOLOGY CLINIC | Age: 57
End: 2022-04-08

## 2022-04-08 DIAGNOSIS — R06.02 SOB (SHORTNESS OF BREATH): Primary | ICD-10-CM

## 2022-04-08 DIAGNOSIS — Z95.2 S/P AVR: ICD-10-CM

## 2022-04-08 DIAGNOSIS — R06.02 SHORTNESS OF BREATH: ICD-10-CM

## 2022-04-08 LAB
LV EF: 48 %
LVEF MODALITY: NORMAL

## 2022-04-08 PROCEDURE — 93017 CV STRESS TEST TRACING ONLY: CPT | Performed by: NUCLEAR MEDICINE

## 2022-04-08 PROCEDURE — 6360000002 HC RX W HCPCS

## 2022-04-08 PROCEDURE — 78452 HT MUSCLE IMAGE SPECT MULT: CPT

## 2022-04-08 PROCEDURE — A9500 TC99M SESTAMIBI: HCPCS | Performed by: NUCLEAR MEDICINE

## 2022-04-08 PROCEDURE — 3430000000 HC RX DIAGNOSTIC RADIOPHARMACEUTICAL: Performed by: NUCLEAR MEDICINE

## 2022-04-08 PROCEDURE — 93306 TTE W/DOPPLER COMPLETE: CPT

## 2022-04-08 RX ADMIN — Medication 9.3 MILLICURIE: at 09:55

## 2022-04-08 RX ADMIN — Medication 30.1 MILLICURIE: at 11:05

## 2022-04-08 NOTE — TELEPHONE ENCOUNTER
Pt. Stopped by  said he was having horrible leg cramping, goes up into the hip. Swelling in legs by the end of the work day. Noticed that his hands are getting white. Pt. Asked if he could get blood work ordered.

## 2022-04-08 NOTE — TELEPHONE ENCOUNTER
Patient calling in asking if Dr Bertha Sánchez can prescribe him patches to stop smoking? Pharmacy is [facility], please advise.

## 2022-04-11 NOTE — TELEPHONE ENCOUNTER
PFT scheduled for first available appt on 5/02/22 at 7:00 am.     Left message for patient to call for appt details and instructions, appt may be moved if patient wishes. Instructions to be mailed once patient confirms date.

## 2022-04-11 NOTE — TELEPHONE ENCOUNTER
PT returned call and notified. Pt requesting to move forward with Pulmonary referral and PFT. Okay to order?

## 2022-04-11 NOTE — TELEPHONE ENCOUNTER
Pt returned call and notified. Pt asking if any changes based on Friday's testing. Please advise. Stress Summary   This Nuclear Medicine study was negative for ischemia . inferior attenuation   abnormal LV function    Echo Summary   Ejection fraction is visually estimated at 45-50%. There was mild global hypokinesis of the left ventricle. Normally functioning bioprosthetic valve in aortic position. Okay to leave detailed VM on cell phone. Or if before 3:30PM pt would like called at work [phone].

## 2022-04-11 NOTE — TELEPHONE ENCOUNTER
LM on patient's machine stating referral sent to Dr. Mindy Maria office and scheduling will call for an appointment for PFT's.

## 2022-04-11 NOTE — TELEPHONE ENCOUNTER
LM for pt to return call. Please also let pt know a script for Nicotine patch was sent to [facility].

## 2022-04-14 DIAGNOSIS — M47.816 LUMBAR FACET ARTHROPATHY: ICD-10-CM

## 2022-04-14 DIAGNOSIS — M54.16 LUMBAR RADICULOPATHY: ICD-10-CM

## 2022-04-14 DIAGNOSIS — M47.816 LUMBAR SPONDYLOSIS: ICD-10-CM

## 2022-04-14 DIAGNOSIS — M48.062 SPINAL STENOSIS OF LUMBAR REGION WITH NEUROGENIC CLAUDICATION: ICD-10-CM

## 2022-04-14 DIAGNOSIS — M46.1 SACROILIAC INFLAMMATION (HCC): ICD-10-CM

## 2022-04-14 DIAGNOSIS — M54.17 LUMBOSACRAL RADICULITIS: ICD-10-CM

## 2022-04-14 DIAGNOSIS — G89.4 CHRONIC PAIN SYNDROME: ICD-10-CM

## 2022-04-14 RX ORDER — ESOMEPRAZOLE MAGNESIUM 40 MG/1
CAPSULE, DELAYED RELEASE ORAL
Qty: 30 CAPSULE | Refills: 11 | Status: SHIPPED | OUTPATIENT
Start: 2022-04-14

## 2022-04-14 NOTE — TELEPHONE ENCOUNTER
OARRS reviewed. UDS: + for xanax, hydrocodone, gabapentin. Last seen: 3/22/2022.  Follow-up: 6/28/22

## 2022-04-14 NOTE — TELEPHONE ENCOUNTER
Abdelrahman Benedict called requesting a refill on the following medications:  Requested Prescriptions     Pending Prescriptions Disp Refills    HYDROcodone-acetaminophen (NORCO) 5-325 MG per tablet 60 tablet 0     Sig: Take 1 tablet by mouth 2 times daily as needed for Pain for up to 30 days. Take lowest dose possible to manage pain     Pharmacy verified:[facility]  .       Date of last visit: 3-  Date of next visit (if applicable): 3/93/6315

## 2022-04-15 RX ORDER — HYDROCODONE BITARTRATE AND ACETAMINOPHEN 5; 325 MG/1; MG/1
1 TABLET ORAL 2 TIMES DAILY PRN
Qty: 60 TABLET | Refills: 0 | Status: SHIPPED | OUTPATIENT
Start: 2022-04-15 | End: 2022-05-13 | Stop reason: SDUPTHER

## 2022-04-19 ENCOUNTER — TELEPHONE (OUTPATIENT)
Dept: PHYSICAL MEDICINE AND REHAB | Age: 57
End: 2022-04-19

## 2022-04-19 NOTE — TELEPHONE ENCOUNTER
Left second message for patient to return call to confirm PFT date; instructions mailed to patient this date - if date needs changed, patient will need to change date on instructions.

## 2022-04-20 ENCOUNTER — TELEPHONE (OUTPATIENT)
Dept: CARDIOLOGY CLINIC | Age: 57
End: 2022-04-20

## 2022-04-20 NOTE — TELEPHONE ENCOUNTER
Pre op Risk Assessment    Procedure FANTASMA SI RFA  Physician SR neuro   Date of surgery/procedure TBA    Last OV 3/28/22  Last Stress 4/8/22  Last Echo 4/8/22  Last Cath 6/9/16  Last Stent none in chart   Is patient on blood thinners asa  Hold Meds/how many days 5

## 2022-04-21 ENCOUNTER — OFFICE VISIT (OUTPATIENT)
Dept: FAMILY MEDICINE CLINIC | Age: 57
End: 2022-04-21
Payer: COMMERCIAL

## 2022-04-21 ENCOUNTER — HOSPITAL ENCOUNTER (OUTPATIENT)
Age: 57
Discharge: HOME | End: 2022-04-21
Payer: COMMERCIAL

## 2022-04-21 VITALS
RESPIRATION RATE: 12 BRPM | WEIGHT: 188.2 LBS | BODY MASS INDEX: 29.48 KG/M2 | SYSTOLIC BLOOD PRESSURE: 138 MMHG | HEART RATE: 68 BPM | OXYGEN SATURATION: 98 % | DIASTOLIC BLOOD PRESSURE: 70 MMHG

## 2022-04-21 DIAGNOSIS — G89.4 CHRONIC PAIN SYNDROME: ICD-10-CM

## 2022-04-21 DIAGNOSIS — R53.83 FATIGUE, UNSPECIFIED TYPE: ICD-10-CM

## 2022-04-21 DIAGNOSIS — Z79.891 CHRONICALLY ON OPIATE THERAPY: ICD-10-CM

## 2022-04-21 DIAGNOSIS — R11.0 NAUSEA: ICD-10-CM

## 2022-04-21 DIAGNOSIS — R25.2 LEG CRAMPS: ICD-10-CM

## 2022-04-21 DIAGNOSIS — R53.83 FATIGUE, UNSPECIFIED TYPE: Primary | ICD-10-CM

## 2022-04-21 DIAGNOSIS — R42 DIZZINESS: ICD-10-CM

## 2022-04-21 LAB — MAGNESIUM: 2.4 MG/DL (ref 1.6–2.4)

## 2022-04-21 PROCEDURE — 84403 ASSAY OF TOTAL TESTOSTERONE: CPT

## 2022-04-21 PROCEDURE — 99214 OFFICE O/P EST MOD 30 MIN: CPT | Performed by: FAMILY MEDICINE

## 2022-04-21 PROCEDURE — 84270 ASSAY OF SEX HORMONE GLOBUL: CPT

## 2022-04-21 PROCEDURE — 36415 COLL VENOUS BLD VENIPUNCTURE: CPT

## 2022-04-21 PROCEDURE — 84402 ASSAY OF FREE TESTOSTERONE: CPT

## 2022-04-21 PROCEDURE — 83735 ASSAY OF MAGNESIUM: CPT

## 2022-04-21 ASSESSMENT — ENCOUNTER SYMPTOMS
RESPIRATORY NEGATIVE: 1
CONSTIPATION: 0
NAUSEA: 1
BACK PAIN: 1
VOMITING: 0
ABDOMINAL PAIN: 0
DIARRHEA: 0

## 2022-04-21 ASSESSMENT — SOCIAL DETERMINANTS OF HEALTH (SDOH): HOW HARD IS IT FOR YOU TO PAY FOR THE VERY BASICS LIKE FOOD, HOUSING, MEDICAL CARE, AND HEATING?: NOT HARD AT ALL

## 2022-04-21 NOTE — PROGRESS NOTES
Leonidas Snowden (:  1965) is a 59 y.o. male,Established patient, here for evaluation of the following chief complaint(s):  Nausea, Fatigue, Dizziness, and Shortness of Breath (CARDIO made referral to PULM)        Subjective   SUBJECTIVE/OBJECTIVE:  HPI:    Chief Complaint   Patient presents with    Nausea    Fatigue    Dizziness    Shortness of Breath     CARDIO made referral to PULM     Pt c/o nausea, fatigue, dizziness for the last few weeks. No vomiting. No appetite. Weight is actually up 6 lbs. Wt Readings from Last 3 Encounters:   22 188 lb 3.2 oz (85.4 kg)   22 188 lb (85.3 kg)   22 185 lb (83.9 kg)     BMs regular. On iron supplement so stools are dark. Cardio stopped his Lipitor due to leg cramps. Patient Active Problem List   Diagnosis    HTN (hypertension)    Lumbago    Lumbar radiculopathy    Cervicalgia, C5-7    Hyperlipidemia    Hypothyroidism    Chronic anxiety    ED (erectile dysfunction)    Bilateral sciatica    Trigger finger, right, ring     Medication monitoring encounter    Nocturnal leg cramps    GERD (gastroesophageal reflux disease)    IFG (impaired fasting glucose)    Situational depression    Dysthymia (or depressive neurosis)    Herniation of cervical intervertebral disc with radiculopathy    H/O cervical spine surgery, C4-5 fusion, 3/2/16. 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     Tobacco abuse    Lymphomatoid papulosis     Lumbar radiculitis    Lumbar spinal stenosis    Spondylosis of thoracic region without myelopathy or radiculopathy    Thoracic spinal stenosis    Chronic pain syndrome    Urinary hesitancy    Malignant lymphoma, non-Hodgkin's (HCC)    Sacroiliac inflammation ([facility] [address]     Past Surgical History:   Procedure Laterality Date    AORTIC VALVE REPLACEMENT      [facility]    AORTIC VALVE REPLACEMENT  2016 extraction of mechanical valve and replacement with tissue valve    APPENDECTOMY  1980    BACK INJECTION Bilateral 1/3/2022    Bilateral SI injection performed by Melva Atkinson MD at [address] Bilateral 3/14/2022    Bilateral SI MBB # 2 performed by Daniel Herrera DO at [facility]  5 20 2010    Patent coronary arteries. Possible causes of the patient's chest pain is likely noncardiac at least based on this angiogram. Patient chould be able to proceed w/ scheduled surgery w/ paying attention to anticoagulation and trying to minimize the period of no anticoagulation.  CARDIAC CATHETERIZATION  2012, 6/9/16    [facility]    CARDIOVASCULAR STRESS TEST  4 15 2010    Moderate fixed inferior defect, possibly attenuation, cannot exclude a previous MI. Correlation w/ EKG is recommented. Significant degree of gut uptake which is likely to be the cause of the attenuation artifact seen.  EF 46%    CERVICAL FUSION  2010    CERVICAL FUSION  03/09/2016    REMOVAL HARDWARE C5-7, ACDF C4-6 WITH ATLANTIS CORNERSTONE    COLONOSCOPY  2010    CORONARY ARTERY BYPASS GRAFT      DILATATION, ESOPHAGUS      small resection    ENDOSCOPY, COLON, DIAGNOSTIC      NERVE SURGERY Bilateral 07/25/2017    THORACIC FACET BLOCK T7-8, T8-9, T11-12    OTHER SURGICAL HISTORY  05/02/2017    LESI L5    OTHER SURGICAL HISTORY  09/12/2017    thoracic epidural T11    LA INJ,PARAVERTEBRAL L/S,1 LEVEL Bilateral 7/25/2017    T-FACET MBB T7-8, T8-9, T11-12 BILATERAL performed by Melva Atkinson MD at [address] [facility] DX/THER SBST [address] LUMBAR/SACRAL N/A 9/12/2017    TESI T11 performed by Melva Atkinson MD at [address] [facility] [address]  2010    [address] EXTRACTION  02/2019    UPPER GASTROINTESTINAL ENDOSCOPY  2010     Social History     Tobacco Use    Smoking status: Current Every Day TOPICALLY TO RIGHT INDEX FINGER 3 TIMES DAILY 6/15/18  Yes Historical Provider, MD   Cholecalciferol (VITAMIN D3) 1000 units TABS Take 2,000 Units by mouth Daily   Yes Historical Provider, MD    MG capsule TAKE 1 CAPSULE BY MOUTH ONE TIME A DAY AS NEEDED for constipation 3/28/18  Yes Maggie Del Real MD   IRON PO Take 65 mg by mouth daily    Yes Historical Provider, MD   Multiple Vitamin (MULTIVITAMIN PO) Take 1 tablet by mouth daily    Yes Historical Provider, MD   gabapentin (NEURONTIN) 300 MG capsule TAKE 1 CAPSULE BY MOUTH THREE TIMES A DAY 4/15/21 3/28/22  Flory Garcia DO       Review of Systems   Constitutional: Positive for appetite change and fatigue. Negative for unexpected weight change. HENT: Negative. Respiratory: Negative. Cardiovascular: Negative. Gastrointestinal: Positive for nausea. Negative for abdominal pain, constipation, diarrhea and vomiting. Musculoskeletal: Positive for back pain. Neurological: Positive for dizziness. All other systems reviewed and are negative. Objective   Physical Exam  Vitals and nursing note reviewed. Constitutional:       General: He is not in acute distress. Appearance: Normal appearance. He is well-developed. HENT:      Head: Normocephalic and atraumatic. Right Ear: Tympanic membrane normal.      Left Ear: Tympanic membrane normal.   Eyes:      Conjunctiva/sclera: Conjunctivae normal.   Cardiovascular:      Rate and Rhythm: Normal rate and regular rhythm. Heart sounds: Normal heart sounds. No murmur heard. Pulmonary:      Effort: Pulmonary effort is normal.      Breath sounds: Normal breath sounds. No wheezing, rhonchi or rales. Abdominal:      General: There is no distension. Musculoskeletal:      Cervical back: Neck supple. Skin:     General: Skin is warm and dry. Findings: No rash (on exposed surfaces). Neurological:      General: No focal deficit present. Mental Status: He is alert. Psychiatric:         Attention and Perception: Attention normal.         Mood and Affect: Mood normal.         Speech: Speech normal.         Behavior: Behavior normal. Behavior is cooperative. Thought Content: Thought content normal.         Judgment: Judgment normal.               ASSESSMENT/PLAN:  1. Fatigue, unspecified type  -     Testosterone, free, total; Future  2. Nausea  3. Dizziness  4. Chronically on opiate therapy  -     Testosterone, free, total; Future  5. Chronic pain syndrome  6. Leg cramps  -     Magnesium; Future    -  Multiple vague symptoms today that I feel are related to polypharmacy  -  Recently had metoprolol and gabapentin increased in addition to chronic opiate therapy  -  Recent labs all look ok other than mild anemia  -  Check labs above, will call    Return if symptoms worsen or fail to improve. An electronic signature was used to authenticate this note.     --Mary Lenz, DO

## 2022-04-21 NOTE — PATIENT INSTRUCTIONS
You may receive a survey about your visit with us today. The feedback from our patients helps us identify what is working well and where the service to all patients can be enhanced. Thank you! Tobacco Cessation Programs     Telephonic behavior modification  Florina Garza ([phone])   Counseling service for those who are ready to quit using tobacco.     Available for uninsured [address] [place] [address] Dr residents, [place] recipients, pregnant women, or patients whose health plans or employers are members of the [address] [place] [address] behavior modification   http://[place]. Quitlogix. org   Online support program to help patients through each step of the quitting process. Available 24 hours a day 7 days a week. Provides up to date researched based tool, step-by-step guides, and motivational messages. Online behavior modification   www.lungusa.org/stop-smoking/how-to-quit   HelpLine: 1-800-LUNG-[place] (709-6241)   Email questions to:  Geo@Impinj. "[employer]    Website offers resources to help tobacco users figure out their reasons for quitting and then take the big step of quitting for good. Hypnosis   Location: [facility], [address] [place], [place]   Contact: Dominguez Tijerina, PhD at [phone]   Hypnosis for tobacco cessation   Cost $225 for the initial session and $175 for each session afterwards. Most patients require 6-8 sessions. There is the option to submit through the patients insurance. Hypnosis and behavior modification   Location: [place] [address],  [facility] [address] [place], [place]   Contact: Brenda Bella, PhD at [phone]  Steven Loser Counseling and hypnosis for nicotine addition   Cost: For uninsured patients:  Please call above phone number  Cost for insured patients depends on patients insurance plan.     Behavior modification   Location: [facility], [address] [facility] [place], [address]: [address] four one-on-one appointments between the patient and a respiratory therapist.  The four appointments span over three weeks. The respiratory therapist schedules one of the appointments to occur 48 hours after the patients quit date.  Cost $100 total for the four sessions.   Tobacco cessation products are not included in the cost and are not provided by [facility].

## 2022-04-23 LAB — TESTOSTERONE FREE: NORMAL

## 2022-04-25 ENCOUNTER — TELEPHONE (OUTPATIENT)
Dept: PHYSICAL MEDICINE AND REHAB | Age: 57
End: 2022-04-25

## 2022-05-02 ENCOUNTER — HOSPITAL ENCOUNTER (OUTPATIENT)
Dept: PULMONOLOGY | Age: 57
Discharge: HOME | End: 2022-05-02
Payer: COMMERCIAL

## 2022-05-02 DIAGNOSIS — R06.02 SOB (SHORTNESS OF BREATH): ICD-10-CM

## 2022-05-02 PROCEDURE — 94726 PLETHYSMOGRAPHY LUNG VOLUMES: CPT

## 2022-05-02 PROCEDURE — 94060 EVALUATION OF WHEEZING: CPT

## 2022-05-02 PROCEDURE — 94729 DIFFUSING CAPACITY: CPT

## 2022-05-03 ENCOUNTER — TELEPHONE (OUTPATIENT)
Dept: CARDIOLOGY CLINIC | Age: 57
End: 2022-05-03

## 2022-05-03 DIAGNOSIS — R06.02 SOB (SHORTNESS OF BREATH): ICD-10-CM

## 2022-05-03 DIAGNOSIS — R94.2 ABNORMAL PFTS: Primary | ICD-10-CM

## 2022-05-03 NOTE — TELEPHONE ENCOUNTER
Please see PFT results: Next appt 3/24/23    Pulmonary Function Diagnosis:  Spirometry: Spirometry and shape of flow volume loop i.e scooping of expiratory limb are consistent with peripheral obstructive defect. However, pseudonormalization of FEV1/FVC ratio noted due to decreased FVC. Lung Volumes: Lung Volumes are consistent with moderate restrictive defect. Diffusion: Moderately decreased diffusion capacity.     ««This interpretation has been electronically signed: Donna De Leon 05/02/2022 01:52:07 PM»»

## 2022-05-09 ENCOUNTER — PREP FOR PROCEDURE (OUTPATIENT)
Dept: PHYSICAL MEDICINE AND REHAB | Age: 57
End: 2022-05-09

## 2022-05-13 DIAGNOSIS — G89.4 CHRONIC PAIN SYNDROME: ICD-10-CM

## 2022-05-13 DIAGNOSIS — M47.816 LUMBAR FACET ARTHROPATHY: ICD-10-CM

## 2022-05-13 DIAGNOSIS — M54.17 LUMBOSACRAL RADICULITIS: ICD-10-CM

## 2022-05-13 DIAGNOSIS — M48.062 SPINAL STENOSIS OF LUMBAR REGION WITH NEUROGENIC CLAUDICATION: ICD-10-CM

## 2022-05-13 DIAGNOSIS — M47.816 LUMBAR SPONDYLOSIS: ICD-10-CM

## 2022-05-13 DIAGNOSIS — M46.1 SACROILIAC INFLAMMATION (HCC): ICD-10-CM

## 2022-05-13 DIAGNOSIS — M54.16 LUMBAR RADICULOPATHY: ICD-10-CM

## 2022-05-13 RX ORDER — HYDROCODONE BITARTRATE AND ACETAMINOPHEN 5; 325 MG/1; MG/1
1 TABLET ORAL 2 TIMES DAILY PRN
Qty: 60 TABLET | Refills: 0 | Status: SHIPPED | OUTPATIENT
Start: 2022-05-15 | End: 2022-06-16 | Stop reason: SDUPTHER

## 2022-05-13 NOTE — TELEPHONE ENCOUNTER
OARRS reviewed. UDS: + for  Gabapentin, Alprazolam, Hydrocodone -consistent. Last seen: 3/22/2022.  Follow-up: 6/2/2022

## 2022-05-13 NOTE — TELEPHONE ENCOUNTER
Shemar Roles called requesting a refill on the following medications:  Requested Prescriptions     Pending Prescriptions Disp Refills    HYDROcodone-acetaminophen (NORCO) 5-325 MG per tablet 60 tablet 0     Sig: Take 1 tablet by mouth 2 times daily as needed for Pain for up to 30 days. Take lowest dose possible to manage pain     [facility] verified[facility]   . pv      Date of last visit:   Date of next visit (if applicable): 8/4/0493

## 2022-05-16 DIAGNOSIS — E03.9 ACQUIRED HYPOTHYROIDISM: ICD-10-CM

## 2022-05-16 RX ORDER — LEVOTHYROXINE SODIUM 0.07 MG/1
TABLET ORAL
Qty: 90 TABLET | Refills: 0 | Status: SHIPPED | OUTPATIENT
Start: 2022-05-16 | End: 2022-08-24

## 2022-05-17 ENCOUNTER — APPOINTMENT (OUTPATIENT)
Dept: GENERAL RADIOLOGY | Age: 57
End: 2022-05-17
Attending: PAIN MEDICINE
Payer: COMMERCIAL

## 2022-05-17 ENCOUNTER — ANESTHESIA EVENT (OUTPATIENT)
Dept: OPERATING ROOM | Age: 57
End: 2022-05-17
Payer: COMMERCIAL

## 2022-05-17 ENCOUNTER — ANESTHESIA (OUTPATIENT)
Dept: OPERATING ROOM | Age: 57
End: 2022-05-17
Payer: COMMERCIAL

## 2022-05-17 ENCOUNTER — HOSPITAL ENCOUNTER (OUTPATIENT)
Age: 57
Setting detail: OUTPATIENT SURGERY
Discharge: HOME | End: 2022-05-17
Attending: PAIN MEDICINE | Admitting: PAIN MEDICINE
Payer: COMMERCIAL

## 2022-05-17 VITALS
OXYGEN SATURATION: 95 % | RESPIRATION RATE: 16 BRPM | HEART RATE: 58 BPM | BODY MASS INDEX: 29.03 KG/M2 | WEIGHT: 185 LBS | SYSTOLIC BLOOD PRESSURE: 131 MMHG | TEMPERATURE: 97.9 F | HEIGHT: 67 IN | DIASTOLIC BLOOD PRESSURE: 61 MMHG

## 2022-05-17 PROCEDURE — 3600000056 HC PAIN LEVEL 4 BASE: Performed by: PAIN MEDICINE

## 2022-05-17 PROCEDURE — 7100000011 HC PHASE II RECOVERY - ADDTL 15 MIN: Performed by: PAIN MEDICINE

## 2022-05-17 PROCEDURE — 64625 RF ABLTJ NRV NRVTG SI JT: CPT | Performed by: PAIN MEDICINE

## 2022-05-17 PROCEDURE — 2720000010 HC SURG SUPPLY STERILE: Performed by: PAIN MEDICINE

## 2022-05-17 PROCEDURE — 3700000001 HC ADD 15 MINUTES (ANESTHESIA): Performed by: PAIN MEDICINE

## 2022-05-17 PROCEDURE — 3600000057 HC PAIN LEVEL 4 ADDL 15 MIN: Performed by: PAIN MEDICINE

## 2022-05-17 PROCEDURE — 3700000000 HC ANESTHESIA ATTENDED CARE: Performed by: PAIN MEDICINE

## 2022-05-17 PROCEDURE — 2709999900 HC NON-CHARGEABLE SUPPLY: Performed by: PAIN MEDICINE

## 2022-05-17 PROCEDURE — 64640 INJECTION TREATMENT OF NERVE: CPT | Performed by: PAIN MEDICINE

## 2022-05-17 PROCEDURE — 3209999900 FLUORO FOR SURGICAL PROCEDURES

## 2022-05-17 PROCEDURE — 6360000002 HC RX W HCPCS: Performed by: NURSE ANESTHETIST, CERTIFIED REGISTERED

## 2022-05-17 PROCEDURE — 64635 DESTROY LUMB/SAC FACET JNT: CPT | Performed by: PAIN MEDICINE

## 2022-05-17 PROCEDURE — 2500000003 HC RX 250 WO HCPCS: Performed by: PAIN MEDICINE

## 2022-05-17 PROCEDURE — 7100000010 HC PHASE II RECOVERY - FIRST 15 MIN: Performed by: PAIN MEDICINE

## 2022-05-17 RX ORDER — LIDOCAINE HYDROCHLORIDE 10 MG/ML
INJECTION, SOLUTION EPIDURAL; INFILTRATION; INTRACAUDAL; PERINEURAL PRN
Status: DISCONTINUED | OUTPATIENT
Start: 2022-05-17 | End: 2022-05-17 | Stop reason: ALTCHOICE

## 2022-05-17 RX ORDER — FENTANYL CITRATE 50 UG/ML
INJECTION, SOLUTION INTRAMUSCULAR; INTRAVENOUS PRN
Status: DISCONTINUED | OUTPATIENT
Start: 2022-05-17 | End: 2022-05-17 | Stop reason: SDUPTHER

## 2022-05-17 RX ORDER — PROPOFOL 10 MG/ML
INJECTION, EMULSION INTRAVENOUS PRN
Status: DISCONTINUED | OUTPATIENT
Start: 2022-05-17 | End: 2022-05-17 | Stop reason: SDUPTHER

## 2022-05-17 RX ORDER — BUPIVACAINE HYDROCHLORIDE 2.5 MG/ML
INJECTION, SOLUTION EPIDURAL; INFILTRATION; INTRACAUDAL PRN
Status: DISCONTINUED | OUTPATIENT
Start: 2022-05-17 | End: 2022-05-17 | Stop reason: ALTCHOICE

## 2022-05-17 RX ADMIN — FENTANYL CITRATE 50 MCG: 50 INJECTION, SOLUTION INTRAMUSCULAR; INTRAVENOUS at 14:54

## 2022-05-17 RX ADMIN — PROPOFOL 30 MG: 10 INJECTION, EMULSION INTRAVENOUS at 15:04

## 2022-05-17 RX ADMIN — FENTANYL CITRATE 50 MCG: 50 INJECTION, SOLUTION INTRAMUSCULAR; INTRAVENOUS at 14:57

## 2022-05-17 RX ADMIN — PROPOFOL 30 MG: 10 INJECTION, EMULSION INTRAVENOUS at 14:57

## 2022-05-17 RX ADMIN — PROPOFOL 30 MG: 10 INJECTION, EMULSION INTRAVENOUS at 15:11

## 2022-05-17 RX ADMIN — PROPOFOL 30 MG: 10 INJECTION, EMULSION INTRAVENOUS at 14:55

## 2022-05-17 ASSESSMENT — PAIN - FUNCTIONAL ASSESSMENT
PAIN_FUNCTIONAL_ASSESSMENT: PREVENTS OR INTERFERES SOME ACTIVE ACTIVITIES AND ADLS
PAIN_FUNCTIONAL_ASSESSMENT: 0-10

## 2022-05-17 ASSESSMENT — PAIN DESCRIPTION - FREQUENCY: FREQUENCY: CONTINUOUS

## 2022-05-17 ASSESSMENT — PAIN DESCRIPTION - ORIENTATION: ORIENTATION: LEFT

## 2022-05-17 ASSESSMENT — LIFESTYLE VARIABLES: SMOKING_STATUS: 1

## 2022-05-17 ASSESSMENT — PAIN DESCRIPTION - DESCRIPTORS: DESCRIPTORS: ACHING

## 2022-05-17 ASSESSMENT — PAIN SCALES - GENERAL: PAINLEVEL_OUTOF10: 4

## 2022-05-17 ASSESSMENT — PAIN DESCRIPTION - ONSET: ONSET: ON-GOING

## 2022-05-17 ASSESSMENT — PAIN DESCRIPTION - LOCATION: LOCATION: HIP

## 2022-05-17 ASSESSMENT — PAIN DESCRIPTION - PAIN TYPE: TYPE: CHRONIC PAIN

## 2022-05-17 NOTE — OP NOTE
Patient Name: Kathie Reyna  YOB: 1965  Medical Record Number: [id number]  Date: 5/17/22     Indication: Lower back and SI pain   Consent: On file. Vital Signs:   @     Past Medical History:    has a past medical history of Arthritis, Bilateral sciatica, Bleeding, CAD (coronary artery disease), Carpal tunnel syndrome, Cervicalgia, C5-7, Chronic anxiety, Dysthymia (or depressive neurosis), ED (erectile dysfunction), ED (erectile dysfunction), GERD (gastroesophageal reflux disease), Headache(784.0), History of blood transfusion, HTN (hypertension), Hyperlipidemia, Hypothyroidism, Iron deficiency anemia, blood loss, Lumbago, Lumbar radiculopathy, Osteoarthritis (arthritis due to wear and tear of joints), S/P AVR, coumadin Tx, and Spondylosis of thoracic region without myelopathy or radiculopathy. Past Surgical History:   has a past surgical history that includes cardiovascular stress test (4 15 2010); Appendectomy (1980); Colonoscopy (2010); Small intestine surgery (2010); Upper gastrointestinal endoscopy (2010); Aortic valve replacement (2007); Endoscopy, colon, diagnostic; cervical fusion (2010); cervical fusion (03/09/2016); Cardiac catheterization (5 20 2010); Cardiac catheterization (2012, 6/9/16); Dilatation, esophagus; Aortic valve replacement (07/20/2016); Coronary artery bypass graft; other surgical history (05/02/2017); Nerve Surgery (Bilateral, 07/25/2017); pr inj,paravertebral l/s,1 level (Bilateral, 7/25/2017); other surgical history (09/12/2017); pr njx dx/ther sbst epidural/subarach lumbar/sacral (N/A, 9/12/2017); Tooth Extraction (02/2019); Back Injection (Bilateral, 1/3/2022); and Back Injection (Bilateral, 3/14/2022). Pre-Sedation Documentation and Exam:   Vital signs have been reviewed (see flow sheet for vitals).       Sedation/ Anesthesia Plan:   MAC     Patient is an appropriate candidate for plan of sedation: yes     Preoperative Diagnosis: Sacroiliac Inflammation    Post-Op Dx: as above     Procedure Performed: Left Radiofrequency ablation of Dorsal Ramus of L5, median branch of L4 and lateral branches at  S1,S2,S3 the levels of SI  under fluoroscopic guidance      Indication for the Procedure: The patient has ahistory of chronic left SI pain that is not responding well to the conservative treatment. Pain is interfering with the activities of daily living. Patient had undergone SI joint injections with pain relief that lasted for only a short period of time and had greater than 70% pain relief with confirmatory . Hence we decided to do radiofrequency abalation for long term pain relief. The procedure and risks were discussed with the patient and an informed consent was obtained. Procedure: Left  The patient was brought into the OR and carefully assisted into the prone position on the table and allowed to adjust to a position of comfort. A grounding pad was placed on the right thigh. The low back and buttocks were widely prepped with chloroprep solution, allowed to air dry and draped in the standard sterile surgical fashion. Local anesthesia was provided by 10 ml of 1% Xylocaine delivered with a 25 g needle. PROCEDURE #1: Radiofrequency Ablation of Doral Ramus of L5, medical branch of L4. A 17g 75mm radiofrequency introducer needle was placed to the planned anatomic target, guided with intermittent fluoroscopy with a perpendicular approach, to terminally place at the left sacral ala and the superior articular process at the transverse process for the left L4 medial branch nerve. The stylets were removed and the radiofrequency probes with a 4 mm active tip were then inserted. Needle tip position of the probes were verified in the AP, oblique and lateral views. At each site, the medical branch nerve was stimulated at [facility] to a maximum of 1-2 volts determined to finalize safe needle and electrode placement.   The patient was awake and responsive during this portion of the procedure. Each target was anesthetized with 1ml of 0.25% marcaine anesthesia for lesioning and then each target was lesioned at 80 degrees Celsius for 2 minutes and 30 seconds. Tissue impedance were noted to be between 250 and 500 Ohms. PROCEDURE #2: Radiofrequency Ablation of S1, S2, S3 Lateral Branches. Using the AP fluoroscopic view for visualization of the lateral PSFA as defined by the pre-placed 27-gauge Quincke needles, appropriate skin starting positions were defined. Using the PSFA as a \"clock-face\", the positions were:     S1:left = 1 o'clock, 3 o'clock, and 5 o'clock. S2:left = 1 o'clock, 3 o'clock, and 5 o'clock. S3: left = 1 o'clock and 5 o'clock. Using fluoroscopic guidance, a 17g introducer needle was inserted Sequentially into the target positions described above until the introducer tip touched the bony surface of the sacrum. The stylet was withdrawn from the introducer and the radiofrequency probe with a 4 mm active tip was fully inserted into the introducer. A lateral view was obtained for standard reference. At each of the targets, needle placement was verified with the use of the multi-planar fluoroscopy. The needle tip position was approximately 7-10 mm lateral to the PSFA as determined by using the Epsilon ruler. At each site, the lateral branch nerve was stimulated at 2Hz to a maximum of 1-2 volts determined to finalize safe needle and electrode placement. The patient was awake and responsive during this portion of the procedure. Each electrode was anesthetized with 1-2 ml of 1% Xylocaine anesthesia for lesioning and then each target was lesioned at 80 degrees Celsius for 2 minutes and 30 seconds. Tissues impedance were noted to be between 250-500 Ohms.   At the conclusion of the lesioning the needles were removed and bandages placed over the needle placement sites and the patient returned to the supine position on a stretcher and transported to the recovery room without hemodynamic, neurologic, or allergic reactions. EBL-0  Patient's vital signs and neurological status remained stable throughout the procedure and post procedural period. The patient tolerated the procedure well and was discharged home in stable condition.       Electronically signed by Abner Berg MD on 5/17/22 at 2:46 PM EDT

## 2022-05-17 NOTE — ANESTHESIA POSTPROCEDURE EVALUATION
Department of Anesthesiology  Postprocedure Note    Patient: Lisa Krabbe  MRN: [id number]  YOB: 1965  Date of evaluation: 5/17/2022  Time:  3:24 PM     Procedure Summary     Date: 05/17/22 Room / Location: [address] [place] [address] 03 / 138 [facility]    Anesthesia Start: 7430 Anesthesia Stop: 1518    Procedure: SI RFA, LEFT SIDE FIRST (Left ) Diagnosis: (Sacroiliac inflammation)    Surgeons: Ariela Nieves MD Responsible Provider: Birdie Hernandez MD    Anesthesia Type: MAC ASA Status: 3          Anesthesia Type: No value filed. Anna Phase I:      Anna Phase II:      Last vitals: Reviewed and per EMR flowsheets. Anesthesia Post Evaluation    Patient location during evaluation: bedside  Patient participation: complete - patient cannot participate  Level of consciousness: awake and alert  Airway patency: patent  Nausea & Vomiting: no nausea and no vomiting  Complications: no  Cardiovascular status: hemodynamically stable  Respiratory status: acceptable  Hydration status: euvolemic      [facility]  POST-ANESTHESIA NOTE       Name:  Lisa Krabbe                                         Age:  59 y.o.   MRN:  [id number]      Last Vitals:  BP (!) 140/76   Pulse 56   Temp 97 °F (36.1 °C) (Temporal)   Resp 16   Ht 5' 7\" (1.702 m)   Wt 185 lb (83.9 kg)   SpO2 98%   BMI 28.98 kg/m²   Patient Vitals for the past 4 hrs:   BP Temp Temp src Pulse Resp SpO2 Height Weight   05/17/22 1447 (!) 140/76 97 °F (36.1 °C) Temporal 56 16 98 % 5' 7\" (1.702 m) 185 lb (83.9 kg)       Level of Consciousness:  Awake    Respiratory:  Stable    Oxygen Saturation:  Stable    Cardiovascular:  Stable    Hydration:  Adequate    PONV:  Stable    Post-op Pain:  Adequate analgesia    Post-op Assessment:  No apparent anesthetic complications    Additional Follow-Up / Treatment / Comment:  None    Elana Quinonez MD  May 17, 2022   3:24 PM

## 2022-05-17 NOTE — ANESTHESIA PRE PROCEDURE
Department of Anesthesiology  Preprocedure Note       Name:  Gregary Klinefelter   Age:  59 y.o.  :  1965                                          MRN:  [id number]         Date:  2022      Surgeon: Amado Alaniz):  Little Ruffin MD    Procedure: Procedure(s):  SI RFA, LEFT SIDE FIRST    Medications prior to admission:   Prior to Admission medications    Medication Sig Start Date End Date Taking? Authorizing Provider   levothyroxine (SYNTHROID) 75 MCG tablet TAKE 1 TABLET BY MOUTH EVERY DAY 5/16/22   Hall Flash, DO   HYDROcodone-acetaminophen [facility]) 5-325 MG per tablet Take 1 tablet by mouth 2 times daily as needed for Pain for up to 30 days.  Take lowest dose possible to manage pain 5/15/22 6/14/22  JASWINDER Moss CNP   esomeprazole ([address]) 40 MG delayed release capsule TAKE 1 CAPSULE BY MOUTH EVERY DAY IN THE MORNING BEFORE BREAKFAST 22   Richard Muller MD   nicotine (NICODERM CQ) 7 MG/24HR Place 1 patch onto the skin every 24 hours 22   Richard Muller MD   metoprolol succinate (TOPROL XL) 50 MG extended release tablet Take 1 tablet by mouth daily 3/28/22   Richard Muller MD   gabapentin (NEURONTIN) 400 MG capsule TAKE 1 CAPSULE BY MOUTH THREE TIMES A DAY 3/8/22 6/6/22  Hall Flash, DO   ibuprofen (ADVIL;MOTRIN) 400 MG tablet Take 400 mg by mouth every 6 hours as needed for Pain  Patient not taking: Reported on 2022    Historical Provider, MD   lidocaine viscous hcl (XYLOCAINE) 2 % SOLN solution Take 15 mLs by mouth as needed for Irritation 21   JASWINDER Negron CNP   gabapentin (NEURONTIN) 300 MG capsule TAKE 1 CAPSULE BY MOUTH THREE TIMES A DAY 4/15/21 3/28/22  Hall Flash, DO   aspirin 81 MG EC tablet Take 1 tablet by mouth daily 21   Richard Muller MD   buPROPion [facility]) 100 MG tablet Take 1 tablet by mouth 2 times daily  Patient not taking: Reported on 2022 10/8/20   Wade Eng MD   indomethacin (INDOCIN) 25 MG capsule Take 1 capsule by mouth 3 times daily (with meals) Take 1 tablet daily for 10 days. Patient not taking: Reported on 5/17/2022 10/8/20   Blake Riley MD   clobetasol (TEMOVATE) 0.05 % cream APPLY TOPICALLY TO RIGHT INDEX FINGER 3 TIMES DAILY  Patient not taking: Reported on 5/17/2022 6/15/18   Historical Provider, MD   Cholecalciferol (VITAMIN D3) 1000 units TABS Take 2,000 Units by mouth Daily  Patient not taking: Reported on 5/17/2022    Historical Provider, MD    MG capsule TAKE 1 CAPSULE BY MOUTH ONE TIME A DAY AS NEEDED for constipation  Patient not taking: Reported on 5/17/2022 3/28/18   Blake Riley MD   IRON PO Take 65 mg by mouth daily     Historical Provider, MD   Multiple Vitamin (MULTIVITAMIN PO) Take 1 tablet by mouth daily     Historical Provider, MD       Current medications:    No current outpatient medications on file. No current facility-administered medications for this visit. Allergies:  No Known Allergies    Problem List:    Patient Active Problem List   Diagnosis Code    HTN (hypertension) I10    Lumbago M54.50    Lumbar radiculopathy M54.16    Cervicalgia, C5-7 M54.2    Hyperlipidemia E78.5    Hypothyroidism E03.9    Chronic anxiety F41.9    ED (erectile dysfunction) N52.9    Bilateral sciatica M54.31, M54.32    Trigger finger, right, ring  M65.30    Medication monitoring encounter Z51.81    Nocturnal leg cramps G47.62    GERD (gastroesophageal reflux disease) K21.9    IFG (impaired fasting glucose) R73.01    Situational depression F43.21    Dysthymia (or depressive neurosis) F34.1    Herniation of cervical intervertebral disc with radiculopathy M50.10    H/O cervical spine surgery, C4-5 fusion, 3/2/16. R25.354    Gastrointestinal hemorrhage associated with gastrojejunal ulcer K28.4    S/P AVR (aortic valve replacement), 7/20/16.  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    Lumbar radiculitis M54.16    Lumbar spinal stenosis M48.061    Spondylosis of thoracic region without myelopathy or radiculopathy M47.814    Thoracic spinal stenosis M48.04    Chronic pain syndrome G89.4    Urinary hesitancy R39.11    Malignant lymphoma, non-Hodgkin's (HCC) C85.90    Sacroiliac inflammation (HCC) M46.1       Past Medical History:        Diagnosis Date    Arthritis     general    Bilateral sciatica     Bleeding     with coumadin    CAD (coronary artery disease)     Carpal tunnel syndrome     RIGHT    Cervicalgia, C5-7     Chronic anxiety     Dysthymia (or depressive neurosis) 2/1/2016    ED (erectile dysfunction)     ED (erectile dysfunction)     GERD (gastroesophageal reflux disease)     Headache(784.0)     History of blood transfusion 03/2016    HTN (hypertension)     Hyperlipidemia     Hypothyroidism     Iron deficiency anemia, blood loss     Lumbago     Lumbar radiculopathy     Osteoarthritis (arthritis due to wear and tear of joints)     S/P AVR, coumadin Tx     Spondylosis of thoracic region without myelopathy or radiculopathy        Past Surgical History:        Procedure Laterality Date    AORTIC VALVE REPLACEMENT  2007    [facility]    AORTIC VALVE REPLACEMENT  07/20/2016    extraction of mechanical valve and replacement with tissue valve    APPENDECTOMY  1980    BACK INJECTION Bilateral 1/3/2022    Bilateral SI injection performed by Elva Pop MD at [address] Bilateral 3/14/2022    Bilateral SI MBB # 2 performed by Darwin Guzmán DO at [facility]  5 20 2010    Patent coronary arteries. Possible causes of the patient's chest pain is likely noncardiac at least based on this angiogram. Patient chould be able to proceed w/ scheduled surgery w/ paying attention to anticoagulation and trying to minimize the period of no anticoagulation.     [facility] CARDIAC CATHETERIZATION  2012, 6/9/16    [facility]    CARDIOVASCULAR STRESS TEST  4 15 2010    Moderate fixed inferior defect, possibly attenuation, cannot exclude a previous MI. Correlation w/ EKG is recommented. Significant degree of gut uptake which is likely to be the cause of the attenuation artifact seen. EF 46%    CERVICAL FUSION  2010    CERVICAL FUSION  03/09/2016    REMOVAL HARDWARE C5-7, ACDF C4-6 WITH ATLANTIS CORNERSTONE    COLONOSCOPY  2010    CORONARY ARTERY BYPASS GRAFT      DILATATION, ESOPHAGUS      small resection    ENDOSCOPY, COLON, DIAGNOSTIC      NERVE SURGERY Bilateral 07/25/2017    THORACIC FACET BLOCK T7-8, T8-9, T11-12    OTHER SURGICAL HISTORY  05/02/2017    LESI L5    OTHER SURGICAL HISTORY  09/12/2017    thoracic epidural T11    DE INJ,PARAVERTEBRAL L/S,1 LEVEL Bilateral 7/25/2017    T-FACET MBB T7-8, T8-9, T11-12 BILATERAL performed by Jared Cornejo MD at [address] [facility] DX/THER [address] LUMBAR/SACRAL N/A 9/12/2017    TESI T11 performed by Jared Cornejo MD at [address] [facility] [address]  2010    [address] EXTRACTION  02/2019    UPPER GASTROINTESTINAL ENDOSCOPY  2010       Social History:    Social History     Tobacco Use    Smoking status: Current Every Day Smoker     Packs/day: 0.25     Years: 25.00     Pack years: 6.25     Types: Cigarettes, Cigars     Start date: 6/9/1978    Smokeless tobacco: Never Used   Substance Use Topics    Alcohol use: Yes     Alcohol/week: 0.0 standard drinks     Comment: not for 3 mo                                Ready to quit: Not Answered  Counseling given: Not Answered      Vital Signs (Current): There were no vitals filed for this visit.                                            BP Readings from Last 3 Encounters:   05/17/22 (!) 140/76   04/21/22 138/70   03/28/22 (!) 164/74       NPO Status:                                                                                 BMI:   Wt Readings from Last 3 Encounters: 05/17/22 185 lb (83.9 kg)   04/21/22 188 lb 3.2 oz (85.4 kg)   03/28/22 188 lb (85.3 kg)     There is no height or weight on file to calculate BMI.    CBC:   Lab Results   Component Value Date    WBC 11.1 03/28/2022    RBC 4.10 03/28/2022    RBC 4.40 05/18/2012    HGB 12.5 03/28/2022    HCT 38.6 03/28/2022    MCV 94.1 03/28/2022    RDW 16.1 08/17/2017     03/28/2022       CMP:   Lab Results   Component Value Date     01/06/2022    K 5.0 01/06/2022     01/06/2022    CO2 25 01/06/2022    BUN 21 03/28/2022    CREATININE 0.7 03/28/2022    LABGLOM >90 03/28/2022    GLUCOSE 99 01/06/2022    PROT 6.8 01/06/2022    CALCIUM 9.6 01/06/2022    BILITOT 0.3 01/06/2022    ALKPHOS 83 01/06/2022    AST 18 03/28/2022    ALT 15 03/28/2022       POC Tests: No results for input(s): POCGLU, POCNA, POCK, POCCL, POCBUN, POCHEMO, POCHCT in the last 72 hours. Coags:   Lab Results   Component Value Date    PROTIME 2.95 12/02/2011    INR 1.00 07/19/2016    APTT 54.3 07/19/2016       HCG (If Applicable): No results found for: PREGTESTUR, PREGSERUM, HCG, HCGQUANT     ABGs: No results found for: PHART, PO2ART, CTW4LOR, THR3FYP, BEART, W6EMNCQE     Type & Screen (If Applicable):  Lab Results   Component Value Date    LABRH POS 07/25/2016       Drug/Infectious Status (If Applicable):  Lab Results   Component Value Date    HEPCAB Negative 06/22/2017       COVID-19 Screening (If Applicable):   Lab Results   Component Value Date    COVID19 NOT  DETECTED 01/06/2022           Anesthesia Evaluation  Patient summary reviewed and Nursing notes reviewed no history of anesthetic complications:   Airway: Mallampati: II  TM distance: >3 FB   Neck ROM: full  Mouth opening: > = 3 FB Dental:          Pulmonary:normal exam  breath sounds clear to auscultation  (+) current smoker          Patient did not smoke on day of surgery.                  Cardiovascular:  Exercise tolerance: good (>4 METS),   (+) hypertension:, valvular problems/murmurs (s/p AVR): AS, CAD:, murmur,         Rhythm: regular  Rate: normal                    Neuro/Psych:   (+) neuromuscular disease:, headaches:, psychiatric history:            GI/Hepatic/Renal:   (+) GERD: poorly controlled, PUD,           Endo/Other:    (+) hypothyroidism: arthritis:., .          Pt had no PAT visit       Abdominal:             Vascular: negative vascular ROS. Other Findings:               Anesthesia Plan      MAC     ASA 3       Induction: intravenous. MIPS: prophylactic pharmacologic antiemetic agents not administered perioperatively for documented reasons. Anesthetic plan and risks discussed with patient.       Plan discussed with CRNA and surgical team.                  Gretta Anne MD   5/17/2022

## 2022-05-17 NOTE — H&P
H&P    Bilateral SI MBB # 2 completed by Dr. Adi Alexandra from 3/14/2022. Patient reports that he received at least 80% relief that last about 4 days and then pain returned gradually over a few days. Less pain with activity. Went back to work the next day which is very demanding at [employer]. Pain has returned back to the level it was prior to procedure    Continues to have pain in low back down buttocks and mainly down left leg today. Constant dull burning and stabbing pain with tingling in leg   Working 50-55 hours per week.         Has been having intermittent angina. Denies any chest pain today. Has FU with [facility] next week   Pain increases with bending, lifting, twisting , turning torso, walking, standing, getting up and down and housework or working at job.     Current pain medications with Norco and neuronin continue to help. On lower dose than he was with pcp which he states he can tell.         Medications reviewed. Patient denies side effects with medications. Patient states he is taking medications as prescribed. Hedenies receiving pain medications from other sources. He denies any ER visits since last visit.     Pain scale with out pain medications or at its worst is 8/10. Pain scale with pain medications or at its best is 6/10. Last dose of Norco was last night, Neurontin wastoday   Drug screen reviewed from 2/16/2022 and was appropriate  Pill count completed  today and WNL: Yes        The patienthas No Known Allergies.        Subjective:      Review of Systems   Constitutional: Negative. Gastrointestinal: Negative. Musculoskeletal: Positive for arthralgias, back pain and myalgias. Negative for gait problem, neck pain and neck stiffness. Not using any assist devices    Psychiatric/Behavioral: Positive for sleep disturbance.  The patient is nervous/anxious.          Objective:      Vitals       Vitals:     03/22/22 0724   BP: (!) 164/76   Site: Left Upper Arm   Position: Sitting Weight: 185 lb (83.9 kg)   Height: 5' 7\" (1.702 m)            Physical Exam  Vitals and nursing note reviewed. Constitutional:       General: He is not in acute distress. Appearance: Normal appearance. He is well-developed. He is not diaphoretic. HENT:      Head: Normocephalic and atraumatic. Right Ear: External ear normal.      Left Ear: External ear normal.      Nose: Nose normal.      Mouth/Throat:      Mouth: Mucous membranes are moist.      Pharynx: No oropharyngeal exudate. Eyes:      General: No scleral icterus. Right eye: No discharge. Left eye: No discharge. Conjunctiva/sclera: Conjunctivae normal.      Pupils: Pupils are equal, round, and reactive to light. Neck:      Thyroid: No thyromegaly. Cardiovascular:      Rate and Rhythm: Normal rate and regular rhythm. Pulses: Normal pulses. Heart sounds: Normal heart sounds. No murmur heard. No friction rub. No gallop. Comments: CLICK PRESENT  Pulmonary:      Effort: Pulmonary effort is normal. No respiratory distress. Breath sounds: Wheezing present. No rales. Chest:      Chest wall: No tenderness. Abdominal:      General: Bowel sounds are normal. There is no distension. Palpations: Abdomen is soft. Tenderness: There is no abdominal tenderness. There is no guarding or rebound. Musculoskeletal:         General: Tenderness present. Right shoulder: Tenderness present. Normal range of motion. Left shoulder: Tenderness present. Normal range of motion. Right elbow: Tenderness present. Left elbow: Tenderness present. No medial epicondyle tenderness. Cervical back: Full passive range of motion without pain and normal range of motion. Spasms present. No edema, erythema, rigidity or tenderness. No muscular tenderness. Normal range of motion. Thoracic back: Spasms and tenderness present. Lumbar back: Spasms, tenderness and bony tenderness present.  Decreased radiculopathy    5. Spinal stenosis of lumbar region with neurogenic claudication    6. Chronic pain syndrome       Plan:      · OARRS reviewed. Current MED: 10.00  · Patient was offered naloxone for home. · Discussed long term side effects of medications, tolerance, dependency and addiction. · Previous UDS reviewed  · UDS preformed today for compliance. · Patient told can not receive any pain medications from any other source. · No evidence of abuse, diversion or aberrant behavior. · Medications and/or procedures to improve function and quality of life- patient understanding with this and that may not be pain free  · Discussed with patient about safe storage of medications at home  · Discussed possible weaning of medication dosing dependent on treatment/procedure results. · Discussed with patient about risks with procedure including infection, reaction to medication, increased pain, or bleeding. · Procedure notes reveiwed in detail  · Received over 80% relief of low back and SI pain from SI MBB X 2 with improvment of mobility   · Plan Bilateral SI RFA, LEFT SIDE FIRST for longer term pain relief. Procedure discussed in detail. · On baby aspirin. Needs cardiac clearance d/t intermittent angina  · Failed many other procedures discussed possible surgery referral vs SCS trial if needed   · Continue Norco 5/325 BID prn- filled 3/10/2022. David Melara Discussed possibly increasing frequency to TID prn in future depending on relief from procedure  · Continue neurontin from pcp   · Patient is compliant.  Will get next UDS at procedural FU.              Return for Bilateral SI RFA, LEFT SIDE FIRST. , follow up after procedure.

## 2022-05-17 NOTE — H&P
[address] [address]  History and Physical Update    Pt Name: Kathie Reyna  MRN: [id number]  YOB: 1965  Date of evaluation: 5/17/2022      I have examined the patient and reviewed the H&P/Consult and there are no changes to the patient or plans.         Electronically signed by Silvia Espinal MD on 5/17/2022 at 1:15 PM

## 2022-05-17 NOTE — PROGRESS NOTES
1519 Patient to phase 2 from surgery. Report from [email or web address]. Patient drowsy, opens eyes to name and answers questions appropriately. Vitals assessed, stable on room air. Injection site clean, dry, intact. 1525 Patient alert, oriented, appropriate. Call light in reach. Snack and drink given per patient preference. 1540 Patient states he feels ready for discharge at this time. IV taken out, dressing applied with no complications. 1547 Patient walked to discharge doors in stable condition. All questions regarding AVS answered at this time. Patient discharged with all belongings.

## 2022-06-02 ENCOUNTER — OFFICE VISIT (OUTPATIENT)
Dept: PHYSICAL MEDICINE AND REHAB | Age: 57
End: 2022-06-02
Payer: COMMERCIAL

## 2022-06-02 VITALS
SYSTOLIC BLOOD PRESSURE: 120 MMHG | WEIGHT: 185 LBS | BODY MASS INDEX: 29.03 KG/M2 | DIASTOLIC BLOOD PRESSURE: 78 MMHG | HEIGHT: 67 IN

## 2022-06-02 DIAGNOSIS — M47.816 LUMBAR SPONDYLOSIS: ICD-10-CM

## 2022-06-02 DIAGNOSIS — M54.17 LUMBOSACRAL RADICULITIS: ICD-10-CM

## 2022-06-02 DIAGNOSIS — M48.062 SPINAL STENOSIS OF LUMBAR REGION WITH NEUROGENIC CLAUDICATION: ICD-10-CM

## 2022-06-02 DIAGNOSIS — M47.816 LUMBAR FACET ARTHROPATHY: ICD-10-CM

## 2022-06-02 DIAGNOSIS — M46.1 SACROILIAC INFLAMMATION (HCC): Primary | ICD-10-CM

## 2022-06-02 DIAGNOSIS — G89.4 CHRONIC PAIN SYNDROME: ICD-10-CM

## 2022-06-02 PROCEDURE — 99214 OFFICE O/P EST MOD 30 MIN: CPT | Performed by: NURSE PRACTITIONER

## 2022-06-02 ASSESSMENT — ENCOUNTER SYMPTOMS
BACK PAIN: 1
GASTROINTESTINAL NEGATIVE: 1

## 2022-06-02 NOTE — PROGRESS NOTES
[address] [place] [address] [address]  Dept: [phone]  Dept Fax: [phone]: [phone]    Visit Date: 6/2/2022    Functionality Assessment/Goals Worksheet     On a scale of 0 (Does not Interfere) to 10 (Completely Interferes)     1. Which number describes how during the past week pain has interfered with       the following:  A. General Activity:  7  B. Mood: 9  C. Walking Ability:  9  D. Normal Work (Includes both work outside the home and housework):  9  E. Relations with Other People:   10  F. Sleep:   9  G. Enjoyment of Life:   8    2. Patient Prefers to Take their Pain Medications:     []  On a regular basis   [x]  Only when necessary    []  Does not take pain medications    3. What are the Patient's Goals/Expectations for Visiting Pain Management? []  Learn about my pain    [x]  Receive Medication   []  Physical Therapy     []  Treat Depression   [x]  Receive Injections    []  Treat Sleep   []  Deal with Anxiety and Stress   []  Treat Opoid Dependence/Addiction   []  Other:      HPI:   Gregary Klinefelter is a 59 y.o. male is here today for    Chief Complaint: SI pain, low back pain, leg pain     HPI   FI from left SI RFA from 5/17/2022. Receiving 70% relief to 80% relief of of left SI pain from RFA. Still some pain and difficult to tell because still pain in right side. Continues to have pain in low back down buttocks and still radiating down left leg to foot- tingling burning anterior and lateral    Pain increased following work and increased activity. Has been more active and working 6 days a week- very physical job   Pain increases with bending, lifting, twisting , turning torso, getting up and down and housework or working at job. Pain medications continue to help       Medications reviewed. Patient denies side effects with medications.  Patient states he is taking medications as prescribed. Hedenies receiving pain medications from other sources. He denies any ER visits since last visit. Pain scale with out pain medications or at its worst is 8-9/10. Pain scale with pain medications or at its best is 5-7/10. Last dose of Arabi andneurontin was today   Drug screen reviewed from 2/16/2022 and was appropriate  Pill count completed  today and WNL: Yes      The patienthas No Known Allergies. Subjective:      Review of Systems   Constitutional: Negative. Gastrointestinal: Negative. Musculoskeletal: Positive for arthralgias, back pain and myalgias. Negative for gait problem, neck pain and neck stiffness. Not using any assist devices    Neurological: Positive for weakness and numbness. Psychiatric/Behavioral: Positive for dysphoric mood and sleep disturbance. The patient is nervous/anxious. Objective:     Vitals:    06/02/22 0821   BP: 120/78   Weight: 185 lb (83.9 kg)   Height: 5' 7\" (1.702 m)       Physical Exam  Vitals and nursing note reviewed. Constitutional:       General: He is not in acute distress. Appearance: Normal appearance. He is well-developed. He is not diaphoretic. HENT:      Head: Normocephalic and atraumatic. Right Ear: External ear normal.      Left Ear: External ear normal.      Nose: Nose normal.      Mouth/Throat:      Mouth: Mucous membranes are moist.      Pharynx: No oropharyngeal exudate. Eyes:      General: No scleral icterus. Right eye: No discharge. Left eye: No discharge. Conjunctiva/sclera: Conjunctivae normal.      Pupils: Pupils are equal, round, and reactive to light. Neck:      Thyroid: No thyromegaly. Cardiovascular:      Rate and Rhythm: Normal rate and regular rhythm. Pulses: Normal pulses. Heart sounds: Normal heart sounds. No murmur heard. No friction rub. No gallop.        Comments: CLICK PRESENT  Pulmonary:      Effort: Pulmonary effort is normal. No respiratory distress. Breath sounds: Wheezing present. No rales. Chest:      Chest wall: No tenderness. Abdominal:      General: Bowel sounds are normal. There is no distension. Palpations: Abdomen is soft. Tenderness: There is no abdominal tenderness. There is no guarding or rebound. Musculoskeletal:         General: Tenderness present. Right shoulder: Tenderness present. Normal range of motion. Left shoulder: Tenderness present. Normal range of motion. Right elbow: Tenderness present. Left elbow: Tenderness present. No medial epicondyle tenderness. Cervical back: Full passive range of motion without pain and normal range of motion. Spasms present. No edema, erythema, rigidity or tenderness. No muscular tenderness. Normal range of motion. Thoracic back: Spasms and tenderness present. Lumbar back: Spasms, tenderness and bony tenderness present. Decreased range of motion. Positive right straight leg raise test and positive left straight leg raise test.        Back:       Right hip: No tenderness. Left hip: No tenderness. Right knee: Normal range of motion. No tenderness. Left knee: Normal range of motion. No tenderness. Right ankle: No swelling. Left ankle: No swelling. Right foot: No swelling. Left foot: No swelling. Skin:     General: Skin is warm. Coloration: Skin is not pale. Findings: No erythema or rash. Neurological:      General: No focal deficit present. Mental Status: He is alert and oriented to person, place, and time. He is not disoriented. Cranial Nerves: No cranial nerve deficit. Sensory: Sensory deficit present. Motor: Weakness present. No atrophy or abnormal muscle tone. Coordination: Coordination abnormal.      Gait: Gait normal.      Deep Tendon Reflexes: Reflexes are normal and symmetric. Psychiatric:         Attention and Perception: He is attentive.          Mood and Affect: Mood normal. Mood is not anxious or depressed. Affect is not labile, blunt, angry or inappropriate. Speech: He is communicative. Speech is not rapid and pressured, delayed, slurred or tangential.         Behavior: Behavior is not agitated, slowed, aggressive, withdrawn, hyperactive or combative. Thought Content: Thought content is not paranoid or delusional. Thought content does not include homicidal or suicidal ideation. Thought content does not include homicidal or suicidal plan. Cognition and Memory: Memory is not impaired. He does not exhibit impaired recent memory or impaired remote memory. Judgment: Judgment is not impulsive or inappropriate. MJ  Patricks test  positive  Yeoman's  positive  Gaenslen's  positive     Assessment:     1. Sacroiliac inflammation ([address]    2. Lumbar facet arthropathy    3. Lumbar spondylosis    4. Spinal stenosis of lumbar region with neurogenic claudication    5. Lumbosacral radiculitis    6. Chronic pain syndrome            Plan:      · OARRS reviewed. Current MED: 10.00  · Patient was offered naloxone for home. · Discussed long term side effects of medications, tolerance, dependency and addiction. · Previous UDS reviewed  · UDS preformed today for compliance. · Patient told can not receive any pain medications from any other source. · No evidence of abuse, diversion or aberrant behavior.  Medications and/or procedures to improve function and quality of life- patient understanding with this and that may not be pain free   Discussed with patient about safe storage of medications at home   Discussed possible weaning of medication dosing dependent on treatment/procedure results.  Discussed with patient about risks with procedure including infection, reaction to medication, increased pain, or bleeding. · Procedure notes reveiwed in detail   Receiving 70% to 80% relief of left SI pain from left SI RFA.     Plan Right SI RFA for longer term pain relief. Procedure discussed in detail   · On baby aspirin. Needs cardiac clearance if not clawed in past 3 months   · Failed many other procedures discussed possible surgery referral vs SCS trial if needed depending on relief from procedure   · Continue Norco 5/325 BID prn- filled 5/16/2022. Continue neurontin from pcp   · Updated UDS ordered today       Meds. Prescribed:   No orders of the defined types were placed in this encounter. Return for  Right SI RFA. , follow up after procedure.                Electronically signed by JASWINDER Bryant CNP on6/2/2022 at 8:39 AM

## 2022-06-09 ENCOUNTER — HOSPITAL ENCOUNTER (OUTPATIENT)
Age: 57
Discharge: HOME | End: 2022-06-09
Payer: COMMERCIAL

## 2022-06-09 LAB
ALT SERPL-CCNC: 11 U/L (ref 11–66)
AST SERPL-CCNC: 16 U/L (ref 5–40)
BUN BLDV-MCNC: 14 MG/DL (ref 7–22)
CREAT SERPL-MCNC: 0.6 MG/DL (ref 0.4–1.2)
ERYTHROCYTE [DISTWIDTH] IN BLOOD BY AUTOMATED COUNT: 16.8 % (ref 11.5–14.5)
ERYTHROCYTE [DISTWIDTH] IN BLOOD BY AUTOMATED COUNT: 57.3 FL (ref 35–45)
GFR SERPL CREATININE-BSD FRML MDRD: > 90 ML/MIN/1.73M2
HCT VFR BLD CALC: 41.5 % (ref 42–52)
HEMOGLOBIN: 13.5 GM/DL (ref 14–18)
MCH RBC QN AUTO: 30.7 PG (ref 26–33)
MCHC RBC AUTO-ENTMCNC: 32.5 GM/DL (ref 32.2–35.5)
MCV RBC AUTO: 94.3 FL (ref 80–94)
PLATELET # BLD: 261 THOU/MM3 (ref 130–400)
PMV BLD AUTO: 10.6 FL (ref 9.4–12.4)
RBC # BLD: 4.4 MILL/MM3 (ref 4.7–6.1)
WBC # BLD: 10 THOU/MM3 (ref 4.8–10.8)

## 2022-06-09 PROCEDURE — 84450 TRANSFERASE (AST) (SGOT): CPT

## 2022-06-09 PROCEDURE — 82565 ASSAY OF CREATININE: CPT

## 2022-06-09 PROCEDURE — 36415 COLL VENOUS BLD VENIPUNCTURE: CPT

## 2022-06-09 PROCEDURE — 84460 ALANINE AMINO (ALT) (SGPT): CPT

## 2022-06-09 PROCEDURE — 84520 ASSAY OF UREA NITROGEN: CPT

## 2022-06-09 PROCEDURE — 85027 COMPLETE CBC AUTOMATED: CPT

## 2022-06-16 ENCOUNTER — TELEPHONE (OUTPATIENT)
Dept: PHYSICAL MEDICINE AND REHAB | Age: 57
End: 2022-06-16

## 2022-06-16 DIAGNOSIS — G89.4 CHRONIC PAIN SYNDROME: ICD-10-CM

## 2022-06-16 DIAGNOSIS — M54.17 LUMBOSACRAL RADICULITIS: ICD-10-CM

## 2022-06-16 DIAGNOSIS — M47.816 LUMBAR FACET ARTHROPATHY: ICD-10-CM

## 2022-06-16 DIAGNOSIS — M47.816 LUMBAR SPONDYLOSIS: ICD-10-CM

## 2022-06-16 DIAGNOSIS — M46.1 SACROILIAC INFLAMMATION (HCC): ICD-10-CM

## 2022-06-16 DIAGNOSIS — M48.062 SPINAL STENOSIS OF LUMBAR REGION WITH NEUROGENIC CLAUDICATION: ICD-10-CM

## 2022-06-16 DIAGNOSIS — M54.16 LUMBAR RADICULOPATHY: ICD-10-CM

## 2022-06-16 RX ORDER — HYDROCODONE BITARTRATE AND ACETAMINOPHEN 5; 325 MG/1; MG/1
1 TABLET ORAL 2 TIMES DAILY PRN
Qty: 60 TABLET | Refills: 0 | Status: SHIPPED | OUTPATIENT
Start: 2022-06-16 | End: 2022-07-16

## 2022-06-16 NOTE — TELEPHONE ENCOUNTER
OARRS reviewed. UDS: + for 2/16. The one done 6/2 not available. Positive gabapentin, xanax, hydrocodone  Last seen: 6/2/2022.  Follow-up:   Future Appointments   Date Time Provider Coleen Mccartney   6/23/2022 11:00 AM JASWINDER Stevenson [address]   7/18/2022  7:45 AM JASWINDER Hernandez - CNP N [facility] - [facility] [address]   3/24/2023  8:30 AM Tanisha Cash MD [address] [facility]

## 2022-06-21 ENCOUNTER — TELEPHONE (OUTPATIENT)
Dept: PHYSICAL MEDICINE AND REHAB | Age: 57
End: 2022-06-21

## 2022-06-21 NOTE — TELEPHONE ENCOUNTER
Procedure cancel do to pt has open wounds follow up was changed. To 09/2022 for medications  Pt will call back when he is healed .

## 2022-06-23 ENCOUNTER — OFFICE VISIT (OUTPATIENT)
Dept: PULMONOLOGY | Age: 57
End: 2022-06-23
Payer: COMMERCIAL

## 2022-06-23 VITALS
TEMPERATURE: 98.1 F | BODY MASS INDEX: 30.29 KG/M2 | WEIGHT: 193 LBS | DIASTOLIC BLOOD PRESSURE: 80 MMHG | HEIGHT: 67 IN | OXYGEN SATURATION: 98 % | SYSTOLIC BLOOD PRESSURE: 132 MMHG | HEART RATE: 66 BPM

## 2022-06-23 DIAGNOSIS — J44.9 COPD, MILD (HCC): ICD-10-CM

## 2022-06-23 DIAGNOSIS — F17.200 CURRENT SMOKER: ICD-10-CM

## 2022-06-23 DIAGNOSIS — R06.2 WHEEZING: ICD-10-CM

## 2022-06-23 DIAGNOSIS — R06.02 SOB (SHORTNESS OF BREATH): Primary | ICD-10-CM

## 2022-06-23 PROCEDURE — 99406 BEHAV CHNG SMOKING 3-10 MIN: CPT | Performed by: NURSE PRACTITIONER

## 2022-06-23 PROCEDURE — 99204 OFFICE O/P NEW MOD 45 MIN: CPT | Performed by: NURSE PRACTITIONER

## 2022-06-23 PROCEDURE — 94664 DEMO&/EVAL PT USE INHALER: CPT | Performed by: NURSE PRACTITIONER

## 2022-06-23 PROCEDURE — 95012 NITRIC OXIDE EXP GAS DETER: CPT | Performed by: NURSE PRACTITIONER

## 2022-06-23 PROCEDURE — 94618 PULMONARY STRESS TESTING: CPT | Performed by: NURSE PRACTITIONER

## 2022-06-23 PROCEDURE — 94010 BREATHING CAPACITY TEST: CPT | Performed by: NURSE PRACTITIONER

## 2022-06-23 RX ORDER — UMECLIDINIUM BROMIDE AND VILANTEROL TRIFENATATE 62.5; 25 UG/1; UG/1
1 POWDER RESPIRATORY (INHALATION) DAILY
Qty: 1 EACH | Refills: 11 | Status: SHIPPED | OUTPATIENT
Start: 2022-06-23 | End: 2022-06-23

## 2022-06-23 RX ORDER — ALBUTEROL SULFATE 90 UG/1
2 AEROSOL, METERED RESPIRATORY (INHALATION) EVERY 6 HOURS PRN
Qty: 1 EACH | Refills: 3 | Status: SHIPPED | OUTPATIENT
Start: 2022-06-23 | End: 2023-06-23

## 2022-06-23 ASSESSMENT — ENCOUNTER SYMPTOMS
BACK PAIN: 1
ALLERGIC/IMMUNOLOGIC NEGATIVE: 1
NAUSEA: 0
DIARRHEA: 0
GASTROINTESTINAL NEGATIVE: 1
STRIDOR: 0
EYES NEGATIVE: 1
SHORTNESS OF BREATH: 1
WHEEZING: 1
CHEST TIGHTNESS: 0
VOMITING: 0

## 2022-06-23 NOTE — PROGRESS NOTES
[facility]    Patient: Buffy Li, 59 y.o.   : 1965         Subjective     Chief Complaint   Patient presents with    New Patient     New patient, Ref by Dr. Regine Coffey for SOB, PFT 22        HPI  Agsu Cartagena is here for evaluation of shortness of breath with referral from Dr. Karlie Milligan. PFT done 22 Mild peripheral obstruction with decreased DLCO  Continues to smoke just less than 1PPD per patient  No current inhaler use   No home oxygen supplementation   Mother and father  of lung cancer and brother 1/2 of R lung removed in 2016    Exacerbating factors- he feels like his SOB is worse at rest   Alleviating factors- control his breathing   Timing-exertion, certain times of day- worse at night   Associated symptoms- light headed at times with the SOB    Patient was never diagnosed with chronic respiratory condition ie asthma, COPD, or ILD. Patient has not been admitted or treated for pneumonia, pulmonary embolism, or respiratory failure. Patient has not been intubated for reasons other than planned procedures. Social History  Patient job history included  He has not had exposure to aerosolized particles or hazardous fumes. (Coal, dust, asbestos, molds ie Hay)  denies living on a farm that primarily raised crops, livestock or combination  admits to passive tobacco exposure from parents or work environment. admits to to active tobacco smoking  1 PPD for 44 years for 44 pack years   denies exposure to pets/animals at home. denies exposure to tuberculosis. denies hobbies they can no longer perform due to shortness of breath    Flu vaccine? Pneumonia vaccine?   Past Medical hx   PMH:  Past Medical History:   Diagnosis Date    Arthritis     general    Bilateral sciatica     Bleeding     with coumadin    CAD (coronary artery disease)     Carpal tunnel syndrome     RIGHT    Cervicalgia, C5-7     Chronic anxiety     Dysthymia (or depressive neurosis) 2/1/2016    ED (erectile dysfunction)     ED (erectile dysfunction)     GERD (gastroesophageal reflux disease)     Headache(784.0)     History of blood transfusion 03/2016    HTN (hypertension)     Hyperlipidemia     Hypothyroidism     Iron deficiency anemia, blood loss     Lumbago     Lumbar radiculopathy     Osteoarthritis (arthritis due to wear and tear of joints)     S/P AVR, coumadin Tx     Spondylosis of thoracic region without myelopathy or radiculopathy      SURGICAL HISTORY:  Past Surgical History:   Procedure Laterality Date    AORTIC VALVE REPLACEMENT  2007    [facility]    AORTIC VALVE REPLACEMENT  07/20/2016    extraction of mechanical valve and replacement with tissue valve    APPENDECTOMY  1980    BACK INJECTION Bilateral 1/3/2022    Bilateral SI injection performed by Silvia Espinal MD at [address] Bilateral 3/14/2022    Bilateral SI MBB # 2 performed by Gerard Holley DO at [facility]  5 20 2010    Patent coronary arteries. Possible causes of the patient's chest pain is likely noncardiac at least based on this angiogram. Patient chould be able to proceed w/ scheduled surgery w/ paying attention to anticoagulation and trying to minimize the period of no anticoagulation.  CARDIAC CATHETERIZATION  2012, 6/9/16    [facility]    CARDIOVASCULAR STRESS TEST  4 15 2010    Moderate fixed inferior defect, possibly attenuation, cannot exclude a previous MI. Correlation w/ EKG is recommented. Significant degree of gut uptake which is likely to be the cause of the attenuation artifact seen.  EF 46%    CERVICAL FUSION  2010    CERVICAL FUSION  03/09/2016    REMOVAL HARDWARE C5-7, ACDF C4-6 WITH ATLANTIS CORNERSTONE    COLONOSCOPY  2010    CORONARY ARTERY BYPASS GRAFT      DILATATION, ESOPHAGUS      small resection    ENDOSCOPY, COLON, DIAGNOSTIC      NERVE SURGERY Bilateral 07/25/2017 THORACIC FACET BLOCK T7-8, T8-9, T11-12    OTHER SURGICAL HISTORY  05/02/2017    LESI L5    OTHER SURGICAL HISTORY  09/12/2017    thoracic epidural T11    PAIN MANAGEMENT PROCEDURE Left 5/17/2022    SI RFA, LEFT SIDE FIRST performed by Misael Cerna MD at [address] LEVEL Bilateral 7/25/2017    T-FACET MBB T7-8, T8-9, T11-12 BILATERAL performed by Misael Cerna MD at [address] [place] [address] DX/THER SBST EPIDURAL/SUBARACH LUMBAR/SACRAL N/A 9/12/2017    TESI T11 performed by Misael Cerna MD at [address] [facility] [address]  2010    [address] EXTRACTION  02/2019    UPPER GASTROINTESTINAL ENDOSCOPY  2010     SOCIAL HISTORY:  Social History     Tobacco Use    Smoking status: Current Every Day Smoker     Packs/day: 1.00     Years: 44.00     Pack years: 44.00     Types: Cigarettes, Cigars     Start date: 6/9/1978    Smokeless tobacco: Never Used    Tobacco comment: 6/23/22 \"smoking a little less than a pack\"   Vaping Use    Vaping Use: Never used   Substance Use Topics    Alcohol use:  Yes     Alcohol/week: 0.0 standard drinks     Comment: not for 3 mo    Drug use: No     ALLERGIES:No Known Allergies  FAMILY HISTORY:  Family History   Problem Relation Age of Onset    Cancer Mother     Diabetes Father     Cancer Father     Heart Disease Father 28        CABG    High Blood Pressure Father     Diabetes Sister         AS A CHILD    Kidney Disease Sister     High Blood Pressure Sister     Cancer Brother     COPD Sister     Heart Disease Sister     Stroke Neg Hx      CURRENT MEDICATIONS:  Current Outpatient Medications   Medication Sig Dispense Refill    albuterol sulfate HFA (PROAIR HFA) 108 (90 Base) MCG/ACT inhaler Inhale 2 puffs into the lungs every 6 hours as needed for Wheezing or Shortness of Breath 1 each 3    fluticasone-salmeterol (ADVAIR HFA) 115-21 MCG/ACT inhaler Inhale 2 puffs into the lungs 2 times daily Rinse mouth after its use. 1 each 11    HYDROcodone-acetaminophen (NORCO) 5-325 MG per tablet Take 1 tablet by mouth 2 times daily as needed for Pain for up to 30 days. Take lowest dose possible to manage pain 60 tablet 0    levothyroxine (SYNTHROID) 75 MCG tablet TAKE 1 TABLET BY MOUTH EVERY DAY 90 tablet 0    esomeprazole (NEXIUM) 40 MG delayed release capsule TAKE 1 CAPSULE BY MOUTH EVERY DAY IN THE MORNING BEFORE BREAKFAST 30 capsule 11    nicotine (NICODERM CQ) 7 MG/24HR Place 1 patch onto the skin every 24 hours 30 patch 1    metoprolol succinate (TOPROL XL) 50 MG extended release tablet Take 1 tablet by mouth daily 90 tablet 3    gabapentin (NEURONTIN) 400 MG capsule TAKE 1 CAPSULE BY MOUTH THREE TIMES A  capsule 0    ibuprofen (ADVIL;MOTRIN) 400 MG tablet Take 400 mg by mouth every 6 hours as needed for Pain       lidocaine viscous hcl (XYLOCAINE) 2 % SOLN solution Take 15 mLs by mouth as needed for Irritation 100 mL 1    gabapentin (NEURONTIN) 300 MG capsule TAKE 1 CAPSULE BY MOUTH THREE TIMES A  capsule 3    aspirin 81 MG EC tablet Take 1 tablet by mouth daily 90 tablet 3    buPROPion (WELLBUTRIN) 100 MG tablet Take 1 tablet by mouth 2 times daily 180 tablet 3    indomethacin (INDOCIN) 25 MG capsule Take 1 capsule by mouth 3 times daily (with meals) Take 1 tablet daily for 10 days. 21 capsule 0    clobetasol (TEMOVATE) 0.05 % cream APPLY TOPICALLY TO RIGHT INDEX FINGER 3 TIMES DAILY  2    Cholecalciferol (VITAMIN D3) 1000 units TABS Take 2,000 Units by mouth Daily        MG capsule TAKE 1 CAPSULE BY MOUTH ONE TIME A DAY AS NEEDED for constipation 30 capsule 10    IRON PO Take 65 mg by mouth daily       Multiple Vitamin (MULTIVITAMIN PO) Take 1 tablet by mouth daily        No current facility-administered medications for this visit. Qi MACIAS   Review of Systems   Constitutional: Negative.   Negative for chills, fever and unexpected weight change. HENT: Negative. Eyes: Negative. Respiratory: Positive for shortness of breath and wheezing. Negative for chest tightness and stridor. Cardiovascular: Negative for chest pain and leg swelling. Gastrointestinal: Negative. Negative for diarrhea, nausea and vomiting. Endocrine: Negative. Genitourinary: Negative. Negative for dysuria. Musculoskeletal: Positive for back pain. Skin: Negative. Allergic/Immunologic: Negative. Neurological: Negative. Hematological: Negative. Psychiatric/Behavioral: The patient is nervous/anxious. Physical exam   /80   Pulse 66   Temp 98.1 °F (36.7 °C)   Ht 5' 7\" (1.702 m)   Wt 193 lb (87.5 kg)   SpO2 98% Comment: r/a  BMI 30.23 kg/m²    Wt Readings from Last 3 Encounters:   06/23/22 193 lb (87.5 kg)   06/02/22 185 lb (83.9 kg)   05/17/22 185 lb (83.9 kg)       Physical Exam  Vitals and nursing note reviewed. Constitutional:       General: He is not in acute distress. Appearance: He is well-developed. HENT:      Head: Normocephalic and atraumatic. Neck:      Trachea: No tracheal deviation. Cardiovascular:      Rate and Rhythm: Normal rate and regular rhythm. Heart sounds: Normal heart sounds. No murmur heard. Pulmonary:      Effort: Pulmonary effort is normal. No respiratory distress. Breath sounds: Normal breath sounds. No stridor. No wheezing or rales. Chest:      Chest wall: No tenderness. Abdominal:      General: Bowel sounds are normal. There is no distension. Palpations: Abdomen is soft. Skin:     General: Skin is warm and dry. Capillary Refill: Capillary refill takes less than 2 seconds. Neurological:      Mental Status: He is alert and oriented to person, place, and time. Psychiatric:         Behavior: Behavior normal.         Thought Content:  Thought content normal.         Judgment: Judgment normal.          results   Lung Nodule Screening     [x] Qualifies    [] Does not qualify   [] Declined    [] Completed     [facility] screening for lung cancer with low-dose computed tomography (LDCT) in adults aged 48 to 80 years who have a 20 pack-year smoking history and currently smoke or have quit within the past 15 years. Screeningshould be discontinued once a person has not smoked for 15 years or develops a health problem that substantially limits life expectancy or the ability or willingness to have curative lung surgery. Six Minute Walk Test  Kris Dailey 1965    Six minute walk test done in my office today by my medical assistant. Alberto's oxygen saturation at rest on room air was 98%. His oxygen saturation dropped to 97% on room air with exertion after walking 1296 feet and within 6 minutes. Patient ambulated a total of 1296 feet with oxygen. Resting Dyspnea/Karen score was 0 / 1  and 0 / 0  upon completion of the walk. Resting heart rate was  69 bpm and 82 bpm upon completing the walk. 04/08/2022   ECHOCARDIOGRAM COMPLETE 2D W DOPPLER W COLOR. Conclusions      Summary   Ejection fraction is visually estimated at 45-50%. There was mild global hypokinesis of the left ventricle. Normally functioning bioprosthetic valve in aortic position. Signature      ----------------------------------------------------------------   Electronically signed by Kayla Dutton MD (Interpreting   physician) on 04/08/2022 at 03:22 PM    04/08/2022   Nuclear Stress Test:Pharmacological, Regadenoson   Conclusions      Summary   This Nuclear Medicine study was negative for ischemia . inferior attenuation   abnormal LV function      Recommendation   Clinical correlation is recommended. Signatures      ----------------------------------------------------------------   Electronically signed by Kayla Dutton MD (Interpreting   Cardiologist) on 04/08/2022 at 15:16    Assessment      Diagnosis Orders   1.  SOB (shortness of breath)  6 115-21 mcg 2 puffs BID rinse mouth after use   -Proair HFA 2 puffs every 4-6 hours PRN for SoB/Wheezing     Will see Shana Quinonez in: 2 months    Electronically signed by JASWINDER Johnson CNP on 6/23/2022 at 11:34 AM     Low Dose CT (LDCT) Lung Screening criteria met              Age 55-77              Pack year smoking >30              Still smoking or less than 15 year since quit              No sign or symptoms of lung cancer              > 11 months since last LDCT      Risks and benefits of lung cancer screening with LDCT scans discussed:     Significance of positive screen - False-positive LDCT results often occur. 95% of all positive results do not lead to a diagnosis of cancer. Usually further imaging can resolve most false-positive results; however, some patients may require invasive procedures. Over diagnosis risk - 10% to 12% of screen-detected lung cancer cases are over diagnosed--that is, the cancer would not have been detected in the patient's lifetime without the screening. Need for follow up screens annually to continue lung cancer screening effectiveness      Risks associated with radiation from annual LDCT- Radiation exposure is about the same as for a mammogram, which is about 1/3 of the annual background radiation exposure from everyday life. Starting screening at age 54 is not likely to increase cancer risk from radiation exposure. Patients with comorbidities resulting in life expectancy of < 10 years, or that would preclude treatment of an abnormality identified on CT, should not be screened due to lack of benefit.      To obtain maximal benefit from this screening, smoking cessation and long-term abstinence from smoking is critical

## 2022-07-05 ENCOUNTER — HOSPITAL ENCOUNTER (OUTPATIENT)
Age: 57
Discharge: HOME | End: 2022-07-05
Payer: COMMERCIAL

## 2022-07-05 LAB
ALBUMIN SERPL-MCNC: 4.5 G/DL (ref 3.5–5.1)
ALP BLD-CCNC: 79 U/L (ref 38–126)
ALT SERPL-CCNC: 13 U/L (ref 11–66)
ANION GAP SERPL CALCULATED.3IONS-SCNC: 12 MEQ/L (ref 8–16)
AST SERPL-CCNC: 17 U/L (ref 5–40)
BILIRUB SERPL-MCNC: 0.2 MG/DL (ref 0.3–1.2)
BUN BLDV-MCNC: 15 MG/DL (ref 7–22)
C-REACTIVE PROTEIN: 0.73 MG/DL (ref 0–1)
CALCIUM SERPL-MCNC: 9.1 MG/DL (ref 8.5–10.5)
CHLORIDE BLD-SCNC: 106 MEQ/L (ref 98–111)
CO2: 24 MEQ/L (ref 23–33)
CREAT SERPL-MCNC: 0.6 MG/DL (ref 0.4–1.2)
ERYTHROCYTE [DISTWIDTH] IN BLOOD BY AUTOMATED COUNT: 17.1 % (ref 11.5–14.5)
ERYTHROCYTE [DISTWIDTH] IN BLOOD BY AUTOMATED COUNT: 59.7 FL (ref 35–45)
GFR SERPL CREATININE-BSD FRML MDRD: > 90 ML/MIN/1.73M2
GLUCOSE BLD-MCNC: 92 MG/DL (ref 70–108)
HCT VFR BLD CALC: 42.9 % (ref 42–52)
HEMOGLOBIN: 13.7 GM/DL (ref 14–18)
LD: 262 U/L (ref 100–190)
MCH RBC QN AUTO: 30.4 PG (ref 26–33)
MCHC RBC AUTO-ENTMCNC: 31.9 GM/DL (ref 32.2–35.5)
MCV RBC AUTO: 95.3 FL (ref 80–94)
PLATELET # BLD: 242 THOU/MM3 (ref 130–400)
PMV BLD AUTO: 11.2 FL (ref 9.4–12.4)
POTASSIUM SERPL-SCNC: 5.3 MEQ/L (ref 3.5–5.2)
RBC # BLD: 4.5 MILL/MM3 (ref 4.7–6.1)
SEDIMENTATION RATE, ERYTHROCYTE: 10 MM/HR (ref 0–10)
SODIUM BLD-SCNC: 142 MEQ/L (ref 135–145)
TOTAL PROTEIN: 6.7 G/DL (ref 6.1–8)
WBC # BLD: 10.1 THOU/MM3 (ref 4.8–10.8)

## 2022-07-05 PROCEDURE — 85027 COMPLETE CBC AUTOMATED: CPT

## 2022-07-05 PROCEDURE — 85651 RBC SED RATE NONAUTOMATED: CPT

## 2022-07-05 PROCEDURE — 86140 C-REACTIVE PROTEIN: CPT

## 2022-07-05 PROCEDURE — 80053 COMPREHEN METABOLIC PANEL: CPT

## 2022-07-05 PROCEDURE — 83615 LACTATE (LD) (LDH) ENZYME: CPT

## 2022-07-05 PROCEDURE — 36415 COLL VENOUS BLD VENIPUNCTURE: CPT

## 2022-07-06 DIAGNOSIS — F41.9 ANXIETY: ICD-10-CM

## 2022-07-07 ENCOUNTER — TELEPHONE (OUTPATIENT)
Dept: FAMILY MEDICINE CLINIC | Age: 57
End: 2022-07-07

## 2022-07-07 RX ORDER — ALPRAZOLAM 0.5 MG/1
TABLET ORAL
Qty: 30 TABLET | Refills: 2 | Status: SHIPPED | OUTPATIENT
Start: 2022-07-07 | End: 2022-10-10

## 2022-07-07 NOTE — TELEPHONE ENCOUNTER
----- Message from Swapna Barahona sent at 7/7/2022 10:57 AM EDT -----  Subject: Refill Request    QUESTIONS  Name of Medication? ALPRAZolam (XANAX) 0.5 MG tablet  Patient-reported dosage and instructions? .5 mg 1-2 times daily  How many days do you have left? 10  Preferred Pharmacy? [address]  Pharmacy phone number (if available)? [phone]  ---------------------------------------------------------------------------  --------------  CALL BACK INFO  What is the best way for the office to contact you? OK to leave message on   voicemail  Preferred Call Back Phone Number? [phone]  ---------------------------------------------------------------------------  --------------  SCRIPT ANSWERS  Relationship to Patient?  Self

## 2022-07-10 ENCOUNTER — HOSPITAL ENCOUNTER (EMERGENCY)
Age: 57
Discharge: HOME | End: 2022-07-10
Attending: EMERGENCY MEDICINE
Payer: COMMERCIAL

## 2022-07-10 ENCOUNTER — APPOINTMENT (OUTPATIENT)
Dept: CT IMAGING | Age: 57
End: 2022-07-10
Payer: COMMERCIAL

## 2022-07-10 ENCOUNTER — APPOINTMENT (OUTPATIENT)
Dept: GENERAL RADIOLOGY | Age: 57
End: 2022-07-10
Payer: COMMERCIAL

## 2022-07-10 VITALS
OXYGEN SATURATION: 100 % | RESPIRATION RATE: 18 BRPM | SYSTOLIC BLOOD PRESSURE: 136 MMHG | TEMPERATURE: 98.3 F | BODY MASS INDEX: 31.32 KG/M2 | WEIGHT: 200 LBS | DIASTOLIC BLOOD PRESSURE: 71 MMHG | HEART RATE: 81 BPM

## 2022-07-10 DIAGNOSIS — R07.9 CHEST PAIN, UNSPECIFIED TYPE: ICD-10-CM

## 2022-07-10 DIAGNOSIS — R42 LIGHTHEADEDNESS: Primary | ICD-10-CM

## 2022-07-10 LAB
ANION GAP SERPL CALCULATED.3IONS-SCNC: 10 MEQ/L (ref 8–16)
BASOPHILS # BLD: 0.6 %
BASOPHILS ABSOLUTE: 0.1 THOU/MM3 (ref 0–0.1)
BUN BLDV-MCNC: 11 MG/DL (ref 7–22)
CALCIUM SERPL-MCNC: 9.3 MG/DL (ref 8.5–10.5)
CHLORIDE BLD-SCNC: 104 MEQ/L (ref 98–111)
CO2: 25 MEQ/L (ref 23–33)
CREAT SERPL-MCNC: 0.7 MG/DL (ref 0.4–1.2)
EKG ATRIAL RATE: 69 BPM
EKG P AXIS: 30 DEGREES
EKG P-R INTERVAL: 178 MS
EKG Q-T INTERVAL: 420 MS
EKG QRS DURATION: 98 MS
EKG QTC CALCULATION (BAZETT): 450 MS
EKG R AXIS: 12 DEGREES
EKG T AXIS: 48 DEGREES
EKG VENTRICULAR RATE: 69 BPM
EOSINOPHIL # BLD: 2.5 %
EOSINOPHILS ABSOLUTE: 0.3 THOU/MM3 (ref 0–0.4)
ERYTHROCYTE [DISTWIDTH] IN BLOOD BY AUTOMATED COUNT: 17.2 % (ref 11.5–14.5)
ERYTHROCYTE [DISTWIDTH] IN BLOOD BY AUTOMATED COUNT: 57.8 FL (ref 35–45)
FLU A ANTIGEN: NEGATIVE
FLU B ANTIGEN: NEGATIVE
GFR SERPL CREATININE-BSD FRML MDRD: > 90 ML/MIN/1.73M2
GLUCOSE BLD-MCNC: 93 MG/DL (ref 70–108)
HCT VFR BLD CALC: 38.8 % (ref 42–52)
HEMOGLOBIN: 12.8 GM/DL (ref 14–18)
IMMATURE GRANS (ABS): 0.04 THOU/MM3 (ref 0–0.07)
IMMATURE GRANULOCYTES: 0.4 %
LYMPHOCYTES # BLD: 12.3 %
LYMPHOCYTES ABSOLUTE: 1.4 THOU/MM3 (ref 1–4.8)
MCH RBC QN AUTO: 30.5 PG (ref 26–33)
MCHC RBC AUTO-ENTMCNC: 33 GM/DL (ref 32.2–35.5)
MCV RBC AUTO: 92.4 FL (ref 80–94)
MONOCYTES # BLD: 10.4 %
MONOCYTES ABSOLUTE: 1.2 THOU/MM3 (ref 0.4–1.3)
NUCLEATED RED BLOOD CELLS: 0 /100 WBC
OSMOLALITY CALCULATION: 276.6 MOSMOL/KG (ref 275–300)
PLATELET # BLD: 242 THOU/MM3 (ref 130–400)
PMV BLD AUTO: 10.7 FL (ref 9.4–12.4)
POTASSIUM REFLEX MAGNESIUM: 4.2 MEQ/L (ref 3.5–5.2)
RBC # BLD: 4.2 MILL/MM3 (ref 4.7–6.1)
SARS-COV-2, NAAT: NOT  DETECTED
SEG NEUTROPHILS: 73.8 %
SEGMENTED NEUTROPHILS ABSOLUTE COUNT: 8.4 THOU/MM3 (ref 1.8–7.7)
SODIUM BLD-SCNC: 139 MEQ/L (ref 135–145)
TROPONIN T: < 0.01 NG/ML
WBC # BLD: 11.4 THOU/MM3 (ref 4.8–10.8)

## 2022-07-10 PROCEDURE — 87804 INFLUENZA ASSAY W/OPTIC: CPT

## 2022-07-10 PROCEDURE — 71045 X-RAY EXAM CHEST 1 VIEW: CPT

## 2022-07-10 PROCEDURE — 70450 CT HEAD/BRAIN W/O DYE: CPT

## 2022-07-10 PROCEDURE — 99285 EMERGENCY DEPT VISIT HI MDM: CPT

## 2022-07-10 PROCEDURE — 85025 COMPLETE CBC W/AUTO DIFF WBC: CPT

## 2022-07-10 PROCEDURE — 93010 ELECTROCARDIOGRAM REPORT: CPT | Performed by: INTERNAL MEDICINE

## 2022-07-10 PROCEDURE — 93005 ELECTROCARDIOGRAM TRACING: CPT | Performed by: EMERGENCY MEDICINE

## 2022-07-10 PROCEDURE — 87635 SARS-COV-2 COVID-19 AMP PRB: CPT

## 2022-07-10 PROCEDURE — 6370000000 HC RX 637 (ALT 250 FOR IP): Performed by: STUDENT IN AN ORGANIZED HEALTH CARE EDUCATION/TRAINING PROGRAM

## 2022-07-10 PROCEDURE — 80048 BASIC METABOLIC PNL TOTAL CA: CPT

## 2022-07-10 PROCEDURE — 84484 ASSAY OF TROPONIN QUANT: CPT

## 2022-07-10 RX ORDER — ACETAMINOPHEN 500 MG
1000 TABLET ORAL ONCE
Status: COMPLETED | OUTPATIENT
Start: 2022-07-10 | End: 2022-07-10

## 2022-07-10 RX ADMIN — ACETAMINOPHEN 1000 MG: 500 TABLET ORAL at 16:06

## 2022-07-10 ASSESSMENT — ENCOUNTER SYMPTOMS
VOMITING: 0
SHORTNESS OF BREATH: 0
CONSTIPATION: 0
NAUSEA: 0
COLOR CHANGE: 0
SINUS PRESSURE: 0
DIARRHEA: 0
ABDOMINAL PAIN: 0
SINUS PAIN: 0
COUGH: 0
SORE THROAT: 0
BACK PAIN: 0

## 2022-07-10 ASSESSMENT — PAIN SCALES - GENERAL: PAINLEVEL_OUTOF10: 5

## 2022-07-10 NOTE — ED TRIAGE NOTES
Pt arrives to ED from home with c/o light headed and dizziness that has been ongoing for about 3 weeks now. Pt states he has not passed out. States he sits down when he feels dizzy and funky feeling. Pt states he has cardiac history and chronic anemia.  Pt states he has some intermittent SOB  Pt states he feels nauseated and more tired than normal.   Denies chest pain diarrhea or trouble urinating

## 2022-07-10 NOTE — ED NOTES
Patient resting in bed. Respirations easy and unlabored. No distress noted. Call light within reach.        Erickson Gutiérrez RN  07/10/22 1944

## 2022-07-10 NOTE — ED PROVIDER NOTES
[place] ENCOUNTER        Pt Name: Mary Rojas  MRN: [id number]  [place] 1965  Date of evaluation: 7/10/2022  Treating Resident Physician: Karina Nath DO  Supervising Physician: Lupe Doherty    History obtained from the patient. CHIEF COMPLAINT       Chief Complaint   Patient presents with    Dizziness           HISTORY OF PRESENT ILLNESS    HPI  Mary Rojas is a 59 y.o. male who presents to the emergency department for evaluation of lightheadedness and mid sternal chest pressure. Patient states this is been ongoing for at least 3 weeks. He has been followed by cardiology and pulmonology and they cannot seem to find an answer for his discomfort. Patient states that he has had associated left-sided headache in the evenings while he is relaxing that spontaneously resolved after 1 to 2 hours. He denies any associated blurred vision, numbness, tingling, nausea, vomiting or fever. Patient describes the chest pain as midsternal radiating up and down. Patient's pain levels 5 out of 10 and mainly attributable to his left-sided headache. The patient has no other acute complaints at this time. REVIEW OF SYSTEMS   Review of Systems   Constitutional: Negative for activity change, chills and fever. HENT: Negative for sinus pressure, sinus pain and sore throat. Eyes: Negative for visual disturbance. Respiratory: Negative for cough and shortness of breath. Cardiovascular: Positive for chest pain. Negative for palpitations. Gastrointestinal: Negative for abdominal pain, constipation, diarrhea, nausea and vomiting. Genitourinary: Negative for difficulty urinating and dysuria. Musculoskeletal: Negative for arthralgias, back pain, neck pain and neck stiffness. Skin: Negative for color change and rash. Neurological: Positive for light-headedness and headaches. Negative for weakness and numbness.          PAST MEDICAL AND SURGICAL HISTORY     Past Medical History:   Diagnosis Date    Arthritis     general    Bilateral sciatica     Bleeding     with coumadin    CAD (coronary artery disease)     Carpal tunnel syndrome     RIGHT    Cervicalgia, C5-7     Chronic anxiety     Dysthymia (or depressive neurosis) 2/1/2016    ED (erectile dysfunction)     ED (erectile dysfunction)     GERD (gastroesophageal reflux disease)     Headache(784.0)     History of blood transfusion 03/2016    HTN (hypertension)     Hyperlipidemia     Hypothyroidism     Iron deficiency anemia, blood loss     Lumbago     Lumbar radiculopathy     Osteoarthritis (arthritis due to wear and tear of joints)     S/P AVR, coumadin Tx     Spondylosis of thoracic region without myelopathy or radiculopathy      Past Surgical History:   Procedure Laterality Date    AORTIC VALVE REPLACEMENT  2007    MECHANICAL [facility]    AORTIC VALVE REPLACEMENT  07/20/2016    extraction of mechanical valve and replacement with tissue valve    APPENDECTOMY  1980    BACK INJECTION Bilateral 1/3/2022    Bilateral SI injection performed by Adrian Ryan MD at [address] Bilateral 3/14/2022    Bilateral SI MBB # 2 performed by John Rooeny DO at [facility]  5 20 2010    Patent coronary arteries. Possible causes of the patient's chest pain is likely noncardiac at least based on this angiogram. Patient chould be able to proceed w/ scheduled surgery w/ paying attention to anticoagulation and trying to minimize the period of no anticoagulation.  CARDIAC CATHETERIZATION  2012, 6/9/16    Psychiatric    CARDIOVASCULAR STRESS TEST  4 15 2010    Moderate fixed inferior defect, possibly attenuation, cannot exclude a previous MI. Correlation w/ EKG is recommented. Significant degree of gut uptake which is likely to be the cause of the attenuation artifact seen.  EF 46%    CERVICAL FUSION  2010    CERVICAL FUSION  03/09/2016    REMOVAL HARDWARE C5-7, ACDF C4-6 WITH ATLANTIS CORNERSTONE    COLONOSCOPY  2010    CORONARY ARTERY BYPASS GRAFT      DILATATION, ESOPHAGUS      small resection    ENDOSCOPY, COLON, DIAGNOSTIC      NERVE SURGERY Bilateral 07/25/2017    THORACIC FACET BLOCK T7-8, T8-9, T11-12    OTHER SURGICAL HISTORY  05/02/2017    LESI L5    OTHER SURGICAL HISTORY  09/12/2017    thoracic epidural T11    PAIN MANAGEMENT PROCEDURE Left 5/17/2022    SI RFA, LEFT SIDE FIRST performed by Brit Robles MD at [address] LEVEL Bilateral 7/25/2017    T-FACET MBB T7-8, T8-9, T11-12 BILATERAL performed by Brit Robles MD at [address] [place] [address]/THER [address] LUMBAR/SACRAL N/A 9/12/2017    TESI T11 performed by Brit Robles MD at [address] [address]  2010    [address] EXTRACTION  02/2019    UPPER GASTROINTESTINAL ENDOSCOPY  2010         MEDICATIONS   No current facility-administered medications for this encounter. Current Outpatient Medications:     ALPRAZolam (XANAX) 0.5 MG tablet, TAKE 1 TABLET BY MOUTH DAILY AS NEEDED FOR ANXIETY OR SLEEP, Disp: 30 tablet, Rfl: 2    albuterol sulfate HFA (PROAIR HFA) 108 (90 Base) MCG/ACT inhaler, Inhale 2 puffs into the lungs every 6 hours as needed for Wheezing or Shortness of Breath, Disp: 1 each, Rfl: 3    fluticasone-salmeterol (ADVAIR HFA) 115-21 MCG/ACT inhaler, Inhale 2 puffs into the lungs 2 times daily Rinse mouth after its use., Disp: 1 each, Rfl: 11    HYDROcodone-acetaminophen (NORCO) 5-325 MG per tablet, Take 1 tablet by mouth 2 times daily as needed for Pain for up to 30 days.  Take lowest dose possible to manage pain, Disp: 60 tablet, Rfl: 0    levothyroxine (SYNTHROID) 75 MCG tablet, TAKE 1 TABLET BY MOUTH EVERY DAY, Disp: 90 tablet, Rfl: 0    esomeprazole (NEXIUM) 40 MG delayed release capsule, TAKE 1 pack\"   Vaping Use    Vaping Use: Never used   Substance Use Topics    Alcohol use: Yes     Alcohol/week: 0.0 standard drinks     Comment: not for 3 mo    Drug use: No         ALLERGIES   No Known Allergies      FAMILY HISTORY     Family History   Problem Relation Age of Onset    Cancer Mother     Diabetes Father     Cancer Father     Heart Disease Father 28        CABG    High Blood Pressure Father     Diabetes Sister         AS A CHILD    Kidney Disease Sister     High Blood Pressure Sister     Cancer Brother     COPD Sister     Heart Disease Sister     Stroke Neg Hx          PREVIOUS RECORDS   Previous records reviewed: Patient last seen in October 2020 for contusion. PHYSICAL EXAM     ED Triage Vitals [07/10/22 1520]   BP Temp Temp Source Heart Rate Resp SpO2 Height Weight   (!) 146/75 98.4 °F (36.9 °C) Oral 81 18 100 % -- 200 lb (90.7 kg)     Initial vital signs and nursing assessment reviewed and normal. Body mass index is 31.32 kg/m². Pulsoximetry is normal per my interpretation. Additional Vital Signs:  Vitals:    07/10/22 1642   BP: 136/71   Pulse: 81   Resp:    Temp: 98.3 °F (36.8 °C)   SpO2:        Physical Exam  Constitutional:       General: He is not in acute distress. Appearance: Normal appearance. He is not ill-appearing, toxic-appearing or diaphoretic. HENT:      Head: Normocephalic and atraumatic. Mouth/Throat:      Mouth: Mucous membranes are moist.      Pharynx: Oropharynx is clear. No oropharyngeal exudate or posterior oropharyngeal erythema. Eyes:      Extraocular Movements: Extraocular movements intact. Conjunctiva/sclera: Conjunctivae normal.      Pupils: Pupils are equal, round, and reactive to light. Cardiovascular:      Rate and Rhythm: Normal rate and regular rhythm. Pulses: Normal pulses. Heart sounds: Normal heart sounds. No murmur heard. No friction rub. No gallop.     Pulmonary:      Effort: Pulmonary effort is normal.      Breath - Abnormal; Notable for the following components:       Result Value    WBC 11.4 (*)     RBC 4.20 (*)     Hemoglobin 12.8 (*)     Hematocrit 38.8 (*)     RDW-CV 17.2 (*)     RDW-SD 57.8 (*)     Segs Absolute 8.4 (*)     All other components within normal limits   COVID-19, RAPID   RAPID INFLUENZA A/B ANTIGENS   TROPONIN   BASIC METABOLIC PANEL W/ REFLEX TO MG FOR LOW K   ANION GAP   GLOMERULAR FILTRATION RATE, ESTIMATED   OSMOLALITY       Radiologic studies results:  CT HEAD WO CONTRAST   Final Result       1. No acute intracranial hemorrhage, mass effect or midline shift. 2. Large mucus retention cyst in the right maxillary sinus. **This report has been created using voice recognition software. It may contain minor errors which are inherent in voice recognition technology. **      Final report electronically signed by Dr Marybeth Concepcion on 7/10/2022 4:59 PM      XR CHEST PORTABLE   Final Result   Cardiomegaly with pulmonary vascular congestion. **This report has been created using voice recognition software. It may contain minor errors which are inherent in voice recognition technology. **      Final report electronically signed by Dr Marybeth Concepcion on 7/10/2022 3:55 PM          ED Medications administered this visit:   Medications   acetaminophen (TYLENOL) tablet 1,000 mg (1,000 mg Oral Given 7/10/22 1606)         ED COURSE          Strict return precautions and follow up instructions were discussed with the patient prior to discharge, with which the patient agrees. MEDICATION CHANGES     Current Discharge Medication List            FINAL DISPOSITION     Final diagnoses:   Lightheadedness   Chest pain, unspecified type     Condition: condition: stable  Dispo: Discharge to home      This transcription was electronically signed.  Parts of this transcriptions may have been dictated by use of voice recognition software and electronically transcribed, and parts may have been transcribed with the assistance of an ED scribe. The transcription may contain errors not detected in proofreading. Please refer to my supervising physician's documentation if my documentation differs.     Electronically Signed: Blayne De La Cruz DO, 07/10/22, 5:50 PM       Blayne De La Cruz DO  Resident  07/10/22 0381

## 2022-07-10 NOTE — ED PROVIDER NOTES

## 2022-07-11 ENCOUNTER — TELEPHONE (OUTPATIENT)
Dept: FAMILY MEDICINE CLINIC | Age: 57
End: 2022-07-11

## 2022-07-11 NOTE — LETTER
Priya Castillo  [facility], [address]  Phone: [phone]  Fax: [phone]     July 11, 2022    [address] [place] [address]    Dear Ben Oliveira,    Thank you for choosing our [facility] on 7/10/22. Your Provider wanted to make sure that you understand your discharge instructions and that you were able to fill any prescriptions that may have been ordered for you. Please contact the office at the above phone number if you were advised to follow up with your Provider, or if you have any further questions or needs. Also did you know -                            [facility] ([facility]) practices can often offer you an appointment on the same day that you call for acute issues. *We have some [facility] offices that offer Walk-in appointments; check our website for availability in your community, [email or web address].      *Evisits are now available for patients through [employer] [employer]. [facility] ([facility]) also offers video visits through [employer] [employer]. If you do not have MyChart and are interested, please contact the office and a staff member may assist you or go to [email or web address].       Sincerely,     Shanique Pineda DO and your [facility]

## 2022-07-14 ENCOUNTER — HOSPITAL ENCOUNTER (OUTPATIENT)
Age: 57
Discharge: HOME | End: 2022-07-14
Payer: COMMERCIAL

## 2022-07-14 LAB — LD: 246 U/L (ref 100–190)

## 2022-07-14 PROCEDURE — 36415 COLL VENOUS BLD VENIPUNCTURE: CPT

## 2022-07-14 PROCEDURE — 83615 LACTATE (LD) (LDH) ENZYME: CPT

## 2022-07-18 ENCOUNTER — TELEPHONE (OUTPATIENT)
Dept: PHYSICAL MEDICINE AND REHAB | Age: 57
End: 2022-07-18

## 2022-07-18 DIAGNOSIS — M54.16 LUMBAR RADICULOPATHY: ICD-10-CM

## 2022-07-18 DIAGNOSIS — M54.17 LUMBOSACRAL RADICULITIS: ICD-10-CM

## 2022-07-18 DIAGNOSIS — G89.4 CHRONIC PAIN SYNDROME: ICD-10-CM

## 2022-07-18 DIAGNOSIS — M47.816 LUMBAR FACET ARTHROPATHY: ICD-10-CM

## 2022-07-18 DIAGNOSIS — M46.1 SACROILIAC INFLAMMATION (HCC): ICD-10-CM

## 2022-07-18 DIAGNOSIS — M48.062 SPINAL STENOSIS OF LUMBAR REGION WITH NEUROGENIC CLAUDICATION: ICD-10-CM

## 2022-07-18 DIAGNOSIS — M47.816 LUMBAR SPONDYLOSIS: ICD-10-CM

## 2022-07-18 RX ORDER — HYDROCODONE BITARTRATE AND ACETAMINOPHEN 5; 325 MG/1; MG/1
1 TABLET ORAL 2 TIMES DAILY PRN
Qty: 60 TABLET | Refills: 0 | Status: CANCELLED | OUTPATIENT
Start: 2022-07-18 | End: 2022-08-17

## 2022-07-18 NOTE — TELEPHONE ENCOUNTER
OARRS reviewed. UDS: + for  .hydrocodone, gabapentin, xanax, THC, cotinine . Do not see ok for THC. PLEASE DENY IF NOT OK AND LET ME KNOW SO CAN CONTACT PT. FOR NEW SCREEN  Last seen: 6/2/2022.  Follow-up:   Future Appointments   Date Time Provider Coleen Sherrill   7/20/2022  7:45 AM Rosa Ribeiro, DO [address]   8/23/2022  3:40 PM STR CT IMAGING RM1  [facility] STR[facility] OUT EXP STR Radiolog   8/30/2022  3:00 PM JASWINDER Lucio - JONATHAN [facility] AM [address]   9/7/2022  7:30 AM JASWINDER Larios CNP N [facility] AM [address]   3/24/2023  8:30 AM Tanisha Maher MD N SRPX Heart [address] [facility]

## 2022-07-19 NOTE — TELEPHONE ENCOUNTER
LVM  regarding denial of pain med and to recontact us regarding and discuss what he needs to do to get script.

## 2022-07-19 NOTE — TELEPHONE ENCOUNTER
LVM of why denied refill on Norco and needs to come in today or tommorow to do repeat urine drug screen and can get refill once it results back and is appropiate.

## 2022-07-20 ENCOUNTER — OFFICE VISIT (OUTPATIENT)
Dept: FAMILY MEDICINE CLINIC | Age: 57
End: 2022-07-20
Payer: COMMERCIAL

## 2022-07-20 VITALS
HEART RATE: 64 BPM | DIASTOLIC BLOOD PRESSURE: 68 MMHG | BODY MASS INDEX: 29.81 KG/M2 | SYSTOLIC BLOOD PRESSURE: 160 MMHG | WEIGHT: 190.3 LBS | RESPIRATION RATE: 16 BRPM

## 2022-07-20 DIAGNOSIS — R06.02 SOB (SHORTNESS OF BREATH): ICD-10-CM

## 2022-07-20 DIAGNOSIS — F41.9 ANXIETY: ICD-10-CM

## 2022-07-20 DIAGNOSIS — J44.9 CHRONIC OBSTRUCTIVE PULMONARY DISEASE, UNSPECIFIED COPD TYPE (HCC): ICD-10-CM

## 2022-07-20 DIAGNOSIS — I10 ESSENTIAL HYPERTENSION: ICD-10-CM

## 2022-07-20 DIAGNOSIS — R42 EPISODIC LIGHTHEADEDNESS: Primary | ICD-10-CM

## 2022-07-20 PROCEDURE — 99214 OFFICE O/P EST MOD 30 MIN: CPT | Performed by: FAMILY MEDICINE

## 2022-07-20 SDOH — ECONOMIC STABILITY: FOOD INSECURITY: WITHIN THE PAST 12 MONTHS, YOU WORRIED THAT YOUR FOOD WOULD RUN OUT BEFORE YOU GOT MONEY TO BUY MORE.: NEVER TRUE

## 2022-07-20 SDOH — ECONOMIC STABILITY: FOOD INSECURITY: WITHIN THE PAST 12 MONTHS, THE FOOD YOU BOUGHT JUST DIDN'T LAST AND YOU DIDN'T HAVE MONEY TO GET MORE.: NEVER TRUE

## 2022-07-20 ASSESSMENT — ENCOUNTER SYMPTOMS
GASTROINTESTINAL NEGATIVE: 1
SHORTNESS OF BREATH: 1

## 2022-07-20 NOTE — PROGRESS NOTES
Elinor Argueta (:  1965) is a 59 y.o. male,Established patient, here for evaluation of the following chief complaint(s):  Follow-up and Dizziness        Subjective   SUBJECTIVE/OBJECTIVE:  HPI:    Chief Complaint   Patient presents with    Follow-up    Dizziness     ED follow up. CHIEF COMPLAINT            Chief Complaint   Patient presents with    Dizziness               HISTORY OF PRESENT ILLNESS    HPI  Elinor Argueta is a 59 y.o. male who presents to the emergency department for evaluation of lightheadedness and mid sternal chest pressure. Patient states this is been ongoing for at least 3 weeks. He has been followed by cardiology and pulmonology and they cannot seem to find an answer for his discomfort. Patient states that he has had associated left-sided headache in the evenings while he is relaxing that spontaneously resolved after 1 to 2 hours. He denies any associated blurred vision, numbness, tingling, nausea, vomiting or fever. Patient describes the chest pain as midsternal radiating up and down. Patient's pain levels 5 out of 10 and mainly attributable to his left-sided headache. Pt continues to have periodic lightheadedness and left-sided HA that will show up out of nowhere. Symptoms resolve in a few minutes. BP on high end today but has been ok. BP Readings from Last 3 Encounters:   22 (!) 160/68   07/10/22 136/71   22 132/80     Under a lot of stress and anxiety at work. Taking Xanax once daily.       Wt Readings from Last 3 Encounters:   22 190 lb 4.8 oz (86.3 kg)   07/10/22 200 lb (90.7 kg)   22 193 lb (87.5 kg)       Patient Active Problem List   Diagnosis    HTN (hypertension)    Lumbago    Lumbar radiculopathy    Cervicalgia, C5-7    Hyperlipidemia    Hypothyroidism    Chronic anxiety    ED (erectile dysfunction)    Bilateral sciatica    Trigger finger, right, ring     Medication monitoring encounter    Nocturnal leg cramps    GERD (gastroesophageal reflux disease)    IFG (impaired fasting glucose)    Situational depression    Dysthymia (or depressive neurosis)    Herniation of cervical intervertebral disc with radiculopathy    H/O cervical spine surgery, C4-5 fusion, 3/2/16. Gastrointestinal hemorrhage associated with gastrojejunal ulcer    S/P AVR (aortic valve replacement), 7/20/16. Tobacco abuse    Lymphomatoid papulosis     Lumbar radiculitis    Lumbar spinal stenosis    Spondylosis of thoracic region without myelopathy or radiculopathy    Thoracic spinal stenosis    Chronic pain syndrome    Urinary hesitancy    Malignant lymphoma, non-Hodgkin's (HCC)    Sacroiliac inflammation ([facility] [address]     Past Surgical History:   Procedure Laterality Date    AORTIC VALVE REPLACEMENT  2007    MECHANICAL [facility]    AORTIC VALVE REPLACEMENT  07/20/2016    extraction of mechanical valve and replacement with tissue valve    APPENDECTOMY  1980    BACK INJECTION Bilateral 1/3/2022    Bilateral SI injection performed by Dionne Lainez MD at [facility] Bilateral 3/14/2022    Bilateral SI MBB # 2 performed by Jerri High DO at [address] [place] [address].  5 20 2010    Patent coronary arteries. Possible causes of the patient's chest pain is likely noncardiac at least based on this angiogram. Patient chould be able to proceed w/ scheduled surgery w/ paying attention to anticoagulation and trying to minimize the period of no anticoagulation. CARDIAC CATHETERIZATION  2012, 6/9/16    [facility]    CARDIOVASCULAR STRESS TEST  4 15 2010    Moderate fixed inferior defect, possibly attenuation, cannot exclude a previous MI. Correlation w/ EKG is recommented. Significant degree of gut uptake which is likely to be the cause of the attenuation artifact seen.  EF 46%    CERVICAL FUSION  2010    CERVICAL FUSION  03/09/2016    REMOVAL HARDWARE C5-7, ACDF C4-6 WITH ATLANTIS CORNERSTONE    COLONOSCOPY  2010    CORONARY ARTERY BYPASS GRAFT      DILATATION, ESOPHAGUS      small resection    ENDOSCOPY, COLON, DIAGNOSTIC      NERVE SURGERY Bilateral 07/25/2017    THORACIC FACET BLOCK T7-8, T8-9, T11-12    OTHER SURGICAL HISTORY  05/02/2017    LESI L5    OTHER SURGICAL HISTORY  09/12/2017    thoracic epidural T11    PAIN MANAGEMENT PROCEDURE Left 5/17/2022    SI RFA, LEFT SIDE FIRST performed by Christin Ortega MD at [facility] [address] L/S,1 LEVEL Bilateral 7/25/2017    T-FACET MBB T7-8, T8-9, T11-12 BILATERAL performed by Christin Ortega MD at [facility] 84 DX/THER SBST EPIDURAL/SUBARACH LUMBAR/SACRAL N/A 9/12/2017    TESI T11 performed by Christin Ortega MD at [address] [place] [address]  2010    [address] EXTRACTION  02/2019    UPPER GASTROINTESTINAL ENDOSCOPY  2010     Prior to Admission medications    Medication Sig Start Date End Date Taking?  Authorizing Provider   ALPRAZolam [place] Rouleau) 0.5 MG tablet TAKE 1 TABLET BY MOUTH DAILY AS NEEDED FOR ANXIETY OR SLEEP 7/7/22 10/7/22 Yes Jermaine Freeman, DO   albuterol sulfate HFA (PROAIR HFA) 108 (90 Base) MCG/ACT inhaler Inhale 2 puffs into the lungs every 6 hours as needed for Wheezing or Shortness of Breath 6/23/22 6/23/23 Yes JASWINDER Vanessa CNP   fluticasone-salmeterol ([facility]) 115-21 MCG/ACT inhaler Inhale 2 puffs into the lungs 2 times daily Rinse mouth after its use. 6/23/22 12/20/22 Yes JASWINDER Vanessa CNP   levothyroxine (SYNTHROID) 75 MCG tablet TAKE 1 TABLET BY MOUTH EVERY DAY 5/16/22  Yes Jermaine Freeman DO   esomeprazole (NEXIUM) 40 MG delayed release capsule TAKE 1 CAPSULE BY MOUTH EVERY DAY IN THE MORNING BEFORE BREAKFAST 4/14/22  Yes Kayden Goodwin MD   nicotine (NICODERM CQ) 7 MG/24HR Place 1 patch onto the skin every 24 hours 4/11/22  Yes Kayden Goodwin MD   metoprolol succinate (TOPROL XL) 50 MG extended release tablet Take 1 tablet by mouth daily 3/28/22  Yes Kristin Dean MD   ibuprofen (ADVIL;MOTRIN) 400 MG tablet Take 400 mg by mouth every 6 hours as needed for Pain    Yes Historical Provider, MD   lidocaine viscous hcl (XYLOCAINE) 2 % SOLN solution Take 15 mLs by mouth as needed for Irritation 7/25/21  Yes Rosy Pedraza, APRN - CNP   aspirin 81 MG EC tablet Take 1 tablet by mouth daily 2/25/21  Yes Kristin Dean MD   buPROPion (WELLBUTRIN) 100 MG tablet Take 1 tablet by mouth 2 times daily 10/8/20  Yes Jaci Love MD   indomethacin (INDOCIN) 25 MG capsule Take 1 capsule by mouth 3 times daily (with meals) Take 1 tablet daily for 10 days. 10/8/20  Yes Jaci Love MD   clobetasol (TEMOVATE) 0.05 % cream APPLY TOPICALLY TO RIGHT INDEX FINGER 3 TIMES DAILY 6/15/18  Yes Historical Provider, MD   Cholecalciferol (VITAMIN D3) 1000 units TABS Take 2,000 Units by mouth Daily    Yes Historical Provider, MD    MG capsule TAKE 1 CAPSULE BY MOUTH ONE TIME A DAY AS NEEDED for constipation 3/28/18  Yes Jaci Love MD   IRON PO Take 65 mg by mouth daily    Yes Historical Provider, MD   Multiple Vitamin (MULTIVITAMIN PO) Take 1 tablet by mouth daily    Yes Historical Provider, MD       Review of Systems   Constitutional: Negative. HENT: Negative. Respiratory:  Positive for shortness of breath. Cardiovascular: Negative. Gastrointestinal: Negative. Musculoskeletal: Negative. Neurological:  Positive for dizziness, light-headedness and headaches. All other systems reviewed and are negative. Objective   Physical Exam  Vitals and nursing note reviewed. Constitutional:       General: He is not in acute distress. Appearance: Normal appearance. He is well-developed. HENT:      Head: Normocephalic and atraumatic.       Right Ear: Tympanic membrane normal.      Left Ear: Tympanic membrane normal.   Eyes:      Conjunctiva/sclera: Conjunctivae normal.   Cardiovascular:      Rate and Rhythm: Normal rate and regular rhythm. Heart sounds: Normal heart sounds. No murmur heard. Pulmonary:      Effort: Pulmonary effort is normal.      Breath sounds: Normal breath sounds. No wheezing, rhonchi or rales. Abdominal:      General: There is no distension. Musculoskeletal:      Cervical back: Neck supple. Skin:     General: Skin is warm and dry. Findings: No rash (on exposed surfaces). Neurological:      General: No focal deficit present. Mental Status: He is alert. Psychiatric:         Attention and Perception: Attention normal.         Mood and Affect: Mood normal.         Speech: Speech normal.         Behavior: Behavior normal. Behavior is cooperative. Thought Content: Thought content normal.         Judgment: Judgment normal.             ASSESSMENT/PLAN:  1. Episodic lightheadedness  -     VL DUP CAROTID BILATERAL; Future  2. Anxiety  3. Essential hypertension  4. SOB (shortness of breath)  5. Chronic obstructive pulmonary disease, unspecified COPD type ([facility] [address]    -  ED reports reviewed  -  Chronic medical problems stable  -  Continue current medications  -  Follow up with specialists as scheduled  -  Check carotid US, will call with results    Return if symptoms worsen or fail to improve. An electronic signature was used to authenticate this note.     --Philip Alexander, DO

## 2022-07-26 ENCOUNTER — HOSPITAL ENCOUNTER (OUTPATIENT)
Dept: INTERVENTIONAL RADIOLOGY/VASCULAR | Age: 57
Discharge: HOME | End: 2022-07-26
Payer: COMMERCIAL

## 2022-07-26 DIAGNOSIS — R42 EPISODIC LIGHTHEADEDNESS: ICD-10-CM

## 2022-07-26 PROCEDURE — 93880 EXTRACRANIAL BILAT STUDY: CPT

## 2022-08-03 RX ORDER — ASPIRIN 81 MG/1
TABLET, DELAYED RELEASE ORAL
Qty: 90 TABLET | Refills: 3 | Status: SHIPPED | OUTPATIENT
Start: 2022-08-03

## 2022-08-17 ENCOUNTER — HOSPITAL ENCOUNTER (OUTPATIENT)
Age: 57
Discharge: HOME | End: 2022-08-17
Payer: COMMERCIAL

## 2022-08-17 LAB
ALT SERPL-CCNC: 56 U/L (ref 11–66)
AST SERPL-CCNC: 40 U/L (ref 5–40)
BUN BLDV-MCNC: 16 MG/DL (ref 7–22)
CREAT SERPL-MCNC: 0.8 MG/DL (ref 0.4–1.2)
ERYTHROCYTE [DISTWIDTH] IN BLOOD BY AUTOMATED COUNT: 17.1 % (ref 11.5–14.5)
ERYTHROCYTE [DISTWIDTH] IN BLOOD BY AUTOMATED COUNT: 58.5 FL (ref 35–45)
GFR SERPL CREATININE-BSD FRML MDRD: > 90 ML/MIN/1.73M2
HCT VFR BLD CALC: 41.8 % (ref 42–52)
HEMOGLOBIN: 13.4 GM/DL (ref 14–18)
MCH RBC QN AUTO: 30.5 PG (ref 26–33)
MCHC RBC AUTO-ENTMCNC: 32.1 GM/DL (ref 32.2–35.5)
MCV RBC AUTO: 95.2 FL (ref 80–94)
PLATELET # BLD: 279 THOU/MM3 (ref 130–400)
PMV BLD AUTO: 9.9 FL (ref 9.4–12.4)
RBC # BLD: 4.39 MILL/MM3 (ref 4.7–6.1)
WBC # BLD: 7.8 THOU/MM3 (ref 4.8–10.8)

## 2022-08-17 PROCEDURE — 84520 ASSAY OF UREA NITROGEN: CPT

## 2022-08-17 PROCEDURE — 84460 ALANINE AMINO (ALT) (SGPT): CPT

## 2022-08-17 PROCEDURE — 84450 TRANSFERASE (AST) (SGOT): CPT

## 2022-08-17 PROCEDURE — 82565 ASSAY OF CREATININE: CPT

## 2022-08-17 PROCEDURE — 85027 COMPLETE CBC AUTOMATED: CPT

## 2022-08-17 PROCEDURE — 36415 COLL VENOUS BLD VENIPUNCTURE: CPT

## 2022-08-23 ENCOUNTER — HOSPITAL ENCOUNTER (OUTPATIENT)
Dept: CT IMAGING | Age: 57
Discharge: HOME | End: 2022-08-23
Payer: COMMERCIAL

## 2022-08-23 DIAGNOSIS — J44.9 COPD, MILD (HCC): ICD-10-CM

## 2022-08-23 DIAGNOSIS — E03.9 ACQUIRED HYPOTHYROIDISM: ICD-10-CM

## 2022-08-23 DIAGNOSIS — R06.02 SOB (SHORTNESS OF BREATH): ICD-10-CM

## 2022-08-23 DIAGNOSIS — F17.200 CURRENT SMOKER: ICD-10-CM

## 2022-08-23 PROCEDURE — 71271 CT THORAX LUNG CANCER SCR C-: CPT

## 2022-08-24 RX ORDER — LEVOTHYROXINE SODIUM 0.07 MG/1
TABLET ORAL
Qty: 90 TABLET | Refills: 0 | Status: SHIPPED | OUTPATIENT
Start: 2022-08-24

## 2022-08-28 ENCOUNTER — HOSPITAL ENCOUNTER (EMERGENCY)
Age: 57
Discharge: HOME | End: 2022-08-28
Payer: COMMERCIAL

## 2022-08-28 VITALS
BODY MASS INDEX: 28.25 KG/M2 | WEIGHT: 180 LBS | TEMPERATURE: 98.3 F | SYSTOLIC BLOOD PRESSURE: 174 MMHG | DIASTOLIC BLOOD PRESSURE: 84 MMHG | RESPIRATION RATE: 18 BRPM | HEART RATE: 83 BPM | HEIGHT: 67 IN | OXYGEN SATURATION: 98 %

## 2022-08-28 DIAGNOSIS — J06.9 VIRAL URI WITH COUGH: Primary | ICD-10-CM

## 2022-08-28 DIAGNOSIS — F17.200 TOBACCO DEPENDENCY: ICD-10-CM

## 2022-08-28 LAB
FLU A ANTIGEN: NEGATIVE
FLU B ANTIGEN: NEGATIVE
SARS-COV-2, NAAT: NOT  DETECTED

## 2022-08-28 PROCEDURE — 87804 INFLUENZA ASSAY W/OPTIC: CPT

## 2022-08-28 PROCEDURE — 99283 EMERGENCY DEPT VISIT LOW MDM: CPT

## 2022-08-28 PROCEDURE — 87635 SARS-COV-2 COVID-19 AMP PRB: CPT

## 2022-08-28 ASSESSMENT — PAIN - FUNCTIONAL ASSESSMENT: PAIN_FUNCTIONAL_ASSESSMENT: NONE - DENIES PAIN

## 2022-08-28 NOTE — LETTER
[facility] [address] [address] EMERGENCY DEPT  [address] [place] [address]  Phone: [phone]               August 28, 2022    Patient: Munir Pradhan   YOB: 1965   Date of Visit: 8/28/2022       To Whom It May Concern:    Dimas Sanchez was seen and treated in our emergency department on 8/28/2022. He may return to work on ***.       Sincerely,       ALFA FRAZIER RN         Signature:__________________________________

## 2022-08-28 NOTE — Clinical Note
Dipti Flowers was seen and treated in our emergency department on 8/28/2022. He may return to work on 08/29/2022. Your Covid test was negative. If you have any questions or concerns, please don't hesitate to call.       Asya Estrada PA-C

## 2022-08-29 ENCOUNTER — TELEPHONE (OUTPATIENT)
Dept: FAMILY MEDICINE CLINIC | Age: 57
End: 2022-08-29

## 2022-08-29 ASSESSMENT — ENCOUNTER SYMPTOMS
COUGH: 1
SINUS PRESSURE: 1
NAUSEA: 0
VOMITING: 0
DIARRHEA: 0
RHINORRHEA: 1
EYE DISCHARGE: 1
SHORTNESS OF BREATH: 0
SORE THROAT: 0
ABDOMINAL PAIN: 0

## 2022-08-29 NOTE — LETTER
Priya OLIVIA AM [address], [address]  Phone: [phone]  Fax: [phone]     August 29, 2022    [address] [place] [address]    Dear Shani Donnelly,    Thank you for choosing our [facility] on 8/28/22. Your Provider wanted to make sure that you understand your discharge instructions and that you were able to fill any prescriptions that may have been ordered for you. Please contact the office at the above phone number if you were advised to follow up with your Provider, or if you have any further questions or needs. Also did you know -                            [facility] ([facility]) practices can often offer you an appointment on the same day that you call for acute issues. *We have some [facility] offices that offer Walk-in appointments; check our website for availability in your community, [email or web address].      *Evisits are now available for patients through [address] [facility]) also offers video visits through [address] If you do not have MyChart and are interested, please contact the office and a staff member may assist you or go to [email or web address].       Sincerely,     Trudee Cabot, DO and your [facility]

## 2022-08-29 NOTE — ED PROVIDER NOTES
[facility] EMERGENCY DEPT      CHIEF COMPLAINT       Chief Complaint   Patient presents with    Concern For COVID-19       Nurses Notes reviewed and I agree except as noted in the HPI. HISTORY OF PRESENT ILLNESS    Elizabeth Reich is a 59 y.o. male who presents for COVID testing. Patient reports being sick for the last few days with nasal congestion, cough, watery eyes, sinus headache, and sneezing. Patient thought it might be allergies and took Guerraview which helped today. Patient works at [employer] and wanted tested for [place] before he returned to work. Patient denies fever, chills, chest pain, dyspnea, vomiting, diarrhea, weakness, vision changes, or other complaints. The patient is a smoker. The patient sees his pulmonologist in 2 days. Patient has an inhaler at home which he does not use. REVIEW OF SYSTEMS     Review of Systems   Constitutional:  Negative for activity change, appetite change, chills, fatigue and fever. HENT:  Positive for congestion, rhinorrhea, sinus pressure and sneezing. Negative for ear pain and sore throat. Eyes:  Positive for discharge. Negative for visual disturbance. Respiratory:  Positive for cough. Negative for shortness of breath. Cardiovascular:  Negative for chest pain. Gastrointestinal:  Negative for abdominal pain, diarrhea, nausea and vomiting. Genitourinary:  Negative for decreased urine volume. Musculoskeletal:  Negative for gait problem and myalgias. Skin:  Negative for rash. Neurological:  Positive for headaches. Negative for weakness and light-headedness. Psychiatric/Behavioral:  Negative for sleep disturbance.        PAST MEDICAL HISTORY    has a past medical history of Arthritis, Bilateral sciatica, Bleeding, CAD (coronary artery disease), Carpal tunnel syndrome, Cervicalgia, C5-7, Chronic anxiety, Dysthymia (or depressive neurosis), ED (erectile dysfunction), ED (erectile dysfunction), GERD (gastroesophageal reflux disease), Headache(784.0), History of blood transfusion, HTN (hypertension), Hyperlipidemia, Hypothyroidism, Iron deficiency anemia, blood loss, Lumbago, Lumbar radiculopathy, Osteoarthritis (arthritis due to wear and tear of joints), S/P AVR, coumadin Tx, and Spondylosis of thoracic region without myelopathy or radiculopathy. SURGICAL HISTORY      has a past surgical history that includes cardiovascular stress test (4 15 2010); Appendectomy (1980); Colonoscopy (2010); Small intestine surgery (2010); Upper gastrointestinal endoscopy (2010); Aortic valve replacement (2007); Endoscopy, colon, diagnostic; cervical fusion (2010); cervical fusion (03/09/2016); Cardiac catheterization (5 20 2010); Cardiac catheterization (2012, 6/9/16); Dilatation, esophagus; Aortic valve replacement (07/20/2016); Coronary artery bypass graft; other surgical history (05/02/2017); Nerve Surgery (Bilateral, 07/25/2017); pr inj,paravertebral l/s,1 level (Bilateral, 7/25/2017); other surgical history (09/12/2017); pr njx dx/ther sbst epidural/subarach lumbar/sacral (N/A, 9/12/2017); Tooth Extraction (02/2019); Back Injection (Bilateral, 1/3/2022); Back Injection (Bilateral, 3/14/2022); and Pain management procedure (Left, 5/17/2022).     CURRENT MEDICATIONS       Discharge Medication List as of 8/28/2022  9:52 PM        CONTINUE these medications which have NOT CHANGED    Details   SM ASPIRIN ADULT LOW STRENGTH 81 MG EC tablet TAKE 1 TABLET BY MOUTH EVERY DAY, Disp-90 tablet, R-3Normal      levothyroxine (SYNTHROID) 75 MCG tablet TAKE 1 TABLET BY MOUTH EVERY DAY, Disp-90 tablet, R-0Normal      ALPRAZolam (XANAX) 0.5 MG tablet TAKE 1 TABLET BY MOUTH DAILY AS NEEDED FOR ANXIETY OR SLEEP, Disp-30 tablet, R-2Normal      albuterol sulfate HFA (PROAIR HFA) 108 (90 Base) MCG/ACT inhaler Inhale 2 puffs into the lungs every 6 hours as needed for Wheezing or Shortness of Breath, Disp-1 each, R-3Normal      fluticasone-salmeterol (ADVAIR HFA) 115-21 MCG/ACT inhaler Inhale 2 puffs into the lungs 2 times daily Rinse mouth after its use., Disp-1 each, R-11Normal      esomeprazole (NEXIUM) 40 MG delayed release capsule TAKE 1 CAPSULE BY MOUTH EVERY DAY IN THE MORNING BEFORE BREAKFAST, Disp-30 capsule, R-11Normal      nicotine (NICODERM CQ) 7 MG/24HR Place 1 patch onto the skin every 24 hours, Disp-30 patch, R-1Normal      metoprolol succinate (TOPROL XL) 50 MG extended release tablet Take 1 tablet by mouth daily, Disp-90 tablet, R-3Normal      ibuprofen (ADVIL;MOTRIN) 400 MG tablet Take 400 mg by mouth every 6 hours as needed for Pain Historical Med      lidocaine viscous hcl (XYLOCAINE) 2 % SOLN solution Take 15 mLs by mouth as needed for Irritation, Disp-100 mL, R-1Normal      buPROPion (WELLBUTRIN) 100 MG tablet Take 1 tablet by mouth 2 times daily, Disp-180 tablet,R-3Normal      indomethacin (INDOCIN) 25 MG capsule Take 1 capsule by mouth 3 times daily (with meals) Take 1 tablet daily for 10 days. , Disp-21 capsule,R-0Normal      clobetasol (TEMOVATE) 0.05 % cream APPLY TOPICALLY TO RIGHT INDEX FINGER 3 TIMES DAILY, R-2, Historical Med      Cholecalciferol (VITAMIN D3) 1000 units TABS Take 2,000 Units by mouth Daily Historical Med       MG capsule TAKE 1 CAPSULE BY MOUTH ONE TIME A DAY AS NEEDED for constipation, Disp-30 capsule, R-10Normal      IRON PO Take 65 mg by mouth daily Historical Med      Multiple Vitamin (MULTIVITAMIN PO) Take 1 tablet by mouth daily              ALLERGIES     has No Known Allergies. FAMILY HISTORY     He indicated that his mother is . He indicated that his father is . He indicated that two of his three sisters are alive. He indicated that his brother is alive. He indicated that the status of his neg hx is unknown.   family history includes COPD in his sister; Cancer in his brother, father, and mother; Diabetes in his father and sister; Heart Disease in his sister;  Heart Disease (age of onset: 28) in his father; High Blood Pressure in his father and sister; Kidney Disease in his sister. SOCIAL HISTORY    reports that he has been smoking cigarettes and cigars. He started smoking about 44 years ago. He has a 22.00 pack-year smoking history. He has never used smokeless tobacco. He reports current alcohol use. He reports that he does not use drugs. PHYSICAL EXAM     INITIAL VITALS:  height is 5' 7\" (1.702 m) and weight is 180 lb (81.6 kg). His oral temperature is 98.3 °F (36.8 °C). His blood pressure is 174/84 (abnormal) and his pulse is 83. His respiration is 18 and oxygen saturation is 98%. Physical Exam  Vitals and nursing note reviewed. Constitutional:       General: He is not in acute distress. Appearance: He is well-developed. He is not toxic-appearing or diaphoretic. HENT:      Head: Normocephalic and atraumatic. Right Ear: Hearing normal.      Left Ear: Hearing normal.      Nose: Nose normal. No rhinorrhea. Mouth/Throat:      Pharynx: Uvula midline. No oropharyngeal exudate. Eyes:      General: Lids are normal. No scleral icterus. Conjunctiva/sclera: Conjunctivae normal.      Pupils: Pupils are equal, round, and reactive to light. Neck:      Trachea: No tracheal deviation. Cardiovascular:      Rate and Rhythm: Normal rate and regular rhythm. Heart sounds: Normal heart sounds. No murmur heard. Pulmonary:      Effort: Pulmonary effort is normal. No respiratory distress. Breath sounds: No stridor. Examination of the right-lower field reveals wheezing. Wheezing present. No decreased breath sounds. Comments: Wheeze right base cleared with cough  Abdominal:      General: There is no distension. Palpations: Abdomen is soft. Abdomen is not rigid. Tenderness: There is no abdominal tenderness. There is no guarding. Musculoskeletal:         General: Normal range of motion. Cervical back: Normal range of motion and neck supple. No rigidity.    Lymphadenopathy: Cervical: No cervical adenopathy. Skin:     General: Skin is warm and dry. Coloration: Skin is not pale. Findings: No rash. Neurological:      Mental Status: He is alert and oriented to person, place, and time. GCS: GCS eye subscore is 4. GCS verbal subscore is 5. GCS motor subscore is 6. Gait: Gait normal.      Comments: No gross deficits observed   Psychiatric:         Mood and Affect: Mood normal.         Speech: Speech normal.         Behavior: Behavior normal.         Thought Content: Thought content normal.       DIFFERENTIAL DIAGNOSIS:   Including but not limited to: Seasonal allergies, sinusitis, common cold, COVID    DIAGNOSTIC RESULTS     EKG: All E[facility]'s are [facility] who either signs or Co-signs this chart in the absence of a cardiologist.  None    RADIOLOGY: non-plain film images(s) such as CT,Ultrasound and MRI are read by the radiologist.  Plain radiographic images are visualized and preliminarily interpreted by the emergency physician unless otherwise stated below. No orders to display       LABS:   Labs Reviewed   COVID-19, RAPID   RAPID INFLUENZA A/B ANTIGENS       EMERGENCY DEPARTMENT COURSE:   Vitals:    Vitals:    08/28/22 2019   BP: (!) 174/84   Pulse: 83   Resp: 18   Temp: 98.3 °F (36.8 °C)   TempSrc: Oral   SpO2: 98%   Weight: 180 lb (81.6 kg)   Height: 5' 7\" (1.702 m)           MDM:  The patient was seen and evaluated by me in the intake area. Vital signs were reviewed and noted stable. Physical exam revealed a nontoxic-appearing 57-year-old gentleman with a wheeze in the right base that cleared with cough. Appropriate testing was ordered. Results were reviewed by me upon completion. Results showed negative COVID and flu. Results were discussed with the patient and discharge plan was discussed. Patient would continue with Gurmeet Peace and be provided a prescription for high-dose guaifenesin. Anticipatory guidance given.  I have given the patient strict written and verbal instructions about care at home, follow-up, and signs and symptoms of worsening of condition and they did verbalize understanding. CRITICAL CARE:   None    CONSULTS:  None    PROCEDURES:  None    FINAL IMPRESSION      1. Viral URI with cough    2. Tobacco dependency          DISPOSITION/PLAN     1. Viral URI with cough    2.  Tobacco dependency        PATIENT REFERRED TO:  Author JASWINDER Cramer - CNP  [address] [facility] [address] [phone]      in 2 days as aschuled      DISCHARGE MEDICATIONS:  Discharge Medication List as of 8/28/2022  9:52 PM          (Please note that portions of this note were completed with a voice recognition program.  Efforts were made to edit the dictations but occasionally words are mis-transcribed.)    Aurelio Kamara PA-C 08/29/22 1:04 AM    RAMÓN Martin PA-C  08/29/22 0109

## 2022-08-29 NOTE — ED TRIAGE NOTES
Pt presents to the ED for a covid test. Pt states that he has had a cough and chills intermittently for the last few days.  Pt states he works in [employer] and wants to get tested for [place] before retuning to work tomorrow

## 2022-08-30 ENCOUNTER — OFFICE VISIT (OUTPATIENT)
Dept: PULMONOLOGY | Age: 57
End: 2022-08-30
Payer: COMMERCIAL

## 2022-08-30 VITALS
OXYGEN SATURATION: 95 % | TEMPERATURE: 98.7 F | WEIGHT: 188.2 LBS | SYSTOLIC BLOOD PRESSURE: 136 MMHG | HEIGHT: 67 IN | BODY MASS INDEX: 29.54 KG/M2 | HEART RATE: 86 BPM | DIASTOLIC BLOOD PRESSURE: 80 MMHG

## 2022-08-30 DIAGNOSIS — F17.200 CURRENT SMOKER: ICD-10-CM

## 2022-08-30 DIAGNOSIS — J44.9 COPD, MILD (HCC): Primary | ICD-10-CM

## 2022-08-30 DIAGNOSIS — R05.9 COUGH: ICD-10-CM

## 2022-08-30 DIAGNOSIS — J01.80 OTHER ACUTE SINUSITIS, RECURRENCE NOT SPECIFIED: ICD-10-CM

## 2022-08-30 DIAGNOSIS — Z87.891 PERSONAL HISTORY OF TOBACCO USE: ICD-10-CM

## 2022-08-30 PROCEDURE — 99214 OFFICE O/P EST MOD 30 MIN: CPT | Performed by: NURSE PRACTITIONER

## 2022-08-30 PROCEDURE — G0296 VISIT TO DETERM LDCT ELIG: HCPCS | Performed by: NURSE PRACTITIONER

## 2022-08-30 RX ORDER — AMOXICILLIN AND CLAVULANATE POTASSIUM 875; 125 MG/1; MG/1
1 TABLET, FILM COATED ORAL 2 TIMES DAILY
Qty: 14 TABLET | Refills: 0 | Status: SHIPPED | OUTPATIENT
Start: 2022-08-30 | End: 2022-09-06

## 2022-08-30 RX ORDER — BENZONATATE 100 MG/1
100-200 CAPSULE ORAL 3 TIMES DAILY PRN
Qty: 60 CAPSULE | Refills: 0 | Status: SHIPPED | OUTPATIENT
Start: 2022-08-30 | End: 2022-09-09

## 2022-08-30 ASSESSMENT — ENCOUNTER SYMPTOMS
COUGH: 1
CHEST TIGHTNESS: 0
NAUSEA: 0
SHORTNESS OF BREATH: 1
WHEEZING: 0
STRIDOR: 0
DIARRHEA: 0
GASTROINTESTINAL NEGATIVE: 1
SINUS PRESSURE: 1
EYE DISCHARGE: 1
SINUS PAIN: 1
VOMITING: 0

## 2022-08-30 NOTE — PROGRESS NOTES
Patient is experiencing SOB: No    Patient is experiencing wheezing: yes     Patient states they have had a Strong, productive = 1 cough. Scratchy throat watery eyes     Phlegm is daily is light yellow what comes out his nose is clear discharge     Patient is coughing up blood: no    Patient has been experiencing chest pains: chest tightness     Patient is currently taking the following inhaler(s): Sylvain Query is was 100 dollar for him so he hasn't had it he has a lot of medical bills     Patient is currently taking the following nebulizer treatment(s): none    Patient is using their rescue inhaler 1-2 times per week.     Other: has skin disease fighting a cold/ allergies went through 2 boxes of allegra doesn't have covid   July 29 was at ER 8/28/22 was off work for 28 days due to his wound on his leg

## 2022-08-30 NOTE — PROGRESS NOTES
[facility]    Patient: Rosalba Law, 59 y.o.   : 1965      Subjective     Chief Complaint   Patient presents with    Follow-up     COPD/SOB. 2-3 months f/u with ctld         HPI  Kye Gonzalez is here for follow up for COPD with LDCT to review. Mild/Moderate COPD- slight restriction - good peripheral response to bronchodilator   A1A MM  No home oxygen use- 6MWT done last visit   Inhaler Albuterol only - can't afford Advair   Continues to smoke   NIOX done last visit (-) for any significant airway inflammation  Requesting RX for cough today     Patient is experiencing SOB: No     Patient is experiencing wheezing: yes      Patient states they have had a Strong, productive = 1 cough. Scratchy throat watery eyes      Phlegm is daily is light yellow what comes out his nose is clear discharge      Patient is coughing up blood: no     Patient has been experiencing chest pains: chest tightness      Patient is currently taking the following inhaler(s): Page Trotter is was 100 dollar for him so he hasn't had it he has a lot of medical bills      Patient is currently taking the following nebulizer treatment(s): none     Patient is using their rescue inhaler 1-2 times per week. Other: has skin disease fighting a cold/ allergies went through 2 boxes of allegra doesn't have covid    was at ER 22 was off work for 28 days due to his wound on his leg     Progress History:   Since last visit any new medical issues? Yes   New ER or hospital visits? Yes ED visit viral URI with cough   Any new or changes in medicines? No  Using inhalers? Yes Albuterol only   Are they helpful?  Yes     Past Medical hx   PMH:  Past Medical History:   Diagnosis Date    Arthritis     general    Bilateral sciatica     Bleeding     with coumadin    CAD (coronary artery disease)     Carpal tunnel syndrome     RIGHT    Cervicalgia, C5-7     Chronic anxiety     Dysthymia (or depressive neurosis) 2/1/2016    ED (erectile dysfunction)     ED (erectile dysfunction)     GERD (gastroesophageal reflux disease)     Headache(784.0)     History of blood transfusion 03/2016    HTN (hypertension)     Hyperlipidemia     Hypothyroidism     Iron deficiency anemia, blood loss     Lumbago     Lumbar radiculopathy     Osteoarthritis (arthritis due to wear and tear of joints)     S/P AVR, coumadin Tx     Spondylosis of thoracic region without myelopathy or radiculopathy      SURGICAL HISTORY:  Past Surgical History:   Procedure Laterality Date    AORTIC VALVE REPLACEMENT  2007    MECHANICAL [facility]    AORTIC VALVE REPLACEMENT  07/20/2016    extraction of mechanical valve and replacement with tissue valve    APPENDECTOMY  1980    BACK INJECTION Bilateral 1/3/2022    Bilateral SI injection performed by Steffen Hill MD at [facility] Bilateral 3/14/2022    Bilateral SI MBB # 2 performed by Haley Phillips DO at [address] [place] [address].  5 20 2010    Patent coronary arteries. Possible causes of the patient's chest pain is likely noncardiac at least based on this angiogram. Patient chould be able to proceed w/ scheduled surgery w/ paying attention to anticoagulation and trying to minimize the period of no anticoagulation. CARDIAC CATHETERIZATION  2012, 6/9/16    [facility]    CARDIOVASCULAR STRESS TEST  4 15 2010    Moderate fixed inferior defect, possibly attenuation, cannot exclude a previous MI. Correlation w/ EKG is recommented. Significant degree of gut uptake which is likely to be the cause of the attenuation artifact seen.  EF 46%    CERVICAL FUSION  2010    CERVICAL FUSION  03/09/2016    REMOVAL HARDWARE C5-7, ACDF C4-6 WITH ATLANTIS CORNERSTONE    COLONOSCOPY  2010    CORONARY ARTERY BYPASS GRAFT      DILATATION, ESOPHAGUS      small resection    ENDOSCOPY, COLON, DIAGNOSTIC      NERVE SURGERY Bilateral 07/25/2017    THORACIC FACET BLOCK T7-8, T8-9, days 14 tablet 0    levothyroxine (SYNTHROID) 75 MCG tablet TAKE 1 TABLET BY MOUTH EVERY DAY 90 tablet 0    SM ASPIRIN ADULT LOW STRENGTH 81 MG EC tablet TAKE 1 TABLET BY MOUTH EVERY DAY 90 tablet 3    ALPRAZolam (XANAX) 0.5 MG tablet TAKE 1 TABLET BY MOUTH DAILY AS NEEDED FOR ANXIETY OR SLEEP 30 tablet 2    albuterol sulfate HFA (PROAIR HFA) 108 (90 Base) MCG/ACT inhaler Inhale 2 puffs into the lungs every 6 hours as needed for Wheezing or Shortness of Breath 1 each 3    esomeprazole (NEXIUM) 40 MG delayed release capsule TAKE 1 CAPSULE BY MOUTH EVERY DAY IN THE MORNING BEFORE BREAKFAST 30 capsule 11    nicotine (NICODERM CQ) 7 MG/24HR Place 1 patch onto the skin every 24 hours 30 patch 1    metoprolol succinate (TOPROL XL) 50 MG extended release tablet Take 1 tablet by mouth daily 90 tablet 3    ibuprofen (ADVIL;MOTRIN) 400 MG tablet Take 400 mg by mouth every 6 hours as needed for Pain       lidocaine viscous hcl (XYLOCAINE) 2 % SOLN solution Take 15 mLs by mouth as needed for Irritation 100 mL 1    buPROPion (WELLBUTRIN) 100 MG tablet Take 1 tablet by mouth 2 times daily 180 tablet 3    indomethacin (INDOCIN) 25 MG capsule Take 1 capsule by mouth 3 times daily (with meals) Take 1 tablet daily for 10 days. 21 capsule 0    clobetasol (TEMOVATE) 0.05 % cream APPLY TOPICALLY TO RIGHT INDEX FINGER 3 TIMES DAILY  2    Cholecalciferol (VITAMIN D3) 1000 units TABS Take 2,000 Units by mouth Daily        MG capsule TAKE 1 CAPSULE BY MOUTH ONE TIME A DAY AS NEEDED for constipation 30 capsule 10    IRON PO Take 65 mg by mouth daily       Multiple Vitamin (MULTIVITAMIN PO) Take 1 tablet by mouth daily        No current facility-administered medications for this visit. ROS   Review of Systems   Constitutional: Negative. Negative for chills, fever and unexpected weight change. HENT:  Positive for congestion, postnasal drip, sinus pressure and sinus pain. Eyes:  Positive for discharge.    Respiratory: Positive for cough and shortness of breath (more with exertion). Negative for chest tightness, wheezing and stridor. Cardiovascular:  Negative for chest pain and leg swelling. Gastrointestinal: Negative. Negative for diarrhea, nausea and vomiting. Endocrine: Negative. Genitourinary: Negative. Negative for dysuria. Musculoskeletal: Negative. Skin:  Positive for wound. Allergic/Immunologic: Positive for environmental allergies. Neurological: Negative. Hematological: Negative. Psychiatric/Behavioral: Negative. Physical exam   /80 (Site: Right Upper Arm, Position: Sitting, Cuff Size: Medium Adult)   Pulse 86   Temp 98.7 °F (37.1 °C) (Temporal)   Ht 5' 7\" (1.702 m)   Wt 188 lb 3.2 oz (85.4 kg)   SpO2 95% Comment: on RA  BMI 29.48 kg/m²    Wt Readings from Last 3 Encounters:   08/30/22 188 lb 3.2 oz (85.4 kg)   08/28/22 180 lb (81.6 kg)   07/20/22 190 lb 4.8 oz (86.3 kg)       Physical Exam  Vitals and nursing note reviewed. Constitutional:       General: He is not in acute distress. Appearance: He is well-developed. HENT:      Head: Normocephalic and atraumatic. Neck:      Trachea: No tracheal deviation. Cardiovascular:      Rate and Rhythm: Normal rate and regular rhythm. Heart sounds: Normal heart sounds. No murmur heard. Pulmonary:      Effort: Pulmonary effort is normal. No respiratory distress. Breath sounds: No stridor. Wheezing (intermittent expiratory wheezing) present. No rales. Chest:      Chest wall: No tenderness. Abdominal:      General: Bowel sounds are normal. There is no distension. Palpations: Abdomen is soft. Skin:     General: Skin is warm and dry. Capillary Refill: Capillary refill takes less than 2 seconds. Neurological:      Mental Status: He is alert and oriented to person, place, and time. Psychiatric:         Behavior: Behavior normal.         Thought Content:  Thought content normal.         Judgment: Judgment normal.        results   Lung Nodule Screening     [x] Qualifies    [] Does not qualify   [] Declined    [x] Completed    The USPSTF recommends annual screening for lung cancer with low-dose computed tomography (LDCT) in adults aged 48 to [de-identified] years who have a 30 pack-year smoking history and currently smoke or have quit within the past 20 years. Screening should be discontinued once a person has not smoked for 20 years or develops a health problem that substantially limits life expectancy or the ability or willingness to have curative lung surgery. 8/23/2022   NONCONTRAST SCREENING CT CHEST:     FINDINGS: Heart/mediastinum: The thyroid gland is unremarkable. Postoperative changes in the 3.4 cm ascending thoracic aorta appear stable. The heart size is normal. No pericardial effusion is observed. No mediastinal, hilar, or axillary lymphadenopathy is visualized. The thyroid gland is unremarkable. Lungs: No focal consolidation, pleural effusion, or pneumothorax is observed. No pulmonary mass or nodules are identified. The central airways are patent and unremarkable bilaterally. Upper abdomen: No acute findings are visualized in the limited images through the upper abdomen. Musculoskeletal: The visualized skeletal structures appear intact. 1. No suspicious pulmonary mass or nodule. 2. No acute intrathoracic process. Stable chronic findings are noted. 3. LUNGRADS ASSESSMENT VALUE: 1           Assessment      Diagnosis Orders   1. COPD, mild ([address]  mometasone-formoterol (DULERA) 200-5 MCG/ACT inhaler      2. Current smoker        3. Cough  benzonatate (TESSALON) 100 MG capsule      4. Personal history of tobacco use  MO VISIT TO DISCUSS LUNG CA SCREEN W LDCT    CT Lung Screen (Annual)      5.  Other acute sinusitis, recurrence not specified  amoxicillin-clavulanate (AUGMENTIN) 875-125 MG per tablet            Plan   -LDCT reviewed no acute findings continue annual screenings  -Smoking cessation without the screening. Need for follow up screens annually to continue lung cancer screening effectiveness     Risks associated with radiation from annual LDCT- Radiation exposure is about the same as for a mammogram, which is about 1/3 of the annual background radiation exposure from everyday life. Starting screening at age 54 is not likely to increase cancer risk from radiation exposure. Patients with comorbidities resulting in life expectancy of < 10 years, or that would preclude treatment of an abnormality identified on CT, should not be screened due to lack of benefit.     To obtain maximal benefit from this screening, smoking cessation and long-term abstinence from smoking is critical

## 2022-09-08 ENCOUNTER — HOSPITAL ENCOUNTER (OUTPATIENT)
Age: 57
Discharge: HOME | End: 2022-09-08
Payer: COMMERCIAL

## 2022-09-08 LAB
ALT SERPL-CCNC: 19 U/L (ref 11–66)
AST SERPL-CCNC: 22 U/L (ref 5–40)
BUN BLDV-MCNC: 12 MG/DL (ref 7–22)
CREAT SERPL-MCNC: 0.7 MG/DL (ref 0.4–1.2)
ERYTHROCYTE [DISTWIDTH] IN BLOOD BY AUTOMATED COUNT: 18.5 % (ref 11.5–14.5)
ERYTHROCYTE [DISTWIDTH] IN BLOOD BY AUTOMATED COUNT: 64 FL (ref 35–45)
GFR SERPL CREATININE-BSD FRML MDRD: > 90 ML/MIN/1.73M2
HCT VFR BLD CALC: 38 % (ref 42–52)
HEMOGLOBIN: 12.6 GM/DL (ref 14–18)
MCH RBC QN AUTO: 31.6 PG (ref 26–33)
MCHC RBC AUTO-ENTMCNC: 33.2 GM/DL (ref 32.2–35.5)
MCV RBC AUTO: 95.2 FL (ref 80–94)
PLATELET # BLD: 349 THOU/MM3 (ref 130–400)
PMV BLD AUTO: 10.1 FL (ref 9.4–12.4)
RBC # BLD: 3.99 MILL/MM3 (ref 4.7–6.1)
WBC # BLD: 10.3 THOU/MM3 (ref 4.8–10.8)

## 2022-09-08 PROCEDURE — 82565 ASSAY OF CREATININE: CPT

## 2022-09-08 PROCEDURE — 84450 TRANSFERASE (AST) (SGOT): CPT

## 2022-09-08 PROCEDURE — 85027 COMPLETE CBC AUTOMATED: CPT

## 2022-09-08 PROCEDURE — 84520 ASSAY OF UREA NITROGEN: CPT

## 2022-09-08 PROCEDURE — 84460 ALANINE AMINO (ALT) (SGPT): CPT

## 2022-09-08 PROCEDURE — 36415 COLL VENOUS BLD VENIPUNCTURE: CPT

## 2022-10-06 ENCOUNTER — HOSPITAL ENCOUNTER (OUTPATIENT)
Age: 57
Discharge: HOME | End: 2022-10-06
Payer: COMMERCIAL

## 2022-10-06 LAB
ALBUMIN SERPL-MCNC: 4.2 G/DL (ref 3.5–5.1)
ALP BLD-CCNC: 70 U/L (ref 38–126)
ALT SERPL-CCNC: 12 U/L (ref 11–66)
ANION GAP SERPL CALCULATED.3IONS-SCNC: 8 MEQ/L (ref 8–16)
AST SERPL-CCNC: 18 U/L (ref 5–40)
BILIRUB SERPL-MCNC: 0.4 MG/DL (ref 0.3–1.2)
BUN BLDV-MCNC: 13 MG/DL (ref 7–22)
CALCIUM SERPL-MCNC: 9.2 MG/DL (ref 8.5–10.5)
CHLORIDE BLD-SCNC: 103 MEQ/L (ref 98–111)
CO2: 27 MEQ/L (ref 23–33)
CREAT SERPL-MCNC: 0.7 MG/DL (ref 0.4–1.2)
ERYTHROCYTE [DISTWIDTH] IN BLOOD BY AUTOMATED COUNT: 18 % (ref 11.5–14.5)
ERYTHROCYTE [DISTWIDTH] IN BLOOD BY AUTOMATED COUNT: 63.8 FL (ref 35–45)
GFR SERPL CREATININE-BSD FRML MDRD: > 90 ML/MIN/1.73M2
GLUCOSE BLD-MCNC: 135 MG/DL (ref 70–108)
HCT VFR BLD CALC: 39.3 % (ref 42–52)
HEMOGLOBIN: 12.8 GM/DL (ref 14–18)
LD: 214 U/L (ref 100–190)
MCH RBC QN AUTO: 31.5 PG (ref 26–33)
MCHC RBC AUTO-ENTMCNC: 32.6 GM/DL (ref 32.2–35.5)
MCV RBC AUTO: 96.8 FL (ref 80–94)
PLATELET # BLD: 312 THOU/MM3 (ref 130–400)
PMV BLD AUTO: 9.9 FL (ref 9.4–12.4)
POTASSIUM SERPL-SCNC: 4.3 MEQ/L (ref 3.5–5.2)
RBC # BLD: 4.06 MILL/MM3 (ref 4.7–6.1)
SODIUM BLD-SCNC: 138 MEQ/L (ref 135–145)
TOTAL PROTEIN: 6.9 G/DL (ref 6.1–8)
WBC # BLD: 10.6 THOU/MM3 (ref 4.8–10.8)

## 2022-10-06 PROCEDURE — 36415 COLL VENOUS BLD VENIPUNCTURE: CPT

## 2022-10-06 PROCEDURE — 80053 COMPREHEN METABOLIC PANEL: CPT

## 2022-10-06 PROCEDURE — 83615 LACTATE (LD) (LDH) ENZYME: CPT

## 2022-10-06 PROCEDURE — 85027 COMPLETE CBC AUTOMATED: CPT

## 2022-10-09 DIAGNOSIS — F41.9 ANXIETY: ICD-10-CM

## 2022-10-10 RX ORDER — ALPRAZOLAM 0.5 MG/1
TABLET ORAL
Qty: 30 TABLET | Refills: 2 | Status: SHIPPED | OUTPATIENT
Start: 2022-10-10 | End: 2023-01-10

## 2022-11-11 ENCOUNTER — HOSPITAL ENCOUNTER (OUTPATIENT)
Age: 57
Discharge: HOME | End: 2022-11-11
Payer: COMMERCIAL

## 2022-11-11 LAB
ALBUMIN SERPL-MCNC: 4.5 G/DL (ref 3.5–5.1)
ALP BLD-CCNC: 71 U/L (ref 38–126)
ALT SERPL-CCNC: 12 U/L (ref 11–66)
ANION GAP SERPL CALCULATED.3IONS-SCNC: 11 MEQ/L (ref 8–16)
AST SERPL-CCNC: 15 U/L (ref 5–40)
BILIRUB SERPL-MCNC: 0.3 MG/DL (ref 0.3–1.2)
BUN BLDV-MCNC: 13 MG/DL (ref 7–22)
CALCIUM SERPL-MCNC: 9.3 MG/DL (ref 8.5–10.5)
CHLORIDE BLD-SCNC: 106 MEQ/L (ref 98–111)
CO2: 25 MEQ/L (ref 23–33)
CREAT SERPL-MCNC: 0.6 MG/DL (ref 0.4–1.2)
ERYTHROCYTE [DISTWIDTH] IN BLOOD BY AUTOMATED COUNT: 16.8 % (ref 11.5–14.5)
ERYTHROCYTE [DISTWIDTH] IN BLOOD BY AUTOMATED COUNT: 59.4 FL (ref 35–45)
GFR SERPL CREATININE-BSD FRML MDRD: > 60 ML/MIN/1.73M2
GLUCOSE BLD-MCNC: 119 MG/DL (ref 70–108)
HCT VFR BLD CALC: 38.1 % (ref 42–52)
HEMOGLOBIN: 12.4 GM/DL (ref 14–18)
LD: 220 U/L (ref 100–190)
MCH RBC QN AUTO: 31.7 PG (ref 26–33)
MCHC RBC AUTO-ENTMCNC: 32.5 GM/DL (ref 32.2–35.5)
MCV RBC AUTO: 97.4 FL (ref 80–94)
PLATELET # BLD: 318 THOU/MM3 (ref 130–400)
PMV BLD AUTO: 10.5 FL (ref 9.4–12.4)
POTASSIUM SERPL-SCNC: 4.7 MEQ/L (ref 3.5–5.2)
RBC # BLD: 3.91 MILL/MM3 (ref 4.7–6.1)
SODIUM BLD-SCNC: 142 MEQ/L (ref 135–145)
TOTAL PROTEIN: 6.8 G/DL (ref 6.1–8)
WBC # BLD: 11.8 THOU/MM3 (ref 4.8–10.8)

## 2022-11-11 PROCEDURE — 80053 COMPREHEN METABOLIC PANEL: CPT

## 2022-11-11 PROCEDURE — 85027 COMPLETE CBC AUTOMATED: CPT

## 2022-11-11 PROCEDURE — 36415 COLL VENOUS BLD VENIPUNCTURE: CPT

## 2022-11-11 PROCEDURE — 83615 LACTATE (LD) (LDH) ENZYME: CPT

## 2022-11-28 RX ORDER — GABAPENTIN 400 MG/1
CAPSULE ORAL
Qty: 270 CAPSULE | Refills: 0 | OUTPATIENT
Start: 2022-11-28 | End: 2023-02-26

## 2022-11-30 DIAGNOSIS — E03.9 ACQUIRED HYPOTHYROIDISM: ICD-10-CM

## 2022-11-30 RX ORDER — LEVOTHYROXINE SODIUM 0.07 MG/1
TABLET ORAL
Qty: 90 TABLET | Refills: 3 | Status: SHIPPED | OUTPATIENT
Start: 2022-11-30

## 2022-11-30 RX ORDER — GABAPENTIN 400 MG/1
CAPSULE ORAL
Qty: 270 CAPSULE | Refills: 3 | Status: SHIPPED | OUTPATIENT
Start: 2022-11-30 | End: 2023-11-30

## 2022-11-30 NOTE — TELEPHONE ENCOUNTER
----- Message from Akanksha Kaminski sent at 11/30/2022  8:35 AM EST -----  Subject: Refill Request    QUESTIONS  Name of Medication? Other - gabapentin   Patient-reported dosage and instructions? 1t po tid  How many days do you have left? 2  Preferred Pharmacy? [address]  Pharmacy phone number (if available)? [phone]  ---------------------------------------------------------------------------  --------------,  Name of Medication? levothyroxine (SYNTHROID) 75 MCG tablet  Patient-reported dosage and instructions? 1t po qd  How many days do you have left? 3  Preferred Pharmacy? [address]  Pharmacy phone number (if available)? [phone]  ---------------------------------------------------------------------------  --------------  CALL BACK INFO  What is the best way for the office to contact you? OK to leave message on   voicemail  Preferred Call Back Phone Number? [phone]  ---------------------------------------------------------------------------  --------------  SCRIPT ANSWERS  Relationship to Patient?  Self

## 2022-12-06 ENCOUNTER — OFFICE VISIT (OUTPATIENT)
Dept: FAMILY MEDICINE CLINIC | Age: 57
End: 2022-12-06
Payer: COMMERCIAL

## 2022-12-06 VITALS
BODY MASS INDEX: 29.44 KG/M2 | RESPIRATION RATE: 18 BRPM | DIASTOLIC BLOOD PRESSURE: 76 MMHG | SYSTOLIC BLOOD PRESSURE: 114 MMHG | HEART RATE: 84 BPM | WEIGHT: 188 LBS

## 2022-12-06 DIAGNOSIS — I10 ESSENTIAL HYPERTENSION: ICD-10-CM

## 2022-12-06 DIAGNOSIS — F41.9 ANXIETY: ICD-10-CM

## 2022-12-06 DIAGNOSIS — R42 EPISODIC LIGHTHEADEDNESS: ICD-10-CM

## 2022-12-06 DIAGNOSIS — E03.9 ACQUIRED HYPOTHYROIDISM: ICD-10-CM

## 2022-12-06 DIAGNOSIS — Z00.00 WELL ADULT HEALTH CHECK: Primary | ICD-10-CM

## 2022-12-06 PROCEDURE — 99396 PREV VISIT EST AGE 40-64: CPT | Performed by: FAMILY MEDICINE

## 2022-12-06 PROCEDURE — 3074F SYST BP LT 130 MM HG: CPT | Performed by: FAMILY MEDICINE

## 2022-12-06 PROCEDURE — 3078F DIAST BP <80 MM HG: CPT | Performed by: FAMILY MEDICINE

## 2022-12-06 RX ORDER — BEXAROTENE 1 G/100G
1 GEL TOPICAL 3 TIMES DAILY
COMMUNITY
Start: 2022-11-30

## 2022-12-06 RX ORDER — FOLIC ACID 1 MG/1
1 TABLET ORAL DAILY
COMMUNITY
Start: 2022-11-27

## 2022-12-06 ASSESSMENT — ENCOUNTER SYMPTOMS
GASTROINTESTINAL NEGATIVE: 1
RESPIRATORY NEGATIVE: 1

## 2022-12-06 NOTE — PROGRESS NOTES
Take 1 tablet by mouth daily Yes Historical Provider, MD   methotrexate (RHEUMATREX) 2.5 MG chemo tablet Take 5 tablets by mouth once a week Yes Historical Provider, MD   levothyroxine (SYNTHROID) 75 MCG tablet TAKE 1 TABLET BY MOUTH EVERY DAY Yes Christanita Montelongo DO   gabapentin (NEURONTIN) 400 MG capsule TAKE 1 CAPSULE BY MOUTH THREE TIMES A DAY Yes Christ Reginald, DO   ALPRAZolam Rolandamerica Rausch) 0.5 MG tablet TAKE 1 TABLET BY MOUTH EVERY DAY AS NEEDED FOR ANXIETY or sleep Yes Christ Reginald, DO   mometasone-formoterol (DULERA) 200-5 MCG/ACT inhaler Inhale 2 puffs into the lungs in the morning and 2 puffs in the evening. Yes Valarie Counter, APRN - CNP   SM ASPIRIN ADULT LOW STRENGTH 81 MG EC tablet TAKE 1 TABLET BY MOUTH EVERY DAY Yes Adenike Kaufman PA-C   albuterol sulfate HFA (PROAIR HFA) 108 (90 Base) MCG/ACT inhaler Inhale 2 puffs into the lungs every 6 hours as needed for Wheezing or Shortness of Breath Yes Valarie Counter, APRN - CNP   esomeprazole (NEXIUM) 40 MG delayed release capsule TAKE 1 CAPSULE BY MOUTH EVERY DAY IN THE MORNING BEFORE BREAKFAST Yes Fartun Luna MD   nicotine (NICODERM CQ) 7 MG/24HR Place 1 patch onto the skin every 24 hours Yes Fartun Luna MD   metoprolol succinate (TOPROL XL) 50 MG extended release tablet Take 1 tablet by mouth daily Yes Fartun Luna MD   ibuprofen (ADVIL;MOTRIN) 400 MG tablet Take 400 mg by mouth every 6 hours as needed for Pain  Yes Historical Provider, MD   lidocaine viscous hcl (XYLOCAINE) 2 % SOLN solution Take 15 mLs by mouth as needed for Irritation Yes Yolande Granger, APRN - CNP   buPROPion (WELLBUTRIN) 100 MG tablet Take 1 tablet by mouth 2 times daily Yes Hansel Dyer MD   indomethacin (INDOCIN) 25 MG capsule Take 1 capsule by mouth 3 times daily (with meals) Take 1 tablet daily for 10 days.  Yes Hansel Dyer MD   Cholecalciferol (VITAMIN D3) 1000 units TABS Take 2,000 Units by mouth Daily  Yes Historical Provider, MD Guadalupe Keys 100 MG capsule TAKE 1 CAPSULE BY MOUTH ONE TIME A DAY AS NEEDED for constipation Yes Ernestina Bowen MD   Multiple Vitamin (MULTIVITAMIN PO) Take 1 tablet by mouth daily  Yes Historical Provider, MD        Allergies   Allergen Reactions    Seasonal        Past Medical History:   Diagnosis Date    Arthritis     general    Bilateral sciatica     Bleeding     with coumadin    CAD (coronary artery disease)     Carpal tunnel syndrome     RIGHT    Cervicalgia, C5-7     Chronic anxiety     Dysthymia (or depressive neurosis) 2/1/2016    ED (erectile dysfunction)     ED (erectile dysfunction)     GERD (gastroesophageal reflux disease)     Headache(784.0)     History of blood transfusion 03/2016    HTN (hypertension)     Hyperlipidemia     Hypothyroidism     Iron deficiency anemia, blood loss     Lumbago     Lumbar radiculopathy     Osteoarthritis (arthritis due to wear and tear of joints)     S/P AVR, coumadin Tx     Spondylosis of thoracic region without myelopathy or radiculopathy        Past Surgical History:   Procedure Laterality Date    AORTIC VALVE REPLACEMENT  2007    [facility]    AORTIC VALVE REPLACEMENT  07/20/2016    extraction of mechanical valve and replacement with tissue valve    APPENDECTOMY  1980    BACK INJECTION Bilateral 1/3/2022    Bilateral SI injection performed by Kathryn Shaw MD at [facility] Bilateral 3/14/2022    Bilateral SI MBB # 2 performed by Caridad Cabral DO at [address] [place] [address].  5 20 2010    Patent coronary arteries. Possible causes of the patient's chest pain is likely noncardiac at least based on this angiogram. Patient chould be able to proceed w/ scheduled surgery w/ paying attention to anticoagulation and trying to minimize the period of no anticoagulation.      CARDIAC CATHETERIZATION  2012, 6/9/16    [facility]    CARDIOVASCULAR STRESS TEST  4 15 2010    Moderate fixed inferior defect, possibly attenuation, cannot exclude a previous MI. Correlation w/ EKG is recommented. Significant degree of gut uptake which is likely to be the cause of the attenuation artifact seen. EF 46%    CERVICAL FUSION  2010    CERVICAL FUSION  03/09/2016    REMOVAL HARDWARE C5-7, ACDF C4-6 WITH ATLANTIS CORNERSTONE    COLONOSCOPY  2010    CORONARY ARTERY BYPASS GRAFT      DILATATION, ESOPHAGUS      small resection    ENDOSCOPY, COLON, DIAGNOSTIC      NERVE SURGERY Bilateral 07/25/2017    THORACIC FACET BLOCK T7-8, T8-9, T11-12    OTHER SURGICAL HISTORY  05/02/2017    LESI L5    OTHER SURGICAL HISTORY  09/12/2017    thoracic epidural T11    PAIN MANAGEMENT PROCEDURE Left 5/17/2022    SI RFA, LEFT SIDE FIRST performed by Kyra Nash MD at [address] L/S,1 LEVEL Bilateral 7/25/2017    T-FACET MBB T7-8, T8-9, T11-12 BILATERAL performed by Kyra Nash MD at [address] DX/THER [address] LUMBAR/SACRAL N/A 9/12/2017    TESI T11 performed by Kyra Nash MD at [address]  2010    [address] EXTRACTION  02/2019    UPPER GASTROINTESTINAL ENDOSCOPY  2010       Social History     Socioeconomic History    Marital status:      Spouse name: Not on file    Number of children: 2    Years of education: 12    Highest education level: High school graduate   Occupational History    Not on file   Tobacco Use    Smoking status: Every Day     Packs/day: 0.50     Years: 44.00     Pack years: 22.00     Types: Cigarettes, Cigars     Start date: 6/9/1978    Smokeless tobacco: Never   Vaping Use    Vaping Use: Never used   Substance and Sexual Activity    Alcohol use:  Yes     Alcohol/week: 0.0 standard drinks     Comment: not for 3 mo    Drug use: No    Sexual activity: Never   Other Topics Concern    Not on file   Social History Narrative    Not on file     Social Determinants of Health     Financial Resource Strain: surfaces). Neurological:      General: No focal deficit present. Mental Status: He is alert. Psychiatric:         Attention and Perception: Attention normal.         Mood and Affect: Mood normal.         Speech: Speech normal.         Behavior: Behavior normal. Behavior is cooperative. Thought Content: Thought content normal.         Judgment: Judgment normal.       No flowsheet data found.     Lab Results   Component Value Date/Time    CHOL 150 01/06/2022 08:47 AM    CHOL 88 03/11/2019 09:10 AM    CHOL 113 12/12/2017 06:20 AM    CHOLFAST 119 07/09/2020 10:05 AM    TRIG 51 01/06/2022 08:47 AM    TRIG 27 03/11/2019 09:10 AM    TRIG 95 12/12/2017 06:20 AM    TRIGLYCFAST 49 07/09/2020 10:05 AM    HDL 43 01/06/2022 08:47 AM    HDL 41 07/09/2020 10:05 AM    HDL 36 03/11/2019 09:10 AM    LDLCALC 97 01/06/2022 08:47 AM    LDLCALC 68 07/09/2020 10:05 AM    LDLCALC 47 03/11/2019 09:10 AM    GLUF 97 07/09/2020 10:05 AM    GLUCOSE 119 11/11/2022 07:17 AM    LABA1C 5.7 01/06/2022 08:47 AM    LABA1C 5.8 07/09/2020 10:05 AM    LABA1C 5.6 02/07/2019 09:29 AM       The 10-year ASCVD risk score (Sarmad TORREZ, et al., 2019) is: 9.1%    Values used to calculate the score:      Age: 59 years      Sex: Male      Is Non- : No      Diabetic: No      Tobacco smoker: Yes      Systolic Blood Pressure: 776 mmHg      Is BP treated: No      HDL Cholesterol: 43 mg/dL      Total Cholesterol: 150 mg/dL    Immunization History   Administered Date(s) Administered    Influenza Virus Vaccine 11/04/2015    Influenza, AFLURIA (age 1 yrs+), FLUZONE, (age 10 mo+), MDV, 0.5mL 10/11/2017, 11/08/2018    Influenza, AFLURIA, FLUZONE (age 11-32 mo), MDV, 0.25mL 10/05/2016    Influenza, FLUARIX, FLULAVAL, FLUZONE (age 10 mo+) AND AFLURIA, (age 1 y+), PF, 0.5mL 10/14/2019, 10/08/2020, 09/16/2021    Influenza, FLUCELVAX, (age 10 mo+), MDCK, MDV, 0.5mL 10/02/2022    Pneumococcal Conjugate 13-valent (Lin Amaya) 06/15/2017 Health Maintenance   Topic Date Due    COVID-19 Vaccine (1) Never done    DTaP/Tdap/Td vaccine (1 - Tdap) Never done    Shingles vaccine (1 of 2) Never done    Pneumococcal 0-64 years Vaccine (2 - PPSV23 if available, else PCV20) 06/15/2018    Depression Monitoring  01/04/2023    A1C test (Diabetic or Prediabetic)  01/06/2023    Low dose CT lung screening  08/23/2023    Colorectal Cancer Screen  03/27/2026    Lipids  01/06/2027    Flu vaccine  Completed    Hepatitis C screen  Completed    HIV screen  Completed    Hepatitis A vaccine  Aged Out    Hib vaccine  Aged Out    Meningococcal (ACWY) vaccine  Aged Out       Assessment & Plan   Well adult health check  -     Lipid Panel w/ Reflex Direct LDL; Future  -     Comprehensive Metabolic Panel; Future  -     Hemoglobin A1C; Future  -     TSH with Reflex; Future  -     CBC with Auto Differential; Future  -     Ferritin; Future  -     PSA Screening; Future  Acquired hypothyroidism  Episodic lightheadedness  Essential hypertension  Anxiety    -  Healthy lifestyle discussed  -  Chronic medical problems stable  -  Continue current medications  -  Follow up with specialists as scheduled  -  Check labs, will call    Return in about 1 year (around 12/6/2023) for Wellness.          --Sarah Cabrera, DO

## 2022-12-06 NOTE — PATIENT INSTRUCTIONS
You may receive a survey regarding the care you received during your visit. Your input is valuable to us. We encourage you to complete and return your survey. We hope you will choose us in the future for your healthcare needs. Tobacco Cessation Programs     Telephonic behavior modification  1-800-QUIT-NOW (666-0785)  Counseling service for those who are ready to quit using tobacco.    Available for uninsured [place] residents, Medicaid recipients, pregnant women, or patients whose health plans or employers are members of the [address] [place] [address] behavior modification  http://[place]. [employer]uit[employer]. org  Online support program to help patients through each step of the quitting process. Available 24 hours a day 7 days a week. Provides up to date researched based tool, step-by-step guides, and motivational messages. Online behavior modification  www.lungusa.org/stop-smoking/how-to-quit  HelpLine: 1-800-LUNG-USA (095-8210)  Email questions to:  Regino@"[employer]. org   Website offers resources to help tobacco users figure out their reasons for quitting and then take the big step of quitting for good. Hypnosis  Location: [facility] [facility], [address] [address], [place]  Contact: Stephanie Ocasio, PhD at [phone]  Hypnosis for tobacco cessation  Cost $225 for the initial session and $175 for each session afterwards. Most patients require 6-8 sessions. There is the option to submit through the patients insurance. Hypnosis and behavior modification  Location: [place] [address],  [facility] [address] [place] [address], [place]  Contact: Leobardo Martinez, PhD at [phone]  Counseling and hypnosis for nicotine addition  Cost: For uninsured patients:  Please call above phone number  Cost for insured patients depends on patients insurance plan.     Behavior modification  Location: [facility], [address] [place]: [phone]  Services include four one-on-one appointments between the patient and a respiratory therapist.  The four appointments span over three weeks. The respiratory therapist schedules one of the appointments to occur 48 hours after the patients quit date. Cost $100 total for the four sessions.   Tobacco cessation products are not included in the cost and are not provided by [facility].

## 2022-12-19 ENCOUNTER — HOSPITAL ENCOUNTER (OUTPATIENT)
Age: 57
Discharge: HOME | End: 2022-12-19
Payer: COMMERCIAL

## 2022-12-19 LAB
ALT SERPL-CCNC: 22 U/L (ref 11–66)
AST SERPL-CCNC: 28 U/L (ref 5–40)
BUN BLDV-MCNC: 12 MG/DL (ref 7–22)
CREAT SERPL-MCNC: 0.7 MG/DL (ref 0.4–1.2)
ERYTHROCYTE [DISTWIDTH] IN BLOOD BY AUTOMATED COUNT: 16.2 % (ref 11.5–14.5)
ERYTHROCYTE [DISTWIDTH] IN BLOOD BY AUTOMATED COUNT: 57.6 FL (ref 35–45)
GFR SERPL CREATININE-BSD FRML MDRD: > 60 ML/MIN/1.73M2
HCT VFR BLD CALC: 39.8 % (ref 42–52)
HEMOGLOBIN: 13 GM/DL (ref 14–18)
MCH RBC QN AUTO: 31.3 PG (ref 26–33)
MCHC RBC AUTO-ENTMCNC: 32.7 GM/DL (ref 32.2–35.5)
MCV RBC AUTO: 95.7 FL (ref 80–94)
PLATELET # BLD: 248 THOU/MM3 (ref 130–400)
PMV BLD AUTO: 10.6 FL (ref 9.4–12.4)
RBC # BLD: 4.16 MILL/MM3 (ref 4.7–6.1)
WBC # BLD: 8.4 THOU/MM3 (ref 4.8–10.8)

## 2022-12-19 PROCEDURE — 84520 ASSAY OF UREA NITROGEN: CPT

## 2022-12-19 PROCEDURE — 36415 COLL VENOUS BLD VENIPUNCTURE: CPT

## 2022-12-19 PROCEDURE — 82565 ASSAY OF CREATININE: CPT

## 2022-12-19 PROCEDURE — 85027 COMPLETE CBC AUTOMATED: CPT

## 2022-12-19 PROCEDURE — 84450 TRANSFERASE (AST) (SGOT): CPT

## 2022-12-19 PROCEDURE — 84460 ALANINE AMINO (ALT) (SGPT): CPT

## 2023-01-09 DIAGNOSIS — F41.9 ANXIETY: ICD-10-CM

## 2023-01-09 RX ORDER — ALPRAZOLAM 0.5 MG/1
TABLET ORAL
Qty: 30 TABLET | Refills: 2 | Status: SHIPPED | OUTPATIENT
Start: 2023-01-09 | End: 2023-04-09

## 2023-01-23 ENCOUNTER — HOSPITAL ENCOUNTER (EMERGENCY)
Age: 58
Discharge: HOME | End: 2023-01-23
Payer: COMMERCIAL

## 2023-01-23 VITALS
SYSTOLIC BLOOD PRESSURE: 158 MMHG | RESPIRATION RATE: 16 BRPM | OXYGEN SATURATION: 97 % | HEART RATE: 77 BPM | TEMPERATURE: 98.3 F | DIASTOLIC BLOOD PRESSURE: 74 MMHG

## 2023-01-23 DIAGNOSIS — R68.89 FLU-LIKE SYMPTOMS: Primary | ICD-10-CM

## 2023-01-23 PROCEDURE — 99213 OFFICE O/P EST LOW 20 MIN: CPT | Performed by: NURSE PRACTITIONER

## 2023-01-23 PROCEDURE — 99213 OFFICE O/P EST LOW 20 MIN: CPT

## 2023-01-23 RX ORDER — PREDNISONE 20 MG/1
20 TABLET ORAL DAILY
Qty: 7 TABLET | Refills: 0 | Status: SHIPPED | OUTPATIENT
Start: 2023-01-23 | End: 2023-01-30

## 2023-01-23 RX ORDER — BROMPHENIRAMINE MALEATE, PSEUDOEPHEDRINE HYDROCHLORIDE, AND DEXTROMETHORPHAN HYDROBROMIDE 2; 30; 10 MG/5ML; MG/5ML; MG/5ML
10 SYRUP ORAL 4 TIMES DAILY PRN
Qty: 200 ML | Refills: 0 | Status: SHIPPED | OUTPATIENT
Start: 2023-01-23

## 2023-01-23 ASSESSMENT — ENCOUNTER SYMPTOMS
BACK PAIN: 1
DIARRHEA: 1

## 2023-01-23 NOTE — ED TRIAGE NOTES
stArted this last Thursday, with a cold, has had a fever, has had head pressure , little cough, otc medication not helping, body aches, has had the flu shot

## 2023-01-23 NOTE — ED PROVIDER NOTES
[facility]  Urgent Care Encounter      CHIEF COMPLAINT     No chief complaint on file. Nurses Notes reviewed and I agree except as noted in the HPI. HISTORY OF PRESENT ILLNESS   Katya Lewis is a 59 y.o. male who presents to urgent care for evaluation of sinus congestion subjective fevers and sweating, chills, headache, body aches, and some diarrhea that started on 1/19. Patient states he has taken Lata-Felton plus over-the-counter without any improvement. Patient also having some right-sided back pain that started today. Denies any urinary symptoms. 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 Patient does smoke approximately 1 pack/day    REVIEW OF SYSTEMS     Review of Systems   Constitutional:  Positive for chills and fever. HENT:  Positive for congestion. Gastrointestinal:  Positive for diarrhea. Musculoskeletal:  Positive for back pain and myalgias. Neurological:  Positive for headaches. All other systems reviewed and are negative. PAST MEDICAL HISTORY         Diagnosis Date    Arthritis     general    Bilateral sciatica     Bleeding     with coumadin    CAD (coronary artery disease)     Carpal tunnel syndrome     RIGHT    Cervicalgia, C5-7     Chronic anxiety     Dysthymia (or depressive neurosis) 2/1/2016    ED (erectile dysfunction)     ED (erectile dysfunction)     GERD (gastroesophageal reflux disease)     Headache(784.0)     History of blood transfusion 03/2016    HTN (hypertension)     Hyperlipidemia     Hypothyroidism     Iron deficiency anemia, blood loss     Lumbago     Lumbar radiculopathy     Osteoarthritis (arthritis due to wear and tear of joints)     S/P AVR, coumadin Tx     Spondylosis of thoracic region without myelopathy or radiculopathy        SURGICAL HISTORY     Patient  has a past surgical history that includes cardiovascular stress test (4 15 2010);  Appendectomy (1980); Colonoscopy (2010); Small intestine surgery (2010); Upper gastrointestinal endoscopy (2010); Aortic valve replacement (2007); Endoscopy, colon, diagnostic; cervical fusion (2010); cervical fusion (03/09/2016); Cardiac catheterization (5 20 2010); Cardiac catheterization (2012, 6/9/16); Dilatation, esophagus; Aortic valve replacement (07/20/2016); Coronary artery bypass graft; other surgical history (05/02/2017); Nerve Surgery (Bilateral, 07/25/2017); pr inj,paravertebral l/s,1 level (Bilateral, 7/25/2017); other surgical history (09/12/2017); pr njx dx/ther sbst epidural/subarach lumbar/sacral (N/A, 9/12/2017); Tooth Extraction (02/2019); Back Injection (Bilateral, 1/3/2022); Back Injection (Bilateral, 3/14/2022); and Pain management procedure (Left, 5/17/2022). CURRENT MEDICATIONS       Previous Medications    ALBUTEROL SULFATE HFA (PROAIR HFA) 108 (90 BASE) MCG/ACT INHALER    Inhale 2 puffs into the lungs every 6 hours as needed for Wheezing or Shortness of Breath    ALPRAZOLAM (XANAX) 0.5 MG TABLET    TAKE 1 TABLET BY MOUTH EVERY DAY AS NEEDED for anxiety or sleep    BEXAROTENE 1 % GEL    Apply 1 Dose topically 3 times daily    BUPROPION (WELLBUTRIN) 100 MG TABLET    Take 1 tablet by mouth 2 times daily    CHOLECALCIFEROL (VITAMIN D3) 1000 UNITS TABS    Take 2,000 Units by mouth Daily      MG CAPSULE    TAKE 1 CAPSULE BY MOUTH ONE TIME A DAY AS NEEDED for constipation    ESOMEPRAZOLE (NEXIUM) 40 MG DELAYED RELEASE CAPSULE    TAKE 1 CAPSULE BY MOUTH EVERY DAY IN THE MORNING BEFORE BREAKFAST    FOLIC ACID (FOLVITE) 1 MG TABLET    Take 1 tablet by mouth daily    GABAPENTIN (NEURONTIN) 400 MG CAPSULE    TAKE 1 CAPSULE BY MOUTH THREE TIMES A DAY    IBUPROFEN (ADVIL;MOTRIN) 400 MG TABLET    Take 400 mg by mouth every 6 hours as needed for Pain     INDOMETHACIN (INDOCIN) 25 MG CAPSULE    Take 1 capsule by mouth 3 times daily (with meals) Take 1 tablet daily for 10 days.     LEVOTHYROXINE (SYNTHROID) 75 MCG TABLET    TAKE 1 TABLET BY MOUTH EVERY DAY    LIDOCAINE VISCOUS HCL (XYLOCAINE) 2 % SOLN SOLUTION    Take 15 mLs by mouth as needed for Irritation    METHOTREXATE (RHEUMATREX) 2.5 MG CHEMO TABLET    Take 5 tablets by mouth once a week    METOPROLOL SUCCINATE (TOPROL XL) 50 MG EXTENDED RELEASE TABLET    Take 1 tablet by mouth daily    MOMETASONE-FORMOTEROL (DULERA) 200-5 MCG/ACT INHALER    Inhale 2 puffs into the lungs in the morning and 2 puffs in the evening. MULTIPLE VITAMIN (MULTIVITAMIN PO)    Take 1 tablet by mouth daily     NICOTINE (NICODERM CQ) 7 MG/24HR    Place 1 patch onto the skin every 24 hours    SM ASPIRIN ADULT LOW STRENGTH 81 MG EC TABLET    TAKE 1 TABLET BY MOUTH EVERY DAY       ALLERGIES     Patient is is allergic to seasonal.    FAMILY HISTORY     Patient'sfamily history includes COPD in his sister; Cancer in his brother, father, and mother; Diabetes in his father and sister; Heart Disease in his sister; Heart Disease (age of onset: 28) in his father; High Blood Pressure in his father and sister; Kidney Disease in his sister. SOCIAL HISTORY     Patient  reports that he has been smoking cigarettes and cigars. He started smoking about 44 years ago. He has a 22.00 pack-year smoking history. He has never used smokeless tobacco. He reports current alcohol use. He reports that he does not use drugs. PHYSICAL EXAM     ED TRIAGE VITALS  BP: (!) 158/74, Temp: 98.3 °F (36.8 °C), Heart Rate: 77, Resp: 16, SpO2: 97 %  Physical Exam  Vitals and nursing note reviewed. HENT:      Head: Normocephalic. Right Ear: External ear normal.      Left Ear: External ear normal.   Eyes:      Pupils: Pupils are equal, round, and reactive to light. Neck:      Trachea: No tracheal deviation. Cardiovascular:      Rate and Rhythm: Normal rate and regular rhythm. Heart sounds: Normal heart sounds. No murmur heard. No friction rub. No gallop.    Pulmonary:      Effort: Pulmonary effort is normal. No respiratory distress.      Breath sounds: Examination of the right-lower field reveals wheezing. Examination of the left-lower field reveals wheezing. Wheezing present. No rales.   Chest:      Chest wall: No tenderness.   Abdominal:      General: Bowel sounds are normal.      Palpations: Abdomen is soft.      Tenderness: There is no abdominal tenderness. There is no rebound.   Genitourinary:     Penis: Normal.    Musculoskeletal:         General: Normal range of motion.      Cervical back: Full passive range of motion without pain, normal range of motion and neck supple.   Lymphadenopathy:      Cervical: No cervical adenopathy.   Skin:     General: Skin is warm and dry.      Findings: No rash.   Neurological:      Mental Status: He is alert and oriented to person, place, and time.   Psychiatric:         Judgment: Judgment normal.       DIAGNOSTIC RESULTS   Labs:No results found for this visit on 01/23/23.    IMAGING:  No orders to display      URGENT CARE COURSE:     Vitals:    01/23/23 1629   BP: (!) 158/74   Pulse: 77   Resp: 16   Temp: 98.3 °F (36.8 °C)   TempSrc: Oral   SpO2: 97%       Medications - No data to display  PROCEDURES:  None  FINAL IMPRESSION      1. Flu-like symptoms        DISPOSITION/PLAN   DISPOSITION Decision To Discharge 01/23/2023 04:45:47 PM    Discussed with symptoms likely viral in nature.  At this point he is outside the window for any kind of antiviral medication, thus testing not really necessary at this time.  We will treat with symptomatic therapies and discussed symptoms that should prompt evaluation emergency department.  Patient encouraged to quit smoking.      PATIENT REFERRED TO:  Juan Christiansen, DO  [address] [facility] [address] [place], [address]  [place] [address]  [phone]    In 1 week  As needed for ongoing symptoms.  Go to ER for shortness of breath or signs of dehydration  DISCHARGE MEDICATIONS:  New Prescriptions    BROMPHENIRAMINE-PSEUDOEPHEDRINE-DM 2-30-10 MG/5ML SYRUP    Take 10  mLs by mouth 4 times daily as needed for Congestion or Cough    PREDNISONE (DELTASONE) 20 MG TABLET    Take 1 tablet by mouth daily for 7 days     Current Discharge Medication List          JASWINDER Klein CNP, APRN - CNP  01/23/23 9313

## 2023-01-24 ENCOUNTER — TELEPHONE (OUTPATIENT)
Dept: FAMILY MEDICINE CLINIC | Age: 58
End: 2023-01-24

## 2023-01-24 NOTE — LETTER
[address] [address], [address]  Phone: [phone]  Fax: [phone]     January 24, 2023    [facility]  [address]    Dear Lender Councilman,    Thank you for choosing our [facility] on 1/23/23. Your Provider wanted to make sure that you understand your discharge instructions and that you were able to fill any prescriptions that may have been ordered for you. Please contact the office at the above phone number if you were advised to follow up with your Provider, or if you have any further questions or needs. Also did you know -                              [facility] ([facility]) practices can often offer you an appointment on the same day that you call for acute issues. *We have some [facility] offices that offer Walk-in appointments; check our website for availability in your community, [email or web address].      *Evisits are now available for patients through [address] [facility] ([facility]) also offers video visits through [address] If you do not have MyChart and are interested, please contact the office and a staff member may assist you or go to [email or web address].       Sincerely,     Philip Alexander DO and your [facility]

## 2023-01-26 ENCOUNTER — HOSPITAL ENCOUNTER (OUTPATIENT)
Age: 58
Discharge: HOME | End: 2023-01-26
Payer: COMMERCIAL

## 2023-01-26 DIAGNOSIS — Z00.00 WELL ADULT HEALTH CHECK: ICD-10-CM

## 2023-01-26 LAB
ALBUMIN SERPL BCG-MCNC: 4.2 G/DL (ref 3.5–5.1)
ALP SERPL-CCNC: 62 U/L (ref 38–126)
ALT SERPL W/O P-5'-P-CCNC: 32 U/L (ref 11–66)
ANION GAP SERPL CALC-SCNC: 11 MEQ/L (ref 8–16)
AST SERPL-CCNC: 39 U/L (ref 5–40)
BASOPHILS ABSOLUTE: 0 THOU/MM3 (ref 0–0.1)
BASOPHILS NFR BLD AUTO: 0 %
BILIRUB SERPL-MCNC: 0.3 MG/DL (ref 0.3–1.2)
BUN SERPL-MCNC: 15 MG/DL (ref 7–22)
CALCIUM SERPL-MCNC: 8.7 MG/DL (ref 8.5–10.5)
CHLORIDE SERPL-SCNC: 103 MEQ/L (ref 98–111)
CHOLEST SERPL-MCNC: 130 MG/DL (ref 100–199)
CO2 SERPL-SCNC: 24 MEQ/L (ref 23–33)
CREAT SERPL-MCNC: 0.7 MG/DL (ref 0.4–1.2)
DEPRECATED MEAN GLUCOSE BLD GHB EST-ACNC: 117 MG/DL (ref 70–126)
DEPRECATED RDW RBC AUTO: 58.2 FL (ref 35–45)
EOSINOPHIL NFR BLD AUTO: 0 %
EOSINOPHILS ABSOLUTE: 0 THOU/MM3 (ref 0–0.4)
ERYTHROCYTE [DISTWIDTH] IN BLOOD BY AUTOMATED COUNT: 17.9 % (ref 11.5–14.5)
FERRITIN SERPL IA-MCNC: 95 NG/ML (ref 22–322)
GFR SERPL CREATININE-BSD FRML MDRD: > 60 ML/MIN/1.73M2
GLUCOSE SERPL-MCNC: 119 MG/DL (ref 70–108)
HBA1C MFR BLD HPLC: 5.9 % (ref 4.4–6.4)
HCT VFR BLD AUTO: 33.6 % (ref 42–52)
HDLC SERPL-MCNC: 39 MG/DL
HGB BLD-MCNC: 11.2 GM/DL (ref 14–18)
IMM GRANULOCYTES # BLD AUTO: 0.02 THOU/MM3 (ref 0–0.07)
IMM GRANULOCYTES NFR BLD AUTO: 0.4 %
LDLC SERPL CALC-MCNC: 77 MG/DL
LYMPHOCYTES ABSOLUTE: 0.3 THOU/MM3 (ref 1–4.8)
LYMPHOCYTES NFR BLD AUTO: 5.4 %
MCH RBC QN AUTO: 29.9 PG (ref 26–33)
MCHC RBC AUTO-ENTMCNC: 33.3 GM/DL (ref 32.2–35.5)
MCV RBC AUTO: 89.8 FL (ref 80–94)
MONOCYTES ABSOLUTE: 0.2 THOU/MM3 (ref 0.4–1.3)
MONOCYTES NFR BLD AUTO: 4.6 %
NEUTROPHILS NFR BLD AUTO: 89.6 %
NRBC BLD AUTO-RTO: 0 /100 WBC
PLATELET # BLD AUTO: 228 THOU/MM3 (ref 130–400)
PMV BLD AUTO: 10.4 FL (ref 9.4–12.4)
POTASSIUM SERPL-SCNC: 4.7 MEQ/L (ref 3.5–5.2)
PROT SERPL-MCNC: 6.8 G/DL (ref 6.1–8)
PSA SERPL-MCNC: 1.33 NG/ML (ref 0–1)
RBC # BLD AUTO: 3.74 MILL/MM3 (ref 4.7–6.1)
SEGMENTED NEUTROPHILS ABSOLUTE COUNT: 4.3 THOU/MM3 (ref 1.8–7.7)
SODIUM SERPL-SCNC: 138 MEQ/L (ref 135–145)
T4 FREE SERPL-MCNC: 1.26 NG/DL (ref 0.93–1.76)
TRIGL SERPL-MCNC: 69 MG/DL (ref 0–199)
TSH SERPL DL<=0.005 MIU/L-ACNC: 0.21 UIU/ML (ref 0.4–4.2)
WBC # BLD AUTO: 4.8 THOU/MM3 (ref 4.8–10.8)

## 2023-01-26 PROCEDURE — G0103 PSA SCREENING: HCPCS

## 2023-01-26 PROCEDURE — 80061 LIPID PANEL: CPT

## 2023-01-26 PROCEDURE — 84443 ASSAY THYROID STIM HORMONE: CPT

## 2023-01-26 PROCEDURE — 36415 COLL VENOUS BLD VENIPUNCTURE: CPT

## 2023-01-26 PROCEDURE — 85025 COMPLETE CBC W/AUTO DIFF WBC: CPT

## 2023-01-26 PROCEDURE — 80053 COMPREHEN METABOLIC PANEL: CPT

## 2023-01-26 PROCEDURE — 84439 ASSAY OF FREE THYROXINE: CPT

## 2023-01-26 PROCEDURE — 83036 HEMOGLOBIN GLYCOSYLATED A1C: CPT

## 2023-01-26 PROCEDURE — 82728 ASSAY OF FERRITIN: CPT

## 2023-02-03 ENCOUNTER — TELEMEDICINE (OUTPATIENT)
Dept: FAMILY MEDICINE CLINIC | Age: 58
End: 2023-02-03
Payer: COMMERCIAL

## 2023-02-03 DIAGNOSIS — J11.1 INFLUENZA-LIKE ILLNESS: Primary | ICD-10-CM

## 2023-02-03 PROCEDURE — 99442 PR PHYS/QHP TELEPHONE EVALUATION 11-20 MIN: CPT | Performed by: FAMILY MEDICINE

## 2023-02-03 RX ORDER — OSELTAMIVIR PHOSPHATE 75 MG/1
75 CAPSULE ORAL 2 TIMES DAILY
Qty: 10 CAPSULE | Refills: 0 | Status: SHIPPED | OUTPATIENT
Start: 2023-02-03 | End: 2023-02-08

## 2023-02-03 NOTE — LETTER
[address] [address]  Phone: [phone]  Fax: [address] Schwartz DO        February 3, 2023     Patient: Amandeep Rossi   YOB: 1965   Date of Visit: 2/3/2023       To Whom It May Concern: It is my medical opinion that Kayden Cavazos may return to work on 2/6/23 with no restrictions. Please excuse from work 2/3 and 2/4 due to acute illness. If you have any questions or concerns, please don't hesitate to call.     Sincerely,        Kye Vela DO

## 2023-02-03 NOTE — PROGRESS NOTES
Tarun Hill (:  1965) is a Established patient, here for evaluation of the following:  Tarun Hill is a 59 y.o. male evaluated via telephone on 2/3/2023 for Cough, Congestion, and Fever  . Documentation:  I communicated with the patient and/or health care decision maker about cough, congestion. Details of this discussion including any medical advice provided: see A/P    Total Time: minutes: 11-20 minutes    Tarun Hill was evaluated through a synchronous (real-time) audio encounter. Patient identification was verified at the start of the visit. He (or guardian if applicable) is aware that this is a billable service, which includes applicable co-pays. This visit was conducted with the patient's (and/or legal guardian's) verbal consent. He has not had a related appointment within my department in the past 7 days or scheduled within the next 24 hours. The patient was located at Home: [address] [address]. The provider was located at [facility] (Appt Dept): [address] [facility] [address] [facility],  [address] [facility]. Note: not billable if this call serves to triage the patient into an appointment for the relevant concern    Cale Settles, DO           Subjective   HPI:    Chief Complaint   Patient presents with    Cough    Congestion    Fever     Pt presents with cough, congestion, fever, HA, myalgias for the last 2 days. Denies NVD. OTCs with some relief. No sick contacts.     Patient Active Problem List   Diagnosis    HTN (hypertension)    Lumbago    Lumbar radiculopathy    Cervicalgia, C5-7    Hyperlipidemia    Hypothyroidism    Chronic anxiety    ED (erectile dysfunction)    Bilateral sciatica    Trigger finger, right, ring     Medication monitoring encounter    Nocturnal leg cramps    GERD (gastroesophageal reflux disease)    IFG (impaired fasting glucose)    Situational depression    Dysthymia (or depressive neurosis)    Herniation of cervical intervertebral disc with radiculopathy    H/O cervical spine surgery, C4-5 fusion, 3/2/16. Gastrointestinal hemorrhage associated with gastrojejunal ulcer    S/P AVR (aortic valve replacement), 7/20/16. Tobacco abuse    Lymphomatoid papulosis     Lumbar radiculitis    Lumbar spinal stenosis    Spondylosis of thoracic region without myelopathy or radiculopathy    Thoracic spinal stenosis    Chronic pain syndrome    Urinary hesitancy    Malignant lymphoma, non-Hodgkin's (HCC)    Sacroiliac inflammation ([facility]     Past Surgical History:   Procedure Laterality Date    AORTIC VALVE REPLACEMENT  2007    MECHANICAL [facility]    AORTIC VALVE REPLACEMENT  07/20/2016    extraction of mechanical valve and replacement with tissue valve    APPENDECTOMY  1980    BACK INJECTION Bilateral 1/3/2022    Bilateral SI injection performed by Balaji Gentile MD at [facility] Bilateral 3/14/2022    Bilateral SI MBB # 2 performed by Brendon Garcia DO at [address] [place] [address].  5 20 2010    Patent coronary arteries. Possible causes of the patient's chest pain is likely noncardiac at least based on this angiogram. Patient chould be able to proceed w/ scheduled surgery w/ paying attention to anticoagulation and trying to minimize the period of no anticoagulation. CARDIAC CATHETERIZATION  2012, 6/9/16    [facility]    CARDIOVASCULAR STRESS TEST  4 15 2010    Moderate fixed inferior defect, possibly attenuation, cannot exclude a previous MI. Correlation w/ EKG is recommented. Significant degree of gut uptake which is likely to be the cause of the attenuation artifact seen.  EF 46%    CERVICAL FUSION  2010    CERVICAL FUSION  03/09/2016    REMOVAL HARDWARE C5-7, ACDF C4-6 WITH ATLANTIS CORNERSTONE    COLONOSCOPY  2010    CORONARY ARTERY BYPASS GRAFT      DILATATION, ESOPHAGUS      small resection    ENDOSCOPY, COLON, DIAGNOSTIC      NERVE SURGERY Bilateral 07/25/2017    THORACIC FACET BLOCK T7-8, T8-9, T11-12 OTHER SURGICAL HISTORY  05/02/2017    LESI L5    OTHER SURGICAL HISTORY  09/12/2017    thoracic epidural T11    PAIN MANAGEMENT PROCEDURE Left 5/17/2022    SI RFA, LEFT SIDE FIRST performed by Kayla Hernandez MD at [address] L/S,1 LEVEL Bilateral 7/25/2017    T-FACET MBB T7-8, T8-9, T11-12 BILATERAL performed by Kayla Hernandez MD at [address] DX/THER SBST EPIDURAL/SUBARACH LUMBAR/SACRAL N/A 9/12/2017    TESI T11 performed by Kayla Hernandez MD at [facility]  2010    [address] EXTRACTION  02/2019    UPPER GASTROINTESTINAL ENDOSCOPY  2010     Social History     Tobacco Use    Smoking status: Every Day     Packs/day: 0.50     Years: 44.00     Pack years: 22.00     Types: Cigarettes, Cigars     Start date: 6/9/1978    Smokeless tobacco: Never   Vaping Use    Vaping Use: Never used   Substance Use Topics    Alcohol use: Yes     Alcohol/week: 0.0 standard drinks     Comment: not for 3 mo    Drug use: No           Review of Systems   Constitutional:  Positive for chills, fatigue and fever. HENT:  Positive for congestion, sinus pressure and sinus pain. Respiratory:  Positive for cough. Cardiovascular: Negative. Gastrointestinal: Negative. Musculoskeletal: Negative. All other systems reviewed and are negative. Objective   Patient-Reported Vitals  No data recorded     Physical Exam  Constitutional:       General: He is not in acute distress. Pulmonary:      Effort: Pulmonary effort is normal. No respiratory distress. Neurological:      Mental Status: He is alert. Mental status is at baseline. Psychiatric:         Mood and Affect: Mood normal.         Behavior: Behavior normal.         Thought Content:  Thought content normal.         Judgment: Judgment normal.     Assessment & Plan   Below is the assessment and plan developed based on review of pertinent history, physical exam, labs, studies, and medications. 1. Influenza-like illness  -     oseltamivir (TAMIFLU) 75 MG capsule; Take 1 capsule by mouth 2 times daily for 5 days, Disp-10 capsule, R-0Normal    -  OTC decongestant, saline rinses    Return if symptoms worsen or fail to improve. Phil Daniela, was evaluated through a synchronous (real-time) audio-video encounter. The patient (or guardian if applicable) is aware that this is a billable service, which includes applicable co-pays. This Virtual Visit was conducted with patient's (and/or legal guardian's) consent. The visit was conducted pursuant to the emergency declaration under the [facility], [address] [place] [address] authority and the [employer] Act. Patient identification was verified, and a caregiver was present when appropriate.    The patient was located at Home: [address]  Provider was located at [facility] (Appt Dept): [address] [place] [address],  [address] [place] [address]

## 2023-02-04 DIAGNOSIS — I10 ESSENTIAL HYPERTENSION: ICD-10-CM

## 2023-02-06 RX ORDER — METOPROLOL SUCCINATE 25 MG/1
TABLET, EXTENDED RELEASE ORAL
Qty: 90 TABLET | Refills: 0 | Status: SHIPPED | OUTPATIENT
Start: 2023-02-06

## 2023-02-06 ASSESSMENT — ENCOUNTER SYMPTOMS
SINUS PAIN: 1
GASTROINTESTINAL NEGATIVE: 1
COUGH: 1
SINUS PRESSURE: 1

## 2023-02-07 ENCOUNTER — OFFICE VISIT (OUTPATIENT)
Dept: FAMILY MEDICINE CLINIC | Age: 58
End: 2023-02-07
Payer: COMMERCIAL

## 2023-02-07 ENCOUNTER — HOSPITAL ENCOUNTER (OUTPATIENT)
Dept: GENERAL RADIOLOGY | Age: 58
Discharge: HOME | End: 2023-02-07
Payer: COMMERCIAL

## 2023-02-07 ENCOUNTER — HOSPITAL ENCOUNTER (OUTPATIENT)
Age: 58
Discharge: HOME | End: 2023-02-07
Payer: COMMERCIAL

## 2023-02-07 VITALS
RESPIRATION RATE: 20 BRPM | SYSTOLIC BLOOD PRESSURE: 138 MMHG | TEMPERATURE: 98.3 F | BODY MASS INDEX: 29.23 KG/M2 | DIASTOLIC BLOOD PRESSURE: 74 MMHG | WEIGHT: 186.6 LBS | HEART RATE: 96 BPM

## 2023-02-07 DIAGNOSIS — B34.9 VIRAL SYNDROME: ICD-10-CM

## 2023-02-07 DIAGNOSIS — R05.3 PERSISTENT COUGH: ICD-10-CM

## 2023-02-07 DIAGNOSIS — R10.84 GENERALIZED ABDOMINAL PAIN: ICD-10-CM

## 2023-02-07 DIAGNOSIS — U07.1 COVID-19: ICD-10-CM

## 2023-02-07 DIAGNOSIS — B34.9 VIRAL SYNDROME: Primary | ICD-10-CM

## 2023-02-07 DIAGNOSIS — K59.00 CONSTIPATION, UNSPECIFIED CONSTIPATION TYPE: ICD-10-CM

## 2023-02-07 LAB
FLUAV RNA RESP QL NAA+PROBE: NOT DETECTED
FLUBV RNA RESP QL NAA+PROBE: NOT DETECTED
SARS-COV-2 RNA RESP QL NAA+PROBE: DETECTED

## 2023-02-07 PROCEDURE — 3078F DIAST BP <80 MM HG: CPT | Performed by: FAMILY MEDICINE

## 2023-02-07 PROCEDURE — 99214 OFFICE O/P EST MOD 30 MIN: CPT | Performed by: FAMILY MEDICINE

## 2023-02-07 PROCEDURE — 87636 SARSCOV2 & INF A&B AMP PRB: CPT

## 2023-02-07 PROCEDURE — 3075F SYST BP GE 130 - 139MM HG: CPT | Performed by: FAMILY MEDICINE

## 2023-02-07 PROCEDURE — 74018 RADEX ABDOMEN 1 VIEW: CPT

## 2023-02-07 PROCEDURE — 71046 X-RAY EXAM CHEST 2 VIEWS: CPT

## 2023-02-07 SDOH — ECONOMIC STABILITY: FOOD INSECURITY: WITHIN THE PAST 12 MONTHS, YOU WORRIED THAT YOUR FOOD WOULD RUN OUT BEFORE YOU GOT MONEY TO BUY MORE.: NEVER TRUE

## 2023-02-07 SDOH — ECONOMIC STABILITY: INCOME INSECURITY: HOW HARD IS IT FOR YOU TO PAY FOR THE VERY BASICS LIKE FOOD, HOUSING, MEDICAL CARE, AND HEATING?: SOMEWHAT HARD

## 2023-02-07 SDOH — ECONOMIC STABILITY: FOOD INSECURITY: WITHIN THE PAST 12 MONTHS, THE FOOD YOU BOUGHT JUST DIDN'T LAST AND YOU DIDN'T HAVE MONEY TO GET MORE.: NEVER TRUE

## 2023-02-07 SDOH — ECONOMIC STABILITY: HOUSING INSECURITY
IN THE LAST 12 MONTHS, WAS THERE A TIME WHEN YOU DID NOT HAVE A STEADY PLACE TO SLEEP OR SLEPT IN A SHELTER (INCLUDING NOW)?: NO

## 2023-02-07 ASSESSMENT — PATIENT HEALTH QUESTIONNAIRE - PHQ9
SUM OF ALL RESPONSES TO PHQ9 QUESTIONS 1 & 2: 1
SUM OF ALL RESPONSES TO PHQ QUESTIONS 1-9: 11
10. IF YOU CHECKED OFF ANY PROBLEMS, HOW DIFFICULT HAVE THESE PROBLEMS MADE IT FOR YOU TO DO YOUR WORK, TAKE CARE OF THINGS AT HOME, OR GET ALONG WITH OTHER PEOPLE: 0
SUM OF ALL RESPONSES TO PHQ QUESTIONS 1-9: 11
5. POOR APPETITE OR OVEREATING: 3
9. THOUGHTS THAT YOU WOULD BE BETTER OFF DEAD, OR OF HURTING YOURSELF: 0
SUM OF ALL RESPONSES TO PHQ QUESTIONS 1-9: 11
8. MOVING OR SPEAKING SO SLOWLY THAT OTHER PEOPLE COULD HAVE NOTICED. OR THE OPPOSITE, BEING SO FIGETY OR RESTLESS THAT YOU HAVE BEEN MOVING AROUND A LOT MORE THAN USUAL: 0
3. TROUBLE FALLING OR STAYING ASLEEP: 2
2. FEELING DOWN, DEPRESSED OR HOPELESS: 1
7. TROUBLE CONCENTRATING ON THINGS, SUCH AS READING THE NEWSPAPER OR WATCHING TELEVISION: 2
6. FEELING BAD ABOUT YOURSELF - OR THAT YOU ARE A FAILURE OR HAVE LET YOURSELF OR YOUR FAMILY DOWN: 0
1. LITTLE INTEREST OR PLEASURE IN DOING THINGS: 0
SUM OF ALL RESPONSES TO PHQ QUESTIONS 1-9: 11
4. FEELING TIRED OR HAVING LITTLE ENERGY: 3

## 2023-02-07 ASSESSMENT — ENCOUNTER SYMPTOMS
SINUS PRESSURE: 1
COUGH: 1
CHEST TIGHTNESS: 1
CONSTIPATION: 1
ABDOMINAL PAIN: 1
DIARRHEA: 1
RHINORRHEA: 1

## 2023-02-07 NOTE — LETTER
[address] [id number]  Phone: [phone]  Fax: 97 Mimi Schwartz DO        February 7, 2023     Patient: Frederick Martinez   YOB: 1965   Date of Visit: 2/7/2023       To Whom It May Concern: It is my medical opinion that Larissa Marquez may return to work on 2/10/23 with no restrictions. If you have any questions or concerns, please don't hesitate to call.     Sincerely,        Ye Wright DO

## 2023-02-07 NOTE — PROGRESS NOTES
Ivana Roth (:  1965) is a 59 y.o. male,Established patient, here for evaluation of the following chief complaint(s):  Sinusitis, Pharyngitis, Headache, Generalized Body Aches, Fatigue, Anorexia, Abdominal Pain (Abdominal pain in the lower left side), and Irritable Bowel Syndrome        Subjective   SUBJECTIVE/OBJECTIVE:  HPI:    Chief Complaint   Patient presents with    Sinusitis    Pharyngitis    Headache    Generalized Body Aches    Fatigue    Anorexia    Abdominal Pain     Abdominal pain in the lower left side    Irritable Bowel Syndrome     Pt presents today for ongoing cough, congestion, fatigue, HA, ST for the last week. VV performed, treated empirically for flu at that time. No relief. Now with some abd pain. Stools are alternating in nature. Patient Active Problem List   Diagnosis    HTN (hypertension)    Lumbago    Lumbar radiculopathy    Cervicalgia, C5-7    Hyperlipidemia    Hypothyroidism    Chronic anxiety    ED (erectile dysfunction)    Bilateral sciatica    Trigger finger, right, ring     Medication monitoring encounter    Nocturnal leg cramps    GERD (gastroesophageal reflux disease)    IFG (impaired fasting glucose)    Situational depression    Dysthymia (or depressive neurosis)    Herniation of cervical intervertebral disc with radiculopathy    H/O cervical spine surgery, C4-5 fusion, 3/2/16. Gastrointestinal hemorrhage associated with gastrojejunal ulcer    S/P AVR (aortic valve replacement), 16.     Tobacco abuse    Lymphomatoid papulosis     Lumbar radiculitis    Lumbar spinal stenosis    Spondylosis of thoracic region without myelopathy or radiculopathy    Thoracic spinal stenosis    Chronic pain syndrome    Urinary hesitancy    Malignant lymphoma, non-Hodgkin's (HCC)    Sacroiliac inflammation ([facility] [address]     Past Surgical History:   Procedure Laterality Date    AORTIC VALVE REPLACEMENT      MECHANICAL [facility]    AORTIC VALVE REPLACEMENT  2016 extraction of mechanical valve and replacement with tissue valve    APPENDECTOMY  1980    BACK INJECTION Bilateral 1/3/2022    Bilateral SI injection performed by Ayanna Chow MD at [facility] Bilateral 3/14/2022    Bilateral SI MBB # 2 performed by Matilda Esquivel DO at [address] [place] [address] Ave.  5 20 2010    Patent coronary arteries. Possible causes of the patient's chest pain is likely noncardiac at least based on this angiogram. Patient chould be able to proceed w/ scheduled surgery w/ paying attention to anticoagulation and trying to minimize the period of no anticoagulation. CARDIAC CATHETERIZATION  2012, 6/9/16    [facility]    CARDIOVASCULAR STRESS TEST  4 15 2010    Moderate fixed inferior defect, possibly attenuation, cannot exclude a previous MI. Correlation w/ EKG is recommented. Significant degree of gut uptake which is likely to be the cause of the attenuation artifact seen.  EF 46%    CERVICAL FUSION  2010    CERVICAL FUSION  03/09/2016    REMOVAL HARDWARE C5-7, ACDF C4-6 WITH ATLANTIS CORNERSTONE    COLONOSCOPY  2010    CORONARY ARTERY BYPASS GRAFT      DILATATION, ESOPHAGUS      small resection    ENDOSCOPY, COLON, DIAGNOSTIC      NERVE SURGERY Bilateral 07/25/2017    THORACIC FACET BLOCK T7-8, T8-9, T11-12    OTHER SURGICAL HISTORY  05/02/2017    LESI L5    OTHER SURGICAL HISTORY  09/12/2017    thoracic epidural T11    PAIN MANAGEMENT PROCEDURE Left 5/17/2022    SI RFA, LEFT SIDE FIRST performed by Ayanna Chow MD at [address] L/S,1 LEVEL Bilateral 7/25/2017    T-FACET MBB T7-8, T8-9, T11-12 BILATERAL performed by Ayanna Chow MD at [facility] DX/THER SBST [address] LUMBAR/SACRAL N/A 9/12/2017    TESI T11 performed by Ayanna Chow MD at [address] [place] [address] Road  2010    [address] EXTRACTION  02/2019 UPPER GASTROINTESTINAL ENDOSCOPY  2010     Social History     Tobacco Use    Smoking status: Every Day     Packs/day: 0.50     Years: 44.00     Pack years: 22.00     Types: Cigarettes, Cigars     Start date: 6/9/1978    Smokeless tobacco: Never   Vaping Use    Vaping Use: Never used   Substance Use Topics    Alcohol use: Yes     Alcohol/week: 0.0 standard drinks     Comment: not for 3 mo    Drug use: No         Review of Systems   Constitutional:  Positive for appetite change and fatigue. Negative for fever. HENT:  Positive for congestion, postnasal drip, rhinorrhea and sinus pressure. Respiratory:  Positive for cough and chest tightness. Cardiovascular: Negative. Gastrointestinal:  Positive for abdominal pain, constipation and diarrhea. Musculoskeletal: Negative. All other systems reviewed and are negative. Objective   Physical Exam  Vitals and nursing note reviewed. Constitutional:       General: He is not in acute distress. Appearance: Normal appearance. He is well-developed. He is not ill-appearing. HENT:      Head: Normocephalic and atraumatic. Right Ear: External ear normal. A middle ear effusion is present. Left Ear: External ear normal. A middle ear effusion is present. Nose: Mucosal edema and rhinorrhea present. Eyes:      Conjunctiva/sclera: Conjunctivae normal.   Cardiovascular:      Rate and Rhythm: Normal rate and regular rhythm. Heart sounds: Normal heart sounds. No murmur heard. Pulmonary:      Effort: Pulmonary effort is normal. No respiratory distress. Breath sounds: Normal breath sounds. No wheezing, rhonchi or rales. Abdominal:      General: There is no distension. Tenderness: There is generalized abdominal tenderness. There is no guarding or rebound. Musculoskeletal:      Cervical back: Neck supple. Skin:     General: Skin is warm and dry. Findings: No rash (on exposed surfaces).    Neurological:      General: No focal deficit present. Mental Status: He is alert and oriented to person, place, and time. Psychiatric:         Attention and Perception: Attention normal.         Mood and Affect: Mood normal.         Speech: Speech normal.         Behavior: Behavior normal. Behavior is cooperative. Thought Content: Thought content normal.         Judgment: Judgment normal.             ASSESSMENT/PLAN:  1. Viral syndrome  -     COVID-19 & Influenza Combo; Future  2. Persistent cough  -     COVID-19 & Influenza Combo; Future  -     XR CHEST (2 VW); Future  3. Generalized abdominal pain  -     XR ABDOMEN (KUB) (SINGLE AP VIEW); Future  4. COVID-19  5. Constipation, unspecified constipation type    -  Orders above  -  Pt COVID +, symptomatic care  -  CXR ok  -  KUB with constipation, Miralax prn    Return if symptoms worsen or fail to improve. An electronic signature was used to authenticate this note.     --Anna Sanchez, DO

## 2023-02-09 ENCOUNTER — TELEPHONE (OUTPATIENT)
Dept: FAMILY MEDICINE CLINIC | Age: 58
End: 2023-02-09

## 2023-02-09 RX ORDER — DOXYCYCLINE HYCLATE 100 MG
100 TABLET ORAL 2 TIMES DAILY
Qty: 14 TABLET | Refills: 0 | Status: SHIPPED | OUTPATIENT
Start: 2023-02-09 | End: 2023-02-16

## 2023-02-09 NOTE — TELEPHONE ENCOUNTER
Patient calling with c/o sinus pressure, cough, and chest congestion worsening. POSITIVE COVID on 2/7/23. Taking OTC Mucinex without improvement. Please advise.   Pharmacy is [facility]

## 2023-02-10 ENCOUNTER — TELEPHONE (OUTPATIENT)
Dept: FAMILY MEDICINE CLINIC | Age: 58
End: 2023-02-10

## 2023-02-10 NOTE — LETTER
[address] [id number]  Phone: [phone]  Fax: 97 Derrickvishnu Buck Schwartz DO        February 10, 2023     Patient: Coleman Tanner   YOB: 1965   Date of Visit: 2/3/23       To Whom It May Concern: It is my medical opinion that Kecia Madden may return to work on 2/13/23 with no restrictions. If you have any questions or concerns, please don't hesitate to call.     Sincerely,        Nilsa Phoenix DO

## 2023-02-10 NOTE — TELEPHONE ENCOUNTER
Patient does not have mychart. Letter printed and signed by Dr. Shay Rangel and filed for pick-up.   Patient notified

## 2023-02-10 NOTE — TELEPHONE ENCOUNTER
The patient called and stated that he has been off work due to being sick and is needing of a letter stating that he can return to work on Monday 2/13/23. Please advise. Call the patient when the letter is ready for .

## 2023-02-13 ENCOUNTER — TELEPHONE (OUTPATIENT)
Dept: FAMILY MEDICINE CLINIC | Age: 58
End: 2023-02-13

## 2023-02-13 RX ORDER — AMOXICILLIN AND CLAVULANATE POTASSIUM 875; 125 MG/1; MG/1
1 TABLET, FILM COATED ORAL 2 TIMES DAILY WITH MEALS
Qty: 14 TABLET | Refills: 0 | Status: SHIPPED | OUTPATIENT
Start: 2023-02-13 | End: 2023-02-20

## 2023-02-13 NOTE — LETTER
[address] [address]  Phone: [phone]  Fax: 48 Derrickvishnu Buck Schwartz DO        February 13, 2023     Patient: Daymon Kocher   YOB: 1965   Date of Visit: N/A       To Whom It May Concern: It is my medical opinion that Odalis Downing may return to work on 2/14/23 with no restrictions. If you have any questions or concerns, please don't hesitate to call.     Sincerely,        Rudy Langston DO Child c/o mid abd pain for the past couple days, mom sts her last bowel movement was 3 days ago and she has been having difficulty passing her stool. Denies any nausea or vomiting. Per mom child felt hot today and gave her children's tylenol.

## 2023-02-13 NOTE — TELEPHONE ENCOUNTER
Pt called office stating he has been sick since mid January. He tested + for COVID 2/7/23. Pt says he has 2days left of his anitbiotic. At times pt is feeling better, once he gets up and moving around \"my symptoms come back\". Pt is still with congestion, HAs in the eyes and body pains. No fever. Please advise.

## 2023-02-13 NOTE — TELEPHONE ENCOUNTER
Pt was notified and voiced understanding. Pt is to RTW today, but is asking if he can go back tomorrow? He will need a new RTW note. Please advise.

## 2023-03-23 ENCOUNTER — HOSPITAL ENCOUNTER (OUTPATIENT)
Age: 58
Discharge: HOME | End: 2023-03-23
Payer: COMMERCIAL

## 2023-03-23 LAB
ALT SERPL W/O P-5'-P-CCNC: 10 U/L (ref 11–66)
ANISOCYTOSIS BLD QL SMEAR: PRESENT
AST SERPL-CCNC: 15 U/L (ref 5–40)
BASOPHILS ABSOLUTE: 0.1 THOU/MM3 (ref 0–0.1)
BASOPHILS NFR BLD AUTO: 0.6 %
BUN SERPL-MCNC: 18 MG/DL (ref 7–22)
CREAT SERPL-MCNC: 0.7 MG/DL (ref 0.4–1.2)
DEPRECATED RDW RBC AUTO: 67.6 FL (ref 35–45)
EOSINOPHIL NFR BLD AUTO: 5.3 %
EOSINOPHILS ABSOLUTE: 0.6 THOU/MM3 (ref 0–0.4)
ERYTHROCYTE [DISTWIDTH] IN BLOOD BY AUTOMATED COUNT: 19.6 % (ref 11.5–14.5)
GFR SERPL CREATININE-BSD FRML MDRD: > 60 ML/MIN/1.73M2
HCT VFR BLD AUTO: 38.8 % (ref 42–52)
HGB BLD-MCNC: 12.6 GM/DL (ref 14–18)
IMM GRANULOCYTES # BLD AUTO: 0.04 THOU/MM3 (ref 0–0.07)
IMM GRANULOCYTES NFR BLD AUTO: 0.3 %
LYMPHOCYTES ABSOLUTE: 1 THOU/MM3 (ref 1–4.8)
LYMPHOCYTES NFR BLD AUTO: 8.5 %
MCH RBC QN AUTO: 31 PG (ref 26–33)
MCHC RBC AUTO-ENTMCNC: 32.5 GM/DL (ref 32.2–35.5)
MCV RBC AUTO: 95.6 FL (ref 80–94)
MONOCYTES ABSOLUTE: 0.8 THOU/MM3 (ref 0.4–1.3)
MONOCYTES NFR BLD AUTO: 7.3 %
NEUTROPHILS NFR BLD AUTO: 78 %
NRBC BLD AUTO-RTO: 0 /100 WBC
PLATELET # BLD AUTO: 264 THOU/MM3 (ref 130–400)
PMV BLD AUTO: 10.9 FL (ref 9.4–12.4)
RBC # BLD AUTO: 4.06 MILL/MM3 (ref 4.7–6.1)
SEGMENTED NEUTROPHILS ABSOLUTE COUNT: 9 THOU/MM3 (ref 1.8–7.7)
WBC # BLD AUTO: 11.5 THOU/MM3 (ref 4.8–10.8)

## 2023-03-23 PROCEDURE — 82565 ASSAY OF CREATININE: CPT

## 2023-03-23 PROCEDURE — 36415 COLL VENOUS BLD VENIPUNCTURE: CPT

## 2023-03-23 PROCEDURE — 85025 COMPLETE CBC W/AUTO DIFF WBC: CPT

## 2023-03-23 PROCEDURE — 84460 ALANINE AMINO (ALT) (SGPT): CPT

## 2023-03-23 PROCEDURE — 84450 TRANSFERASE (AST) (SGOT): CPT

## 2023-03-23 PROCEDURE — 84520 ASSAY OF UREA NITROGEN: CPT

## 2023-04-01 NOTE — TELEPHONE ENCOUNTER
Pt returned call. Referral ordered. Pt states that it is okay to leave a VM regarding the appointment with Pulmonary as he has a hard time answering his phone during the day. 3

## 2023-04-06 DIAGNOSIS — F41.9 ANXIETY: ICD-10-CM

## 2023-04-07 RX ORDER — ALPRAZOLAM 0.5 MG/1
TABLET ORAL
Qty: 30 TABLET | Refills: 2 | Status: SHIPPED | OUTPATIENT
Start: 2023-04-07 | End: 2023-07-07

## 2023-04-07 RX ORDER — ESOMEPRAZOLE MAGNESIUM 40 MG/1
CAPSULE, DELAYED RELEASE ORAL
Qty: 30 CAPSULE | Refills: 0 | OUTPATIENT
Start: 2023-04-07

## 2023-04-11 RX ORDER — ESOMEPRAZOLE MAGNESIUM 40 MG/1
CAPSULE, DELAYED RELEASE ORAL
Qty: 30 CAPSULE | Refills: 0 | OUTPATIENT
Start: 2023-04-11

## 2023-04-12 ENCOUNTER — TELEPHONE (OUTPATIENT)
Dept: CARDIOLOGY CLINIC | Age: 58
End: 2023-04-12

## 2023-04-15 PROBLEM — J44.9 COPD, MILD (HCC): Status: ACTIVE | Noted: 2023-04-15

## 2023-04-15 PROBLEM — I71.40 ABDOMINAL AORTIC ANEURYSM (AAA) WITHOUT RUPTURE, UNSPECIFIED PART (HCC): Status: ACTIVE | Noted: 2023-04-15

## 2023-04-18 NOTE — TELEPHONE ENCOUNTER
LM for patient to call office back. Can patient come in on 4-20-23 at 3:15: pm with Dr. Sherrill Amaro? Encounter was marked done. Did patient confirm appointment?

## 2023-04-20 ENCOUNTER — OFFICE VISIT (OUTPATIENT)
Dept: CARDIOLOGY CLINIC | Age: 58
End: 2023-04-20
Payer: COMMERCIAL

## 2023-04-20 VITALS
SYSTOLIC BLOOD PRESSURE: 126 MMHG | BODY MASS INDEX: 28.56 KG/M2 | DIASTOLIC BLOOD PRESSURE: 72 MMHG | WEIGHT: 182 LBS | HEIGHT: 67 IN | HEART RATE: 76 BPM

## 2023-04-20 DIAGNOSIS — I10 PRIMARY HYPERTENSION: ICD-10-CM

## 2023-04-20 DIAGNOSIS — Z95.2 S/P AVR: Primary | ICD-10-CM

## 2023-04-20 PROCEDURE — 99213 OFFICE O/P EST LOW 20 MIN: CPT | Performed by: NUCLEAR MEDICINE

## 2023-04-20 PROCEDURE — 3078F DIAST BP <80 MM HG: CPT | Performed by: NUCLEAR MEDICINE

## 2023-04-20 PROCEDURE — 3074F SYST BP LT 130 MM HG: CPT | Performed by: NUCLEAR MEDICINE

## 2023-04-20 NOTE — PROGRESS NOTES
risk factor modification and medical management  Thank you for allowing me to participate in the care of your patient. Please don't hesitate to contact me regarding any further issues related to the patient care    Orders Placed:  No orders of the defined types were placed in this encounter. Prescribed:  No orders of the defined types were placed in this encounter. Discussed use, benefit, and side effects of prescribed medications. All patient questions answered. Pt voicedunderstanding. Instructed to continue current medications, diet and exercise. Continue risk factor modification and medical management. Patient agreed with treatment plan. Follow up as directed.     Electronically signedby Aleisha Gruber MD on 4/20/2023 at 3:28 PM

## 2023-04-25 ENCOUNTER — HOSPITAL ENCOUNTER (OUTPATIENT)
Age: 58
Discharge: HOME | End: 2023-04-25
Payer: COMMERCIAL

## 2023-04-25 LAB
ALT SERPL W/O P-5'-P-CCNC: 11 U/L (ref 11–66)
AST SERPL-CCNC: 14 U/L (ref 5–40)
BUN SERPL-MCNC: 17 MG/DL (ref 7–22)
CREAT SERPL-MCNC: 0.7 MG/DL (ref 0.4–1.2)
DEPRECATED RDW RBC AUTO: 65.9 FL (ref 35–45)
ERYTHROCYTE [DISTWIDTH] IN BLOOD BY AUTOMATED COUNT: 18.6 % (ref 11.5–14.5)
GFR SERPL CREATININE-BSD FRML MDRD: > 60 ML/MIN/1.73M2
HCT VFR BLD AUTO: 40 % (ref 42–52)
HGB BLD-MCNC: 12.9 GM/DL (ref 14–18)
MCH RBC QN AUTO: 31 PG (ref 26–33)
MCHC RBC AUTO-ENTMCNC: 32.3 GM/DL (ref 32.2–35.5)
MCV RBC AUTO: 96.2 FL (ref 80–94)
PLATELET # BLD AUTO: 232 THOU/MM3 (ref 130–400)
PMV BLD AUTO: 10.7 FL (ref 9.4–12.4)
RBC # BLD AUTO: 4.16 MILL/MM3 (ref 4.7–6.1)
WBC # BLD AUTO: 8.6 THOU/MM3 (ref 4.8–10.8)

## 2023-04-25 PROCEDURE — 84450 TRANSFERASE (AST) (SGOT): CPT

## 2023-04-25 PROCEDURE — 84460 ALANINE AMINO (ALT) (SGPT): CPT

## 2023-04-25 PROCEDURE — 85027 COMPLETE CBC AUTOMATED: CPT

## 2023-04-25 PROCEDURE — 82565 ASSAY OF CREATININE: CPT

## 2023-04-25 PROCEDURE — 84520 ASSAY OF UREA NITROGEN: CPT

## 2023-04-25 PROCEDURE — 36415 COLL VENOUS BLD VENIPUNCTURE: CPT

## 2023-05-17 DIAGNOSIS — I10 ESSENTIAL HYPERTENSION: ICD-10-CM

## 2023-05-18 RX ORDER — METOPROLOL SUCCINATE 25 MG/1
TABLET, EXTENDED RELEASE ORAL
Qty: 90 TABLET | Refills: 0 | OUTPATIENT
Start: 2023-05-18

## 2023-07-09 DIAGNOSIS — F41.9 ANXIETY: ICD-10-CM

## 2023-07-10 RX ORDER — ALPRAZOLAM 0.5 MG/1
TABLET ORAL
Qty: 30 TABLET | Refills: 0 | Status: SHIPPED | OUTPATIENT
Start: 2023-07-10 | End: 2023-08-10

## 2023-08-10 DIAGNOSIS — F41.9 ANXIETY: ICD-10-CM

## 2023-08-10 RX ORDER — ALPRAZOLAM 0.5 MG/1
TABLET ORAL
Qty: 30 TABLET | Refills: 0 | Status: SHIPPED | OUTPATIENT
Start: 2023-08-10 | End: 2023-09-10

## 2023-08-10 RX ORDER — ASPIRIN 81 MG/1
TABLET, COATED ORAL
Qty: 90 TABLET | Refills: 2 | Status: SHIPPED | OUTPATIENT
Start: 2023-08-10

## 2023-09-03 DIAGNOSIS — F41.9 ANXIETY: ICD-10-CM

## 2023-09-04 RX ORDER — ALPRAZOLAM 0.5 MG/1
TABLET ORAL
Qty: 30 TABLET | Refills: 0 | Status: SHIPPED | OUTPATIENT
Start: 2023-09-04 | End: 2023-10-04

## 2023-09-14 ENCOUNTER — HOSPITAL ENCOUNTER (OUTPATIENT)
Age: 58
Discharge: HOME | End: 2023-09-14
Payer: COMMERCIAL

## 2023-09-14 LAB
ALT SERPL W/O P-5'-P-CCNC: 9 U/L (ref 11–66)
AST SERPL-CCNC: 13 U/L (ref 5–40)
BUN SERPL-MCNC: 12 MG/DL (ref 7–22)
CREAT SERPL-MCNC: 0.7 MG/DL (ref 0.4–1.2)
DEPRECATED RDW RBC AUTO: 57.7 FL (ref 35–45)
ERYTHROCYTE [DISTWIDTH] IN BLOOD BY AUTOMATED COUNT: 16.1 % (ref 11.5–14.5)
GFR SERPL CREATININE-BSD FRML MDRD: > 60 ML/MIN/1.73M2
HCT VFR BLD AUTO: 44.7 % (ref 42–52)
HGB BLD-MCNC: 14.5 GM/DL (ref 14–18)
MCH RBC QN AUTO: 31.8 PG (ref 26–33)
MCHC RBC AUTO-ENTMCNC: 32.4 GM/DL (ref 32.2–35.5)
MCV RBC AUTO: 98 FL (ref 80–94)
PLATELET # BLD AUTO: 290 THOU/MM3 (ref 130–400)
PMV BLD AUTO: 10.4 FL (ref 9.4–12.4)
RBC # BLD AUTO: 4.56 MILL/MM3 (ref 4.7–6.1)
WBC # BLD AUTO: 9.9 THOU/MM3 (ref 4.8–10.8)

## 2023-09-14 PROCEDURE — 84520 ASSAY OF UREA NITROGEN: CPT

## 2023-09-14 PROCEDURE — 85027 COMPLETE CBC AUTOMATED: CPT

## 2023-09-14 PROCEDURE — 82565 ASSAY OF CREATININE: CPT

## 2023-09-14 PROCEDURE — 84460 ALANINE AMINO (ALT) (SGPT): CPT

## 2023-09-14 PROCEDURE — 84450 TRANSFERASE (AST) (SGOT): CPT

## 2023-09-14 PROCEDURE — 36415 COLL VENOUS BLD VENIPUNCTURE: CPT

## 2023-10-09 DIAGNOSIS — F41.9 ANXIETY: ICD-10-CM

## 2023-10-10 ENCOUNTER — NURSE ONLY (OUTPATIENT)
Dept: FAMILY MEDICINE CLINIC | Age: 58
End: 2023-10-10
Payer: COMMERCIAL

## 2023-10-10 DIAGNOSIS — Z23 NEED FOR INFLUENZA VACCINATION: Primary | ICD-10-CM

## 2023-10-10 PROCEDURE — 90471 IMMUNIZATION ADMIN: CPT | Performed by: FAMILY MEDICINE

## 2023-10-10 PROCEDURE — 90674 CCIIV4 VAC NO PRSV 0.5 ML IM: CPT | Performed by: FAMILY MEDICINE

## 2023-10-10 PROCEDURE — 99999 PR OFFICE/OUTPT VISIT,PROCEDURE ONLY: CPT | Performed by: FAMILY MEDICINE

## 2023-10-10 RX ORDER — ALPRAZOLAM 0.5 MG/1
TABLET ORAL
Qty: 30 TABLET | Refills: 0 | Status: SHIPPED | OUTPATIENT
Start: 2023-10-10 | End: 2023-11-10

## 2023-10-10 NOTE — PROGRESS NOTES
After obtaining consent, and per orders of Dr. Gege Casanova, injection of Flucelvax 0.5ml given in Left deltoid by Janine Suarez CMA (AAMA). Patient instructed to report any adverse reaction to me immediately. Pt tolerated injection well.      Immunizations Administered       Name Date Dose Route    Influenza, FLUCELVAX, (age 10 mo+), MDCK, PF, 0.5mL 10/10/2023 0.5 mL Intramuscular    Site: Deltoid- Left    Lot: [id number]    NDC: 06016-585-50

## 2023-11-09 DIAGNOSIS — F41.9 ANXIETY: ICD-10-CM

## 2023-11-10 RX ORDER — ALPRAZOLAM 0.5 MG/1
TABLET ORAL
Qty: 30 TABLET | Refills: 0 | Status: SHIPPED | OUTPATIENT
Start: 2023-11-10 | End: 2023-12-10

## 2023-11-16 ENCOUNTER — HOSPITAL ENCOUNTER (OUTPATIENT)
Age: 58
Discharge: HOME | End: 2023-11-16
Payer: COMMERCIAL

## 2023-11-16 LAB
ALT SERPL W/O P-5'-P-CCNC: 12 U/L (ref 11–66)
AST SERPL-CCNC: 16 U/L (ref 5–40)
BUN SERPL-MCNC: 16 MG/DL (ref 7–22)
CREAT SERPL-MCNC: 0.8 MG/DL (ref 0.4–1.2)
DEPRECATED RDW RBC AUTO: 61.4 FL (ref 35–45)
ERYTHROCYTE [DISTWIDTH] IN BLOOD BY AUTOMATED COUNT: 17.5 % (ref 11.5–14.5)
GFR SERPL CREATININE-BSD FRML MDRD: > 60 ML/MIN/1.73M2
HCT VFR BLD AUTO: 39.9 % (ref 42–52)
HGB BLD-MCNC: 13 GM/DL (ref 14–18)
MCH RBC QN AUTO: 31.7 PG (ref 26–33)
MCHC RBC AUTO-ENTMCNC: 32.6 GM/DL (ref 32.2–35.5)
MCV RBC AUTO: 97.3 FL (ref 80–94)
PLATELET # BLD AUTO: 291 THOU/MM3 (ref 130–400)
PMV BLD AUTO: 10.1 FL (ref 9.4–12.4)
RBC # BLD AUTO: 4.1 MILL/MM3 (ref 4.7–6.1)
WBC # BLD AUTO: 10.1 THOU/MM3 (ref 4.8–10.8)

## 2023-11-16 PROCEDURE — 84460 ALANINE AMINO (ALT) (SGPT): CPT

## 2023-11-16 PROCEDURE — 36415 COLL VENOUS BLD VENIPUNCTURE: CPT

## 2023-11-16 PROCEDURE — 85027 COMPLETE CBC AUTOMATED: CPT

## 2023-11-16 PROCEDURE — 84450 TRANSFERASE (AST) (SGOT): CPT

## 2023-11-16 PROCEDURE — 84520 ASSAY OF UREA NITROGEN: CPT

## 2023-11-16 PROCEDURE — 82565 ASSAY OF CREATININE: CPT

## 2023-12-07 ENCOUNTER — OFFICE VISIT (OUTPATIENT)
Dept: FAMILY MEDICINE CLINIC | Age: 58
End: 2023-12-07
Payer: COMMERCIAL

## 2023-12-07 ENCOUNTER — HOSPITAL ENCOUNTER (OUTPATIENT)
Age: 58
Discharge: HOME | End: 2023-12-07
Payer: COMMERCIAL

## 2023-12-07 DIAGNOSIS — C86.6 LYMPHOMATOID PAPULOSIS (HCC): ICD-10-CM

## 2023-12-07 DIAGNOSIS — F41.9 ANXIETY: ICD-10-CM

## 2023-12-07 DIAGNOSIS — J44.9 COPD, MILD (HCC): ICD-10-CM

## 2023-12-07 DIAGNOSIS — Z00.00 WELL ADULT HEALTH CHECK: Primary | ICD-10-CM

## 2023-12-07 DIAGNOSIS — Z00.00 WELL ADULT HEALTH CHECK: ICD-10-CM

## 2023-12-07 DIAGNOSIS — F43.0 ACUTE STRESS REACTION: ICD-10-CM

## 2023-12-07 DIAGNOSIS — I10 ESSENTIAL HYPERTENSION: ICD-10-CM

## 2023-12-07 LAB
CHOLEST SERPL-MCNC: 172 MG/DL (ref 100–199)
DEPRECATED MEAN GLUCOSE BLD GHB EST-ACNC: 105 MG/DL (ref 70–126)
HBA1C MFR BLD HPLC: 5.5 % (ref 4.4–6.4)
HDLC SERPL-MCNC: 39 MG/DL
LDLC SERPL CALC-MCNC: 116 MG/DL
PSA SERPL-MCNC: 2.13 NG/ML (ref 0–1)
TRIGL SERPL-MCNC: 83 MG/DL (ref 0–199)
TSH SERPL DL<=0.005 MIU/L-ACNC: 0.62 UIU/ML (ref 0.4–4.2)

## 2023-12-07 PROCEDURE — 80061 LIPID PANEL: CPT

## 2023-12-07 PROCEDURE — 83036 HEMOGLOBIN GLYCOSYLATED A1C: CPT

## 2023-12-07 PROCEDURE — G0103 PSA SCREENING: HCPCS

## 2023-12-07 PROCEDURE — 99396 PREV VISIT EST AGE 40-64: CPT | Performed by: FAMILY MEDICINE

## 2023-12-07 PROCEDURE — 3074F SYST BP LT 130 MM HG: CPT | Performed by: FAMILY MEDICINE

## 2023-12-07 PROCEDURE — 36415 COLL VENOUS BLD VENIPUNCTURE: CPT

## 2023-12-07 PROCEDURE — 84443 ASSAY THYROID STIM HORMONE: CPT

## 2023-12-07 PROCEDURE — 3078F DIAST BP <80 MM HG: CPT | Performed by: FAMILY MEDICINE

## 2023-12-07 RX ORDER — TRIAMCINOLONE ACETONIDE 1 MG/G
CREAM TOPICAL
COMMUNITY
Start: 2023-09-25

## 2023-12-07 RX ORDER — TRIAMCINOLONE ACETONIDE 0.25 MG/G
CREAM TOPICAL
COMMUNITY
Start: 2023-10-18

## 2023-12-07 RX ORDER — FLUOROURACIL 50 MG/G
CREAM TOPICAL
COMMUNITY
Start: 2023-10-03

## 2023-12-07 RX ORDER — ESCITALOPRAM OXALATE 10 MG/1
10 TABLET ORAL DAILY
Qty: 30 TABLET | Refills: 1 | Status: SHIPPED | OUTPATIENT
Start: 2023-12-07

## 2023-12-09 VITALS
WEIGHT: 185.38 LBS | SYSTOLIC BLOOD PRESSURE: 130 MMHG | BODY MASS INDEX: 29.1 KG/M2 | HEIGHT: 67 IN | DIASTOLIC BLOOD PRESSURE: 84 MMHG

## 2023-12-09 RX ORDER — ALPRAZOLAM 0.5 MG/1
TABLET ORAL
Qty: 30 TABLET | Refills: 2 | Status: SHIPPED | OUTPATIENT
Start: 2023-12-09 | End: 2024-03-09

## 2023-12-09 ASSESSMENT — ENCOUNTER SYMPTOMS
GASTROINTESTINAL NEGATIVE: 1
RESPIRATORY NEGATIVE: 1

## 2023-12-10 DIAGNOSIS — E03.9 ACQUIRED HYPOTHYROIDISM: ICD-10-CM

## 2023-12-11 RX ORDER — LEVOTHYROXINE SODIUM 0.07 MG/1
TABLET ORAL
Qty: 90 TABLET | Refills: 0 | Status: SHIPPED | OUTPATIENT
Start: 2023-12-11

## 2024-01-04 ENCOUNTER — OFFICE VISIT (OUTPATIENT)
Dept: FAMILY MEDICINE CLINIC | Age: 59
End: 2024-01-04
Payer: COMMERCIAL

## 2024-01-04 VITALS
DIASTOLIC BLOOD PRESSURE: 80 MMHG | HEART RATE: 92 BPM | WEIGHT: 185 LBS | RESPIRATION RATE: 16 BRPM | BODY MASS INDEX: 29.03 KG/M2 | SYSTOLIC BLOOD PRESSURE: 136 MMHG | HEIGHT: 67 IN

## 2024-01-04 DIAGNOSIS — F41.9 ANXIETY: ICD-10-CM

## 2024-01-04 DIAGNOSIS — F43.21 GRIEVING: Primary | ICD-10-CM

## 2024-01-04 PROCEDURE — 3075F SYST BP GE 130 - 139MM HG: CPT | Performed by: FAMILY MEDICINE

## 2024-01-04 PROCEDURE — 99213 OFFICE O/P EST LOW 20 MIN: CPT | Performed by: FAMILY MEDICINE

## 2024-01-04 PROCEDURE — 3079F DIAST BP 80-89 MM HG: CPT | Performed by: FAMILY MEDICINE

## 2024-01-04 RX ORDER — ESCITALOPRAM OXALATE 10 MG/1
10 TABLET ORAL DAILY
Qty: 90 TABLET | Refills: 3 | Status: SHIPPED | OUTPATIENT
Start: 2024-01-04

## 2024-01-04 ASSESSMENT — COLUMBIA-SUICIDE SEVERITY RATING SCALE - C-SSRS
6. HAVE YOU EVER DONE ANYTHING, STARTED TO DO ANYTHING, OR PREPARED TO DO ANYTHING TO END YOUR LIFE?: NO
1. WITHIN THE PAST MONTH, HAVE YOU WISHED YOU WERE DEAD OR WISHED YOU COULD GO TO SLEEP AND NOT WAKE UP?: NO
2. HAVE YOU ACTUALLY HAD ANY THOUGHTS OF KILLING YOURSELF?: NO

## 2024-01-04 ASSESSMENT — PATIENT HEALTH QUESTIONNAIRE - PHQ9
SUM OF ALL RESPONSES TO PHQ9 QUESTIONS 1 & 2: 6
5. POOR APPETITE OR OVEREATING: 3
1. LITTLE INTEREST OR PLEASURE IN DOING THINGS: 3
SUM OF ALL RESPONSES TO PHQ QUESTIONS 1-9: 21
10. IF YOU CHECKED OFF ANY PROBLEMS, HOW DIFFICULT HAVE THESE PROBLEMS MADE IT FOR YOU TO DO YOUR WORK, TAKE CARE OF THINGS AT HOME, OR GET ALONG WITH OTHER PEOPLE: 0
SUM OF ALL RESPONSES TO PHQ QUESTIONS 1-9: 21
6. FEELING BAD ABOUT YOURSELF - OR THAT YOU ARE A FAILURE OR HAVE LET YOURSELF OR YOUR FAMILY DOWN: 3
3. TROUBLE FALLING OR STAYING ASLEEP: 3
7. TROUBLE CONCENTRATING ON THINGS, SUCH AS READING THE NEWSPAPER OR WATCHING TELEVISION: 3
8. MOVING OR SPEAKING SO SLOWLY THAT OTHER PEOPLE COULD HAVE NOTICED. OR THE OPPOSITE, BEING SO FIGETY OR RESTLESS THAT YOU HAVE BEEN MOVING AROUND A LOT MORE THAN USUAL: 0
SUM OF ALL RESPONSES TO PHQ QUESTIONS 1-9: 21
SUM OF ALL RESPONSES TO PHQ QUESTIONS 1-9: 21
2. FEELING DOWN, DEPRESSED OR HOPELESS: 3
4. FEELING TIRED OR HAVING LITTLE ENERGY: 3
9. THOUGHTS THAT YOU WOULD BE BETTER OFF DEAD, OR OF HURTING YOURSELF: 0

## 2024-01-04 ASSESSMENT — ENCOUNTER SYMPTOMS
RESPIRATORY NEGATIVE: 1
GASTROINTESTINAL NEGATIVE: 1

## 2024-01-04 NOTE — PROGRESS NOTES
Alberto Gilliland (:  1965) is a 58 y.o. male,Established patient, here for evaluation of the following chief complaint(s):  Anxiety (4 week follow up /Son passed away and just found out on Tuesday )          Subjective   SUBJECTIVE/OBJECTIVE:  HPI  Chief Complaint   Patient presents with    Anxiety     4 week follow up   Son passed away and just found out on Tuesday      1 month eval.    Unfortunately Alberto recently lost his son to an overdose earlier this month and just found out.    Lexapro was helping prior to this tragic event.    Patient Active Problem List   Diagnosis    HTN (hypertension)    Lumbago    Lumbar radiculopathy    Cervicalgia, C5-7    Hyperlipidemia    Hypothyroidism    Chronic anxiety    ED (erectile dysfunction)    Bilateral sciatica    Trigger finger, right, ring     Medication monitoring encounter    Nocturnal leg cramps    GERD (gastroesophageal reflux disease)    IFG (impaired fasting glucose)    Situational depression    Dysthymia (or depressive neurosis)    Herniation of cervical intervertebral disc with radiculopathy    H/O cervical spine surgery, C4-5 fusion, 3/2/16.    Gastrointestinal hemorrhage associated with gastrojejunal ulcer    S/P AVR (aortic valve replacement), 16.    Tobacco abuse    Lymphomatoid papulosis     Lumbar radiculitis    Lumbar spinal stenosis    Spondylosis of thoracic region without myelopathy or radiculopathy    Thoracic spinal stenosis    Chronic pain syndrome    Urinary hesitancy    Malignant lymphoma, non-Hodgkin's (HCC)    COPD, mild (HCC)    Abdominal aortic aneurysm (AAA) without rupture, unspecified part (HCC)       Current Outpatient Medications   Medication Sig Dispense Refill    escitalopram (LEXAPRO) 10 MG tablet Take 1 tablet by mouth daily 90 tablet 3    levothyroxine (SYNTHROID) 75 MCG tablet TAKE 1 TABLET BY MOUTH EVERY DAY 90 tablet 0    ALPRAZolam (XANAX) 0.5 MG tablet TAKE 1 TABLET BY MOUTH EVERY DAY AS NEEDED FOR ANXIETY OR  sleep

## 2024-01-18 ENCOUNTER — HOSPITAL ENCOUNTER (OUTPATIENT)
Dept: ULTRASOUND IMAGING | Age: 59
Discharge: HOME | End: 2024-01-18
Attending: INTERNAL MEDICINE
Payer: COMMERCIAL

## 2024-01-18 DIAGNOSIS — K76.0 FATTY METAMORPHOSIS OF LIVER: ICD-10-CM

## 2024-01-18 PROCEDURE — 93975 VASCULAR STUDY: CPT

## 2024-01-18 PROCEDURE — 76705 ECHO EXAM OF ABDOMEN: CPT

## 2024-01-22 ENCOUNTER — HOSPITAL ENCOUNTER (OUTPATIENT)
Age: 59
Discharge: HOME | End: 2024-01-22
Payer: COMMERCIAL

## 2024-01-22 LAB
ALT SERPL W/O P-5'-P-CCNC: 11 U/L (ref 11–66)
AST SERPL-CCNC: 16 U/L (ref 5–40)
BUN SERPL-MCNC: 16 MG/DL (ref 7–22)
CREAT SERPL-MCNC: 0.6 MG/DL (ref 0.4–1.2)
DEPRECATED RDW RBC AUTO: 64.3 FL (ref 35–45)
ERYTHROCYTE [DISTWIDTH] IN BLOOD BY AUTOMATED COUNT: 18.6 % (ref 11.5–14.5)
GFR SERPL CREATININE-BSD FRML MDRD: > 60 ML/MIN/1.73M2
HCT VFR BLD AUTO: 37.5 % (ref 42–52)
HGB BLD-MCNC: 11.8 GM/DL (ref 14–18)
MCH RBC QN AUTO: 30.1 PG (ref 26–33)
MCHC RBC AUTO-ENTMCNC: 31.5 GM/DL (ref 32.2–35.5)
MCV RBC AUTO: 95.7 FL (ref 80–94)
PLATELET # BLD AUTO: 319 THOU/MM3 (ref 130–400)
PMV BLD AUTO: 10 FL (ref 9.4–12.4)
RBC # BLD AUTO: 3.92 MILL/MM3 (ref 4.7–6.1)
WBC # BLD AUTO: 10.8 THOU/MM3 (ref 4.8–10.8)

## 2024-01-22 PROCEDURE — 82565 ASSAY OF CREATININE: CPT

## 2024-01-22 PROCEDURE — 84460 ALANINE AMINO (ALT) (SGPT): CPT

## 2024-01-22 PROCEDURE — 85027 COMPLETE CBC AUTOMATED: CPT

## 2024-01-22 PROCEDURE — 84520 ASSAY OF UREA NITROGEN: CPT

## 2024-01-22 PROCEDURE — 84450 TRANSFERASE (AST) (SGOT): CPT

## 2024-01-22 PROCEDURE — 36415 COLL VENOUS BLD VENIPUNCTURE: CPT

## 2024-01-31 ENCOUNTER — HOSPITAL ENCOUNTER (OUTPATIENT)
Dept: ULTRASOUND IMAGING | Age: 59
Discharge: HOME | End: 2024-01-31
Payer: COMMERCIAL

## 2024-01-31 VITALS
BODY MASS INDEX: 29.29 KG/M2 | DIASTOLIC BLOOD PRESSURE: 72 MMHG | WEIGHT: 187 LBS | TEMPERATURE: 98.1 F | HEART RATE: 72 BPM | OXYGEN SATURATION: 96 % | RESPIRATION RATE: 18 BRPM | SYSTOLIC BLOOD PRESSURE: 148 MMHG

## 2024-01-31 DIAGNOSIS — Z79.631 METHOTREXATE, LONG TERM, CURRENT USE: ICD-10-CM

## 2024-01-31 LAB
CREAT SERPL-MCNC: 0.6 MG/DL (ref 0.4–1.2)
DEPRECATED RDW RBC AUTO: 61 FL (ref 35–45)
ERYTHROCYTE [DISTWIDTH] IN BLOOD BY AUTOMATED COUNT: 17.9 % (ref 11.5–14.5)
GFR SERPL CREATININE-BSD FRML MDRD: > 60 ML/MIN/1.73M2
HCT VFR BLD AUTO: 36.6 % (ref 42–52)
HGB BLD-MCNC: 11.8 GM/DL (ref 14–18)
MCH RBC QN AUTO: 30 PG (ref 26–33)
MCHC RBC AUTO-ENTMCNC: 32.2 GM/DL (ref 32.2–35.5)
MCV RBC AUTO: 93.1 FL (ref 80–94)
PLATELET # BLD AUTO: 331 THOU/MM3 (ref 130–400)
PMV BLD AUTO: 9.6 FL (ref 9.4–12.4)
RBC # BLD AUTO: 3.93 MILL/MM3 (ref 4.7–6.1)
WBC # BLD AUTO: 9.1 THOU/MM3 (ref 4.8–10.8)

## 2024-01-31 PROCEDURE — 88307 TISSUE EXAM BY PATHOLOGIST: CPT

## 2024-01-31 PROCEDURE — 6360000002 HC RX W HCPCS: Performed by: RADIOLOGY

## 2024-01-31 PROCEDURE — 36415 COLL VENOUS BLD VENIPUNCTURE: CPT

## 2024-01-31 PROCEDURE — 85027 COMPLETE CBC AUTOMATED: CPT

## 2024-01-31 PROCEDURE — 88313 SPECIAL STAINS GROUP 2: CPT

## 2024-01-31 PROCEDURE — 2580000003 HC RX 258: Performed by: RADIOLOGY

## 2024-01-31 PROCEDURE — 82565 ASSAY OF CREATININE: CPT

## 2024-01-31 PROCEDURE — 76942 ECHO GUIDE FOR BIOPSY: CPT

## 2024-01-31 RX ORDER — FENTANYL CITRATE 50 UG/ML
50 INJECTION, SOLUTION INTRAMUSCULAR; INTRAVENOUS ONCE
Status: COMPLETED | OUTPATIENT
Start: 2024-01-31 | End: 2024-01-31

## 2024-01-31 RX ORDER — MIDAZOLAM HYDROCHLORIDE 1 MG/ML
1 INJECTION INTRAMUSCULAR; INTRAVENOUS ONCE
Status: COMPLETED | OUTPATIENT
Start: 2024-01-31 | End: 2024-01-31

## 2024-01-31 RX ORDER — ACETAMINOPHEN 325 MG/1
650 TABLET ORAL EVERY 6 HOURS PRN
COMMUNITY

## 2024-01-31 RX ORDER — SODIUM CHLORIDE 450 MG/100ML
INJECTION, SOLUTION INTRAVENOUS CONTINUOUS
Status: DISCONTINUED | OUTPATIENT
Start: 2024-01-31 | End: 2024-02-01 | Stop reason: HOSPADM

## 2024-01-31 RX ADMIN — MIDAZOLAM 1 MG: 1 INJECTION INTRAMUSCULAR; INTRAVENOUS at 08:32

## 2024-01-31 RX ADMIN — FENTANYL CITRATE 50 MCG: 50 INJECTION INTRAMUSCULAR; INTRAVENOUS at 08:32

## 2024-01-31 RX ADMIN — SODIUM CHLORIDE: 4.5 INJECTION, SOLUTION INTRAVENOUS at 07:01

## 2024-01-31 ASSESSMENT — PAIN - FUNCTIONAL ASSESSMENT: PAIN_FUNCTIONAL_ASSESSMENT: NONE - DENIES PAIN

## 2024-01-31 NOTE — DISCHARGE INSTRUCTIONS
POST BIOPSY DISCHARGE INSTRUCTION SHEET    DIET:  As tolerated    ACTIVITY:  Rest at home on sofa, bed or recliner today.  Bathroom privileges only today.  Limit any exertion (pushing or pulling) today.  No lifting for 3 days.  No driving today.  Check biopsy site frequently today.  Resume any blood thinners in 24 hours.  Keep band aid clean & dry - replace as needed, may remove in 48 hours.    RETURN TO NEAREST EMERGENCY ROOM IF YOU HAVE ANY OF THE FOLLOWING:  Sign of bleeding, swelling, drainage from biopsy site or severe pain (slight discomfort to be expected) around biopsy site.  Repeated nausea/vomiting/abdominal pain.  Elevated temperature above 101 degrees.  Shortness of breath.  Chest pain.    Keep scheduled appointment with your physician.  If sedation given, follow post sedation instruction sheet.      _

## 2024-01-31 NOTE — PROGRESS NOTES
Formulation and discussion of sedation / procedure plans, risks, benefits, side effects and alternatives with patient and/or responsible adult completed.    History and Physical reviewed and unchanged.    Electronically signed by Jan Sotelo MD on 1/31/24 at 8:32 AM EST

## 2024-01-31 NOTE — PROGRESS NOTES
0750 Patient received in ultrasound for ultrasound guided random liver biopsy procedure. Monitor applied and pre scan started.  0825 Dr. Sotelo here; spoke to patient. This procedure has been fully reviewed with the patient and written informed consent has been obtained.   0832 Procedure started with Dr. Sotelo.  0855 Surgigel mixture injected into needle tract by radiologist.  Procedure completed; patient tolerated well. Samples collected by the radiologist, placed in a sterile specimen cup, and given to the ultrasound tech to be labeled and sent to the pathologist for further review. Post scan images obtained.   0857 Bacitracin oint, band aid to site; no bleeding noted. Patient on cart, comfort ensured.  0900 Report called to OPN and patient taken to OPN via cart.

## 2024-01-31 NOTE — PROGRESS NOTES
Patient being discharged in stable condition. Written and verbal instructions given to patient he voices no question or concerns.

## 2024-01-31 NOTE — H&P
[facility]  Sedation/Analgesia History & Physical    Pt Name: Alberto Gilliland  MRN: [id number]  YOB: 1965  Provider Performing Procedure: Jan Sotelo MD, MD  Primary Care Physician: Juan Christiansen DO    Formulation and discussion of sedation / procedure plans, risks, benefits, side effects and alternatives with patient and/or responsible adult completed.    PRE-PROCEDURE   DNR-CCA/DNR-CC []Yes [x]No  Brief History/Pre-Procedure Diagnosis: Methotrexate use, in need of random liver biopsy          MEDICAL HISTORY  []CAD/Valve  []Liver Disease  []Lung Disease []Diabetes  []Hypertension []Renal Disease  []Additional information:       has a past medical history of Arthritis, Bilateral sciatica, Bleeding, CAD (coronary artery disease), Carpal tunnel syndrome, Cervicalgia, C5-7, Chronic anxiety, COPD, mild (HCC), Dysthymia (or depressive neurosis), ED (erectile dysfunction), ED (erectile dysfunction), GERD (gastroesophageal reflux disease), Headache(784.0), History of blood transfusion, HTN (hypertension), Hyperlipidemia, Hypothyroidism, Iron deficiency anemia, blood loss, Lumbago, Lumbar radiculopathy, Lymphomatoid papulosis (HCC), Osteoarthritis (arthritis due to wear and tear of joints), S/P AVR, coumadin Tx, and Spondylosis of thoracic region without myelopathy or radiculopathy.    SURGICAL HISTORY   has a past surgical history that includes cardiovascular stress test (4 15 2010); Appendectomy (1980); Colonoscopy (2010); Small intestine surgery (2010); Upper gastrointestinal endoscopy (2010); Aortic valve replacement (2007); Endoscopy, colon, diagnostic; cervical fusion (2010); cervical fusion (03/09/2016); Cardiac catheterization (5 20 2010); Cardiac catheterization (2012, 6/9/16); Dilatation, esophagus; Aortic valve replacement (07/20/2016); Coronary artery bypass graft; other surgical history (05/02/2017); Nerve Surgery (Bilateral, 07/25/2017); pr inj,paravertebral l/s,1

## 2024-01-31 NOTE — PROGRESS NOTES
__M__ Safety:       (Environmental)  [place] to environment  Ensure ID band is correct and in place/ allergy band as needed  Assess for fall risk  Initiate fall precautions as applicable (fall band, side rails, etc.)  Call light within reach  Bed in low position/ wheels locked    _M___ Pain:       Assess pain level and characteristics  Administer analgesics as ordered  Assess effectiveness of pain management and report to MD as needed    _M___ Knowledge Deficit:  Assess baseline knowledge  Provide teaching at level of understanding  Provide teaching via preferred learning method  Evaluate teaching effectiveness    _M___ Hemodynamic/Respiratory Status:       (Pre and Post Procedure Monitoring)  Assess/Monitor vital signs and LOC  Assess Baseline SpO2 prior to any sedation  Obtain weight/height  Assess vital signs/ LOC until patient meets discharge criteria  Monitor procedure site and notify MD of any issues

## 2024-02-05 RX ORDER — GABAPENTIN 400 MG/1
CAPSULE ORAL
Qty: 270 CAPSULE | Refills: 0 | Status: SHIPPED | OUTPATIENT
Start: 2024-02-05 | End: 2024-05-05

## 2024-02-06 ENCOUNTER — APPOINTMENT (OUTPATIENT)
Dept: CT IMAGING | Age: 59
End: 2024-02-06
Payer: COMMERCIAL

## 2024-02-06 ENCOUNTER — HOSPITAL ENCOUNTER (OUTPATIENT)
Age: 59
Setting detail: OBSERVATION
Discharge: HOME | End: 2024-02-08
Attending: EMERGENCY MEDICINE
Payer: COMMERCIAL

## 2024-02-06 DIAGNOSIS — K66.1 HEMOPERITONEUM: ICD-10-CM

## 2024-02-06 DIAGNOSIS — R58 ABDOMINAL HEMORRHAGE: Primary | ICD-10-CM

## 2024-02-06 LAB
ALBUMIN SERPL BCG-MCNC: 4.3 G/DL (ref 3.5–5.1)
ALP SERPL-CCNC: 75 U/L (ref 38–126)
ALT SERPL W/O P-5'-P-CCNC: 10 U/L (ref 11–66)
ANION GAP SERPL CALC-SCNC: 12 MEQ/L (ref 8–16)
AST SERPL-CCNC: 17 U/L (ref 5–40)
BASOPHILS ABSOLUTE: 0.1 THOU/MM3 (ref 0–0.1)
BASOPHILS NFR BLD AUTO: 0.5 %
BILIRUB SERPL-MCNC: 0.4 MG/DL (ref 0.3–1.2)
BUN SERPL-MCNC: 17 MG/DL (ref 7–22)
CALCIUM SERPL-MCNC: 9 MG/DL (ref 8.5–10.5)
CHLORIDE SERPL-SCNC: 101 MEQ/L (ref 98–111)
CO2 SERPL-SCNC: 23 MEQ/L (ref 23–33)
CREAT SERPL-MCNC: 0.7 MG/DL (ref 0.4–1.2)
DEPRECATED RDW RBC AUTO: 60.8 FL (ref 35–45)
EKG ATRIAL RATE: 87 BPM
EKG P AXIS: 33 DEGREES
EKG P-R INTERVAL: 186 MS
EKG Q-T INTERVAL: 388 MS
EKG QRS DURATION: 114 MS
EKG QTC CALCULATION (BAZETT): 466 MS
EKG R AXIS: -10 DEGREES
EKG T AXIS: 22 DEGREES
EKG VENTRICULAR RATE: 87 BPM
EOSINOPHIL NFR BLD AUTO: 1.5 %
EOSINOPHILS ABSOLUTE: 0.2 THOU/MM3 (ref 0–0.4)
ERYTHROCYTE [DISTWIDTH] IN BLOOD BY AUTOMATED COUNT: 18.1 % (ref 11.5–14.5)
GFR SERPL CREATININE-BSD FRML MDRD: > 60 ML/MIN/1.73M2
GLUCOSE SERPL-MCNC: 103 MG/DL (ref 70–108)
HCT VFR BLD AUTO: 35.4 % (ref 42–52)
HCT VFR BLD AUTO: 36.8 % (ref 42–52)
HGB BLD-MCNC: 11.1 GM/DL (ref 14–18)
HGB BLD-MCNC: 11.5 GM/DL (ref 14–18)
IMM GRANULOCYTES # BLD AUTO: 0.06 THOU/MM3 (ref 0–0.07)
IMM GRANULOCYTES NFR BLD AUTO: 0.5 %
INR PPP: 0.98 (ref 0.85–1.13)
LIPASE SERPL-CCNC: 36.6 U/L (ref 5.6–51.3)
LYMPHOCYTES ABSOLUTE: 0.9 THOU/MM3 (ref 1–4.8)
LYMPHOCYTES NFR BLD AUTO: 6.7 %
MCH RBC QN AUTO: 29 PG (ref 26–33)
MCHC RBC AUTO-ENTMCNC: 31.4 GM/DL (ref 32.2–35.5)
MCV RBC AUTO: 92.4 FL (ref 80–94)
MONOCYTES ABSOLUTE: 1.1 THOU/MM3 (ref 0.4–1.3)
MONOCYTES NFR BLD AUTO: 8.8 %
NEUTROPHILS NFR BLD AUTO: 82 %
NRBC BLD AUTO-RTO: 0 /100 WBC
OSMOLALITY SERPL CALC.SUM OF ELEC: 273.8 MOSMOL/KG (ref 275–300)
PLATELET # BLD AUTO: 375 THOU/MM3 (ref 130–400)
PMV BLD AUTO: 10.2 FL (ref 9.4–12.4)
POTASSIUM SERPL-SCNC: 4.4 MEQ/L (ref 3.5–5.2)
PROT SERPL-MCNC: 7.2 G/DL (ref 6.1–8)
RBC # BLD AUTO: 3.83 MILL/MM3 (ref 4.7–6.1)
SEGMENTED NEUTROPHILS ABSOLUTE COUNT: 10.5 THOU/MM3 (ref 1.8–7.7)
SODIUM SERPL-SCNC: 136 MEQ/L (ref 135–145)
WBC # BLD AUTO: 12.8 THOU/MM3 (ref 4.8–10.8)

## 2024-02-06 PROCEDURE — 6360000002 HC RX W HCPCS: Performed by: STUDENT IN AN ORGANIZED HEALTH CARE EDUCATION/TRAINING PROGRAM

## 2024-02-06 PROCEDURE — 80053 COMPREHEN METABOLIC PANEL: CPT

## 2024-02-06 PROCEDURE — 85014 HEMATOCRIT: CPT

## 2024-02-06 PROCEDURE — 85610 PROTHROMBIN TIME: CPT

## 2024-02-06 PROCEDURE — 36415 COLL VENOUS BLD VENIPUNCTURE: CPT

## 2024-02-06 PROCEDURE — 83690 ASSAY OF LIPASE: CPT

## 2024-02-06 PROCEDURE — G0378 HOSPITAL OBSERVATION PER HR: HCPCS

## 2024-02-06 PROCEDURE — 85025 COMPLETE CBC W/AUTO DIFF WBC: CPT

## 2024-02-06 PROCEDURE — 96375 TX/PRO/DX INJ NEW DRUG ADDON: CPT

## 2024-02-06 PROCEDURE — 99285 EMERGENCY DEPT VISIT HI MDM: CPT

## 2024-02-06 PROCEDURE — 2580000003 HC RX 258: Performed by: PHYSICIAN ASSISTANT

## 2024-02-06 PROCEDURE — 85018 HEMOGLOBIN: CPT

## 2024-02-06 PROCEDURE — 96374 THER/PROPH/DIAG INJ IV PUSH: CPT

## 2024-02-06 PROCEDURE — 93010 ELECTROCARDIOGRAM REPORT: CPT | Performed by: INTERNAL MEDICINE

## 2024-02-06 PROCEDURE — 99223 1ST HOSP IP/OBS HIGH 75: CPT | Performed by: PHYSICIAN ASSISTANT

## 2024-02-06 PROCEDURE — 6370000000 HC RX 637 (ALT 250 FOR IP): Performed by: PHYSICIAN ASSISTANT

## 2024-02-06 PROCEDURE — 93005 ELECTROCARDIOGRAM TRACING: CPT | Performed by: EMERGENCY MEDICINE

## 2024-02-06 PROCEDURE — 74177 CT ABD & PELVIS W/CONTRAST: CPT

## 2024-02-06 PROCEDURE — 6360000004 HC RX CONTRAST MEDICATION: Performed by: STUDENT IN AN ORGANIZED HEALTH CARE EDUCATION/TRAINING PROGRAM

## 2024-02-06 RX ORDER — MAGNESIUM SULFATE IN WATER 40 MG/ML
2000 INJECTION, SOLUTION INTRAVENOUS PRN
Status: DISCONTINUED | OUTPATIENT
Start: 2024-02-06 | End: 2024-02-08 | Stop reason: HOSPADM

## 2024-02-06 RX ORDER — ESCITALOPRAM OXALATE 10 MG/1
10 TABLET ORAL DAILY
Status: DISCONTINUED | OUTPATIENT
Start: 2024-02-07 | End: 2024-02-08 | Stop reason: HOSPADM

## 2024-02-06 RX ORDER — METOPROLOL SUCCINATE 50 MG/1
50 TABLET, EXTENDED RELEASE ORAL DAILY
Status: DISCONTINUED | OUTPATIENT
Start: 2024-02-07 | End: 2024-02-08 | Stop reason: HOSPADM

## 2024-02-06 RX ORDER — ACETAMINOPHEN 650 MG/1
650 SUPPOSITORY RECTAL EVERY 6 HOURS PRN
Status: DISCONTINUED | OUTPATIENT
Start: 2024-02-06 | End: 2024-02-08 | Stop reason: HOSPADM

## 2024-02-06 RX ORDER — ASPIRIN 81 MG/1
81 TABLET ORAL DAILY
Status: DISCONTINUED | OUTPATIENT
Start: 2024-02-07 | End: 2024-02-08 | Stop reason: HOSPADM

## 2024-02-06 RX ORDER — POTASSIUM CHLORIDE 7.45 MG/ML
10 INJECTION INTRAVENOUS PRN
Status: DISCONTINUED | OUTPATIENT
Start: 2024-02-06 | End: 2024-02-08 | Stop reason: HOSPADM

## 2024-02-06 RX ORDER — ACETAMINOPHEN 325 MG/1
650 TABLET ORAL EVERY 6 HOURS PRN
Status: DISCONTINUED | OUTPATIENT
Start: 2024-02-06 | End: 2024-02-08 | Stop reason: HOSPADM

## 2024-02-06 RX ORDER — SODIUM CHLORIDE 0.9 % (FLUSH) 0.9 %
5-40 SYRINGE (ML) INJECTION EVERY 12 HOURS SCHEDULED
Status: DISCONTINUED | OUTPATIENT
Start: 2024-02-06 | End: 2024-02-08 | Stop reason: HOSPADM

## 2024-02-06 RX ORDER — ONDANSETRON 2 MG/ML
4 INJECTION INTRAMUSCULAR; INTRAVENOUS EVERY 6 HOURS PRN
Status: DISCONTINUED | OUTPATIENT
Start: 2024-02-06 | End: 2024-02-08 | Stop reason: HOSPADM

## 2024-02-06 RX ORDER — PANTOPRAZOLE SODIUM 40 MG/1
40 TABLET, DELAYED RELEASE ORAL
Status: DISCONTINUED | OUTPATIENT
Start: 2024-02-07 | End: 2024-02-08 | Stop reason: HOSPADM

## 2024-02-06 RX ORDER — SODIUM CHLORIDE 9 MG/ML
INJECTION, SOLUTION INTRAVENOUS PRN
Status: DISCONTINUED | OUTPATIENT
Start: 2024-02-06 | End: 2024-02-08 | Stop reason: HOSPADM

## 2024-02-06 RX ORDER — ONDANSETRON 4 MG/1
4 TABLET, ORALLY DISINTEGRATING ORAL EVERY 8 HOURS PRN
Status: DISCONTINUED | OUTPATIENT
Start: 2024-02-06 | End: 2024-02-08 | Stop reason: HOSPADM

## 2024-02-06 RX ORDER — POLYETHYLENE GLYCOL 3350 17 G/17G
17 POWDER, FOR SOLUTION ORAL DAILY PRN
Status: DISCONTINUED | OUTPATIENT
Start: 2024-02-06 | End: 2024-02-08 | Stop reason: HOSPADM

## 2024-02-06 RX ORDER — SODIUM CHLORIDE 0.9 % (FLUSH) 0.9 %
5-40 SYRINGE (ML) INJECTION PRN
Status: DISCONTINUED | OUTPATIENT
Start: 2024-02-06 | End: 2024-02-08 | Stop reason: HOSPADM

## 2024-02-06 RX ORDER — LEVOTHYROXINE SODIUM 0.07 MG/1
75 TABLET ORAL DAILY
Status: DISCONTINUED | OUTPATIENT
Start: 2024-02-07 | End: 2024-02-08 | Stop reason: HOSPADM

## 2024-02-06 RX ORDER — MORPHINE SULFATE 2 MG/ML
1 INJECTION, SOLUTION INTRAMUSCULAR; INTRAVENOUS ONCE
Status: COMPLETED | OUTPATIENT
Start: 2024-02-06 | End: 2024-02-06

## 2024-02-06 RX ORDER — ALPRAZOLAM 0.5 MG/1
0.5 TABLET ORAL NIGHTLY PRN
Status: DISCONTINUED | OUTPATIENT
Start: 2024-02-06 | End: 2024-02-08 | Stop reason: HOSPADM

## 2024-02-06 RX ORDER — POTASSIUM CHLORIDE 20 MEQ/1
40 TABLET, EXTENDED RELEASE ORAL PRN
Status: DISCONTINUED | OUTPATIENT
Start: 2024-02-06 | End: 2024-02-08 | Stop reason: HOSPADM

## 2024-02-06 RX ORDER — GABAPENTIN 400 MG/1
400 CAPSULE ORAL 3 TIMES DAILY
Status: DISCONTINUED | OUTPATIENT
Start: 2024-02-06 | End: 2024-02-08 | Stop reason: HOSPADM

## 2024-02-06 RX ADMIN — ALPRAZOLAM 0.5 MG: 0.5 TABLET ORAL at 22:43

## 2024-02-06 RX ADMIN — GABAPENTIN 400 MG: 400 CAPSULE ORAL at 22:43

## 2024-02-06 RX ADMIN — SODIUM CHLORIDE, PRESERVATIVE FREE 10 ML: 5 INJECTION INTRAVENOUS at 22:08

## 2024-02-06 RX ADMIN — MORPHINE SULFATE 1 MG: 2 INJECTION, SOLUTION INTRAMUSCULAR; INTRAVENOUS at 16:57

## 2024-02-06 RX ADMIN — ACETAMINOPHEN 650 MG: 325 TABLET ORAL at 22:43

## 2024-02-06 RX ADMIN — IOPAMIDOL 80 ML: 755 INJECTION, SOLUTION INTRAVENOUS at 17:53

## 2024-02-06 ASSESSMENT — PAIN SCALES - GENERAL
PAINLEVEL_OUTOF10: 4
PAINLEVEL_OUTOF10: 3
PAINLEVEL_OUTOF10: 5
PAINLEVEL_OUTOF10: 3

## 2024-02-06 ASSESSMENT — PAIN DESCRIPTION - LOCATION
LOCATION: ABDOMEN

## 2024-02-06 ASSESSMENT — PAIN DESCRIPTION - PAIN TYPE
TYPE: ACUTE PAIN
TYPE: ACUTE PAIN

## 2024-02-06 ASSESSMENT — ENCOUNTER SYMPTOMS
CHEST TIGHTNESS: 1
BLOOD IN STOOL: 0
ABDOMINAL PAIN: 1
DIARRHEA: 1
BLOOD IN STOOL: 1
DIARRHEA: 0
WHEEZING: 1
SORE THROAT: 1
SHORTNESS OF BREATH: 1
NAUSEA: 1
STRIDOR: 0
ALLERGIC/IMMUNOLOGIC NEGATIVE: 1
COUGH: 1
CONSTIPATION: 0
EYES NEGATIVE: 1
BACK PAIN: 1
VOMITING: 0

## 2024-02-06 ASSESSMENT — PAIN DESCRIPTION - ORIENTATION
ORIENTATION: RIGHT

## 2024-02-06 ASSESSMENT — PAIN - FUNCTIONAL ASSESSMENT
PAIN_FUNCTIONAL_ASSESSMENT: 0-10

## 2024-02-06 ASSESSMENT — PAIN DESCRIPTION - DESCRIPTORS
DESCRIPTORS: SHARP

## 2024-02-06 NOTE — ED PROVIDER NOTES
[facility] EMERGENCY DEPT  EMERGENCY DEPARTMENT ENCOUNTER          Pt Name: Alberto Gilliland  MRN: [id number]  Birthdate 1965  Date of evaluation: 2/6/2024  Physician: Javid Villalobos DO  Supervising Attending Physician: Param Paz MD       CHIEF COMPLAINT       Chief Complaint   Patient presents with    Abdominal Pain         HISTORY OF PRESENT ILLNESS    HPI  Alberto Gilliland is a 58 y.o. male who presents to the emergency department  for evaluation of abdominal pain.       Presenting with complaints of abdominal pain for last 6 days following liver biopsy. Last Wed was severe pain, pain continued to persist and then went back to work yesterday and got much worse. Does heavy lifting, works at [employer]. Rates the pain currently as a 4 or 5/10, gets as high as a 9 or 10/10. Worse with lifting, movements, pushing, stretching, rolling onto right side while sleeping. Has been taking tylenol with minimal relief of symptoms. Last dose of tylenol was this morning. 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      REVIEW OF SYSTEMS   Review of Systems   Constitutional:  Positive for chills. Negative for fever.   Cardiovascular:  Positive for chest pain (Constantly across right and left side of chest wall, 4-5 months). Negative for palpitations and leg swelling.   Gastrointestinal:  Positive for abdominal pain, blood in stool (Stool black day following biopsy, returned to normal following that), diarrhea and nausea. Negative for vomiting.   Genitourinary:  Positive for frequency and urgency. Negative for hematuria.   Musculoskeletal:  Positive for back pain.   Skin:  Positive for rash.   Neurological:  Positive for light-headedness.         PAST MEDICAL AND SURGICAL HISTORY     Past Medical History:   Diagnosis Date    Arthritis     general    Bilateral sciatica     Bleeding     with coumadin    CAD (coronary

## 2024-02-06 NOTE — ED NOTES
Upon first contact with pt, pt resting on cot in position of comfort. Pt reports d/t being on certain medications, pt had liver biopsy completed last week. Pt reports having increased pain in RUQ when stretching or moving in certain ways. Upon initial assessment, pt is A&Ox4, resps easy and unlabored. IV flushed and shows no s/s of infection or infiltration. Pt reports 3/10 RUQ pain at this time, sharp in nature. Denies any other symptoms. Updated pt on POC. Will monitor.

## 2024-02-06 NOTE — ED TRIAGE NOTES
Pt presents to the ED from home with complaints of right sided abdominal pain that started 6 days ago after he had a liver biopsy done. Pt states he is on methotrexate for a rare skin condition and states he had a liver biopsy done cause he is on such a high dose of this medication. Pt rates pain a 5/10. VSS.

## 2024-02-07 LAB
ANION GAP SERPL CALC-SCNC: 9 MEQ/L (ref 8–16)
BASOPHILS ABSOLUTE: 0.1 THOU/MM3 (ref 0–0.1)
BASOPHILS NFR BLD AUTO: 0.8 %
BUN SERPL-MCNC: 12 MG/DL (ref 7–22)
CALCIUM SERPL-MCNC: 9.1 MG/DL (ref 8.5–10.5)
CHLORIDE SERPL-SCNC: 102 MEQ/L (ref 98–111)
CO2 SERPL-SCNC: 26 MEQ/L (ref 23–33)
CREAT SERPL-MCNC: 0.6 MG/DL (ref 0.4–1.2)
DEPRECATED RDW RBC AUTO: 61.4 FL (ref 35–45)
EOSINOPHIL NFR BLD AUTO: 3.8 %
EOSINOPHILS ABSOLUTE: 0.4 THOU/MM3 (ref 0–0.4)
ERYTHROCYTE [DISTWIDTH] IN BLOOD BY AUTOMATED COUNT: 18.1 % (ref 11.5–14.5)
GFR SERPL CREATININE-BSD FRML MDRD: > 60 ML/MIN/1.73M2
GLUCOSE SERPL-MCNC: 83 MG/DL (ref 70–108)
HCT VFR BLD AUTO: 31.5 % (ref 42–52)
HCT VFR BLD AUTO: 33 % (ref 42–52)
HCT VFR BLD AUTO: 33.2 % (ref 42–52)
HCT VFR BLD AUTO: 34.4 % (ref 42–52)
HGB BLD-MCNC: 10.1 GM/DL (ref 14–18)
HGB BLD-MCNC: 10.5 GM/DL (ref 14–18)
HGB BLD-MCNC: 10.7 GM/DL (ref 14–18)
HGB BLD-MCNC: 10.8 GM/DL (ref 14–18)
IMM GRANULOCYTES # BLD AUTO: 0.02 THOU/MM3 (ref 0–0.07)
IMM GRANULOCYTES NFR BLD AUTO: 0.2 %
LYMPHOCYTES ABSOLUTE: 0.8 THOU/MM3 (ref 1–4.8)
LYMPHOCYTES NFR BLD AUTO: 8.6 %
MAGNESIUM SERPL-MCNC: 2.3 MG/DL (ref 1.6–2.4)
MCH RBC QN AUTO: 29.6 PG (ref 26–33)
MCHC RBC AUTO-ENTMCNC: 31.6 GM/DL (ref 32.2–35.5)
MCV RBC AUTO: 93.5 FL (ref 80–94)
MONOCYTES ABSOLUTE: 1.1 THOU/MM3 (ref 0.4–1.3)
MONOCYTES NFR BLD AUTO: 12.2 %
NEUTROPHILS NFR BLD AUTO: 74.4 %
NRBC BLD AUTO-RTO: 0 /100 WBC
PLATELET # BLD AUTO: 349 THOU/MM3 (ref 130–400)
PMV BLD AUTO: 10.3 FL (ref 9.4–12.4)
POTASSIUM SERPL-SCNC: 4.4 MEQ/L (ref 3.5–5.2)
PROCALCITONIN SERPL IA-MCNC: 0.07 NG/ML (ref 0.01–0.09)
RBC # BLD AUTO: 3.55 MILL/MM3 (ref 4.7–6.1)
SEGMENTED NEUTROPHILS ABSOLUTE COUNT: 6.9 THOU/MM3 (ref 1.8–7.7)
SODIUM SERPL-SCNC: 137 MEQ/L (ref 135–145)
WBC # BLD AUTO: 9.3 THOU/MM3 (ref 4.8–10.8)

## 2024-02-07 PROCEDURE — 83735 ASSAY OF MAGNESIUM: CPT

## 2024-02-07 PROCEDURE — 2580000003 HC RX 258: Performed by: PHYSICIAN ASSISTANT

## 2024-02-07 PROCEDURE — 85018 HEMOGLOBIN: CPT

## 2024-02-07 PROCEDURE — 36415 COLL VENOUS BLD VENIPUNCTURE: CPT

## 2024-02-07 PROCEDURE — 84145 PROCALCITONIN (PCT): CPT

## 2024-02-07 PROCEDURE — 6370000000 HC RX 637 (ALT 250 FOR IP): Performed by: SURGERY

## 2024-02-07 PROCEDURE — 85014 HEMATOCRIT: CPT

## 2024-02-07 PROCEDURE — 6360000002 HC RX W HCPCS: Performed by: STUDENT IN AN ORGANIZED HEALTH CARE EDUCATION/TRAINING PROGRAM

## 2024-02-07 PROCEDURE — 6370000000 HC RX 637 (ALT 250 FOR IP): Performed by: STUDENT IN AN ORGANIZED HEALTH CARE EDUCATION/TRAINING PROGRAM

## 2024-02-07 PROCEDURE — 6370000000 HC RX 637 (ALT 250 FOR IP): Performed by: PHYSICIAN ASSISTANT

## 2024-02-07 PROCEDURE — G0378 HOSPITAL OBSERVATION PER HR: HCPCS

## 2024-02-07 PROCEDURE — 2580000003 HC RX 258: Performed by: STUDENT IN AN ORGANIZED HEALTH CARE EDUCATION/TRAINING PROGRAM

## 2024-02-07 PROCEDURE — 85025 COMPLETE CBC W/AUTO DIFF WBC: CPT

## 2024-02-07 PROCEDURE — 96365 THER/PROPH/DIAG IV INF INIT: CPT

## 2024-02-07 PROCEDURE — 96366 THER/PROPH/DIAG IV INF ADDON: CPT

## 2024-02-07 PROCEDURE — 99233 SBSQ HOSP IP/OBS HIGH 50: CPT | Performed by: STUDENT IN AN ORGANIZED HEALTH CARE EDUCATION/TRAINING PROGRAM

## 2024-02-07 PROCEDURE — 80048 BASIC METABOLIC PNL TOTAL CA: CPT

## 2024-02-07 RX ORDER — NICOTINE 21 MG/24HR
1 PATCH, TRANSDERMAL 24 HOURS TRANSDERMAL DAILY
Status: DISCONTINUED | OUTPATIENT
Start: 2024-02-07 | End: 2024-02-08 | Stop reason: HOSPADM

## 2024-02-07 RX ADMIN — SODIUM CHLORIDE, PRESERVATIVE FREE 5 ML: 5 INJECTION INTRAVENOUS at 07:54

## 2024-02-07 RX ADMIN — ACETAMINOPHEN 650 MG: 325 TABLET ORAL at 20:12

## 2024-02-07 RX ADMIN — PANTOPRAZOLE SODIUM 40 MG: 40 TABLET, DELAYED RELEASE ORAL at 06:01

## 2024-02-07 RX ADMIN — PIPERACILLIN AND TAZOBACTAM 3375 MG: 3; .375 INJECTION, POWDER, FOR SOLUTION INTRAVENOUS at 13:52

## 2024-02-07 RX ADMIN — METOPROLOL SUCCINATE 50 MG: 50 TABLET, EXTENDED RELEASE ORAL at 07:53

## 2024-02-07 RX ADMIN — GABAPENTIN 400 MG: 400 CAPSULE ORAL at 07:52

## 2024-02-07 RX ADMIN — ACETAMINOPHEN 650 MG: 325 TABLET ORAL at 07:53

## 2024-02-07 RX ADMIN — PIPERACILLIN AND TAZOBACTAM 4500 MG: 4; .5 INJECTION, POWDER, FOR SOLUTION INTRAVENOUS at 08:20

## 2024-02-07 RX ADMIN — ALPRAZOLAM 0.5 MG: 0.5 TABLET ORAL at 22:05

## 2024-02-07 RX ADMIN — GABAPENTIN 400 MG: 400 CAPSULE ORAL at 13:53

## 2024-02-07 RX ADMIN — PIPERACILLIN AND TAZOBACTAM 3375 MG: 3; .375 INJECTION, POWDER, FOR SOLUTION INTRAVENOUS at 22:05

## 2024-02-07 RX ADMIN — GABAPENTIN 400 MG: 400 CAPSULE ORAL at 20:12

## 2024-02-07 RX ADMIN — ESCITALOPRAM OXALATE 10 MG: 10 TABLET ORAL at 07:53

## 2024-02-07 RX ADMIN — LEVOTHYROXINE SODIUM 75 MCG: 0.07 TABLET ORAL at 07:53

## 2024-02-07 RX ADMIN — SODIUM CHLORIDE, PRESERVATIVE FREE 10 ML: 5 INJECTION INTRAVENOUS at 20:13

## 2024-02-07 ASSESSMENT — PAIN DESCRIPTION - DESCRIPTORS: DESCRIPTORS: ACHING

## 2024-02-07 ASSESSMENT — PAIN DESCRIPTION - PAIN TYPE: TYPE: ACUTE PAIN

## 2024-02-07 ASSESSMENT — PAIN - FUNCTIONAL ASSESSMENT: PAIN_FUNCTIONAL_ASSESSMENT: ACTIVITIES ARE NOT PREVENTED

## 2024-02-07 ASSESSMENT — PAIN DESCRIPTION - FREQUENCY: FREQUENCY: CONTINUOUS

## 2024-02-07 ASSESSMENT — PAIN SCALES - GENERAL
PAINLEVEL_OUTOF10: 0
PAINLEVEL_OUTOF10: 5
PAINLEVEL_OUTOF10: 0

## 2024-02-07 ASSESSMENT — PAIN DESCRIPTION - ONSET: ONSET: ON-GOING

## 2024-02-07 ASSESSMENT — PAIN DESCRIPTION - ORIENTATION: ORIENTATION: RIGHT

## 2024-02-07 NOTE — ED NOTES
ED to inpatient nurses report      Chief Complaint:  Chief Complaint   Patient presents with    Abdominal Pain     Present to ED from: Home    MOA:     LOC: alert and orientated to name, place, date  Mobility: Independent  Oxygen Baseline: Room Air    Current needs required: Room Air     Code Status:   Prior    What abnormal results were found and what did you give/do to treat them? Hemoperitoneum  Any procedures or intervention occur?     Mental Status:  Level of Consciousness: Alert (0)    Psych Assessment:        Vitals:  Patient Vitals for the past 24 hrs:   BP Temp Temp src Pulse Resp SpO2   02/06/24 1950 (!) 160/71 -- -- 74 18 97 %   02/06/24 1850 (!) 149/87 -- -- 85 15 98 %   02/06/24 1741 (!) 166/77 -- -- 72 19 98 %   02/06/24 1656 (!) 169/81 -- -- 73 19 98 %   02/06/24 1542 (!) 165/85 97.7 °F (36.5 °C) Oral 88 18 100 %        LDAs:   Peripheral IV 02/06/24 Left Antecubital (Active)   Site Assessment Clean, dry & intact 02/06/24 1544   Line Status Blood return noted;Flushed;Specimen collected 02/06/24 1544   Dressing Status Clean, dry & intact 02/06/24 1544       Ambulatory Status:  No data recorded    Diagnosis:  DISPOSITION Admitted 02/06/2024 07:46:57 PM   Final diagnoses:   None        Consults:  None     Pain Score:  Pain Assessment  Pain Assessment: 0-10  Pain Level: 3  Patient's Stated Pain Goal: 2  Pain Location: Abdomen  Pain Orientation: Right  Pain Descriptors: Sharp  Pain Type: Acute pain    C-SSRS:   Risk of Suicide: No Risk    Sepsis Screening:  Sepsis Risk Score: 2.36    [place] Fall Risk:       Swallow Screening        Preferred Language:   English      ALLERGIES     Seasonal    SURGICAL HISTORY       Past Surgical History:   Procedure Laterality Date    [facility]    AORTIC VALVE REPLACEMENT  07/20/2016    extraction of mechanical valve and replacement with tissue valve    APPENDECTOMY  1980    BACK INJECTION Bilateral 1/3/2022    Bilateral SI injection  performed by Juan Kiran MD at [facility]    BACK INJECTION Bilateral 3/14/2022    Bilateral SI MBB # 2 performed by Yehuda Marie DO at [facility]    CARDIAC CATHETERIZATION  5 20 2010    Patent coronary arteries. Possible causes of the patient's chest pain is likely noncardiac at least based on this angiogram. Patient chould be able to proceed w/ scheduled surgery w/ paying attention to anticoagulation and trying to minimize the period of no anticoagulation.     CARDIAC CATHETERIZATION  2012, 6/9/16    [facility]    CARDIOVASCULAR STRESS TEST  4 15 2010    Moderate fixed inferior defect, possibly attenuation, cannot exclude a previous MI. Correlation w/ EKG is recommented. Significant degree of gut uptake which is likely to be the cause of the attenuation artifact seen. EF 46%    CERVICAL FUSION  2010    CERVICAL FUSION  03/09/2016    REMOVAL HARDWARE C5-7, ACDF C4-6 WITH ATLANTIS CORNERSTONE    COLONOSCOPY  2010    CORONARY ARTERY BYPASS GRAFT      DILATATION, ESOPHAGUS      small resection    ENDOSCOPY, COLON, DIAGNOSTIC      NERVE SURGERY Bilateral 07/25/2017    THORACIC FACET BLOCK T7-8, T8-9, T11-12    OTHER SURGICAL HISTORY  05/02/2017    LESI L5    OTHER SURGICAL HISTORY  09/12/2017    thoracic epidural T11    PAIN MANAGEMENT PROCEDURE Left 5/17/2022    SI RFA, LEFT SIDE FIRST performed by Juan Kiran MD at [facility] OR    RI INJ,PARAVERTEBRAL L/S,1 LEVEL Bilateral 7/25/2017    T-FACET MBB T7-8, T8-9, T11-12 BILATERAL performed by Juan Kiran MD at [facility]    RI NJX DX/THER SBST EPIDURAL/SUBARACH LUMBAR/SACRAL N/A 9/12/2017    TESI T11 performed by Juan Kiran MD at [facility]    SMALL INTESTINE SURGERY  2010    8 INCHES REMOVED    TOOTH EXTRACTION  02/2019    UPPER GASTROINTESTINAL ENDOSCOPY  2010    US GUIDED LIVER BIOPSY PERCUTANEOUS  1/31/2024    US GUIDED LIVER BIOPSY PERCUTANEOUS 1/31/2024 Jan Sotelo

## 2024-02-07 NOTE — H&P
Resource Strain: Medium Risk (2/7/2023)    Overall Financial Resource Strain (CARDIA)     Difficulty of Paying Living Expenses: Somewhat hard   Food Insecurity: Not on file (2/7/2023)   Transportation Needs: Unknown (2/7/2023)    PRAPARE - Transportation     Lack of Transportation (Medical): Not on file     Lack of Transportation (Non-Medical): No   Physical Activity: Not on file   Stress: Not on file   Social Connections: Not on file   Intimate Partner Violence: Not on file   Housing Stability: Unknown (2/7/2023)    Housing Stability Vital Sign     Unable to Pay for Housing in the Last Year: Not on file     Number of Places Lived in the Last Year: Not on file     Unstable Housing in the Last Year: No     FHX:   Family History   Problem Relation Age of Onset    Cancer Mother     Diabetes Father     Cancer Father     Heart Disease Father 35        CABG    High Blood Pressure Father     Diabetes Sister         AS A CHILD    Kidney Disease Sister     High Blood Pressure Sister     Cancer Brother     COPD Sister     Heart Disease Sister     Stroke Neg Hx      Allergies:   Allergies   Allergen Reactions    Seasonal      Medications:            Labs:   Recent Results (from the past 24 hour(s))   EKG 12 Lead    Collection Time: 02/06/24  3:48 PM   Result Value Ref Range    Ventricular Rate 87 BPM    Atrial Rate 87 BPM    P-R Interval 186 ms    QRS Duration 114 ms    Q-T Interval 388 ms    QTc Calculation (Bazett) 466 ms    P Axis 33 degrees    R Axis -10 degrees    T Axis 22 degrees   CBC with Auto Differential    Collection Time: 02/06/24  3:56 PM   Result Value Ref Range    WBC 12.8 (H) 4.8 - 10.8 thou/mm3    RBC 3.83 (L) 4.70 - 6.10 mill/mm3    Hemoglobin 11.1 (L) 14.0 - 18.0 gm/dl    Hematocrit 35.4 (L) 42.0 - 52.0 %    MCV 92.4 80.0 - 94.0 fL    MCH 29.0 26.0 - 33.0 pg    MCHC 31.4 (L) 32.2 - 35.5 gm/dl    RDW-CV 18.1 (H) 11.5 - 14.5 %    RDW-SD 60.8 (H) 35.0 - 45.0 fL    Platelets 375 130 - 400 thou/mm3    MPV 10.2  auscultation.  No retractions, no accessory muscle use.  Cardiac: RRR, no murmur, 2+ pulses  Abdomen: soft.  Nontender. Bowel sounds active  Extremities:  No clubbing, cyanosis x 4, no edema    Vasculature: capillary refill < 3 seconds.  Skin:  warm and dry. no visible rashes  Psych:  Alert and oriented x3.  Affect appropriate  Lymph:  No supraclavicular adenopathy.  Neurologic:  CN II-XII grossly intact. No focal deficit.    Data: (All radiographs, tracings, PFTs, and imaging are personally viewed and interpreted unless otherwise noted).     EKG: normal sinus rhythm, no blocks or conduction defects, no ischemic changes        Electronically signed by  SERINA Cortés

## 2024-02-07 NOTE — PLAN OF CARE
Problem: Discharge Planning  Goal: Discharge to home or other facility with appropriate resources  Outcome: Progressing     Problem: Pain  Goal: Verbalizes/displays adequate comfort level or baseline comfort level  2/7/2024 0722 by Tera Oleary, RN  Outcome: Progressing  2/6/2024 2202 by Tory Smith, RN  Outcome: Progressing

## 2024-02-07 NOTE — CONSULTS
[place] [address]                                  CONSULTATION    PATIENT NAME: MARICARMEN TURNER                    :        1965  MED REC NO:   [id number]                           ROOM:       0004  ACCOUNT NO:   [id number]                           ADMIT DATE: 2024  PROVIDER:     José Antonio Allred MD    CONSULT DATE:  2024    CHIEF COMPLAINT:  Hemoperitoneum, status post liver biopsy.    HISTORY OF PRESENT ILLNESS:  The patient is a 58-year-old male who is on  high-dose methotrexate for a lymphomatoid papulosis of the skin treated  per Dr. Fletcher and because of the methotrexate, he wanted liver biopsy  one week ago.  Today, he underwent an IR-guided liver biopsy.  He went  home.  He actually stayed off work until this past Monday, two days ago,  but went back to work at _____ where he does a lot of lifting.  He had  pain all along.  He states that the pain became more severe and he  presented to the emergency room last night.  CT scan was performed  showing moderate hemoperitoneum particularly along the right _____  consistent with a post liver biopsy bleed.  His hemoglobin was 11.8  prior to the biopsy, it was 11.1 last night.  He has been admitted to  the Medicine Service with Surgery consultation.  He does have a history  of aortic valve replacement x2 with the first being a mechanical valve  and he was on Coumadin, which lead to GI bleeding.  He was eventually  taken back to surgery by  _____ having a tissue valve placed.  He is  no longer on any anticoagulants, although he does take aspirin.    PAST MEDICAL HISTORY:  Again is positive for arthritis, skin conditions,  lymphomatoid papillomatosis.  He has a history of coronary artery  disease.  He has a history of chronic back pain, history of  hyperlipidemia, hypothyroidism, osteoarthritis, and spondylosis.    PAST SURGICAL HISTORY:  Again includes

## 2024-02-07 NOTE — PROGRESS NOTES
Surg Pt seen consult dict  abd bleed s/p liver BX  hgb staying stable  surg for washout ONLY if pain would not resolve  OK for Reg diet

## 2024-02-07 NOTE — ED NOTES
Spoke to 8A Tory to approve pt transport to [facility] in stable condition.

## 2024-02-07 NOTE — H&P
Hospitalist - History & Physical      Patient: Alberto Gilliland    Unit/Bed:8A-04/004-A  YOB: 1965  MRN: [id number]   Acct: [id number]   PCP: Juan Christiansen, DO      Assessment and Plan:        Hemoperitoneum:   Patient has been experiencing abdominal pain 2/2 liver biopsy done 1/31/24   CT abd/pelvis shows pelvic hemoperitoneum   Hgb has decreased from 11.8 to 11.1  Hct has decreased from 36.6 to 35.4  Monitor H/H q6h   Consult general surgery - Dr. 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 He states that he has been experiencing severe R sided abdominal pain since the biopsy. He decided to come to the hospital from home because he returned to work yesterday, and \"knew something wasn't right.\" He rates the pain at a 5/10.     Patient also endorses 4-day history of random, transient chest tightness, right-sided chest pain, nausea, heartburn, reflux symptoms, and SOB. These episodes self-resolve and he reports no inciting or relieving factors. He also feels he experiences coughing, wheezing, and sneezing nightly, as well as a feeling of a sore throat at night. Denies stridor, fever, chills, hematemesis, bloody sputum, diarrhea, constipation, hematochezia, dysuria, or recent sick contacts. 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 He has received his yearly flu vaccine, but states he does not believe in getting a COVID-19 vaccine.     ROS: Review of Systems   Constitutional:  Negative for chills and fever.   HENT:  Positive for sneezing and sore throat.    Eyes: Negative.    Respiratory:  Positive for cough, chest tightness, shortness of breath and wheezing.  thou/mm3    Immature Grans (Abs) 0.06 0.00 - 0.07 thou/mm3    nRBC 0 /100 wbc   Comprehensive Metabolic Panel    Collection Time: 02/06/24  3:56 PM   Result Value Ref Range    Glucose 103 70 - 108 mg/dL    Creatinine 0.7 0.4 - 1.2 mg/dL    BUN 17 7 - 22 mg/dL    Sodium 136 135 - 145 meq/L    Potassium 4.4 3.5 - 5.2 meq/L    Chloride 101 98 - 111 meq/L    CO2 23 23 - 33 meq/L    Calcium 9.0 8.5 - 10.5 mg/dL    AST 17 5 - 40 U/L    Alkaline Phosphatase 75 38 - 126 U/L    Total Protein 7.2 6.1 - 8.0 g/dL    Albumin 4.3 3.5 - 5.1 g/dL    Total Bilirubin 0.4 0.3 - 1.2 mg/dL    ALT 10 (L) 11 - 66 U/L   Protime-INR    Collection Time: 02/06/24  3:56 PM   Result Value Ref Range    INR 0.98 0.85 - 1.13   Lipase    Collection Time: 02/06/24  3:56 PM   Result Value Ref Range    Lipase 36.6 5.6 - 51.3 U/L   Anion Gap    Collection Time: 02/06/24  3:56 PM   Result Value Ref Range    Anion Gap 12.0 8.0 - 16.0 meq/L   Osmolality    Collection Time: 02/06/24  3:56 PM   Result Value Ref Range    Osmolality Calc 273.8 (L) 275.0 - 300.0 mOsmol/kg   Glomerular Filtration Rate, Estimated    Collection Time: 02/06/24  3:56 PM   Result Value Ref Range    Est, Glom Filt Rate >60 >60 ml/min/1.73m2   Hemoglobin and Hematocrit    Collection Time: 02/06/24 10:02 PM   Result Value Ref Range    Hemoglobin 11.5 (L) 14.0 - 18.0 gm/dl    Hematocrit 36.8 (L) 42.0 - 52.0 %         Vital Signs: T: 97.7 P: 74 RR: 18 B/P: 160/71: FiO2: 21%: O2 Sat:97% ORA : I/O: No intake or output data in the 24 hours ending 02/06/24 9799      General:   Patient seated comfortably on cot in NAD  HEENT:  normocephalic and atraumatic.  No scleral icterus. PEARLA, mucous membranes moist  Neck: supple.  Trachea midline. No JVD. Full ROM, no meningismus.  Lungs: clear to auscultation.  No retractions, no accessory muscle use.  Cardiac: RRR, no murmur, 2+ pulses  Abdomen: guarding, tender with rebound in the lower abdomen. Bowel sounds active  Extremities:  No clubbing,

## 2024-02-07 NOTE — CARE COORDINATION
Case Management Assessment  Initial Evaluation    Date/Time of Evaluation: 2/7/2024 2:36 PM  Assessment Completed by: Marian Leo RN    If patient is discharged prior to next notation, then this note serves as note for discharge by case management.    Patient Name: Alberto Gilliland                   YOB: 1965  Diagnosis: Hemoperitoneum [K66.1]  Abdominal hemorrhage [R58]                   Date / Time: 2/6/2024  3:38 PM  Location: [facility]     Patient Admission Status: Observation   Readmission Risk Low 0-14, Mod 15-19), High > 20: No data recorded  Current PCP: Juan Christiansen, DO  PCP verified by CM? Yes    Chart Reviewed: Yes      History Provided by: Patient  Patient Orientation: Alert and Oriented    Patient Cognition: Alert    Hospitalization in the last 30 days (Readmission):  No    If yes, Readmission Assessment in CM Navigator will be completed.    Advance Directives:      Code Status: Full Code   Patient's Primary Decision Maker is: Named in Scanned ACP Document      Discharge Planning:    Patient lives with: Alone Type of Home: Apartment  Primary Care Giver: Self  Patient Support Systems include: Family Members, Children   Current Financial resources: Other (Comment) (commercial)  Current community resources: None  Current services prior to admission: None            Current DME:              Type of Home Care services:  None    ADLS  Prior functional level: Independent in ADLs/IADLs  Current functional level: Independent in ADLs/IADLs    Family can provide assistance at DC: Yes  Would you like Case Management to discuss the discharge plan with any other family members/significant others, and if so, who? No  Plans to Return to Present Housing: Yes  Other Identified Issues/Barriers to RETURNING to current housing: no  Potential Assistance needed at discharge: N/A            Potential DME:    Patient expects to discharge to: Apartment  Plan for transportation at discharge:

## 2024-02-08 VITALS
SYSTOLIC BLOOD PRESSURE: 147 MMHG | HEIGHT: 67 IN | BODY MASS INDEX: 28.65 KG/M2 | RESPIRATION RATE: 16 BRPM | TEMPERATURE: 97.7 F | OXYGEN SATURATION: 97 % | DIASTOLIC BLOOD PRESSURE: 69 MMHG | WEIGHT: 182.54 LBS | HEART RATE: 59 BPM

## 2024-02-08 PROBLEM — E44.0 MODERATE MALNUTRITION (HCC): Status: ACTIVE | Noted: 2024-02-08

## 2024-02-08 LAB
BASOPHILS ABSOLUTE: 0.1 THOU/MM3 (ref 0–0.1)
BASOPHILS NFR BLD AUTO: 0.7 %
DEPRECATED RDW RBC AUTO: 60.1 FL (ref 35–45)
EOSINOPHIL NFR BLD AUTO: 4.4 %
EOSINOPHILS ABSOLUTE: 0.4 THOU/MM3 (ref 0–0.4)
ERYTHROCYTE [DISTWIDTH] IN BLOOD BY AUTOMATED COUNT: 18 % (ref 11.5–14.5)
HCT VFR BLD AUTO: 32.3 % (ref 42–52)
HGB BLD-MCNC: 10.4 GM/DL (ref 14–18)
IMM GRANULOCYTES # BLD AUTO: 0.02 THOU/MM3 (ref 0–0.07)
IMM GRANULOCYTES NFR BLD AUTO: 0.2 %
LYMPHOCYTES ABSOLUTE: 0.9 THOU/MM3 (ref 1–4.8)
LYMPHOCYTES NFR BLD AUTO: 10.5 %
MCH RBC QN AUTO: 29.7 PG (ref 26–33)
MCHC RBC AUTO-ENTMCNC: 32.2 GM/DL (ref 32.2–35.5)
MCV RBC AUTO: 92.3 FL (ref 80–94)
MONOCYTES ABSOLUTE: 1 THOU/MM3 (ref 0.4–1.3)
MONOCYTES NFR BLD AUTO: 11.3 %
NEUTROPHILS NFR BLD AUTO: 72.9 %
NRBC BLD AUTO-RTO: 0 /100 WBC
PLATELET # BLD AUTO: 354 THOU/MM3 (ref 130–400)
PMV BLD AUTO: 10 FL (ref 9.4–12.4)
RBC # BLD AUTO: 3.5 MILL/MM3 (ref 4.7–6.1)
SEGMENTED NEUTROPHILS ABSOLUTE COUNT: 6.6 THOU/MM3 (ref 1.8–7.7)
WBC # BLD AUTO: 9 THOU/MM3 (ref 4.8–10.8)

## 2024-02-08 PROCEDURE — 99239 HOSP IP/OBS DSCHRG MGMT >30: CPT | Performed by: STUDENT IN AN ORGANIZED HEALTH CARE EDUCATION/TRAINING PROGRAM

## 2024-02-08 PROCEDURE — 6370000000 HC RX 637 (ALT 250 FOR IP): Performed by: PHYSICIAN ASSISTANT

## 2024-02-08 PROCEDURE — 2580000003 HC RX 258: Performed by: STUDENT IN AN ORGANIZED HEALTH CARE EDUCATION/TRAINING PROGRAM

## 2024-02-08 PROCEDURE — 36415 COLL VENOUS BLD VENIPUNCTURE: CPT

## 2024-02-08 PROCEDURE — G0378 HOSPITAL OBSERVATION PER HR: HCPCS

## 2024-02-08 PROCEDURE — 6360000002 HC RX W HCPCS: Performed by: STUDENT IN AN ORGANIZED HEALTH CARE EDUCATION/TRAINING PROGRAM

## 2024-02-08 PROCEDURE — 6370000000 HC RX 637 (ALT 250 FOR IP): Performed by: STUDENT IN AN ORGANIZED HEALTH CARE EDUCATION/TRAINING PROGRAM

## 2024-02-08 PROCEDURE — 85025 COMPLETE CBC W/AUTO DIFF WBC: CPT

## 2024-02-08 PROCEDURE — 6370000000 HC RX 637 (ALT 250 FOR IP): Performed by: SURGERY

## 2024-02-08 PROCEDURE — 96366 THER/PROPH/DIAG IV INF ADDON: CPT

## 2024-02-08 RX ADMIN — GABAPENTIN 400 MG: 400 CAPSULE ORAL at 07:46

## 2024-02-08 RX ADMIN — ESCITALOPRAM OXALATE 10 MG: 10 TABLET ORAL at 07:46

## 2024-02-08 RX ADMIN — METOPROLOL SUCCINATE 50 MG: 50 TABLET, EXTENDED RELEASE ORAL at 07:46

## 2024-02-08 RX ADMIN — LEVOTHYROXINE SODIUM 75 MCG: 0.07 TABLET ORAL at 07:46

## 2024-02-08 RX ADMIN — ACETAMINOPHEN 650 MG: 325 TABLET ORAL at 08:52

## 2024-02-08 RX ADMIN — PIPERACILLIN AND TAZOBACTAM 3375 MG: 3; .375 INJECTION, POWDER, FOR SOLUTION INTRAVENOUS at 05:37

## 2024-02-08 RX ADMIN — PANTOPRAZOLE SODIUM 40 MG: 40 TABLET, DELAYED RELEASE ORAL at 05:37

## 2024-02-08 ASSESSMENT — PAIN SCALES - GENERAL: PAINLEVEL_OUTOF10: 4

## 2024-02-08 ASSESSMENT — PAIN DESCRIPTION - DESCRIPTORS: DESCRIPTORS: SORE

## 2024-02-08 ASSESSMENT — PAIN DESCRIPTION - LOCATION: LOCATION: ABDOMEN

## 2024-02-08 NOTE — PLAN OF CARE
Problem: Discharge Planning  Goal: Discharge to home or other facility with appropriate resources  Outcome: Progressing  Discharge to home or other facility with appropriate resources: Identify barriers to discharge with patient and caregiver     Problem: Pain  Goal: Verbalizes/displays adequate comfort level or baseline comfort level  Outcome: Progressing  Verbalizes/displays adequate comfort level or baseline comfort level:   Assess pain using appropriate pain scale   Encourage patient to monitor pain and request assistance     Problem: Infection - Adult  Goal: Absence of infection at discharge  Outcome: Progressing  Absence of infection at discharge:   Assess and monitor for signs and symptoms of infection   Monitor lab/diagnostic results   Identify and instruct in appropriate isolation precautions for identified infection/condition  Goal: Absence of infection during hospitalization  Outcome: Progressing  Absence of infection during hospitalization:   Assess and monitor for signs and symptoms of infection   Monitor lab/diagnostic results   Identify and instruct in appropriate isolation precautions for identified infection/condition  Goal: Absence of fever/infection during anticipated neutropenic period  Outcome: Progressing  Absence of fever/infection during anticipated neutropenic period: Monitor white blood cell count   Care plan reviewed with patient.  Patient verbalizes understanding of the plan of care and contribute to goal setting.

## 2024-02-08 NOTE — PROGRESS NOTES
Comprehensive Nutrition Assessment    Type and Reason for Visit:  Initial, Positive Nutrition Screen (poor appetite/intake, unplanned weight loss)    Nutrition Recommendations/Plan:   Consider MVI.  ONS: Ensure Enlive TID. Discussed home ONS use as needed a well.  Continue current diet.      Malnutrition Assessment:  Malnutrition Status:  Moderate malnutrition (02/08/24 1357)    Context:  Acute Illness     Findings of the 6 clinical characteristics of malnutrition:  Energy Intake:  75% or less of estimated energy requirements for 7 or more days  Weight Loss:  Unable to assess (no  EMR weight from 11/2023, patient reports 9.2% loss in the last 3 months)     Body Fat Loss:  Mild body fat loss Orbital, Triceps, Fat Overlying Ribs   Muscle Mass Loss:  Mild muscle mass loss Temples (temporalis), Clavicles (pectoralis & deltoids), Thigh (quadraceps)  Fluid Accumulation:  No significant fluid accumulation     Strength:  Not Performed    Nutrition Assessment:     Pt. moderately malnourished AEB criteria as listed above.  At risk for further nutrition compromise r/t admit with hemoperitoneum s/p liver biopsy 1/31/24, and underlying medical condition (hx: HTN, HLD, GERD, CAD, COPD, smoking, methotrexate toxicity/fatty liver).        Nutrition Related Findings:    Pt. Report/Treatments/Miscellaneous: Patient seen earlier this morning, reports unplanned weight loss since the beginning of November 2023, reports he is very active on his job, burns a lot of calories, and wasn't eating as much due to poor appetite.  Reports he had lost his son to drug overdose during this time as well.  Has used ONS in past and is agreeable to now, has been drinking here.    GI Status: BM 2/7/24, right sided abdominal pain  Pertinent Labs: 2/7/24 BMP wnls  Pertinent Meds: synthroid, protonix, zosyn     Wound Type: None       Current Nutrition Intake & Therapies:    Average Meal Intake: %  Average Supplements Intake: % (drinking ONS  per patient)  ADULT DIET; Regular  ADULT ORAL NUTRITION SUPPLEMENT; Breakfast, Lunch, Dinner; Standard High Calorie/High Protein Oral Supplement  ADULT ORAL NUTRITION SUPPLEMENT; Dinner, Lunch; Other Oral Supplement; 2 hard boiled eggs    Anthropometric Measures:  Height: 170.2 cm (5' 7\")  Ideal Body Weight (IBW): 148 lbs (67 kg)    Admission Body Weight: 85.3 kg (188 lb) (2/6/24 no edema)  Current Body Weight: 82.8 kg (182 lb 8.7 oz) (2/7/24 no edema),   Current BMI (kg/m2): 28.6  Usual Body Weight:  (~ 190# usually, 1st of November 2023 201# per patient.  Per EMR: 2/7/23: 186#10oz, 12/7/23: 185#6oz)                       BMI Categories: Overweight (BMI 25.0-29.9)    Estimated Daily Nutrient Needs:  Energy Requirements Based On: Kcal/kg  Weight Used for Energy Requirements:  (83 kg)  Energy (kcal/day): ~ 0002-1087 kcals (20-25 kcals/kg/day)  Weight Used for Protein Requirements: Ideal (67 kg)  Protein (g/day): ~ 87 grams (1.3 grams/kg IBW/day)       Nutrition Diagnosis:   Moderate malnutrition, In context of acute illness or injury related to psychological cause or life stress, inadequate protein-energy intake as evidenced by Criteria as identified in malnutrition assessment    Nutrition Interventions:   Food and/or Nutrient Delivery: Continue Current Diet, Continue Oral Nutrition Supplement  Nutrition Education/Counseling: Education initiated (Encouraged oral intake, good protein sources, and home ONS use as needed to help maintain weight.)          Goals:     Goals: PO intake 75% or greater, by next RD assessment       Nutrition Monitoring and Evaluation:      Food/Nutrient Intake Outcomes: Food and Nutrient Intake, Supplement Intake  Physical Signs/Symptoms Outcomes: Biochemical Data, GI Status, Fluid Status or Edema, Nutrition Focused Physical Findings, Weight    Discharge Planning:    Continue Oral Nutrition Supplement, Continue current diet     Allison C Schwab, TANIA, LD  Contact: [phone]

## 2024-02-08 NOTE — PLAN OF CARE
Problem: Discharge Planning  Goal: Discharge to home or other facility with appropriate resources  2/8/2024 1332 by Sisi Stewart RN  Outcome: Completed  2/8/2024 0420 by uAdra Noriega RN  Outcome: Progressing  Flowsheets (Taken 2/8/2024 0420)  Discharge to home or other facility with appropriate resources: Identify barriers to discharge with patient and caregiver     Problem: Pain  Goal: Verbalizes/displays adequate comfort level or baseline comfort level  2/8/2024 1332 by Sisi Stewart RN  Outcome: Completed  2/8/2024 0420 by Audra Noriega RN  Outcome: Progressing  Flowsheets (Taken 2/8/2024 0420)  Verbalizes/displays adequate comfort level or baseline comfort level:   Assess pain using appropriate pain scale   Encourage patient to monitor pain and request assistance     Problem: Infection - Adult  Goal: Absence of infection at discharge  2/8/2024 1332 by Sisi Stewart RN  Outcome: Completed  2/8/2024 0420 by Audra Noriega RN  Outcome: Progressing  Flowsheets (Taken 2/8/2024 0420)  Absence of infection at discharge:   Assess and monitor for signs and symptoms of infection   Monitor lab/diagnostic results   Identify and instruct in appropriate isolation precautions for identified infection/condition  Goal: Absence of infection during hospitalization  2/8/2024 1332 by Sisi Stewart RN  Outcome: Completed  2/8/2024 0420 by Audra Noriega RN  Outcome: Progressing  Flowsheets (Taken 2/8/2024 0420)  Absence of infection during hospitalization:   Assess and monitor for signs and symptoms of infection   Monitor lab/diagnostic results   Identify and instruct in appropriate isolation precautions for identified infection/condition  Goal: Absence of fever/infection during anticipated neutropenic period  2/8/2024 1332 by Sisi Stewart RN  Outcome: Completed  2/8/2024 0420 by Audra Noriega RN  Outcome: Progressing  Flowsheets (Taken 2/8/2024 0420)  Absence of fever/infection during anticipated

## 2024-02-08 NOTE — DISCHARGE SUMMARY
Hospital Medicine Discharge Summary      Patient Identification:   Alberto Gilliland   : 1965  MRN: [id number]   Account: [id number]      Patient's PCP: Juan Christiansen DO    Admit Date: 2024     Discharge Date:   24    Admitting Physician: No admitting provider for patient encounter.     Discharge Physician: Sendy Tompkins MD     Discharge Diagnoses:  Acute Hemoperitoneum: post-procedural complication. S/p liver biopsy 24. Hb stable ~10-11.  Previously Hb 11.8. Remains afebrile and hemodynamically stable. Low suspicion for infection, will discontinue Abx on DC. CBC follow up outpatient.     Primary hypertension: Suboptimally controlled at this time.  In the setting of pain.  Home meds only include Toprol-XL.  Monitor and consider adding another antihypertensive if needed.      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  S/p biopsy 24, awaiting results.      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  Follow-up outpatient.      CAD s/p CABG: On aspirin and Toprol-XL.  Aspirin currently held- continue to hold until follow up with PCP and/or Dr. Allred.     Hx bicuspid aortic valve insufficiency: S/p AVR x 2 in  & . After revision of chart in conjunction with Pharmacy team, patient is not on Coumadin and mechanical valve was exchanged to a tissue valve. Not on Coumadin.     Hypothyroidism: On Synthroid     Anxiety/Sleep disturbance/ depression?:  On Lexapro and Xanax as needed.     GERD: On PPI    The patient was seen and examined on day of discharge and this discharge summary is in conjunction with any daily progress note from day of discharge.    Hospital Course:   Alberto Gilliland is a 58 y.o. male admitted to [facility] on 2024 for abdominal pain.        59 y/o male with a history of aortic valve replacement x2 (currently bio valve w/o sequelae) and LT high dose methotrexate use for

## 2024-02-08 NOTE — CARE COORDINATION
2/8/24, 1:12 PM EST    Patient goals/plan/ treatment preferences discussed by  and .  Patient goals/plan/ treatment preferences reviewed with patient/ family.  Patient/ family verbalize understanding of discharge plan and are in agreement with goal/plan/treatment preferences.  Understanding was demonstrated using the teach back method.  AVS provided by RN at time of discharge, which includes all necessary medical information pertaining to the patients current course of illness, treatment, post-discharge goals of care, and treatment preferences.     Services At/After Discharge: None

## 2024-02-08 NOTE — PROGRESS NOTES
[facility]   Dr. José Antonio Allred MD  Daily Progress Note  Pt Name: Alberto Gilliland  Medical Record Number: [id number]  Date of Birth 1965   Today's Date: 2/8/2024    HD#3    CHIEF COMPLAINT bleed post liver Bx    SUBJECTIVE  Patient feels better residual R Sided abd pain    OBJECTIVE  CURRENT VITALS BP (!) 147/69   Pulse 59   Temp 97.7 °F (36.5 °C) (Oral)   Resp 16   Ht 1.702 m (5' 7\")   Wt 82.8 kg (182 lb 8.7 oz)   SpO2 97%   BMI 28.59 kg/m²   LUNGS: Lungs clear   ABDOMEN: soft BS+  WOUNDS: n/a  24 HR INTAKE/OUTPUT :   Intake/Output Summary (Last 24 hours) at 2/8/2024 1227  Last data filed at 2/8/2024 0542  Gross per 24 hour   Intake 600 ml   Output 0 ml   Net 600 ml     DRAIN/TUBE OUTPUT :      LABS  CBC :   Lab Results   Component Value Date/Time    WBC 9.0 02/08/2024 05:28 AM    HGB 10.4 02/08/2024 05:28 AM    HCT 32.3 02/08/2024 05:28 AM     02/08/2024 05:28 AM     BMP:   Lab Results   Component Value Date/Time     02/07/2024 06:13 AM    K 4.4 02/07/2024 06:13 AM     02/07/2024 06:13 AM    CO2 26 02/07/2024 06:13 AM    BUN 12 02/07/2024 06:13 AM    CREATININE 0.6 02/07/2024 06:13 AM    MG 2.3 02/07/2024 06:13 AM       ASSESSMENT  1. Hgb stable kavon diet OK for d/c gave pt my card if issues can call office      PLAN  1. As above OK for d/c      José Antonio Allred MD  Electronically signed 2/8/2024 at 12:27 PM

## 2024-02-08 NOTE — PROGRESS NOTES
Hospitalist Progress Note      Patient:  Alberto Gilliland    Unit/Bed:8A-04/004-A  YOB: 1965  MRN: [id number]   Acct: [id number]   PCP: Juan Christiansen DO  Date of Admission: 2/6/2024    Assessment/Plan:    Acute Hemoperitoneum: post-procedural complication. S/p liver biopsy 1/31/24. Hb stable ~10-11.  Previously Hb 11.8.  Will start on Zosyn for prophylaxis given intra-abdominal bleeding in the setting of immunocompromise state with LT high dose methotrexate use for lymphomatoid papulosis. Continue to trend hemoglobin as needed.  Remains afebrile and hemodynamically stable.     Primary hypertension: Suboptimally controlled at this time.  In the setting of pain.  Home meds only include Toprol-XL.  Monitor and consider adding another antihypertensive if needed.     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  S/p biopsy 1/31/24, awaiting results.     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  Follow-up outpatient.     CAD s/p CABG: On aspirin and Toprol-XL.  Aspirin currently held.     Hx bicuspid aortic valve insufficiency: S/p AVR x 2 in 2007 & 2016. On coumadin with mechanical valve replacement.  Currently held due to hemoperitoneum.  Will follow-up with Dr. Allred. Resume when appropriate. Pharmacy to dose.    Hypothyroidism: On Synthroid    Anxiety/Sleep disturbance/ depression?:  On Lexapro and Xanax as needed.    GERD: On PPI    Home meds include gabapentin, will discuss indication with pharmacy/ patient.      Tele:   [x] yes             [] no    Code Status: Full Code    PT/OT:  Fluids:  Diet:  ADULT ORAL NUTRITION SUPPLEMENT; Breakfast, Lunch, Dinner; Clear Liquid Oral Supplement  ADULT DIET; Regular    DVT prophylaxis: [] Lovenox                                 [x] SCDs                                 [] SQ Heparin                                 [x] Encourage ambulation     Contrast? None   Final Result   Impression:   Moderate hemorrhage in the right pericolic gutter with mild hemoperitoneum    in the pelvis.   Mild bladder wall thickening may be incidental. Mild cystitis less likely.   Other findings as above.      This document has been electronically signed by: Hayden Ray MD on    02/06/2024 06:48 PM      All CTs at this facility use dose modulation techniques and iterative    reconstructions, and/or weight-based dosing   when appropriate to reduce radiation to a low as reasonably achievable.        CT ABDOMEN PELVIS W IV CONTRAST Additional Contrast? None    Result Date: 2/6/2024  ** ADDENDUM #1 ** This report was discussed with MATTHIAS HALLMAN on Feb 06, 2024 18:54:00 EST. This document has been electronically signed by: Danya Weinstein on 02/06/2024 06:54 PM ** ORIGINAL REPORT ** CT of the abdomen and pelvis utilizing intravenous contrast. Comparison 4/11/2023. Findings: The gallbladder is unremarkable. There is lobulated hemorrhage in the right pericolic gutter. Small nonobstructive left renal stone. No hydronephrosis. The pancreas is unremarkable. No abdominal aortic aneurysm. Mild-to-moderate narrowing of the superior mesenteric artery. There are operative changes of the proximal small bowel. Mild nonspecific bladder wall thickening. Mild hemoperitoneum in the pelvis. Small bilateral hydroceles.     Impression: Moderate hemorrhage in the right pericolic gutter with mild hemoperitoneum in the pelvis. Mild bladder wall thickening may be incidental. Mild cystitis less likely. Other findings as above. This document has been electronically signed by: Hayden Ray MD on 02/06/2024 06:48 PM All CTs at this facility use dose modulation techniques and iterative reconstructions, and/or weight-based dosing when appropriate to reduce radiation to a low as reasonably achievable.          Electronically signed by Sendy Tompkins MD 2/7/2024

## 2024-02-09 ENCOUNTER — CARE COORDINATION (OUTPATIENT)
Dept: CASE MANAGEMENT | Age: 59
End: 2024-02-09

## 2024-02-09 ENCOUNTER — TELEPHONE (OUTPATIENT)
Dept: FAMILY MEDICINE CLINIC | Age: 59
End: 2024-02-09

## 2024-02-09 DIAGNOSIS — K66.1 HEMOPERITONEUM: Primary | ICD-10-CM

## 2024-02-09 NOTE — TELEPHONE ENCOUNTER
Care Transitions Initial Follow Up Call    Outreach made within 2 business days of discharge: Yes    Patient: Alberto Gilliland Patient : 1965   MRN: [id number]  Reason for Admission: There are no discharge diagnoses documented for the most recent discharge.  Discharge Date: 24       Spoke with: Pt    Discharge department/facility: [facility]    TCM Interactive Patient Contact:  Was patient able to fill all prescriptions: N/A  Was patient instructed to bring all medications to the follow-up visit: Yes  Is patient taking all medications as directed in the discharge summary? Yes  Does patient understand their discharge instructions: Yes  Does patient have questions or concerns that need addressed prior to 7-14 day follow up office visit: no    Scheduled appointment with PCP within 7-14 days    Follow Up  Future Appointments   Date Time Provider Department Center   2/15/2024 11:00 AM Juan Christiansen DO [facility] [place]   2024 10:00 AM Rosa Wise APRN - CNP [facility] [place]   2024  3:30 PM Juan Christiansen DO [facility] [place]       Katelyn Mcnair CMA ([facility])

## 2024-02-09 NOTE — CARE COORDINATION
Care Transitions Initial Follow Up Call    Call within 2 business days of discharge: Yes    Patient Current Location:  Home: [address] [place] [id number]    Care Transition Nurse contacted the patient by telephone to perform post hospital discharge assessment. Verified name and  with patient as identifiers. Provided introduction to self, and explanation of the Care Transition Nurse role.     Patient: Alberto Gilliland Patient : 1965   MRN: [id number]  Reason for Admission: Hemoperitoneum   Discharge Date: 24 RARS: No data recorded    Last Discharge Facility       Date Complaint Diagnosis Description Type Department Provider    24 Abdominal Pain Abdominal hemorrhage ... ED to Hosp-Admission (Discharged) (ADMITTED) [facility] Sendy Tompkins MD; Constantine Stewart, ...              Challenges to be reviewed by the provider   Additional needs identified to be addressed with provider: No  none               Method of communication with provider: none.    Spoke with Alberto, said he is \"not too bad.\"  Very little abd pain.  Denies fever, chills, chest pain, dyspnea, n/v/d.  Has not had a BM for a few days but feels he will.  Discussed Miralax and/or Colace if needed, voiced understanding, has taken in the past.  Reviewed medications/no changes, taking as directed.  Appetite and fluid intake is good.  Will see PCP next week and general surgery and cardio the following week.  He will drive himself.  Aware to get CBC next week.  No other issues to report.  Denies any other needs.  No other questions or concerns at this time.  Will continue to follow.    Care Transition Nurse reviewed discharge instructions, medical action plan, and red flags with patient who verbalized understanding. The patient was given an opportunity to ask questions and does not have any further questions or concerns at this time. Were discharge instructions available to patient? Yes. Reviewed appropriate site of care based on symptoms and

## 2024-02-12 ENCOUNTER — HOSPITAL ENCOUNTER (OUTPATIENT)
Age: 59
Discharge: HOME | End: 2024-02-12
Payer: COMMERCIAL

## 2024-02-12 DIAGNOSIS — K66.1 HEMOPERITONEUM: ICD-10-CM

## 2024-02-12 LAB
DEPRECATED RDW RBC AUTO: 62.4 FL (ref 35–45)
ERYTHROCYTE [DISTWIDTH] IN BLOOD BY AUTOMATED COUNT: 18.5 % (ref 11.5–14.5)
HCT VFR BLD AUTO: 36.2 % (ref 42–52)
HGB BLD-MCNC: 11.3 GM/DL (ref 14–18)
MCH RBC QN AUTO: 29.4 PG (ref 26–33)
MCHC RBC AUTO-ENTMCNC: 31.2 GM/DL (ref 32.2–35.5)
MCV RBC AUTO: 94 FL (ref 80–94)
PLATELET # BLD AUTO: 429 THOU/MM3 (ref 130–400)
PMV BLD AUTO: 9.5 FL (ref 9.4–12.4)
RBC # BLD AUTO: 3.85 MILL/MM3 (ref 4.7–6.1)
WBC # BLD AUTO: 16.1 THOU/MM3 (ref 4.8–10.8)

## 2024-02-12 PROCEDURE — 85027 COMPLETE CBC AUTOMATED: CPT

## 2024-02-12 PROCEDURE — 36415 COLL VENOUS BLD VENIPUNCTURE: CPT

## 2024-02-13 ENCOUNTER — OFFICE VISIT (OUTPATIENT)
Dept: FAMILY MEDICINE CLINIC | Age: 59
End: 2024-02-13

## 2024-02-13 ENCOUNTER — HOSPITAL ENCOUNTER (OUTPATIENT)
Age: 59
Discharge: HOME | End: 2024-02-13
Payer: COMMERCIAL

## 2024-02-13 VITALS
DIASTOLIC BLOOD PRESSURE: 66 MMHG | WEIGHT: 189.6 LBS | HEART RATE: 88 BPM | SYSTOLIC BLOOD PRESSURE: 134 MMHG | RESPIRATION RATE: 16 BRPM | BODY MASS INDEX: 29.7 KG/M2

## 2024-02-13 DIAGNOSIS — Z09 HOSPITAL DISCHARGE FOLLOW-UP: ICD-10-CM

## 2024-02-13 DIAGNOSIS — J44.9 COPD, MILD (HCC): ICD-10-CM

## 2024-02-13 DIAGNOSIS — D72.829 LEUKOCYTOSIS, UNSPECIFIED TYPE: ICD-10-CM

## 2024-02-13 DIAGNOSIS — C86.6 LYMPHOMATOID PAPULOSIS (HCC): ICD-10-CM

## 2024-02-13 DIAGNOSIS — Z72.0 TOBACCO ABUSE: ICD-10-CM

## 2024-02-13 DIAGNOSIS — Z98.890 HISTORY OF LIVER BIOPSY: ICD-10-CM

## 2024-02-13 DIAGNOSIS — K66.1 HEMOPERITONEUM: Primary | ICD-10-CM

## 2024-02-13 LAB
BASOPHILS ABSOLUTE: 0.1 THOU/MM3 (ref 0–0.1)
BASOPHILS NFR BLD AUTO: 0.6 %
DEPRECATED RDW RBC AUTO: 62.4 FL (ref 35–45)
EOSINOPHIL NFR BLD AUTO: 3.1 %
EOSINOPHILS ABSOLUTE: 0.4 THOU/MM3 (ref 0–0.4)
ERYTHROCYTE [DISTWIDTH] IN BLOOD BY AUTOMATED COUNT: 18.5 % (ref 11.5–14.5)
HCT VFR BLD AUTO: 35.9 % (ref 42–52)
HGB BLD-MCNC: 11.4 GM/DL (ref 14–18)
IMM GRANULOCYTES # BLD AUTO: 0.06 THOU/MM3 (ref 0–0.07)
IMM GRANULOCYTES NFR BLD AUTO: 0.4 %
LYMPHOCYTES ABSOLUTE: 0.8 THOU/MM3 (ref 1–4.8)
LYMPHOCYTES NFR BLD AUTO: 5.6 %
MCH RBC QN AUTO: 29.7 PG (ref 26–33)
MCHC RBC AUTO-ENTMCNC: 31.8 GM/DL (ref 32.2–35.5)
MCV RBC AUTO: 93.5 FL (ref 80–94)
MONOCYTES ABSOLUTE: 1.5 THOU/MM3 (ref 0.4–1.3)
MONOCYTES NFR BLD AUTO: 10.5 %
NEUTROPHILS NFR BLD AUTO: 79.8 %
NRBC BLD AUTO-RTO: 0 /100 WBC
PLATELET # BLD AUTO: 436 THOU/MM3 (ref 130–400)
PMV BLD AUTO: 9.6 FL (ref 9.4–12.4)
RBC # BLD AUTO: 3.84 MILL/MM3 (ref 4.7–6.1)
SEGMENTED NEUTROPHILS ABSOLUTE COUNT: 11.1 THOU/MM3 (ref 1.8–7.7)
WBC # BLD AUTO: 13.9 THOU/MM3 (ref 4.8–10.8)

## 2024-02-13 PROCEDURE — 36415 COLL VENOUS BLD VENIPUNCTURE: CPT

## 2024-02-13 PROCEDURE — 85025 COMPLETE CBC W/AUTO DIFF WBC: CPT

## 2024-02-13 RX ORDER — NICOTINE 21 MG/24HR
1 PATCH, TRANSDERMAL 24 HOURS TRANSDERMAL EVERY 24 HOURS
Qty: 30 PATCH | Refills: 2 | Status: SHIPPED | OUTPATIENT
Start: 2024-02-13 | End: 2025-02-12

## 2024-02-13 NOTE — PROGRESS NOTES
valve replacement x2 (currently bio valve w/o sequelae) and LT high dose methotrexate use for lymphomatoid papulosis presented for complaints of acute onset severe abdominal pain following liver biopsy outpatient on 1/31/2024.  Admitted for associated hemoperitoneum seen on CT abdomen pelvis on admission. Seen by Gen Surg, no indication for surgical intervention. Hb remained stable ~10 throughout his stay.      Seen and evaluated this morning. Reported pain is unchanged, mostly present with movement but tolerable. Encouraged to ambulate and keep active. Will take time for the hemoperitoneum to resolve. Dr. Allred OK with discharge. Will follow up outpatient as needed. CBC in 1 week for follow up.     Allergies   Allergen Reactions    Seasonal      Outpatient Medications Marked as Taking for the 2/13/24 encounter (Office Visit) with Juan Christiansen, DO   Medication Sig Dispense Refill    nicotine (NICODERM CQ) 21 MG/24HR Place 1 patch onto the skin every 24 hours 30 patch 2    gabapentin (NEURONTIN) 400 MG capsule TAKE 1 CAPSULE BY MOUTH THREE TIMES A  capsule 0    acetaminophen (TYLENOL) 325 MG tablet Take 2 tablets by mouth every 6 hours as needed for Pain      escitalopram (LEXAPRO) 10 MG tablet Take 1 tablet by mouth daily 90 tablet 3    levothyroxine (SYNTHROID) 75 MCG tablet TAKE 1 TABLET BY MOUTH EVERY DAY 90 tablet 0    ALPRAZolam (XANAX) 0.5 MG tablet TAKE 1 TABLET BY MOUTH EVERY DAY AS NEEDED FOR ANXIETY OR  sleep 30 tablet 2    ASPIRIN LOW DOSE 81 MG EC tablet TAKE 1 TABLET BY MOUTH EVERY DAY 90 tablet 2    esomeprazole (NEXIUM) 40 MG delayed release capsule TAKE 1 CAPSULE BY MOUTH IN THE MORNING before breakfast 90 capsule 3    B Complex Vitamins (VITAMIN B-COMPLEX PO) Take by mouth      folic acid (FOLVITE) 1 MG tablet Take 1 tablet by mouth daily      methotrexate (RHEUMATREX) 2.5 MG chemo tablet Take 5 tablets by mouth once a week 5 tabs in the morning and 5 tabs in the evening once a

## 2024-02-15 ENCOUNTER — TRANSCRIBE ORDERS (OUTPATIENT)
Dept: ADMINISTRATIVE | Age: 59
End: 2024-02-15

## 2024-02-15 ENCOUNTER — CARE COORDINATION (OUTPATIENT)
Dept: CASE MANAGEMENT | Age: 59
End: 2024-02-15

## 2024-02-15 DIAGNOSIS — K66.1 NONTRAUMATIC HEMOPERITONEUM: Primary | ICD-10-CM

## 2024-02-15 NOTE — CARE COORDINATION
Care Transitions Outreach Attempt-1st attempt    Call within 2 business days of discharge: Yes   Attempted to reach patient for subsequent transitional call.  Left HIPPA compliant VM to return call directly to [phone] either today or tomorrow.  Unable to reach letter sent via [employer].    Patient: Alberto Gilliland Patient : 1965 MRN: [id number]    Last Discharge Facility       Date Complaint Diagnosis Description Type Department Provider    24 Abdominal Pain Abdominal hemorrhage ... ED to Hosp-Admission (Discharged) (ADMITTED) [facility] Sendy Tompkins MD; Constantine Stewart, ...              Noted following upcoming appointments from discharge chart review:   [facility] follow up appointment(s):   Future Appointments   Date Time Provider Department Center   2024 10:00 AM Rosa Wise APRN - CNP [facility] [place]   2024  3:30 PM Juan Christiansen, DO [facility] [place]     Non-[facility]  follow up appointment(s): doron

## 2024-02-16 ENCOUNTER — HOSPITAL ENCOUNTER (OUTPATIENT)
Dept: CT IMAGING | Age: 59
Discharge: HOME | End: 2024-02-16
Attending: SURGERY
Payer: COMMERCIAL

## 2024-02-16 DIAGNOSIS — K66.1 NONTRAUMATIC HEMOPERITONEUM: ICD-10-CM

## 2024-02-16 PROCEDURE — 74177 CT ABD & PELVIS W/CONTRAST: CPT

## 2024-02-16 PROCEDURE — 6360000004 HC RX CONTRAST MEDICATION: Performed by: SURGERY

## 2024-02-16 RX ADMIN — IOPAMIDOL 85 ML: 755 INJECTION, SOLUTION INTRAVENOUS at 15:08

## 2024-02-19 ENCOUNTER — CARE COORDINATION (OUTPATIENT)
Dept: CASE MANAGEMENT | Age: 59
End: 2024-02-19

## 2024-02-19 NOTE — CARE COORDINATION
Care Transitions Follow Up Call    Patient Current Location:  Home: [address] [place], [place] [address]  [facility] [address]    Care Transition Nurse contacted the patient by telephone to follow up after admission on 24.  Verified name and  with patient as identifiers.    Patient: Alberto Gilliland  Patient : 1965   MRN: [id number]  Reason for Admission: abd pain ac hemoperitoneum after liver bx 24  Discharge Date: 24 RARS: No data recorded    Needs to be reviewed by the provider   Additional needs identified to be addressed with provider: Yes  Pt. C/o nausea, R abd pain persisting             Method of communication with provider: phone.    CTN call to Alberto today and he says he is still having R lower abd pain/bloating w/ nausea. Doesn't have antiemetic.  Denies vomiting, diarrhea, fever,chills, sob, chest pain, dizziness, swelling, malaise, blood in stool or tarry stool.   He called Dr. Allred's office to report and they said he was in surgery today but would message him. He has not heard back yet.  We discussed seeking ER attention if new or worsening sxs. He expressed understanding.  Voices concerns about getting back to work.  Last HGB=11.4 24  Aspirin on hold.  Last BM yesterday. Says feels bowels are sluggish, he usually goes every day.  Eating, drinking & sleeping ok.  PCP HFU 2/15/24-> nicoderm (hasn't started this)  Surgery HFU 24-> CT scan completed.  Awaiting results from Dr. Allred.  Denies problems w/ urination. No other concerns voiced at this time. Will check on pt. Tomorrow.      Addressed changes since last contact:  labs-CBC done  Discussed follow-up appointments. If no appointment was previously scheduled, appointment scheduling offered: Yes.   Is follow up appointment scheduled within 7 days of discharge? Yes.    Follow Up  Future Appointments   Date Time Provider Department Center   2024 10:00 AM Rosa Wise, APRN - CNP [facility] [place]   2024  3:30 PM

## 2024-02-20 ENCOUNTER — CARE COORDINATION (OUTPATIENT)
Dept: CASE MANAGEMENT | Age: 59
End: 2024-02-20

## 2024-02-20 NOTE — CARE COORDINATION
management-did he get antiemetic?  OP surgery F 2/23/24-> NPO/instructions    Francisca Payne RN

## 2024-02-21 RX ORDER — CLOBETASOL PROPIONATE 0.5 MG/G
1 CREAM TOPICAL 2 TIMES DAILY
COMMUNITY

## 2024-02-21 NOTE — FLOWSHEET NOTE
Follow all instructions given by your physician  Do not eat or drink anything after midnight prior to surgery(includes water, chewing gum, mints and ice chips)  Sips of water am of surgery with allowed medications  Do not use chewing tobacco after midnight prior to surgery  May brush teeth do not swallow water  Do not smoke, drink alcoholic beverages or use any illicit drugs for 24 hours prior to surgery  Bring insurance info and photo ID  Bring pertinent paperwork with you from Doctor or surgeons's office  Wear clean comfortable, loose-fitting clothing  No make-up, nail polish, jewelry, piercings, or contact lenses to be worn day of surgery  No glue on dentures morning of surgery; you will be asked to remove them for surgery. Case for glasses.   Shower the night before and the morning of surgery with cleansing soap provided or a liquid antibacterial soap, dry with new fresh clean towel after each shower, no lotions, creams or powder.  Clean sheets and pillowcase on bed night before surgery  Bring medications in original bottles, Bring rescue inhalers with you  Bring CPAP/BIPAP machine if you have one ( you may be charged if one is needed in recovery room ) no pacemaker no     Our pharmacy has a Meds to Beds program where they will deliver any new prescriptions you may have to your room before you leave. Our Pharmacy will clear it through your insurance; for example (same co pay). This enables you to take your new RX as soon as you need when you get home and avoids stop/wait delays on the way home.  Please have a form of payment with you and have someone designated as your Pharmacy contact with their phone # as you may not feel well or still be under the influence of anesthesia.    Please refer to the SSI-Surgical Site Infection Flyer you hopefully received in the mail-together we can prevent infections; signs and symptoms reviewed.  When discharged be sure you understand how to care for your wound and that you have

## 2024-02-22 ENCOUNTER — CARE COORDINATION (OUTPATIENT)
Dept: CASE MANAGEMENT | Age: 59
End: 2024-02-22

## 2024-02-22 ENCOUNTER — OFFICE VISIT (OUTPATIENT)
Dept: CARDIOLOGY CLINIC | Age: 59
End: 2024-02-22
Payer: COMMERCIAL

## 2024-02-22 ENCOUNTER — TELEPHONE (OUTPATIENT)
Dept: CARDIOLOGY CLINIC | Age: 59
End: 2024-02-22

## 2024-02-22 VITALS
HEIGHT: 67 IN | SYSTOLIC BLOOD PRESSURE: 160 MMHG | WEIGHT: 189.4 LBS | DIASTOLIC BLOOD PRESSURE: 86 MMHG | HEART RATE: 81 BPM | BODY MASS INDEX: 29.73 KG/M2

## 2024-02-22 DIAGNOSIS — R06.02 SOB (SHORTNESS OF BREATH): ICD-10-CM

## 2024-02-22 DIAGNOSIS — I50.22 CHRONIC SYSTOLIC (CONGESTIVE) HEART FAILURE (HCC): ICD-10-CM

## 2024-02-22 DIAGNOSIS — I71.40 ABDOMINAL AORTIC ANEURYSM (AAA) WITHOUT RUPTURE, UNSPECIFIED PART (HCC): ICD-10-CM

## 2024-02-22 DIAGNOSIS — S30.1XXA ABDOMINAL HEMATOMA: ICD-10-CM

## 2024-02-22 DIAGNOSIS — I10 PRIMARY HYPERTENSION: ICD-10-CM

## 2024-02-22 DIAGNOSIS — Z95.2 S/P AVR: Primary | ICD-10-CM

## 2024-02-22 PROCEDURE — 3079F DIAST BP 80-89 MM HG: CPT | Performed by: NURSE PRACTITIONER

## 2024-02-22 PROCEDURE — 99214 OFFICE O/P EST MOD 30 MIN: CPT | Performed by: NURSE PRACTITIONER

## 2024-02-22 PROCEDURE — 3077F SYST BP >= 140 MM HG: CPT | Performed by: NURSE PRACTITIONER

## 2024-02-22 NOTE — CARE COORDINATION
Care Transitions Follow Up Call    Patient Current Location:  Home: [address] [place], [place] [address]vishnu  [facility] [address]    Care Transition Nurse contacted the patient by telephone to follow up after admission on 24.  Verified name and  with patient as identifiers.    Patient: Alberto Gilliland  Patient : 1965   MRN: [id number]  Reason for Admission: abd pain ac hemoperitoneum after liver bx 24   Discharge Date: 24 RARS: No data recorded    Needs to be reviewed by the provider   Additional needs identified to be addressed with provider: No  none             Method of communication with provider: none.    CTN call to Alberto today and he says he is feeling about the same.  C/o nausea, R abdominal pain/bloating.  Denies v/d, fever, chills, swelling, malaise, dizziness, sob, chest pain.  Scheduled for hematoma evacuation at [facility] as an OP tomorrow 24. Aspirin has been on hold x 2 weeks.  Sister is taking him.  Cardio f/u today->KA=962/66 no changes.  Eating, drinking & sleeping ok. Protein shakes daily.  Denies problems w/ urination/bowels. BM today.  No other concerns voiced at this time. Will continue to follow.  Expresses appreciation for the CTN calls.    Addressed changes since last contact:   scheduled for hematoma evac 24 at [facility] as OP  Discussed follow-up appointments. If no appointment was previously scheduled, appointment scheduling offered: Yes.   Is follow up appointment scheduled within 7 days of discharge? Yes.    Follow Up  Future Appointments   Date Time Provider Department Center   2024  3:30 PM Juan Christiansen, DO [facility]   2024 10:30 AM Rosa Wise, APRN - CNP N [facility]     External follow up appointment(s):     Care Transition Nurse reviewed discharge instructions, medical action plan, and red flags with patient and discussed any barriers to care and/or understanding of plan of care after discharge. Discussed appropriate site of care based on

## 2024-02-22 NOTE — PROGRESS NOTES
BILATERAL performed by Juan Kiran MD at [facility] OR    OH NJX DX/THER SBST EPIDURAL/SUBARACH LUMBAR/SACRAL N/A 09/12/2017    TESI T11 performed by Juan Kiran MD at [facility] OR    SMALL INTESTINE SURGERY  2010    8 INCHES REMOVED    TOOTH EXTRACTION  02/2019    UPPER GASTROINTESTINAL ENDOSCOPY  2010    US GUIDED LIVER BIOPSY PERCUTANEOUS  01/31/2024    US GUIDED LIVER BIOPSY PERCUTANEOUS 1/31/2024 Jan Sotelo MD [facility] ULTR[facility]OUND     Family History   Problem Relation Age of Onset    Cancer Mother     Diabetes Father     Cancer Father     Heart Disease Father 35        CABG    High Blood Pressure Father     Diabetes Sister         AS A CHILD    Kidney Disease Sister     High Blood Pressure Sister     Cancer Brother     COPD Sister     Heart Disease Sister     Stroke Neg Hx      Social History     Tobacco Use    Smoking status: Every Day     Current packs/day: 1.00     Average packs/day: 1 pack/day for 45.8 years (45.8 ttl pk-yrs)     Types: Cigarettes, Cigars     Start date: 6/9/1978    Smokeless tobacco: Never   Substance Use Topics    Alcohol use: Not Currently     Comment: not for 3 mo      Current Outpatient Medications   Medication Sig Dispense Refill    clobetasol (TEMOVATE) 0.05 % cream Apply 1 each topically 2 times daily      nicotine (NICODERM CQ) 21 MG/24HR Place 1 patch onto the skin every 24 hours 30 patch 2    gabapentin (NEURONTIN) 400 MG capsule TAKE 1 CAPSULE BY MOUTH THREE TIMES A  capsule 0    acetaminophen (TYLENOL) 325 MG tablet Take 2 tablets by mouth every 6 hours as needed for Pain      escitalopram (LEXAPRO) 10 MG tablet Take 1 tablet by mouth daily 90 tablet 3    ASPIRIN LOW DOSE 81 MG EC tablet TAKE 1 TABLET BY MOUTH EVERY DAY 90 tablet 2    esomeprazole (NEXIUM) 40 MG delayed release capsule TAKE 1 CAPSULE BY MOUTH IN THE MORNING before breakfast 90 capsule 3    B Complex Vitamins (VITAMIN B-COMPLEX PO) Take 1 tablet by mouth daily

## 2024-02-22 NOTE — PATIENT INSTRUCTIONS
Continue current medications as prescribed.    Allow yourself to recover from the upcoming procedure.    Make sure you control pain after the procedure to not let it be hard on your heart.     If your symptoms do not improve in a few weeks after the procedure, then call and we will re-evaluate you.     Follow-up with your PCP as scheduled.    Follow-up with 8 weeks with Rosa CNP  as scheduled or sooner if need.

## 2024-02-22 NOTE — TELEPHONE ENCOUNTER
Pt was in today was seen with Rosa , received fax needs clearance ,  Pre op Risk Assessment    Procedure Diagnostic Laparoscopy, possible open   Physician Dr. Allred  Date of surgery/procedure 2/23/24    Last OV 4/20/23--saw Rosa today  Last Stress 4/8/23  Last Echo 4/8/23  Last Cath 6/9/16  Last Stent not seen in epic  Is patient on blood thinners asa  Hold Meds/how many days ?    Dr. Luu is pt cleared?

## 2024-02-23 ENCOUNTER — ANESTHESIA EVENT (OUTPATIENT)
Dept: OPERATING ROOM | Age: 59
End: 2024-02-23
Payer: COMMERCIAL

## 2024-02-23 ENCOUNTER — HOSPITAL ENCOUNTER (INPATIENT)
Age: 59
LOS: 1 days | Discharge: HOME | End: 2024-02-24
Attending: SURGERY | Admitting: SURGERY
Payer: COMMERCIAL

## 2024-02-23 ENCOUNTER — ANESTHESIA (OUTPATIENT)
Dept: OPERATING ROOM | Age: 59
End: 2024-02-23
Payer: COMMERCIAL

## 2024-02-23 DIAGNOSIS — K43.9 VENTRAL HERNIA WITHOUT OBSTRUCTION OR GANGRENE: Primary | ICD-10-CM

## 2024-02-23 PROBLEM — S30.1XXA ABDOMINAL HEMATOMA: Status: ACTIVE | Noted: 2024-02-23

## 2024-02-23 PROCEDURE — 6370000000 HC RX 637 (ALT 250 FOR IP): Performed by: SURGERY

## 2024-02-23 PROCEDURE — C1781 MESH (IMPLANTABLE): HCPCS | Performed by: SURGERY

## 2024-02-23 PROCEDURE — 7100000001 HC PACU RECOVERY - ADDTL 15 MIN: Performed by: SURGERY

## 2024-02-23 PROCEDURE — 6360000002 HC RX W HCPCS

## 2024-02-23 PROCEDURE — 7100000010 HC PHASE II RECOVERY - FIRST 15 MIN: Performed by: SURGERY

## 2024-02-23 PROCEDURE — 3700000001 HC ADD 15 MINUTES (ANESTHESIA): Performed by: SURGERY

## 2024-02-23 PROCEDURE — 2500000003 HC RX 250 WO HCPCS: Performed by: NURSE ANESTHETIST, CERTIFIED REGISTERED

## 2024-02-23 PROCEDURE — 7100000011 HC PHASE II RECOVERY - ADDTL 15 MIN: Performed by: SURGERY

## 2024-02-23 PROCEDURE — 2580000003 HC RX 258: Performed by: SURGERY

## 2024-02-23 PROCEDURE — 2709999900 HC NON-CHARGEABLE SUPPLY: Performed by: SURGERY

## 2024-02-23 PROCEDURE — 6360000002 HC RX W HCPCS: Performed by: ANESTHESIOLOGY

## 2024-02-23 PROCEDURE — 3600000009 HC SURGERY ROBOT BASE: Performed by: SURGERY

## 2024-02-23 PROCEDURE — 0WUF0JZ SUPPLEMENT ABDOMINAL WALL WITH SYNTHETIC SUBSTITUTE, OPEN APPROACH: ICD-10-PCS | Performed by: SURGERY

## 2024-02-23 PROCEDURE — 7100000000 HC PACU RECOVERY - FIRST 15 MIN: Performed by: SURGERY

## 2024-02-23 PROCEDURE — 3700000000 HC ANESTHESIA ATTENDED CARE: Performed by: SURGERY

## 2024-02-23 PROCEDURE — S2900 ROBOTIC SURGICAL SYSTEM: HCPCS | Performed by: SURGERY

## 2024-02-23 PROCEDURE — 8E0W4CZ ROBOTIC ASSISTED PROCEDURE OF TRUNK REGION, PERCUTANEOUS ENDOSCOPIC APPROACH: ICD-10-PCS | Performed by: SURGERY

## 2024-02-23 PROCEDURE — 1200000000 HC SEMI PRIVATE

## 2024-02-23 PROCEDURE — 6360000002 HC RX W HCPCS: Performed by: SURGERY

## 2024-02-23 PROCEDURE — 3600000019 HC SURGERY ROBOT ADDTL 15MIN: Performed by: SURGERY

## 2024-02-23 PROCEDURE — 0WCG4ZZ EXTIRPATION OF MATTER FROM PERITONEAL CAVITY, PERCUTANEOUS ENDOSCOPIC APPROACH: ICD-10-PCS | Performed by: SURGERY

## 2024-02-23 PROCEDURE — 6360000002 HC RX W HCPCS: Performed by: NURSE ANESTHETIST, CERTIFIED REGISTERED

## 2024-02-23 DEVICE — MESH HERN W4XL6IN ELLIPSE W/ ECHO PS POS SYS VENTRALIGHT ST: Type: IMPLANTABLE DEVICE | Site: ABDOMEN | Status: FUNCTIONAL

## 2024-02-23 RX ORDER — ALPRAZOLAM 0.5 MG/1
0.5 TABLET ORAL NIGHTLY PRN
Status: DISCONTINUED | OUTPATIENT
Start: 2024-02-23 | End: 2024-02-24 | Stop reason: HOSPADM

## 2024-02-23 RX ORDER — FENTANYL CITRATE 50 UG/ML
INJECTION, SOLUTION INTRAMUSCULAR; INTRAVENOUS PRN
Status: DISCONTINUED | OUTPATIENT
Start: 2024-02-23 | End: 2024-02-23 | Stop reason: SDUPTHER

## 2024-02-23 RX ORDER — SODIUM CHLORIDE 9 MG/ML
INJECTION, SOLUTION INTRAVENOUS PRN
Status: DISCONTINUED | OUTPATIENT
Start: 2024-02-23 | End: 2024-02-24 | Stop reason: HOSPADM

## 2024-02-23 RX ORDER — PROPOFOL 10 MG/ML
INJECTION, EMULSION INTRAVENOUS PRN
Status: DISCONTINUED | OUTPATIENT
Start: 2024-02-23 | End: 2024-02-23 | Stop reason: SDUPTHER

## 2024-02-23 RX ORDER — ONDANSETRON 2 MG/ML
4 INJECTION INTRAMUSCULAR; INTRAVENOUS EVERY 6 HOURS PRN
Status: DISCONTINUED | OUTPATIENT
Start: 2024-02-23 | End: 2024-02-24 | Stop reason: HOSPADM

## 2024-02-23 RX ORDER — SODIUM CHLORIDE 0.9 % (FLUSH) 0.9 %
5-40 SYRINGE (ML) INJECTION PRN
Status: DISCONTINUED | OUTPATIENT
Start: 2024-02-23 | End: 2024-02-24 | Stop reason: HOSPADM

## 2024-02-23 RX ORDER — LEVOTHYROXINE SODIUM 0.07 MG/1
75 TABLET ORAL DAILY
Status: DISCONTINUED | OUTPATIENT
Start: 2024-02-24 | End: 2024-02-24 | Stop reason: HOSPADM

## 2024-02-23 RX ORDER — ROCURONIUM BROMIDE 10 MG/ML
INJECTION, SOLUTION INTRAVENOUS PRN
Status: DISCONTINUED | OUTPATIENT
Start: 2024-02-23 | End: 2024-02-23 | Stop reason: SDUPTHER

## 2024-02-23 RX ORDER — ONDANSETRON 2 MG/ML
INJECTION INTRAMUSCULAR; INTRAVENOUS PRN
Status: DISCONTINUED | OUTPATIENT
Start: 2024-02-23 | End: 2024-02-23 | Stop reason: SDUPTHER

## 2024-02-23 RX ORDER — SODIUM CHLORIDE 9 MG/ML
INJECTION, SOLUTION INTRAVENOUS CONTINUOUS
Status: DISCONTINUED | OUTPATIENT
Start: 2024-02-23 | End: 2024-02-24 | Stop reason: HOSPADM

## 2024-02-23 RX ORDER — LIDOCAINE HCL/PF 100 MG/5ML
SYRINGE (ML) INJECTION PRN
Status: DISCONTINUED | OUTPATIENT
Start: 2024-02-23 | End: 2024-02-23 | Stop reason: SDUPTHER

## 2024-02-23 RX ORDER — BUPIVACAINE HYDROCHLORIDE 5 MG/ML
INJECTION, SOLUTION EPIDURAL; INTRACAUDAL PRN
Status: DISCONTINUED | OUTPATIENT
Start: 2024-02-23 | End: 2024-02-23 | Stop reason: ALTCHOICE

## 2024-02-23 RX ORDER — CEFAZOLIN SODIUM 1 G/3ML
INJECTION, POWDER, FOR SOLUTION INTRAMUSCULAR; INTRAVENOUS PRN
Status: DISCONTINUED | OUTPATIENT
Start: 2024-02-23 | End: 2024-02-23 | Stop reason: SDUPTHER

## 2024-02-23 RX ORDER — OXYCODONE HYDROCHLORIDE 5 MG/1
5 TABLET ORAL EVERY 4 HOURS PRN
Status: DISCONTINUED | OUTPATIENT
Start: 2024-02-23 | End: 2024-02-24 | Stop reason: HOSPADM

## 2024-02-23 RX ORDER — GABAPENTIN 400 MG/1
400 CAPSULE ORAL 3 TIMES DAILY
Status: DISCONTINUED | OUTPATIENT
Start: 2024-02-23 | End: 2024-02-24 | Stop reason: HOSPADM

## 2024-02-23 RX ORDER — ESCITALOPRAM OXALATE 10 MG/1
10 TABLET ORAL DAILY
Status: DISCONTINUED | OUTPATIENT
Start: 2024-02-23 | End: 2024-02-24 | Stop reason: HOSPADM

## 2024-02-23 RX ORDER — ONDANSETRON 4 MG/1
4 TABLET, ORALLY DISINTEGRATING ORAL EVERY 8 HOURS PRN
Status: DISCONTINUED | OUTPATIENT
Start: 2024-02-23 | End: 2024-02-24 | Stop reason: HOSPADM

## 2024-02-23 RX ORDER — PANTOPRAZOLE SODIUM 40 MG/1
40 TABLET, DELAYED RELEASE ORAL
Status: DISCONTINUED | OUTPATIENT
Start: 2024-02-24 | End: 2024-02-24 | Stop reason: HOSPADM

## 2024-02-23 RX ORDER — FENTANYL CITRATE 50 UG/ML
INJECTION, SOLUTION INTRAMUSCULAR; INTRAVENOUS
Status: COMPLETED
Start: 2024-02-23 | End: 2024-02-23

## 2024-02-23 RX ORDER — DEXAMETHASONE SODIUM PHOSPHATE 10 MG/ML
INJECTION, EMULSION INTRAMUSCULAR; INTRAVENOUS PRN
Status: DISCONTINUED | OUTPATIENT
Start: 2024-02-23 | End: 2024-02-23 | Stop reason: SDUPTHER

## 2024-02-23 RX ORDER — SODIUM CHLORIDE 0.9 % (FLUSH) 0.9 %
5-40 SYRINGE (ML) INJECTION EVERY 12 HOURS SCHEDULED
Status: DISCONTINUED | OUTPATIENT
Start: 2024-02-23 | End: 2024-02-24 | Stop reason: HOSPADM

## 2024-02-23 RX ORDER — METOPROLOL SUCCINATE 50 MG/1
50 TABLET, EXTENDED RELEASE ORAL DAILY
Status: DISCONTINUED | OUTPATIENT
Start: 2024-02-24 | End: 2024-02-24 | Stop reason: HOSPADM

## 2024-02-23 RX ORDER — NICOTINE 21 MG/24HR
1 PATCH, TRANSDERMAL 24 HOURS TRANSDERMAL EVERY 24 HOURS
Status: DISCONTINUED | OUTPATIENT
Start: 2024-02-23 | End: 2024-02-24 | Stop reason: HOSPADM

## 2024-02-23 RX ORDER — MIDAZOLAM HYDROCHLORIDE 1 MG/ML
INJECTION INTRAMUSCULAR; INTRAVENOUS PRN
Status: DISCONTINUED | OUTPATIENT
Start: 2024-02-23 | End: 2024-02-23 | Stop reason: SDUPTHER

## 2024-02-23 RX ORDER — FENTANYL CITRATE 50 UG/ML
50 INJECTION, SOLUTION INTRAMUSCULAR; INTRAVENOUS EVERY 5 MIN PRN
Status: COMPLETED | OUTPATIENT
Start: 2024-02-23 | End: 2024-02-23

## 2024-02-23 RX ADMIN — GABAPENTIN 400 MG: 400 CAPSULE ORAL at 17:48

## 2024-02-23 RX ADMIN — FENTANYL CITRATE 50 MCG: 50 INJECTION INTRAMUSCULAR; INTRAVENOUS at 10:06

## 2024-02-23 RX ADMIN — FENTANYL CITRATE 100 MCG: 50 INJECTION INTRAMUSCULAR; INTRAVENOUS at 10:26

## 2024-02-23 RX ADMIN — FENTANYL CITRATE 50 MCG: 50 INJECTION INTRAMUSCULAR; INTRAVENOUS at 11:57

## 2024-02-23 RX ADMIN — Medication 0.5 MG: at 12:00

## 2024-02-23 RX ADMIN — OXYCODONE 5 MG: 5 TABLET ORAL at 12:55

## 2024-02-23 RX ADMIN — ESCITALOPRAM OXALATE 10 MG: 10 TABLET ORAL at 17:48

## 2024-02-23 RX ADMIN — ROCURONIUM BROMIDE 10 MG: 10 INJECTION INTRAVENOUS at 10:28

## 2024-02-23 RX ADMIN — SUGAMMADEX 200 MG: 100 INJECTION, SOLUTION INTRAVENOUS at 11:32

## 2024-02-23 RX ADMIN — Medication 80 MG: at 10:07

## 2024-02-23 RX ADMIN — ONDANSETRON 4 MG: 2 INJECTION INTRAMUSCULAR; INTRAVENOUS at 11:26

## 2024-02-23 RX ADMIN — SODIUM CHLORIDE: 9 INJECTION, SOLUTION INTRAVENOUS at 09:48

## 2024-02-23 RX ADMIN — OXYCODONE 5 MG: 5 TABLET ORAL at 21:54

## 2024-02-23 RX ADMIN — CEFAZOLIN 2 G: 1 INJECTION, POWDER, FOR SOLUTION INTRAMUSCULAR; INTRAVENOUS at 10:19

## 2024-02-23 RX ADMIN — HYDROMORPHONE HYDROCHLORIDE 0.25 MG: 1 INJECTION, SOLUTION INTRAMUSCULAR; INTRAVENOUS; SUBCUTANEOUS at 12:07

## 2024-02-23 RX ADMIN — MIDAZOLAM 2 MG: 1 INJECTION INTRAMUSCULAR; INTRAVENOUS at 10:03

## 2024-02-23 RX ADMIN — HYDROMORPHONE HYDROCHLORIDE 0.5 MG: 1 INJECTION, SOLUTION INTRAMUSCULAR; INTRAVENOUS; SUBCUTANEOUS at 14:51

## 2024-02-23 RX ADMIN — SODIUM CHLORIDE: 9 INJECTION, SOLUTION INTRAVENOUS at 14:51

## 2024-02-23 RX ADMIN — HYDROMORPHONE HYDROCHLORIDE 0.25 MG: 1 INJECTION, SOLUTION INTRAMUSCULAR; INTRAVENOUS; SUBCUTANEOUS at 12:12

## 2024-02-23 RX ADMIN — DEXAMETHASONE SODIUM PHOSPHATE 10 MG: 10 INJECTION, EMULSION INTRAMUSCULAR; INTRAVENOUS at 10:30

## 2024-02-23 RX ADMIN — PROPOFOL 200 MG: 10 INJECTION, EMULSION INTRAVENOUS at 10:08

## 2024-02-23 RX ADMIN — HYDROMORPHONE HYDROCHLORIDE 0.5 MG: 1 INJECTION, SOLUTION INTRAMUSCULAR; INTRAVENOUS; SUBCUTANEOUS at 12:00

## 2024-02-23 RX ADMIN — GABAPENTIN 400 MG: 400 CAPSULE ORAL at 21:54

## 2024-02-23 RX ADMIN — ROCURONIUM BROMIDE 50 MG: 10 INJECTION INTRAVENOUS at 10:08

## 2024-02-23 RX ADMIN — OXYCODONE 5 MG: 5 TABLET ORAL at 17:51

## 2024-02-23 RX ADMIN — FENTANYL CITRATE 50 MCG: 50 INJECTION INTRAMUSCULAR; INTRAVENOUS at 12:02

## 2024-02-23 ASSESSMENT — PAIN SCALES - GENERAL
PAINLEVEL_OUTOF10: 9
PAINLEVEL_OUTOF10: 5
PAINLEVEL_OUTOF10: 8
PAINLEVEL_OUTOF10: 6
PAINLEVEL_OUTOF10: 0
PAINLEVEL_OUTOF10: 6
PAINLEVEL_OUTOF10: 9
PAINLEVEL_OUTOF10: 7
PAINLEVEL_OUTOF10: 0
PAINLEVEL_OUTOF10: 3
PAINLEVEL_OUTOF10: 6
PAINLEVEL_OUTOF10: 0
PAINLEVEL_OUTOF10: 6
PAINLEVEL_OUTOF10: 6
PAINLEVEL_OUTOF10: 3
PAINLEVEL_OUTOF10: 4

## 2024-02-23 ASSESSMENT — PAIN DESCRIPTION - PAIN TYPE
TYPE: SURGICAL PAIN
TYPE: SURGICAL PAIN

## 2024-02-23 ASSESSMENT — PAIN DESCRIPTION - DESCRIPTORS
DESCRIPTORS: ACHING
DESCRIPTORS: ACHING;SHARP
DESCRIPTORS: ACHING
DESCRIPTORS: SHARP;SORE
DESCRIPTORS: SHARP
DESCRIPTORS: SHARP

## 2024-02-23 ASSESSMENT — PAIN - FUNCTIONAL ASSESSMENT
PAIN_FUNCTIONAL_ASSESSMENT: ACTIVITIES ARE NOT PREVENTED
PAIN_FUNCTIONAL_ASSESSMENT: ACTIVITIES ARE NOT PREVENTED
PAIN_FUNCTIONAL_ASSESSMENT: 0-10
PAIN_FUNCTIONAL_ASSESSMENT: ACTIVITIES ARE NOT PREVENTED

## 2024-02-23 ASSESSMENT — PAIN DESCRIPTION - LOCATION
LOCATION: ABDOMEN
LOCATION: ABDOMEN;BACK

## 2024-02-23 ASSESSMENT — PAIN DESCRIPTION - ORIENTATION
ORIENTATION: MID

## 2024-02-23 ASSESSMENT — COPD QUESTIONNAIRES: CAT_SEVERITY: MODERATE

## 2024-02-23 NOTE — ANESTHESIA POSTPROCEDURE EVALUATION
Department of Anesthesiology  Postprocedure Note    Patient: Alberto Gilliland  MRN: [id number]  YOB: 1965  Date of evaluation: 2/23/2024    Procedure Summary       Date: 02/23/24 Room / Location: [facility] OR  / [facility] OR    Anesthesia Start: 0959 Anesthesia Stop: 1145    Procedure: Robotic Laparoscopy;Lysis of adhestions; Ventral hernia repair; evacuation of abdominal hematoma with debridment of hematoma capsule (Abdomen) Diagnosis:       Hemoperitoneum      Abdominal pain, unspecified abdominal location      (Hemoperitoneum [K66.1])      (Abdominal pain, unspecified abdominal location [R10.9])    Surgeons: José Antonio Allred MD Responsible Provider: Neal Ramires DO    Anesthesia Type: general ASA Status: 3            Anesthesia Type: No value filed.    Anna Phase I: Anna Score: 9    Anna Phase II:      Anesthesia Post Evaluation    Patient location during evaluation: PACU  Patient participation: complete - patient participated  Level of consciousness: awake  Airway patency: patent  Nausea & Vomiting: no nausea  Cardiovascular status: hemodynamically stable  Respiratory status: acceptable  Hydration status: stable  Pain management: adequate    No notable events documented.

## 2024-02-23 NOTE — PROGRESS NOTES
Patient educated on how to use incentive spirometer. Patient verbalized understanding and demonstrated proper use. Emphasized importance and usage of device, with coughing and deep breathing every 2 hours while awake.

## 2024-02-23 NOTE — PROGRESS NOTES
Patient oriented to Same Day department and admitted to Same Day Surgery room 16.   Patient verbalized approval for first name, last initial with physician name on unit whiteboard.     Plan of care reviewed with patient.   Patient room whiteboard filled out and discussed with patient and responsible adult.   Patient and responsible adult offered Same Day Welcome Packet to review.    Call light in reach.   Bed in lowest position, locked, with one bed rail up.   SCDs and warming blanket in place.  Appropriate arm bands on patient.   Bathroom offered.   All questions and concerns of patient addressed.        Meds to Beds:   Patient informed of [facility] Meds to Beds program during admission. Patient has declined use of program.

## 2024-02-23 NOTE — ANESTHESIA PRE PROCEDURE
Department of Anesthesiology  Preprocedure Note       Name:  Alberto Gilliland   Age:  58 y.o.  :  1965                                          MRN:  [id number]         Date:  2024      Surgeon: Surgeon(s):  José Antonio Allred MD    Procedure: Procedure(s):  Robotic Diagnostic Laparoscopy Possible Open    Medications prior to admission:   Prior to Admission medications    Medication Sig Start Date End Date Taking? Authorizing Provider   clobetasol (TEMOVATE) 0.05 % cream Apply 1 each topically 2 times daily    Ying Sevilla MD   nicotine (NICODERM CQ) 21 MG/24HR Place 1 patch onto the skin every 24 hours 24  Juan Christiansen DO   gabapentin (NEURONTIN) 400 MG capsule TAKE 1 CAPSULE BY MOUTH THREE TIMES A DAY 24  Juan Christiansen DO   acetaminophen (TYLENOL) 325 MG tablet Take 2 tablets by mouth every 6 hours as needed for Pain    Ying Sevilla MD   escitalopram (LEXAPRO) 10 MG tablet Take 1 tablet by mouth daily 24   Juan Christiansen DO   levothyroxine (SYNTHROID) 75 MCG tablet TAKE 1 TABLET BY MOUTH EVERY DAY 23   Juan Christiansen DO   ALPRAZolam (XANAX) 0.5 MG tablet TAKE 1 TABLET BY MOUTH EVERY DAY AS NEEDED FOR ANXIETY OR  sleep 12/9/23 3/9/24  Juan Christiansen DO   ASPIRIN LOW DOSE 81 MG EC tablet TAKE 1 TABLET BY MOUTH EVERY DAY 8/10/23   Tanisha Hathaway MD   esomeprazole (NEXIUM) 40 MG delayed release capsule TAKE 1 CAPSULE BY MOUTH IN THE MORNING before breakfast 23   Tanisha Hathaway MD   B Complex Vitamins (VITAMIN B-COMPLEX PO) Take 1 tablet by mouth daily    Ying Sevilla MD   folic acid (FOLVITE) 1 MG tablet Take 1 tablet by mouth daily 22   Ying Sevilla MD   methotrexate (RHEUMATREX) 2.5 MG chemo tablet Take 5 tablets by mouth once a week 5 tabs in the morning and 5 tabs in the evening once a week about 12 hour APART Monday takes 22   Ying Sevilla MD   metoprolol

## 2024-02-23 NOTE — PROGRESS NOTES
1144- pt to pacu. VSS. Pt denies pain, nausea.     1153- Pt continues to deny pain, nausea. Needs encouraged to deep breathe.    1157- pt reports pain. Medicated per order.    1206- pt reports pain improving, not yet tolerable. VSS.    1217- pt reports pain tolerable at this time. VSS.    1224- pt resting in bed eyes closed. VSS    1232-pt continues to report pain tolerable when woke. VSS. Pt meets criteria for discharge from pacu.    1238-Pt returned to [facility] [place] [facility] awaiting inpatient bed. Report given to Myra

## 2024-02-23 NOTE — H&P
[facility]  History and Physical Update      Pt Name: Alberto Gilliland  MRN: [id number]  YOB: 1965  Date of evaluation: 2/22/2024    [x] I have examined the patient and reviewed the H&P/Consult and there are no changes to the patient or plans.    [] I have examined the patient and reviewed the H&P/Consult and have noted the following changes:        José Antonio Allred MD  Electronically signed 2/22/2024 at 10:51 PM

## 2024-02-23 NOTE — H&P
[address] [address]                            PREOPERATIVE H&P      PATIENT NAME: MARICARMEN TURNER              : 1965  MED REC NO: [id number]                       ROOM:   ACCOUNT NO: [id number]                       ADMIT DATE: 2024  PROVIDER: José Antonio Allred MD      CHIEF COMPLAINT:  Persistent abdominal pain, post hemoperitoneum status post liver biopsy.    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  He has been treated by Dr. Villegas he has been on chronic methotrexate, he wanted a liver biopsy and 1 week ago on the , I believe of January underwent an IR-guided liver biopsy.  He went home.  He stayed off work for several days and then 2 days ago, went back to work.  He does do a lot of lifting.  He had pain that started, became more severe and he presented to the emergency room on the evening of , with a CT scan performed showing moderate hemoperitoneum, particularly along the right gutter consistent with a post liver biopsy bleed.  His hemoglobin was 11.8 on admission.  He was admitted to the medicine service and again I saw him in consultation.  It should be noted the patient has a history of having aortic valve replacement with the first mechanical valve; however, on Coumadin, he developed a GI bleed and was eventually taken back to surgery by Dr. Liang and had a tissue valve placed.  He is no longer on anticoagulants.  Nonetheless, the patient was seen in the office in followup, but then began having some persistent pain.  A repeat CT scan was performed showing persistent now, what probably is liquified hematoma, but because of his pain he is being admitted at this time for laparoscopy and hopefully washout of the

## 2024-02-23 NOTE — PROGRESS NOTES
Pt returned to [facility] room 16. Vitals and assessment as charted. 0.9 infusing, to count from PACU. Pt has crackers and water. Family at the bedside. Pt and family verbalized understanding of awaiting a bed and call light use. Call light in reach.

## 2024-02-23 NOTE — BRIEF OP NOTE
Brief Postoperative Note      Patient: Alberto Gilliland  YOB: 1965  MRN: [id number]    Date of Procedure: 2/23/2024    Pre-Op Diagnosis Codes:     * Hemoperitoneum [K66.1]     * Abdominal pain, unspecified abdominal location [R10.9]    Post-Op Diagnosis: 1.R Gutter Encapsulated hematoma  2.Midline Adhesions  3.Ventral Hernia       Procedure(s):  Robotic Laparoscopy;Lysis of adhestions; Ventral hernia repair; evacuation of abdominal hematoma with debridment of hematoma capsule    Surgeon(s):  José Antonio Allred MD    Assistant:      Anesthesia: General    Estimated Blood Loss (mL): 20 ml    Complications: none    Specimens:       Implants:  Implant Name Type Inv. Item Serial No.  Lot No. LRB No. Used Action   MESH ROSALIE [id number] ELLIPSE W/ ECHO PS POS SYS VENTRALIGHT ST - [id number]  MESH ROSALIE [id number] ELLIPSE W/ ECHO PS POS SYS VENTRALIGHT ST  BARD DAVOL-WD [id number] N/A 1 Implanted         Drains: none    Findings: see op note      Electronically signed by José Antonio Allred MD on 2/23/2024 at 11:33 AM

## 2024-02-23 NOTE — OP NOTE
[address] [address]                            OPERATIVE REPORT      PATIENT NAME: MARICARMEN TURNER              : 1965  MED REC NO: [id number]                       ROOM: [facility]                                               (General) POOL                                               RM    ACCOUNT NO: [id number]                       ADMIT DATE: 2024  PROVIDER: José Antonio Allred MD      DATE OF PROCEDURE:  2024    SURGEON:  José Antonio Allred MD    PREOPERATIVE DIAGNOSIS:  Persistent abdominal pain, status post hemoperitoneum post IR liver biopsy.    POSTOPERATIVE DIAGNOSES:    1. Encapsulated hematoma, right gutter.  2. Midline adhesions.  3. Ventral hernia, totaling 5 inches total length with 4 inches width.    OPERATIONS:    1. Robotic diagnostic laparoscopy.  2. Evacuation of right gutter hematoma with debridement.  3. Debridement of hematoma capsule.  4. Lysis of omental adhesions.  5. Robotic ventral hernia repair with 4 inch x 6 inch Echo PS Ventralight mesh.    ANESTHESIA:  General.    COMPLICATIONS:  None.    BLOOD LOSS:  20 mL.    INDICATIONS FOR PROCEDURE:  The patient is a 58-year-old male.  He was almost 1 month out status post IR biopsy of the liver that developed hemoperitoneum.  He was treated conservatively; however, he just persisted with pain, particularly along the right gutter, and it was felt that laparoscopy with washout would be appropriate due to his persistent symptoms.  Also, he had a midline incision with a small-bowel resection in the past.  It is thought he might have adhesions, although the pain really in his abdomen came on after this biopsy.    FINDINGS:  The patient had midline omental adhesions, and these were taken down.  The patient had several hernias in the midline, totaling 5 inches in length and 4 inches in diameter.  The patient did have an encapsulated hematoma in the right

## 2024-02-23 NOTE — PLAN OF CARE
Problem: Discharge Planning  Goal: Discharge to home or other facility with appropriate resources  Outcome: Progressing  Flowsheets (Taken 2/23/2024 1696 by Sandra Burt, RN)  Discharge to home or other facility with appropriate resources: Identify discharge learning needs (meds, wound care, etc)    Discharge plan is in process. Plan discharge home with family.     Problem: ABCDS Injury Assessment  Goal: Absence of physical injury  Outcome: Progressing  Fall assessment completed. Patient using call light appropriately to call for assistance with ambulation to bathroom.  Personal items within reach. Patient is also compliant with use of non-skid slippers.      Problem: Pain  Goal: Verbalizes/displays adequate comfort level or baseline comfort level  Outcome: Progressing  Patient states pain relief from PRN pain medications. Pain reassessed one hour post PRN pain medication given.  Patient rates pain 6-8 on NETTA 0-10 scale.     Problem: Skin/Tissue Integrity - Adult  Goal: Skin integrity remains intact  Outcome: Progressing  No skin breakdown this shift. Patient being assisted with turning. Patients states understanding of repositioning every two hours.     Problem: Skin/Tissue Integrity - Adult  Goal: Incisions, wounds, or drain sites healing without S/S of infection  Outcome: Progressing  Surgical incisions dry/intact.     Problem: Gastrointestinal - Adult  Goal: Maintains or returns to baseline bowel function  Outcome: Progressing  Patient bowel sounds active.  Passing  flatus.  Pt taking prescribed medication to assist with BM.     Problem: Infection - Adult  Goal: Absence of infection during hospitalization  Outcome: Progressing  No s/s infection for shift. VS-WNL.  Care plan reviewed with patient and family.  Patient and family verbalize understanding of the plan of care and contribute to goal setting.

## 2024-02-24 VITALS
DIASTOLIC BLOOD PRESSURE: 76 MMHG | BODY MASS INDEX: 29.03 KG/M2 | HEIGHT: 67 IN | HEART RATE: 66 BPM | OXYGEN SATURATION: 97 % | SYSTOLIC BLOOD PRESSURE: 138 MMHG | RESPIRATION RATE: 18 BRPM | WEIGHT: 185 LBS | TEMPERATURE: 97.6 F

## 2024-02-24 DIAGNOSIS — G89.18 POST-OP PAIN: Primary | ICD-10-CM

## 2024-02-24 LAB
BASOPHILS ABSOLUTE: 0 THOU/MM3 (ref 0–0.1)
BASOPHILS NFR BLD AUTO: 0.1 %
DEPRECATED RDW RBC AUTO: 62.9 FL (ref 35–45)
EOSINOPHIL NFR BLD AUTO: 0.1 %
EOSINOPHILS ABSOLUTE: 0 THOU/MM3 (ref 0–0.4)
ERYTHROCYTE [DISTWIDTH] IN BLOOD BY AUTOMATED COUNT: 18.6 % (ref 11.5–14.5)
HCT VFR BLD AUTO: 31.9 % (ref 42–52)
HGB BLD-MCNC: 9.8 GM/DL (ref 14–18)
IMM GRANULOCYTES # BLD AUTO: 0.1 THOU/MM3 (ref 0–0.07)
IMM GRANULOCYTES NFR BLD AUTO: 0.6 %
LYMPHOCYTES ABSOLUTE: 0.8 THOU/MM3 (ref 1–4.8)
LYMPHOCYTES NFR BLD AUTO: 4.4 %
MCH RBC QN AUTO: 28.7 PG (ref 26–33)
MCHC RBC AUTO-ENTMCNC: 30.7 GM/DL (ref 32.2–35.5)
MCV RBC AUTO: 93.3 FL (ref 80–94)
MONOCYTES ABSOLUTE: 1.4 THOU/MM3 (ref 0.4–1.3)
MONOCYTES NFR BLD AUTO: 8.3 %
NEUTROPHILS NFR BLD AUTO: 86.5 %
NRBC BLD AUTO-RTO: 0 /100 WBC
PLATELET # BLD AUTO: 412 THOU/MM3 (ref 130–400)
PMV BLD AUTO: 9.8 FL (ref 9.4–12.4)
RBC # BLD AUTO: 3.42 MILL/MM3 (ref 4.7–6.1)
SEGMENTED NEUTROPHILS ABSOLUTE COUNT: 15 THOU/MM3 (ref 1.8–7.7)
WBC # BLD AUTO: 17.3 THOU/MM3 (ref 4.8–10.8)

## 2024-02-24 PROCEDURE — 36415 COLL VENOUS BLD VENIPUNCTURE: CPT

## 2024-02-24 PROCEDURE — 85025 COMPLETE CBC W/AUTO DIFF WBC: CPT

## 2024-02-24 PROCEDURE — 6370000000 HC RX 637 (ALT 250 FOR IP): Performed by: SURGERY

## 2024-02-24 PROCEDURE — 2580000003 HC RX 258: Performed by: SURGERY

## 2024-02-24 RX ORDER — OXYCODONE HYDROCHLORIDE AND ACETAMINOPHEN 5; 325 MG/1; MG/1
1 TABLET ORAL EVERY 6 HOURS PRN
Qty: 20 TABLET | Refills: 0 | Status: SHIPPED | OUTPATIENT
Start: 2024-02-24 | End: 2024-02-28

## 2024-02-24 RX ADMIN — SODIUM CHLORIDE: 9 INJECTION, SOLUTION INTRAVENOUS at 01:27

## 2024-02-24 RX ADMIN — OXYCODONE 5 MG: 5 TABLET ORAL at 03:29

## 2024-02-24 RX ADMIN — OXYCODONE 5 MG: 5 TABLET ORAL at 09:33

## 2024-02-24 RX ADMIN — OXYCODONE 5 MG: 5 TABLET ORAL at 11:55

## 2024-02-24 RX ADMIN — ALPRAZOLAM 0.5 MG: 0.5 TABLET ORAL at 01:27

## 2024-02-24 RX ADMIN — METOPROLOL SUCCINATE 50 MG: 50 TABLET, EXTENDED RELEASE ORAL at 09:28

## 2024-02-24 RX ADMIN — LEVOTHYROXINE SODIUM 75 MCG: 0.07 TABLET ORAL at 09:28

## 2024-02-24 RX ADMIN — ESCITALOPRAM OXALATE 10 MG: 10 TABLET ORAL at 09:29

## 2024-02-24 RX ADMIN — PANTOPRAZOLE SODIUM 40 MG: 40 TABLET, DELAYED RELEASE ORAL at 09:29

## 2024-02-24 RX ADMIN — GABAPENTIN 400 MG: 400 CAPSULE ORAL at 09:29

## 2024-02-24 ASSESSMENT — PAIN DESCRIPTION - ORIENTATION: ORIENTATION: MID

## 2024-02-24 ASSESSMENT — PAIN SCALES - GENERAL
PAINLEVEL_OUTOF10: 5
PAINLEVEL_OUTOF10: 4
PAINLEVEL_OUTOF10: 4

## 2024-02-24 ASSESSMENT — PAIN DESCRIPTION - LOCATION: LOCATION: ABDOMEN

## 2024-02-24 ASSESSMENT — PAIN - FUNCTIONAL ASSESSMENT: PAIN_FUNCTIONAL_ASSESSMENT: ACTIVITIES ARE NOT PREVENTED

## 2024-02-24 NOTE — DISCHARGE SUMMARY
[address] [address]                            DISCHARGE SUMMARY      PATIENT NAME: MARICARMEN TURNRE              : 1965  MED REC NO: [id number]                       ROOM: [facility]  ACCOUNT NO: [id number]                       ADMIT DATE: 2024  PROVIDER: José Antonio Allred MD      DISCHARGE DIAGNOSES:    1. Intraabdominal hematoma.  2. Adhesions.  3. Ventral hernia.    OPERATION:  Robotic-assisted lysis of adhesions with evacuation of abdominal wall hematoma and ventral hernia repair.    HOSPITAL COURSE:  The patient is a 58-year-old male who had had an IR biopsy of the liver performed and then developed a hemoperitoneum post procedure and he was readmitted to the hospital a week after that procedure, treated conservatively, went home.  However, he persisted with abdominal pain, particularly along the right gutter and underwent a repeat CT scan showing persistent blood products, as it was called liquified in the right lower quadrant.  Due to his pain, I felt that a robotic laparoscopy with clean-out would be appropriate, and at the time of surgery, he was found to have adhesions of omentum up to the midline from a prior midline incision.  He had an encapsulated hematoma along the right gutter, but once we got the adhesions lysed in the midline, he also had several hernias in the midline incision.  These were repaired with mesh.  He was kept overnight.  On postop day 1, he is doing well.  Bowel sounds are active.  Port sites are good.  He will be discharged home to resume all home medications, on Percocet for pain.  He will be followed up in my office.  He is being discharged in stable condition.          JOSÉ ANTONIO ALLRED MD      D:  2024 11:16:31     T:  2024 11:42:27     HERMELINDA/FRANTZ  Job #:  [id number]     Doc#:  [id number]

## 2024-02-24 NOTE — PROGRESS NOTES
Surg POD#1  pt kept overnite  doing well port sites good abd mild distention active BS  reviewed pictures with pt  OK to d/c f/u office

## 2024-02-24 NOTE — CARE COORDINATION
02/24/24 1126   Service Assessment   Patient Orientation Alert and Oriented   Cognition Alert   History Provided By Patient   Primary Caregiver Self   Accompanied By/Relationship no one   Support Systems Children;Family Members   Patient's Healthcare Decision Maker is: Named in Scanned ACP Document   PCP Verified by CM Yes   Last Visit to PCP Within last 3 months   Prior Functional Level Independent in ADLs/IADLs   Current Functional Level Independent in ADLs/IADLs   Can patient return to prior living arrangement Yes   Ability to make needs known: Good   Family able to assist with home care needs: Yes   Would you like for me to discuss the discharge plan with any other family members/significant others, and if so, who? No   Financial Resources Other (Comment)  (meebee)   Community Resources None   Social/Functional History   Active  Yes   Discharge Planning   Type of Residence Apartment   Living Arrangements Alone   Current Services Prior To Admission None   Potential Assistance Needed N/A   DME Ordered? No   Potential Assistance Purchasing Medications No   Type of Home Care Services None   Patient expects to be discharged to: Apartment   Services At/After Discharge   Mode of Transport at Discharge Other (see comment)  (sister)   Confirm Follow Up Transport Self     Patient Goals/Plan/Treatment Preferences: Alberto is from home in an apartment alone. He does not have any DME or services, and denies needs. Current with Dr Christiansen. Drives normally and works; family to drive him post-op. Plans to discharge home today; has been walking the halls without difficulty.     If patient is discharged prior to next notation, then this note serves as note for discharge by case management.

## 2024-02-24 NOTE — CARE COORDINATION
02/24/24 1134   Readmission Assessment   Number of Days since last admission? 8-30 days   Previous Disposition Home Alone   Who is being Interviewed Patient   What was the patient's/caregiver's perception as to why they think they needed to return back to the hospital? Other (Comment)  (scheduled surgery)   Did you visit your Primary Care Physician after you left the hospital, before you returned this time? Yes   Did you see a specialist, such as Cardiac, Pulmonary, Orthopedic Physician, etc. after you left the hospital? Yes   Who advised the patient to return to the hospital? Other (Comment)  (n/a scheduled surgery)   Does the patient report anything that got in the way of taking their medications? No   In our efforts to provide the best possible care to you and others like you, can you think of anything that we could have done to help you after you left the hospital the first time, so that you might not have needed to return so soon? Other (Comment)  (\"no\" ; n/a scheduled surgery)

## 2024-02-25 DIAGNOSIS — G89.18 POST-OP PAIN: Primary | ICD-10-CM

## 2024-02-25 RX ORDER — OXYCODONE HYDROCHLORIDE AND ACETAMINOPHEN 5; 325 MG/1; MG/1
1 TABLET ORAL EVERY 6 HOURS PRN
Qty: 24 TABLET | Refills: 0 | Status: SHIPPED | OUTPATIENT
Start: 2024-02-25 | End: 2024-03-02

## 2024-02-26 ENCOUNTER — HOSPITAL ENCOUNTER (OUTPATIENT)
Age: 59
Discharge: HOME | End: 2024-02-26
Payer: COMMERCIAL

## 2024-02-26 ENCOUNTER — TELEPHONE (OUTPATIENT)
Dept: FAMILY MEDICINE CLINIC | Age: 59
End: 2024-02-26

## 2024-02-26 ENCOUNTER — CARE COORDINATION (OUTPATIENT)
Dept: CASE MANAGEMENT | Age: 59
End: 2024-02-26

## 2024-02-26 LAB
ALT SERPL W/O P-5'-P-CCNC: 16 U/L (ref 11–66)
AST SERPL-CCNC: 17 U/L (ref 5–40)
BUN SERPL-MCNC: 11 MG/DL (ref 7–22)
CREAT SERPL-MCNC: 0.7 MG/DL (ref 0.4–1.2)
DEPRECATED RDW RBC AUTO: 60.5 FL (ref 35–45)
ERYTHROCYTE [DISTWIDTH] IN BLOOD BY AUTOMATED COUNT: 18.3 % (ref 11.5–14.5)
GFR SERPL CREATININE-BSD FRML MDRD: > 60 ML/MIN/1.73M2
HCT VFR BLD AUTO: 33.7 % (ref 42–52)
HGB BLD-MCNC: 10.4 GM/DL (ref 14–18)
MCH RBC QN AUTO: 28.6 PG (ref 26–33)
MCHC RBC AUTO-ENTMCNC: 30.9 GM/DL (ref 32.2–35.5)
MCV RBC AUTO: 92.6 FL (ref 80–94)
PLATELET # BLD AUTO: 383 THOU/MM3 (ref 130–400)
PMV BLD AUTO: 9.8 FL (ref 9.4–12.4)
RBC # BLD AUTO: 3.64 MILL/MM3 (ref 4.7–6.1)
WBC # BLD AUTO: 10 THOU/MM3 (ref 4.8–10.8)

## 2024-02-26 PROCEDURE — 82565 ASSAY OF CREATININE: CPT

## 2024-02-26 PROCEDURE — 36415 COLL VENOUS BLD VENIPUNCTURE: CPT

## 2024-02-26 PROCEDURE — 84460 ALANINE AMINO (ALT) (SGPT): CPT

## 2024-02-26 PROCEDURE — 84520 ASSAY OF UREA NITROGEN: CPT

## 2024-02-26 PROCEDURE — 85027 COMPLETE CBC AUTOMATED: CPT

## 2024-02-26 PROCEDURE — 84450 TRANSFERASE (AST) (SGOT): CPT

## 2024-02-26 NOTE — TELEPHONE ENCOUNTER
Care Transitions Initial Follow Up Call    Outreach made within 2 business days of discharge: Yes    Patient: Alberto Gilliland Patient : 1965   MRN: [id number]  Reason for Admission: There are no discharge diagnoses documented for the most recent discharge.  Discharge Date: 24       Spoke with: Left VM Message    Discharge department/facility: [facility]    Scheduled appointment with PCP within 7-14 days    Follow Up  Future Appointments   Date Time Provider Department Center   2024  3:30 PM Juan Christiansen DO [facility]   2024 10:30 AM Rosa Wise, APRN - CNP N [email or web address] - Janine Painter LPN

## 2024-02-26 NOTE — TELEPHONE ENCOUNTER
Care Transitions Initial Follow Up Call    Outreach made within 2 business days of discharge: Yes    Patient: Alberto Gilliland Patient : 1965   MRN: [id number]  Reason for Admission: There are no discharge diagnoses documented for the most recent discharge.  Discharge Date: 24       Spoke with: Patient    Discharge department/facility: [facility]    TCM Interactive Patient Contact:  Was patient able to fill all prescriptions: Yes  Was patient instructed to bring all medications to the follow-up visit: Yes  Is patient taking all medications as directed in the discharge summary? Yes  Does patient understand their discharge instructions: Yes  Does patient have questions or concerns that need addressed prior to 7-14 day follow up office visit: no  Declines need for follow-up with PCP at this time.    Scheduled appointment with PCP within 7-14 days    Follow Up  Future Appointments   Date Time Provider Department Center   2024  3:30 PM Juan Christiansen, DO [facility]   2024 10:30 AM Rosa Wise, APRN - CNP N [facility] Heart [facility] Iliana Painter LPN

## 2024-02-26 NOTE — CARE COORDINATION
Care Transitions Initial Outreach Attempt-1st attempt    Call within 2 business days of discharge: Yes   Attempted initial 24 hour transitional call to patient.  Left HIPPA compliant VM to return call directly to [phone].  Unable to reach letter sent via [employer].    Patient: Alberto Gilliland Patient : 1965 MRN: [id number]    Last Discharge Facility       Date Complaint Diagnosis Description Type Department Provider    24  Ventral hernia without obstruction or gangrene Admission (Discharged) JAY 5K José Antonio Allred MD                Noted following upcoming appointments from discharge chart review:   BS follow up appointment(s):   Future Appointments   Date Time Provider Department Center   2024  3:30 PM Juan [facility], DO [facility] [place]   2024 10:30 AM Rosa Wise, APRN - CNP [facility] [place]     Non-BS  follow up appointment(s): na

## 2024-02-27 ENCOUNTER — CARE COORDINATION (OUTPATIENT)
Dept: CASE MANAGEMENT | Age: 59
End: 2024-02-27

## 2024-02-27 DIAGNOSIS — K43.9 VENTRAL HERNIA WITHOUT OBSTRUCTION OR GANGRENE: Primary | ICD-10-CM

## 2024-02-27 NOTE — CARE COORDINATION
opportunity to ask questions and does not have any further questions or concerns at this time. Were discharge instructions available to patient? Yes. Reviewed appropriate site of care based on symptoms and resources available to patient including: PCP  Specialist  Urgent care clinics  When to call 911  [facility]. The patient agrees to contact the PCP office for questions related to their healthcare.     Advance Care Planning:   Does patient have an Advance Directive: reviewed and current.    Medication reconciliation was performed with patient, who verbalizes understanding of administration of home medications. Medications reviewed, 1111F entered: yes    Was patient discharged with a pulse oximeter? no    Non-face-to-face services provided:  Scheduled appointment with PCP-4/4, declined earlier day  Scheduled appointment with Specialist-3/12 4/19  Obtained and reviewed discharge summary and/or continuity of care documents    Offered patient enrollment in the Remote Patient Monitoring (RPM) program for in-home monitoring: Patient is not eligible for RPM program.    Care Transitions 24 Hour Call    Schedule Follow Up Appointment with PCP: Completed  Do you have a copy of your discharge instructions?: Yes  Do you have all of your prescriptions and are they filled?: Yes  Have you been contacted by a [facility] Pharmacist?: No  Have you scheduled your follow up appointment?: Yes  How are you going to get to your appointment?: Car - family or friend to transport  Do you feel like you have everything you need to keep you well at home?: Yes  Care Transitions Interventions     Transportation Support: Declined   Disease Specific Clinic: Completed      Disease Association: Completed               Discussed follow-up appointments. If no appointment was previously scheduled, appointment scheduling offered: Yes.   Is follow up appointment scheduled within 7 days of discharge? No, planned surgery    Follow Up    Future

## 2024-03-01 DIAGNOSIS — G89.18 POST-OPERATIVE PAIN: Primary | ICD-10-CM

## 2024-03-01 RX ORDER — OXYCODONE HYDROCHLORIDE AND ACETAMINOPHEN 5; 325 MG/1; MG/1
1 TABLET ORAL EVERY 6 HOURS PRN
Qty: 28 TABLET | Refills: 0 | Status: SHIPPED | OUTPATIENT
Start: 2024-03-01 | End: 2024-03-08

## 2024-03-04 ENCOUNTER — TELEPHONE (OUTPATIENT)
Dept: FAMILY MEDICINE CLINIC | Age: 59
End: 2024-03-04

## 2024-03-04 NOTE — TELEPHONE ENCOUNTER
Pt needs a letter stating that he is handicapped for his appt building so his can have a handicap parking space.

## 2024-03-05 ENCOUNTER — OFFICE VISIT (OUTPATIENT)
Dept: FAMILY MEDICINE CLINIC | Age: 59
End: 2024-03-05
Payer: COMMERCIAL

## 2024-03-05 ENCOUNTER — HOSPITAL ENCOUNTER (OUTPATIENT)
Age: 59
Discharge: HOME | End: 2024-03-05
Payer: COMMERCIAL

## 2024-03-05 VITALS
DIASTOLIC BLOOD PRESSURE: 72 MMHG | TEMPERATURE: 98.4 F | WEIGHT: 190.2 LBS | RESPIRATION RATE: 20 BRPM | BODY MASS INDEX: 29.79 KG/M2 | SYSTOLIC BLOOD PRESSURE: 140 MMHG | HEART RATE: 80 BPM

## 2024-03-05 DIAGNOSIS — G89.29 CHRONIC NONINTRACTABLE HEADACHE, UNSPECIFIED HEADACHE TYPE: Primary | ICD-10-CM

## 2024-03-05 DIAGNOSIS — R51.9 CHRONIC NONINTRACTABLE HEADACHE, UNSPECIFIED HEADACHE TYPE: Primary | ICD-10-CM

## 2024-03-05 DIAGNOSIS — F41.9 ANXIETY: ICD-10-CM

## 2024-03-05 DIAGNOSIS — R20.0 FACIAL NUMBNESS: ICD-10-CM

## 2024-03-05 DIAGNOSIS — R11.0 NAUSEA: ICD-10-CM

## 2024-03-05 DIAGNOSIS — K92.1 MELENA: ICD-10-CM

## 2024-03-05 DIAGNOSIS — R53.82 CHRONIC FATIGUE, UNSPECIFIED: ICD-10-CM

## 2024-03-05 PROBLEM — K59.01 SLOW TRANSIT CONSTIPATION: Status: ACTIVE | Noted: 2024-03-05

## 2024-03-05 PROBLEM — K21.00 GASTRO-ESOPHAGEAL REFLUX DISEASE WITH ESOPHAGITIS, WITHOUT BLEEDING: Status: ACTIVE | Noted: 2024-03-05

## 2024-03-05 PROBLEM — K76.0 FATTY LIVER: Status: ACTIVE | Noted: 2024-03-05

## 2024-03-05 LAB
DEPRECATED RDW RBC AUTO: 60.9 FL (ref 35–45)
ERYTHROCYTE [DISTWIDTH] IN BLOOD BY AUTOMATED COUNT: 18.9 % (ref 11.5–14.5)
HCT VFR BLD AUTO: 36.5 % (ref 42–52)
HGB BLD-MCNC: 11.8 GM/DL (ref 14–18)
MCH RBC QN AUTO: 29.4 PG (ref 26–33)
MCHC RBC AUTO-ENTMCNC: 32.3 GM/DL (ref 32.2–35.5)
MCV RBC AUTO: 90.8 FL (ref 80–94)
PLATELET # BLD AUTO: 486 THOU/MM3 (ref 130–400)
PMV BLD AUTO: 9.7 FL (ref 9.4–12.4)
RBC # BLD AUTO: 4.02 MILL/MM3 (ref 4.7–6.1)
WBC # BLD AUTO: 13.3 THOU/MM3 (ref 4.8–10.8)

## 2024-03-05 PROCEDURE — 99214 OFFICE O/P EST MOD 30 MIN: CPT | Performed by: FAMILY MEDICINE

## 2024-03-05 PROCEDURE — G2211 COMPLEX E/M VISIT ADD ON: HCPCS | Performed by: FAMILY MEDICINE

## 2024-03-05 PROCEDURE — 36415 COLL VENOUS BLD VENIPUNCTURE: CPT

## 2024-03-05 PROCEDURE — 3077F SYST BP >= 140 MM HG: CPT | Performed by: FAMILY MEDICINE

## 2024-03-05 PROCEDURE — 3078F DIAST BP <80 MM HG: CPT | Performed by: FAMILY MEDICINE

## 2024-03-05 PROCEDURE — 85027 COMPLETE CBC AUTOMATED: CPT

## 2024-03-05 RX ORDER — ATORVASTATIN CALCIUM 20 MG/1
20 TABLET, FILM COATED ORAL DAILY
COMMUNITY
Start: 2021-03-02

## 2024-03-05 RX ORDER — BUPROPION HYDROCHLORIDE 100 MG/1
100 TABLET, EXTENDED RELEASE ORAL DAILY
COMMUNITY
Start: 2021-03-02

## 2024-03-05 ASSESSMENT — ENCOUNTER SYMPTOMS
NAUSEA: 1
VOMITING: 0
RESPIRATORY NEGATIVE: 1

## 2024-03-05 NOTE — PROGRESS NOTES
Alberto Gilliland (:  1965) is a 58 y.o. male,Established patient, here for evaluation of the following chief complaint(s):  Headache (Behind eyes, pain in right temple and numbness on both sides of face), Nausea, and Fatigue          Subjective   SUBJECTIVE/OBJECTIVE:  HPI  Chief Complaint   Patient presents with    Headache     Behind eyes, pain in right temple and numbness on both sides of face    Nausea    Fatigue     Pt presents today with c/o HAs located behind his right and right temple region.  This is chronic in nature.  The HAs come and go but coming on more frequently now.  Pt has some numbness to both sides of his face.      Pt states that he occasionally gets a fog over his right eye as well as some nausea.      Very fatigued, this too is chronic in nature.      Denies balance concerns.    Recently had hernia surgery with Dr. Allred, still on Percocet.  Post-op labs on  reassuring.  No follow up currently scheduled with Dr. Allred.      BPs and weight stable.  BP Readings from Last 3 Encounters:   24 (!) 140/72   24 138/76   24 (!) 160/86     Wt Readings from Last 3 Encounters:   24 86.3 kg (190 lb 3.2 oz)   24 83.9 kg (185 lb)   24 85.9 kg (189 lb 6.4 oz)       Patient Active Problem List   Diagnosis    HTN (hypertension)    Lumbago    Lumbar radiculopathy    Cervicalgia, C5-7    Hyperlipidemia    Hypothyroidism    Chronic anxiety    ED (erectile dysfunction)    Bilateral sciatica    Trigger finger, right, ring     Medication monitoring encounter    Nocturnal leg cramps    GERD (gastroesophageal reflux disease)    IFG (impaired fasting glucose)    Situational depression    Dysthymia (or depressive neurosis)    Herniation of cervical intervertebral disc with radiculopathy    H/O cervical spine surgery, C4-5 fusion, 3/2/16.    Gastrointestinal hemorrhage associated with gastrojejunal ulcer    S/P AVR (aortic valve replacement), 16.    Tobacco

## 2024-03-05 NOTE — PATIENT INSTRUCTIONS
You may receive a survey regarding the care you received during your visit.  Your input is valuable to us.  We encourage you to complete and return your survey.  We hope you will choose us in the future for your healthcare needs.    Tobacco Cessation Programs     Telephonic behavior modification  1-800-QUIT-NOW (766-6177)  Counseling service for those who are ready to quit using tobacco.    Available for uninsured [place] residents, Medicaid recipients, pregnant women, or patients whose health plans or employers are members of the [place] Tobacco Collaborative    Online behavior modification  http://[place].Quitlogix.org  Online support program to help patients through each step of the quitting process.  Available 24 hours a day 7 days a week.  Provides up to date researched based tool, step-by-step guides, and motivational messages.    Online behavior modification  www.lungusa.org/stop-smoking/how-to-quit  HelpLine: 1-800-LUNG-USA (356-6147)  Email questions to:  questions@makerSQRer.org   Website offers resources to help tobacco users figure out their reasons for quitting and then take the big step of quitting for good.    Hypnosis  Location: [address] [place] [address]  Contact: Mike Masterson, PhD at [phone]  Hypnosis for tobacco cessation  Cost $225 for the initial session and $175 for each session afterwards.  Most patients require 6-8 sessions.  There is the option to submit through the patient’s insurance.    Hypnosis and behavior modification  Location: [address] [address].[place], [place]  Contact: Jo Esteban, PhD at [phone]  Counseling and hypnosis for nicotine addition  Cost: For uninsured patients:  Please call above phone number  Cost for insured patients depends on patient’s insurance plan.    Behavior modification  Location: [facility], [address] [place], [place]  Contact: [phone]  Services include four one-on-one appointments between the patient and a

## 2024-03-06 ENCOUNTER — CARE COORDINATION (OUTPATIENT)
Dept: CASE MANAGEMENT | Age: 59
End: 2024-03-06

## 2024-03-06 NOTE — CARE COORDINATION
Care Transitions Outreach Attempt    Call within 2 business days of discharge: Yes   Attempted to reach patient for transitions of care follow up. Unable to reach patient.  Left HIPAA compliant message w/ CTN # x2 asking to return call w/ no response day #1.    Patient: Alberto Gilliland Patient : 1965 MRN: [id number]    Last Discharge Facility       Date Complaint Diagnosis Description Type Department Provider    24  Ventral hernia without obstruction or gangrene Admission (Discharged) JAY 5K José Antonio Allred MD              Was this an external facility discharge? No Discharge Facility Name:     Noted following upcoming appointments from discharge chart review:   [facility] follow up appointment(s):   Future Appointments   Date Time Provider Department Center   2024  3:30 PM Juan [facility], DO [facility] [place]   2024 10:30 AM Rosa Wise, APRN - CNP [facility] [place]       follow up appointment(s):

## 2024-03-07 ENCOUNTER — CARE COORDINATION (OUTPATIENT)
Dept: CASE MANAGEMENT | Age: 59
End: 2024-03-07

## 2024-03-07 NOTE — CARE COORDINATION
Care Transitions Follow Up Call    Patient Current Location:  Home: [address]  [place] [address]    Care Transition Nurse contacted the patient by telephone to follow up after admission on 24.  Verified name and  with patient as identifiers.    Patient: Alberto Gilliland  Patient : 1965   MRN: [id number]  Reason for Admission: Robotic-assisted lysis of adhesions with evacuation of abdominal wall hematoma and ventral hernia repair.    Discharge Date: 24 RARS: Readmission Risk Score: 12.4      Needs to be reviewed by the provider   Additional needs identified to be addressed with provider: No  none             Method of communication with provider: none.    Spoke with Alberto, said he is getting better everyday.  He is home now.  Denies abd pain, just soreness.  Stopped Percocet d/t side effects, made him tired, nauseous, didn't like the way he felt.  Only taking Tylenol if needed which hasn't really needed much.  Denies fever, chills, dyspnea, chest pain, dizziness.  Saw PCP this week, no changes.  Recommended [facility] for counseling on the recent loss of his son.  He is used to working 6 days/wk and has too much time to sit and think.  Hoping he will get a RTW date when he sees surgeon next week.  Eating, drinking, sleeping ok.  B&B normal.  No other issues to report.  Denies any other needs.  No other questions or concerns at this time.  Will continue to follow.  Appreciates the calls.    Addressed changes since last contact:  none  Discussed follow-up appointments. If no appointment was previously scheduled, appointment scheduling offered: Yes.   Is follow up appointment scheduled within 7 days of discharge? No.    Follow Up    Future Appointments         Provider Specialty Dept Phone    3/12/2024 10:30 AM José Antonio Allred MD General Surgery     2024 3:30 PM Juan Christiansen,  Family Medicine [phone]    2024 10:30 AM Rosa Wise, APRN - CNP Cardiology [phone]

## 2024-03-10 DIAGNOSIS — F41.9 ANXIETY: ICD-10-CM

## 2024-03-10 DIAGNOSIS — E03.9 ACQUIRED HYPOTHYROIDISM: ICD-10-CM

## 2024-03-11 RX ORDER — ALPRAZOLAM 0.5 MG/1
TABLET ORAL
Qty: 30 TABLET | Refills: 0 | Status: SHIPPED | OUTPATIENT
Start: 2024-03-11 | End: 2024-04-11

## 2024-03-11 RX ORDER — LEVOTHYROXINE SODIUM 0.07 MG/1
TABLET ORAL
Qty: 90 TABLET | Refills: 0 | Status: SHIPPED | OUTPATIENT
Start: 2024-03-11

## 2024-03-12 PROBLEM — I50.22 CHRONIC SYSTOLIC (CONGESTIVE) HEART FAILURE (HCC): Status: ACTIVE | Noted: 2024-03-12

## 2024-03-14 ENCOUNTER — CARE COORDINATION (OUTPATIENT)
Dept: CASE MANAGEMENT | Age: 59
End: 2024-03-14

## 2024-03-14 NOTE — CARE COORDINATION
Care Transitions Outreach Attempt-1st attempt    Call within 2 business days of discharge: Yes   Attempted to reach patient for subsequent transitional call.  Left HIPPA compliant VM to return call directly to [phone].     Patient: Alberto Gilliland Patient : 1965 MRN: [id number]    Last Discharge Facility       Date Complaint Diagnosis Description Type Department Provider    24  Ventral hernia without obstruction or gangrene Admission (Discharged) JAY 5K José Antonio Allred MD            Noted following upcoming appointments from discharge chart review:   [facility] follow up appointment(s):   Future Appointments   Date Time Provider Department Center   2024  3:30 PM Juan [facility], DO [facility] [place]   2024 10:30 AM Rosa Wise, JASWINDER - CNP [facility] [place]     Non-BS  follow up appointment(s): na

## 2024-03-18 ENCOUNTER — CARE COORDINATION (OUTPATIENT)
Dept: CASE MANAGEMENT | Age: 59
End: 2024-03-18

## 2024-03-18 NOTE — CARE COORDINATION
Care Transitions Follow Up Call    Patient Current Location:  Home: [address] [place], [place] [address]  [facility] [address]    Care Transition Nurse contacted the patient by telephone to follow up after admission on 24.  Verified name and  with patient as identifiers.    Patient: Alberto Gilliland  Patient : 1965   MRN: [id number]  Reason for Admission:  intrabd hematoma hemoperitoneum after liver bx 24 HTN, methotrexate toxicity  robotic dx lap evac R gutter hematoma w/ debridement lysis of omental capsule, ventral hernia repair w/ mesh.  Discharge Date: 24 RARS: Readmission Risk Score: 12.4      Needs to be reviewed by the provider   Additional needs identified to be addressed with provider: No  none             Method of communication with provider: none.    CTN call to Alberto today and he says he is still feeling nauseated, fatigue & chills on & off.  Surgery HFU 3/12/24-> healing RTW 3/25/24. Incisions healed.  Pain level 10 Tylenol helps.  GI 3/20/24 for ongoing nausea. He expects to be scheduled for EGD.  Had outbreak of lymphomatoid papulosis. Derm increased methotrexate and he goes for UV light tx 3x/week.  Denies sob, chest pain, fever, v/d, swelling, dizziness.  Eating, drinking & sleeping ok. Denies problems w/ urination/bowels.  Activity normal except tired easily.  Laments loss of his sone. Support & empathy given. He says he is going to [place] for grief recovery.   Denies need for transportation. No other concerns voiced at this time. Will continue to follow.      Addressed changes since last contact:  medications-Methotrexate increased per Derm  Discussed follow-up appointments. If no appointment was previously scheduled, appointment scheduling offered: Yes.   Is follow up appointment scheduled within 7 days of discharge? Yes.    Follow Up  Future Appointments   Date Time Provider Department Center   2024  3:30 PM Juan [facility] DO [facility] [place]   2024 10:30 AM [facility]

## 2024-03-20 ENCOUNTER — HOSPITAL ENCOUNTER (OUTPATIENT)
Age: 59
Discharge: HOME | End: 2024-03-20
Payer: COMMERCIAL

## 2024-03-20 LAB
ALT SERPL W/O P-5'-P-CCNC: 8 U/L (ref 11–66)
AST SERPL-CCNC: 12 U/L (ref 5–40)
BUN SERPL-MCNC: 12 MG/DL (ref 7–22)
CEA SERPL-MCNC: 5.3 NG/ML (ref 0–5)
CREAT SERPL-MCNC: 0.7 MG/DL (ref 0.4–1.2)
CRP SERPL-MCNC: 2.34 MG/DL (ref 0–1)
DEPRECATED RDW RBC AUTO: 61.4 FL (ref 35–45)
ERYTHROCYTE [DISTWIDTH] IN BLOOD BY AUTOMATED COUNT: 18.8 % (ref 11.5–14.5)
ERYTHROCYTE [SEDIMENTATION RATE] IN BLOOD BY WESTERGREN METHOD: 45 MM/HR (ref 0–10)
GFR SERPL CREATININE-BSD FRML MDRD: > 60 ML/MIN/1.73M2
HCT VFR BLD AUTO: 38.4 % (ref 42–52)
HGB BLD-MCNC: 11.9 GM/DL (ref 14–18)
MCH RBC QN AUTO: 28.3 PG (ref 26–33)
MCHC RBC AUTO-ENTMCNC: 31 GM/DL (ref 32.2–35.5)
MCV RBC AUTO: 91.4 FL (ref 80–94)
PLATELET # BLD AUTO: 509 THOU/MM3 (ref 130–400)
PMV BLD AUTO: 9.4 FL (ref 9.4–12.4)
RBC # BLD AUTO: 4.2 MILL/MM3 (ref 4.7–6.1)
RHEUMATOID FACT SERPL-ACNC: < 10 IU/ML (ref 0–13)
WBC # BLD AUTO: 10.8 THOU/MM3 (ref 4.8–10.8)

## 2024-03-20 PROCEDURE — 84450 TRANSFERASE (AST) (SGOT): CPT

## 2024-03-20 PROCEDURE — 84460 ALANINE AMINO (ALT) (SGPT): CPT

## 2024-03-20 PROCEDURE — 85651 RBC SED RATE NONAUTOMATED: CPT

## 2024-03-20 PROCEDURE — 36415 COLL VENOUS BLD VENIPUNCTURE: CPT

## 2024-03-20 PROCEDURE — 82378 CARCINOEMBRYONIC ANTIGEN: CPT

## 2024-03-20 PROCEDURE — 86430 RHEUMATOID FACTOR TEST QUAL: CPT

## 2024-03-20 PROCEDURE — 85027 COMPLETE CBC AUTOMATED: CPT

## 2024-03-20 PROCEDURE — 86140 C-REACTIVE PROTEIN: CPT

## 2024-03-20 PROCEDURE — 82565 ASSAY OF CREATININE: CPT

## 2024-03-20 PROCEDURE — 84520 ASSAY OF UREA NITROGEN: CPT

## 2024-03-25 ENCOUNTER — CARE COORDINATION (OUTPATIENT)
Dept: CASE MANAGEMENT | Age: 59
End: 2024-03-25

## 2024-03-25 NOTE — CARE COORDINATION
Care Transitions Follow Up Call-Final    Patient Current Location:  [place]    Care Transition Nurse contacted the patient by telephone to follow up after admission on 24.  Verified name and  with patient as identifiers.    Patient: Alberto Gilliland  Patient : 1965   MRN: [id number]  Reason for Admission:  Robotic-assisted lysis of adhesions with evacuation of abdominal wall hematoma and ventral hernia repair.     Discharge Date: 24 RARS: Readmission Risk Score: 12.4      Needs to be reviewed by the provider   Additional needs identified to be addressed with provider: No  none             Method of communication with provider: none.    Spoke with Alberto, said he is doing ok.  Still has nausea, saw GI last week and is scheduled for EGD this week.  No issues with the hernia repair, incision healed.  Saw dermatologist today, got some new meds.  Denies fever, chills, chest pain, dyspnea, dizziness.  Eating, drinking, sleeping ok.  B&B normal.  Has not returned to work yet, probably end of April.  Declines ACM calls.  No other issues to report.  Denies any other needs.  No other questions or concerns at this time.  Final call, appreciative.     Addressed changes since last contact:  none  Discussed follow-up appointments. If no appointment was previously scheduled, appointment scheduling offered: Yes.   Is follow up appointment scheduled within 7 days of discharge? Yes.    Follow Up  Future Appointments   Date Time Provider Department Center   2024  3:30 PM Juan [facility], DO [facility] [place]   2024 10:30 AM Rosa Wise, APRN - CNP [facility] [place]     External follow up appointment(s): doron    Care Transition Nurse reviewed medical action plan and red flags with patient and discussed any barriers to care and/or understanding of plan of care after discharge. Discussed appropriate site of care based on symptoms and resources available to patient including:

## 2024-04-04 ENCOUNTER — OFFICE VISIT (OUTPATIENT)
Dept: FAMILY MEDICINE CLINIC | Age: 59
End: 2024-04-04

## 2024-04-04 VITALS
SYSTOLIC BLOOD PRESSURE: 144 MMHG | DIASTOLIC BLOOD PRESSURE: 72 MMHG | RESPIRATION RATE: 16 BRPM | BODY MASS INDEX: 30.71 KG/M2 | HEART RATE: 76 BPM | WEIGHT: 196.1 LBS

## 2024-04-04 DIAGNOSIS — D72.829 LEUKOCYTOSIS, UNSPECIFIED TYPE: ICD-10-CM

## 2024-04-04 DIAGNOSIS — R53.82 CHRONIC FATIGUE, UNSPECIFIED: Primary | ICD-10-CM

## 2024-04-04 DIAGNOSIS — D64.9 CHRONIC ANEMIA: ICD-10-CM

## 2024-04-04 DIAGNOSIS — R11.0 NAUSEA: ICD-10-CM

## 2024-04-04 DIAGNOSIS — E03.9 ACQUIRED HYPOTHYROIDISM: ICD-10-CM

## 2024-04-04 DIAGNOSIS — C86.6 LYMPHOMATOID PAPULOSIS (HCC): ICD-10-CM

## 2024-04-04 DIAGNOSIS — F41.9 ANXIETY: ICD-10-CM

## 2024-04-04 RX ORDER — ALPRAZOLAM 0.5 MG/1
TABLET ORAL
Qty: 30 TABLET | Refills: 2 | Status: SHIPPED | OUTPATIENT
Start: 2024-04-04 | End: 2024-05-05

## 2024-04-04 NOTE — PATIENT INSTRUCTIONS
You may receive a survey regarding the care you received during your visit.  Your input is valuable to us.  We encourage you to complete and return your survey.  We hope you will choose us in the future for your healthcare needs.      Tobacco Cessation Programs     Telephonic behavior modification  1-800-QUIT-NOW (495-8362)  Counseling service for those who are ready to quit using tobacco.    Available for uninsured [place] residents, Medicaid recipients, pregnant women, or patients whose health plans or employers are members of the [place] Tobacco Collaborative    Online behavior modification  http://[place].Quitlogix.org  Online support program to help patients through each step of the quitting process.  Available 24 hours a day 7 days a week.  Provides up to date researched based tool, step-by-step guides, and motivational messages.    Online behavior modification  www.lungusa.org/stop-smoking/how-to-quit  HelpLine: 1-800-LUNG-USA (812-5571)  Email questions to:  questions@"[employer]er.org   Website offers resources to help tobacco users figure out their reasons for quitting and then take the big step of quitting for good.    Hypnosis  Location: [address] [place] [address]  Contact: Mike Masterson, PhD at [phone]  Hypnosis for tobacco cessation  Cost $225 for the initial session and $175 for each session afterwards.  Most patients require 6-8 sessions.  There is the option to submit through the patient’s insurance.    Hypnosis and behavior modification  Location: [address] [address].[place], [place]  Contact: Jo Esteban, PhD at [phone]  Counseling and hypnosis for nicotine addition  Cost: For uninsured patients:  Please call above phone number  Cost for insured patients depends on patient’s insurance plan.    Behavior modification  Location: [facility], [address] [place], [place]  Contact: [phone]  Services include four one-on-one appointments between the patient and a

## 2024-04-04 NOTE — PROGRESS NOTES
Thought Content: Thought content normal.         Judgment: Judgment normal.               ASSESSMENT/PLAN:  1. Chronic fatigue, unspecified  2. Anxiety  -     ALPRAZolam (XANAX) 0.5 MG tablet; TAKE 1 TABLET BY MOUTH EVERY DAY AS NEEDED FOR ANXIETY OR  sleep, Disp-30 tablet, R-2Normal  3. Nausea  4. Leukocytosis, unspecified type  5. Chronic anemia  6. Acquired hypothyroidism  7. Lymphomatoid papulosis (HCC)    -  Chronic medical problems stable  -  Labs reviewed, abnormalities addressed  -  Continue current medications at this time  -  Follow up with specialists as scheduled, discuss changing MTX with Derm.  Feel that several of his symptoms related to the MTX  -  Recent EGD with biopsies, has follow up scheduled with GI    Return in about 3 months (around 7/4/2024) for Anxiety.             An electronic signature was used to authenticate this note.    --Juan Christiansen, DO

## 2024-04-05 ASSESSMENT — ENCOUNTER SYMPTOMS
NAUSEA: 1
RESPIRATORY NEGATIVE: 1
ABDOMINAL PAIN: 1

## 2024-04-05 NOTE — ADDENDUM NOTE
Addended by: TOMASA STERLING on: 4/5/2024 07:46 AM     Modules accepted: Level of Service    
Detail Level: Zone
Detail Level: Generalized
Detail Level: Detailed

## 2024-04-10 ENCOUNTER — HOSPITAL ENCOUNTER (OUTPATIENT)
Age: 59
Discharge: HOME | End: 2024-04-10
Payer: COMMERCIAL

## 2024-04-10 LAB
ALT SERPL W/O P-5'-P-CCNC: 8 U/L (ref 11–66)
AST SERPL-CCNC: 14 U/L (ref 5–40)
BUN SERPL-MCNC: 20 MG/DL (ref 7–22)
CREAT SERPL-MCNC: 0.7 MG/DL (ref 0.4–1.2)
DEPRECATED RDW RBC AUTO: 59.4 FL (ref 35–45)
ERYTHROCYTE [DISTWIDTH] IN BLOOD BY AUTOMATED COUNT: 19.8 % (ref 11.5–14.5)
GFR SERPL CREATININE-BSD FRML MDRD: > 90 ML/MIN/1.73M2
HCT VFR BLD AUTO: 33.9 % (ref 42–52)
HGB BLD-MCNC: 11 GM/DL (ref 14–18)
MCH RBC QN AUTO: 28.2 PG (ref 26–33)
MCHC RBC AUTO-ENTMCNC: 32.4 GM/DL (ref 32.2–35.5)
MCV RBC AUTO: 86.9 FL (ref 80–94)
PLATELET # BLD AUTO: 401 THOU/MM3 (ref 130–400)
PMV BLD AUTO: 9.9 FL (ref 9.4–12.4)
RBC # BLD AUTO: 3.9 MILL/MM3 (ref 4.7–6.1)
WBC # BLD AUTO: 12.9 THOU/MM3 (ref 4.8–10.8)

## 2024-04-10 PROCEDURE — 85027 COMPLETE CBC AUTOMATED: CPT

## 2024-04-10 PROCEDURE — 84460 ALANINE AMINO (ALT) (SGPT): CPT

## 2024-04-10 PROCEDURE — 84520 ASSAY OF UREA NITROGEN: CPT

## 2024-04-10 PROCEDURE — 84450 TRANSFERASE (AST) (SGOT): CPT

## 2024-04-10 PROCEDURE — 82565 ASSAY OF CREATININE: CPT

## 2024-04-10 PROCEDURE — 36415 COLL VENOUS BLD VENIPUNCTURE: CPT

## 2024-04-15 ENCOUNTER — APPOINTMENT (OUTPATIENT)
Dept: GENERAL RADIOLOGY | Age: 59
End: 2024-04-15
Payer: COMMERCIAL

## 2024-04-15 ENCOUNTER — APPOINTMENT (OUTPATIENT)
Dept: CT IMAGING | Age: 59
End: 2024-04-15
Payer: COMMERCIAL

## 2024-04-15 ENCOUNTER — HOSPITAL ENCOUNTER (OUTPATIENT)
Age: 59
Setting detail: OBSERVATION
Discharge: HOME | End: 2024-04-16
Attending: EMERGENCY MEDICINE
Payer: COMMERCIAL

## 2024-04-15 DIAGNOSIS — R59.1 LYMPHADENOPATHY: Primary | ICD-10-CM

## 2024-04-15 DIAGNOSIS — R06.02 SHORTNESS OF BREATH: ICD-10-CM

## 2024-04-15 DIAGNOSIS — R10.11 ABDOMINAL PAIN, RIGHT UPPER QUADRANT: ICD-10-CM

## 2024-04-15 LAB
ALBUMIN SERPL BCG-MCNC: 4.1 G/DL (ref 3.5–5.1)
ALP SERPL-CCNC: 87 U/L (ref 38–126)
ALT SERPL W/O P-5'-P-CCNC: 10 U/L (ref 11–66)
ANION GAP SERPL CALC-SCNC: 17 MEQ/L (ref 8–16)
AST SERPL-CCNC: 16 U/L (ref 5–40)
BASOPHILS ABSOLUTE: 0.1 THOU/MM3 (ref 0–0.1)
BASOPHILS NFR BLD AUTO: 0.6 %
BILIRUB SERPL-MCNC: 0.2 MG/DL (ref 0.3–1.2)
BUN SERPL-MCNC: 14 MG/DL (ref 7–22)
CALCIUM SERPL-MCNC: 8.7 MG/DL (ref 8.5–10.5)
CHLORIDE SERPL-SCNC: 99 MEQ/L (ref 98–111)
CO2 SERPL-SCNC: 19 MEQ/L (ref 23–33)
CREAT SERPL-MCNC: 0.7 MG/DL (ref 0.4–1.2)
CRP SERPL-MCNC: 1.43 MG/DL (ref 0–1)
DEPRECATED RDW RBC AUTO: 61.8 FL (ref 35–45)
EOSINOPHIL NFR BLD AUTO: 3.4 %
EOSINOPHILS ABSOLUTE: 0.5 THOU/MM3 (ref 0–0.4)
ERYTHROCYTE [DISTWIDTH] IN BLOOD BY AUTOMATED COUNT: 20 % (ref 11.5–14.5)
GFR SERPL CREATININE-BSD FRML MDRD: > 90 ML/MIN/1.73M2
GLUCOSE SERPL-MCNC: 103 MG/DL (ref 70–108)
HCT VFR BLD AUTO: 34.3 % (ref 42–52)
HGB BLD-MCNC: 10.9 GM/DL (ref 14–18)
IMM GRANULOCYTES # BLD AUTO: 0.07 THOU/MM3 (ref 0–0.07)
IMM GRANULOCYTES NFR BLD AUTO: 0.5 %
LIPASE SERPL-CCNC: 31 U/L (ref 5.6–51.3)
LYMPHOCYTES ABSOLUTE: 0.8 THOU/MM3 (ref 1–4.8)
LYMPHOCYTES NFR BLD AUTO: 5.8 %
MCH RBC QN AUTO: 28 PG (ref 26–33)
MCHC RBC AUTO-ENTMCNC: 31.8 GM/DL (ref 32.2–35.5)
MCV RBC AUTO: 88.2 FL (ref 80–94)
MONOCYTES ABSOLUTE: 1.4 THOU/MM3 (ref 0.4–1.3)
MONOCYTES NFR BLD AUTO: 9.7 %
NEUTROPHILS NFR BLD AUTO: 80 %
NRBC BLD AUTO-RTO: 0 /100 WBC
OSMOLALITY SERPL CALC.SUM OF ELEC: 270.8 MOSMOL/KG (ref 275–300)
PLATELET # BLD AUTO: 391 THOU/MM3 (ref 130–400)
PMV BLD AUTO: 9.7 FL (ref 9.4–12.4)
POTASSIUM SERPL-SCNC: 4.2 MEQ/L (ref 3.5–5.2)
POTASSIUM SERPL-SCNC: 4.2 MEQ/L (ref 3.5–5.2)
PROCALCITONIN SERPL IA-MCNC: < 0.02 NG/ML (ref 0.01–0.09)
PROT SERPL-MCNC: 7.5 G/DL (ref 6.1–8)
RBC # BLD AUTO: 3.89 MILL/MM3 (ref 4.7–6.1)
SEGMENTED NEUTROPHILS ABSOLUTE COUNT: 11.7 THOU/MM3 (ref 1.8–7.7)
SODIUM SERPL-SCNC: 135 MEQ/L (ref 135–145)
TROPONIN, HIGH SENSITIVITY: 13 NG/L (ref 0–12)
TSH SERPL DL<=0.005 MIU/L-ACNC: 2.44 UIU/ML (ref 0.4–4.2)
WBC # BLD AUTO: 14.6 THOU/MM3 (ref 4.8–10.8)

## 2024-04-15 PROCEDURE — 71046 X-RAY EXAM CHEST 2 VIEWS: CPT

## 2024-04-15 PROCEDURE — 99223 1ST HOSP IP/OBS HIGH 75: CPT | Performed by: INTERNAL MEDICINE

## 2024-04-15 PROCEDURE — 86140 C-REACTIVE PROTEIN: CPT

## 2024-04-15 PROCEDURE — 6360000002 HC RX W HCPCS: Performed by: STUDENT IN AN ORGANIZED HEALTH CARE EDUCATION/TRAINING PROGRAM

## 2024-04-15 PROCEDURE — G0378 HOSPITAL OBSERVATION PER HR: HCPCS

## 2024-04-15 PROCEDURE — 99285 EMERGENCY DEPT VISIT HI MDM: CPT

## 2024-04-15 PROCEDURE — 84484 ASSAY OF TROPONIN QUANT: CPT

## 2024-04-15 PROCEDURE — 80053 COMPREHEN METABOLIC PANEL: CPT

## 2024-04-15 PROCEDURE — 6370000000 HC RX 637 (ALT 250 FOR IP): Performed by: STUDENT IN AN ORGANIZED HEALTH CARE EDUCATION/TRAINING PROGRAM

## 2024-04-15 PROCEDURE — 96372 THER/PROPH/DIAG INJ SC/IM: CPT

## 2024-04-15 PROCEDURE — 84443 ASSAY THYROID STIM HORMONE: CPT

## 2024-04-15 PROCEDURE — 71275 CT ANGIOGRAPHY CHEST: CPT

## 2024-04-15 PROCEDURE — 84145 PROCALCITONIN (PCT): CPT

## 2024-04-15 PROCEDURE — 6360000004 HC RX CONTRAST MEDICATION: Performed by: EMERGENCY MEDICINE

## 2024-04-15 PROCEDURE — 93010 ELECTROCARDIOGRAM REPORT: CPT | Performed by: INTERNAL MEDICINE

## 2024-04-15 PROCEDURE — 2580000003 HC RX 258: Performed by: STUDENT IN AN ORGANIZED HEALTH CARE EDUCATION/TRAINING PROGRAM

## 2024-04-15 PROCEDURE — 36415 COLL VENOUS BLD VENIPUNCTURE: CPT

## 2024-04-15 PROCEDURE — 83690 ASSAY OF LIPASE: CPT

## 2024-04-15 PROCEDURE — 85025 COMPLETE CBC W/AUTO DIFF WBC: CPT

## 2024-04-15 PROCEDURE — 74177 CT ABD & PELVIS W/CONTRAST: CPT

## 2024-04-15 PROCEDURE — 93005 ELECTROCARDIOGRAM TRACING: CPT | Performed by: EMERGENCY MEDICINE

## 2024-04-15 RX ORDER — SODIUM CHLORIDE 0.9 % (FLUSH) 0.9 %
10 SYRINGE (ML) INJECTION PRN
Status: DISCONTINUED | OUTPATIENT
Start: 2024-04-15 | End: 2024-04-16 | Stop reason: HOSPADM

## 2024-04-15 RX ORDER — ACETAMINOPHEN 650 MG/1
650 SUPPOSITORY RECTAL EVERY 6 HOURS PRN
Status: DISCONTINUED | OUTPATIENT
Start: 2024-04-15 | End: 2024-04-16 | Stop reason: HOSPADM

## 2024-04-15 RX ORDER — FOLIC ACID 1 MG/1
1000 TABLET ORAL DAILY
Status: DISCONTINUED | OUTPATIENT
Start: 2024-04-16 | End: 2024-04-16 | Stop reason: HOSPADM

## 2024-04-15 RX ORDER — ACETAMINOPHEN 325 MG/1
650 TABLET ORAL EVERY 6 HOURS PRN
Status: DISCONTINUED | OUTPATIENT
Start: 2024-04-15 | End: 2024-04-16 | Stop reason: HOSPADM

## 2024-04-15 RX ORDER — GABAPENTIN 400 MG/1
400 CAPSULE ORAL 3 TIMES DAILY
Status: DISCONTINUED | OUTPATIENT
Start: 2024-04-15 | End: 2024-04-16 | Stop reason: HOSPADM

## 2024-04-15 RX ORDER — SODIUM CHLORIDE 9 MG/ML
INJECTION, SOLUTION INTRAVENOUS PRN
Status: DISCONTINUED | OUTPATIENT
Start: 2024-04-15 | End: 2024-04-16 | Stop reason: HOSPADM

## 2024-04-15 RX ORDER — POLYETHYLENE GLYCOL 3350 17 G/17G
17 POWDER, FOR SOLUTION ORAL DAILY PRN
Status: DISCONTINUED | OUTPATIENT
Start: 2024-04-15 | End: 2024-04-16 | Stop reason: HOSPADM

## 2024-04-15 RX ORDER — ONDANSETRON 4 MG/1
4 TABLET, ORALLY DISINTEGRATING ORAL EVERY 8 HOURS PRN
Status: DISCONTINUED | OUTPATIENT
Start: 2024-04-15 | End: 2024-04-16 | Stop reason: HOSPADM

## 2024-04-15 RX ORDER — ENOXAPARIN SODIUM 100 MG/ML
40 INJECTION SUBCUTANEOUS NIGHTLY
Status: DISCONTINUED | OUTPATIENT
Start: 2024-04-15 | End: 2024-04-16 | Stop reason: HOSPADM

## 2024-04-15 RX ORDER — LEVOTHYROXINE SODIUM 0.07 MG/1
75 TABLET ORAL DAILY
Status: DISCONTINUED | OUTPATIENT
Start: 2024-04-16 | End: 2024-04-16 | Stop reason: HOSPADM

## 2024-04-15 RX ORDER — ATORVASTATIN CALCIUM 20 MG/1
20 TABLET, FILM COATED ORAL NIGHTLY
Status: DISCONTINUED | OUTPATIENT
Start: 2024-04-15 | End: 2024-04-16 | Stop reason: HOSPADM

## 2024-04-15 RX ORDER — ALPRAZOLAM 0.5 MG/1
0.5 TABLET ORAL DAILY PRN
Status: DISCONTINUED | OUTPATIENT
Start: 2024-04-16 | End: 2024-04-16 | Stop reason: HOSPADM

## 2024-04-15 RX ORDER — MAGNESIUM SULFATE IN WATER 40 MG/ML
2000 INJECTION, SOLUTION INTRAVENOUS PRN
Status: DISCONTINUED | OUTPATIENT
Start: 2024-04-15 | End: 2024-04-16 | Stop reason: HOSPADM

## 2024-04-15 RX ORDER — ONDANSETRON 2 MG/ML
4 INJECTION INTRAMUSCULAR; INTRAVENOUS EVERY 6 HOURS PRN
Status: DISCONTINUED | OUTPATIENT
Start: 2024-04-15 | End: 2024-04-16 | Stop reason: HOSPADM

## 2024-04-15 RX ORDER — POTASSIUM CHLORIDE 20 MEQ/1
40 TABLET, EXTENDED RELEASE ORAL PRN
Status: DISCONTINUED | OUTPATIENT
Start: 2024-04-15 | End: 2024-04-16 | Stop reason: HOSPADM

## 2024-04-15 RX ORDER — METOPROLOL SUCCINATE 50 MG/1
50 TABLET, EXTENDED RELEASE ORAL DAILY
Status: DISCONTINUED | OUTPATIENT
Start: 2024-04-16 | End: 2024-04-16 | Stop reason: HOSPADM

## 2024-04-15 RX ORDER — POTASSIUM CHLORIDE 7.45 MG/ML
10 INJECTION INTRAVENOUS PRN
Status: DISCONTINUED | OUTPATIENT
Start: 2024-04-15 | End: 2024-04-16 | Stop reason: HOSPADM

## 2024-04-15 RX ORDER — ASPIRIN 81 MG/1
81 TABLET ORAL DAILY
Status: DISCONTINUED | OUTPATIENT
Start: 2024-04-16 | End: 2024-04-16 | Stop reason: HOSPADM

## 2024-04-15 RX ORDER — SODIUM CHLORIDE 0.9 % (FLUSH) 0.9 %
5-40 SYRINGE (ML) INJECTION EVERY 12 HOURS SCHEDULED
Status: DISCONTINUED | OUTPATIENT
Start: 2024-04-15 | End: 2024-04-16 | Stop reason: HOSPADM

## 2024-04-15 RX ADMIN — ENOXAPARIN SODIUM 40 MG: 100 INJECTION SUBCUTANEOUS at 22:30

## 2024-04-15 RX ADMIN — SODIUM CHLORIDE, PRESERVATIVE FREE 10 ML: 5 INJECTION INTRAVENOUS at 22:29

## 2024-04-15 RX ADMIN — IOPAMIDOL 80 ML: 755 INJECTION, SOLUTION INTRAVENOUS at 13:27

## 2024-04-15 RX ADMIN — GABAPENTIN 400 MG: 400 CAPSULE ORAL at 22:29

## 2024-04-15 RX ADMIN — ATORVASTATIN CALCIUM 20 MG: 20 TABLET, FILM COATED ORAL at 22:29

## 2024-04-15 ASSESSMENT — PAIN DESCRIPTION - DESCRIPTORS
DESCRIPTORS: CRAMPING
DESCRIPTORS: DISCOMFORT;DULL

## 2024-04-15 ASSESSMENT — PAIN SCALES - GENERAL
PAINLEVEL_OUTOF10: 4
PAINLEVEL_OUTOF10: 2

## 2024-04-15 ASSESSMENT — PAIN DESCRIPTION - LOCATION
LOCATION: ABDOMEN
LOCATION: CHEST

## 2024-04-15 ASSESSMENT — PAIN - FUNCTIONAL ASSESSMENT: PAIN_FUNCTIONAL_ASSESSMENT: 0-10

## 2024-04-15 ASSESSMENT — PAIN DESCRIPTION - ORIENTATION
ORIENTATION: MID
ORIENTATION: LEFT

## 2024-04-15 NOTE — ED PROVIDER NOTES
[facility] EMERGENCY DEPT      EMERGENCY MEDICINE     Pt Name: Alberto Gilliland  MRN: [id number]  Birthdate 1965  Date of evaluation: 4/15/2024  Provider: CHRISTINA LANDEROS DO, FACEP    CHIEF COMPLAINT       Chief Complaint   Patient presents with    Abdominal Pain    Shortness of Breath     HISTORY OF PRESENT ILLNESS   Alberto Gilliland is a pleasant 59 y.o. male who presents to the emergency department for evaluation of LUQ abd pain for the past few months (since procedure) and some new shortness of breath past 2 weeks.  Also states has a lot of joint/bone pain for several weeks.  Shortness of breath 2 weeks, progressively worse. Cough x 2weeks w/ yellow sputum.  Dyspnea Only with exertion. No orthopnea. No leg swelling. +bilateral leg pain R>L (x several months). Some chest pressure after he eats, but none otherwise. No CP with exertion. 45pack year smoking hx  Normal food intake though bad apetite. No  vomiting but some reflux and nausea.  No fever; +cold chills (over 6 months)      PASTMEDICAL HISTORY/Co-Morbid Conditions:     Past Medical History:   Diagnosis Date    Arthritis     general    Bilateral sciatica     Bleeding     with coumadin    CAD (coronary artery disease)     Carpal tunnel syndrome     RIGHT    Cervicalgia, C5-7     Chronic anxiety     COPD, mild (HCC) 04/15/2023    Dysthymia (or depressive neurosis) 02/01/2016    ED (erectile dysfunction)     ED (erectile dysfunction)     Fatty liver 3/5/2024    GERD (gastroesophageal reflux disease)     Headache(784.0)     History of blood transfusion 03/2016    because of taking coumadin    HTN (hypertension)     Hyperlipidemia     Hypothyroidism     Iron deficiency anemia, blood loss     Lumbago     Lumbar radiculopathy     Lymphomatoid papulosis (HCC)     sores on skin    Osteoarthritis (arthritis due to wear and tear of joints)     S/P AVR, coumadin Tx 2016    Spondylosis of thoracic region without myelopathy or radiculopathy        Patient Active

## 2024-04-15 NOTE — ED NOTES
Pt resting on cot. Pt aware of plan to admit. Pt provided with urinal to use independently at bedside. Call light in reach. Meal tray ordered at this time.

## 2024-04-15 NOTE — H&P
Hospitalist - History & Physical      Patient: Alberto Gilliland    Unit/Bed:02/002A  YOB: 1965  MRN: [id number]   Acct: [id number]   PCP: Juan Christiansen DO    Date of Service: Pt seen/examined on 04/15/24  and Admitted to Observation with expected LOS less than two midnights due to medical therapy.     Chief Complaint:  RUQ pain / SOB    Assessment and Plan:      RUQ pain / SOB: unclear etiology - on RA, not tachypnic, no wheezing/rales; CXR unremarkable for inflitrate; CT A/P unremarkable for acute disease; CTA Chest (-) PE, small rt lung nodule and mediastinal LAD c/f malignancy/metastatic disease. Source of discomfort / SOB? Pt completely Asx on evaluation.   Consider Pulmonary consult to evaluate need to Bx LNDs  Lymphomatoid papulosis on HD-MTX c/b liver toxicity: Managed per Derm OP; several notes/ conversations of his sxs being related to MTX -> w/consideration to change if possible MTX held at this time   Chronic fatigue, Anxiety/depression: On Xanax, Lexapro, etc..   Chronic conditions:   Tobacco smoker (~45PY Hx) + Mild-Mod COPD  CAD s/p CABG on ASA/Toprol  ?NHL, listed in chart review but no detailed info  Hepatic steatosis w/hepatomegaly c/f MTX toxicity   Recent RP hematoma s/p liver Bx 1/31/24  Hypothyroidism on Synthroid  HTN  GERD on PPI    History Of Present Illness:      Alberto Gilliland is a 59 y.o. male with PMH of aortic valve replacement x2 (currently bio valve w/o sequelae) and LT high dose methotrexate use for lymphomatoid papulosis, Hypothyroidism on Synthroid, Tobacco smoker (~45PY Hx), Mild-Mod COPD, HTN, GERD on PPI, CAD s/p CABG on ASA/Toprol, ?NHL, Hepatic steatosis w/hepatomegaly, recent RP hematoma s/p liver Bx 1/31/24, and Chronic SOB/MA who presented to [facility] ED c/o RUQ pain w/SOB. NO other associated sxs -> pt will be admitted for observation and determine course for w/u if indeed necessary.       Past Medical History:  has a past medical history of Arthritis,

## 2024-04-15 NOTE — ED NOTES
ED to inpatient nurses report      Chief Complaint:  Chief Complaint   Patient presents with    Abdominal Pain    Shortness of Breath     Present to ED from: home    MOA:     LOC: alert and orientated to name, place, date  Mobility: Independent  Oxygen Baseline: RA    Current needs required: RA     Code Status:   Prior    What abnormal results were found and what did you give/do to treat them?   CT chest revealed R lung nodule and concern of metastatic disease     Mental Status:  Level of Consciousness: Alert (0)    Psych Assessment:        Vitals:  Patient Vitals for the past 24 hrs:   BP Temp Temp src Pulse Resp SpO2 Height Weight   04/15/24 1556 (!) 152/70 -- -- 70 17 96 % -- --   04/15/24 1452 135/68 -- -- 75 17 94 % -- --   04/15/24 1318 (!) 146/71 -- -- 69 19 95 % -- --   04/15/24 1248 137/80 -- -- 71 20 96 % -- --   04/15/24 1020 (!) 150/71 -- -- 69 19 96 % -- --   04/15/24 0917 (!) 150/70 97.9 °F (36.6 °C) Oral 82 18 97 % 1.702 m (5' 7\") 86.2 kg (190 lb)        LDAs:   Peripheral IV 04/15/24 Left Antecubital (Active)   Site Assessment Clean, dry & intact 04/15/24 1556   Line Status Normal saline locked 04/15/24 1556       Ambulatory Status:  No data recorded    Diagnosis:  DISPOSITION Admitted 04/15/2024 03:53:31 PM   Final diagnoses:   None        Consults:  None     Pain Score:  Pain Assessment  Pain Assessment: 0-10  Pain Level: 4  Pain Location: Abdomen  Pain Orientation: Left  Pain Descriptors: Cramping    C-SSRS:   Risk of Suicide: No Risk    Sepsis Screening:  Sepsis Risk Score: 2.18    [place] Fall Risk:       Swallow Screening        Preferred Language:   English      ALLERGIES     Seasonal    SURGICAL HISTORY       Past Surgical History:   Procedure Laterality Date    [facility]  [facility]    AORTIC VALVE REPLACEMENT  07/20/2016    extraction of mechanical valve and replacement with tissue valve    APPENDECTOMY  1980    BACK INJECTION Bilateral 01/03/2022    Bilateral SI

## 2024-04-15 NOTE — ED TRIAGE NOTES
Patient presents to ED with c/o LUQ abdominal pain and shortness of breath that has been going on for the past month. Patient states that over the past two days the symptoms have been getting worse. Also states that he is supposed to follow up with Dr. Luu but he hasn't had a chance to see him yet. EKG completed in triage. Call light in reach.

## 2024-04-16 VITALS
HEART RATE: 64 BPM | OXYGEN SATURATION: 99 % | DIASTOLIC BLOOD PRESSURE: 55 MMHG | SYSTOLIC BLOOD PRESSURE: 134 MMHG | HEIGHT: 67 IN | TEMPERATURE: 97.7 F | BODY MASS INDEX: 29.82 KG/M2 | RESPIRATION RATE: 16 BRPM | WEIGHT: 190 LBS

## 2024-04-16 LAB
ANION GAP SERPL CALC-SCNC: 10 MEQ/L (ref 8–16)
BASOPHILS ABSOLUTE: 0 THOU/MM3 (ref 0–0.1)
BASOPHILS NFR BLD AUTO: 0.6 %
BUN SERPL-MCNC: 13 MG/DL (ref 7–22)
CALCIUM SERPL-MCNC: 8.6 MG/DL (ref 8.5–10.5)
CHLORIDE SERPL-SCNC: 100 MEQ/L (ref 98–111)
CO2 SERPL-SCNC: 25 MEQ/L (ref 23–33)
CREAT SERPL-MCNC: 0.7 MG/DL (ref 0.4–1.2)
DEPRECATED RDW RBC AUTO: 59.9 FL (ref 35–45)
EOSINOPHIL NFR BLD AUTO: 4.2 %
EOSINOPHILS ABSOLUTE: 0.3 THOU/MM3 (ref 0–0.4)
ERYTHROCYTE [DISTWIDTH] IN BLOOD BY AUTOMATED COUNT: 19.7 % (ref 11.5–14.5)
GFR SERPL CREATININE-BSD FRML MDRD: > 90 ML/MIN/1.73M2
GLUCOSE SERPL-MCNC: 91 MG/DL (ref 70–108)
HCT VFR BLD AUTO: 30.9 % (ref 42–52)
HGB BLD-MCNC: 9.8 GM/DL (ref 14–18)
IMM GRANULOCYTES # BLD AUTO: 0.03 THOU/MM3 (ref 0–0.07)
IMM GRANULOCYTES NFR BLD AUTO: 0.4 %
LYMPHOCYTES ABSOLUTE: 0.9 THOU/MM3 (ref 1–4.8)
LYMPHOCYTES NFR BLD AUTO: 11.8 %
MCH RBC QN AUTO: 27.5 PG (ref 26–33)
MCHC RBC AUTO-ENTMCNC: 31.7 GM/DL (ref 32.2–35.5)
MCV RBC AUTO: 86.8 FL (ref 80–94)
MONOCYTES ABSOLUTE: 0.8 THOU/MM3 (ref 0.4–1.3)
MONOCYTES NFR BLD AUTO: 10.5 %
NEUTROPHILS NFR BLD AUTO: 72.5 %
NRBC BLD AUTO-RTO: 0 /100 WBC
PLATELET # BLD AUTO: 351 THOU/MM3 (ref 130–400)
PMV BLD AUTO: 9.9 FL (ref 9.4–12.4)
POTASSIUM SERPL-SCNC: 4.1 MEQ/L (ref 3.5–5.2)
RBC # BLD AUTO: 3.56 MILL/MM3 (ref 4.7–6.1)
SEGMENTED NEUTROPHILS ABSOLUTE COUNT: 5.7 THOU/MM3 (ref 1.8–7.7)
SODIUM SERPL-SCNC: 135 MEQ/L (ref 135–145)
WBC # BLD AUTO: 7.8 THOU/MM3 (ref 4.8–10.8)

## 2024-04-16 PROCEDURE — 2580000003 HC RX 258: Performed by: STUDENT IN AN ORGANIZED HEALTH CARE EDUCATION/TRAINING PROGRAM

## 2024-04-16 PROCEDURE — 36415 COLL VENOUS BLD VENIPUNCTURE: CPT

## 2024-04-16 PROCEDURE — 85025 COMPLETE CBC W/AUTO DIFF WBC: CPT

## 2024-04-16 PROCEDURE — 6370000000 HC RX 637 (ALT 250 FOR IP): Performed by: PHYSICIAN ASSISTANT

## 2024-04-16 PROCEDURE — 80048 BASIC METABOLIC PNL TOTAL CA: CPT

## 2024-04-16 PROCEDURE — 99239 HOSP IP/OBS DSCHRG MGMT >30: CPT | Performed by: PHYSICIAN ASSISTANT

## 2024-04-16 PROCEDURE — 6370000000 HC RX 637 (ALT 250 FOR IP): Performed by: STUDENT IN AN ORGANIZED HEALTH CARE EDUCATION/TRAINING PROGRAM

## 2024-04-16 PROCEDURE — G0378 HOSPITAL OBSERVATION PER HR: HCPCS

## 2024-04-16 RX ORDER — SUCRALFATE 1 G/1
1 TABLET ORAL
Status: DISCONTINUED | OUTPATIENT
Start: 2024-04-16 | End: 2024-04-16 | Stop reason: HOSPADM

## 2024-04-16 RX ORDER — SUCRALFATE 1 G/1
1 TABLET ORAL
Qty: 120 TABLET | Refills: 3 | Status: SHIPPED | OUTPATIENT
Start: 2024-04-16

## 2024-04-16 RX ADMIN — GABAPENTIN 400 MG: 400 CAPSULE ORAL at 08:01

## 2024-04-16 RX ADMIN — FOLIC ACID 1000 MCG: 1 TABLET ORAL at 08:01

## 2024-04-16 RX ADMIN — SUCRALFATE 1 G: 1 TABLET ORAL at 11:11

## 2024-04-16 RX ADMIN — SODIUM CHLORIDE, PRESERVATIVE FREE 10 ML: 5 INJECTION INTRAVENOUS at 08:01

## 2024-04-16 RX ADMIN — ACETAMINOPHEN 650 MG: 325 TABLET ORAL at 08:01

## 2024-04-16 RX ADMIN — LEVOTHYROXINE SODIUM 75 MCG: 0.07 TABLET ORAL at 05:44

## 2024-04-16 RX ADMIN — METOPROLOL SUCCINATE 50 MG: 50 TABLET, EXTENDED RELEASE ORAL at 08:01

## 2024-04-16 RX ADMIN — ASPIRIN 81 MG: 81 TABLET, COATED ORAL at 08:01

## 2024-04-16 ASSESSMENT — PAIN DESCRIPTION - DESCRIPTORS: DESCRIPTORS: DISCOMFORT;DULL

## 2024-04-16 ASSESSMENT — PAIN DESCRIPTION - FREQUENCY: FREQUENCY: INTERMITTENT

## 2024-04-16 ASSESSMENT — PAIN - FUNCTIONAL ASSESSMENT: PAIN_FUNCTIONAL_ASSESSMENT: ACTIVITIES ARE NOT PREVENTED

## 2024-04-16 ASSESSMENT — PAIN DESCRIPTION - LOCATION: LOCATION: ABDOMEN

## 2024-04-16 ASSESSMENT — PAIN SCALES - GENERAL: PAINLEVEL_OUTOF10: 4

## 2024-04-16 ASSESSMENT — PAIN DESCRIPTION - ONSET: ONSET: ON-GOING

## 2024-04-16 ASSESSMENT — PAIN DESCRIPTION - ORIENTATION: ORIENTATION: LEFT

## 2024-04-16 NOTE — CARE COORDINATION
04/16/24 1016   Service Assessment   Patient Orientation Alert and Oriented;Person;Place;Situation;Self   Cognition Alert   History Provided By Patient;Medical Record   Primary Caregiver Self   Accompanied By/Relationship Sister   Support Systems Family Members   Patient's Healthcare Decision Maker is: Named in Scanned ACP Document   PCP Verified by CM Yes   Last Visit to PCP Within last 3 months   Prior Functional Level Independent in ADLs/IADLs;Bathing;Dressing;Toileting;Feeding;Cooking;Housework;Shopping;Mobility   Current Functional Level Independent in ADLs/IADLs;Bathing;Dressing;Toileting;Feeding;Cooking;Housework;Shopping;Mobility   Can patient return to prior living arrangement Yes   Ability to make needs known: Good   Family able to assist with home care needs: Yes   Would you like for me to discuss the discharge plan with any other family members/significant others, and if so, who? No   Financial Resources Other (Comment)  (BCBS)   CM/SW Referral Other (see comment)  (N/A)   Social/Functional History   Active  Yes   Occupation Full time employment   Discharge Planning   Type of Residence Apartment   Living Arrangements Alone   Current Services Prior To Admission None   Potential Assistance Needed N/A   DME Ordered? No   Potential Assistance Purchasing Medications No   Type of Home Care Services None   Patient expects to be discharged to: Apartment   History of falls? 0   Services At/After Discharge   Transition of Care Consult (CM Consult) N/A   Services At/After Discharge None   Confirm Follow Up Transport Family   Condition of Participation: Discharge Planning   The Plan for Transition of Care is related to the following treatment goals: hopeful for discharge soon, plans for outpatient Oncology work-up.   Freedom of Choice list was provided with basic dialogue that supports the patient's individualized plan of care/goals, treatment preferences, and shares the quality data associated with the

## 2024-04-16 NOTE — CARE COORDINATION
4/16/24, 10:25 AM EDT    Patient goals/plan/ treatment preferences discussed by  and .  Patient goals/plan/ treatment preferences reviewed with patient/ family.  Patient/ family verbalize understanding of discharge plan and are in agreement with goal/plan/treatment preferences.  Understanding was demonstrated using the teach back method.  AVS provided by RN at time of discharge, which includes all necessary medical information pertaining to the patients current course of illness, treatment, post-discharge goals of care, and treatment preferences.     Services At/After Discharge: None

## 2024-04-16 NOTE — PROGRESS NOTES
Patient educated on how to use incentive spirometer. Patient verbalized understanding and demonstrated proper use. Emphasized importance and usage of device, with coughing and deep breathing every day hours while awake.

## 2024-04-16 NOTE — PLAN OF CARE
Problem: Pain  Goal: Verbalizes/displays adequate comfort level or baseline comfort level  4/16/2024 1024 by Archie De Paz, RN  Outcome: Adequate for Discharge  4/16/2024 0813 by Archie De Paz, RN  Outcome: Progressing     Problem: Chronic Conditions and Co-morbidities  Goal: Patient's chronic conditions and co-morbidity symptoms are monitored and maintained or improved  Outcome: Adequate for Discharge

## 2024-04-16 NOTE — PROGRESS NOTES
1127: Pt departed the floor and was discharged under his own independent power in stable condition. Pt and sister were educated on his hospital course, discharge instructions, and medication changes - they verbalized their understanding. Pts belongings were accounted for and sent with him. Pts IV was removed w/o complications and was intact.

## 2024-04-17 ENCOUNTER — TELEPHONE (OUTPATIENT)
Dept: FAMILY MEDICINE CLINIC | Age: 59
End: 2024-04-17

## 2024-04-17 ENCOUNTER — CARE COORDINATION (OUTPATIENT)
Dept: CASE MANAGEMENT | Age: 59
End: 2024-04-17

## 2024-04-17 DIAGNOSIS — R06.02 SHORTNESS OF BREATH: Primary | ICD-10-CM

## 2024-04-17 LAB
EKG ATRIAL RATE: 77 BPM
EKG P AXIS: 33 DEGREES
EKG P-R INTERVAL: 184 MS
EKG Q-T INTERVAL: 412 MS
EKG QRS DURATION: 108 MS
EKG QTC CALCULATION (BAZETT): 466 MS
EKG R AXIS: -27 DEGREES
EKG T AXIS: 59 DEGREES
EKG VENTRICULAR RATE: 77 BPM

## 2024-04-17 NOTE — CARE COORDINATION
Care Transitions Note    Initial Call - Call within 2 business days of discharge: Yes     Patient Current Location:  Home: [address] [place] [id number]    Care Transition Nurse contacted the patient by telephone to perform post hospital discharge assessment, verified name and  as identifiers. Provided introduction to self, and explanation of the Care Transition Nurse role.     Patient: Alberto Gilliland    Patient : 1965   MRN: [id number]    Reason for Admission: SOB, lung nodule (incidental finding)  Discharge Date: 24  RURS: Readmission Risk Score: 12.4      Last Discharge Facility       Date Complaint Diagnosis Description Type Department Provider    4/15/24 Abdominal Pain; Shortness of Breath Lymphadenopathy ... ED to Hosp-Admission (Discharged) (ADMITTED) [facility] Devika Landrum PA; Alice Fernandes...            Was this an external facility discharge? No    Additional needs identified to be addressed with provider   No needs identified             Method of communication with provider: none.    Patients top risk factors for readmission: lack of knowledge about disease, medical condition-HTN, lung nodule, and polypharmacy    Interventions to address risk factors:   Review of patient management of conditions/medications  Communication with providers: appt scheduled    Care Summary Note:   Spoke with Alberto, said he didn't sleep well.  Awake about every 2 hrs, couldn't get comfortable.  He is doing ok this morning, just tired, feels \"blah.\"  Incidental finding of lung nodule is concerning him.  Denies chest pain, dyspnea, fever, chills, n/v/d.  Using IS, coughs up yellow phlegm after use.  Reviewed medications/changes, picking up Carafate this afternoon.  Appetite and fluid intake is ok, eating a light breakfast.  Went to dermatologist this morning.  Declined to see PCP but will see cardio on Friday.  Is waiting on hema/onc office to call for appt.  No other issues to report.  Denies any other needs.

## 2024-04-17 NOTE — TELEPHONE ENCOUNTER
Care Transitions Initial Follow Up Call    Outreach made within 2 business days of discharge: Yes    Patient: Alberto Gilliland Patient : 1965   MRN: [id number]  Reason for Admission: There are no discharge diagnoses documented for the most recent discharge.  Discharge Date: 24       Spoke with: Patient    Discharge department/facility: [facility]    TCM Interactive Patient Contact:  Was patient able to fill all prescriptions: Yes  Was patient instructed to bring all medications to the follow-up visit: Yes  Is patient taking all medications as directed in the discharge summary? Yes  Does patient understand their discharge instructions: Yes  Does patient have questions or concerns that need addressed prior to 7-14 day follow up office visit: no    Scheduled appointment with PCP within 7-14 days  Declines f/u with PCP at this time.  Awaiting appt with [facility]    Follow Up  Future Appointments   Date Time Provider Department Center   2024 10:30 AM Rosa Wise APRN - CNP N [facility] - [place]   2024  3:30 PM Juan Christiansen, DO [facility] LPN

## 2024-04-17 NOTE — TELEPHONE ENCOUNTER
Care Transitions Initial Follow Up Call    Outreach made within 2 business days of discharge: Yes    Patient: Alberto Gilliland Patient : 1965   MRN: [id number]  Reason for Admission: There are no discharge diagnoses documented for the most recent discharge.  Discharge Date: 24       Spoke with: attempted to contact pt, left vm.    Discharge department/facility: [facility] Interactive Patient Contact:      Scheduled appointment with PCP within 7-14 days    Follow Up  Future Appointments   Date Time Provider Department Center   2024 10:30 AM Rosa Wise, APRN - CNP N [facility] - [place]   2024  3:30 PM Juan Christiansen DO [facility] - Janine Mcnair CMA ([facility])

## 2024-04-18 NOTE — DISCHARGE SUMMARY
and dictated separately for additional assessment. **This report has been created using voice recognition software.  It may contain minor errors which are inherent in voice recognition technology.** Final report electronically signed by Dr Haydee Aranda on 4/15/2024 1:42 PM    XR CHEST (2 VW)    Result Date: 4/15/2024  PROCEDURE: XR CHEST (2 VW) CLINICAL INFORMATION: shortness of breath COMPARISON: 4/11/2023 TECHNIQUE: PA and lateral views of the chest were obtained.     1. Moderate ventricular megaly. Metallic sternotomy sutures and vascular clips from prior surgery. Prosthetic heart valve. Prior anterior cervical fusion. 2. No acute findings. No infiltrates or effusions are seen. **This report has been created using voice recognition software.  It may contain minor errors which are inherent in voice recognition technology.** Final report electronically signed by Dr. Kennedy Louis on 4/15/2024 10:15 AM       Follow-up scheduled after discharge :-    in the next few days with Juan Christiansen, DO  in the next few weeks with Oncology     Consultations during this hospital stay:-  [] NONE [] Cardiology  [] Nephrology  [] Hemo onco   [] GI   [] ID  [] Endocrine  [] Pulm    [] Neuro    [] Psych   [] Urology  [] ENT   [] G SURGERY   []Ortho    []CV surg    [] Palliative  [] Hospice [] Pain management   []    []TCU   [] PT/OT  OTHERS:-    Disposition: home  Condition at Discharge: Stable    Time Spent:- 40 minutes    Electronically signed by JESSI Og on 4/18/24 at 10:23 AM EDT   Discharging Hospitalist

## 2024-04-19 ENCOUNTER — OFFICE VISIT (OUTPATIENT)
Dept: CARDIOLOGY CLINIC | Age: 59
End: 2024-04-19
Payer: COMMERCIAL

## 2024-04-19 VITALS
HEART RATE: 73 BPM | DIASTOLIC BLOOD PRESSURE: 66 MMHG | WEIGHT: 198 LBS | BODY MASS INDEX: 31.08 KG/M2 | HEIGHT: 67 IN | SYSTOLIC BLOOD PRESSURE: 129 MMHG

## 2024-04-19 DIAGNOSIS — I10 PRIMARY HYPERTENSION: ICD-10-CM

## 2024-04-19 DIAGNOSIS — Z95.2 S/P AVR: Primary | ICD-10-CM

## 2024-04-19 DIAGNOSIS — Z87.891 FORMER SMOKER: ICD-10-CM

## 2024-04-19 PROCEDURE — 99214 OFFICE O/P EST MOD 30 MIN: CPT | Performed by: NURSE PRACTITIONER

## 2024-04-19 PROCEDURE — 3074F SYST BP LT 130 MM HG: CPT | Performed by: NURSE PRACTITIONER

## 2024-04-19 PROCEDURE — 3078F DIAST BP <80 MM HG: CPT | Performed by: NURSE PRACTITIONER

## 2024-04-19 NOTE — PROGRESS NOTES
Pt C/O Chest pain but pt states they found a nodule in his lung, and he is also have pain in the left arm and shoulder, sob, dizziness, nausea, light headed, very fatigued.       Pt denies  heart palpitations, swelling

## 2024-04-19 NOTE — PROGRESS NOTES
[facility] [place] [facility]  [facility] CARDIOLOGY  [address]  [address]  [place] [address]  Dept: [phone]  Dept Fax: [phone]  Loc: [phone]    Visit Date: 4/29/2024    Alberto Gilliland is a 59 y.o. male who presents today for: follow-up; 8 weeks    Primary Cardiologist: Dr. Luu    Chief Complaint   Patient presents with    Follow-up     8 WEEK CHECK        HPI:  2/23/24: Robotic Laparoscopy;Lysis of adhestions; Ventral hernia repair; evacuation of abdominal hematoma with debridment of hematoma capsule  per MEGGAN Allred MD.     Last seen in office on 2/22/2024 per this writer. Per office note:  Assessment:    Diagnosis Orders   1. S/P AVR          2. Abdominal aortic aneurysm (AAA) without rupture, unspecified part (HCC)          3. Chronic systolic (congestive) heart failure          4. Primary hypertension          5. SOB (shortness of breath)          6. Abdominal hematoma           As above  Some brief episodes of chest discomfort - seem more reflux related. Having some abdominal bloating with known hematoma. Also with some shortness of breath and fatigue - all worse than normal since the RP hematoma. Planning for surgical evacuation.   No evidence of decompensated HF. Heart rate and rhythm stable. No palpitations. Tolerating meds. BP little higher today - anxious about whole RP bleed in general - discussed and questions answered to his satisfaction.    Plan:  Discussed need for adequate pain control pre and post evacuation. Discussed allowing recovery from RP hematoma evacuation and then re-evaluate. Agrees with plan.    Continue current medications as prescribed.  Allow yourself to recover from the upcoming procedure.  Make sure you control pain after the procedure to not let it be hard on your heart.   If your symptoms do not improve in a few weeks after the procedure, then call and we will re-evaluate you.   Follow-up with your PCP as scheduled.  Follow-up with 8

## 2024-04-19 NOTE — PATIENT INSTRUCTIONS
Continue current medications as prescribed.    Continue with the GI doctors and medications as currently doing. If abdominal pain continues may need to consider work-up for gallbladder.     Stay as active as you can. Try to walk daily to help you get in condition to return to work.    See if Dr. Allred will return you to work at 32 hours of work for the first 2 weeks and then return to full time     Follow-up with your PCP as scheduled.    Follow-up with      as scheduled or sooner if need.

## 2024-04-24 ENCOUNTER — CARE COORDINATION (OUTPATIENT)
Dept: CASE MANAGEMENT | Age: 59
End: 2024-04-24

## 2024-04-24 NOTE — CARE COORDINATION
appointment(s). and Patient declined to schedule [facility] appointment.  Future Appointments         Provider Specialty Dept Phone    5/3/2024 11:00 AM Ivy De Leon MD Oncology [phone]    6/27/2024 3:30 PM Juan Christiansen, [facility] [phone]    10/18/2024 10:30 AM Rosa Wise, APRN - CNP Cardiology [phone]            Care Transition Nurse provided contact information.  Plan for follow-up call in 6-10 days based on severity of symptoms and risk factors.  Plan for next call: symptom management-new or worsening symptoms  self management-sleeping better?  follow-up appointment-review f/u 5/3      Jadyn Bailey RN

## 2024-05-02 ENCOUNTER — CARE COORDINATION (OUTPATIENT)
Dept: CASE MANAGEMENT | Age: 59
End: 2024-05-02

## 2024-05-02 NOTE — CARE COORDINATION
Care Transitions Note    Follow Up Call      Attempted to reach patient for transitions of care follow up.  Unable to reach patient.      Outreach Attempts:   HIPAA compliant voicemail left for patient.       Follow Up Appointment:   Future Appointments         Provider Specialty Dept Phone    5/3/2024 11:00 AM Ivy De Leon MD Oncology [phone]    6/27/2024 3:30 PM Juan Christiansen, [facility] Medicine [phone]    10/18/2024 10:30 AM Rosa Wise APRN - CNP Cardiology [phone]            Plan for follow-up call in 2-5 days based on severity of symptoms and risk factors. Plan for next call: symptom management-new or worsening symptoms  self management-sleeping better?  follow-up appointment-review f/u 5/3    Jadyn Bailey RN

## 2024-05-03 ENCOUNTER — CLINICAL DOCUMENTATION (OUTPATIENT)
Dept: CASE MANAGEMENT | Age: 59
End: 2024-05-03

## 2024-05-03 ENCOUNTER — OFFICE VISIT (OUTPATIENT)
Dept: ONCOLOGY | Age: 59
End: 2024-05-03
Payer: COMMERCIAL

## 2024-05-03 ENCOUNTER — HOSPITAL ENCOUNTER (OUTPATIENT)
Dept: INFUSION THERAPY | Age: 59
Discharge: HOME | End: 2024-05-03
Payer: COMMERCIAL

## 2024-05-03 VITALS
TEMPERATURE: 98 F | BODY MASS INDEX: 31.26 KG/M2 | OXYGEN SATURATION: 94 % | DIASTOLIC BLOOD PRESSURE: 72 MMHG | SYSTOLIC BLOOD PRESSURE: 155 MMHG | WEIGHT: 199.6 LBS | RESPIRATION RATE: 16 BRPM | HEART RATE: 82 BPM

## 2024-05-03 VITALS
SYSTOLIC BLOOD PRESSURE: 155 MMHG | TEMPERATURE: 98 F | HEART RATE: 82 BPM | RESPIRATION RATE: 16 BRPM | DIASTOLIC BLOOD PRESSURE: 72 MMHG | OXYGEN SATURATION: 94 %

## 2024-05-03 DIAGNOSIS — R91.8 LUNG NODULES: Primary | ICD-10-CM

## 2024-05-03 DIAGNOSIS — D50.9 IRON DEFICIENCY ANEMIA, UNSPECIFIED IRON DEFICIENCY ANEMIA TYPE: ICD-10-CM

## 2024-05-03 LAB
ALBUMIN SERPL BCG-MCNC: 4 G/DL (ref 3.5–5.1)
ALP SERPL-CCNC: 79 U/L (ref 38–126)
ALT SERPL W/O P-5'-P-CCNC: 9 U/L (ref 11–66)
ANION GAP SERPL CALC-SCNC: 15 MEQ/L (ref 8–16)
AST SERPL-CCNC: 15 U/L (ref 5–40)
BASOPHILS ABSOLUTE: 0.1 THOU/MM3 (ref 0–0.1)
BASOPHILS NFR BLD AUTO: 0.8 %
BILIRUB SERPL-MCNC: < 0.2 MG/DL (ref 0.3–1.2)
BUN SERPL-MCNC: 16 MG/DL (ref 7–22)
CALCIUM SERPL-MCNC: 9.4 MG/DL (ref 8.5–10.5)
CHLORIDE SERPL-SCNC: 99 MEQ/L (ref 98–111)
CO2 SERPL-SCNC: 23 MEQ/L (ref 23–33)
CREAT SERPL-MCNC: 0.8 MG/DL (ref 0.4–1.2)
DEPRECATED RDW RBC AUTO: 63.1 FL (ref 35–45)
EOSINOPHIL NFR BLD AUTO: 3.7 %
EOSINOPHILS ABSOLUTE: 0.4 THOU/MM3 (ref 0–0.4)
ERYTHROCYTE [DISTWIDTH] IN BLOOD BY AUTOMATED COUNT: 21.2 % (ref 11.5–14.5)
ERYTHROCYTE [SEDIMENTATION RATE] IN BLOOD BY WESTERGREN METHOD: 31 MM/HR (ref 0–10)
FERRITIN SERPL IA-MCNC: 18 NG/ML (ref 22–322)
FOLATE SERPL-MCNC: > 20 NG/ML (ref 4.8–24.2)
GFR SERPL CREATININE-BSD FRML MDRD: > 90 ML/MIN/1.73M2
GLUCOSE SERPL-MCNC: 112 MG/DL (ref 70–108)
HCT VFR BLD AUTO: 33 % (ref 42–52)
HGB BLD-MCNC: 10.3 GM/DL (ref 14–18)
HGB RETIC QN AUTO: 30.7 PG (ref 28.2–35.7)
IMM GRANULOCYTES # BLD AUTO: 0.04 THOU/MM3 (ref 0–0.07)
IMM GRANULOCYTES NFR BLD AUTO: 0.4 %
IMM RETICS NFR: 46 % (ref 2.3–13.4)
IRON SATN MFR SERPL: 5 % (ref 20–50)
IRON SERPL-MCNC: 16 UG/DL (ref 65–195)
LDH SERPL L TO P-CCNC: 221 U/L (ref 100–190)
LYMPHOCYTES ABSOLUTE: 0.8 THOU/MM3 (ref 1–4.8)
LYMPHOCYTES NFR BLD AUTO: 8 %
MCH RBC QN AUTO: 27 PG (ref 26–33)
MCHC RBC AUTO-ENTMCNC: 31.2 GM/DL (ref 32.2–35.5)
MCV RBC AUTO: 86.6 FL (ref 80–94)
MONOCYTES ABSOLUTE: 1 THOU/MM3 (ref 0.4–1.3)
MONOCYTES NFR BLD AUTO: 9.6 %
NEUTROPHILS ABSOLUTE: 8 THOU/MM3 (ref 1.8–7.7)
NEUTROPHILS NFR BLD AUTO: 77.5 %
NRBC BLD AUTO-RTO: 0 /100 WBC
PLATELET # BLD AUTO: 419 THOU/MM3 (ref 130–400)
PMV BLD AUTO: 10 FL (ref 9.4–12.4)
PROT SERPL-MCNC: 7.3 G/DL (ref 6.1–8)
RBC # BLD AUTO: 3.81 MILL/MM3 (ref 4.7–6.1)
RETICS # AUTO: 67 THOU/MM3 (ref 20–115)
RETICS/RBC NFR AUTO: 1.8 % (ref 0.5–2)
SODIUM SERPL-SCNC: 137 MEQ/L (ref 135–145)
TIBC SERPL-MCNC: 346 UG/DL (ref 171–450)
VIT B12 SERPL-MCNC: 585 PG/ML (ref 211–911)
WBC # BLD AUTO: 10.3 THOU/MM3 (ref 4.8–10.8)

## 2024-05-03 PROCEDURE — 85025 COMPLETE CBC W/AUTO DIFF WBC: CPT

## 2024-05-03 PROCEDURE — 83010 ASSAY OF HAPTOGLOBIN QUANT: CPT

## 2024-05-03 PROCEDURE — 82746 ASSAY OF FOLIC ACID SERUM: CPT

## 2024-05-03 PROCEDURE — 99211 OFF/OP EST MAY X REQ PHY/QHP: CPT

## 2024-05-03 PROCEDURE — 88184 FLOWCYTOMETRY/ TC 1 MARKER: CPT

## 2024-05-03 PROCEDURE — 83550 IRON BINDING TEST: CPT

## 2024-05-03 PROCEDURE — 82607 VITAMIN B-12: CPT

## 2024-05-03 PROCEDURE — 99205 OFFICE O/P NEW HI 60 MIN: CPT | Performed by: INTERNAL MEDICINE

## 2024-05-03 PROCEDURE — 83540 ASSAY OF IRON: CPT

## 2024-05-03 PROCEDURE — 80053 COMPREHEN METABOLIC PANEL: CPT

## 2024-05-03 PROCEDURE — 82728 ASSAY OF FERRITIN: CPT

## 2024-05-03 PROCEDURE — 3077F SYST BP >= 140 MM HG: CPT | Performed by: INTERNAL MEDICINE

## 2024-05-03 PROCEDURE — 36415 COLL VENOUS BLD VENIPUNCTURE: CPT

## 2024-05-03 PROCEDURE — 3078F DIAST BP <80 MM HG: CPT | Performed by: INTERNAL MEDICINE

## 2024-05-03 PROCEDURE — 85651 RBC SED RATE NONAUTOMATED: CPT

## 2024-05-03 PROCEDURE — 85046 RETICYTE/HGB CONCENTRATE: CPT

## 2024-05-03 PROCEDURE — 88185 FLOWCYTOMETRY/TC ADD-ON: CPT

## 2024-05-03 PROCEDURE — 83615 LACTATE (LD) (LDH) ENZYME: CPT

## 2024-05-03 RX ORDER — ONDANSETRON 4 MG/1
4 TABLET, FILM COATED ORAL
Qty: 30 TABLET | Refills: 2 | Status: SHIPPED | OUTPATIENT
Start: 2024-05-03

## 2024-05-03 NOTE — PATIENT INSTRUCTIONS
Orders Placed This Encounter   Procedures    Ferritin    Iron Binding Capacity    Iron    Haptoglobin    IRON SATURATION    Lactate Dehydrogenase    Vitamin B12 & Folate    Sedimentation Rate    Reticulocytes    Path Review, Smear    CBC with Auto Differential    Comprehensive Metabolic Panel    Blood Occult Stool Screen #1    Flow Cytometry Leukemia/Lymphoma, Blood    Ambulatory referral to Pulmonology   Urgent referral to pulmonology  Labs today.  Orders Placed This Encounter   Medications    ondansetron (ZOFRAN) 4 MG tablet     Sig: Take 1 tablet by mouth every 4-6 hours as needed for Nausea or Vomiting     Dispense:  30 tablet     Refill:  2    Please give him a work excuse for 2 days

## 2024-05-03 NOTE — PROGRESS NOTES
[facility] [place] [facility]  [facility]CER [facility]ENTER  [address]  [address]  [place] [address]  Dept: [phone]  Loc: [phone]   Hematology/Oncology Consult (Clinic)        5/3/24:    Alberto Gilliland   1965     Devika Landrum, JESSI Christiansen, Juan Rivas,        Reason:  Chief Complaint   Patient presents with    New Patient     Mediastinal lympadenopathy   HPI:  Mr. Alberto Gilliland is a 59-year-old male who is here for initial consultation for abnormal findings on CT scan obtained in April 2024.  CT chest (4/15/2024) with findings of small right lung nodules of indeterminate etiology and mediastinal lymphadenopathy.  Scans were obtained as part of workup for ED visit on 4/15/2024 for shortness of breath and right upper quadrant pain.  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  3 mm nodule lateral right lower lobe.  Nodules in the left base.  Scan with multiple new pulmonary nodules.  No further workup with done between April 2023 and April 2024.  Mr. Gilliland reports chronic dry cough, he gained weight due to constant eating and making efforts to quit smoking.  He reports bone pains all over the body and Dr. Davila, is treating him with gabapentin 4 times a day for  sciatica.  He takes oral iron on a daily basis.  He reports history of GI bleed (8 inches of small bowel was removed )on Coumadin which was discontinued after the mechanical valve was replaced.      Social history:  1PPD and 1.5 PPD previously.  Down to less than one 1PPD.  Marijuana for years.  Alcohol

## 2024-05-04 LAB
REVIEWED BY: NORMAL
SMEAR REVIEW: NORMAL

## 2024-05-04 RX ORDER — SODIUM CHLORIDE 9 MG/ML
5-250 INJECTION, SOLUTION INTRAVENOUS PRN
OUTPATIENT
Start: 2024-05-18

## 2024-05-04 RX ORDER — SODIUM CHLORIDE 9 MG/ML
INJECTION, SOLUTION INTRAVENOUS CONTINUOUS
OUTPATIENT
Start: 2024-05-18

## 2024-05-04 RX ORDER — SODIUM CHLORIDE 0.9 % (FLUSH) 0.9 %
5-40 SYRINGE (ML) INJECTION PRN
OUTPATIENT
Start: 2024-05-18

## 2024-05-04 RX ORDER — ALBUTEROL SULFATE 90 UG/1
4 AEROSOL, METERED RESPIRATORY (INHALATION) PRN
OUTPATIENT
Start: 2024-05-18

## 2024-05-04 RX ORDER — ACETAMINOPHEN 325 MG/1
650 TABLET ORAL
OUTPATIENT
Start: 2024-05-18

## 2024-05-04 RX ORDER — DIPHENHYDRAMINE HYDROCHLORIDE 50 MG/ML
50 INJECTION INTRAMUSCULAR; INTRAVENOUS
OUTPATIENT
Start: 2024-05-18

## 2024-05-04 RX ORDER — FAMOTIDINE 10 MG/ML
20 INJECTION, SOLUTION INTRAVENOUS
OUTPATIENT
Start: 2024-05-18

## 2024-05-04 RX ORDER — HEPARIN SODIUM (PORCINE) LOCK FLUSH IV SOLN 100 UNIT/ML 100 UNIT/ML
500 SOLUTION INTRAVENOUS PRN
OUTPATIENT
Start: 2024-05-18

## 2024-05-04 RX ORDER — EPINEPHRINE 1 MG/ML
0.3 INJECTION, SOLUTION, CONCENTRATE INTRAVENOUS PRN
OUTPATIENT
Start: 2024-05-18

## 2024-05-04 RX ORDER — ONDANSETRON 2 MG/ML
8 INJECTION INTRAMUSCULAR; INTRAVENOUS
OUTPATIENT
Start: 2024-05-18

## 2024-05-05 LAB — HAPTOGLOB SERPL-MCNC: 329 MG/DL (ref 30–200)

## 2024-05-06 ENCOUNTER — CLINICAL DOCUMENTATION (OUTPATIENT)
Dept: CASE MANAGEMENT | Age: 59
End: 2024-05-06

## 2024-05-06 ENCOUNTER — CARE COORDINATION (OUTPATIENT)
Dept: CASE MANAGEMENT | Age: 59
End: 2024-05-06

## 2024-05-06 LAB — LEUK/LYMPH PHENOTYPING WB: NORMAL

## 2024-05-06 NOTE — PROGRESS NOTES
Name: Alberto Gilliland  : 1965  MRN: [id number]    Oncology Navigation Follow-Up Note    Contact Type:  Medical Oncology    Notes: Pt stopped at office- he received a message from Dr. De Leon but didn't quit understand. Konrad informed scheduled for PET on  @ 0815. Dr. De Leon also ored IV iron infusions but have to wait for authorization from insurance- once approved someone will call to schedule times. Pt wondered since this is going on would Dr. De Leon sign for more work time off so wouldn't have to go to PCP. Konrad spoke to Dr. De Leon - agreed to have off for one month until all reports come back. Pt informed and instructed for him to  paper from employer to have staff fill out here. Voiced understanding.     Electronically signed by Kendy Garibay RN on 2024 at 3:55 PM

## 2024-05-06 NOTE — CARE COORDINATION
[place]   5/24/2024 10:30 AM Wallace Corral MD N Oncology [facility]   6/27/2024  3:30 PM Juan Christians[facility], DO [facility]   10/18/2024 10:30 AM Rosa Wise, APRN - CNP N [facility] Heart [facility]     External follow up appointment(s):     Care Transition Nurse reviewed discharge instructions, medical action plan, and red flags with patient and discussed any barriers to care and/or understanding of plan of care after discharge. Discussed appropriate site of care based on symptoms and resources available to patient including: PCP  Specialist  Urgent care clinics  When to call 911  [facility]. The patient agrees to contact the PCP office for questions related to their healthcare.     Advance Care Planning:   reviewed and current.     Patients top risk factors for readmission: RUQ pain intrabd hematoma hemoperitoneum after liver bx 1/31/24 HTN, methotrexate toxicity, CAD, lymphomatoid papulosis. HTN, mild COPD, CAD. DIONISIO  Interventions to address risk factors: Scheduled appointment with Specialist-Pulm 5/16/24; Onc f/u 5/24/24, Obtained and reviewed discharge summary and/or continuity of care documents, and Reviewed and followed up on pending diagnostic tests and treatments-PET/CT w/ bx 5/13/24    Offered patient enrollment in the Remote Patient Monitoring (RPM) program for in-home monitoring: Patient is not eligible for RPM program because: declined not now .     Care Transitions Subsequent and Final Call    Schedule Follow Up Appointment with PCP: Completed  Subsequent and Final Calls  Do you have any ongoing symptoms?: Yes  Onset of Patient-reported symptoms: In the past 7 days  Patient-reported symptoms: Cough, Fatigue, Chest Pain, Other  Interventions for patient-reported symptoms: Notified PCP/Physician  Have your medications changed?: No  Do you have any questions related to your medications?: No  Do you currently have any active services?: No  Do you have any needs or concerns that I can

## 2024-05-06 NOTE — PROGRESS NOTES
Name: Alberto Gilliland  : 1965  MRN: [id number]    Oncology Navigation- Initial Note:    Intake-  Contact Type: Medical Oncology  Brother Guerrero accompanied consultation    Diagnosis:  Mediastinal lymphadenopathy - unknown primary site    Home Disposition: Lives alone  - daughter Ronnie / son recently passed   -lots of issues: constant nonproductive cough,night sweats x 2 months,chills w/no fevers,extreme fatigue, lightheaded & dizziness,restless legs,black stools x1 week,bone pain in right shoulder  -off work [employer] - short term disability( per Dr. Allred) - suppose to return 2024  - sees Dr. Rogers for mental health since lost son  -autoimmune disease  on MTX weekly  -no appetite forces self to eat   - smokes 1/2 pack a day ( was 1- 1 1/2 pack per day up to age 48)  -alcohol - stopped 5 years/ was heavy drinker age 43 ( 6 pack beer & 2 -3 drinks Jack Bonilla daily)  -marijuana started teenage yeas quit 3-4 years ago    ONCPOC:  - referral ASAP pulmonary to obtain biopsy for diagnosis  -labs drawn today  -stool occult blood x3  -keep GI appt   -Rx zofran   - off work slip for 2 days  & but need to contact PCP for extension   - return MD apt       Patient needs and barriers to care: Coordination of Care, Knowledge deficit, Symptom Management, and Transportation     Referral Source: Outpatient    Receptive to Advanced Care Planning/ Palliative Care:  deferred    Interventions-   General Interventions: navigation program discussed;welcome folder reviewed & given,including contact information       Education/Screenings:  yes - mary carmen reiterated ONC POC       Referrals: pulmonary for biopsy ASAP     Biopsy site status: needing done       Continuum of Care: Diagnosis/Active Treatment    Notes: mary carmen following to assist & support as needed     Electronically signed by Kendy Garibay RN on 2024 at 3:21 PM

## 2024-05-09 ENCOUNTER — TELEPHONE (OUTPATIENT)
Dept: ONCOLOGY | Age: 59
End: 2024-05-09

## 2024-05-09 NOTE — TELEPHONE ENCOUNTER
Labs from 5/3/2024  Iron saturation 5%  Ferritin 18%    Findings consistent with iron deficiency anemia  Prior authorization requested for Venofer 300 mg IV x 3 doses weekly.  Detailed message was left on patient's phone explaining the lab results and the need for IV iron.  Patient presented to the clinic and the  information was discussed with him on 5/13/2024.  He expressed understanding of and willingness to proceed.

## 2024-05-13 ENCOUNTER — HOSPITAL ENCOUNTER (OUTPATIENT)
Dept: PET IMAGING | Age: 59
Discharge: HOME | End: 2024-05-13
Attending: INTERNAL MEDICINE
Payer: COMMERCIAL

## 2024-05-13 DIAGNOSIS — R91.8 LUNG NODULES: ICD-10-CM

## 2024-05-13 PROCEDURE — A9609 HC RX DIAGNOSTIC RADIOPHARMACEUTICAL: HCPCS | Performed by: INTERNAL MEDICINE

## 2024-05-13 PROCEDURE — 78815 PET IMAGE W/CT SKULL-THIGH: CPT

## 2024-05-13 PROCEDURE — 3430000000 HC RX DIAGNOSTIC RADIOPHARMACEUTICAL: Performed by: INTERNAL MEDICINE

## 2024-05-13 RX ORDER — FLUDEOXYGLUCOSE F 18 200 MCI/ML
10.7 INJECTION, SOLUTION INTRAVENOUS
Status: COMPLETED | OUTPATIENT
Start: 2024-05-13 | End: 2024-05-13

## 2024-05-13 RX ADMIN — FLUDEOXYGLUCOSE F 18 10.7 MILLICURIE: 200 INJECTION, SOLUTION INTRAVENOUS at 08:09

## 2024-05-14 ENCOUNTER — CARE COORDINATION (OUTPATIENT)
Dept: CASE MANAGEMENT | Age: 59
End: 2024-05-14

## 2024-05-14 NOTE — CARE COORDINATION
Care Transitions Note    Final Call      Patient Current Location:  Home: [place] [address]    Care Transition Nurse contacted the patient by telephone. Verified name and  as identifiers.    Patient graduated from the Care Transitions program on 24.  Patient/family verbalizes confidence in the ability to self-manage at this time. has the ability to self manage at this time..      Advance Care Planning:   Does patient have an Advance Directive: reviewed during previous call, see note. .    Handoff:   Patient was not referred to the ACM team due to  oncology CM will follow.      Care Summary Note:  Spoke with Alberto, said he is feeling ok, still gets fatigued easily.  Still has some chest pain when he eats, feels like it goes up into his chest.  Eats smaller portions.  Occasional cough continues, yellow phlegm.  Denies fever but has chills off and on but not too bad.  Will see pulmonary on Thursday and start iron infusions on Friday.  Needs to turn in his 3 stool samples he has.  Said the 1st 2 were black, 3rd one was brown.  Takes oral iron.  Denied seeing any blood in stool.  Had PET scan done and will get results Thursday.  Still c/o insomnia.  Some nights are better but will wake up and can't get back to sleep.  No issues with B&B.  Drinking adequate fluids.  No other issues to report.  Denies any other needs.  No other questions or concerns at this time.  Final call, appreciative of calls.    Assessments:  Care Transitions Subsequent and Final Call    Subsequent and Final Calls  Do you have any ongoing symptoms?: No  Have your medications changed?: No  Do you have any questions related to your medications?: No  Do you currently have any active services?: No  Do you have any needs or concerns that I can assist you with?: No  Identified Barriers: Lack of Education  Care Transitions Interventions     Transportation Support: Declined   Disease Specific Clinic: Completed      Disease Association:

## 2024-05-16 ENCOUNTER — OFFICE VISIT (OUTPATIENT)
Dept: PULMONOLOGY | Age: 59
End: 2024-05-16
Payer: COMMERCIAL

## 2024-05-16 VITALS
HEART RATE: 76 BPM | HEIGHT: 67 IN | SYSTOLIC BLOOD PRESSURE: 140 MMHG | TEMPERATURE: 98 F | BODY MASS INDEX: 31.23 KG/M2 | DIASTOLIC BLOOD PRESSURE: 74 MMHG | OXYGEN SATURATION: 96 % | WEIGHT: 199 LBS

## 2024-05-16 DIAGNOSIS — R59.0 LAD (LYMPHADENOPATHY), MEDIASTINAL: ICD-10-CM

## 2024-05-16 DIAGNOSIS — D84.821 IMMUNOSUPPRESSED DUE TO CHEMOTHERAPY (HCC): ICD-10-CM

## 2024-05-16 DIAGNOSIS — R91.1 PULMONARY NODULE: Primary | ICD-10-CM

## 2024-05-16 DIAGNOSIS — Z79.899 IMMUNOSUPPRESSED DUE TO CHEMOTHERAPY (HCC): ICD-10-CM

## 2024-05-16 DIAGNOSIS — T45.1X5A IMMUNOSUPPRESSED DUE TO CHEMOTHERAPY (HCC): ICD-10-CM

## 2024-05-16 PROCEDURE — 99205 OFFICE O/P NEW HI 60 MIN: CPT | Performed by: INTERNAL MEDICINE

## 2024-05-16 PROCEDURE — 3078F DIAST BP <80 MM HG: CPT | Performed by: INTERNAL MEDICINE

## 2024-05-16 PROCEDURE — 3077F SYST BP >= 140 MM HG: CPT | Performed by: INTERNAL MEDICINE

## 2024-05-16 RX ORDER — SODIUM CHLORIDE 9 MG/ML
INJECTION, SOLUTION INTRAVENOUS CONTINUOUS
Status: DISCONTINUED | OUTPATIENT
Start: 2024-05-16 | End: 2024-05-17 | Stop reason: HOSPADM

## 2024-05-16 RX ORDER — ALPRAZOLAM 0.5 MG/1
TABLET ORAL
COMMUNITY
Start: 2024-05-10

## 2024-05-16 RX ORDER — LACTULOSE 10 G/15ML
SOLUTION ORAL
COMMUNITY
Start: 2024-05-09

## 2024-05-16 ASSESSMENT — ENCOUNTER SYMPTOMS
WHEEZING: 0
VOICE CHANGE: 0
CHEST TIGHTNESS: 0
SHORTNESS OF BREATH: 0
BLOOD IN STOOL: 0
CHOKING: 0
TROUBLE SWALLOWING: 0
COUGH: 0
VOMITING: 0
DIARRHEA: 0

## 2024-05-17 ENCOUNTER — HOSPITAL ENCOUNTER (OUTPATIENT)
Age: 59
Setting detail: OUTPATIENT SURGERY
Discharge: HOME | End: 2024-05-17
Attending: INTERNAL MEDICINE | Admitting: INTERNAL MEDICINE
Payer: COMMERCIAL

## 2024-05-17 ENCOUNTER — ANESTHESIA (OUTPATIENT)
Dept: ENDOSCOPY | Age: 59
End: 2024-05-17
Payer: COMMERCIAL

## 2024-05-17 ENCOUNTER — ANESTHESIA EVENT (OUTPATIENT)
Dept: ENDOSCOPY | Age: 59
End: 2024-05-17
Payer: COMMERCIAL

## 2024-05-17 VITALS
DIASTOLIC BLOOD PRESSURE: 60 MMHG | HEART RATE: 76 BPM | WEIGHT: 199 LBS | SYSTOLIC BLOOD PRESSURE: 119 MMHG | RESPIRATION RATE: 21 BRPM | HEIGHT: 67 IN | BODY MASS INDEX: 31.23 KG/M2 | TEMPERATURE: 97.2 F | OXYGEN SATURATION: 94 %

## 2024-05-17 LAB
ACINETOBACTER CALCOACETICUS-BAUMANNII BY PCR: NOT DETECTED
BLACTX-M ISLT/SPM QL: NORMAL
BLAIMP ISLT/SPM QL: NORMAL
BLAKPC ISLT/SPM QL: NORMAL
BLAOXA-48-LIKE ISLT/SPM QL: NORMAL
BLAVIM ISLT/SPM QL: NORMAL
C PNEUM DNA LOWER RESP QL NAA+NON-PROBE: NOT DETECTED
CYTOLOGY-NON GYN: NORMAL
ENTEROBACTER CLOACAE COMPLEX BY PCR: NOT DETECTED
ESCHERICHIA COLI BY PCR: NOT DETECTED
FLUAV RNA LOWER RESP QL NAA+NON-PROBE: NOT DETECTED
FLUBV RNA LOWER RESP QL NAA+NON-PROBE: NOT DETECTED
FUNGUS SPEC FUNGUS STN: NORMAL
HADV DNA LOWER RESP QL NAA+NON-PROBE: NOT DETECTED
HAEMOPHILUS INFLUENZAE BY PCR: NOT DETECTED
HCOV RNA LOWER RESP QL NAA+NON-PROBE: NOT DETECTED
HMPV RNA LOWER RESP QL NAA+NON-PROBE: NOT DETECTED
HPIV RNA LOWER RESP QL NAA+NON-PROBE: NOT DETECTED
KLEBSIELLA AEROGENES BY PCR: NOT DETECTED
KLEBSIELLA OXYTOCA BY PCR: NOT DETECTED
KLEBSIELLA PNEUMONIAE GROUP BY PCR: NOT DETECTED
L PNEUMO DNA LOWER RESP QL NAA+NON-PROBE: NOT DETECTED
M PNEUMO DNA LOWER RESP QL NAA+NON-PROBE: NOT DETECTED
MORAXELLA CATARRHALIS BY PCR: NOT DETECTED
PROTEUS SPECIES BY PCR: NOT DETECTED
PSEUDOMONAS AERUGINOSA BY PCR: NOT DETECTED
RESISTANT GENE MECA/C & MREJ BY PCR: NORMAL
RESISTANT GENE NDM BY PCR: NORMAL
RSV RNA LOWER RESP QL NAA+NON-PROBE: NOT DETECTED
RV+EV RNA LOWER RESP QL NAA+NON-PROBE: NOT DETECTED
SERRATIA MARCESCENS BY PCR: NOT DETECTED
SOURCE: NORMAL
SPECIMEN ACCEPTABILITY: NORMAL
STAPH AUREUS BY PCR: NOT DETECTED
STREP AGALACTIAE BY PCR: NOT DETECTED
STREP PNEUMONIAE BY PCR: NOT DETECTED
STREP PYOGENES BY PCR: NOT DETECTED

## 2024-05-17 PROCEDURE — 2580000003 HC RX 258: Performed by: INTERNAL MEDICINE

## 2024-05-17 PROCEDURE — 89051 BODY FLUID CELL COUNT: CPT

## 2024-05-17 PROCEDURE — 87529 HSV DNA AMP PROBE: CPT

## 2024-05-17 PROCEDURE — 6360000002 HC RX W HCPCS: Performed by: NURSE ANESTHETIST, CERTIFIED REGISTERED

## 2024-05-17 PROCEDURE — 7100000000 HC PACU RECOVERY - FIRST 15 MIN: Performed by: INTERNAL MEDICINE

## 2024-05-17 PROCEDURE — 88184 FLOWCYTOMETRY/ TC 1 MARKER: CPT

## 2024-05-17 PROCEDURE — 88185 FLOWCYTOMETRY/TC ADD-ON: CPT

## 2024-05-17 PROCEDURE — 87581 M.PNEUMON DNA AMP PROBE: CPT

## 2024-05-17 PROCEDURE — 87206 SMEAR FLUORESCENT/ACID STAI: CPT

## 2024-05-17 PROCEDURE — 36415 COLL VENOUS BLD VENIPUNCTURE: CPT

## 2024-05-17 PROCEDURE — 87496 CYTOMEG DNA AMP PROBE: CPT

## 2024-05-17 PROCEDURE — 87541 LEGION PNEUMO DNA AMP PROB: CPT

## 2024-05-17 PROCEDURE — 31645 BRNCHSC W/THER ASPIR 1ST: CPT | Performed by: INTERNAL MEDICINE

## 2024-05-17 PROCEDURE — 88177 CYTP FNA EVAL EA ADDL: CPT

## 2024-05-17 PROCEDURE — 87305 ASPERGILLUS AG IA: CPT

## 2024-05-17 PROCEDURE — 87631 RESP VIRUS 3-5 TARGETS: CPT

## 2024-05-17 PROCEDURE — 3700000001 HC ADD 15 MINUTES (ANESTHESIA): Performed by: INTERNAL MEDICINE

## 2024-05-17 PROCEDURE — 87070 CULTURE OTHR SPECIMN AEROBIC: CPT

## 2024-05-17 PROCEDURE — 87798 DETECT AGENT NOS DNA AMP: CPT

## 2024-05-17 PROCEDURE — 87205 SMEAR GRAM STAIN: CPT

## 2024-05-17 PROCEDURE — 88172 CYTP DX EVAL FNA 1ST EA SITE: CPT

## 2024-05-17 PROCEDURE — 88312 SPECIAL STAINS GROUP 1: CPT

## 2024-05-17 PROCEDURE — 31624 DX BRONCHOSCOPE/LAVAGE: CPT | Performed by: INTERNAL MEDICINE

## 2024-05-17 PROCEDURE — 88305 TISSUE EXAM BY PATHOLOGIST: CPT

## 2024-05-17 PROCEDURE — 87102 FUNGUS ISOLATION CULTURE: CPT

## 2024-05-17 PROCEDURE — 88112 CYTOPATH CELL ENHANCE TECH: CPT

## 2024-05-17 PROCEDURE — 87486 CHLMYD PNEUM DNA AMP PROBE: CPT

## 2024-05-17 PROCEDURE — 87116 MYCOBACTERIA CULTURE: CPT

## 2024-05-17 PROCEDURE — 6370000000 HC RX 637 (ALT 250 FOR IP)

## 2024-05-17 PROCEDURE — 88173 CYTOPATH EVAL FNA REPORT: CPT

## 2024-05-17 PROCEDURE — 3700000000 HC ANESTHESIA ATTENDED CARE: Performed by: INTERNAL MEDICINE

## 2024-05-17 PROCEDURE — 7100000001 HC PACU RECOVERY - ADDTL 15 MIN: Performed by: INTERNAL MEDICINE

## 2024-05-17 PROCEDURE — 7100000010 HC PHASE II RECOVERY - FIRST 15 MIN: Performed by: INTERNAL MEDICINE

## 2024-05-17 PROCEDURE — 31652 BRONCH EBUS SAMPLNG 1/2 NODE: CPT | Performed by: INTERNAL MEDICINE

## 2024-05-17 PROCEDURE — 6370000000 HC RX 637 (ALT 250 FOR IP): Performed by: NURSE ANESTHETIST, CERTIFIED REGISTERED

## 2024-05-17 PROCEDURE — 2500000003 HC RX 250 WO HCPCS: Performed by: NURSE ANESTHETIST, CERTIFIED REGISTERED

## 2024-05-17 PROCEDURE — 3603165200 HC BRNCHSC EBUS GUIDED SAMPL 1/2 NODE STATION/STRUX: Performed by: INTERNAL MEDICINE

## 2024-05-17 PROCEDURE — 88108 CYTOPATH CONCENTRATE TECH: CPT

## 2024-05-17 RX ORDER — LIDOCAINE HYDROCHLORIDE 20 MG/ML
INJECTION, SOLUTION EPIDURAL; INFILTRATION; INTRACAUDAL; PERINEURAL PRN
Status: DISCONTINUED | OUTPATIENT
Start: 2024-05-17 | End: 2024-05-17 | Stop reason: SDUPTHER

## 2024-05-17 RX ORDER — FENTANYL CITRATE 50 UG/ML
INJECTION, SOLUTION INTRAMUSCULAR; INTRAVENOUS PRN
Status: DISCONTINUED | OUTPATIENT
Start: 2024-05-17 | End: 2024-05-17 | Stop reason: SDUPTHER

## 2024-05-17 RX ORDER — ROCURONIUM BROMIDE 10 MG/ML
INJECTION, SOLUTION INTRAVENOUS PRN
Status: DISCONTINUED | OUTPATIENT
Start: 2024-05-17 | End: 2024-05-17 | Stop reason: SDUPTHER

## 2024-05-17 RX ORDER — IPRATROPIUM BROMIDE AND ALBUTEROL SULFATE 2.5; .5 MG/3ML; MG/3ML
SOLUTION RESPIRATORY (INHALATION)
Status: COMPLETED
Start: 2024-05-17 | End: 2024-05-17

## 2024-05-17 RX ORDER — ONDANSETRON 2 MG/ML
INJECTION INTRAMUSCULAR; INTRAVENOUS PRN
Status: DISCONTINUED | OUTPATIENT
Start: 2024-05-17 | End: 2024-05-17 | Stop reason: SDUPTHER

## 2024-05-17 RX ORDER — IPRATROPIUM BROMIDE AND ALBUTEROL SULFATE 2.5; .5 MG/3ML; MG/3ML
1 SOLUTION RESPIRATORY (INHALATION) ONCE
Status: COMPLETED | OUTPATIENT
Start: 2024-05-17 | End: 2024-05-17

## 2024-05-17 RX ORDER — MIDAZOLAM HYDROCHLORIDE 1 MG/ML
INJECTION INTRAMUSCULAR; INTRAVENOUS PRN
Status: DISCONTINUED | OUTPATIENT
Start: 2024-05-17 | End: 2024-05-17 | Stop reason: SDUPTHER

## 2024-05-17 RX ORDER — PROPOFOL 10 MG/ML
INJECTION, EMULSION INTRAVENOUS PRN
Status: DISCONTINUED | OUTPATIENT
Start: 2024-05-17 | End: 2024-05-17 | Stop reason: SDUPTHER

## 2024-05-17 RX ORDER — LIDOCAINE HYDROCHLORIDE 40 MG/ML
SOLUTION TOPICAL PRN
Status: DISCONTINUED | OUTPATIENT
Start: 2024-05-17 | End: 2024-05-17 | Stop reason: SDUPTHER

## 2024-05-17 RX ADMIN — SODIUM CHLORIDE: 9 INJECTION, SOLUTION INTRAVENOUS at 12:03

## 2024-05-17 RX ADMIN — LIDOCAINE HYDROCHLORIDE 4 ML: 40 SOLUTION TOPICAL at 12:35

## 2024-05-17 RX ADMIN — IPRATROPIUM BROMIDE AND ALBUTEROL SULFATE 1 DOSE: 2.5; .5 SOLUTION RESPIRATORY (INHALATION) at 13:31

## 2024-05-17 RX ADMIN — IPRATROPIUM BROMIDE AND ALBUTEROL SULFATE 1 DOSE: .5; 3 SOLUTION RESPIRATORY (INHALATION) at 13:31

## 2024-05-17 RX ADMIN — ONDANSETRON 4 MG: 2 INJECTION INTRAMUSCULAR; INTRAVENOUS at 12:44

## 2024-05-17 RX ADMIN — PROPOFOL 200 MG: 10 INJECTION, EMULSION INTRAVENOUS at 12:34

## 2024-05-17 RX ADMIN — MIDAZOLAM 2 MG: 1 INJECTION INTRAMUSCULAR; INTRAVENOUS at 12:31

## 2024-05-17 RX ADMIN — SUGAMMADEX 200 MG: 100 INJECTION, SOLUTION INTRAVENOUS at 13:17

## 2024-05-17 RX ADMIN — LIDOCAINE HYDROCHLORIDE 80 MG: 20 INJECTION, SOLUTION EPIDURAL; INFILTRATION; INTRACAUDAL; PERINEURAL at 12:33

## 2024-05-17 RX ADMIN — ROCURONIUM BROMIDE 50 MG: 10 INJECTION INTRAVENOUS at 12:33

## 2024-05-17 RX ADMIN — FENTANYL CITRATE 100 MCG: 50 INJECTION, SOLUTION INTRAMUSCULAR; INTRAVENOUS at 12:32

## 2024-05-17 ASSESSMENT — ENCOUNTER SYMPTOMS
CHEST TIGHTNESS: 0
SHORTNESS OF BREATH: 1
VOICE CHANGE: 0
DIARRHEA: 0
SHORTNESS OF BREATH: 0
CHOKING: 0
COUGH: 0
WHEEZING: 0
VOMITING: 0
TROUBLE SWALLOWING: 0
BLOOD IN STOOL: 0

## 2024-05-17 ASSESSMENT — PAIN - FUNCTIONAL ASSESSMENT
PAIN_FUNCTIONAL_ASSESSMENT: NONE - DENIES PAIN

## 2024-05-17 NOTE — PROGRESS NOTES
Recovery mode, arouses easily, denies discomfort. Taking ice chips, Dr. Mckinley discussed findings and plan of care with pt and , understandings verbalized.

## 2024-05-17 NOTE — H&P
History and Physical     Name:Alberto Gilliland  Medical Record Number:[id number]  Account Number: [id number]  Age: 59 y.o.      CHIEF COMPLAINT / HPI:                The patient is a 59 y.o. male with significant past medical history of   Patient Active Problem List   Diagnosis    HTN (hypertension)    Lumbago    Lumbar radiculopathy    Cervicalgia, C5-7    Hyperlipidemia    Hypothyroidism    Chronic anxiety    Iron deficiency anemia, blood loss    ED (erectile dysfunction)    Bilateral sciatica    Trigger finger, right, ring     Medication monitoring encounter    Nocturnal leg cramps    GERD (gastroesophageal reflux disease)    IFG (impaired fasting glucose)    Situational depression    Dysthymia (or depressive neurosis)    Herniation of cervical intervertebral disc with radiculopathy    H/O cervical spine surgery, C4-5 fusion, 3/2/16.    Gastrointestinal hemorrhage associated with gastrojejunal ulcer    S/P AVR (aortic valve replacement), 7/20/16.    Tobacco abuse    Lymphomatoid papulosis     Lumbar radiculitis    Lumbar spinal stenosis    Spondylosis of thoracic region without myelopathy or radiculopathy    Thoracic spinal stenosis    Chronic pain syndrome    Urinary hesitancy    Malignant lymphoma, non-Hodgkin's (HCC)    COPD, mild (HCC)    Abdominal aortic aneurysm (AAA) without rupture, unspecified part (HCC)    Hemoperitoneum    Moderate malnutrition (HCC)    Abdominal hematoma    Ventral hernia without obstruction or gangrene    Fatty liver    Gastro-esophageal reflux disease with esophagitis, without bleeding    Slow transit constipation    Chronic systolic (congestive) heart failure    Shortness of breath      presents with complaints of mediastinal LAD with some pulmonary nodules in setting of immunocompromised state on MTX, and is a current smoker, who had a CTA chest which revealed some bulky mediastinal LAD. Follow up pet negative, however, is current smoker and immunocompromised so higher risk for  Diabetes Father     Cancer Father     Heart Disease Father 35        CABG    High Blood Pressure Father     Diabetes Sister         AS A CHILD    Kidney Disease Sister     High Blood Pressure Sister     COPD Sister     Heart Disease Sister     Lung Cancer Brother     Stroke Neg Hx      REVIEW OF SYSTEMS:    Review of Systems   Constitutional:  Negative for chills, fever and unexpected weight change.   HENT:  Negative for trouble swallowing and voice change.    Eyes:  Negative for visual disturbance.   Respiratory:  Negative for cough, choking, chest tightness, shortness of breath and wheezing.    Cardiovascular:  Negative for chest pain, palpitations and leg swelling.   Gastrointestinal:  Negative for blood in stool, diarrhea and vomiting.   Allergic/Immunologic: Positive for immunocompromised state.   Neurological:  Negative for weakness.   Hematological:  Negative for adenopathy. Does not bruise/bleed easily.       Objective:   PHYSICAL EXAM:      VITALS:  BP (!) 147/64   Pulse 76   Temp 97.4 °F (36.3 °C) (Temporal)   Resp 16   Ht 1.702 m (5' 7\")   Wt 90.3 kg (199 lb)   SpO2 99%   BMI 31.17 kg/m²   24HR INTAKE/OUTPUT:  No intake or output data in the 24 hours ending 05/17/24 1221  CURRENT PULSE OXIMETRY:  SpO2: 99 %  O2: .flow[182717:last    Physical Exam  Vitals and nursing note reviewed.   Constitutional:       Appearance: Normal appearance.   HENT:      Head: Normocephalic and atraumatic.   Eyes:      General: No scleral icterus.     Extraocular Movements: Extraocular movements intact.   Cardiovascular:      Rate and Rhythm: Normal rate and regular rhythm.      Pulses: Normal pulses.      Heart sounds: No murmur heard.  Pulmonary:      Effort: Pulmonary effort is normal. No respiratory distress.      Breath sounds: Normal breath sounds. No wheezing or rales.   Abdominal:      General: Abdomen is flat.      Palpations: Abdomen is soft.   Musculoskeletal:      Cervical back: Normal range of motion and neck

## 2024-05-17 NOTE — PROGRESS NOTES
admitted to Endo department and admitted to Endo room 4  Plan of care reviewed with patient.   Call light within reach.   Bed in lowest position, locked, with one bed rail up.   Appropriate arm bands on patient.   Bathroom offered.   All questions and concerns of patient addressed    Name: ben  Relationship to patient:   Phone number: [phone]

## 2024-05-17 NOTE — PROGRESS NOTES
Bronchoscopy completed with Scope  Used. BAL completed.     Patient in endo for EBUS. Scope  used. 22 gauge visio shot needle used for  10 passes. FNA in position. 7, 4R Slides and 3  jars hand carried by pathology to lab. Pictures taken. Procedure completed. Patient tolerated well.

## 2024-05-17 NOTE — PROGRESS NOTES
1325 pt arrived to PACU, awakens to voice. Coughing on arrival. VSS  1331 Duoneb given  1340 pt denies pain. VSS  1355 meets criteria for discharge from PACU, transported to [facility] recovery in stable condition

## 2024-05-17 NOTE — ANESTHESIA POSTPROCEDURE EVALUATION
Department of Anesthesiology  Postprocedure Note    Patient: Alberto Gilliland  MRN: [id number]  YOB: 1965  Date of evaluation: 5/17/2024    Procedure Summary       Date: 05/17/24 Room / Location: [place] [address] / [facility]    Anesthesia Start: 1230 Anesthesia Stop: 1331    Procedure: EBUS Diagnosis:       LAD (lymphadenopathy)      Pulmonary nodule      Immunosuppressed due to chemotherapy (HCC)      (LAD (lymphadenopathy) [R59.1])      (Pulmonary nodule [R91.1])      (Immunosuppressed due to chemotherapy (HCC) [D84.821, T45.1X5A, Z79.899])    Surgeons: Jonny Mckinley MD Responsible Provider: Albert Rivera MD    Anesthesia Type: general ASA Status: 3            Anesthesia Type: No value filed.    Anna Phase I: Anna Score: 10    Anna Phase II:      Anesthesia Post Evaluation    Patient location during evaluation: PACU  Patient participation: complete - patient participated  Level of consciousness: awake and alert  Pain score: 0  Airway patency: patent  Nausea & Vomiting: no nausea and no vomiting  Cardiovascular status: blood pressure returned to baseline and hemodynamically stable  Respiratory status: acceptable, face mask, nonlabored ventilation and spontaneous ventilation  Hydration status: stable  Pain management: satisfactory to patient        No notable events documented.

## 2024-05-17 NOTE — PROCEDURES
PROCEDURE NOTE  Date: 2024   Name: Alberto Gilliland  YOB: 1965    Procedures     Bronchoscopy with EBUS (Endobronchial ultrasound guided) Procedure Note    Patient: Alberto Gilliland  : 1965      Operation: Flexible fiberoptic bronchoscopy, endobronchial ultrasound, transbronchial (endobronchial ultrasound guided) biopsy of lymph node/s with out fluoroscopy. Flexible diagnostic fiberoptic bronchoscopy without fluoroscopy. During procedure BAL obtained from right LL. Date of Operation: 2024    Indication for procedure: Mediastinal lymphadenopathy, immunocompromised, questionable history of lymphoma    Pre operative diagnoses: lung lesion concerning for malignancy    Post operative diagnoses: lung lesion concerning for malignancy       Surgeon: Jonny Mckinley MD    Assistants: N/A    Consent: Alberto Gilliland is a 59 y.o. male . The risks, benefits, complications, treatment options and expected outcomes were discussed with the patient. Consent obtained from the patient after explaining the risks and complications of the procedure. The possibilities of reaction to medication, pulmonary aspiration, perforation of a airway, bleeding, failure to diagnose a condition and creating a complication requiring transfusion,respiratory failure and operation were discussed with the patient who freely signed the consent.    Anesthesia: General endotracheal anesthesia/MAC anesthesia.    Description of procedure:  A time out was taken. Please see anesthesiologist note for details.     EBUS (Endobronchial ultrasound):    After achieving adequate sedation the EBUS scope was passed through the ET tube.    The standard Flexible fiberoptic bronchoscope was passed into the trachea. The candace is sharp with no growths. Careful inspection of the tracheal lumen was accomplished with no growths.    The scope was  advanced into the right main bronchus and then into the Right upper lobe, Right middle lobe, and Right lower

## 2024-05-17 NOTE — ANESTHESIA PRE PROCEDURE
Department of Anesthesiology  Preprocedure Note       Name:  Alberto Gilliland   Age:  59 y.o.  :  1965                                          MRN:  [id number]         Date:  2024      Surgeon: Surgeon(s):  Jonny Mckinley MD    Procedure: Procedure(s):  EBUS    Medications prior to admission:   Prior to Admission medications    Medication Sig Start Date End Date Taking? Authorizing Provider   ALPRAZolam (XANAX) 0.5 MG tablet TAKE 1 TABLET BY MOUTH EVERY DAY AS NEEDED FOR ANXIETY or sleep 5/10/24   Ying Sevilla MD   lactulose (CHRONULAC) 10 GM/15ML solution TAKE 30 ML BY MOUTH TWO TIMES A DAY AS NEEDED 24   Ying Sevilla MD   ondansetron (ZOFRAN) 4 MG tablet Take 1 tablet by mouth every 4-6 hours as needed for Nausea or Vomiting 5/3/24   Ivy De Leon MD   sucralfate (CARAFATE) 1 GM tablet Take 1 tablet by mouth 4 times daily (before meals and nightly) 24   Devika Landrum, PA   levothyroxine (SYNTHROID) 75 MCG tablet TAKE 1 TABLET BY MOUTH EVERY DAY 3/11/24   Juan Christiansen DO   atorvastatin (LIPITOR) 20 MG tablet Take 1 tablet by mouth daily 3/2/21   Ying Sevilla MD   buPROPion (WELLBUTRIN SR) 100 MG extended release tablet Take 1 tablet by mouth daily 3/2/21   Ying Sevilla MD   clobetasol (TEMOVATE) 0.05 % cream Apply 1 each topically 2 times daily    Ying Sevilla MD   gabapentin (NEURONTIN) 400 MG capsule TAKE 1 CAPSULE BY MOUTH THREE TIMES A DAY 24  Juan Christiansen DO   acetaminophen (TYLENOL) 325 MG tablet Take 2 tablets by mouth every 6 hours as needed for Pain    Ying Sevilla MD   escitalopram (LEXAPRO) 10 MG tablet Take 1 tablet by mouth daily 24   Juan Christiansen DO   ASPIRIN LOW DOSE 81 MG EC tablet TAKE 1 TABLET BY MOUTH EVERY DAY 8/10/23   Tanisha Hathaway MD   esomeprazole (NEXIUM) 40 MG delayed release capsule TAKE 1 CAPSULE BY MOUTH IN THE MORNING before breakfast 23   Tanisha Hathaway

## 2024-05-18 LAB
AFB CULTURE & SMEAR: NORMAL
BAL CHARACTER: ABNORMAL
BAL COLLECTION SITE: ABNORMAL
BAL COLOR: ABNORMAL
CILIATED/EPITHELIAL CELLS BAL: 57 % (ref 0–5)
LYMPHOCYTES NFR BRONCH MANUAL: 7 % (ref 10–15)
MACROPHAGE/MONOCYTE BAL: 9 % (ref 86–100)
NEUTROPHILS NFR BRONCH MANUAL: 27 % (ref 0–3)
PATHOLOGIST REVIEW: ABNORMAL
RBC BAL: 5767 /CUMM
SPECIMEN SOURCE: NORMAL
TOTAL NUCLEATED CELLS BAL: 100 /CUMM
TOTAL VOLUME RECEIVED BAL: 46 ML

## 2024-05-19 LAB
ACID FAST MOD KINY STN SPEC: NORMAL
BACTERIA SPEC RESP CULT: NORMAL
GRAM STN SPEC: NORMAL

## 2024-05-20 LAB
AFB CULTURE & SMEAR: NORMAL
FUNGUS SPEC CULT: NORMAL
FUNGUS SPEC FUNGUS STN: NORMAL
LEUKEMIA/LYMPHOMA PHENOTYPING MISC: NORMAL
LEUKEMIA/LYMPHOMA PHENOTYPING MISC: NORMAL
MISC. #1 REFERENCE GROUP TEST: NORMAL

## 2024-05-21 LAB
CMV DNA SPEC QL NAA+PROBE: NOT DETECTED
HSV1 DNA SPEC QL NAA+PROBE: NOT DETECTED
HSV2 DNA SPEC QL NAA+PROBE: NOT DETECTED
SPECIMEN SOURCE: NORMAL

## 2024-05-22 ENCOUNTER — HOSPITAL ENCOUNTER (OUTPATIENT)
Age: 59
Discharge: HOME | End: 2024-05-22
Payer: COMMERCIAL

## 2024-05-22 LAB
ALT SERPL W/O P-5'-P-CCNC: 9 U/L (ref 11–66)
AST SERPL-CCNC: 11 U/L (ref 5–40)
BUN SERPL-MCNC: 11 MG/DL (ref 7–22)
CREAT SERPL-MCNC: 0.7 MG/DL (ref 0.4–1.2)
DEPRECATED RDW RBC AUTO: 63.5 FL (ref 35–45)
ERYTHROCYTE [DISTWIDTH] IN BLOOD BY AUTOMATED COUNT: 21.3 % (ref 11.5–14.5)
GFR SERPL CREATININE-BSD FRML MDRD: > 90 ML/MIN/1.73M2
HCT VFR BLD AUTO: 29.4 % (ref 42–52)
HGB BLD-MCNC: 9.4 GM/DL (ref 14–18)
MCH RBC QN AUTO: 27.2 PG (ref 26–33)
MCHC RBC AUTO-ENTMCNC: 32 GM/DL (ref 32.2–35.5)
MCV RBC AUTO: 85 FL (ref 80–94)
PLATELET # BLD AUTO: 393 THOU/MM3 (ref 130–400)
PMV BLD AUTO: 9.9 FL (ref 9.4–12.4)
RBC # BLD AUTO: 3.46 MILL/MM3 (ref 4.7–6.1)
WBC # BLD AUTO: 10.8 THOU/MM3 (ref 4.8–10.8)

## 2024-05-22 PROCEDURE — 84460 ALANINE AMINO (ALT) (SGPT): CPT

## 2024-05-22 PROCEDURE — 84450 TRANSFERASE (AST) (SGOT): CPT

## 2024-05-22 PROCEDURE — 36415 COLL VENOUS BLD VENIPUNCTURE: CPT

## 2024-05-22 PROCEDURE — 82565 ASSAY OF CREATININE: CPT

## 2024-05-22 PROCEDURE — 84520 ASSAY OF UREA NITROGEN: CPT

## 2024-05-22 PROCEDURE — 85027 COMPLETE CBC AUTOMATED: CPT

## 2024-05-23 DIAGNOSIS — D50.9 IRON DEFICIENCY ANEMIA, UNSPECIFIED IRON DEFICIENCY ANEMIA TYPE: Primary | ICD-10-CM

## 2024-05-24 ENCOUNTER — OFFICE VISIT (OUTPATIENT)
Dept: ONCOLOGY | Age: 59
End: 2024-05-24
Payer: COMMERCIAL

## 2024-05-24 ENCOUNTER — HOSPITAL ENCOUNTER (OUTPATIENT)
Dept: INFUSION THERAPY | Age: 59
Discharge: HOME | End: 2024-05-24
Payer: COMMERCIAL

## 2024-05-24 VITALS
BODY MASS INDEX: 31.77 KG/M2 | SYSTOLIC BLOOD PRESSURE: 156 MMHG | HEART RATE: 66 BPM | DIASTOLIC BLOOD PRESSURE: 73 MMHG | OXYGEN SATURATION: 98 % | HEIGHT: 67 IN | WEIGHT: 202.4 LBS | TEMPERATURE: 98 F | RESPIRATION RATE: 18 BRPM

## 2024-05-24 VITALS
HEIGHT: 67 IN | OXYGEN SATURATION: 94 % | HEART RATE: 84 BPM | TEMPERATURE: 97.7 F | BODY MASS INDEX: 31.77 KG/M2 | WEIGHT: 202.4 LBS | RESPIRATION RATE: 16 BRPM | SYSTOLIC BLOOD PRESSURE: 137 MMHG | DIASTOLIC BLOOD PRESSURE: 62 MMHG

## 2024-05-24 DIAGNOSIS — D50.9 IRON DEFICIENCY ANEMIA, UNSPECIFIED IRON DEFICIENCY ANEMIA TYPE: Primary | ICD-10-CM

## 2024-05-24 DIAGNOSIS — R59.1 LYMPHADENOPATHY: ICD-10-CM

## 2024-05-24 DIAGNOSIS — R91.8 LUNG NODULES: ICD-10-CM

## 2024-05-24 LAB
BASOPHILS ABSOLUTE: 0.1 THOU/MM3 (ref 0–0.1)
BASOPHILS NFR BLD AUTO: 1 % (ref 0–3)
EOSINOPHIL NFR BLD AUTO: 5 % (ref 0–4)
EOSINOPHILS ABSOLUTE: 0.5 THOU/MM3 (ref 0–0.4)
ERYTHROCYTE [DISTWIDTH] IN BLOOD BY AUTOMATED COUNT: 21.3 % (ref 11.5–14.5)
FERRITIN SERPL IA-MCNC: 14 NG/ML (ref 22–322)
HCT VFR BLD AUTO: 29.6 % (ref 42–52)
HGB BLD-MCNC: 9.1 GM/DL (ref 14–18)
IMMATURE GRANULOCYTES %: 0 %
IMMATURE GRANULOCYTES ABSOLUTE: 0.02 THOU/MM3 (ref 0–0.07)
IRON SATN MFR SERPL: 6 % (ref 20–50)
IRON SERPL-MCNC: 19 UG/DL (ref 65–195)
LYMPHOCYTES ABSOLUTE: 1 THOU/MM3 (ref 1–4.8)
LYMPHOCYTES NFR BLD AUTO: 9 % (ref 15–47)
MCH RBC QN AUTO: 26.1 PG (ref 26–33)
MCHC RBC AUTO-ENTMCNC: 30.7 GM/DL (ref 32.2–35.5)
MCV RBC AUTO: 85 FL (ref 80–94)
MONOCYTES ABSOLUTE: 1 THOU/MM3 (ref 0.4–1.3)
MONOCYTES NFR BLD AUTO: 9 % (ref 0–12)
NEUTROPHILS ABSOLUTE: 8.3 THOU/MM3 (ref 1.8–7.7)
NEUTROPHILS NFR BLD AUTO: 77 % (ref 43–75)
PLATELET # BLD AUTO: 392 THOU/MM3 (ref 130–400)
PMV BLD AUTO: 9.4 FL (ref 9.4–12.4)
RBC # BLD AUTO: 3.49 MILL/MM3 (ref 4.7–6.1)
TIBC SERPL-MCNC: 329 UG/DL (ref 171–450)
WBC # BLD AUTO: 10.8 THOU/MM3 (ref 4.8–10.8)

## 2024-05-24 PROCEDURE — 99213 OFFICE O/P EST LOW 20 MIN: CPT | Performed by: INTERNAL MEDICINE

## 2024-05-24 PROCEDURE — 3075F SYST BP GE 130 - 139MM HG: CPT | Performed by: INTERNAL MEDICINE

## 2024-05-24 PROCEDURE — 96365 THER/PROPH/DIAG IV INF INIT: CPT

## 2024-05-24 PROCEDURE — 6360000002 HC RX W HCPCS: Performed by: INTERNAL MEDICINE

## 2024-05-24 PROCEDURE — 83550 IRON BINDING TEST: CPT

## 2024-05-24 PROCEDURE — 99211 OFF/OP EST MAY X REQ PHY/QHP: CPT

## 2024-05-24 PROCEDURE — 96366 THER/PROPH/DIAG IV INF ADDON: CPT

## 2024-05-24 PROCEDURE — 82728 ASSAY OF FERRITIN: CPT

## 2024-05-24 PROCEDURE — 83540 ASSAY OF IRON: CPT

## 2024-05-24 PROCEDURE — 85025 COMPLETE CBC W/AUTO DIFF WBC: CPT

## 2024-05-24 PROCEDURE — 3078F DIAST BP <80 MM HG: CPT | Performed by: INTERNAL MEDICINE

## 2024-05-24 PROCEDURE — 36415 COLL VENOUS BLD VENIPUNCTURE: CPT

## 2024-05-24 PROCEDURE — 2580000003 HC RX 258: Performed by: INTERNAL MEDICINE

## 2024-05-24 RX ORDER — ONDANSETRON 2 MG/ML
8 INJECTION INTRAMUSCULAR; INTRAVENOUS
Status: CANCELLED | OUTPATIENT
Start: 2024-05-31

## 2024-05-24 RX ORDER — HEPARIN SODIUM (PORCINE) LOCK FLUSH IV SOLN 100 UNIT/ML 100 UNIT/ML
500 SOLUTION INTRAVENOUS PRN
Status: CANCELLED | OUTPATIENT
Start: 2024-05-31

## 2024-05-24 RX ORDER — SODIUM CHLORIDE 9 MG/ML
5-250 INJECTION, SOLUTION INTRAVENOUS PRN
Status: CANCELLED | OUTPATIENT
Start: 2024-05-31

## 2024-05-24 RX ORDER — SODIUM CHLORIDE 9 MG/ML
5-250 INJECTION, SOLUTION INTRAVENOUS PRN
Status: DISCONTINUED | OUTPATIENT
Start: 2024-05-24 | End: 2024-05-25 | Stop reason: HOSPADM

## 2024-05-24 RX ORDER — HEPARIN 100 UNIT/ML
500 SYRINGE INTRAVENOUS PRN
Status: CANCELLED | OUTPATIENT
Start: 2024-05-31

## 2024-05-24 RX ORDER — EPINEPHRINE 1 MG/ML
0.3 INJECTION, SOLUTION INTRAMUSCULAR; SUBCUTANEOUS PRN
Status: CANCELLED | OUTPATIENT
Start: 2024-05-31

## 2024-05-24 RX ORDER — SODIUM CHLORIDE 0.9 % (FLUSH) 0.9 %
5-40 SYRINGE (ML) INJECTION PRN
Status: CANCELLED | OUTPATIENT
Start: 2024-05-31

## 2024-05-24 RX ORDER — FAMOTIDINE 10 MG/ML
20 INJECTION, SOLUTION INTRAVENOUS
Status: CANCELLED | OUTPATIENT
Start: 2024-05-31

## 2024-05-24 RX ORDER — EPINEPHRINE 1 MG/ML
0.3 INJECTION, SOLUTION, CONCENTRATE INTRAVENOUS PRN
Status: CANCELLED | OUTPATIENT
Start: 2024-05-31

## 2024-05-24 RX ORDER — ACETAMINOPHEN 325 MG/1
650 TABLET ORAL
Status: CANCELLED | OUTPATIENT
Start: 2024-05-31

## 2024-05-24 RX ORDER — DIPHENHYDRAMINE HYDROCHLORIDE 50 MG/ML
50 INJECTION INTRAMUSCULAR; INTRAVENOUS
Status: CANCELLED | OUTPATIENT
Start: 2024-05-31

## 2024-05-24 RX ORDER — SODIUM CHLORIDE 9 MG/ML
INJECTION, SOLUTION INTRAVENOUS CONTINUOUS
Status: CANCELLED | OUTPATIENT
Start: 2024-05-31

## 2024-05-24 RX ORDER — ALBUTEROL SULFATE 90 UG/1
4 AEROSOL, METERED RESPIRATORY (INHALATION) PRN
Status: CANCELLED | OUTPATIENT
Start: 2024-05-31

## 2024-05-24 RX ADMIN — SODIUM CHLORIDE 300 MG: 9 INJECTION, SOLUTION INTRAVENOUS at 11:20

## 2024-05-24 RX ADMIN — SODIUM CHLORIDE 55 ML/HR: 9 INJECTION, SOLUTION INTRAVENOUS at 11:19

## 2024-05-24 ASSESSMENT — ENCOUNTER SYMPTOMS
GASTROINTESTINAL NEGATIVE: 1
ALLERGIC/IMMUNOLOGIC NEGATIVE: 1
EYES NEGATIVE: 1
RESPIRATORY NEGATIVE: 1

## 2024-05-24 NOTE — PROGRESS NOTES
[facility] [place] [facility]  [facility]  [address]  [address]  [place] [id number]  Dept: [phone]  Loc: [phone]   Hematology/Oncology Progress Note (Clinic)        Alberto Gilliland  1965 5/24/2024     No ref. provider found   Juan Christiansen,        ASSESSMENT:     1. Iron deficiency anemia, unspecified iron deficiency anemia type  2. Lung nodules  3. Lymphadenopathy       PLAN:   Alberto is doing well at this time.  PET CT scans were negative and but his final biopsy as well as liver biopsy in past showed no signs of carcinoma.  He is here today to start his iron infusion and receive it today.  He will receive 3 doses of 300 mg IVP weekly.  Return to see us in 2 months time repeat CBC and iron panel to be done 1 week prior to visit to review see if he is responding to IV iron.    Symptom Management: (include nausea, vomiting, diarrhea, constipation, appetite, weight loss, energy, anxiety, depression, SOB, neuropathy, pain)      none     Supportive care provided.       Level of care is appropriate.       Teaching done today.     Medications reviewed.   Prescriptions today:            No orders of the defined types were placed in this encounter.       [place]:  Controlled Substance Monitoring:    Acute and Chronic Pain Monitoring:   RX Monitoring Periodic Controlled Substance Monitoring   12/16/2020  10:21 AM No signs of potential drug abuse or diversion identified.;Obtaining appropriate analgesic effect of treatment.        Research Options:       none    Follow Up:  Return in about 2 months (around 7/24/2024).     I spent 20 minutes face-to-face with the patient and family. Greater than half of this visit was spent counseling and coordinating her care.  Time Spent includes chart prep ,review of x-rays and  labs, symptom management. Teaching includes risk and benefits of any treatment and the importance of compliance and risk reduction.    Wallace Corral,

## 2024-05-24 NOTE — PROGRESS NOTES
Patient assessed for the following post iron:    Dizziness   No  Lightheadedness  No      Acute nausea/vomiting No  Headache   No  Chest pain/pressure  No  Rash/itching   No  Shortness of breath  No    Patient kept for 20 minutes observation post infusion iron.    Patient tolerated iron treatment Venofer without any complications.    Last vital signs:   BP (!) 156/73   Pulse 66   Temp 98 °F (36.7 °C) (Oral)   Resp 18   Ht 1.702 m (5' 7.01\")   Wt 91.8 kg (202 lb 6.4 oz)   SpO2 98%   BMI 31.69 kg/m²     Patient instructed if experience any of the above symptoms following today's infusion, he is to notify MD immediately or go to the emergency department.    Discharge instructions given to patient. Verbalizes understanding. Ambulated off unit per self, with belongings.

## 2024-05-24 NOTE — PLAN OF CARE
Problem: Chronic Conditions and Co-morbidities  Goal: Patient's chronic conditions and co-morbidity symptoms are monitored and maintained or improved  Outcome: Adequate for Discharge  Flowsheets (Taken 5/24/2024 1404)  Care Plan - Patient's Chronic Conditions and Co-Morbidity Symptoms are Monitored and Maintained or Improved: Monitor and assess patient's chronic conditions and comorbid symptoms for stability, deterioration, or improvement  Note: Venofer reviewed, patient verbalizes understanding of medication being administered and potential side effects.       Problem: Safety - Adult  Goal: Free from fall injury  Outcome: Adequate for Discharge  Flowsheets (Taken 5/24/2024 1404)  Free From Fall Injury: Instruct family/caregiver on patient safety     Problem: Discharge Planning  Goal: Discharge to home or other facility with appropriate resources  Outcome: Adequate for Discharge  Flowsheets (Taken 5/24/2024 1404)  Discharge to home or other facility with appropriate resources: Identify barriers to discharge with patient and caregiver     Care plan reviewed with patient.  Patient verbalizes understanding of the plan of care and contribute to goal setting.

## 2024-05-28 LAB — AFB CULTURE & SMEAR: NORMAL

## 2024-05-31 ENCOUNTER — HOSPITAL ENCOUNTER (OUTPATIENT)
Dept: INFUSION THERAPY | Age: 59
Discharge: HOME | End: 2024-05-31
Payer: COMMERCIAL

## 2024-05-31 VITALS
WEIGHT: 200.4 LBS | SYSTOLIC BLOOD PRESSURE: 153 MMHG | OXYGEN SATURATION: 97 % | HEIGHT: 67 IN | HEART RATE: 64 BPM | TEMPERATURE: 97.8 F | BODY MASS INDEX: 31.45 KG/M2 | DIASTOLIC BLOOD PRESSURE: 80 MMHG | RESPIRATION RATE: 18 BRPM

## 2024-05-31 DIAGNOSIS — D50.9 IRON DEFICIENCY ANEMIA, UNSPECIFIED IRON DEFICIENCY ANEMIA TYPE: Primary | ICD-10-CM

## 2024-05-31 PROCEDURE — 96365 THER/PROPH/DIAG IV INF INIT: CPT

## 2024-05-31 PROCEDURE — 96366 THER/PROPH/DIAG IV INF ADDON: CPT

## 2024-05-31 PROCEDURE — 6360000002 HC RX W HCPCS: Performed by: INTERNAL MEDICINE

## 2024-05-31 PROCEDURE — 2580000003 HC RX 258: Performed by: INTERNAL MEDICINE

## 2024-05-31 RX ORDER — ALBUTEROL SULFATE 90 UG/1
4 AEROSOL, METERED RESPIRATORY (INHALATION) PRN
OUTPATIENT
Start: 2024-06-07

## 2024-05-31 RX ORDER — DIPHENHYDRAMINE HYDROCHLORIDE 50 MG/ML
50 INJECTION INTRAMUSCULAR; INTRAVENOUS
OUTPATIENT
Start: 2024-06-07

## 2024-05-31 RX ORDER — SODIUM CHLORIDE 9 MG/ML
5-250 INJECTION, SOLUTION INTRAVENOUS PRN
Status: DISCONTINUED | OUTPATIENT
Start: 2024-05-31 | End: 2024-06-01 | Stop reason: HOSPADM

## 2024-05-31 RX ORDER — SODIUM CHLORIDE 0.9 % (FLUSH) 0.9 %
5-40 SYRINGE (ML) INJECTION PRN
OUTPATIENT
Start: 2024-06-07

## 2024-05-31 RX ORDER — SODIUM CHLORIDE 9 MG/ML
5-250 INJECTION, SOLUTION INTRAVENOUS PRN
OUTPATIENT
Start: 2024-06-07

## 2024-05-31 RX ORDER — ACETAMINOPHEN 325 MG/1
650 TABLET ORAL
OUTPATIENT
Start: 2024-06-07

## 2024-05-31 RX ORDER — ONDANSETRON 2 MG/ML
8 INJECTION INTRAMUSCULAR; INTRAVENOUS
OUTPATIENT
Start: 2024-06-07

## 2024-05-31 RX ORDER — EPINEPHRINE 1 MG/ML
0.3 INJECTION, SOLUTION INTRAMUSCULAR; SUBCUTANEOUS PRN
OUTPATIENT
Start: 2024-06-07

## 2024-05-31 RX ORDER — SODIUM CHLORIDE 9 MG/ML
INJECTION, SOLUTION INTRAVENOUS CONTINUOUS
OUTPATIENT
Start: 2024-06-07

## 2024-05-31 RX ORDER — HEPARIN 100 UNIT/ML
500 SYRINGE INTRAVENOUS PRN
OUTPATIENT
Start: 2024-06-07

## 2024-05-31 RX ADMIN — SODIUM CHLORIDE 300 MG: 9 INJECTION, SOLUTION INTRAVENOUS at 13:00

## 2024-05-31 RX ADMIN — SODIUM CHLORIDE 40 ML/HR: 9 INJECTION, SOLUTION INTRAVENOUS at 12:58

## 2024-05-31 NOTE — PLAN OF CARE
Problem: Chronic Conditions and Co-morbidities  Goal: Patient's chronic conditions and co-morbidity symptoms are monitored and maintained or improved  Outcome: Adequate for Discharge  Flowsheets (Taken 5/31/2024 1528)  Care Plan - Patient's Chronic Conditions and Co-Morbidity Symptoms are Monitored and Maintained or Improved:   Monitor and assess patient's chronic conditions and comorbid symptoms for stability, deterioration, or improvement   Collaborate with multidisciplinary team to address chronic and comorbid conditions and prevent exacerbation or deterioration  Note: Patient verbalizes understanding to verbal information given on Venofer,action and possible side effects. Aware to call MD if develop complications.      Problem: Safety - Adult  Goal: Free from fall injury  Outcome: Adequate for Discharge  Flowsheets (Taken 5/31/2024 1528)  Free From Fall Injury:   Instruct family/caregiver on patient safety   Based on caregiver fall risk screen, instruct family/caregiver to ask for assistance with transferring infant if caregiver noted to have fall risk factors  Note: Free from falls while in O.P. Oncology.     Problem: Discharge Planning  Goal: Discharge to home or other facility with appropriate resources  Outcome: Adequate for Discharge  Flowsheets (Taken 5/31/2024 1528)  Discharge to home or other facility with appropriate resources:   Identify barriers to discharge with patient and caregiver   Identify discharge learning needs (meds, wound care, etc)  Note: Verbalize understanding of discharge instructions, follow up appointments, and when to call Physician.     Care plan reviewed with patient.  Patient verbalizes understanding of the plan of care and contribute to goal setting.

## 2024-05-31 NOTE — DISCHARGE INSTRUCTIONS
Please contact your Oncologist if you have any questions regarding the Venofer that you received today.    You are instructed to call the office or go to the Emergency Dept. If you experience any of the following symptoms:    Dizziness/lightheadedness   Acute nausea or vomiting-not relieved by medications  Headaches-not relieved by medications  New chest pain or pressure  New rash /itching  New shortness of breath  Fever,chills or signs or symptoms of infection    Make sure you are drinking 48 to 64 ounces of water daily-if you are unable to drink fluids let us know right away.

## 2024-05-31 NOTE — PROGRESS NOTES
Patient tolerated Venofer without any complications.  Patient kept for 20 minuets observation post infusion. Denies dizziness, lightheadedness, acute nausea or vomiting, headache, heart palpitations, rash/itching or increased SOB.    Last vital signs  BP (!) 153/80   Pulse 64   Temp 97.8 °F (36.6 °C) (Oral)   Resp 18   Ht 1.702 m (5' 7\")   Wt 90.9 kg (200 lb 6.4 oz)   SpO2 97%   BMI 31.39 kg/m²     Patient instructed if they experience any of the above symptoms following today's visit, he/she is to notify the Physician or go to the Emergency Dept.    Discharge instructions given to patient, Verbalizes understanding. Ambulated off unit per self in stable condition with all belongings.

## 2024-06-03 LAB — AFB CULTURE & SMEAR: NORMAL

## 2024-06-07 ENCOUNTER — TELEPHONE (OUTPATIENT)
Dept: ONCOLOGY | Age: 59
End: 2024-06-07

## 2024-06-07 ENCOUNTER — HOSPITAL ENCOUNTER (OUTPATIENT)
Age: 59
Discharge: HOME | End: 2024-06-07
Payer: COMMERCIAL

## 2024-06-07 LAB
ALT SERPL W/O P-5'-P-CCNC: 10 U/L (ref 11–66)
AST SERPL-CCNC: 15 U/L (ref 5–40)
BUN SERPL-MCNC: 15 MG/DL (ref 7–22)
CREAT SERPL-MCNC: 0.8 MG/DL (ref 0.4–1.2)
DEPRECATED RDW RBC AUTO: 85.3 FL (ref 35–45)
ERYTHROCYTE [DISTWIDTH] IN BLOOD BY AUTOMATED COUNT: 27.3 % (ref 11.5–14.5)
GFR SERPL CREATININE-BSD FRML MDRD: > 90 ML/MIN/1.73M2
HCT VFR BLD AUTO: 34.7 % (ref 42–52)
HGB BLD-MCNC: 10.5 GM/DL (ref 14–18)
MCH RBC QN AUTO: 27 PG (ref 26–33)
MCHC RBC AUTO-ENTMCNC: 30.3 GM/DL (ref 32.2–35.5)
MCV RBC AUTO: 89.2 FL (ref 80–94)
PLATELET # BLD AUTO: 390 THOU/MM3 (ref 130–400)
PMV BLD AUTO: 9.9 FL (ref 9.4–12.4)
RBC # BLD AUTO: 3.89 MILL/MM3 (ref 4.7–6.1)
WBC # BLD AUTO: 13.4 THOU/MM3 (ref 4.8–10.8)

## 2024-06-07 PROCEDURE — 84450 TRANSFERASE (AST) (SGOT): CPT

## 2024-06-07 PROCEDURE — 85027 COMPLETE CBC AUTOMATED: CPT

## 2024-06-07 PROCEDURE — 36415 COLL VENOUS BLD VENIPUNCTURE: CPT

## 2024-06-07 PROCEDURE — 84520 ASSAY OF UREA NITROGEN: CPT

## 2024-06-07 PROCEDURE — 82565 ASSAY OF CREATININE: CPT

## 2024-06-07 PROCEDURE — 84460 ALANINE AMINO (ALT) (SGPT): CPT

## 2024-06-07 NOTE — TELEPHONE ENCOUNTER
Called pt on Tuesday about moving his infusion appointment out due to it being on back order, he stated he wanted to see Dr De Leon sooner. He is having fatigue again, but he is very confused on what all is going on with his treatment. He thinks Dr Corral told him he doesn't have cancer anymore and he would like answers.

## 2024-06-10 DIAGNOSIS — E03.9 ACQUIRED HYPOTHYROIDISM: ICD-10-CM

## 2024-06-10 LAB — AFB CULTURE & SMEAR: NORMAL

## 2024-06-10 RX ORDER — LEVOTHYROXINE SODIUM 0.07 MG/1
TABLET ORAL
Qty: 90 TABLET | Refills: 0 | Status: SHIPPED | OUTPATIENT
Start: 2024-06-10

## 2024-06-12 RX ORDER — ASPIRIN 81 MG/1
TABLET, COATED ORAL
Qty: 90 TABLET | Refills: 0 | Status: SHIPPED | OUTPATIENT
Start: 2024-06-12

## 2024-06-13 ENCOUNTER — OFFICE VISIT (OUTPATIENT)
Dept: ONCOLOGY | Age: 59
End: 2024-06-13
Payer: COMMERCIAL

## 2024-06-13 ENCOUNTER — HOSPITAL ENCOUNTER (OUTPATIENT)
Dept: INFUSION THERAPY | Age: 59
Discharge: HOME | End: 2024-06-13
Payer: COMMERCIAL

## 2024-06-13 DIAGNOSIS — D50.9 IRON DEFICIENCY ANEMIA, UNSPECIFIED IRON DEFICIENCY ANEMIA TYPE: Primary | ICD-10-CM

## 2024-06-13 DIAGNOSIS — D50.9 IRON DEFICIENCY ANEMIA, UNSPECIFIED IRON DEFICIENCY ANEMIA TYPE: ICD-10-CM

## 2024-06-13 DIAGNOSIS — R91.8 LUNG NODULES: ICD-10-CM

## 2024-06-13 LAB
BASOPHILS ABSOLUTE: 0.1 THOU/MM3 (ref 0–0.1)
BASOPHILS NFR BLD AUTO: 1 % (ref 0–3)
EOSINOPHIL NFR BLD AUTO: 6 % (ref 0–4)
EOSINOPHILS ABSOLUTE: 0.6 THOU/MM3 (ref 0–0.4)
ERYTHROCYTE [DISTWIDTH] IN BLOOD BY AUTOMATED COUNT: 25.9 % (ref 11.5–14.5)
HCT VFR BLD AUTO: 34.7 % (ref 42–52)
HGB BLD-MCNC: 10.8 GM/DL (ref 14–18)
IMMATURE GRANULOCYTES %: 0 %
IMMATURE GRANULOCYTES ABSOLUTE: 0.01 THOU/MM3 (ref 0–0.07)
LYMPHOCYTES ABSOLUTE: 0.8 THOU/MM3 (ref 1–4.8)
LYMPHOCYTES NFR BLD AUTO: 8 % (ref 15–47)
MCH RBC QN AUTO: 27.6 PG (ref 26–33)
MCHC RBC AUTO-ENTMCNC: 31.1 GM/DL (ref 32.2–35.5)
MCV RBC AUTO: 89 FL (ref 80–94)
MONOCYTES ABSOLUTE: 0.9 THOU/MM3 (ref 0.4–1.3)
MONOCYTES NFR BLD AUTO: 9 % (ref 0–12)
NEUTROPHILS ABSOLUTE: 7.8 THOU/MM3 (ref 1.8–7.7)
NEUTROPHILS NFR BLD AUTO: 77 % (ref 43–75)
PLATELET # BLD AUTO: 313 THOU/MM3 (ref 130–400)
PMV BLD AUTO: 8.7 FL (ref 9.4–12.4)
RBC # BLD AUTO: 3.92 MILL/MM3 (ref 4.7–6.1)
WBC # BLD AUTO: 10.2 THOU/MM3 (ref 4.8–10.8)

## 2024-06-13 PROCEDURE — 99211 OFF/OP EST MAY X REQ PHY/QHP: CPT

## 2024-06-13 PROCEDURE — 85025 COMPLETE CBC W/AUTO DIFF WBC: CPT

## 2024-06-13 PROCEDURE — 99214 OFFICE O/P EST MOD 30 MIN: CPT | Performed by: INTERNAL MEDICINE

## 2024-06-13 PROCEDURE — 36415 COLL VENOUS BLD VENIPUNCTURE: CPT

## 2024-06-13 RX ORDER — FAMOTIDINE 10 MG/ML
20 INJECTION, SOLUTION INTRAVENOUS
OUTPATIENT
Start: 2024-06-27

## 2024-06-13 RX ORDER — ACETAMINOPHEN 325 MG/1
650 TABLET ORAL
OUTPATIENT
Start: 2024-06-27

## 2024-06-13 RX ORDER — ONDANSETRON 2 MG/ML
8 INJECTION INTRAMUSCULAR; INTRAVENOUS
OUTPATIENT
Start: 2024-06-27

## 2024-06-13 RX ORDER — SODIUM CHLORIDE 0.9 % (FLUSH) 0.9 %
5-40 SYRINGE (ML) INJECTION PRN
OUTPATIENT
Start: 2024-06-27

## 2024-06-13 RX ORDER — SODIUM CHLORIDE 9 MG/ML
INJECTION, SOLUTION INTRAVENOUS CONTINUOUS
OUTPATIENT
Start: 2024-06-27

## 2024-06-13 RX ORDER — EPINEPHRINE 1 MG/ML
0.3 INJECTION, SOLUTION, CONCENTRATE INTRAVENOUS PRN
OUTPATIENT
Start: 2024-06-27

## 2024-06-13 RX ORDER — ALBUTEROL SULFATE 90 UG/1
4 AEROSOL, METERED RESPIRATORY (INHALATION) PRN
OUTPATIENT
Start: 2024-06-27

## 2024-06-13 RX ORDER — DIPHENHYDRAMINE HYDROCHLORIDE 50 MG/ML
50 INJECTION INTRAMUSCULAR; INTRAVENOUS
OUTPATIENT
Start: 2024-06-27

## 2024-06-13 RX ORDER — SODIUM CHLORIDE 9 MG/ML
5-250 INJECTION, SOLUTION INTRAVENOUS PRN
OUTPATIENT
Start: 2024-06-27

## 2024-06-13 RX ORDER — HEPARIN SODIUM (PORCINE) LOCK FLUSH IV SOLN 100 UNIT/ML 100 UNIT/ML
500 SOLUTION INTRAVENOUS PRN
OUTPATIENT
Start: 2024-06-27

## 2024-06-13 NOTE — PROGRESS NOTES
cervical spine surgery, C4-5 fusion, 3/2/16.    Gastrointestinal hemorrhage associated with gastrojejunal ulcer    S/P AVR (aortic valve replacement), 7/20/16.    Tobacco abuse    Lymphomatoid papulosis     Lumbar radiculitis    Lumbar spinal stenosis    Spondylosis of thoracic region without myelopathy or radiculopathy    Thoracic spinal stenosis    Chronic pain syndrome    Urinary hesitancy    Malignant lymphoma, non-Hodgkin's (HCC)    COPD, mild (HCC)    Abdominal aortic aneurysm (AAA) without rupture, unspecified part (HCC)    Hemoperitoneum    Moderate malnutrition (HCC)    Abdominal hematoma    Ventral hernia without obstruction or gangrene    Fatty liver    Gastro-esophageal reflux disease with esophagitis, without bleeding    Slow transit constipation    Chronic systolic (congestive) heart failure    Shortness of breath        Surgery:  Past Surgical History:   Procedure Laterality Date    AORTIC VALVE REPLACEMENT  2007    MECHANICAL [facility]    AORTIC VALVE REPLACEMENT  07/20/2016    extraction of mechanical valve and replacement with tissue valve    APPENDECTOMY  1980    BACK INJECTION Bilateral 01/03/2022    Bilateral SI injection performed by Juan Kiran MD at [facility] [place] OR    BACK INJECTION Bilateral 03/14/2022    Bilateral SI MBB # 2 performed by Yehuda Marie DO at [facility] OR    BRONCHOSCOPY N/A 5/17/2024    EBUS performed by Jonny Mckinley MD at [facility] ENDOSCOPY    CARDIAC CATHETERIZATION  05/20/2010    Patent coronary arteries. Possible causes of the patient's chest pain is likely noncardiac at least based on this angiogram. Patient chould be able to proceed w/ scheduled surgery w/ paying attention to anticoagulation and trying to minimize the period of no anticoagulation.     CARDIAC CATHETERIZATION  2012, 6/9/16    [facility]    CARDIOVASCULAR STRESS TEST  04/15/2010    Moderate fixed inferior defect, possibly attenuation, cannot exclude a previous MI. Correlation w/ EKG is

## 2024-06-13 NOTE — PATIENT INSTRUCTIONS
Authorization requested for 2 doses of Feraheme.  Discontinued Venofer for tomorrow due to shortage.  Orders Placed This Encounter   Procedures    CT CHEST W CONTRAST    Ferritin    Iron    Iron Binding Capacity    IRON SATURATION    CBC with Auto Differential    Reticulocytes    Vitamin B12 & Folate   Labs and CT scans should be obtained 1 week prior to the scheduled clinic visit.  Return in about 9 weeks (around 8/15/2024).MD visit to review labs and CT scans   Please request records from Dr. Vega's clinic, the results of the skin biopsy( tentative the results will be available next week)

## 2024-06-14 ENCOUNTER — HOSPITAL ENCOUNTER (OUTPATIENT)
Dept: INFUSION THERAPY | Age: 59
End: 2024-06-14

## 2024-06-17 LAB
AFB CULTURE & SMEAR: NORMAL
FUNGUS SPEC CULT: NORMAL
FUNGUS SPEC FUNGUS STN: NORMAL

## 2024-06-24 LAB — AFB CULTURE & SMEAR: NORMAL

## 2024-06-27 ENCOUNTER — OFFICE VISIT (OUTPATIENT)
Dept: FAMILY MEDICINE CLINIC | Age: 59
End: 2024-06-27
Payer: COMMERCIAL

## 2024-06-27 VITALS
HEART RATE: 96 BPM | SYSTOLIC BLOOD PRESSURE: 152 MMHG | BODY MASS INDEX: 32.25 KG/M2 | DIASTOLIC BLOOD PRESSURE: 72 MMHG | WEIGHT: 205.9 LBS | RESPIRATION RATE: 16 BRPM

## 2024-06-27 DIAGNOSIS — C86.6 LYMPHOMATOID PAPULOSIS (HCC): ICD-10-CM

## 2024-06-27 DIAGNOSIS — D64.9 CHRONIC ANEMIA: ICD-10-CM

## 2024-06-27 DIAGNOSIS — F41.9 ANXIETY: ICD-10-CM

## 2024-06-27 DIAGNOSIS — R53.82 CHRONIC FATIGUE, UNSPECIFIED: ICD-10-CM

## 2024-06-27 DIAGNOSIS — J44.9 COPD, MILD (HCC): ICD-10-CM

## 2024-06-27 DIAGNOSIS — E03.9 ACQUIRED HYPOTHYROIDISM: Primary | ICD-10-CM

## 2024-06-27 PROCEDURE — 3077F SYST BP >= 140 MM HG: CPT | Performed by: FAMILY MEDICINE

## 2024-06-27 PROCEDURE — 3078F DIAST BP <80 MM HG: CPT | Performed by: FAMILY MEDICINE

## 2024-06-27 PROCEDURE — 99213 OFFICE O/P EST LOW 20 MIN: CPT | Performed by: FAMILY MEDICINE

## 2024-06-27 PROCEDURE — G2211 COMPLEX E/M VISIT ADD ON: HCPCS | Performed by: FAMILY MEDICINE

## 2024-06-27 NOTE — PROGRESS NOTES
Alberto Gilliland (:  1965) is a 59 y.o. male,Established patient, here for evaluation of the following chief complaint(s):  3 Month Follow-Up (anxiety)          Subjective   SUBJECTIVE/OBJECTIVE:  HPI  Chief Complaint   Patient presents with    3 Month Follow-Up     anxiety     3 month eval.    Doing ok overall.    Continues to follow with several specialists.      BP elevated once again.  Per pt his home readings are ok.  Gets very anxious coming into the office.  BP Readings from Last 3 Encounters:   24 (!) 176/74   24 (!) 152/72   24 (!) 153/80     Wt Readings from Last 3 Encounters:   24 92.7 kg (204 lb 6.4 oz)   24 93.4 kg (205 lb 14.4 oz)   24 90.9 kg (200 lb 6.4 oz)       Patient Active Problem List   Diagnosis    HTN (hypertension)    Lumbago    Lumbar radiculopathy    Cervicalgia, C5-7    Hyperlipidemia    Hypothyroidism    Chronic anxiety    Iron deficiency anemia, blood loss    ED (erectile dysfunction)    Bilateral sciatica    Trigger finger, right, ring     Medication monitoring encounter    Nocturnal leg cramps    GERD (gastroesophageal reflux disease)    IFG (impaired fasting glucose)    Situational depression    Dysthymia (or depressive neurosis)    Herniation of cervical intervertebral disc with radiculopathy    H/O cervical spine surgery, C4-5 fusion, 3/2/16.    Gastrointestinal hemorrhage associated with gastrojejunal ulcer    S/P AVR (aortic valve replacement), 16.    Tobacco abuse    Lymphomatoid papulosis     Lumbar radiculitis    Lumbar spinal stenosis    Spondylosis of thoracic region without myelopathy or radiculopathy    Thoracic spinal stenosis    Chronic pain syndrome    Urinary hesitancy    Malignant lymphoma, non-Hodgkin's (HCC)    COPD, mild (HCC)    Abdominal aortic aneurysm (AAA) without rupture, unspecified part (HCC)    Hemoperitoneum    Moderate malnutrition (HCC)    Abdominal hematoma    Ventral hernia without obstruction or

## 2024-06-28 ENCOUNTER — HOSPITAL ENCOUNTER (OUTPATIENT)
Dept: INFUSION THERAPY | Age: 59
Discharge: HOME | End: 2024-06-28
Payer: COMMERCIAL

## 2024-06-28 VITALS
DIASTOLIC BLOOD PRESSURE: 74 MMHG | TEMPERATURE: 98.2 F | BODY MASS INDEX: 32.08 KG/M2 | OXYGEN SATURATION: 96 % | HEIGHT: 67 IN | SYSTOLIC BLOOD PRESSURE: 176 MMHG | WEIGHT: 204.4 LBS | HEART RATE: 63 BPM | RESPIRATION RATE: 16 BRPM

## 2024-06-28 DIAGNOSIS — D50.9 IRON DEFICIENCY ANEMIA, UNSPECIFIED IRON DEFICIENCY ANEMIA TYPE: Primary | ICD-10-CM

## 2024-06-28 PROCEDURE — 6360000002 HC RX W HCPCS: Performed by: INTERNAL MEDICINE

## 2024-06-28 PROCEDURE — 96365 THER/PROPH/DIAG IV INF INIT: CPT

## 2024-06-28 PROCEDURE — 2580000003 HC RX 258: Performed by: INTERNAL MEDICINE

## 2024-06-28 RX ORDER — SODIUM CHLORIDE 9 MG/ML
5-250 INJECTION, SOLUTION INTRAVENOUS PRN
Status: CANCELLED | OUTPATIENT
Start: 2024-07-05

## 2024-06-28 RX ORDER — HEPARIN 100 UNIT/ML
500 SYRINGE INTRAVENOUS PRN
Status: CANCELLED | OUTPATIENT
Start: 2024-07-05

## 2024-06-28 RX ORDER — EPINEPHRINE 1 MG/ML
0.3 INJECTION, SOLUTION INTRAMUSCULAR; SUBCUTANEOUS PRN
Status: CANCELLED | OUTPATIENT
Start: 2024-07-05

## 2024-06-28 RX ORDER — ACETAMINOPHEN 325 MG/1
650 TABLET ORAL
Status: CANCELLED | OUTPATIENT
Start: 2024-07-05

## 2024-06-28 RX ORDER — SODIUM CHLORIDE 9 MG/ML
INJECTION, SOLUTION INTRAVENOUS CONTINUOUS
Status: CANCELLED | OUTPATIENT
Start: 2024-07-05

## 2024-06-28 RX ORDER — ONDANSETRON 2 MG/ML
8 INJECTION INTRAMUSCULAR; INTRAVENOUS
Status: CANCELLED | OUTPATIENT
Start: 2024-07-05

## 2024-06-28 RX ORDER — SODIUM CHLORIDE 0.9 % (FLUSH) 0.9 %
5-40 SYRINGE (ML) INJECTION PRN
Status: CANCELLED | OUTPATIENT
Start: 2024-07-05

## 2024-06-28 RX ORDER — ALBUTEROL SULFATE 90 UG/1
4 AEROSOL, METERED RESPIRATORY (INHALATION) PRN
Status: CANCELLED | OUTPATIENT
Start: 2024-07-05

## 2024-06-28 RX ORDER — DIPHENHYDRAMINE HYDROCHLORIDE 50 MG/ML
50 INJECTION INTRAMUSCULAR; INTRAVENOUS
Status: CANCELLED | OUTPATIENT
Start: 2024-07-05

## 2024-06-28 RX ORDER — SODIUM CHLORIDE 9 MG/ML
5-250 INJECTION, SOLUTION INTRAVENOUS PRN
Status: DISCONTINUED | OUTPATIENT
Start: 2024-06-28 | End: 2024-06-29 | Stop reason: HOSPADM

## 2024-06-28 RX ADMIN — FERUMOXYTOL 510 MG: 510 INJECTION INTRAVENOUS at 08:18

## 2024-06-28 RX ADMIN — SODIUM CHLORIDE 55 ML/HR: 9 INJECTION, SOLUTION INTRAVENOUS at 08:07

## 2024-06-28 NOTE — PROGRESS NOTES
Patient assessed for the following post iron:    Dizziness   No  Lightheadedness  No      Acute nausea/vomiting No  Headache   No  Chest pain/pressure  No  Rash/itching   No  Shortness of breath  No    Patient kept for 20 minutes observation post infusion iron treatment.    Patient tolerated iron treatment Feraheme without any complications.    Last vital signs:   BP (!) 176/74   Pulse 63   Temp 98.2 °F (36.8 °C) (Oral)   Resp 16   Ht 1.702 m (5' 7.01\")   Wt 92.7 kg (204 lb 6.4 oz)   SpO2 96%   BMI 32.01 kg/m²     Patient instructed if experience any of the above symptoms following today's infusion, he is to notify MD immediately or go to the emergency department.    Discharge instructions given to patient. Verbalizes understanding. Ambulated off unit per self, with belongings.

## 2024-06-28 NOTE — PLAN OF CARE
Problem: Chronic Conditions and Co-morbidities  Goal: Patient's chronic conditions and co-morbidity symptoms are monitored and maintained or improved  Outcome: Adequate for Discharge  Flowsheets (Taken 6/28/2024 0810)  Care Plan - Patient's Chronic Conditions and Co-Morbidity Symptoms are Monitored and Maintained or Improved: Monitor and assess patient's chronic conditions and comorbid symptoms for stability, deterioration, or improvement  Note: Feraheme reviewed, patient verbalizes understanding of medication being administered and potential side effects.       Problem: Discharge Planning  Goal: Discharge to home or other facility with appropriate resources  Outcome: Adequate for Discharge  Flowsheets (Taken 6/28/2024 0810)  Discharge to home or other facility with appropriate resources: Identify barriers to discharge with patient and caregiver     Problem: Safety - Adult  Goal: Free from fall injury  Outcome: Adequate for Discharge  Flowsheets (Taken 6/28/2024 0810)  Free From Fall Injury: Instruct family/caregiver on patient safety

## 2024-06-28 NOTE — DISCHARGE INSTRUCTIONS
Please contact your Oncologist if you have any questions regarding the iron treatment Feraheme that you received today.      Patient instructed if experience any of the symptoms following today's iron treatment to notify MD immediately or go to emergency department.    * dizziness/lightheadedness  *acute nausea/vomiting - not relieved with medication  *headache - not relieved from Tylenol/pain medication  *chest pain/pressure  *rash/itching  *shortness of breath        Drink fluids - 48oz fluids daily  Call if develop fever/ chills/ signs or symptoms of infection

## 2024-07-01 LAB — AFB CULTURE & SMEAR: NORMAL

## 2024-07-05 ENCOUNTER — HOSPITAL ENCOUNTER (OUTPATIENT)
Dept: INFUSION THERAPY | Age: 59
Discharge: HOME | End: 2024-07-05
Payer: COMMERCIAL

## 2024-07-05 VITALS
DIASTOLIC BLOOD PRESSURE: 74 MMHG | SYSTOLIC BLOOD PRESSURE: 160 MMHG | TEMPERATURE: 98.1 F | RESPIRATION RATE: 18 BRPM | OXYGEN SATURATION: 97 % | HEART RATE: 65 BPM

## 2024-07-05 DIAGNOSIS — D50.9 IRON DEFICIENCY ANEMIA, UNSPECIFIED IRON DEFICIENCY ANEMIA TYPE: Primary | ICD-10-CM

## 2024-07-05 PROCEDURE — 96365 THER/PROPH/DIAG IV INF INIT: CPT

## 2024-07-05 PROCEDURE — 6360000002 HC RX W HCPCS: Performed by: INTERNAL MEDICINE

## 2024-07-05 PROCEDURE — 2580000003 HC RX 258: Performed by: INTERNAL MEDICINE

## 2024-07-05 RX ORDER — SODIUM CHLORIDE 0.9 % (FLUSH) 0.9 %
5-40 SYRINGE (ML) INJECTION PRN
OUTPATIENT
Start: 2024-07-05

## 2024-07-05 RX ORDER — EPINEPHRINE 1 MG/ML
0.3 INJECTION, SOLUTION INTRAMUSCULAR; SUBCUTANEOUS PRN
OUTPATIENT
Start: 2024-07-05

## 2024-07-05 RX ORDER — HEPARIN 100 UNIT/ML
500 SYRINGE INTRAVENOUS PRN
OUTPATIENT
Start: 2024-07-05

## 2024-07-05 RX ORDER — DIPHENHYDRAMINE HYDROCHLORIDE 50 MG/ML
50 INJECTION INTRAMUSCULAR; INTRAVENOUS
OUTPATIENT
Start: 2024-07-05

## 2024-07-05 RX ORDER — SODIUM CHLORIDE 9 MG/ML
INJECTION, SOLUTION INTRAVENOUS CONTINUOUS
OUTPATIENT
Start: 2024-07-05

## 2024-07-05 RX ORDER — ALBUTEROL SULFATE 90 UG/1
4 AEROSOL, METERED RESPIRATORY (INHALATION) PRN
OUTPATIENT
Start: 2024-07-05

## 2024-07-05 RX ORDER — ONDANSETRON 2 MG/ML
8 INJECTION INTRAMUSCULAR; INTRAVENOUS
OUTPATIENT
Start: 2024-07-05

## 2024-07-05 RX ORDER — ACETAMINOPHEN 325 MG/1
650 TABLET ORAL
OUTPATIENT
Start: 2024-07-05

## 2024-07-05 RX ORDER — SODIUM CHLORIDE 9 MG/ML
5-250 INJECTION, SOLUTION INTRAVENOUS PRN
Status: DISCONTINUED | OUTPATIENT
Start: 2024-07-05 | End: 2024-07-06 | Stop reason: HOSPADM

## 2024-07-05 RX ORDER — SODIUM CHLORIDE 9 MG/ML
5-250 INJECTION, SOLUTION INTRAVENOUS PRN
OUTPATIENT
Start: 2024-07-05

## 2024-07-05 RX ORDER — SODIUM CHLORIDE 9 MG/ML
5-250 INJECTION, SOLUTION INTRAVENOUS PRN
Status: CANCELLED | OUTPATIENT
Start: 2024-07-05

## 2024-07-05 RX ADMIN — SODIUM CHLORIDE 250 ML/HR: 9 INJECTION, SOLUTION INTRAVENOUS at 10:02

## 2024-07-05 RX ADMIN — FERUMOXYTOL 510 MG: 510 INJECTION INTRAVENOUS at 10:11

## 2024-07-05 NOTE — DISCHARGE INSTRUCTIONS
Patient assessed for the following post feraheme:    Dizziness   No  Lightheadedness  No      Acute nausea/vomiting No  Headache   No  Chest pain/pressure  No  Rash/itching   No  Shortness of breath  No    Patient kept for 20 minutes observation post infusion.   Patient tolerated feraheme without any complications.    L    Patient instructed if experience any of the above symptoms following today's infusion,he/she is to notify MD immediately or go to the emergency department.    Discharge instructions given to patient. Verbalizes understanding. Ambulated off unit per self with belongings.

## 2024-07-05 NOTE — PLAN OF CARE
Problem: Chronic Conditions and Co-morbidities  Goal: Patient's chronic conditions and co-morbidity symptoms are monitored and maintained or improved  7/5/2024 1300 by Shira Holbrook RN  Flowsheets (Taken 7/5/2024 1300)  Care Plan - Patient's Chronic Conditions and Co-Morbidity Symptoms are Monitored and Maintained or Improved:   Monitor and assess patient's chronic conditions and comorbid symptoms for stability, deterioration, or improvement   Collaborate with multidisciplinary team to address chronic and comorbid conditions and prevent exacerbation or deterioration  Note: Patient verbalizes understanding to verbal information given on feraheme,action and possible side effects. Aware to call MD if develop complications.   7/5/2024 1300 by Shira Holbrook RN  Outcome: Adequate for Discharge  Flowsheets (Taken 7/5/2024 1300)  Care Plan - Patient's Chronic Conditions and Co-Morbidity Symptoms are Monitored and Maintained or Improved:   Monitor and assess patient's chronic conditions and comorbid symptoms for stability, deterioration, or improvement   Collaborate with multidisciplinary team to address chronic and comorbid conditions and prevent exacerbation or deterioration     Problem: Discharge Planning  Goal: Discharge to home or other facility with appropriate resources  7/5/2024 1300 by Shira Holbrook RN  Flowsheets (Taken 7/5/2024 1300)  Discharge to home or other facility with appropriate resources:   Identify barriers to discharge with patient and caregiver   Arrange for interpreters to assist at discharge as needed  Note: Verbalized understanding of discharge instructions, follow ups and when to call doctor  7/5/2024 1300 by Shira Holbrook RN  Outcome: Adequate for Discharge  Flowsheets (Taken 7/5/2024 1300)  Discharge to home or other facility with appropriate resources:   Identify barriers to discharge with patient and caregiver   Arrange for interpreters to assist at discharge as needed     Problem:

## 2024-07-05 NOTE — PROGRESS NOTES
Patient assessed for the following post feraheme:    Dizziness   No  Lightheadedness  No      Acute nausea/vomiting No  Headache   No  Chest pain/pressure  No  Rash/itching   No  Shortness of breath  No    Patient kept for 20 minutes observation post infusion.   Patient tolerated feraheme without any complications.    Last vital signs:   BP (!) 160/74   Pulse 65   Temp 98.1 °F (36.7 °C) (Oral)   Resp 18   SpO2 97%         Patient instructed if experience any of the above symptoms following today's infusion,he/she is to notify MD immediately or go to the emergency department.    Discharge instructions given to patient. Verbalizes understanding. Ambulated off unit per self with belongings.

## 2024-07-06 ENCOUNTER — TELEPHONE (OUTPATIENT)
Dept: PULMONOLOGY | Age: 59
End: 2024-07-06

## 2024-07-06 NOTE — TELEPHONE ENCOUNTER
On call provider received perfectserve message from patient \"returning a call\". I called patient back who stated he received a phone call from a doctors office and thought it was \"Dr. Dooley\". I had advised our office was closed 7/4, 7/5, 7/6, and 7/7. I do not believe anyone from our office has called patient today. I did see that patient has appt Tuesday with Dr. Mckinley and advised patient to keep that appointment. He verbalized understanding and had no further questions/concerns.

## 2024-07-08 DIAGNOSIS — F41.9 ANXIETY: Primary | ICD-10-CM

## 2024-07-09 ENCOUNTER — OFFICE VISIT (OUTPATIENT)
Dept: PULMONOLOGY | Age: 59
End: 2024-07-09
Payer: COMMERCIAL

## 2024-07-09 VITALS
SYSTOLIC BLOOD PRESSURE: 152 MMHG | HEIGHT: 67 IN | OXYGEN SATURATION: 95 % | DIASTOLIC BLOOD PRESSURE: 82 MMHG | BODY MASS INDEX: 31.8 KG/M2 | HEART RATE: 77 BPM | WEIGHT: 202.6 LBS | TEMPERATURE: 98.2 F

## 2024-07-09 DIAGNOSIS — Z79.899 IMMUNOSUPPRESSED DUE TO CHEMOTHERAPY (HCC): ICD-10-CM

## 2024-07-09 DIAGNOSIS — R91.1 PULMONARY NODULE: ICD-10-CM

## 2024-07-09 DIAGNOSIS — R06.02 SHORTNESS OF BREATH: ICD-10-CM

## 2024-07-09 DIAGNOSIS — D84.821 IMMUNOSUPPRESSED DUE TO CHEMOTHERAPY (HCC): ICD-10-CM

## 2024-07-09 DIAGNOSIS — T45.1X5A IMMUNOSUPPRESSED DUE TO CHEMOTHERAPY (HCC): ICD-10-CM

## 2024-07-09 DIAGNOSIS — R59.0 LAD (LYMPHADENOPATHY), MEDIASTINAL: Primary | ICD-10-CM

## 2024-07-09 DIAGNOSIS — Z72.0 TOBACCO ABUSE: ICD-10-CM

## 2024-07-09 PROBLEM — E44.0 MODERATE MALNUTRITION (HCC): Status: RESOLVED | Noted: 2024-02-08 | Resolved: 2024-07-09

## 2024-07-09 PROBLEM — K66.1 HEMOPERITONEUM: Status: RESOLVED | Noted: 2024-02-06 | Resolved: 2024-07-09

## 2024-07-09 PROBLEM — S30.1XXA ABDOMINAL HEMATOMA: Status: RESOLVED | Noted: 2024-02-23 | Resolved: 2024-07-09

## 2024-07-09 PROBLEM — C85.90 MALIGNANT LYMPHOMA, NON-HODGKIN'S (HCC): Status: RESOLVED | Noted: 2020-12-21 | Resolved: 2024-07-09

## 2024-07-09 PROCEDURE — 99214 OFFICE O/P EST MOD 30 MIN: CPT | Performed by: INTERNAL MEDICINE

## 2024-07-09 PROCEDURE — 99406 BEHAV CHNG SMOKING 3-10 MIN: CPT | Performed by: INTERNAL MEDICINE

## 2024-07-09 PROCEDURE — 3079F DIAST BP 80-89 MM HG: CPT | Performed by: INTERNAL MEDICINE

## 2024-07-09 PROCEDURE — 3077F SYST BP >= 140 MM HG: CPT | Performed by: INTERNAL MEDICINE

## 2024-07-09 RX ORDER — ALPRAZOLAM 0.5 MG/1
TABLET ORAL
Qty: 30 TABLET | Refills: 2 | Status: SHIPPED | OUTPATIENT
Start: 2024-07-09 | End: 2024-10-07

## 2024-07-09 ASSESSMENT — ENCOUNTER SYMPTOMS
CHEST TIGHTNESS: 0
CHOKING: 0
COUGH: 1
SHORTNESS OF BREATH: 1
VOMITING: 0
TROUBLE SWALLOWING: 0
WHEEZING: 1
VOICE CHANGE: 0
BLOOD IN STOOL: 0
DIARRHEA: 0

## 2024-07-09 NOTE — PROGRESS NOTES
support.  Educational material provided to patient.  Asked patient to set a quit date and inform me when they have done so.  Discussed prior reason for relapse and strategies to overcome this in the future.  Smoking cessation counseling provided. Individualized cessation plan includes patient is thinking about different medications or whether he wants to try the, he has tried nicotine patches which did not work, spent significant time going over benefits of smoking cessation especially given his immunocompromise state, spent 7 minutes on smoking cessation.  -Advised patient to call office with any changes, questions, or concerns regarding respiratory status    Will see Alberto Johnston in:  Return in about 7 weeks (around 8/28/2024) for SOA, follow up PFTs.     Jonny Mckinley MD  7/9/2024  8:51 AM

## 2024-07-12 ENCOUNTER — HOSPITAL ENCOUNTER (OUTPATIENT)
Dept: INFUSION THERAPY | Age: 59
End: 2024-07-12

## 2024-07-24 ENCOUNTER — HOSPITAL ENCOUNTER (OUTPATIENT)
Age: 59
Discharge: HOME | End: 2024-07-24
Payer: COMMERCIAL

## 2024-07-24 LAB
ALT SERPL W/O P-5'-P-CCNC: 13 U/L (ref 11–66)
AST SERPL-CCNC: 15 U/L (ref 5–40)
BUN SERPL-MCNC: 10 MG/DL (ref 7–22)
DEPRECATED RDW RBC AUTO: 85 FL (ref 35–45)
ERYTHROCYTE [DISTWIDTH] IN BLOOD BY AUTOMATED COUNT: 25.6 % (ref 11.5–14.5)
HCT VFR BLD AUTO: 41.7 % (ref 42–52)
HGB BLD-MCNC: 13.4 GM/DL (ref 14–18)
MCH RBC QN AUTO: 29.8 PG (ref 26–33)
MCHC RBC AUTO-ENTMCNC: 32.1 GM/DL (ref 32.2–35.5)
MCV RBC AUTO: 92.7 FL (ref 80–94)
PLATELET # BLD AUTO: 272 THOU/MM3 (ref 130–400)
PMV BLD AUTO: 10.7 FL (ref 9.4–12.4)
RBC # BLD AUTO: 4.5 MILL/MM3 (ref 4.7–6.1)
SCAN OF BLOOD SMEAR: NORMAL
WBC # BLD AUTO: 11.7 THOU/MM3 (ref 4.8–10.8)

## 2024-07-24 PROCEDURE — 84450 TRANSFERASE (AST) (SGOT): CPT

## 2024-07-24 PROCEDURE — 36415 COLL VENOUS BLD VENIPUNCTURE: CPT

## 2024-07-24 PROCEDURE — 82565 ASSAY OF CREATININE: CPT

## 2024-07-24 PROCEDURE — 84460 ALANINE AMINO (ALT) (SGPT): CPT

## 2024-07-24 PROCEDURE — 84520 ASSAY OF UREA NITROGEN: CPT

## 2024-07-24 PROCEDURE — 85027 COMPLETE CBC AUTOMATED: CPT

## 2024-07-25 LAB
CREAT SERPL-MCNC: 0.7 MG/DL (ref 0.4–1.2)
GFR SERPL CREATININE-BSD FRML MDRD: > 90 ML/MIN/1.73M2

## 2024-08-07 ENCOUNTER — HOSPITAL ENCOUNTER (OUTPATIENT)
Age: 59
Discharge: HOME | End: 2024-08-07
Payer: COMMERCIAL

## 2024-08-07 DIAGNOSIS — R91.8 LUNG NODULES: ICD-10-CM

## 2024-08-07 DIAGNOSIS — D50.9 IRON DEFICIENCY ANEMIA, UNSPECIFIED IRON DEFICIENCY ANEMIA TYPE: ICD-10-CM

## 2024-08-07 LAB
ANISOCYTOSIS BLD QL SMEAR: PRESENT
BASOPHILS ABSOLUTE: 0.1 THOU/MM3 (ref 0–0.1)
BASOPHILS NFR BLD AUTO: 1 %
DEPRECATED RDW RBC AUTO: 78.9 FL (ref 35–45)
EOSINOPHIL NFR BLD AUTO: 15.2 %
EOSINOPHILS ABSOLUTE: 1.6 THOU/MM3 (ref 0–0.4)
ERYTHROCYTE [DISTWIDTH] IN BLOOD BY AUTOMATED COUNT: 23.9 % (ref 11.5–14.5)
FERRITIN SERPL IA-MCNC: 236 NG/ML (ref 22–322)
FOLATE SERPL-MCNC: > 20 NG/ML (ref 4.8–24.2)
HCT VFR BLD AUTO: 44.1 % (ref 42–52)
HGB BLD-MCNC: 14.4 GM/DL (ref 14–18)
HGB RETIC QN AUTO: 34.1 PG (ref 28.2–35.7)
IMM GRANULOCYTES # BLD AUTO: 0.02 THOU/MM3 (ref 0–0.07)
IMM GRANULOCYTES NFR BLD AUTO: 0.2 %
IMM RETICS NFR: 8.4 % (ref 2.3–13.4)
IRON SATN MFR SERPL: 35 % (ref 20–50)
IRON SERPL-MCNC: 77 UG/DL (ref 65–195)
LYMPHOCYTES ABSOLUTE: 0.8 THOU/MM3 (ref 1–4.8)
LYMPHOCYTES NFR BLD AUTO: 8.2 %
MCH RBC QN AUTO: 30.4 PG (ref 26–33)
MCHC RBC AUTO-ENTMCNC: 32.7 GM/DL (ref 32.2–35.5)
MCV RBC AUTO: 93.2 FL (ref 80–94)
MONOCYTES ABSOLUTE: 0.9 THOU/MM3 (ref 0.4–1.3)
MONOCYTES NFR BLD AUTO: 8.8 %
NEUTROPHILS ABSOLUTE: 6.8 THOU/MM3 (ref 1.8–7.7)
NEUTROPHILS NFR BLD AUTO: 66.6 %
NRBC BLD AUTO-RTO: 0 /100 WBC
PATHOLOGIST REVIEW: ABNORMAL
PLATELET # BLD AUTO: 257 THOU/MM3 (ref 130–400)
PMV BLD AUTO: 11 FL (ref 9.4–12.4)
POIKILOCYTES: ABNORMAL
POLYCHROMASIA BLD QL SMEAR: ABNORMAL
RBC # BLD AUTO: 4.73 MILL/MM3 (ref 4.7–6.1)
RETICS # AUTO: 51 THOU/MM3 (ref 20–115)
RETICS/RBC NFR AUTO: 1.1 % (ref 0.5–2)
SCAN OF BLOOD SMEAR: NORMAL
SCHISTOCYTES BLD QL SMEAR: ABNORMAL
SPHEROCYTES BLD QL SMEAR: ABNORMAL
STOMATOCYTES: ABNORMAL
TARGETS BLD QL SMEAR: ABNORMAL
TIBC SERPL-MCNC: 219 UG/DL (ref 171–450)
VIT B12 SERPL-MCNC: 652 PG/ML (ref 211–911)
WBC # BLD AUTO: 10.2 THOU/MM3 (ref 4.8–10.8)

## 2024-08-07 PROCEDURE — 82746 ASSAY OF FOLIC ACID SERUM: CPT

## 2024-08-07 PROCEDURE — 83540 ASSAY OF IRON: CPT

## 2024-08-07 PROCEDURE — 85025 COMPLETE CBC W/AUTO DIFF WBC: CPT

## 2024-08-07 PROCEDURE — 85046 RETICYTE/HGB CONCENTRATE: CPT

## 2024-08-07 PROCEDURE — 82607 VITAMIN B-12: CPT

## 2024-08-07 PROCEDURE — 82728 ASSAY OF FERRITIN: CPT

## 2024-08-07 PROCEDURE — 36415 COLL VENOUS BLD VENIPUNCTURE: CPT

## 2024-08-07 PROCEDURE — 83550 IRON BINDING TEST: CPT

## 2024-08-13 ENCOUNTER — HOSPITAL ENCOUNTER (OUTPATIENT)
Dept: CT IMAGING | Age: 59
Discharge: HOME | End: 2024-08-13
Attending: INTERNAL MEDICINE
Payer: COMMERCIAL

## 2024-08-13 ENCOUNTER — HOSPITAL ENCOUNTER (OUTPATIENT)
Dept: PULMONOLOGY | Age: 59
Discharge: HOME | End: 2024-08-13
Attending: INTERNAL MEDICINE
Payer: COMMERCIAL

## 2024-08-13 DIAGNOSIS — R91.8 LUNG NODULES: ICD-10-CM

## 2024-08-13 DIAGNOSIS — R06.02 SHORTNESS OF BREATH: ICD-10-CM

## 2024-08-13 DIAGNOSIS — Z72.0 TOBACCO ABUSE: ICD-10-CM

## 2024-08-13 DIAGNOSIS — D50.9 IRON DEFICIENCY ANEMIA, UNSPECIFIED IRON DEFICIENCY ANEMIA TYPE: ICD-10-CM

## 2024-08-13 PROCEDURE — 94726 PLETHYSMOGRAPHY LUNG VOLUMES: CPT

## 2024-08-13 PROCEDURE — 6360000004 HC RX CONTRAST MEDICATION: Performed by: INTERNAL MEDICINE

## 2024-08-13 PROCEDURE — 94729 DIFFUSING CAPACITY: CPT

## 2024-08-13 PROCEDURE — 71260 CT THORAX DX C+: CPT

## 2024-08-13 PROCEDURE — 94060 EVALUATION OF WHEEZING: CPT

## 2024-08-13 RX ADMIN — IOPAMIDOL 80 ML: 755 INJECTION, SOLUTION INTRAVENOUS at 07:46

## 2024-08-23 ENCOUNTER — OFFICE VISIT (OUTPATIENT)
Dept: ONCOLOGY | Age: 59
End: 2024-08-23
Payer: COMMERCIAL

## 2024-08-23 ENCOUNTER — HOSPITAL ENCOUNTER (OUTPATIENT)
Dept: INFUSION THERAPY | Age: 59
Discharge: HOME | End: 2024-08-23
Payer: COMMERCIAL

## 2024-08-23 VITALS
OXYGEN SATURATION: 97 % | HEART RATE: 70 BPM | TEMPERATURE: 97.8 F | RESPIRATION RATE: 16 BRPM | DIASTOLIC BLOOD PRESSURE: 73 MMHG | WEIGHT: 202.6 LBS | HEIGHT: 67 IN | SYSTOLIC BLOOD PRESSURE: 166 MMHG | BODY MASS INDEX: 31.8 KG/M2

## 2024-08-23 VITALS
TEMPERATURE: 97.8 F | OXYGEN SATURATION: 97 % | SYSTOLIC BLOOD PRESSURE: 166 MMHG | DIASTOLIC BLOOD PRESSURE: 73 MMHG | RESPIRATION RATE: 16 BRPM | HEART RATE: 70 BPM

## 2024-08-23 DIAGNOSIS — R59.1 LYMPHADENOPATHY: ICD-10-CM

## 2024-08-23 DIAGNOSIS — R51.9 RECURRENT HEADACHE: Primary | ICD-10-CM

## 2024-08-23 DIAGNOSIS — R91.8 LUNG NODULES: ICD-10-CM

## 2024-08-23 PROCEDURE — 3078F DIAST BP <80 MM HG: CPT | Performed by: INTERNAL MEDICINE

## 2024-08-23 PROCEDURE — 3077F SYST BP >= 140 MM HG: CPT | Performed by: INTERNAL MEDICINE

## 2024-08-23 PROCEDURE — 99211 OFF/OP EST MAY X REQ PHY/QHP: CPT

## 2024-08-23 PROCEDURE — 99214 OFFICE O/P EST MOD 30 MIN: CPT | Performed by: INTERNAL MEDICINE

## 2024-08-23 NOTE — PROGRESS NOTES
[facility] [place] [facility]  [facility]  [address]  [address]  [place] [address]  Dept: [phone]  Loc: [phone]   Hematology/Oncology Consult (Clinic)        5/3/24:    Alberto Gilliland   1965     No ref. provider found   Juan Christiansen DO       Reason:  Chief Complaint   Patient presents with    Follow-up     Iron deficiency anemia, unspecified iron deficiency anemia type   HPI:  Mr. Alberto Gilliland is a 59-year-old male who is here for initial consultation for abnormal findings on CT scan obtained in April 2024.  CT chest (4/15/2024) with findings of small right lung nodules of indeterminate etiology and mediastinal lymphadenopathy.  Scans were obtained as part of workup for ED visit on 4/15/2024 for shortness of breath and right upper quadrant pain.  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  3 mm nodule lateral right lower lobe.  Nodules in the left base.  Scan with multiple new pulmonary nodules.  No further workup with done between April 2023 and April 2024.  Mr. Gilliland reports chronic dry cough, he gained weight due to constant eating and making efforts to quit smoking.  He reports bone pains all over the body and Dr. Davila, is treating him with gabapentin 4 times a day for  sciatica.  He takes oral iron on a daily basis.  He reports history of GI bleed (8 inches of small bowel was removed )on Coumadin which was discontinued after the mechanical valve was replaced.      Social history:  1PPD and 1.5 PPD previously.  Down to less than one

## 2024-08-26 ENCOUNTER — CARE COORDINATION (OUTPATIENT)
Dept: CARE COORDINATION | Age: 59
End: 2024-08-26

## 2024-08-26 ENCOUNTER — OFFICE VISIT (OUTPATIENT)
Dept: PULMONOLOGY | Age: 59
End: 2024-08-26
Payer: COMMERCIAL

## 2024-08-26 VITALS
DIASTOLIC BLOOD PRESSURE: 82 MMHG | WEIGHT: 202 LBS | OXYGEN SATURATION: 97 % | HEART RATE: 71 BPM | TEMPERATURE: 98.7 F | BODY MASS INDEX: 31.71 KG/M2 | SYSTOLIC BLOOD PRESSURE: 158 MMHG | HEIGHT: 67 IN

## 2024-08-26 DIAGNOSIS — R06.02 SHORTNESS OF BREATH: ICD-10-CM

## 2024-08-26 DIAGNOSIS — R91.1 LUNG NODULE: ICD-10-CM

## 2024-08-26 DIAGNOSIS — Z72.0 TOBACCO ABUSE: ICD-10-CM

## 2024-08-26 DIAGNOSIS — Z79.631 METHOTREXATE, LONG TERM, CURRENT USE: ICD-10-CM

## 2024-08-26 DIAGNOSIS — J44.9 COPD, MILD (HCC): Primary | ICD-10-CM

## 2024-08-26 PROCEDURE — 3079F DIAST BP 80-89 MM HG: CPT | Performed by: INTERNAL MEDICINE

## 2024-08-26 PROCEDURE — 99214 OFFICE O/P EST MOD 30 MIN: CPT | Performed by: INTERNAL MEDICINE

## 2024-08-26 PROCEDURE — 3077F SYST BP >= 140 MM HG: CPT | Performed by: INTERNAL MEDICINE

## 2024-08-26 ASSESSMENT — ENCOUNTER SYMPTOMS
SHORTNESS OF BREATH: 1
CHOKING: 0
VOICE CHANGE: 0
COUGH: 1
CHEST TIGHTNESS: 0
TROUBLE SWALLOWING: 0
VOMITING: 0
DIARRHEA: 0
BLOOD IN STOOL: 0
WHEEZING: 1

## 2024-08-26 ASSESSMENT — COPD QUESTIONNAIRES: COPD: 1

## 2024-08-26 NOTE — PROGRESS NOTES
[facility]    Patient: MARICARMEN TURNER, 59 y.o.   : 1965         Subjective     Chief Complaint   Patient presents with    Follow-up     7 week ct + pft results        Originally referred for CTA chest with some bulky medistinal LN and pulm nodules, PET negative. On MTX for AI rash. Chart initially had likely error lymphoma diagnosis on it, but is a smoker, 1/2-1 ppd still for many years. Has quit. Triggers for smoking are waking up and getting coffee, also stress with losing his son. Some + family history of lung cancer.   2024, did have EBUS with bronchoscopy and BAL for mediastinal lymphadenopathy, was negative for any malignancy or infectious process.    COPD  He complains of cough, shortness of breath and wheezing. This is a chronic problem. The current episode started more than 1 year ago. The problem has been unchanged. The cough is non-productive. Pertinent negatives include no chest pain, fever, sweats, trouble swallowing or weight loss. His symptoms are aggravated by exercise. His symptoms are alleviated by rest and beta-agonist. He reports moderate improvement on treatment. Risk factors for lung disease include smoking/tobacco exposure. His past medical history is significant for COPD.           Past Medical hx   PMH:  Past Medical History:   Diagnosis Date    Arthritis     general    Bilateral sciatica     Bleeding     with coumadin    CAD (coronary artery disease)     Carpal tunnel syndrome     RIGHT    Cervicalgia, C5-7     Chronic anxiety     COPD, mild (HCC) 04/15/2023    Dysthymia (or depressive neurosis) 2016    ED (erectile dysfunction)     ED (erectile dysfunction)     Fatty liver 3/5/2024    GERD (gastroesophageal reflux disease)     Headache(784.0)     History of blood transfusion 2016    because of taking coumadin    HTN (hypertension)     Hyperlipidemia     Hypothyroidism     Iron deficiency anemia, blood loss     Lumbago     Lumbar  Alberto Johnston in:  Return in about 6 weeks (around 10/7/2024) for lung nodules, and COPD.     Jonny Mckinley MD  8/26/2024  5:09 PM

## 2024-08-26 NOTE — PROGRESS NOTES
9.6 oz)   06/28/24 92.7 kg (204 lb 6.4 oz)       Physical Exam     results   Lung Nodule Screening     [x] Qualifies    [] Does not qualify   [] Declined    [] Completed     The [facility] annual screening for lung cancer with low-dose computed tomography (LDCT) in adults   aged 50 to 80 years   20 pack-year smoking history and   currently smoke or have quit within the past 15 years.     Screening should be discontinued once a person has not smoked for 15 years or develops a health problem that substantially limits life expectancy or the ability or willingness to have curative lung surgery.     Assessment   {No diagnosis found. (Refresh or delete this SmartLink)}        Plan           -Advised to maintain vaccines   -Advised patient to call office with any changes, questions, or concerns regarding respiratory status    Will see Alberto Johnston in:  No follow-ups on file.

## 2024-08-26 NOTE — CARE COORDINATION
Ambulatory Care Coordination Note     8/26/2024 10:14 AM     Patient outreach attempt by this ACM today to offer care management services. ACM was unable to reach the patient by telephone today; left voice message requesting a return phone call to this ACM.     ACM: Tasha Wesley RN     Care Summary Note: pt referred to care coordination for care mgmt and RPM services from oncology office.      PCP/Specialist follow up:   Future Appointments         Provider Specialty Dept Phone    8/26/2024 2:40 PM Jonny Mckinley MD Pulmonology [phone]    9/13/2024 8:00 PM (Arrive by 7:30 PM) [facility] Radiology [phone]    9/16/2024 9:30 AM Ivy De Leon MD Oncology [phone]    9/25/2024 3:30 PM Juan Christiansen,  Family Medicine [phone]    10/18/2024 10:30 AM Rosa Wise, APRN - CNP Cardiology [phone]            Follow Up:   Plan for next ACM outreach in approximately 1-2 days  to complete:  - outreach attempt to offer care management services  - RPM.

## 2024-08-27 ENCOUNTER — CARE COORDINATION (OUTPATIENT)
Dept: CARE COORDINATION | Age: 59
End: 2024-08-27

## 2024-08-27 SDOH — SOCIAL STABILITY: SOCIAL NETWORK
DO YOU BELONG TO ANY CLUBS OR ORGANIZATIONS SUCH AS CHURCH GROUPS UNIONS, FRATERNAL OR ATHLETIC GROUPS, OR SCHOOL GROUPS?: NO

## 2024-08-27 SDOH — ECONOMIC STABILITY: INCOME INSECURITY: HOW HARD IS IT FOR YOU TO PAY FOR THE VERY BASICS LIKE FOOD, HOUSING, MEDICAL CARE, AND HEATING?: NOT VERY HARD

## 2024-08-27 SDOH — ECONOMIC STABILITY: FOOD INSECURITY: WITHIN THE PAST 12 MONTHS, YOU WORRIED THAT YOUR FOOD WOULD RUN OUT BEFORE YOU GOT MONEY TO BUY MORE.: NEVER TRUE

## 2024-08-27 SDOH — HEALTH STABILITY: MENTAL HEALTH
STRESS IS WHEN SOMEONE FEELS TENSE, NERVOUS, ANXIOUS, OR CAN'T SLEEP AT NIGHT BECAUSE THEIR MIND IS TROUBLED. HOW STRESSED ARE YOU?: RATHER MUCH

## 2024-08-27 SDOH — HEALTH STABILITY: PHYSICAL HEALTH: ON AVERAGE, HOW MANY DAYS PER WEEK DO YOU ENGAGE IN MODERATE TO STRENUOUS EXERCISE (LIKE A BRISK WALK)?: 0 DAYS

## 2024-08-27 SDOH — SOCIAL STABILITY: SOCIAL NETWORK: ARE YOU MARRIED, WIDOWED, DIVORCED, SEPARATED, NEVER MARRIED, OR LIVING WITH A PARTNER?: DIVORCED

## 2024-08-27 SDOH — HEALTH STABILITY: PHYSICAL HEALTH: ON AVERAGE, HOW MANY MINUTES DO YOU ENGAGE IN EXERCISE AT THIS LEVEL?: 0 MIN

## 2024-08-27 SDOH — ECONOMIC STABILITY: INCOME INSECURITY: IN THE LAST 12 MONTHS, WAS THERE A TIME WHEN YOU WERE NOT ABLE TO PAY THE MORTGAGE OR RENT ON TIME?: NO

## 2024-08-27 SDOH — SOCIAL STABILITY: SOCIAL NETWORK: HOW OFTEN DO YOU GET TOGETHER WITH FRIENDS OR RELATIVES?: MORE THAN THREE TIMES A WEEK

## 2024-08-27 SDOH — ECONOMIC STABILITY: FOOD INSECURITY: WITHIN THE PAST 12 MONTHS, THE FOOD YOU BOUGHT JUST DIDN'T LAST AND YOU DIDN'T HAVE MONEY TO GET MORE.: NEVER TRUE

## 2024-08-27 SDOH — SOCIAL STABILITY: SOCIAL NETWORK: HOW OFTEN DO YOU ATTENT MEETINGS OF THE CLUB OR ORGANIZATION YOU BELONG TO?: NEVER

## 2024-08-27 SDOH — SOCIAL STABILITY: SOCIAL NETWORK
IN A TYPICAL WEEK, HOW MANY TIMES DO YOU TALK ON THE PHONE WITH FAMILY, FRIENDS, OR NEIGHBORS?: MORE THAN THREE TIMES A WEEK

## 2024-08-27 SDOH — SOCIAL STABILITY: SOCIAL NETWORK: HOW OFTEN DO YOU ATTEND CHURCH OR RELIGIOUS SERVICES?: NEVER

## 2024-08-27 ASSESSMENT — ENCOUNTER SYMPTOMS: DYSPNEA ASSOCIATED WITH: MINIMAL EXERTION

## 2024-08-27 NOTE — CARE COORDINATION
Ambulatory Care Coordination Note     2024 4:01 PM     Patient Current Location:  Home: [place] [address]     This patient was received as a referral from Ambulatory Care Manager .    ACM contacted the patient by telephone. Verified name and  with patient as identifiers. Provided introduction to self, and explanation of the ACM role.   Patient accepted care management services at this time.          ACM: Tasha Wesley RN     Challenges to be reviewed by the provider   Additional needs identified to be addressed with provider No  none               Method of communication with provider: none.    Care Summary Note: Alberto was referred to care coordination by oncology for education and management of chronic conditions and RPM service.  Pt has h/o: GERD, anemia, hypothyroidism, CAD, HTN, GI bleed, lymphomatoid papulosis.   Spoke with Alberto today.  Pt very appreciative of call.   Recently completed IV iron infusions for tx of anemia.    Recent CT chest revealed new lung nodules.  Pt will have PET scan completed.  MRI brain d/t h/a's  Pt reports that he has no energy, no appetite.  Having h/a's.    COPD: following with pulmonary.  Gets MA.  Has had increased non productive cough.  Started on Stiolto by PULM.  Was provided with sample inhaler.  Pt started yesterday.  Denies problems with use of inhaler.  Pt is active smoker.  Has quit in past.  Smoking less than 0.5ppd.  alpha 1 lab completed in office yesterday.   Declines smoking aides.  Will discuss smoking cessation clinic program with next outreach.   Grief-lost son within the past year.  Following with counselor bi-weekly at [place].  Feels he has good support system.   [facility] 9/3 [facility]   MRI brain.   Working 4 hr shifts at [employer].    Pt has had change in bowels. Has seen GI.  Was advised to get more fiber in diet. Reports diet is not great.  Appetite poor.  Nausea at times. Reports that nothing taste goods. Reports

## 2024-08-28 ENCOUNTER — CARE COORDINATION (OUTPATIENT)
Dept: CARE COORDINATION | Age: 59
End: 2024-08-28

## 2024-08-28 NOTE — CARE COORDINATION
Attempted to contact [place] for RPM insurance verification.  Was on hold for a long duration of time with no answer.  Will try again tomorrow.

## 2024-08-29 ENCOUNTER — CARE COORDINATION (OUTPATIENT)
Dept: CARE COORDINATION | Age: 59
End: 2024-08-29

## 2024-08-29 NOTE — CARE COORDINATION
Remote Patient Monitoring Enrollment Note  Per : Covered at 100%.  Deductible and out of pocket both have already been met.  Reference #: [id number]        Date/Time:  8/29/2024 3:54 PM    Offered patient enrollment in the [facility] Remote Patient Monitoring (RPM) program for in home monitoring for COPD; condition managed by PULM-Dr. Mckinley. HTN; condition managed by PCP..  Patient accepted RPM services.    Patient will be monitoring the following daily:  blood pressure reading, medications, pulse ox reading, and weight      ACM reviewed the information below with patient:    Emergency Contact (name and contact number): Sahra Almaraz [phone]    [x] A member from the care coordination team will reach out to notify the patient once the RPM kit is ordered.   [x] Once the kit is delivered, the HRS team will contact the patient after UPS deliver to assist with set up.            [x] Determined BP cuff size: large (13.8\"-19.68\")      [x] Determined weight scale: regular (<330lbs)                                                 [x] Hours of ACM monitoring - Monday-Friday [id number].   [x] It is important to take your vitals every day, even on the weekends,to keep your care team aware of how you are doing every day of the week.                  All questions about RPM program answered at this time.

## 2024-08-29 NOTE — CARE COORDINATION
Ambulatory Care Coordination  RPM Note     8/29/2024 3:07 PM     Patient Current Location:  Home: [place] [address]    CCSS received request from JOSE Wesley to assist patient in contacting insurance company for verification of remote patient monitoring coverage.  If patient is agreeable to information provided by insurance company, patient will be enrolled in remote patient monitoring for COPD; condition managed by Dr. Mckinley. HTN; condition managed by Rosa Middleton.      Covered at 100%.  Deductible and out of pocket both have already been met.  Reference #: [id number]

## 2024-09-03 ENCOUNTER — CARE COORDINATION (OUTPATIENT)
Dept: CARE COORDINATION | Age: 59
End: 2024-09-03

## 2024-09-03 SDOH — HEALTH STABILITY: PHYSICAL HEALTH: ON AVERAGE, HOW MANY DAYS PER WEEK DO YOU ENGAGE IN MODERATE TO STRENUOUS EXERCISE (LIKE A BRISK WALK)?: 0 DAYS

## 2024-09-03 SDOH — SOCIAL STABILITY: SOCIAL NETWORK: ARE YOU MARRIED, WIDOWED, DIVORCED, SEPARATED, NEVER MARRIED, OR LIVING WITH A PARTNER?: DIVORCED

## 2024-09-03 SDOH — HEALTH STABILITY: PHYSICAL HEALTH: ON AVERAGE, HOW MANY MINUTES DO YOU ENGAGE IN EXERCISE AT THIS LEVEL?: 0 MIN

## 2024-09-03 SDOH — SOCIAL STABILITY: SOCIAL NETWORK: HOW OFTEN DO YOU ATTENT MEETINGS OF THE CLUB OR ORGANIZATION YOU BELONG TO?: NEVER

## 2024-09-03 SDOH — ECONOMIC STABILITY: FOOD INSECURITY: WITHIN THE PAST 12 MONTHS, YOU WORRIED THAT YOUR FOOD WOULD RUN OUT BEFORE YOU GOT MONEY TO BUY MORE.: NEVER TRUE

## 2024-09-03 SDOH — ECONOMIC STABILITY: FOOD INSECURITY: WITHIN THE PAST 12 MONTHS, THE FOOD YOU BOUGHT JUST DIDN'T LAST AND YOU DIDN'T HAVE MONEY TO GET MORE.: NEVER TRUE

## 2024-09-03 SDOH — ECONOMIC STABILITY: INCOME INSECURITY: IN THE LAST 12 MONTHS, WAS THERE A TIME WHEN YOU WERE NOT ABLE TO PAY THE MORTGAGE OR RENT ON TIME?: NO

## 2024-09-03 SDOH — ECONOMIC STABILITY: INCOME INSECURITY: HOW HARD IS IT FOR YOU TO PAY FOR THE VERY BASICS LIKE FOOD, HOUSING, MEDICAL CARE, AND HEATING?: NOT VERY HARD

## 2024-09-03 SDOH — SOCIAL STABILITY: SOCIAL NETWORK: HOW OFTEN DO YOU GET TOGETHER WITH FRIENDS OR RELATIVES?: MORE THAN THREE TIMES A WEEK

## 2024-09-03 ASSESSMENT — ENCOUNTER SYMPTOMS: DYSPNEA ASSOCIATED WITH: EXERTION

## 2024-09-03 NOTE — ACP (ADVANCE CARE PLANNING)
Advance Care Planning   Healthcare Decision Maker:    Primary Decision Maker: Lesa Mullen - [phone]    Click here to complete Healthcare Decision Makers including selection of the Healthcare Decision Maker Relationship (ie \"Primary\").  Today we documented Decision Maker(s) consistent with ACP documents on file.       If the relationship to the patient does NOT follow our state's Next of Kin hierarchy, the patient MUST complete an ACP Document to allow him/her to act on the patient's behalf. :

## 2024-09-03 NOTE — CARE COORDINATION
Ambulatory Care Coordination Note     9/3/2024 4:05 PM     Patient Current Location:  Home: [place] [address]     ACM contacted the patient by telephone. Verified name and  with patient as identifiers.         ACM: Tasha Wesley RN     Challenges to be reviewed by the provider   Additional needs identified to be addressed with provider No  none               Method of communication with provider: none.    Care Summary Note: Alberto was referred to care coordination by oncology for education and management of chronic conditions and RPM service.  Pt has h/o: GERD, anemia, hypothyroidism, CAD, HTN, GI bleed, lymphomatoid papulosis.   Spoke with Alberto  Pt reports no appetite. Eating 1 good meal per day. Ongoing fatigue.  Reports that oncologist has taken him off work until he gets testing completed. Pt is drinking Boost daily.  to mail out Ensure coupons  Pt reports PET scan was rescheduled for .    MRI brain  and f/u with oncology   Chronic cough producing yellow sputum.  Active smoker.  Working to quit.   Offered patient enrollment in the Remote Patient Monitoring (RPM) program for in-home monitoring: Yes, patient enrolled; current status is awaiting kit.  COPD Assessment    Does the patient understand envrionmental exposure?: Yes  Is the patient able to verbalize Rescue vs. Long Acting medications?: Yes  Does the patient have a nebulizer?: No  Does the patient use a space with inhaled medications?: No            Symptoms:  None: Yes      Symptom course: stable  Breathlessness: exertion  Increase use of rapid acting/rescue inhaled medications?: No  Change in chronic cough?: No/At Baseline  Change in sputum?: No/At Baseline  Sputum characteristics: Yellow  Self Monitoring - SaO2: No  Have you had a recent diagnosis of pneumonia either by PCP or at a hospital?: No        Assessments Completed:   General Assessment    Do you have any symptoms that are causing concern?:

## 2024-09-04 ENCOUNTER — CARE COORDINATION (OUTPATIENT)
Dept: CARE COORDINATION | Age: 59
End: 2024-09-04

## 2024-09-04 ENCOUNTER — TELEPHONE (OUTPATIENT)
Dept: ONCOLOGY | Age: 59
End: 2024-09-04

## 2024-09-04 NOTE — TELEPHONE ENCOUNTER
[facility] that is currently scheduled on [facility]/9 was denied. The number to complete peer to peer is [phone]. Once p2p is completed,

## 2024-09-04 NOTE — CARE COORDINATION
Mailing the patient Ens[employer] coupons per the request of Tasha Wesley RN.       Marian Davila [facility]  CC   Medication Assistance  [place] [employer]    (P) [phone]  (F) [phone]

## 2024-09-09 ENCOUNTER — CARE COORDINATION (OUTPATIENT)
Dept: CARE COORDINATION | Age: 59
End: 2024-09-09

## 2024-09-09 ENCOUNTER — HOSPITAL ENCOUNTER (OUTPATIENT)
Dept: INFUSION THERAPY | Age: 59
Discharge: HOME | End: 2024-09-09
Payer: COMMERCIAL

## 2024-09-09 ENCOUNTER — OFFICE VISIT (OUTPATIENT)
Dept: ONCOLOGY | Age: 59
End: 2024-09-09
Payer: COMMERCIAL

## 2024-09-09 VITALS
RESPIRATION RATE: 16 BRPM | TEMPERATURE: 97.5 F | HEART RATE: 70 BPM | DIASTOLIC BLOOD PRESSURE: 64 MMHG | OXYGEN SATURATION: 98 % | SYSTOLIC BLOOD PRESSURE: 150 MMHG

## 2024-09-09 VITALS
DIASTOLIC BLOOD PRESSURE: 64 MMHG | BODY MASS INDEX: 30.92 KG/M2 | WEIGHT: 197 LBS | TEMPERATURE: 97.5 F | OXYGEN SATURATION: 98 % | SYSTOLIC BLOOD PRESSURE: 150 MMHG | HEIGHT: 67 IN | RESPIRATION RATE: 16 BRPM | HEART RATE: 70 BPM

## 2024-09-09 DIAGNOSIS — D50.9 IRON DEFICIENCY ANEMIA, UNSPECIFIED IRON DEFICIENCY ANEMIA TYPE: ICD-10-CM

## 2024-09-09 DIAGNOSIS — E03.9 ACQUIRED HYPOTHYROIDISM: ICD-10-CM

## 2024-09-09 DIAGNOSIS — R59.1 LYMPHADENOPATHY: ICD-10-CM

## 2024-09-09 DIAGNOSIS — R91.1 LUNG NODULE: Primary | ICD-10-CM

## 2024-09-09 PROCEDURE — 99211 OFF/OP EST MAY X REQ PHY/QHP: CPT

## 2024-09-09 PROCEDURE — 3077F SYST BP >= 140 MM HG: CPT | Performed by: INTERNAL MEDICINE

## 2024-09-09 PROCEDURE — 3078F DIAST BP <80 MM HG: CPT | Performed by: INTERNAL MEDICINE

## 2024-09-09 PROCEDURE — 99213 OFFICE O/P EST LOW 20 MIN: CPT | Performed by: INTERNAL MEDICINE

## 2024-09-09 RX ORDER — LEVOTHYROXINE SODIUM 75 UG/1
TABLET ORAL
Qty: 90 TABLET | Refills: 0 | Status: SHIPPED | OUTPATIENT
Start: 2024-09-09

## 2024-09-09 ASSESSMENT — ENCOUNTER SYMPTOMS
ALLERGIC/IMMUNOLOGIC NEGATIVE: 1
RESPIRATORY NEGATIVE: 1
EYES NEGATIVE: 1
GASTROINTESTINAL NEGATIVE: 1

## 2024-09-10 ENCOUNTER — CARE COORDINATION (OUTPATIENT)
Dept: CARE COORDINATION | Age: 59
End: 2024-09-10

## 2024-09-10 DIAGNOSIS — R91.1 LUNG NODULE: Primary | ICD-10-CM

## 2024-09-10 DIAGNOSIS — I10 ESSENTIAL HYPERTENSION: ICD-10-CM

## 2024-09-10 RX ORDER — METOPROLOL SUCCINATE 50 MG/1
50 TABLET, EXTENDED RELEASE ORAL DAILY
Qty: 90 TABLET | Refills: 3 | Status: SHIPPED | OUTPATIENT
Start: 2024-09-10

## 2024-09-10 RX ORDER — ATORVASTATIN CALCIUM 20 MG/1
20 TABLET, FILM COATED ORAL DAILY
Qty: 90 TABLET | Refills: 3 | Status: SHIPPED | OUTPATIENT
Start: 2024-09-10

## 2024-09-13 ENCOUNTER — HOSPITAL ENCOUNTER (OUTPATIENT)
Dept: MRI IMAGING | Age: 59
Discharge: HOME | End: 2024-09-13
Attending: INTERNAL MEDICINE
Payer: COMMERCIAL

## 2024-09-13 DIAGNOSIS — R91.8 LUNG NODULES: ICD-10-CM

## 2024-09-13 DIAGNOSIS — R51.9 RECURRENT HEADACHE: ICD-10-CM

## 2024-09-13 DIAGNOSIS — R59.1 LYMPHADENOPATHY: ICD-10-CM

## 2024-09-13 PROCEDURE — A9579 GAD-BASE MR CONTRAST NOS,1ML: HCPCS | Performed by: INTERNAL MEDICINE

## 2024-09-13 PROCEDURE — 70553 MRI BRAIN STEM W/O & W/DYE: CPT

## 2024-09-13 PROCEDURE — 6360000004 HC RX CONTRAST MEDICATION: Performed by: INTERNAL MEDICINE

## 2024-09-13 RX ADMIN — GADOTERIDOL 20 ML: 279.3 INJECTION, SOLUTION INTRAVENOUS at 15:43

## 2024-09-16 ENCOUNTER — CARE COORDINATION (OUTPATIENT)
Dept: CARE COORDINATION | Age: 59
End: 2024-09-16

## 2024-09-16 ASSESSMENT — ENCOUNTER SYMPTOMS: DYSPNEA ASSOCIATED WITH: EXERTION

## 2024-09-18 ENCOUNTER — HOSPITAL ENCOUNTER (OUTPATIENT)
Dept: INFUSION THERAPY | Age: 59
Discharge: HOME | End: 2024-09-18
Payer: COMMERCIAL

## 2024-09-18 ENCOUNTER — OFFICE VISIT (OUTPATIENT)
Dept: ONCOLOGY | Age: 59
End: 2024-09-18
Payer: COMMERCIAL

## 2024-09-18 VITALS
DIASTOLIC BLOOD PRESSURE: 78 MMHG | HEIGHT: 67 IN | SYSTOLIC BLOOD PRESSURE: 169 MMHG | HEART RATE: 71 BPM | WEIGHT: 199 LBS | OXYGEN SATURATION: 98 % | TEMPERATURE: 97.5 F | RESPIRATION RATE: 16 BRPM | BODY MASS INDEX: 31.23 KG/M2

## 2024-09-18 VITALS
RESPIRATION RATE: 16 BRPM | DIASTOLIC BLOOD PRESSURE: 78 MMHG | HEART RATE: 71 BPM | TEMPERATURE: 97.5 F | SYSTOLIC BLOOD PRESSURE: 169 MMHG | OXYGEN SATURATION: 98 %

## 2024-09-18 DIAGNOSIS — R91.1 LUNG NODULE: Primary | ICD-10-CM

## 2024-09-18 PROCEDURE — 99213 OFFICE O/P EST LOW 20 MIN: CPT | Performed by: INTERNAL MEDICINE

## 2024-09-18 PROCEDURE — 3078F DIAST BP <80 MM HG: CPT | Performed by: INTERNAL MEDICINE

## 2024-09-18 PROCEDURE — 99211 OFF/OP EST MAY X REQ PHY/QHP: CPT

## 2024-09-18 PROCEDURE — 3077F SYST BP >= 140 MM HG: CPT | Performed by: INTERNAL MEDICINE

## 2024-09-20 VITALS — DIASTOLIC BLOOD PRESSURE: 64 MMHG | OXYGEN SATURATION: 95 % | SYSTOLIC BLOOD PRESSURE: 128 MMHG | HEART RATE: 61 BPM

## 2024-09-24 ENCOUNTER — CARE COORDINATION (OUTPATIENT)
Dept: CARE COORDINATION | Age: 59
End: 2024-09-24

## 2024-09-25 ENCOUNTER — OFFICE VISIT (OUTPATIENT)
Dept: FAMILY MEDICINE CLINIC | Age: 59
End: 2024-09-25

## 2024-09-25 VITALS
WEIGHT: 196.8 LBS | SYSTOLIC BLOOD PRESSURE: 136 MMHG | HEIGHT: 67 IN | RESPIRATION RATE: 18 BRPM | BODY MASS INDEX: 30.89 KG/M2 | HEART RATE: 110 BPM | DIASTOLIC BLOOD PRESSURE: 78 MMHG

## 2024-09-25 DIAGNOSIS — F41.9 ANXIETY: ICD-10-CM

## 2024-09-25 DIAGNOSIS — D64.9 CHRONIC ANEMIA: ICD-10-CM

## 2024-09-25 DIAGNOSIS — E03.9 ACQUIRED HYPOTHYROIDISM: ICD-10-CM

## 2024-09-25 DIAGNOSIS — I10 ESSENTIAL HYPERTENSION: Primary | ICD-10-CM

## 2024-09-25 DIAGNOSIS — I50.22 CHRONIC SYSTOLIC (CONGESTIVE) HEART FAILURE (HCC): ICD-10-CM

## 2024-09-25 DIAGNOSIS — C86.6 LYMPHOMATOID PAPULOSIS: ICD-10-CM

## 2024-09-25 RX ORDER — TRIAMCINOLONE ACETONIDE 1 MG/G
CREAM TOPICAL
COMMUNITY
Start: 2024-07-08

## 2024-09-25 RX ORDER — ALPRAZOLAM 0.5 MG
0.5 TABLET ORAL DAILY PRN
Qty: 30 TABLET | Refills: 2 | Status: SHIPPED | OUTPATIENT
Start: 2024-10-07 | End: 2025-01-05

## 2024-09-26 ASSESSMENT — ENCOUNTER SYMPTOMS
RESPIRATORY NEGATIVE: 1
GASTROINTESTINAL NEGATIVE: 1

## 2024-09-30 RX ORDER — ASPIRIN 81 MG/1
TABLET, COATED ORAL
Qty: 90 TABLET | Refills: 0 | Status: SHIPPED | OUTPATIENT
Start: 2024-09-30

## 2024-10-08 ENCOUNTER — HOSPITAL ENCOUNTER (OUTPATIENT)
Dept: CT IMAGING | Age: 59
Discharge: HOME | End: 2024-10-08
Payer: COMMERCIAL

## 2024-10-08 ENCOUNTER — CARE COORDINATION (OUTPATIENT)
Dept: CARE COORDINATION | Age: 59
End: 2024-10-08

## 2024-10-08 VITALS
BODY MASS INDEX: 30.76 KG/M2 | SYSTOLIC BLOOD PRESSURE: 170 MMHG | DIASTOLIC BLOOD PRESSURE: 73 MMHG | HEIGHT: 67 IN | OXYGEN SATURATION: 96 % | RESPIRATION RATE: 15 BRPM | WEIGHT: 196 LBS | TEMPERATURE: 98.8 F | HEART RATE: 56 BPM

## 2024-10-08 DIAGNOSIS — R91.1 LUNG NODULE: ICD-10-CM

## 2024-10-08 LAB
DEPRECATED RDW RBC AUTO: 61.6 FL (ref 35–45)
ERYTHROCYTE [DISTWIDTH] IN BLOOD BY AUTOMATED COUNT: 18 % (ref 11.5–14.5)
HCT VFR BLD AUTO: 41.8 % (ref 42–52)
HGB BLD-MCNC: 14.3 GM/DL (ref 14–18)
MCH RBC QN AUTO: 32.3 PG (ref 26–33)
MCHC RBC AUTO-ENTMCNC: 34.2 GM/DL (ref 32.2–35.5)
MCV RBC AUTO: 94.4 FL (ref 80–94)
PLATELET # BLD AUTO: 254 THOU/MM3 (ref 130–400)
PMV BLD AUTO: 10.4 FL (ref 9.4–12.4)
RBC # BLD AUTO: 4.43 MILL/MM3 (ref 4.7–6.1)
WBC # BLD AUTO: 11.4 THOU/MM3 (ref 4.8–10.8)

## 2024-10-08 PROCEDURE — 71250 CT THORAX DX C-: CPT

## 2024-10-08 PROCEDURE — 85027 COMPLETE CBC AUTOMATED: CPT

## 2024-10-08 PROCEDURE — 2580000003 HC RX 258: Performed by: RADIOLOGY

## 2024-10-08 RX ORDER — MIDAZOLAM HYDROCHLORIDE 1 MG/ML
1 INJECTION INTRAMUSCULAR; INTRAVENOUS ONCE
Status: DISCONTINUED | OUTPATIENT
Start: 2024-10-08 | End: 2024-10-09 | Stop reason: HOSPADM

## 2024-10-08 RX ORDER — FENTANYL CITRATE 50 UG/ML
50 INJECTION, SOLUTION INTRAMUSCULAR; INTRAVENOUS ONCE
Status: DISCONTINUED | OUTPATIENT
Start: 2024-10-08 | End: 2024-10-09 | Stop reason: HOSPADM

## 2024-10-08 RX ORDER — SODIUM CHLORIDE 450 MG/100ML
INJECTION, SOLUTION INTRAVENOUS CONTINUOUS
Status: DISCONTINUED | OUTPATIENT
Start: 2024-10-08 | End: 2024-10-09 | Stop reason: HOSPADM

## 2024-10-08 RX ADMIN — SODIUM CHLORIDE: 4.5 INJECTION, SOLUTION INTRAVENOUS at 07:18

## 2024-10-08 ASSESSMENT — PAIN - FUNCTIONAL ASSESSMENT
PAIN_FUNCTIONAL_ASSESSMENT: 0-10
PAIN_FUNCTIONAL_ASSESSMENT: ACTIVITIES ARE NOT PREVENTED

## 2024-10-08 ASSESSMENT — PAIN DESCRIPTION - DESCRIPTORS: DESCRIPTORS: PRESSURE

## 2024-10-08 NOTE — CARE COORDINATION
Remote Patient Monitoring Note      Date/Time:  10/8/2024 10:19 AM    LPN attempted to contact patient by telephone regarding yellow alert received for blood pressure reading (171/77).    Background: COPD, High Blood-Pressure     Clinical Interventions:  HIPAA compliant message left on  requesting a return call.    Plan/Follow Up: Will continue to review, monitor and address alerts with follow up based on severity of symptoms and risk factors.       GERALD Gallego [facility]/ CTN/ Remote Patient Monitoring  [phone]

## 2024-10-08 NOTE — PROGRESS NOTES
0814 Patient received in CT scanning for left lower lobe lung biopsy pre-procedure assessment with family at bedside. Monitor applied.   0829 Dr. Louis here; spoke to patient. This procedure has been fully reviewed with the patient and written informed consent has been obtained.   0836 Family taken to waiting area. Patient assisted to CT table and pre scan started.  0844 Dr Louis speaking to patient and notifying him that the nodule is no longer visualized on CT so the biopsy is being cancelled.   0846 Patient on cart; comfort ensured.  0855 Report called to Indu and patient taken to OPN via transport with family at bedside. Pt alert and oriented x3; follows commands. Skin pink, warm, and dry. Respirations easy, regular, and nonlabored.

## 2024-10-08 NOTE — CARE COORDINATION
Remote Alert Monitoring Note      Date/Time:  10/8/2024 10:44 AM  Patient Current Location: Home: [place] [address]    LPN contacted patient by telephone regarding yellow alert received for blood pressure reading (171/). Verified patients name and  as identifiers.    Rpm alert to be reviewed by the provider                         Background: COPD, High Blood-Pressure     Refer to 911 immediately if:  Patient unresponsive or unable to provide history  Change in cognition or sudden confusion  Patient unable to respond in complete sentences  Intense chest pain/tightness  Any concern for any clinical emergency  Red Alert: Provider response time of 1 hr required for any red alert requiring intervention  Yellow Alert: Provider response time of 3hr required for any escalated yellow alert        Blood Pressure BP Triage  Are you having any Chest Pain? no   Are you having any Shortness of Breath? no   Do you have a headache or have any vision changes? no   Are you having any numbness or tingling? no   Are you having any other health concerns or issues? no     Have you taken your medications as instructed by your doctor today? Yes       Clinical Interventions: Reviewed and followed up on alerts and treatments-. Pt is asymptomatic and in no apparent distress, speaking in full sentences.  Patient states he was scheduled for a lung biopsy today and he has been very anxious. The lump ended up going away and patient states he did not end up needing the biopsy after all. He is agreeable to sit and relax for 15 minutes prior to rechecking his BP.  Will await update.      Plan/Follow Up: Will continue to review, monitor and address alerts with follow up based on severity of symptoms and risk factors.    GERALD Gallego [facility]/ CTN/ Remote Patient monitoring  [phone]

## 2024-10-08 NOTE — PROGRESS NOTES
0654 Patient arrived to hospitals ambulatory for lung biopsy.  Oriented to room and call light  PT RIGHTS AND RESPONSIBILITIES OFFERED TO PT.  He states his siblings are driving, he has been NPO, and he has held his aspirin for 1 week.  Blood work obtained and sent to lab    0904 Patient returned to hospitals.  Iv removed.  Discharge instructions given and explained to patient and he denies questions.  Discharged ambulatory       _M___ Safety:       (Environmental)  Dwale to environment  Ensure ID band is correct and in place/ allergy band as needed  Assess for fall risk  Initiate fall precautions as applicable (fall band, side rails, etc.)  Call light within reach  Bed in low position/ wheels locked    _M___ Pain:       Assess pain level and characteristics  Administer analgesics as ordered  Assess effectiveness of pain management and report to MD as needed    _M___ Knowledge Deficit:  Assess baseline knowledge  Provide teaching at level of understanding  Provide teaching via preferred learning method  Evaluate teaching effectiveness    _M___ Hemodynamic/Respiratory Status:       (Pre and Post Procedure Monitoring)  Assess/Monitor vital signs and LOC  Assess Baseline SpO2 prior to any sedation  Obtain weight/height  Assess vital signs/ LOC until patient meets discharge criteria  Monitor procedure site and notify MD of any issues

## 2024-10-08 NOTE — H&P
Formulation and discussion of sedation / procedure plans, risks, benefits, side effects and alternatives with patient and/or responsible adult completed.    History and Physical reviewed and unchanged.    Electronically signed by Kennedy Louis MD on 10/8/24 at 8:37 AM EDT   
(09/12/2017); pr njx dx/ther sbst epidural/subarach lumbar/sacral (N/A, 09/12/2017); Tooth Extraction (02/2019); Back Injection (Bilateral, 01/03/2022); Back Injection (Bilateral, 03/14/2022); Pain management procedure (Left, 05/17/2022); US BIOPSY LIVER PERCUTANEOUS (01/31/2024); laparoscopy (N/A, 2/23/2024); and bronchoscopy (N/A, 5/17/2024).  Additional information:       ALLERGIES   Allergies as of 10/08/2024 - Fully Reviewed 10/08/2024   Allergen Reaction Noted    Seasonal  08/30/2022     Additional information:       MEDICATIONS   Coumadin Use Last 5 Days [x]No []Yes  Antiplatelet drug therapy use last 5 days  [x]No []Yes  Other anticoagulant use last 5 days  [x]No []Yes    Current Outpatient Medications:     ASPIRIN LOW DOSE 81 MG EC tablet, TAKE 1 TABLET BY MOUTH EVERY DAY, Disp: 90 tablet, Rfl: 0    Ferrous Sulfate (IRON PO), Take by mouth, Disp: , Rfl:     triamcinolone (KENALOG) 0.1 % cream, APPLY TOPICALLY 2 TIMES A DAY to rash on arms  AS NEEDED, Disp: , Rfl:     ALPRAZolam (XANAX) 0.5 MG tablet, Take 1 tablet by mouth daily as needed for Sleep for up to 90 days. Max Daily Amount: 0.5 mg, Disp: 30 tablet, Rfl: 2    metoprolol succinate (TOPROL XL) 50 MG extended release tablet, Take 1 tablet by mouth daily, Disp: 90 tablet, Rfl: 3    atorvastatin (LIPITOR) 20 MG tablet, Take 1 tablet by mouth daily, Disp: 90 tablet, Rfl: 3    levothyroxine (SYNTHROID) 75 MCG tablet, TAKE 1 TABLET BY MOUTH EVERY DAY, Disp: 90 tablet, Rfl: 0    tiotropium-olodaterol (STIOLTO RESPIMAT) 2.5-2.5 MCG/ACT AERS, Inhale 2 puffs into the lungs daily, Disp: 4 each, Rfl: 2    ondansetron (ZOFRAN) 4 MG tablet, Take 1 tablet by mouth every 4-6 hours as needed for Nausea or Vomiting, Disp: 30 tablet, Rfl: 2    sucralfate (CARAFATE) 1 GM tablet, Take 1 tablet by mouth 4 times daily (before meals and nightly), Disp: 120 tablet, Rfl: 3    clobetasol (TEMOVATE) 0.05 % cream, Apply 1 each topically 2 times daily, Disp: , Rfl:

## 2024-10-09 RX ORDER — VALSARTAN 80 MG/1
80 TABLET ORAL DAILY
Qty: 90 TABLET | Refills: 3 | Status: SHIPPED | OUTPATIENT
Start: 2024-10-09

## 2024-10-09 NOTE — TELEPHONE ENCOUNTER
Pt lmom to see if we had anything open for him to be seen before his appt on 10/18 with Rosa. Pt is having high BP issues. I called pt but the connection was lost. Attempted to call pt again but just kept ringing.

## 2024-10-09 NOTE — TELEPHONE ENCOUNTER
Call to patient, no sooner appointment to offer at this time.  BP has been elevated for last 2 weeks  155/71  171/77  170/91  184/81   153/78  HR 52-71  Taking Toprol 50 mg daily   Please advise.

## 2024-10-10 ENCOUNTER — CARE COORDINATION (OUTPATIENT)
Dept: CARE COORDINATION | Age: 59
End: 2024-10-10

## 2024-10-10 ENCOUNTER — OFFICE VISIT (OUTPATIENT)
Dept: ONCOLOGY | Age: 59
End: 2024-10-10
Payer: COMMERCIAL

## 2024-10-10 ENCOUNTER — HOSPITAL ENCOUNTER (OUTPATIENT)
Dept: INFUSION THERAPY | Age: 59
Discharge: HOME | End: 2024-10-10
Payer: COMMERCIAL

## 2024-10-10 VITALS
WEIGHT: 197.2 LBS | SYSTOLIC BLOOD PRESSURE: 162 MMHG | DIASTOLIC BLOOD PRESSURE: 76 MMHG | OXYGEN SATURATION: 98 % | HEIGHT: 67 IN | HEART RATE: 64 BPM | RESPIRATION RATE: 16 BRPM | TEMPERATURE: 97.9 F | BODY MASS INDEX: 30.95 KG/M2

## 2024-10-10 VITALS
HEART RATE: 64 BPM | TEMPERATURE: 97.9 F | DIASTOLIC BLOOD PRESSURE: 76 MMHG | RESPIRATION RATE: 16 BRPM | OXYGEN SATURATION: 98 % | SYSTOLIC BLOOD PRESSURE: 162 MMHG

## 2024-10-10 DIAGNOSIS — D50.9 IRON DEFICIENCY ANEMIA, UNSPECIFIED IRON DEFICIENCY ANEMIA TYPE: Primary | ICD-10-CM

## 2024-10-10 PROCEDURE — 3078F DIAST BP <80 MM HG: CPT | Performed by: INTERNAL MEDICINE

## 2024-10-10 PROCEDURE — 99213 OFFICE O/P EST LOW 20 MIN: CPT | Performed by: INTERNAL MEDICINE

## 2024-10-10 PROCEDURE — 99211 OFF/OP EST MAY X REQ PHY/QHP: CPT

## 2024-10-10 PROCEDURE — 3077F SYST BP >= 140 MM HG: CPT | Performed by: INTERNAL MEDICINE

## 2024-10-10 ASSESSMENT — ENCOUNTER SYMPTOMS
RESPIRATORY NEGATIVE: 1
ALLERGIC/IMMUNOLOGIC NEGATIVE: 1
EYES NEGATIVE: 1
GASTROINTESTINAL NEGATIVE: 1

## 2024-10-10 NOTE — CARE COORDINATION
Ambulatory Care Coordination Note     10/10/2024 2:01 PM     Patient Current Location:  Home: [place] [address]     ACM contacted the patient by telephone. Verified name and  with patient as identifiers.         ACM: Tasha Wesley RN     Challenges to be reviewed by the provider   Additional needs identified to be addressed with provider No  none               Method of communication with provider: none.    Has the patient been seen in the ED since your last call? no    Care Summary Note: Alberto was referred to care coordination by oncology for education and management of chronic conditions and RPM service. Pt has h/o: GERD, anemia, hypothyroidism, CAD, HTN, GI bleed, lymphomatoid papulosis.   Spoke with Alberto today.    Pt reports that he is doing ok.    MA.  Pain in left side of lung.  Has been present for quite some time.   Was to have recent lung bx but nodule had almost completely resolved.    Following closely with PULM and oncology.   Continues to smoke.  Reports that he is smoking just under 1 ppd.    Has appt with counselor at [facility] tomorrow.   Diovan added to medication regimen d/t elevated BP's.  Pt will be obtaining medication today and starting tomorrow. Has appt with cardiology 10/14  Continues to f/u with dermatology.  Getting UV tx 3x per wk.   Offered patient enrollment in the Remote Patient Monitoring (RPM) program for in-home monitoring: Yes, patient enrolled; current status is activated and monitoring.   127/69-69-96%RA today.    Diovan recently added to medication regimen.  Pt will be starting tomorrow.   Assessments Completed:   General Assessment    Do you have any symptoms that are causing concern?: Yes  Reported Symptoms:  (Comment: weakness/fatigue.  reports no real change.  remains off work at this time but hoping to return back after his upcoming appts.)          Medications Reviewed:   Completed during a previous call     Advance Care Planning:   Not reviewed during

## 2024-10-10 NOTE — PROGRESS NOTES
TIBC 219 08/07/2024 09:03 AM     FERRITIN:    Lab Results   Component Value Date/Time    FERRITIN 236 08/07/2024 09:03 AM     PSA:   Lab Results   Component Value Date/Time    PSA 2.13 12/07/2023 04:30 PM            IMAGING:    CT CHEST WO CONTRAST    Result Date: 10/8/2024  The scheduled lung biopsy was not performed. The previously noted 14 mm nodule in the left lung base has almost totally disappeared. **This report has been created using voice recognition software.  It may contain minor errors which are inherent in voice recognition technology.** Electronically signed by Dr. Kennedy Louis    MRI BRAIN W WO CONTRAST    Result Date: 9/13/2024  1. Mild atrophy and dilatation of the lateral ventricles. 2. Probable ischemic changes in the white matter. No evidence of an acute infarct. 3. No evidence of intracranial metastatic disease. 4. 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 5. Inflammatory changes in the right maxillary sinus, mastoid and to a lesser extent ethmoid air cells bilaterally. **This report has been created using voice recognition software. It may contain minor errors which are inherent in voice recognition technology.** Electronically signed by Dr. Courtney Corral MD 10/10/2024

## 2024-10-14 ENCOUNTER — TELEPHONE (OUTPATIENT)
Dept: ONCOLOGY | Age: 59
End: 2024-10-14

## 2024-10-14 ENCOUNTER — OFFICE VISIT (OUTPATIENT)
Dept: CARDIOLOGY CLINIC | Age: 59
End: 2024-10-14
Payer: COMMERCIAL

## 2024-10-14 VITALS
SYSTOLIC BLOOD PRESSURE: 170 MMHG | HEART RATE: 60 BPM | DIASTOLIC BLOOD PRESSURE: 82 MMHG | BODY MASS INDEX: 30.95 KG/M2 | HEIGHT: 67 IN | WEIGHT: 197.2 LBS

## 2024-10-14 DIAGNOSIS — F41.9 ANXIETY: ICD-10-CM

## 2024-10-14 DIAGNOSIS — I25.10 CORONARY ARTERY DISEASE INVOLVING NATIVE CORONARY ARTERY OF NATIVE HEART WITHOUT ANGINA PECTORIS: Primary | ICD-10-CM

## 2024-10-14 DIAGNOSIS — Z95.2 S/P AVR: ICD-10-CM

## 2024-10-14 DIAGNOSIS — I10 PRIMARY HYPERTENSION: ICD-10-CM

## 2024-10-14 PROCEDURE — 93000 ELECTROCARDIOGRAM COMPLETE: CPT | Performed by: NURSE PRACTITIONER

## 2024-10-14 PROCEDURE — 3079F DIAST BP 80-89 MM HG: CPT | Performed by: NURSE PRACTITIONER

## 2024-10-14 PROCEDURE — 99214 OFFICE O/P EST MOD 30 MIN: CPT | Performed by: NURSE PRACTITIONER

## 2024-10-14 PROCEDURE — 3077F SYST BP >= 140 MM HG: CPT | Performed by: NURSE PRACTITIONER

## 2024-10-14 RX ORDER — ALPRAZOLAM 0.5 MG
0.5 TABLET ORAL DAILY PRN
Qty: 30 TABLET | Refills: 2 | Status: SHIPPED | OUTPATIENT
Start: 2024-10-14 | End: 2025-01-12

## 2024-10-14 NOTE — TELEPHONE ENCOUNTER
Ruth with [facility] calling for patient.  Patient went to refill Xanax but it was cancelled by mistake by Dr Corral at [facility].  They are requesting we send a new Rx to pharmacy.  She will have patient check with pharmacy later today.

## 2024-10-14 NOTE — PROGRESS NOTES
Follow-up.   He does have intermittent chest pain across the chest, associated with left neck and arm discomfort.   He has intermittent shortness of breath with exertion.     EKG completed.       
      Today's visit:   Subjective:  Dull pains in left and right chest - and some tightness  Happens daily - abdomen feels full at times - pushing up into chest  BP has been high last couple weeks - worried about bx - pulmonary nodule had been growing - was scheduled for bx - bx cancelled as when went to have done nodule was gone   BP this /73; has been running 130 - 120/  - one night 101/51 - didn't feel real well with it that low  Takes Fe - some constipation - bowels moving  Good water drinker  Pretty active  No swelling      HPI  Past Medical History:   Diagnosis Date    Arthritis     general    Bilateral sciatica     Bleeding     with coumadin    CAD (coronary artery disease)     Carpal tunnel syndrome     RIGHT    Cervicalgia, C5-7     Chronic anxiety     COPD, mild (HCC) 04/15/2023    Dysthymia (or depressive neurosis) 02/01/2016    ED (erectile dysfunction)     ED (erectile dysfunction)     Fatty liver 3/5/2024    GERD (gastroesophageal reflux disease)     Headache(784.0)     History of blood transfusion 03/2016    because of taking coumadin    HTN (hypertension)     Hyperlipidemia     Hypothyroidism     Iron deficiency anemia, blood loss     Lumbago     Lumbar radiculopathy     Lymphomatoid papulosis     sores on skin    Osteoarthritis (arthritis due to wear and tear of joints)     S/P AVR, coumadin Tx 2016    Spondylosis of thoracic region without myelopathy or radiculopathy       Past Surgical History:   Procedure Laterality Date    AORTIC VALVE REPLACEMENT  2007    MECHANICAL [facility]    AORTIC VALVE REPLACEMENT  07/20/2016    extraction of mechanical valve and replacement with tissue valve    APPENDECTOMY  1980    BACK INJECTION Bilateral 01/03/2022    Bilateral SI injection performed by Juan Kiran MD at [facility] [place] [facility]    BACK INJECTION Bilateral 03/14/2022    Bilateral SI MBB # 2 performed by Yehuda Marie DO at [facility] [place] [facility]    BRONCHOSCOPY N/A 5/17/2024    EBUS

## 2024-10-14 NOTE — PATIENT INSTRUCTIONS
Continue current medications as prescribed.    Continue to monitor the blood pressure - if wants to run 150 - 160 or consistent basis then call. May need to make some adjustments with the medications  [phone]- use Option #3.    Stay as active as you can.     Eat heart healthy diet.     Follow-up with your PCP as scheduled.    Follow-up with Dr. Luu in one year as scheduled or sooner if need.         Your nurses today was LUKE Angel   Your provider today was HOSEA Lee  Phone number: [phone]      You may receive a survey regarding the care you received during your visit.  Your input is valuable to us.  We encourage you to complete and return your survey.  We hope you will choose us in the future for your healthcare needs.

## 2024-10-14 NOTE — TELEPHONE ENCOUNTER
Pt came to the office requesting a return to work letter for 10/22/24. Pt says Dr. Corral told him he should be off work for his IR lung bx (which was never done due to nodule resolving) and f/u appt with pulmonology 10/21. There is nothing mentioned in Dr. Corral's note about being off work. Are you agreeable to sign work letter? Or is patient ok to return to work at this time?

## 2024-10-21 ENCOUNTER — OFFICE VISIT (OUTPATIENT)
Dept: PULMONOLOGY | Age: 59
End: 2024-10-21
Payer: COMMERCIAL

## 2024-10-21 VITALS
HEIGHT: 67 IN | SYSTOLIC BLOOD PRESSURE: 142 MMHG | TEMPERATURE: 97.2 F | BODY MASS INDEX: 31.45 KG/M2 | DIASTOLIC BLOOD PRESSURE: 78 MMHG | HEART RATE: 65 BPM | OXYGEN SATURATION: 95 % | WEIGHT: 200.4 LBS

## 2024-10-21 DIAGNOSIS — J44.9 CHRONIC OBSTRUCTIVE PULMONARY DISEASE, UNSPECIFIED COPD TYPE (HCC): Primary | ICD-10-CM

## 2024-10-21 DIAGNOSIS — R91.8 LUNG NODULES: ICD-10-CM

## 2024-10-21 DIAGNOSIS — Z72.0 TOBACCO ABUSE: ICD-10-CM

## 2024-10-21 DIAGNOSIS — Z79.631 METHOTREXATE, LONG TERM, CURRENT USE: ICD-10-CM

## 2024-10-21 PROCEDURE — 3077F SYST BP >= 140 MM HG: CPT | Performed by: INTERNAL MEDICINE

## 2024-10-21 PROCEDURE — 99214 OFFICE O/P EST MOD 30 MIN: CPT | Performed by: INTERNAL MEDICINE

## 2024-10-21 PROCEDURE — 3078F DIAST BP <80 MM HG: CPT | Performed by: INTERNAL MEDICINE

## 2024-10-21 ASSESSMENT — ENCOUNTER SYMPTOMS
CHOKING: 0
DIFFICULTY BREATHING: 0
WHEEZING: 0
TROUBLE SWALLOWING: 0
COUGH: 1
DIARRHEA: 0
VOMITING: 0
FREQUENT THROAT CLEARING: 0
CHEST TIGHTNESS: 0
VOICE CHANGE: 0
BLOOD IN STOOL: 0
SHORTNESS OF BREATH: 0

## 2024-10-21 ASSESSMENT — COPD QUESTIONNAIRES: COPD: 1

## 2024-10-21 NOTE — PROGRESS NOTES
[facility]    Patient: MARICARMEN TURNER, 59 y.o.   : 1965  10/21/2024         Subjective     Chief Complaint   Patient presents with    Follow-up     2 month lung nodules with mri (ct biopsy not completed)        Originally referred for CTA chest with some bulky medistinal LN and pulm nodules, PET negative. On MTX for AI rash. Chart initially had likely error lymphoma diagnosis on it, but is a smoker, 1/2-1 ppd still for many years. Has quit. Triggers for smoking are waking up and getting coffee, also stress with losing his son. Some + family history of lung cancer.   2024, did have EBUS with bronchoscopy and BAL for mediastinal lymphadenopathy, was negative for any malignancy or infectious process. Following nodules with CT chest, they seem to come and go a lot.     COPD  He complains of cough. There is no difficulty breathing, frequent throat clearing, shortness of breath or wheezing. This is a chronic problem. The current episode started more than 1 year ago. The problem has been gradually improving. The cough is non-productive. Pertinent negatives include no chest pain, fever, sweats, trouble swallowing or weight loss. His symptoms are aggravated by exercise. His symptoms are alleviated by rest, beta-agonist and ipratropium. He reports significant improvement on treatment. Risk factors for lung disease include smoking/tobacco exposure. His past medical history is significant for COPD. Past medical history comments: Immunocompromised on methotrexate, multiple nodules and CT almost had CT guided biopsy but nodules resolved. .           Past Medical hx   PMH:  Past Medical History:   Diagnosis Date    Arthritis     general    Bilateral sciatica     Bleeding     with coumadin    CAD (coronary artery disease)     Carpal tunnel syndrome     RIGHT    Cervicalgia, C5-7     Chronic anxiety     COPD, mild (HCC) 04/15/2023    Dysthymia (or depressive neurosis) 2016    ED (erectile

## 2024-10-24 ENCOUNTER — CARE COORDINATION (OUTPATIENT)
Dept: CARE COORDINATION | Age: 59
End: 2024-10-24

## 2024-10-24 NOTE — CARE COORDINATION
Remote Patient Monitoring Note      Date/Time:  10/24/2024 2:32 PM    LPN attempted to contact patient by telephone regarding yellow alert received for no metrics for >2 days  .     Background: COPD, High Blood-Pressure     Clinical Interventions: HIPAA compliant message left on  requesting a return call.    Plan/Follow Up: Will continue to review, monitor and address alerts with follow up based on severity of symptoms and risk factors.       Shi Johnson LPN  [facility]/ CTN/ Remote Patient Monitoring  [phone]

## 2024-10-25 ENCOUNTER — CARE COORDINATION (OUTPATIENT)
Dept: CARE COORDINATION | Age: 59
End: 2024-10-25

## 2024-10-25 NOTE — CARE COORDINATION
S, RN at 10/25/2024  3:47 PM.  Learner: Patient  Readiness: Acceptance  Method: Explanation  Response: Verbalizes Understanding, Needs Reinforcement    Discuss COPD Overview, taught by Tasha Wesley RN at 10/25/2024  3:47 PM.  Learner: Patient  Readiness: Acceptance  Method: Explanation  Response: Verbalizes Understanding, Needs Reinforcement    Education Comments  No comments found.     ,    Goals Addressed    None          PCP/Specialist follow up:   Future Appointments         Provider Specialty Dept Phone    12/23/2024 3:30 PM Juan Christiansen,  Family Medicine [phone]    4/10/2025 11:30 AM Ivy De Leon MD Oncology [phone]    4/21/2025 4:30 PM (Arrive by 4:00 PM) STR CT IMAGING RM1  [facility] [phone]    5/5/2025 3:40 PM Jonny Mckinley MD Pulmonology [phone]    10/20/2025 11:00 AM Tanisha Hathaway MD Cardiology [phone]            Follow Up:   Plan for next ACM outreach in approximately 2 weeks to complete:  - CC Protocol assessments  - disease specific assessments  - goal progression  - education .   Patient  is agreeable to this plan.

## 2024-10-25 NOTE — CARE COORDINATION
Remote Patient Monitoring Note      Date/Time:  10/25/2024 2:36 PM    LPN attempted to contact patient by telephone regarding yellow alert received for no metrics for 4 days  .     Background: COPD, High Blood-Pressure     Clinical Interventions: No answer, unable to LM.  Day 4 note sent to ACM.    Plan/Follow Up: Will continue to review, monitor and address alerts with follow up based on severity of symptoms and risk factors.       Shi Johnson LPN  [facility]/ CTN/ Remote Patient Monitoring  [phone]

## 2024-10-25 NOTE — CARE COORDINATION
Ambulatory Care Coordination Note     10/25/2024 2:10 PM     Patient outreach attempt by this ACM today to perform care management follow up . ACM was unable to reach the patient by telephone today; left voice message requesting a return phone call to this ACM.     ACM: Tasha Wesley RN     Care Summary Note:  Alberto was referred to care coordination by oncology for education and management of chronic conditions and RPM service. Pt has h/o: GERD, anemia, hypothyroidism, CAD, HTN, GI bleed, lymphomatoid papulosis.     PCP/Specialist follow up:   Future Appointments         Provider Specialty Dept Phone    12/23/2024 3:30 PM Juan Christiansen DO Family Medicine [phone]    4/10/2025 11:30 AM Ivy De Leon MD Oncology [phone]    4/21/2025 4:30 PM (Arrive by 4:00 PM) STR CT IMAGING RM1  [facility] [phone]    5/5/2025 3:40 PM Jonny Mckinley MD Pulmonology [phone]    10/20/2025 11:00 AM Tanisha Hathaway MD Cardiology [phone]            Follow Up:   Plan for next ACM outreach in approximately 1 week to complete:  - CC Protocol assessments  - disease specific assessments  - goal progression  - education .

## 2024-11-05 ENCOUNTER — CARE COORDINATION (OUTPATIENT)
Dept: CARE COORDINATION | Age: 59
End: 2024-11-05

## 2024-11-05 NOTE — CARE COORDINATION
Ambulatory Care Coordination Note     11/5/2024 4:04 PM     Patient outreach attempt by this ACM today to perform care management follow up . ACM was unable to reach the patient by telephone today; left voice message requesting a return phone call to this ACM.     ACM: Tasha Wesley RN     Care Summary Note:  Alberto was referred to care coordination by oncology for education and management of chronic conditions and RPM service. Pt has h/o: GERD, anemia, hypothyroidism, CAD, HTN, GI bleed, lymphomatoid papulosis.     PCP/Specialist follow up:   Future Appointments         Provider Specialty Dept Phone    12/23/2024 3:30 PM Juan Christiansen DO Family Medicine [phone]    4/10/2025 11:30 AM Ivy De Leon MD Oncology [phone]    4/21/2025 4:30 PM (Arrive by 4:00 PM) STR CT IMAGING RM1  [facility] [phone]    5/5/2025 3:40 PM Jonny Mckinley MD Pulmonology [phone]    10/20/2025 11:00 AM Tanisha Hathaway MD Cardiology [phone]            Follow Up:   Plan for next ACM outreach in approximately 1 week to complete:  - CC Protocol assessments  - disease specific assessments  - goal progression  - education .

## 2024-11-11 ENCOUNTER — CARE COORDINATION (OUTPATIENT)
Dept: CARE COORDINATION | Age: 59
End: 2024-11-11

## 2024-11-11 NOTE — CARE COORDINATION
Remote Patient Monitoring Note      Date/Time:  11/11/2024 8:35 AM    LPN attempted to contact patient by telephone regarding yellow alert received for blood pressure reading (176/59).    Background:  COPD, High Blood-Pressure     Clinical Interventions:   HIPAA compliant message left on  requesting a return call.    Plan/Follow Up: Will continue to review, monitor and address alerts with follow up based on severity of symptoms and risk factors.       GERALD Gallego [facility]/ CTN/ Remote Patient Monitoring  [phone]

## 2024-11-11 NOTE — CARE COORDINATION
Remote Patient Monitoring Note    2nd Attempt  Date/Time:  11/11/2024 11:22 AM    LPN attempted to contact patient by telephone regarding yellow alert received for blood pressure reading (176/59).     Background:  COPD, High Blood-Pressure      Clinical Interventions:   HIPAA compliant message left on VM requesting a return call. Attempted EC, VM full. ACM updated.     Plan/Follow Up: Will continue to review, monitor and address alerts with follow up based on severity of symptoms and risk factors.       Shi Johnson LPN  [facility]/ CTN/ Remote Patient Monitoring  [phone]

## 2024-11-12 ENCOUNTER — CARE COORDINATION (OUTPATIENT)
Dept: CARE COORDINATION | Age: 59
End: 2024-11-12

## 2024-11-12 NOTE — CARE COORDINATION
Pt called today stating that he missed call from RPM nurse yesterday.  Pt alerted d/t elevated BP.  Pt unsure why it was elevated yesterday. Reports that he had taken his medications appx 1 hr prior to checking BP but had been up and around getting ready for work.  BP today improved: 156/63.  Advised pt to continue monitoring.  Pt reports that he did wake up today feeling congested and fatigue.  Thinks he is coming down with a virus.  Reports several people at work have had similar s/s.  Advised to push fluids and get adequate amts of rest. Advised pt that he if he develops worsening s/s to call.

## 2024-11-14 ENCOUNTER — HOSPITAL ENCOUNTER (OUTPATIENT)
Age: 59
Discharge: HOME | End: 2024-11-14
Payer: COMMERCIAL

## 2024-11-14 LAB
ALT SERPL W/O P-5'-P-CCNC: 11 U/L (ref 11–66)
AST SERPL-CCNC: 17 U/L (ref 5–40)
BUN SERPL-MCNC: 10 MG/DL (ref 7–22)
CREAT SERPL-MCNC: 0.6 MG/DL (ref 0.4–1.2)
DEPRECATED RDW RBC AUTO: 61.8 FL (ref 35–45)
ERYTHROCYTE [DISTWIDTH] IN BLOOD BY AUTOMATED COUNT: 17 % (ref 11.5–14.5)
GFR SERPL CREATININE-BSD FRML MDRD: > 90 ML/MIN/1.73M2
HCT VFR BLD AUTO: 39.9 % (ref 42–52)
HGB BLD-MCNC: 13 GM/DL (ref 14–18)
MCH RBC QN AUTO: 32.6 PG (ref 26–33)
MCHC RBC AUTO-ENTMCNC: 32.6 GM/DL (ref 32.2–35.5)
MCV RBC AUTO: 100 FL (ref 80–94)
PLATELET # BLD AUTO: 278 THOU/MM3 (ref 130–400)
PMV BLD AUTO: 10.3 FL (ref 9.4–12.4)
RBC # BLD AUTO: 3.99 MILL/MM3 (ref 4.7–6.1)
WBC # BLD AUTO: 10.1 THOU/MM3 (ref 4.8–10.8)

## 2024-11-14 PROCEDURE — 84460 ALANINE AMINO (ALT) (SGPT): CPT

## 2024-11-14 PROCEDURE — 36415 COLL VENOUS BLD VENIPUNCTURE: CPT

## 2024-11-14 PROCEDURE — 85027 COMPLETE CBC AUTOMATED: CPT

## 2024-11-14 PROCEDURE — 82565 ASSAY OF CREATININE: CPT

## 2024-11-14 PROCEDURE — 84450 TRANSFERASE (AST) (SGOT): CPT

## 2024-11-14 PROCEDURE — 84520 ASSAY OF UREA NITROGEN: CPT

## 2024-11-21 ENCOUNTER — CARE COORDINATION (OUTPATIENT)
Dept: CARE COORDINATION | Age: 59
End: 2024-11-21

## 2024-11-21 NOTE — CARE COORDINATION
Ambulatory Care Coordination Note     11/21/2024 12:03 PM     Patient outreach attempt by this ACM today to perform care management follow up . ACM was unable to reach the patient by telephone today;   left voice message requesting a return phone call to this ACM.     ACM: Tasha Wesley RN     Care Summary Note: Alberto was referred to care coordination by oncology for education and management of chronic conditions and RPM service. Pt has h/o: GERD, anemia, hypothyroidism, CAD, HTN, GI bleed, lymphomatoid papulosis.     PCP/Specialist follow up:   Future Appointments         Provider Specialty Dept Phone    12/23/2024 3:30 PM Juan Christiansen DO Family Medicine [phone]    4/10/2025 11:30 AM Ivy De Leon MD Oncology [phone]    4/21/2025 4:30 PM (Arrive by 4:00 PM) STR CT IMAGING RM1  [facility] [phone]    5/5/2025 3:40 PM Jonny Mckinley MD Pulmonology [phone]    10/20/2025 11:00 AM Tanisha Hathaway MD Cardiology [phone]            Follow Up:   Plan for next ACM outreach in approximately 1 week to complete:  - CC Protocol assessments  - disease specific assessments  - goal progression  - education .

## 2024-11-25 ENCOUNTER — CARE COORDINATION (OUTPATIENT)
Dept: CARE COORDINATION | Age: 59
End: 2024-11-25

## 2024-11-25 NOTE — CARE COORDINATION
Remote Patient Monitoring Note      Date/Time:  11/25/2024 7:52 AM    LPN attempted to contact patient by telephone regarding red alert received for blood pressure reading (184/70).    Background: COPD, High Blood-Pressure     Clinical Interventions:  HIPAA compliant message left on  requesting a return call.    Plan/Follow Up: Will continue to review, monitor and address alerts with follow up based on severity of symptoms and risk factors.       GERALD Gallego [facility]/ CTN/ Remote Patient Monitoring  [phone]

## 2024-11-25 NOTE — CARE COORDINATION
Remote Alert Monitoring Note      Date/Time:  2024 8:06 AM  Patient Current Location: Home: [place] [address]    LPN contacted patient by telephone regarding red alert received for blood pressure reading (184/70). Verified patients name and  as identifiers.    Rpm alert to be reviewed by the provider                         Background: COPD, High Blood-Pressure     Refer to 911 immediately if:  Patient unresponsive or unable to provide history  Change in cognition or sudden confusion  Patient unable to respond in complete sentences  Intense chest pain/tightness  Any concern for any clinical emergency  Red Alert: Provider response time of 1 hr required for any red alert requiring intervention  Yellow Alert: Provider response time of 3hr required for any escalated yellow alert        Blood Pressure BP Triage  Are you having any Chest Pain? no   Are you having any Shortness of Breath? no   Do you have a headache or have any vision changes? no   Are you having any numbness or tingling? no   Are you having any other health concerns or issues? no     Have you taken your medications as instructed by your doctor today? Yes       Clinical Interventions: Reviewed and followed up on alerts and treatments-. Pt is asymptomatic and in no apparent distress, speaking in full sentences.  Patient states he has left for work for the day and will not return home until around 430p. Patient advised to relax upon returning home prior to rechecking BP. He was agreeable.     Advised pt to call 911 and go to ED for the following: chest pain/pressure/tightness, new or worsening SOB, sudden loss of use of an arm or leg, sudden numbness or tingling--especially unilateral, confusion, sudden change in vision, facial droop, slurred speech, fainting spell or any other serious or worsening symptoms. Patient agreeable to this plan.       Plan/Follow Up: Will continue to review, monitor and address alerts with follow up based

## 2024-11-29 ENCOUNTER — CARE COORDINATION (OUTPATIENT)
Dept: CARE COORDINATION | Age: 59
End: 2024-11-29

## 2024-11-29 NOTE — CARE COORDINATION
Attempted to reach patient for continued Care Coordination follow up and education.  Patient was unavailable at the time of my call, and a generic voicemail message was left asking patient to return my call at [phone].

## 2024-12-08 DIAGNOSIS — E03.9 ACQUIRED HYPOTHYROIDISM: ICD-10-CM

## 2024-12-09 RX ORDER — LEVOTHYROXINE SODIUM 75 UG/1
TABLET ORAL
Qty: 90 TABLET | Refills: 0 | Status: SHIPPED | OUTPATIENT
Start: 2024-12-09

## 2024-12-10 ENCOUNTER — CARE COORDINATION (OUTPATIENT)
Dept: CARE COORDINATION | Age: 59
End: 2024-12-10

## 2024-12-10 NOTE — CARE COORDINATION
Ambulatory Care Coordination Note     12/10/2024 2:39 PM     Patient outreach attempt by this ACM today to perform care management follow up . ACM was unable to reach the patient by telephone today;   left voice message requesting a return phone call to this ACM.     ACM: Tasha Wesley RN     Care Summary Note: Alberto was referred to care coordination by oncology for education and management of chronic conditions and RPM service. Pt has h/o: GERD, anemia, hypothyroidism, CAD, HTN, GI bleed, lymphomatoid papulosis.     PCP/Specialist follow up:   Future Appointments         Provider Specialty Dept Phone    12/23/2024 3:30 PM Juan Christiansen DO Family Medicine [phone]    4/10/2025 11:30 AM Ivy De Leon MD Oncology [phone]    4/21/2025 4:30 PM (Arrive by 4:00 PM) STR CT IMAGING RM1  [facility] [phone]    5/5/2025 3:40 PM Jonny Mckinley MD Pulmonology [phone]    10/20/2025 11:00 AM Tanisha Hathaway MD Cardiology [phone]            Follow Up:   Plan for next ACM outreach in approximately 1 week to complete:  - CC Protocol assessments  - disease specific assessments  - goal progression  - education .

## 2024-12-19 ENCOUNTER — CARE COORDINATION (OUTPATIENT)
Dept: CARE COORDINATION | Age: 59
End: 2024-12-19

## 2024-12-19 NOTE — CARE COORDINATION
Ambulatory Care Coordination Note     12/19/2024 1:11 PM     Patient outreach attempt by this ACM today to perform care management follow up . ACM was unable to reach the patient by telephone today;   left voice message requesting a return phone call to this ACM.     ACM: Tasha Wesley RN     Care Summary Note: Alberto was referred to care coordination by oncology for education and management of chronic conditions and RPM service. Pt has h/o: GERD, anemia, hypothyroidism, CAD, HTN, GI bleed, lymphomatoid papulosis.     PCP/Specialist follow up:   Future Appointments         Provider Specialty Dept Phone    12/23/2024 3:30 PM Juan Christiansen DO Family Medicine [phone]    4/10/2025 11:30 AM Ivy De Leon MD Oncology [phone]    4/21/2025 4:30 PM (Arrive by 4:00 PM) STR CT IMAGING RM1  [facility] [phone]    5/5/2025 3:40 PM Jonny Mckinley MD Pulmonology [phone]    10/20/2025 11:00 AM Tanisha Hathaway MD Cardiology [phone]            Follow Up:   Plan for next ACM outreach in approximately 1 week to complete:  - CC Protocol assessments  - disease specific assessments  - goal progression  - education .

## 2024-12-30 ENCOUNTER — OFFICE VISIT (OUTPATIENT)
Dept: FAMILY MEDICINE CLINIC | Age: 59
End: 2024-12-30
Payer: COMMERCIAL

## 2024-12-30 VITALS
RESPIRATION RATE: 18 BRPM | HEART RATE: 72 BPM | TEMPERATURE: 97.5 F | WEIGHT: 205.7 LBS | HEIGHT: 67 IN | DIASTOLIC BLOOD PRESSURE: 72 MMHG | SYSTOLIC BLOOD PRESSURE: 142 MMHG | BODY MASS INDEX: 32.28 KG/M2

## 2024-12-30 DIAGNOSIS — I50.22 CHRONIC SYSTOLIC (CONGESTIVE) HEART FAILURE (HCC): ICD-10-CM

## 2024-12-30 DIAGNOSIS — I10 ESSENTIAL HYPERTENSION: ICD-10-CM

## 2024-12-30 DIAGNOSIS — J44.9 COPD, MILD (HCC): ICD-10-CM

## 2024-12-30 DIAGNOSIS — D64.9 CHRONIC ANEMIA: ICD-10-CM

## 2024-12-30 DIAGNOSIS — C86.60 LYMPHOMATOID PAPULOSIS: ICD-10-CM

## 2024-12-30 DIAGNOSIS — E03.9 ACQUIRED HYPOTHYROIDISM: ICD-10-CM

## 2024-12-30 DIAGNOSIS — F41.9 ANXIETY: ICD-10-CM

## 2024-12-30 DIAGNOSIS — Z00.00 WELL ADULT HEALTH CHECK: Primary | ICD-10-CM

## 2024-12-30 PROCEDURE — 3078F DIAST BP <80 MM HG: CPT | Performed by: FAMILY MEDICINE

## 2024-12-30 PROCEDURE — 99396 PREV VISIT EST AGE 40-64: CPT | Performed by: FAMILY MEDICINE

## 2024-12-30 PROCEDURE — 3077F SYST BP >= 140 MM HG: CPT | Performed by: FAMILY MEDICINE

## 2024-12-30 ASSESSMENT — ENCOUNTER SYMPTOMS
GASTROINTESTINAL NEGATIVE: 1
RESPIRATORY NEGATIVE: 1

## 2024-12-30 NOTE — PROGRESS NOTES
facility-administered medications for this visit.       Past Surgical History:   Procedure Laterality Date    AORTIC VALVE REPLACEMENT  2007    MECHANICAL [facility]    AORTIC VALVE REPLACEMENT  07/20/2016    extraction of mechanical valve and replacement with tissue valve    APPENDECTOMY  1980    BACK INJECTION Bilateral 01/03/2022    Bilateral SI injection performed by Juan Kiran MD at [facility]    BACK INJECTION Bilateral 03/14/2022    Bilateral SI MBB # 2 performed by Yehuda Marie DO at [facility] [place] [facility]    BRONCHOSCOPY N/A 5/17/2024    EBUS performed by Jonny Mckinley MD at [facility] ENDOSCOPY    CARDIAC CATHETERIZATION  05/20/2010    Patent coronary arteries. Possible causes of the patient's chest pain is likely noncardiac at least based on this angiogram. Patient chould be able to proceed w/ scheduled surgery w/ paying attention to anticoagulation and trying to minimize the period of no anticoagulation.     CARDIAC CATHETERIZATION  2012, 6/9/16    [facility]    CARDIOVASCULAR STRESS TEST  04/15/2010    Moderate fixed inferior defect, possibly attenuation, cannot exclude a previous MI. Correlation w/ EKG is recommented. Significant degree of gut uptake which is likely to be the cause of the attenuation artifact seen. EF 46%    CERVICAL FUSION  2010    CERVICAL FUSION  03/09/2016    REMOVAL HARDWARE C5-7, ACDF C4-6 WITH ATLANTIS CORNERSTONE    COLONOSCOPY  2010 2023    DILATATION, ESOPHAGUS      small resection    ENDOSCOPY, COLON, DIAGNOSTIC      LAPAROSCOPY N/A 2/23/2024    Robotic Laparoscopy;Lysis of adhestions; Ventral hernia repair; evacuation of abdominal hematoma with debridment of hematoma capsule performed by José Antonio Allred MD at [facility] OR    NERVE SURGERY Bilateral 07/25/2017    THORACIC FACET BLOCK T7-8, T8-9, T11-12    OTHER SURGICAL HISTORY  05/02/2017    LESI L5    OTHER SURGICAL HISTORY  09/12/2017    thoracic epidural T11    PAIN MANAGEMENT PROCEDURE Left 05/17/2022    SI

## 2024-12-31 ENCOUNTER — CARE COORDINATION (OUTPATIENT)
Dept: CARE COORDINATION | Age: 59
End: 2024-12-31

## 2024-12-31 NOTE — CARE COORDINATION
Ambulatory Care Coordination Note     2024 12:26 PM     Patient Current Location:  Home: [place] [address]     ACM contacted the patient by telephone. Verified name and  with patient as identifiers.         ACM: Tasha Wesley RN     Challenges to be reviewed by the provider   Additional needs identified to be addressed with provider No  none               Method of communication with provider: none.    Utilization: Patient has not had any utilization since our last call.     Care Summary Note: Alberto was referred to care coordination by oncology for education and management of chronic conditions and RPM service. Pt has h/o: GERD, anemia, hypothyroidism, CAD, HTN, GI bleed, lymphomatoid papulosis.   Spoke with Alberto today.   Pt had f/u with PCP yesterday.    Aware that he needs to obtain labs.   Has been battling cough/congestion.  Cough is non productive.  recently.  Planning on obtaining Coricidin HBP that was recommended by PCP yesterday.  Encouraged rest and fluids.    Pt continues to smoke but has cut back significantly.     Offered patient enrollment in the Remote Patient Monitoring (RPM) program for in-home monitoring: Patient being discharged from remote patient monitoring program today.     Assessments Completed:   General Assessment    Do you have any symptoms that are causing concern?: Yes  Progression since Onset: Unchanged  Reported Symptoms:  (Comment: cough and congestion.  PCP recommended OTC Coricidin HBP. pt will be obtaining today.  encouraged rest and fluids.)          Medications Reviewed:   Completed during a previous call     Advance Care Planning:   Not reviewed during this call     Care Planning:   Education Documentation  Educate on COPD Zone, taught by Tasha Wesley RN at 2024 12:25 PM.  Learner: Patient  Readiness: Acceptance  Method: Explanation, Teachback  Response: Verbalizes Understanding, Needs Reinforcement    Educate chronic obstructive pulmonary 
spouse

## 2025-01-02 ENCOUNTER — HOSPITAL ENCOUNTER (OUTPATIENT)
Age: 60
Discharge: HOME | End: 2025-01-02
Payer: COMMERCIAL

## 2025-01-02 DIAGNOSIS — Z00.00 WELL ADULT HEALTH CHECK: ICD-10-CM

## 2025-01-02 LAB
CHOLEST SERPL-MCNC: 103 MG/DL (ref 100–199)
DEPRECATED MEAN GLUCOSE BLD GHB EST-ACNC: 96 MG/DL (ref 70–126)
HBA1C MFR BLD HPLC: 5.2 % (ref 4.4–6.4)
HDLC SERPL-MCNC: 33 MG/DL
LDLC SERPL CALC-MCNC: 60 MG/DL
PSA SERPL-MCNC: 1.71 NG/ML (ref 0–1)
TRIGL SERPL-MCNC: 51 MG/DL (ref 0–199)

## 2025-01-02 PROCEDURE — 83036 HEMOGLOBIN GLYCOSYLATED A1C: CPT

## 2025-01-02 PROCEDURE — G0103 PSA SCREENING: HCPCS

## 2025-01-02 PROCEDURE — 80061 LIPID PANEL: CPT

## 2025-01-02 PROCEDURE — 36415 COLL VENOUS BLD VENIPUNCTURE: CPT

## 2025-01-06 RX ORDER — ASPIRIN 81 MG/1
TABLET, COATED ORAL
Qty: 90 TABLET | Refills: 3 | Status: SHIPPED | OUTPATIENT
Start: 2025-01-06

## 2025-01-07 DIAGNOSIS — F41.9 ANXIETY: ICD-10-CM

## 2025-01-08 RX ORDER — ALPRAZOLAM 0.5 MG
TABLET ORAL
Qty: 30 TABLET | Refills: 2 | Status: SHIPPED | OUTPATIENT
Start: 2025-01-08 | End: 2025-04-08

## 2025-01-14 ENCOUNTER — CARE COORDINATION (OUTPATIENT)
Dept: INTERNAL MEDICINE CLINIC | Age: 60
End: 2025-01-14

## 2025-01-14 NOTE — CARE COORDINATION
Ambulatory Care Coordination Note     2025 5:09 PM     Patient Current Location:  Home: [place] [address]     ACM contacted the patient by telephone. Verified name and  with patient as identifiers.         ACM: Tasha Wesley RN     Challenges to be reviewed by the provider   Additional needs identified to be addressed with provider No  none               Method of communication with provider: none.    Utilization: Patient has not had any utilization since our last call.     Care Summary Note: Alberto was referred to care coordination by oncology for education and management of chronic conditions and RPM service. Pt has h/o: GERD, anemia, hypothyroidism, CAD, HTN, GI bleed, lymphomatoid papulosis.   Spoke with Alberto today.  Reports that sinus pressure, nasal congestion, persist but have improved. Advised pt to continue monitoring s/s and to call next wk if no improvement.  Pt taking OTC Coricidin as recommended by PCP.   Pt following with GI-pt reports that has episodes of diarrhea and constipation.  Being working up by GI.   Continues to f/u with dermatology for UV tx for sores.   Offered patient enrollment in the Remote Patient Monitoring (RPM) program for in-home monitoring: Patient being discharged from remote patient monitoring program today.   pt graduated.  RPM equipment picked up.   Assessments Completed:   General Assessment              Medications Reviewed:   Completed during a previous call     Advance Care Planning:   Not reviewed during this call     Care Planning:   Education Documentation  Educate on COPD Zone, taught by Tasha Wesley RN at 2025  5:07 PM.  Learner: Patient  Readiness: Acceptance  Method: Explanation, Teachback  Response: Verbalizes Understanding    Educate chronic obstructive pulmonary disease exacerbation signs and symptoms, taught by Tasha Wesley RN at 2025  5:07 PM.  Learner: Patient  Readiness: Acceptance  Method: Explanation,

## 2025-02-06 ENCOUNTER — HOSPITAL ENCOUNTER (OUTPATIENT)
Age: 60
Discharge: HOME | End: 2025-02-06
Payer: COMMERCIAL

## 2025-02-06 LAB
ALT SERPL W/O P-5'-P-CCNC: 10 U/L (ref 11–66)
AST SERPL-CCNC: 14 U/L (ref 5–40)
BASOPHILS ABSOLUTE: 0.1 THOU/MM3 (ref 0–0.1)
BASOPHILS NFR BLD AUTO: 0.9 %
BUN SERPL-MCNC: 10 MG/DL (ref 7–22)
CREAT SERPL-MCNC: 0.7 MG/DL (ref 0.4–1.2)
DEPRECATED RDW RBC AUTO: 59.7 FL (ref 35–45)
EOSINOPHIL NFR BLD AUTO: 3.9 %
EOSINOPHILS ABSOLUTE: 0.4 THOU/MM3 (ref 0–0.4)
ERYTHROCYTE [DISTWIDTH] IN BLOOD BY AUTOMATED COUNT: 16.8 % (ref 11.5–14.5)
GFR SERPL CREATININE-BSD FRML MDRD: > 90 ML/MIN/1.73M2
HCT VFR BLD AUTO: 34.8 % (ref 42–52)
HGB BLD-MCNC: 10.9 GM/DL (ref 14–18)
IMM GRANULOCYTES # BLD AUTO: 0.03 THOU/MM3 (ref 0–0.07)
IMM GRANULOCYTES NFR BLD AUTO: 0.3 %
LYMPHOCYTES ABSOLUTE: 0.8 THOU/MM3 (ref 1–4.8)
LYMPHOCYTES NFR BLD AUTO: 7.9 %
MCH RBC QN AUTO: 30.9 PG (ref 26–33)
MCHC RBC AUTO-ENTMCNC: 31.3 GM/DL (ref 32.2–35.5)
MCV RBC AUTO: 98.6 FL (ref 80–94)
MONOCYTES ABSOLUTE: 0.9 THOU/MM3 (ref 0.4–1.3)
MONOCYTES NFR BLD AUTO: 8.6 %
NEUTROPHILS ABSOLUTE: 8.3 THOU/MM3 (ref 1.8–7.7)
NEUTROPHILS NFR BLD AUTO: 78.4 %
NRBC BLD AUTO-RTO: 0 /100 WBC
PLATELET # BLD AUTO: 330 THOU/MM3 (ref 130–400)
PMV BLD AUTO: 9.7 FL (ref 9.4–12.4)
RBC # BLD AUTO: 3.53 MILL/MM3 (ref 4.7–6.1)
WBC # BLD AUTO: 10.6 THOU/MM3 (ref 4.8–10.8)

## 2025-02-06 PROCEDURE — 84460 ALANINE AMINO (ALT) (SGPT): CPT

## 2025-02-06 PROCEDURE — 36415 COLL VENOUS BLD VENIPUNCTURE: CPT

## 2025-02-06 PROCEDURE — 84520 ASSAY OF UREA NITROGEN: CPT

## 2025-02-06 PROCEDURE — 82565 ASSAY OF CREATININE: CPT

## 2025-02-06 PROCEDURE — 85025 COMPLETE CBC W/AUTO DIFF WBC: CPT

## 2025-02-06 PROCEDURE — 84450 TRANSFERASE (AST) (SGOT): CPT

## 2025-02-10 RX ORDER — GABAPENTIN 400 MG/1
CAPSULE ORAL
Qty: 270 CAPSULE | Refills: 1 | Status: SHIPPED | OUTPATIENT
Start: 2025-02-10 | End: 2025-08-10

## 2025-03-09 DIAGNOSIS — E03.9 ACQUIRED HYPOTHYROIDISM: ICD-10-CM

## 2025-03-10 RX ORDER — LEVOTHYROXINE SODIUM 75 UG/1
75 TABLET ORAL DAILY
Qty: 90 TABLET | Refills: 0 | Status: SHIPPED | OUTPATIENT
Start: 2025-03-10

## 2025-03-27 ENCOUNTER — OFFICE VISIT (OUTPATIENT)
Dept: FAMILY MEDICINE CLINIC | Age: 60
End: 2025-03-27

## 2025-03-27 VITALS
DIASTOLIC BLOOD PRESSURE: 68 MMHG | BODY MASS INDEX: 31.21 KG/M2 | RESPIRATION RATE: 20 BRPM | HEART RATE: 80 BPM | WEIGHT: 199.3 LBS | SYSTOLIC BLOOD PRESSURE: 130 MMHG

## 2025-03-27 DIAGNOSIS — C86.60 LYMPHOMATOID PAPULOSIS: ICD-10-CM

## 2025-03-27 DIAGNOSIS — I10 ESSENTIAL HYPERTENSION: Primary | ICD-10-CM

## 2025-03-27 DIAGNOSIS — F41.9 ANXIETY: ICD-10-CM

## 2025-03-27 DIAGNOSIS — D64.9 CHRONIC ANEMIA: ICD-10-CM

## 2025-03-27 DIAGNOSIS — E03.9 ACQUIRED HYPOTHYROIDISM: ICD-10-CM

## 2025-03-27 DIAGNOSIS — J44.9 COPD, MILD (HCC): ICD-10-CM

## 2025-03-27 DIAGNOSIS — I50.22 CHRONIC SYSTOLIC (CONGESTIVE) HEART FAILURE: ICD-10-CM

## 2025-03-27 ASSESSMENT — PATIENT HEALTH QUESTIONNAIRE - PHQ9
4. FEELING TIRED OR HAVING LITTLE ENERGY: NEARLY EVERY DAY
1. LITTLE INTEREST OR PLEASURE IN DOING THINGS: NEARLY EVERY DAY
SUM OF ALL RESPONSES TO PHQ QUESTIONS 1-9: 20
SUM OF ALL RESPONSES TO PHQ QUESTIONS 1-9: 20
5. POOR APPETITE OR OVEREATING: NOT AT ALL
3. TROUBLE FALLING OR STAYING ASLEEP: NEARLY EVERY DAY
SUM OF ALL RESPONSES TO PHQ QUESTIONS 1-9: 20
SUM OF ALL RESPONSES TO PHQ QUESTIONS 1-9: 18
10. IF YOU CHECKED OFF ANY PROBLEMS, HOW DIFFICULT HAVE THESE PROBLEMS MADE IT FOR YOU TO DO YOUR WORK, TAKE CARE OF THINGS AT HOME, OR GET ALONG WITH OTHER PEOPLE: SOMEWHAT DIFFICULT
8. MOVING OR SPEAKING SO SLOWLY THAT OTHER PEOPLE COULD HAVE NOTICED. OR THE OPPOSITE, BEING SO FIGETY OR RESTLESS THAT YOU HAVE BEEN MOVING AROUND A LOT MORE THAN USUAL: NOT AT ALL
9. THOUGHTS THAT YOU WOULD BE BETTER OFF DEAD, OR OF HURTING YOURSELF: MORE THAN HALF THE DAYS
7. TROUBLE CONCENTRATING ON THINGS, SUCH AS READING THE NEWSPAPER OR WATCHING TELEVISION: NEARLY EVERY DAY
2. FEELING DOWN, DEPRESSED OR HOPELESS: NEARLY EVERY DAY
6. FEELING BAD ABOUT YOURSELF - OR THAT YOU ARE A FAILURE OR HAVE LET YOURSELF OR YOUR FAMILY DOWN: NEARLY EVERY DAY

## 2025-03-27 ASSESSMENT — COLUMBIA-SUICIDE SEVERITY RATING SCALE - C-SSRS
1. WITHIN THE PAST MONTH, HAVE YOU WISHED YOU WERE DEAD OR WISHED YOU COULD GO TO SLEEP AND NOT WAKE UP?: NO
2. HAVE YOU ACTUALLY HAD ANY THOUGHTS OF KILLING YOURSELF?: NO
6. HAVE YOU EVER DONE ANYTHING, STARTED TO DO ANYTHING, OR PREPARED TO DO ANYTHING TO END YOUR LIFE?: NO

## 2025-03-27 ASSESSMENT — ENCOUNTER SYMPTOMS
RESPIRATORY NEGATIVE: 1
GASTROINTESTINAL NEGATIVE: 1

## 2025-03-27 NOTE — PATIENT INSTRUCTIONS
You may receive a survey about your visit with us today. The feedback from our patients helps us identify what is working well and where the service to all patients can be enhanced. Thank you!     Tobacco Cessation Programs     Telephonic behavior modification  1-800-QUIT-NOW (542-6666)  Counseling service for those who are ready to quit using tobacco.    Available for uninsured [place] residents, Medicaid recipients, pregnant women, or patients whose health plans or employers are members of the [place] [employer]    Online behavior modification  http://[place].Quitlogix.org  Online support program to help patients through each step of the quitting process.  Available 24 hours a day 7 days a week.  Provides up to date researched based tool, step-by-step guides, and motivational messages.    Online behavior modification  www.lungusa.org/stop-smoking/how-to-quit  HelpLine: 1-800-LUNG-[place] (881-4949)  Email questions to:  questions@[employer]Again.[employer].org   Website offers resources to help tobacco users figure out their reasons for quitting and then take the big step of quitting for good.    Hypnosis  Location: [address] [place] [address]  Contact: Mike Masterson, PhD at [phone]  Hypnosis for tobacco cessation  Cost $225 for the initial session and $175 for each session afterwards.  Most patients require 6-8 sessions.  There is the option to submit through the patient’s insurance.    Hypnosis and behavior modification  Location: [address] [place] [address]  Contact: Jo Esteban, PhD at [phone]  Counseling and hypnosis for nicotine addition  Cost: For uninsured patients:  Please call above phone number  Cost for insured patients depends on patient’s insurance plan.    Behavior modification  Location: [place] [facility], [address] [place] [address]  Contact: [phone]  Services include four one-on-one appointments between the patient and a respiratory therapist.  The four appointments

## 2025-03-27 NOTE — PROGRESS NOTES
medications for this visit.       Past Surgical History:   Procedure Laterality Date    AORTIC VALVE REPLACEMENT  2007    MECHANICAL [facility]    AORTIC VALVE REPLACEMENT  07/20/2016    extraction of mechanical valve and replacement with tissue valve    APPENDECTOMY  1980    BACK INJECTION Bilateral 01/03/2022    Bilateral SI injection performed by Juan Kiran MD at [facility] [place]    BACK INJECTION Bilateral 03/14/2022    Bilateral SI MBB # 2 performed by Yehuda Marie DO at [facility] OR    BRONCHOSCOPY N/A 5/17/2024    EBUS performed by Jonny Mckinley MD at [facility] ENDOSCOPY    CARDIAC CATHETERIZATION  05/20/2010    Patent coronary arteries. Possible causes of the patient's chest pain is likely noncardiac at least based on this angiogram. Patient chould be able to proceed w/ scheduled surgery w/ paying attention to anticoagulation and trying to minimize the period of no anticoagulation.     CARDIAC CATHETERIZATION  2012, 6/9/16    [facility]    CARDIOVASCULAR STRESS TEST  04/15/2010    Moderate fixed inferior defect, possibly attenuation, cannot exclude a previous MI. Correlation w/ EKG is recommented. Significant degree of gut uptake which is likely to be the cause of the attenuation artifact seen. EF 46%    CERVICAL FUSION  2010    CERVICAL FUSION  03/09/2016    REMOVAL HARDWARE C5-7, ACDF C4-6 WITH ATLANTIS CORNERSTONE    COLONOSCOPY  2010 2023    DILATATION, ESOPHAGUS      small resection    ENDOSCOPY, COLON, DIAGNOSTIC      LAPAROSCOPY N/A 2/23/2024    Robotic Laparoscopy;Lysis of adhestions; Ventral hernia repair; evacuation of abdominal hematoma with debridment of hematoma capsule performed by José Antonio Allred MD at [facility] OR    NERVE SURGERY Bilateral 07/25/2017    THORACIC FACET BLOCK T7-8, T8-9, T11-12    OTHER SURGICAL HISTORY  05/02/2017    LESI L5    OTHER SURGICAL HISTORY  09/12/2017    thoracic epidural T11    PAIN MANAGEMENT PROCEDURE Left 05/17/2022    SI RFA, LEFT SIDE FIRST

## 2025-04-02 ENCOUNTER — HOSPITAL ENCOUNTER (OUTPATIENT)
Age: 60
Discharge: HOME | End: 2025-04-02
Payer: COMMERCIAL

## 2025-04-02 DIAGNOSIS — D50.9 IRON DEFICIENCY ANEMIA, UNSPECIFIED IRON DEFICIENCY ANEMIA TYPE: Primary | ICD-10-CM

## 2025-04-02 DIAGNOSIS — D50.9 IRON DEFICIENCY ANEMIA, UNSPECIFIED IRON DEFICIENCY ANEMIA TYPE: ICD-10-CM

## 2025-04-02 LAB
BASOPHILS ABSOLUTE: 0.1 THOU/MM3 (ref 0–0.1)
BASOPHILS NFR BLD AUTO: 1.1 %
DEPRECATED RDW RBC AUTO: 62.2 FL (ref 35–45)
EOSINOPHIL NFR BLD AUTO: 9.7 %
EOSINOPHILS ABSOLUTE: 1 THOU/MM3 (ref 0–0.4)
ERYTHROCYTE [DISTWIDTH] IN BLOOD BY AUTOMATED COUNT: 18.9 % (ref 11.5–14.5)
FERRITIN SERPL IA-MCNC: 93 NG/ML (ref 30–400)
FOLATE SERPL-MCNC: > 20 NG/ML (ref 4.6–34.8)
HCT VFR BLD AUTO: 26.5 % (ref 42–52)
HGB BLD-MCNC: 8 GM/DL (ref 14–18)
IMM GRANULOCYTES # BLD AUTO: 0.05 THOU/MM3 (ref 0–0.07)
IMM GRANULOCYTES NFR BLD AUTO: 0.5 %
IRON SATN MFR SERPL: 8 % (ref 20–50)
IRON SERPL-MCNC: 27 UG/DL (ref 61–157)
LYMPHOCYTES ABSOLUTE: 0.6 THOU/MM3 (ref 1–4.8)
LYMPHOCYTES NFR BLD AUTO: 5.5 %
MCH RBC QN AUTO: 27.6 PG (ref 26–33)
MCHC RBC AUTO-ENTMCNC: 30.2 GM/DL (ref 32.2–35.5)
MCV RBC AUTO: 91.4 FL (ref 80–94)
MONOCYTES ABSOLUTE: 0.8 THOU/MM3 (ref 0.4–1.3)
MONOCYTES NFR BLD AUTO: 7.7 %
NEUTROPHILS ABSOLUTE: 7.8 THOU/MM3 (ref 1.8–7.7)
NEUTROPHILS NFR BLD AUTO: 75.5 %
NRBC BLD AUTO-RTO: 0 /100 WBC
PLATELET # BLD AUTO: 331 THOU/MM3 (ref 130–400)
PMV BLD AUTO: 10.3 FL (ref 9.4–12.4)
RBC # BLD AUTO: 2.9 MILL/MM3 (ref 4.7–6.1)
TIBC SERPL-MCNC: 348 UG/DL (ref 171–450)
VIT B12 SERPL-MCNC: 548 PG/ML (ref 232–1245)
WBC # BLD AUTO: 10.3 THOU/MM3 (ref 4.8–10.8)

## 2025-04-02 PROCEDURE — 85025 COMPLETE CBC W/AUTO DIFF WBC: CPT

## 2025-04-02 PROCEDURE — 83550 IRON BINDING TEST: CPT

## 2025-04-02 PROCEDURE — 82728 ASSAY OF FERRITIN: CPT

## 2025-04-02 PROCEDURE — 83540 ASSAY OF IRON: CPT

## 2025-04-02 PROCEDURE — 36415 COLL VENOUS BLD VENIPUNCTURE: CPT

## 2025-04-02 PROCEDURE — 82746 ASSAY OF FOLIC ACID SERUM: CPT

## 2025-04-02 PROCEDURE — 82607 VITAMIN B-12: CPT

## 2025-04-04 ENCOUNTER — APPOINTMENT (OUTPATIENT)
Dept: GENERAL RADIOLOGY | Age: 60
End: 2025-04-04
Payer: COMMERCIAL

## 2025-04-04 ENCOUNTER — HOSPITAL ENCOUNTER (EMERGENCY)
Age: 60
Discharge: HOME | End: 2025-04-04
Payer: COMMERCIAL

## 2025-04-04 VITALS
HEART RATE: 86 BPM | SYSTOLIC BLOOD PRESSURE: 115 MMHG | DIASTOLIC BLOOD PRESSURE: 78 MMHG | TEMPERATURE: 97.6 F | RESPIRATION RATE: 20 BRPM | OXYGEN SATURATION: 98 %

## 2025-04-04 DIAGNOSIS — J40 BRONCHITIS: Primary | ICD-10-CM

## 2025-04-04 PROCEDURE — 99203 OFFICE O/P NEW LOW 30 MIN: CPT

## 2025-04-04 PROCEDURE — 71046 X-RAY EXAM CHEST 2 VIEWS: CPT

## 2025-04-04 PROCEDURE — 99213 OFFICE O/P EST LOW 20 MIN: CPT

## 2025-04-04 RX ORDER — PREDNISONE 20 MG/1
20 TABLET ORAL 2 TIMES DAILY
Qty: 10 TABLET | Refills: 0 | Status: SHIPPED | OUTPATIENT
Start: 2025-04-04 | End: 2025-04-09

## 2025-04-04 RX ORDER — BENZONATATE 200 MG/1
200 CAPSULE ORAL 3 TIMES DAILY PRN
Qty: 30 CAPSULE | Refills: 0 | Status: SHIPPED | OUTPATIENT
Start: 2025-04-04 | End: 2025-04-14

## 2025-04-04 RX ORDER — AZITHROMYCIN 250 MG/1
TABLET, FILM COATED ORAL
Qty: 6 TABLET | Refills: 0 | Status: SHIPPED | OUTPATIENT
Start: 2025-04-04 | End: 2025-04-14

## 2025-04-04 ASSESSMENT — ENCOUNTER SYMPTOMS
ABDOMINAL PAIN: 0
DIARRHEA: 0
SHORTNESS OF BREATH: 0
COUGH: 1
WHEEZING: 1
VOMITING: 0

## 2025-04-04 NOTE — ED TRIAGE NOTES
Pt to uc with c/o cough x 2 weeks. Pt reports his pcp seen him last week and told him to continue otc medications for his cough. Pt reports he is more winded with his cough. Pt reports he is a smoker and hx of lung nodules.

## 2025-04-04 NOTE — ED PROVIDER NOTES
[facility] URGENT CARE  Urgent Care Encounter      CHIEF COMPLAINT       Chief Complaint   Patient presents with    Chest Congestion    Cough       Nurses Notes reviewed and I agree except as noted in the HPI.  HISTORY OF PRESENT ILLNESS   Alberto Gilliland is a 60 y.o. male who presents to urgent care with complaints of cough and congestion.  Patient reports symptoms have been ongoing for 2 weeks.  Reports he did see his primary care provider 1 week ago and was advised to take over-the-counter medications.  Patient reports he has been taking Coricidin although reports that is not improving.    REVIEW OF SYSTEMS     Review of Systems   Constitutional:  Negative for fever.   HENT:  Positive for congestion.    Respiratory:  Positive for cough and wheezing. Negative for shortness of breath.    Cardiovascular:  Negative for chest pain.   Gastrointestinal:  Negative for abdominal pain, diarrhea and vomiting.   Neurological:  Negative for seizures.       PAST MEDICAL HISTORY         Diagnosis Date    Arthritis     general    Bilateral sciatica     Bleeding     with coumadin    CAD (coronary artery disease)     Carpal tunnel syndrome     RIGHT    Cervicalgia, C5-7     Chronic anxiety     COPD, mild (HCC) 04/15/2023    Dysthymia (or depressive neurosis) 02/01/2016    ED (erectile dysfunction)     ED (erectile dysfunction)     Fatty liver 3/5/2024    GERD (gastroesophageal reflux disease)     Headache(784.0)     History of blood transfusion 03/2016    because of taking coumadin    HTN (hypertension)     Hyperlipidemia     Hypothyroidism     Iron deficiency anemia, blood loss     Lumbago     Lumbar radiculopathy     Lymphomatoid papulosis     sores on skin    Osteoarthritis (arthritis due to wear and tear of joints)     S/P AVR, coumadin Tx 2016    Spondylosis of thoracic region without myelopathy or radiculopathy        SURGICAL HISTORY     Patient  has a past surgical history that includes cardiovascular stress test

## 2025-04-06 DIAGNOSIS — F41.9 ANXIETY: ICD-10-CM

## 2025-04-07 ENCOUNTER — APPOINTMENT (OUTPATIENT)
Dept: CT IMAGING | Age: 60
End: 2025-04-07
Payer: COMMERCIAL

## 2025-04-07 ENCOUNTER — HOSPITAL ENCOUNTER (EMERGENCY)
Age: 60
Discharge: HOME | End: 2025-04-07
Payer: COMMERCIAL

## 2025-04-07 VITALS
DIASTOLIC BLOOD PRESSURE: 76 MMHG | TEMPERATURE: 98.1 F | SYSTOLIC BLOOD PRESSURE: 133 MMHG | HEART RATE: 72 BPM | RESPIRATION RATE: 20 BRPM | OXYGEN SATURATION: 99 %

## 2025-04-07 DIAGNOSIS — R05.1 ACUTE COUGH: Primary | ICD-10-CM

## 2025-04-07 LAB
ANION GAP SERPL CALC-SCNC: 11 MEQ/L (ref 8–16)
ANISOCYTOSIS BLD QL SMEAR: PRESENT
APTT PPP: 26.6 SECONDS (ref 22–38)
AUTO DIFF PNL BLD: ABNORMAL
BASOPHILS ABSOLUTE: 0.1 THOU/MM3 (ref 0–0.1)
BASOPHILS NFR BLD AUTO: 0.3 %
BUN SERPL-MCNC: 19 MG/DL (ref 8–23)
BURR CELLS BLD QL SMEAR: ABNORMAL
CALCIUM SERPL-MCNC: 9 MG/DL (ref 8.8–10.2)
CHLORIDE SERPL-SCNC: 106 MEQ/L (ref 98–111)
CO2 SERPL-SCNC: 21 MEQ/L (ref 22–29)
CREAT SERPL-MCNC: 0.9 MG/DL (ref 0.7–1.2)
DEPRECATED RDW RBC AUTO: 66.4 FL (ref 35–45)
EKG ATRIAL RATE: 82 BPM
EKG P AXIS: 52 DEGREES
EKG P-R INTERVAL: 234 MS
EKG Q-T INTERVAL: 396 MS
EKG QRS DURATION: 128 MS
EKG QTC CALCULATION (BAZETT): 462 MS
EKG R AXIS: -30 DEGREES
EKG T AXIS: 146 DEGREES
EKG VENTRICULAR RATE: 82 BPM
EOSINOPHIL NFR BLD AUTO: 1.3 %
EOSINOPHILS ABSOLUTE: 0.2 THOU/MM3 (ref 0–0.4)
ERYTHROCYTE [DISTWIDTH] IN BLOOD BY AUTOMATED COUNT: 20.8 % (ref 11.5–14.5)
FLUAV RNA RESP QL NAA+PROBE: NOT DETECTED
FLUBV RNA RESP QL NAA+PROBE: NOT DETECTED
GFR SERPL CREATININE-BSD FRML MDRD: > 90 ML/MIN/1.73M2
GLUCOSE SERPL-MCNC: 212 MG/DL (ref 74–109)
HCT VFR BLD AUTO: 26.2 % (ref 42–52)
HGB BLD-MCNC: 7.9 GM/DL (ref 14–18)
IMM GRANULOCYTES # BLD AUTO: 0.13 THOU/MM3 (ref 0–0.07)
IMM GRANULOCYTES NFR BLD AUTO: 0.8 %
INR PPP: 1.19 (ref 0.85–1.13)
LYMPHOCYTES ABSOLUTE: 0.3 THOU/MM3 (ref 1–4.8)
LYMPHOCYTES NFR BLD AUTO: 1.6 %
MAGNESIUM SERPL-MCNC: 2.4 MG/DL (ref 1.6–2.6)
MCH RBC QN AUTO: 27.7 PG (ref 26–33)
MCHC RBC AUTO-ENTMCNC: 30.2 GM/DL (ref 32.2–35.5)
MCV RBC AUTO: 91.9 FL (ref 80–94)
MONOCYTES ABSOLUTE: 0.9 THOU/MM3 (ref 0.4–1.3)
MONOCYTES NFR BLD AUTO: 5 %
NEUTROPHILS ABSOLUTE: 15.5 THOU/MM3 (ref 1.8–7.7)
NEUTROPHILS NFR BLD AUTO: 91 %
NRBC BLD AUTO-RTO: 0 /100 WBC
OSMOLALITY SERPL CALC.SUM OF ELEC: 284.2 MOSMOL/KG (ref 275–300)
PATHOLOGIST REVIEW: ABNORMAL
PLATELET # BLD AUTO: 329 THOU/MM3 (ref 130–400)
PMV BLD AUTO: 10.8 FL (ref 9.4–12.4)
POIKILOCYTES: ABNORMAL
POLYCHROMASIA BLD QL SMEAR: ABNORMAL
POTASSIUM SERPL-SCNC: 4.4 MEQ/L (ref 3.5–5.2)
RBC # BLD AUTO: 2.85 MILL/MM3 (ref 4.7–6.1)
SARS-COV-2 RNA RESP QL NAA+PROBE: NOT DETECTED
SCAN OF BLOOD SMEAR: NORMAL
SCHISTOCYTES BLD QL SMEAR: ABNORMAL
SODIUM SERPL-SCNC: 138 MEQ/L (ref 135–145)
WBC # BLD AUTO: 17 THOU/MM3 (ref 4.8–10.8)

## 2025-04-07 PROCEDURE — 85730 THROMBOPLASTIN TIME PARTIAL: CPT

## 2025-04-07 PROCEDURE — 93010 ELECTROCARDIOGRAM REPORT: CPT | Performed by: NUCLEAR MEDICINE

## 2025-04-07 PROCEDURE — 83735 ASSAY OF MAGNESIUM: CPT

## 2025-04-07 PROCEDURE — 99285 EMERGENCY DEPT VISIT HI MDM: CPT

## 2025-04-07 PROCEDURE — 36415 COLL VENOUS BLD VENIPUNCTURE: CPT

## 2025-04-07 PROCEDURE — 6360000004 HC RX CONTRAST MEDICATION: Performed by: NURSE PRACTITIONER

## 2025-04-07 PROCEDURE — 85025 COMPLETE CBC W/AUTO DIFF WBC: CPT

## 2025-04-07 PROCEDURE — 87636 SARSCOV2 & INF A&B AMP PRB: CPT

## 2025-04-07 PROCEDURE — 93005 ELECTROCARDIOGRAM TRACING: CPT | Performed by: EMERGENCY MEDICINE

## 2025-04-07 PROCEDURE — 80048 BASIC METABOLIC PNL TOTAL CA: CPT

## 2025-04-07 PROCEDURE — 85610 PROTHROMBIN TIME: CPT

## 2025-04-07 PROCEDURE — 71275 CT ANGIOGRAPHY CHEST: CPT

## 2025-04-07 RX ORDER — IOPAMIDOL 755 MG/ML
80 INJECTION, SOLUTION INTRAVASCULAR
Status: COMPLETED | OUTPATIENT
Start: 2025-04-07 | End: 2025-04-07

## 2025-04-07 RX ORDER — ALPRAZOLAM 0.5 MG
TABLET ORAL
Qty: 30 TABLET | Refills: 2 | Status: SHIPPED | OUTPATIENT
Start: 2025-04-07 | End: 2025-07-06

## 2025-04-07 RX ADMIN — IOPAMIDOL 80 ML: 755 INJECTION, SOLUTION INTRAVENOUS at 10:58

## 2025-04-07 NOTE — ED PROVIDER NOTES
tenderness to palpation.    Musculoskeletal:  Moves all extremities.  No joint tenderness or deformities.  No pedal or pretibial edema.  No vascular defects.    Neuro:  GCS 15.  Awake, alert, and oriented x 4.  Speech clear and concise.  No lateralizing deficits.    Psych:  Normal affect.  Normal mood.  Cooperative behavior.      Additional Vital Signs:  Vitals:    04/07/25 0858   BP: 138/75   Pulse: 91   Resp: 17   Temp: 98.1 °F (36.7 °C)   SpO2: 99%         FORMAL DIAGNOSTIC RESULTS     RADIOLOGY: Interpretation per the Radiologist below, if available at the time of this note (none if blank):    CTA CHEST W WO CONTRAST    (Results Pending)     CTA CHEST W WO CONTRAST   Final Result   1. No filling defects are noted within the pulmonary arterial vasculature to   suggest the presence of pulmonary embolus. Several of the previously seen   pulmonary nodules are no longer visualized however there are several new   pulmonary nodules as described. Reportedly the patient has a history of prior   biopsy. Correlation with the prior clinical history and biopsy pathology is   advised. New left lower lobe consolidation and new small bilateral pleural   effusions are also noted. Consider repeat histopathologic correlation or further   evaluation with PET/CT examination if clinically indicated. Close clinical   follow-up and follow-up to ensure resolution is advised.      2. Chronic findings are discussed.            **This report has been created using voice recognition software.  It may contain   minor errors which are inherent in voice recognition technology.**      Electronically signed by Dr. Haydee Aranda          LABS: (none if blank)  Labs Reviewed   COVID-19 & INFLUENZA COMBO   CBC WITH AUTO DIFFERENTIAL   BASIC METABOLIC PANEL   PROTIME-INR   APTT   MAGNESIUM     Labs Reviewed   CBC WITH AUTO DIFFERENTIAL - Abnormal; Notable for the following components:       Result Value    WBC 17.0 (*)     RBC 2.85 (*)

## 2025-04-07 NOTE — DISCHARGE INSTR - COC
10/02/2022    Influenza, FLUCELVAX, (age 6 mo+), MDCK, Quadv PF, 0.5mL 10/10/2023    Pneumococcal, PCV-13, PREVNAR 13, (age 6w+), IM, 0.5mL 06/15/2017       Active Problems:  Patient Active Problem List   Diagnosis Code    HTN (hypertension) I10    Lumbar radiculopathy M54.16    Cervicalgia, C5-7 M54.2    Hyperlipidemia E78.5    Hypothyroidism E03.9    Chronic anxiety F41.9    Iron deficiency anemia, blood loss D50.9    ED (erectile dysfunction) N52.9    Bilateral sciatica M54.31, M54.32    Medication monitoring encounter Z51.81    Nocturnal leg cramps G47.62    IFG (impaired fasting glucose) R73.01    Situational depression F43.21    Dysthymia (or depressive neurosis) F34.1    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 Z98.890    Gastrointestinal hemorrhage associated with gastrojejunal ulcer K28.4    S/P AVR (aortic valve replacement), 7/20/16. Z95.2    Tobacco abuse Z72.0    Lymphomatoid papulosis  C86.60    Lumbar radiculitis M54.16    Lumbar spinal stenosis M48.061    Spondylosis of thoracic region without myelopathy or radiculopathy M47.814    Thoracic spinal stenosis M48.04    Chronic pain syndrome G89.4    Urinary hesitancy R39.11    Chronic obstructive pulmonary disease (HCC) J44.9    Abdominal aortic aneurysm (AAA) without rupture, unspecified part I71.40    Ventral hernia without obstruction or gangrene K43.9    Fatty liver K76.0    Gastro-esophageal reflux disease with esophagitis, without bleeding K21.00    Slow transit constipation K59.01    Chronic systolic (congestive) heart failure I50.22    Shortness of breath R06.02    Methotrexate, long term, current use Z79.631    Lung nodules R91.8       Isolation/Infection:   Isolation            No Isolation          Patient Infection Status        Infection Onset Added Last Indicated Last Indicated By Review Planned Expiration    MRSA 03/15/21 03/17/21 01/09/23 Culture, Aerobic

## 2025-04-07 NOTE — ED NOTES
Pt to ED for eval of continued cough. Pt was seen and diagnosed with bronchitis. Pt has one dose of steriods left and cough is still lingering. Pt in stable condition with respirations even and unlabored.

## 2025-04-08 ENCOUNTER — PATIENT MESSAGE (OUTPATIENT)
Dept: FAMILY MEDICINE CLINIC | Age: 60
End: 2025-04-08

## 2025-04-09 ENCOUNTER — APPOINTMENT (OUTPATIENT)
Dept: GENERAL RADIOLOGY | Age: 60
End: 2025-04-09
Payer: COMMERCIAL

## 2025-04-09 ENCOUNTER — HOSPITAL ENCOUNTER (EMERGENCY)
Age: 60
Discharge: HOME | End: 2025-04-09
Attending: EMERGENCY MEDICINE
Payer: COMMERCIAL

## 2025-04-09 VITALS
SYSTOLIC BLOOD PRESSURE: 147 MMHG | WEIGHT: 200 LBS | OXYGEN SATURATION: 96 % | TEMPERATURE: 97.7 F | HEART RATE: 84 BPM | BODY MASS INDEX: 31.39 KG/M2 | RESPIRATION RATE: 22 BRPM | DIASTOLIC BLOOD PRESSURE: 78 MMHG | HEIGHT: 67 IN

## 2025-04-09 DIAGNOSIS — J44.1 COPD EXACERBATION (HCC): Primary | ICD-10-CM

## 2025-04-09 DIAGNOSIS — R91.1 PULMONARY NODULE: ICD-10-CM

## 2025-04-09 LAB
ALBUMIN SERPL BCG-MCNC: 4.2 G/DL (ref 3.4–4.9)
ALP SERPL-CCNC: 100 U/L (ref 40–129)
ALT SERPL W/O P-5'-P-CCNC: 24 U/L (ref 10–50)
ANION GAP SERPL CALC-SCNC: 11 MEQ/L (ref 8–16)
ANISOCYTOSIS BLD QL SMEAR: PRESENT
AST SERPL-CCNC: 25 U/L (ref 10–50)
BASOPHILS ABSOLUTE: 0.2 THOU/MM3 (ref 0–0.1)
BASOPHILS NFR BLD AUTO: 1.2 %
BILIRUB SERPL-MCNC: 0.5 MG/DL (ref 0.3–1.2)
BILIRUB UR QL STRIP.AUTO: NEGATIVE
BUN SERPL-MCNC: 15 MG/DL (ref 8–23)
CALCIUM SERPL-MCNC: 8.9 MG/DL (ref 8.8–10.2)
CHARACTER UR: CLEAR
CHLORIDE SERPL-SCNC: 104 MEQ/L (ref 98–111)
CO2 SERPL-SCNC: 24 MEQ/L (ref 22–29)
COLOR, UA: YELLOW
CREAT SERPL-MCNC: 0.9 MG/DL (ref 0.7–1.2)
DEPRECATED RDW RBC AUTO: 65.1 FL (ref 35–45)
EKG ATRIAL RATE: 83 BPM
EKG P AXIS: 95 DEGREES
EKG P-R INTERVAL: 204 MS
EKG Q-T INTERVAL: 394 MS
EKG QRS DURATION: 120 MS
EKG QTC CALCULATION (BAZETT): 462 MS
EKG R AXIS: -44 DEGREES
EKG T AXIS: 88 DEGREES
EKG VENTRICULAR RATE: 83 BPM
EOSINOPHIL NFR BLD AUTO: 19.3 %
EOSINOPHILS ABSOLUTE: 3 THOU/MM3 (ref 0–0.4)
ERYTHROCYTE [DISTWIDTH] IN BLOOD BY AUTOMATED COUNT: 21 % (ref 11.5–14.5)
FLUAV RNA RESP QL NAA+PROBE: NOT DETECTED
FLUBV RNA RESP QL NAA+PROBE: NOT DETECTED
GFR SERPL CREATININE-BSD FRML MDRD: > 90 ML/MIN/1.73M2
GLUCOSE SERPL-MCNC: 100 MG/DL (ref 74–109)
GLUCOSE UR QL STRIP.AUTO: NEGATIVE MG/DL
HCT VFR BLD AUTO: 28 % (ref 42–52)
HGB BLD-MCNC: 8.6 GM/DL (ref 14–18)
HGB UR QL STRIP.AUTO: NEGATIVE
HYPOCHROMIA BLD QL SMEAR: PRESENT
IMM GRANULOCYTES # BLD AUTO: 0.07 THOU/MM3 (ref 0–0.07)
IMM GRANULOCYTES NFR BLD AUTO: 0.5 %
KETONES UR QL STRIP.AUTO: NEGATIVE
LACTIC ACID, SEPSIS: 1.2 MMOL/L (ref 0.5–1.9)
LACTIC ACID, SEPSIS: 1.3 MMOL/L (ref 0.5–1.9)
LYMPHOCYTES ABSOLUTE: 1.5 THOU/MM3 (ref 1–4.8)
LYMPHOCYTES NFR BLD AUTO: 9.5 %
MACROCYTES BLD QL SMEAR: PRESENT
MAGNESIUM SERPL-MCNC: 2.4 MG/DL (ref 1.6–2.6)
MCH RBC QN AUTO: 27.7 PG (ref 26–33)
MCHC RBC AUTO-ENTMCNC: 30.7 GM/DL (ref 32.2–35.5)
MCV RBC AUTO: 90.3 FL (ref 80–94)
MONOCYTES ABSOLUTE: 1.1 THOU/MM3 (ref 0.4–1.3)
MONOCYTES NFR BLD AUTO: 6.8 %
NEUTROPHILS ABSOLUTE: 9.7 THOU/MM3 (ref 1.8–7.7)
NEUTROPHILS NFR BLD AUTO: 62.7 %
NITRITE UR QL STRIP: NEGATIVE
NRBC BLD AUTO-RTO: 0 /100 WBC
NT-PROBNP SERPL IA-MCNC: 7060 PG/ML (ref 0–124)
OSMOLALITY SERPL CALC.SUM OF ELEC: 278.5 MOSMOL/KG (ref 275–300)
PH UR STRIP.AUTO: 6.5 [PH] (ref 5–9)
PLATELET # BLD AUTO: 374 THOU/MM3 (ref 130–400)
PMV BLD AUTO: 11 FL (ref 9.4–12.4)
POIKILOCYTES: ABNORMAL
POLYCHROMASIA BLD QL SMEAR: ABNORMAL
POTASSIUM SERPL-SCNC: 4 MEQ/L (ref 3.5–5.2)
PROT SERPL-MCNC: 6.9 G/DL (ref 6.4–8.3)
PROT UR STRIP.AUTO-MCNC: NEGATIVE MG/DL
RBC # BLD AUTO: 3.1 MILL/MM3 (ref 4.7–6.1)
SARS-COV-2 RNA RESP QL NAA+PROBE: NOT DETECTED
SCAN OF BLOOD SMEAR: NORMAL
SCHISTOCYTES BLD QL SMEAR: ABNORMAL
SODIUM SERPL-SCNC: 139 MEQ/L (ref 135–145)
SP GR UR REFRACT.AUTO: 1.01 (ref 1–1.03)
T4 FREE SERPL-MCNC: 1.3 NG/DL (ref 0.92–1.68)
TROPONIN, HIGH SENSITIVITY: 29 NG/L (ref 0–12)
TROPONIN, HIGH SENSITIVITY: 33 NG/L (ref 0–12)
TSH SERPL DL<=0.05 MIU/L-ACNC: 4.16 UIU/ML (ref 0.27–4.2)
UROBILINOGEN, URINE: 0.2 EU/DL (ref 0–1)
WBC # BLD AUTO: 15.5 THOU/MM3 (ref 4.8–10.8)
WBC #/AREA URNS HPF: NEGATIVE /[HPF]

## 2025-04-09 PROCEDURE — 96375 TX/PRO/DX INJ NEW DRUG ADDON: CPT

## 2025-04-09 PROCEDURE — 83880 ASSAY OF NATRIURETIC PEPTIDE: CPT

## 2025-04-09 PROCEDURE — 93010 ELECTROCARDIOGRAM REPORT: CPT | Performed by: NUCLEAR MEDICINE

## 2025-04-09 PROCEDURE — 99285 EMERGENCY DEPT VISIT HI MDM: CPT

## 2025-04-09 PROCEDURE — 83735 ASSAY OF MAGNESIUM: CPT

## 2025-04-09 PROCEDURE — 81003 URINALYSIS AUTO W/O SCOPE: CPT

## 2025-04-09 PROCEDURE — 6360000002 HC RX W HCPCS

## 2025-04-09 PROCEDURE — 94640 AIRWAY INHALATION TREATMENT: CPT

## 2025-04-09 PROCEDURE — 84443 ASSAY THYROID STIM HORMONE: CPT

## 2025-04-09 PROCEDURE — 83605 ASSAY OF LACTIC ACID: CPT

## 2025-04-09 PROCEDURE — 84484 ASSAY OF TROPONIN QUANT: CPT

## 2025-04-09 PROCEDURE — 94761 N-INVAS EAR/PLS OXIMETRY MLT: CPT

## 2025-04-09 PROCEDURE — 84439 ASSAY OF FREE THYROXINE: CPT

## 2025-04-09 PROCEDURE — 2500000003 HC RX 250 WO HCPCS

## 2025-04-09 PROCEDURE — 6370000000 HC RX 637 (ALT 250 FOR IP)

## 2025-04-09 PROCEDURE — 85025 COMPLETE CBC W/AUTO DIFF WBC: CPT

## 2025-04-09 PROCEDURE — 87636 SARSCOV2 & INF A&B AMP PRB: CPT

## 2025-04-09 PROCEDURE — 96374 THER/PROPH/DIAG INJ IV PUSH: CPT

## 2025-04-09 PROCEDURE — 80053 COMPREHEN METABOLIC PANEL: CPT

## 2025-04-09 PROCEDURE — 93005 ELECTROCARDIOGRAM TRACING: CPT

## 2025-04-09 PROCEDURE — 36415 COLL VENOUS BLD VENIPUNCTURE: CPT

## 2025-04-09 PROCEDURE — 71045 X-RAY EXAM CHEST 1 VIEW: CPT

## 2025-04-09 RX ORDER — DIPHENHYDRAMINE HYDROCHLORIDE 50 MG/ML
25 INJECTION, SOLUTION INTRAMUSCULAR; INTRAVENOUS ONCE
Status: COMPLETED | OUTPATIENT
Start: 2025-04-09 | End: 2025-04-09

## 2025-04-09 RX ORDER — IPRATROPIUM BROMIDE AND ALBUTEROL SULFATE 2.5; .5 MG/3ML; MG/3ML
3 SOLUTION RESPIRATORY (INHALATION) ONCE
Status: COMPLETED | OUTPATIENT
Start: 2025-04-09 | End: 2025-04-09

## 2025-04-09 RX ADMIN — WATER 125 MG: 1 INJECTION INTRAMUSCULAR; INTRAVENOUS; SUBCUTANEOUS at 06:13

## 2025-04-09 RX ADMIN — DIPHENHYDRAMINE HYDROCHLORIDE 25 MG: 50 INJECTION INTRAMUSCULAR; INTRAVENOUS at 06:13

## 2025-04-09 RX ADMIN — IPRATROPIUM BROMIDE AND ALBUTEROL SULFATE 3 DOSE: .5; 3 SOLUTION RESPIRATORY (INHALATION) at 06:17

## 2025-04-09 ASSESSMENT — PAIN - FUNCTIONAL ASSESSMENT
PAIN_FUNCTIONAL_ASSESSMENT: 0-10
PAIN_FUNCTIONAL_ASSESSMENT: 0-10

## 2025-04-09 ASSESSMENT — PAIN SCALES - GENERAL
PAINLEVEL_OUTOF10: 6
PAINLEVEL_OUTOF10: 6

## 2025-04-09 ASSESSMENT — PAIN DESCRIPTION - LOCATION: LOCATION: CHEST

## 2025-04-09 NOTE — DISCHARGE INSTRUCTIONS
If for worsening facial swelling, with associated shortness of breath or unable to take deep breaths, having trouble swallowing food, drooling.  Please present to the nearest ED. please complete the antibiotic/steroid course, has been previously prescribed.

## 2025-04-09 NOTE — ED NOTES
Pt resting in bed. Medicated per MAR. Vitals assessed. Covid swab obtained. Pt denies additional needs. Respiratory at bedside for breathing tx. Call light in reach.

## 2025-04-09 NOTE — ED NOTES
This RN to the bedside for rounding. Patient ambulated to restroom under own power to obtain urine sample. Patient VSS. Respirations are easy and unlabored. Patient appears to be in no current distress at this time. Call light within reach.

## 2025-04-09 NOTE — ED NOTES
Patient in bed and talking with provider at the bedside. Patient provided education regarding discharge. Patient VSS and patient does not appear to be in any current distress at this time. Call light within reach.

## 2025-04-09 NOTE — ED PROVIDER NOTES
[facility] EMERGENCY DEPARTMENT - VISIT NOTE    Patient Name: Alberto Gilliland  MRN: [id number]  YOB: 1965  Date of Evaluation: 4/9/2025  Treating Resident Physician: Bigg Cortez MD  Supervising Physician: Ki Soto DO    CHIEF COMPLAINT       Chief Complaint   Patient presents with    Shortness of Breath       HISTORY OF PRESENT ILLNESS    HPI    History obtained from chart review and the patient.    Alberto is a 60 y.o. old male who presents to the emergency department by Walk In for evaluation of ongoing shortness of breath, chest congestion, facial swelling.  Patient has a history of aortic valve replacement, COPD, lung nodules describing greater than 1 week upper URI symptoms.  On/4/25, patient was seen in urgent care with cough congestion and wheezing, discharged with prednisone, Tessalon, azithromycin./7/25, patient presenting with worsening mucus production and given Augmentin.  Patient returns to the emergency department today because he believes that when he woke up he had diffuse facial swelling.  States this has never happened before.  Shows an image that he took this morning which he claims he was swollen but states it has gotten better since he took that photo.  Objectively, there is minimal facial swelling.  He describes facial congestion, chest congestion, ongoing mucus production, abdominal distention.    Chart reviewed, relevant history summarized in HPI above.      REVIEW OF SYSTEMS   Review of Systems  Negative unless documented in HPI    PAST MEDICAL AND SURGICAL HISTORY   Alberto  has a past medical history of Arthritis, Bilateral sciatica, Bleeding, CAD (coronary artery disease), Carpal tunnel syndrome, Cervicalgia, C5-7, Chronic anxiety, COPD, mild (HCC) (04/15/2023), Dysthymia (or depressive neurosis) (02/01/2016), ED (erectile dysfunction), ED (erectile dysfunction), Fatty liver (3/5/2024), GERD (gastroesophageal reflux disease), Headache(784.0), History of blood 
Cardiomegaly.   2. Hazy appearance to the mid to lower lung zones suggesting mild pulmonary   edema.               **This report has been created using voice recognition software. It may contain   minor errors which are inherent in voice recognition technology.**      Electronically signed by Dr. Katarzyna Hill          ED LABS:  Labs Reviewed   CBC WITH AUTO DIFFERENTIAL - Abnormal; Notable for the following components:       Result Value    WBC 15.5 (*)     RBC 3.10 (*)     Hemoglobin 8.6 (*)     Hematocrit 28.0 (*)     MCHC 30.7 (*)     RDW-CV 21.0 (*)     RDW-SD 65.1 (*)     Neutrophils Absolute 9.7 (*)     Eosinophils Absolute 3.0 (*)     Basophils Absolute 0.2 (*)     All other components within normal limits   TROPONIN - Abnormal; Notable for the following components:    Troponin, High Sensitivity 33 (*)     All other components within normal limits   BRAIN NATRIURETIC PEPTIDE - Abnormal; Notable for the following components:    NT Pro-BNP 7060.0 (*)     All other components within normal limits   TROPONIN - Abnormal; Notable for the following components:    Troponin, High Sensitivity 29 (*)     All other components within normal limits   COVID-19 & INFLUENZA COMBO   COMPREHENSIVE METABOLIC PANEL   LACTATE, SEPSIS   LACTATE, SEPSIS   MAGNESIUM   TSH   T4, FREE   ANION GAP   GLOMERULAR FILTRATION RATE, ESTIMATED   OSMOLALITY   SCAN OF BLOOD SMEAR   URINALYSIS WITH REFLEX TO CULTURE       MDM:   At the time of my evaluation, patient received his DuoNeb and is resting in bed quietly.  I discussed his lab results back thus far.  He reports he has a known history of anemia, states that he is on iron.  He has received blood transfusions in the past but none recently.  He has not had associated shortness of breath with this anemia, states he has a rare skin disease that he follows with a dermatologist and that causes his anemia.    Findings discussed with patient. He was offered options of admission vs. Outpatient

## 2025-04-10 ENCOUNTER — OFFICE VISIT (OUTPATIENT)
Dept: ONCOLOGY | Age: 60
End: 2025-04-10
Payer: COMMERCIAL

## 2025-04-10 ENCOUNTER — HOSPITAL ENCOUNTER (OUTPATIENT)
Dept: INFUSION THERAPY | Age: 60
Discharge: HOME | End: 2025-04-10
Payer: COMMERCIAL

## 2025-04-10 ENCOUNTER — OFFICE VISIT (OUTPATIENT)
Dept: FAMILY MEDICINE CLINIC | Age: 60
End: 2025-04-10

## 2025-04-10 VITALS
HEART RATE: 83 BPM | TEMPERATURE: 98 F | SYSTOLIC BLOOD PRESSURE: 138 MMHG | RESPIRATION RATE: 22 BRPM | DIASTOLIC BLOOD PRESSURE: 70 MMHG

## 2025-04-10 VITALS
SYSTOLIC BLOOD PRESSURE: 138 MMHG | WEIGHT: 205 LBS | BODY MASS INDEX: 32.18 KG/M2 | OXYGEN SATURATION: 98 % | HEART RATE: 83 BPM | RESPIRATION RATE: 22 BRPM | TEMPERATURE: 98 F | DIASTOLIC BLOOD PRESSURE: 70 MMHG | HEIGHT: 67 IN

## 2025-04-10 VITALS
WEIGHT: 205.6 LBS | RESPIRATION RATE: 24 BRPM | HEART RATE: 92 BPM | DIASTOLIC BLOOD PRESSURE: 70 MMHG | BODY MASS INDEX: 32.27 KG/M2 | HEIGHT: 67 IN | SYSTOLIC BLOOD PRESSURE: 124 MMHG

## 2025-04-10 DIAGNOSIS — C86.60 LYMPHOMATOID PAPULOSIS: Primary | ICD-10-CM

## 2025-04-10 DIAGNOSIS — R06.02 SOB (SHORTNESS OF BREATH): ICD-10-CM

## 2025-04-10 DIAGNOSIS — J81.0 ACUTE PULMONARY EDEMA (HCC): Primary | ICD-10-CM

## 2025-04-10 DIAGNOSIS — J44.1 COPD WITH ACUTE EXACERBATION (HCC): ICD-10-CM

## 2025-04-10 DIAGNOSIS — R91.1 LUNG NODULE: ICD-10-CM

## 2025-04-10 DIAGNOSIS — R79.89 ELEVATED BRAIN NATRIURETIC PEPTIDE (BNP) LEVEL: ICD-10-CM

## 2025-04-10 DIAGNOSIS — J18.1 LEFT LOWER LOBE CONSOLIDATION: ICD-10-CM

## 2025-04-10 DIAGNOSIS — D50.9 IRON DEFICIENCY ANEMIA, UNSPECIFIED IRON DEFICIENCY ANEMIA TYPE: ICD-10-CM

## 2025-04-10 DIAGNOSIS — R63.5 UNINTENDED WEIGHT GAIN: ICD-10-CM

## 2025-04-10 PROCEDURE — 3078F DIAST BP <80 MM HG: CPT | Performed by: INTERNAL MEDICINE

## 2025-04-10 PROCEDURE — 3075F SYST BP GE 130 - 139MM HG: CPT | Performed by: INTERNAL MEDICINE

## 2025-04-10 PROCEDURE — 99214 OFFICE O/P EST MOD 30 MIN: CPT | Performed by: INTERNAL MEDICINE

## 2025-04-10 PROCEDURE — 99211 OFF/OP EST MAY X REQ PHY/QHP: CPT

## 2025-04-10 RX ORDER — ACETAMINOPHEN 325 MG/1
650 TABLET ORAL
Status: CANCELLED | OUTPATIENT
Start: 2025-04-24

## 2025-04-10 RX ORDER — ALBUTEROL SULFATE 90 UG/1
4 INHALANT RESPIRATORY (INHALATION) PRN
Status: CANCELLED | OUTPATIENT
Start: 2025-04-24

## 2025-04-10 RX ORDER — SODIUM CHLORIDE 0.9 % (FLUSH) 0.9 %
5-40 SYRINGE (ML) INJECTION PRN
Status: CANCELLED | OUTPATIENT
Start: 2025-04-24

## 2025-04-10 RX ORDER — SODIUM CHLORIDE 9 MG/ML
INJECTION, SOLUTION INTRAVENOUS CONTINUOUS
Status: CANCELLED | OUTPATIENT
Start: 2025-04-24

## 2025-04-10 RX ORDER — SODIUM CHLORIDE 9 MG/ML
5-250 INJECTION, SOLUTION INTRAVENOUS PRN
Status: CANCELLED | OUTPATIENT
Start: 2025-04-24

## 2025-04-10 RX ORDER — ONDANSETRON 2 MG/ML
8 INJECTION INTRAMUSCULAR; INTRAVENOUS
Status: CANCELLED | OUTPATIENT
Start: 2025-04-24

## 2025-04-10 RX ORDER — FUROSEMIDE 40 MG/1
40 TABLET ORAL DAILY
Qty: 5 TABLET | Refills: 0 | Status: SHIPPED | OUTPATIENT
Start: 2025-04-10

## 2025-04-10 RX ORDER — EPINEPHRINE 1 MG/ML
0.3 INJECTION, SOLUTION, CONCENTRATE INTRAVENOUS PRN
Status: CANCELLED | OUTPATIENT
Start: 2025-04-24

## 2025-04-10 RX ORDER — HYDROCORTISONE SODIUM SUCCINATE 100 MG/2ML
100 INJECTION INTRAMUSCULAR; INTRAVENOUS
Status: CANCELLED | OUTPATIENT
Start: 2025-04-24

## 2025-04-10 RX ORDER — DIPHENHYDRAMINE HYDROCHLORIDE 50 MG/ML
50 INJECTION, SOLUTION INTRAMUSCULAR; INTRAVENOUS
Status: CANCELLED | OUTPATIENT
Start: 2025-04-24

## 2025-04-10 RX ORDER — FAMOTIDINE 10 MG/ML
20 INJECTION, SOLUTION INTRAVENOUS
Status: CANCELLED | OUTPATIENT
Start: 2025-04-24

## 2025-04-10 RX ORDER — HEPARIN SODIUM (PORCINE) LOCK FLUSH IV SOLN 100 UNIT/ML 100 UNIT/ML
500 SOLUTION INTRAVENOUS PRN
Status: CANCELLED | OUTPATIENT
Start: 2025-04-24

## 2025-04-10 ASSESSMENT — ENCOUNTER SYMPTOMS
COUGH: 1
CHEST TIGHTNESS: 1
RHINORRHEA: 1
GASTROINTESTINAL NEGATIVE: 1
SHORTNESS OF BREATH: 1
FACIAL SWELLING: 1

## 2025-04-10 NOTE — PATIENT INSTRUCTIONS
span over three weeks.  The respiratory therapist schedules one of the appointments to occur 48 hours after the patient’s quit date.   Cost $100 total for the four sessions.  Tobacco cessation products are not included in the cost and are not provided by [facility].

## 2025-04-10 NOTE — PATIENT INSTRUCTIONS
Orders Placed This Encounter   Procedures    PET CT SKULL BASE TO MID THIGH    CBC with Auto Differential    Hepatic Function Panel    POC PANEL BMP W/IOCA    Ferritin    Iron    Iron Binding Capacity    Reticulocytes    Vitamin B12 & Folate    Lactate Dehydrogenase    Lactate Dehydrogenase    Haptoglobin    Amb External Referral To Gastroenterology    External Referral To Rheumatology    Direct Antiglobulin Test   Please get documents signed for [facility].  Please notify Ms. Goldman of transportation needs  Referral to GI is urgent.  Please call Dr. Fletcher's office: to discuss MTX dosing.  Please notify Sima, about the authorization for Injectafer on this patient.  Please ask her to email [place] for an urgent authorization.  Return in about 4 weeks (around 5/8/2025).MD visit to review PET and labs

## 2025-04-10 NOTE — PROGRESS NOTES
understanding. Patient agreed with treatment plan. Follow up as directed. Patient instructed to call for questions or concerns.        NOTE: This report was transcribed using voice recognition software.  Every effort was made to ensure accuracy; however, inadvertent computerized transcription errors may be present.      Ivy De Leon MD

## 2025-04-10 NOTE — PROGRESS NOTES
breath)  3. Elevated brain natriuretic peptide (BNP) level  4. Unintended weight gain  5. Left lower lobe consolidation  6. COPD with acute exacerbation (HCC)    -  Several ER reports reviewed  -  Although his CT did show a consolidation, I feel his symptoms are fluid related  -  Ok to continue abx  -  Start Lasix daily for the next 5 days  -  Ok to continue inhaler PRN, pt feels that it helps  -  Update office tomorrow on symptoms  -  ER if worsening symptoms    Return if symptoms worsen or fail to improve.             An electronic signature was used to authenticate this note.    --Juan Christiansen, DO

## 2025-04-11 ENCOUNTER — TELEPHONE (OUTPATIENT)
Dept: FAMILY MEDICINE CLINIC | Age: 60
End: 2025-04-11

## 2025-04-11 NOTE — TELEPHONE ENCOUNTER
Pt called office stating he was suppose to call in today with an update on how he is feeling. His face is still swollen and he is still congested. Pt also continues with SOB. He is taking his water pill \"and peeing, so I must be getting rid of fluid\".

## 2025-04-11 NOTE — TELEPHONE ENCOUNTER
Noted.  Continue daily water pill over the weekend.  Call office on Monday with update.  If symptoms worsen over the weekend, recommend ER for evaluation.

## 2025-04-14 ENCOUNTER — TELEPHONE (OUTPATIENT)
Dept: PULMONOLOGY | Age: 60
End: 2025-04-14

## 2025-04-14 NOTE — TELEPHONE ENCOUNTER
Patient calling with update.  He states his facial swelling has gone down and he is peeing a lot.  No improvement with SOB.  I asked patient if he has weighted himself to see if he has lost or gained any weight and he states he does not have a scale.      Please advise.  If need to speak with patient today, please call him at work at ph# [phone]

## 2025-04-14 NOTE — TELEPHONE ENCOUNTER
Patent called in asking if he still needs to have his CT done on the 21st since he just had a CTA W WO contrast on 4/7/25. Please advise thank you

## 2025-04-14 NOTE — TELEPHONE ENCOUNTER
Patient notified of above and understanding voiced.  Patient currently at work and will go after he gets off around 4pm or 5pm today.  If s/s worsen, he will leave work and go to ER immediately.

## 2025-04-15 ENCOUNTER — CLINICAL DOCUMENTATION (OUTPATIENT)
Facility: HOSPITAL | Age: 60
End: 2025-04-15

## 2025-04-15 PROBLEM — I71.40 ABDOMINAL AORTIC ANEURYSM (AAA) WITHOUT RUPTURE, UNSPECIFIED PART: Status: RESOLVED | Noted: 2023-04-15 | Resolved: 2025-04-15

## 2025-04-15 NOTE — PROGRESS NOTES
Attempt 1 - Successful  via In Basket messaging on 04/15/25 at 11:16 am.    Messaged with Dr. Ambrosio Puentes who is in agreement to change medication to Venofer on 04/15/25 at 12:19 pm.        Actions Items completed: Dr. Ambrosio Puentes was asked to change this therapy plan in [place] (as a provider needs to do so) on 04/15/25 at 3:06 pm.      Follow up Actions: Will monitor for change of therapy plan and then confirm that it gets sent back to \[facility]\"" for authorization.     Electronically signed by Meme Llanes, RN, RN on 4/15/25 at 3:21 PM

## 2025-04-15 NOTE — TELEPHONE ENCOUNTER
LVM notifying patient the ct is no longer needed, and will reach out to Central scheduling to cancel

## 2025-04-15 NOTE — PROGRESS NOTES
Injectafer medication plan was denied.     Denial Reason: Injectafer is non-preferred. Trial and failure of or contraindication to two of three preferred iron agents required prior to approving Injectafer. Preferred are Ferrlecit, Venofer and Feraheme. (Per Shaneka Farris at [facility]).     Follow up action: Will follow up with ordering physician to inquire about change of medication.

## 2025-04-15 NOTE — PROGRESS NOTES
[facility] PHYSICI[facility] [place] SPECIALTY  [facility] CARDIOLOGY  [address]  [address]  [facility] [address]  Dept: [phone]  Dept Fax: [phone]  Loc: [phone]    Visit Date: 4/17/2025    Alberto Gilliland is a 60 y.o. male who presents todayfor:  Chief Complaint   Patient presents with    Follow-Up from Hospital     Cardiologist: Jus Ugarte of CAD, s/p AVR 2007 mechanical, 2016 tissue valve put in, HTN, DIONISIO, lymphomatoid papulosis    HPI: f/u   Having more sob, fatigue, weight gain. Some bloated feelings. BNP >7000. Lasix did help, got 5 days from PCP. Now out again and feels he is bloating again. Going for workup with heme-onc in [place] soon. Follows here as well for lymphamatoid issues. Chronic anemia.   Past Surgical History:   Procedure Laterality Date    AORTIC VALVE REPLACEMENT  2007    MECHANICAL [facility]    AORTIC VALVE REPLACEMENT  07/20/2016    extraction of mechanical valve and replacement with tissue valve    APPENDECTOMY  1980    BACK INJECTION Bilateral 01/03/2022    Bilateral SI injection performed by Juan Kiran MD at [facility] [place] [facility]    BACK INJECTION Bilateral 03/14/2022    Bilateral SI MBB # 2 performed by Yehuda Marie DO at [facility] OR    BRONCHOSCOPY N/A 5/17/2024    EBUS performed by Jonny Mckinley MD at [facility] ENDOSCOPY    CARDIAC CATHETERIZATION  05/20/2010    Patent coronary arteries. Possible causes of the patient's chest pain is likely noncardiac at least based on this angiogram. Patient chould be able to proceed w/ scheduled surgery w/ paying attention to anticoagulation and trying to minimize the period of no anticoagulation.     CARDIAC CATHETERIZATION  2012, 6/9/16    [facility]    CARDIOVASCULAR STRESS TEST  04/15/2010    Moderate fixed inferior defect, possibly attenuation, cannot exclude a previous MI. Correlation w/ EKG is recommented. Significant degree of gut uptake which is likely to be the cause of the attenuation artifact seen. EF 46%

## 2025-04-16 ENCOUNTER — TELEPHONE (OUTPATIENT)
Dept: FAMILY MEDICINE CLINIC | Age: 60
End: 2025-04-16

## 2025-04-16 NOTE — TELEPHONE ENCOUNTER
See previous TE encounter.  Patient did not go to ED on Monday as directed.  Patient states all s.s have returned.  SOB is worsening.  Advised to go to ED for further evaluation as discussed on Monday.  Patient voices understanding,.

## 2025-04-16 NOTE — TELEPHONE ENCOUNTER
Patient came into office stating he took his last lasix on Monday 4/14. Woke up this morning with same symptoms, face swelling, SOB, pain in ribs/back. Please advise. Call patient at work [phone], he is there until 4:00.

## 2025-04-17 ENCOUNTER — OFFICE VISIT (OUTPATIENT)
Dept: CARDIOLOGY CLINIC | Age: 60
End: 2025-04-17
Payer: COMMERCIAL

## 2025-04-17 VITALS
HEART RATE: 96 BPM | BODY MASS INDEX: 30.76 KG/M2 | DIASTOLIC BLOOD PRESSURE: 72 MMHG | WEIGHT: 196 LBS | HEIGHT: 67 IN | SYSTOLIC BLOOD PRESSURE: 122 MMHG

## 2025-04-17 DIAGNOSIS — Z72.0 TOBACCO ABUSE: ICD-10-CM

## 2025-04-17 DIAGNOSIS — I10 PRIMARY HYPERTENSION: ICD-10-CM

## 2025-04-17 DIAGNOSIS — Z95.2 S/P AVR: ICD-10-CM

## 2025-04-17 DIAGNOSIS — I50.23 ACUTE ON CHRONIC SYSTOLIC CONGESTIVE HEART FAILURE (HCC): ICD-10-CM

## 2025-04-17 DIAGNOSIS — I50.22 HEART FAILURE WITH MID-RANGE EJECTION FRACTION (HFMEF) (HCC): Primary | ICD-10-CM

## 2025-04-17 PROCEDURE — 3078F DIAST BP <80 MM HG: CPT | Performed by: STUDENT IN AN ORGANIZED HEALTH CARE EDUCATION/TRAINING PROGRAM

## 2025-04-17 PROCEDURE — 3074F SYST BP LT 130 MM HG: CPT | Performed by: STUDENT IN AN ORGANIZED HEALTH CARE EDUCATION/TRAINING PROGRAM

## 2025-04-17 PROCEDURE — 99214 OFFICE O/P EST MOD 30 MIN: CPT | Performed by: STUDENT IN AN ORGANIZED HEALTH CARE EDUCATION/TRAINING PROGRAM

## 2025-04-17 RX ORDER — FUROSEMIDE 20 MG/1
20 TABLET ORAL DAILY
Qty: 60 TABLET | Refills: 3 | Status: SHIPPED | OUTPATIENT
Start: 2025-04-17

## 2025-04-17 NOTE — PROGRESS NOTES
Pt here for hospital follow up.    Pt has c/o increased edema in the abdomen and face, CP, SOB    Pt denies palpitations.

## 2025-04-18 RX ORDER — ALBUTEROL SULFATE 90 UG/1
4 INHALANT RESPIRATORY (INHALATION) PRN
Status: CANCELLED | OUTPATIENT
Start: 2025-04-18

## 2025-04-18 RX ORDER — ACETAMINOPHEN 325 MG/1
650 TABLET ORAL
Status: CANCELLED | OUTPATIENT
Start: 2025-04-18

## 2025-04-18 RX ORDER — HEPARIN 100 UNIT/ML
500 SYRINGE INTRAVENOUS PRN
Status: CANCELLED | OUTPATIENT
Start: 2025-04-18

## 2025-04-18 RX ORDER — SODIUM CHLORIDE 9 MG/ML
5-250 INJECTION, SOLUTION INTRAVENOUS PRN
Status: CANCELLED | OUTPATIENT
Start: 2025-04-18

## 2025-04-18 RX ORDER — ONDANSETRON 2 MG/ML
8 INJECTION INTRAMUSCULAR; INTRAVENOUS
Status: CANCELLED | OUTPATIENT
Start: 2025-04-18

## 2025-04-18 RX ORDER — HYDROCORTISONE SODIUM SUCCINATE 100 MG/2ML
100 INJECTION INTRAMUSCULAR; INTRAVENOUS
Status: CANCELLED | OUTPATIENT
Start: 2025-04-18

## 2025-04-18 RX ORDER — SODIUM CHLORIDE 9 MG/ML
INJECTION, SOLUTION INTRAVENOUS CONTINUOUS
Status: CANCELLED | OUTPATIENT
Start: 2025-04-18

## 2025-04-18 RX ORDER — DIPHENHYDRAMINE HYDROCHLORIDE 50 MG/ML
50 INJECTION, SOLUTION INTRAMUSCULAR; INTRAVENOUS
Status: CANCELLED | OUTPATIENT
Start: 2025-04-18

## 2025-04-18 RX ORDER — SODIUM CHLORIDE 0.9 % (FLUSH) 0.9 %
5-40 SYRINGE (ML) INJECTION PRN
Status: CANCELLED | OUTPATIENT
Start: 2025-04-18

## 2025-04-18 RX ORDER — EPINEPHRINE 1 MG/ML
0.3 INJECTION, SOLUTION, CONCENTRATE INTRAVENOUS PRN
Status: CANCELLED | OUTPATIENT
Start: 2025-04-18

## 2025-04-21 ENCOUNTER — HOSPITAL ENCOUNTER (OUTPATIENT)
Age: 60
Discharge: HOME | End: 2025-04-23
Payer: COMMERCIAL

## 2025-04-21 DIAGNOSIS — I50.23 ACUTE ON CHRONIC SYSTOLIC CONGESTIVE HEART FAILURE (HCC): ICD-10-CM

## 2025-04-21 LAB
ECHO AV AREA PEAK VELOCITY: 0.8 CM2
ECHO AV AREA VTI: 0.7 CM2
ECHO AV MEAN GRADIENT: 45 MMHG
ECHO AV MEAN VELOCITY: 3.1 M/S
ECHO AV PEAK GRADIENT: 74 MMHG
ECHO AV PEAK VELOCITY: 4.3 M/S
ECHO AV VELOCITY RATIO: 0.26
ECHO AV VTI: 102 CM
ECHO LA AREA 2C: 19.6 CM2
ECHO LA AREA 4C: 17.6 CM2
ECHO LA DIAMETER: 4.8 CM
ECHO LA MAJOR AXIS: 5.1 CM
ECHO LA MINOR AXIS: 4.8 CM
ECHO LA VOL BP: 58 ML (ref 18–58)
ECHO LA VOL MOD A2C: 65 ML (ref 18–58)
ECHO LA VOL MOD A4C: 49 ML (ref 18–58)
ECHO LV E' LATERAL VELOCITY: 5.9 CM/S
ECHO LV E' SEPTAL VELOCITY: 6.6 CM/S
ECHO LV EDV A2C: 188 ML
ECHO LV EDV A4C: 177 ML
ECHO LV EJECTION FRACTION 3D: 30 %
ECHO LV EJECTION FRACTION A2C: 29 %
ECHO LV EJECTION FRACTION A4C: 28 %
ECHO LV ESV A2C: 134 ML
ECHO LV ESV A4C: 128 ML
ECHO LV FRACTIONAL SHORTENING: 14 % (ref 28–44)
ECHO LV INTERNAL DIMENSION DIASTOLIC: 5.6 CM (ref 4.2–5.9)
ECHO LV INTERNAL DIMENSION SYSTOLIC: 4.8 CM
ECHO LV IVSD: 1.2 CM (ref 0.6–1)
ECHO LV MASS 2D: 330.2 G (ref 88–224)
ECHO LV POSTERIOR WALL DIASTOLIC: 1.5 CM (ref 0.6–1)
ECHO LV RELATIVE WALL THICKNESS RATIO: 0.54
ECHO LVOT AREA: 3.1 CM2
ECHO LVOT AV VTI INDEX: 0.21
ECHO LVOT DIAM: 2 CM
ECHO LVOT MEAN GRADIENT: 3 MMHG
ECHO LVOT PEAK GRADIENT: 5 MMHG
ECHO LVOT PEAK VELOCITY: 1.1 M/S
ECHO LVOT SV: 66.9 ML
ECHO LVOT VTI: 21.3 CM
ECHO MV A VELOCITY: 0.87 M/S
ECHO MV E DECELERATION TIME (DT): 183 MS
ECHO MV E VELOCITY: 1.49 M/S
ECHO MV E/A RATIO: 1.71
ECHO MV E/E' LATERAL: 25.25
ECHO MV E/E' RATIO (AVERAGED): 23.91
ECHO MV E/E' SEPTAL: 22.58
ECHO MV REGURGITANT ALIASING (NYQUIST) VELOCITY: 39 CM/S
ECHO MV REGURGITANT PEAK GRADIENT: 130 MMHG
ECHO MV REGURGITANT PEAK VELOCITY: 5.7 M/S
ECHO MV REGURGITANT RADIUS PISA: 0.3 CM
ECHO MV REGURGITANT VTIA: 192 CM
ECHO PULMONARY ARTERY END DIASTOLIC PRESSURE: 7 MMHG
ECHO PV MAX VELOCITY: 0.9 M/S
ECHO PV PEAK GRADIENT: 3 MMHG
ECHO PV REGURGITANT MAX VELOCITY: 1.3 M/S
ECHO RV INTERNAL DIMENSION: 3.6 CM
ECHO TV E WAVE: 0.4 M/S
ECHO TV REGURGITANT MAX VELOCITY: 2.84 M/S
ECHO TV REGURGITANT PEAK GRADIENT: 32 MMHG

## 2025-04-21 PROCEDURE — 93306 TTE W/DOPPLER COMPLETE: CPT

## 2025-04-22 ENCOUNTER — TELEPHONE (OUTPATIENT)
Dept: CARDIOLOGY CLINIC | Age: 60
End: 2025-04-22

## 2025-04-22 ENCOUNTER — HOSPITAL ENCOUNTER (OUTPATIENT)
Dept: PET IMAGING | Age: 60
Discharge: HOME | End: 2025-04-22
Attending: INTERNAL MEDICINE
Payer: COMMERCIAL

## 2025-04-22 DIAGNOSIS — R91.1 LUNG NODULE: ICD-10-CM

## 2025-04-22 PROCEDURE — 78815 PET IMAGE W/CT SKULL-THIGH: CPT

## 2025-04-22 PROCEDURE — A9609 HC RX DIAGNOSTIC RADIOPHARMACEUTICAL: HCPCS | Performed by: INTERNAL MEDICINE

## 2025-04-22 PROCEDURE — 3430000000 HC RX DIAGNOSTIC RADIOPHARMACEUTICAL: Performed by: INTERNAL MEDICINE

## 2025-04-22 RX ORDER — FLUDEOXYGLUCOSE F 18 200 MCI/ML
9.6 INJECTION, SOLUTION INTRAVENOUS
Status: COMPLETED | OUTPATIENT
Start: 2025-04-22 | End: 2025-04-22

## 2025-04-22 RX ADMIN — FLUDEOXYGLUCOSE F 18 9.6 MILLICURIE: 200 INJECTION, SOLUTION INTRAVENOUS at 08:00

## 2025-04-22 NOTE — TELEPHONE ENCOUNTER
Results of echo:   Left Ventricle: Moderately reduced left ventricular systolic function with a visually estimated EF of 30 - 35%. Left ventricle size is normal. Normal wall thickness. Moderate global hypokinesis present. Normal diastolic function.    Right Ventricle: Right ventricle size is normal. Normal systolic function.    Aortic Valve: Not well visualized. Bioprosthetic valve. AV mean gradient is 45 mmHg. Moderately calcified cusps. Severe stenosis of the aortic valve. Elevated prosthetic gradient. AV mean gradient is 45 mmHg. AV area by continuity VTI is 0.7 cm2.    Mitral Valve: Mild regurgitation.    Tricuspid Valve: Mild regurgitation.    Left Atrium: Left atrium is moderately dilated.    Image quality is adequate.

## 2025-04-23 ENCOUNTER — RESULTS FOLLOW-UP (OUTPATIENT)
Dept: CARDIOLOGY CLINIC | Age: 60
End: 2025-04-23

## 2025-04-23 ENCOUNTER — CARE COORDINATION (OUTPATIENT)
Dept: CARE COORDINATION | Age: 60
End: 2025-04-23

## 2025-04-23 ENCOUNTER — HOSPITAL ENCOUNTER (OUTPATIENT)
Dept: INFUSION THERAPY | Age: 60
Discharge: HOME | End: 2025-04-23
Payer: COMMERCIAL

## 2025-04-23 VITALS
SYSTOLIC BLOOD PRESSURE: 110 MMHG | HEIGHT: 67 IN | TEMPERATURE: 98.1 F | DIASTOLIC BLOOD PRESSURE: 62 MMHG | OXYGEN SATURATION: 98 % | WEIGHT: 194.2 LBS | HEART RATE: 88 BPM | RESPIRATION RATE: 20 BRPM | BODY MASS INDEX: 30.48 KG/M2

## 2025-04-23 DIAGNOSIS — D50.0 IRON DEFICIENCY ANEMIA DUE TO CHRONIC BLOOD LOSS: Primary | ICD-10-CM

## 2025-04-23 PROCEDURE — 96365 THER/PROPH/DIAG IV INF INIT: CPT

## 2025-04-23 PROCEDURE — 6360000002 HC RX W HCPCS: Performed by: INTERNAL MEDICINE

## 2025-04-23 PROCEDURE — 2580000003 HC RX 258: Performed by: INTERNAL MEDICINE

## 2025-04-23 PROCEDURE — 96366 THER/PROPH/DIAG IV INF ADDON: CPT

## 2025-04-23 RX ORDER — EPINEPHRINE 1 MG/ML
0.3 INJECTION, SOLUTION INTRAMUSCULAR; SUBCUTANEOUS PRN
OUTPATIENT
Start: 2025-04-30

## 2025-04-23 RX ORDER — HEPARIN 100 UNIT/ML
500 SYRINGE INTRAVENOUS PRN
OUTPATIENT
Start: 2025-04-30

## 2025-04-23 RX ORDER — SODIUM CHLORIDE 9 MG/ML
5-250 INJECTION, SOLUTION INTRAVENOUS PRN
Status: DISCONTINUED | OUTPATIENT
Start: 2025-04-23 | End: 2025-04-24 | Stop reason: HOSPADM

## 2025-04-23 RX ORDER — ONDANSETRON 2 MG/ML
8 INJECTION INTRAMUSCULAR; INTRAVENOUS
OUTPATIENT
Start: 2025-04-30

## 2025-04-23 RX ORDER — ASCORBIC ACID 500 MG
500 TABLET ORAL DAILY
COMMUNITY

## 2025-04-23 RX ORDER — SODIUM CHLORIDE 9 MG/ML
INJECTION, SOLUTION INTRAVENOUS CONTINUOUS
OUTPATIENT
Start: 2025-04-30

## 2025-04-23 RX ORDER — ACETAMINOPHEN 325 MG/1
650 TABLET ORAL
OUTPATIENT
Start: 2025-04-30

## 2025-04-23 RX ORDER — HYDROCORTISONE SODIUM SUCCINATE 100 MG/2ML
100 INJECTION INTRAMUSCULAR; INTRAVENOUS
OUTPATIENT
Start: 2025-04-30

## 2025-04-23 RX ORDER — SODIUM CHLORIDE 0.9 % (FLUSH) 0.9 %
5-40 SYRINGE (ML) INJECTION PRN
OUTPATIENT
Start: 2025-04-30

## 2025-04-23 RX ORDER — SODIUM CHLORIDE 9 MG/ML
5-250 INJECTION, SOLUTION INTRAVENOUS PRN
OUTPATIENT
Start: 2025-04-30

## 2025-04-23 RX ORDER — ALBUTEROL SULFATE 90 UG/1
4 INHALANT RESPIRATORY (INHALATION) PRN
OUTPATIENT
Start: 2025-04-30

## 2025-04-23 RX ORDER — DIPHENHYDRAMINE HYDROCHLORIDE 50 MG/ML
50 INJECTION, SOLUTION INTRAMUSCULAR; INTRAVENOUS
OUTPATIENT
Start: 2025-04-30

## 2025-04-23 RX ADMIN — SODIUM CHLORIDE 20 ML/HR: 9 INJECTION, SOLUTION INTRAVENOUS at 12:24

## 2025-04-23 RX ADMIN — SODIUM CHLORIDE 300 MG: 0.9 INJECTION, SOLUTION INTRAVENOUS at 12:38

## 2025-04-23 ASSESSMENT — ENCOUNTER SYMPTOMS: DYSPNEA ASSOCIATED WITH: MINIMAL EXERTION

## 2025-04-23 ASSESSMENT — PAIN DESCRIPTION - ORIENTATION: ORIENTATION: LEFT

## 2025-04-23 ASSESSMENT — PAIN DESCRIPTION - LOCATION: LOCATION: CHEST

## 2025-04-23 ASSESSMENT — PAIN SCALES - GENERAL: PAINLEVEL_OUTOF10: 0

## 2025-04-23 NOTE — CARE COORDINATION
Ambulatory Care Coordination  RPM Note     4/23/2025 11:55 AM     Patient Current Location:  [facility] received request from [facility] Tasha Wesley to assist patient in contacting insurance company for verification of remote patient monitoring coverage.  If patient is agreeable to information provided by insurance company, patient will be enrolled in remote patient monitoring for CHF; condition managed by Dr. Hathaway. COPD; condition managed by Dr. Mckinley. HTN; condition managed by PCP.      Insurance verification complete.  Patient is responsible for 30% coinsurance for RPM services.  Will notify [facility] at this time.     Reference #: [id number]

## 2025-04-23 NOTE — PLAN OF CARE
Problem: Discharge Planning  Goal: Discharge to home or other facility with appropriate resources  Outcome: Adequate for Discharge  Flowsheets (Taken 4/23/2025 1643)  Discharge to home or other facility with appropriate resources: Identify barriers to discharge with patient and caregiver     Problem: Chronic Conditions and Co-morbidities  Goal: Patient's chronic conditions and co-morbidity symptoms are monitored and maintained or improved  Outcome: Adequate for Discharge  Flowsheets (Taken 4/23/2025 1643)  Care Plan - Patient's Chronic Conditions and Co-Morbidity Symptoms are Monitored and Maintained or Improved: Monitor and assess patient's chronic conditions and comorbid symptoms for stability, deterioration, or improvement  Note: Patient verbalizes understanding to verbal information given on Venofer action and possible side effects. Aware to call MD if develop complications.       Problem: Safety - Adult  Goal: Free from fall injury  Outcome: Adequate for Discharge  Flowsheets (Taken 4/23/2025 1643)  Free From Fall Injury: Instruct family/caregiver on patient safety     Care plan reviewed with patient. Patient verbalizes understanding of the plan of care and contribute to goal setting.

## 2025-04-23 NOTE — CARE COORDINATION
Ambulatory Care Coordination Note     2025 11:03 AM     Patient Current Location:  Home: [place] [address]     This patient was received as a referral from Ambulatory Care Manager .    ACM contacted the patient by telephone. Verified name and  with patient as identifiers. Provided introduction to self, and explanation of the ACM role.   Patient accepted care management services at this time.          ACM: Tasha Wesley RN     Challenges to be reviewed by the provider   Additional needs identified to be addressed with provider No  none               Method of communication with provider: none.    Utilization: Initial Call - N/A    Care Summary Note: Alberto was referred to care coordination by population health report. Pt has h/o: lymphomatoid papulosis, hypothyroidism, anxiety, COPD, HTN, new CHF.   CHF: on Lasix daily now. Has cut back to 2 liters fluids per day. Pt reports that baseline wt 187-190#.  P[t reports wt at GI office was 194# today which is down from previously.  Facial swelling has improved.   Has also been experiencing abd bloating.  Having MA.  Having to take frequent rest breaks.  Has intermittent periods of CP.  Aware of importance of low sodium diet but is eating 4x per wk.  Not using added salt. Would benefit from central dietician referral-agrees to have outreach  Pt has GI appt today. Will be getting colonoscopy and EGD .   Hgb: 8.6.  starting on IV Venofer.  Has first infusion today.   COPD: pt smoking 3 cigarettes per day.  Pt still working to quit smoking.  MA.  Following with pulmonary. On Stiolto 2 puffs daily.  Pt admits that he is only taking PRN.  Educated on importance of taking it daily. Pt agrees to take it as prescribed.  Has upcoming appt with Dr. Mckinley in May.  Had recent PET scan.   Depression: following with counselor bi weekly-Janine Rogers. On Xanax for anxiety and Lexapro for depression.   Pt and daughter is on My Chart now. Encouraged to

## 2025-04-23 NOTE — CARE COORDINATION
RD received referral from Tasha LEO:   New CHF pt. Eating out 4x per wk most wks. Needs education on healthy meal options. Lives  alone. Pt works day shift but is off on Thursday's so typically best day to reach him. I pushed pt out to 5/1 b/c technically that is his next day off and he works day shift.     RD will outreach to patient on Thursday 5/1/25 regarding Dietitian referral.       Margarette Khanna RDN, LD  [phone]

## 2025-04-23 NOTE — PROGRESS NOTES
Patient tolerated Venofer without any complications.  Denies dizziness, lightheadedness, acute nausea or vomiting, headache, heart palpitations, rash/itching or increased SOB.    Last vital signs  /62   Pulse 88   Temp 98.1 °F (36.7 °C)   Resp 20   Ht 1.702 m (5' 7\")   Wt 88.1 kg (194 lb 3.2 oz)   SpO2 98%   BMI 30.42 kg/m²     Patient instructed if they experience any of the above symptoms following today's visit, he/she is to notify the Physician or go to the Emergency Dept.    Discharge instructions given to patient, Verbalizes understanding. Ambulated off unit per self in stable condition with all belongings.

## 2025-04-23 NOTE — CARE COORDINATION
Ambulatory Care Coordination Note     4/23/2025 9:53 AM     Patient outreach attempt by this ACM today to offer care management services. ACM was unable to reach the patient by telephone today;   left voice message requesting a return phone call to this ACM.     ACM: Tasha Wesley RN     Care Summary Note: Alberto was referred to care coordination by population health report.  Pt has h/o: lymphomatoid papulosis, hypothyroidism, anxiety, COPD, HTN, CHF.       PCP/Specialist follow up:   Future Appointments         Provider Specialty Dept Phone    4/23/2025 11:45 AM STR OUT PT ONC INJ RM 01 Infusion Therapy [phone]    4/30/2025 11:45 AM STR OUT PT ONC INJ RM 13 Infusion Therapy [phone]    5/5/2025 3:40 PM Jonny Mckinley MD Pulmonology [phone]    5/7/2025 11:45 AM STR OUT PT ONC INJ RM 14 Infusion Therapy [phone]    5/8/2025 10:00 AM Ivy De Leon MD Oncology [phone]    6/26/2025 11:45 AM Juan Christiansen,  Family Medicine [phone]    10/20/2025 11:00 AM Tanisha Hathaway MD Cardiology [phone]            Follow Up:   Plan for next ACM outreach in approximately 1-2 days  to complete:  - outreach attempt to offer care management services  - RPM.

## 2025-04-24 ENCOUNTER — CARE COORDINATION (OUTPATIENT)
Dept: CARE COORDINATION | Age: 60
End: 2025-04-24

## 2025-04-24 NOTE — TELEPHONE ENCOUNTER
Dr Luu wants to see pt to discuss   LM for pt to call office   Appt made for tomorrow 4/25/25 at 8:30

## 2025-04-24 NOTE — CARE COORDINATION
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  Pt received call from cardiology and has an appt tomorrow with Dr. Luu to discuss echo results.  Pt denies worsening s/s today.  Respirations easy and non labored at rest.  Gets MA.  Has some bloating still and discomfort under right ribs.  Taking Lasix and reports voiding increased amts.  Limiting sodium intake.  Does not feel he is retaining any more fluid.  Does not have scales at this time to monitor wts.  Advised to take it easy today and if develops new or worsening s/s to report to ED.  Pt verbalizes understanding.

## 2025-04-25 ENCOUNTER — TELEPHONE (OUTPATIENT)
Dept: ONCOLOGY | Age: 60
End: 2025-04-25

## 2025-04-25 ENCOUNTER — CARE COORDINATION (OUTPATIENT)
Dept: CARE COORDINATION | Age: 60
End: 2025-04-25

## 2025-04-25 ENCOUNTER — OFFICE VISIT (OUTPATIENT)
Dept: CARDIOLOGY CLINIC | Age: 60
End: 2025-04-25
Payer: COMMERCIAL

## 2025-04-25 VITALS
WEIGHT: 196.6 LBS | DIASTOLIC BLOOD PRESSURE: 68 MMHG | BODY MASS INDEX: 30.86 KG/M2 | HEART RATE: 91 BPM | HEIGHT: 67 IN | OXYGEN SATURATION: 99 % | SYSTOLIC BLOOD PRESSURE: 108 MMHG

## 2025-04-25 DIAGNOSIS — I42.0 DILATED CARDIOMYOPATHY (HCC): ICD-10-CM

## 2025-04-25 DIAGNOSIS — Z95.2 S/P AVR: Primary | ICD-10-CM

## 2025-04-25 DIAGNOSIS — I10 PRIMARY HYPERTENSION: ICD-10-CM

## 2025-04-25 PROCEDURE — 99215 OFFICE O/P EST HI 40 MIN: CPT | Performed by: NUCLEAR MEDICINE

## 2025-04-25 PROCEDURE — 3074F SYST BP LT 130 MM HG: CPT | Performed by: NUCLEAR MEDICINE

## 2025-04-25 PROCEDURE — 3078F DIAST BP <80 MM HG: CPT | Performed by: NUCLEAR MEDICINE

## 2025-04-25 RX ORDER — POTASSIUM CHLORIDE 1500 MG/1
20 TABLET, EXTENDED RELEASE ORAL DAILY
Qty: 90 TABLET | Refills: 1 | Status: SHIPPED | OUTPATIENT
Start: 2025-04-25

## 2025-04-25 RX ORDER — FUROSEMIDE 40 MG/1
40 TABLET ORAL DAILY
Qty: 90 TABLET | Refills: 1 | Status: SHIPPED | OUTPATIENT
Start: 2025-04-25

## 2025-04-25 NOTE — CARE COORDINATION
Ambulatory Care Coordination  RPM Note     4/25/2025 1:46 PM     Patient Current Location:  Home: [place] [address]    [facility] received request from ACM Tasha Wesley to assist patient in contacting insurance company for verification of remote patient monitoring coverage.  If patient is agreeable to information provided by insurance company, patient will be enrolled in remote patient monitoring for CHF; condition managed by Dr. Hathaway. COPD; condition managed by Dr. Mckinley. HTN; condition managed by PCP.       Insurance verification complete.  RPM services are covered 100%.  Will notify [facility] at this time.     Reference #: [id number]

## 2025-04-25 NOTE — PROGRESS NOTES
Follow up.    Last EKG done on 04/09/2025.    Reports chest pain for months, shortness of breath with exertion, and lightheadedness.    Denies palpitations and edema.

## 2025-04-25 NOTE — TELEPHONE ENCOUNTER
Gloria called from Dr Luu's office and Dr Luu would like to speak with Dr De Leon about pt's cancer and cardiology plan. He would like to discuss plan of care.

## 2025-04-25 NOTE — CARE COORDINATION
Remote Patient Monitoring Enrollment Note      Date/Time:  4/25/2025 4:22 PM    Offered patient enrollment in the [facility] Remote Patient Monitoring (RPM) program for in home monitoring for CHF; condition managed by Dr. Hathaway. COPD; condition managed by Dr. Mckinley. HTN; condition managed by PCP..  Patient accepted RPM services.    Patient will be monitoring the following daily:  blood pressure reading, pulse ox reading, and weight      ACM reviewed the information below with patient:    Emergency Contact (name and contact number): Lesa Mullen [phone]    [x] A member from the care coordination team will reach out to notify the patient once the RPM kit is ordered.   [x] Once the kit is delivered, the HRS team will contact the patient after UPS deliver to assist with set up.            [x] Determined BP cuff size: large (13.8\"-19.68\")      [x] Determined weight scale: regular (<330lbs)                                                 [x] Hours of ACM monitoring - Monday-Friday [id number].   [x] It is important to take your vitals every day, even on the weekends,to keep your care team aware of how you are doing every day of the week.                  All questions about RPM program answered at this time.    Pt had appt with Dr. Luu.  Has been referred to CHF clinic.  Medications adjusted.  He will be speaking with Dr. De Leon re: plan of care.

## 2025-04-25 NOTE — PROGRESS NOTES
[facility]YS[facility] [place] [facility]  [facility] CARDIOLOGY  [address]  [address]  [facility] [address]  Dept: [phone]  Dept Fax: [phone]  Loc: [phone]    Visit Date: 4/25/2025    Alberto Gilliland is a 60 y.o. male who presents todayfor:  Chief Complaint   Patient presents with    Follow-up     Follow up.    Hypertension    Cardiomyopathy    Valvular Heart Disease   Here to discuss  Worsening EF and AS  Known AVR twice   Severe anemia lately   Getting work up for possible malignancy   Following with oncology   Severe fatigue  Dyspnea on exertion   Fluid overload  Chest heaviness  LE edema  BP is stable  Some dizziness  No syncope  On statins for hyperlipidemia  No issues with the meds     HPI:  HPI  Past Medical History:   Diagnosis Date    Arthritis     general    Bilateral sciatica     Bleeding     with coumadin    CAD (coronary artery disease)     Carpal tunnel syndrome     RIGHT    Cervicalgia, C5-7     Chronic anxiety     COPD, mild (HCC) 04/15/2023    Dysthymia (or depressive neurosis) 02/01/2016    ED (erectile dysfunction)     ED (erectile dysfunction)     Fatty liver 3/5/2024    GERD (gastroesophageal reflux disease)     Headache(784.0)     History of blood transfusion 03/2016    because of taking coumadin    HTN (hypertension)     Hyperlipidemia     Hypothyroidism     Iron deficiency anemia, blood loss     Lumbago     Lumbar radiculopathy     Lymphomatoid papulosis (HCC)     sores on skin    Osteoarthritis (arthritis due to wear and tear of joints)     S/P AVR, coumadin Tx 2016    Spondylosis of thoracic region without myelopathy or radiculopathy       Past Surgical History:   Procedure Laterality Date    [facility]    AORTIC VALVE REPLACEMENT  07/20/2016    extraction of mechanical valve and replacement with tissue valve    APPENDECTOMY  1980    BACK INJECTION Bilateral 01/03/2022    Bilateral SI injection performed by Juan

## 2025-04-28 ENCOUNTER — TELEPHONE (OUTPATIENT)
Dept: CARDIOLOGY CLINIC | Age: 60
End: 2025-04-28

## 2025-04-28 ENCOUNTER — COMMUNITY OUTREACH (OUTPATIENT)
Dept: FAMILY MEDICINE CLINIC | Age: 60
End: 2025-04-28

## 2025-04-28 DIAGNOSIS — Z95.2 S/P AVR: Primary | ICD-10-CM

## 2025-04-28 DIAGNOSIS — I50.22 HEART FAILURE WITH MID-RANGE EJECTION FRACTION (HFMEF) (HCC): ICD-10-CM

## 2025-04-28 DIAGNOSIS — I25.10 CORONARY ARTERY DISEASE INVOLVING NATIVE CORONARY ARTERY OF NATIVE HEART WITHOUT ANGINA PECTORIS: ICD-10-CM

## 2025-04-28 DIAGNOSIS — I71.40 ABDOMINAL AORTIC ANEURYSM (AAA) WITHOUT RUPTURE, UNSPECIFIED PART: ICD-10-CM

## 2025-04-28 DIAGNOSIS — I10 PRIMARY HYPERTENSION: ICD-10-CM

## 2025-04-28 DIAGNOSIS — I42.0 DILATED CARDIOMYOPATHY (HCC): ICD-10-CM

## 2025-04-28 DIAGNOSIS — R06.02 SOB (SHORTNESS OF BREATH): ICD-10-CM

## 2025-04-28 NOTE — TELEPHONE ENCOUNTER
Pt lm on nurse line having severe sweats at night time. States he does have sob but no other cardiac complaints.

## 2025-04-29 ENCOUNTER — CARE COORDINATION (OUTPATIENT)
Dept: PRIMARY CARE CLINIC | Age: 60
End: 2025-04-29

## 2025-04-29 NOTE — TELEPHONE ENCOUNTER
Spoke with pt.  Pt sees Dr. De Leon here at [facility].  Will call that office later to try and get Dr. Khan number.

## 2025-04-29 NOTE — TELEPHONE ENCOUNTER
ARTHUR:    Spoke with Jaquelin at Dr. Khan office - (w) [phone].  Dr. Khan cell phone number is on Dr. Ghosh desarelis.

## 2025-04-29 NOTE — CARE COORDINATION
Remote Patient Kit Ordering Note      Date/Time:  4/29/2025 9:52 AM      [facility] to patient/family today to notify of RPM kit order; patient/family was available; discussed the following topics below and all questions answered.    [x] [facility] confirmed [facility] shipping address  [x] Patient will receive package over the next 1-3 business days. Someone 21 years or older must be present to sign for UPS delivery.  [] HRS will contact patient within 24 hours, an HRS  will call the patient directly: If the patient does not answer, HRS will follow up with the clinical team notifying them about the unsuccessful attempt to contact the patient. HRS will make three call attempts to the patient.Provide patient with [facility] Virtual install number is: [phone].  [] The RPM Nurse will contact patient once equipment is active to welcome them to the program.                                                         [] Hours of RPM monitoring - Monday-Friday [id number]; encourage patient to get vitals entered by Noon each day to have the alert addressed same day.  [x][facility] mailed RPM Patient flyer to patient.                      ACM made aware the RPM kit has been ordered.

## 2025-04-30 ENCOUNTER — HOSPITAL ENCOUNTER (OUTPATIENT)
Dept: INFUSION THERAPY | Age: 60
Discharge: HOME | End: 2025-04-30
Payer: COMMERCIAL

## 2025-04-30 ENCOUNTER — HOSPITAL ENCOUNTER (INPATIENT)
Age: 60
LOS: 28 days | Discharge: HOME HEALTH | DRG: 003 | End: 2025-05-28
Attending: EMERGENCY MEDICINE | Admitting: INTERNAL MEDICINE
Payer: COMMERCIAL

## 2025-04-30 ENCOUNTER — APPOINTMENT (OUTPATIENT)
Dept: GENERAL RADIOLOGY | Age: 60
DRG: 003 | End: 2025-04-30
Payer: COMMERCIAL

## 2025-04-30 VITALS
HEART RATE: 88 BPM | WEIGHT: 193.4 LBS | TEMPERATURE: 97.5 F | DIASTOLIC BLOOD PRESSURE: 51 MMHG | OXYGEN SATURATION: 98 % | SYSTOLIC BLOOD PRESSURE: 90 MMHG | BODY MASS INDEX: 30.29 KG/M2

## 2025-04-30 DIAGNOSIS — I50.22 HEART FAILURE WITH MID-RANGE EJECTION FRACTION (HFMEF) (HCC): ICD-10-CM

## 2025-04-30 DIAGNOSIS — N17.9 AKI (ACUTE KIDNEY INJURY): ICD-10-CM

## 2025-04-30 DIAGNOSIS — I95.9 HYPOTENSION, UNSPECIFIED HYPOTENSION TYPE: ICD-10-CM

## 2025-04-30 DIAGNOSIS — Z95.2 S/P TAVR (TRANSCATHETER AORTIC VALVE REPLACEMENT): ICD-10-CM

## 2025-04-30 DIAGNOSIS — G47.62 NOCTURNAL LEG CRAMPS: ICD-10-CM

## 2025-04-30 DIAGNOSIS — Z95.2 S/P AVR: ICD-10-CM

## 2025-04-30 DIAGNOSIS — I50.9 LOW OUTPUT HEART FAILURE (HCC): ICD-10-CM

## 2025-04-30 DIAGNOSIS — R07.9 CHEST PAIN: ICD-10-CM

## 2025-04-30 DIAGNOSIS — I50.9 ACUTE ON CHRONIC CONGESTIVE HEART FAILURE, UNSPECIFIED HEART FAILURE TYPE (HCC): Primary | ICD-10-CM

## 2025-04-30 DIAGNOSIS — I35.0 AORTIC STENOSIS: ICD-10-CM

## 2025-04-30 DIAGNOSIS — I95.89 OTHER SPECIFIED HYPOTENSION: ICD-10-CM

## 2025-04-30 DIAGNOSIS — R06.02 SHORTNESS OF BREATH: ICD-10-CM

## 2025-04-30 DIAGNOSIS — I50.82 BIVENTRICULAR HEART FAILURE (HCC): ICD-10-CM

## 2025-04-30 DIAGNOSIS — I35.0 NONRHEUMATIC AORTIC VALVE STENOSIS: ICD-10-CM

## 2025-04-30 DIAGNOSIS — I50.9 CHF (CONGESTIVE HEART FAILURE) (HCC): ICD-10-CM

## 2025-04-30 DIAGNOSIS — Z95.2 HISTORY OF TRANSCATHETER AORTIC VALVE REPLACEMENT (TAVR): ICD-10-CM

## 2025-04-30 DIAGNOSIS — D64.9 CHRONIC ANEMIA: ICD-10-CM

## 2025-04-30 DIAGNOSIS — R57.0 CARDIOGENIC SHOCK (HCC): ICD-10-CM

## 2025-04-30 DIAGNOSIS — R79.89 TROPONIN LEVEL ELEVATED: ICD-10-CM

## 2025-04-30 LAB
ALBUMIN SERPL BCG-MCNC: 3.9 G/DL (ref 3.4–4.9)
ALP SERPL-CCNC: 102 U/L (ref 40–129)
ALT SERPL W/O P-5'-P-CCNC: 88 U/L (ref 10–50)
ANION GAP SERPL CALC-SCNC: 13 MEQ/L (ref 8–16)
ANISOCYTOSIS BLD QL SMEAR: PRESENT
AST SERPL-CCNC: 38 U/L (ref 10–50)
BASOPHILS ABSOLUTE: 0 THOU/MM3 (ref 0–0.1)
BASOPHILS NFR BLD AUTO: 0.6 %
BILIRUB CONJ SERPL-MCNC: 0.3 MG/DL (ref 0–0.2)
BILIRUB SERPL-MCNC: 0.5 MG/DL (ref 0.3–1.2)
BILIRUB UR QL STRIP.AUTO: NEGATIVE
BUN SERPL-MCNC: 19 MG/DL (ref 8–23)
BURR CELLS BLD QL SMEAR: ABNORMAL
CALCIUM SERPL-MCNC: 8.6 MG/DL (ref 8.8–10.2)
CHARACTER UR: CLEAR
CHLORIDE SERPL-SCNC: 104 MEQ/L (ref 98–111)
CO2 SERPL-SCNC: 20 MEQ/L (ref 22–29)
COLOR, UA: YELLOW
CREAT SERPL-MCNC: 0.9 MG/DL (ref 0.7–1.2)
DEPRECATED RDW RBC AUTO: 80.4 FL (ref 35–45)
EKG ATRIAL RATE: 90 BPM
EKG P AXIS: 67 DEGREES
EKG P-R INTERVAL: 202 MS
EKG Q-T INTERVAL: 392 MS
EKG QRS DURATION: 120 MS
EKG QTC CALCULATION (BAZETT): 479 MS
EKG R AXIS: 109 DEGREES
EKG T AXIS: 58 DEGREES
EKG VENTRICULAR RATE: 90 BPM
EOSINOPHIL NFR BLD AUTO: 2.1 %
EOSINOPHILS ABSOLUTE: 0.2 THOU/MM3 (ref 0–0.4)
ERYTHROCYTE [DISTWIDTH] IN BLOOD BY AUTOMATED COUNT: 24.3 % (ref 11.5–14.5)
FERRITIN SERPL IA-MCNC: 139 NG/ML (ref 30–400)
GFR SERPL CREATININE-BSD FRML MDRD: > 90 ML/MIN/1.73M2
GLUCOSE SERPL-MCNC: 126 MG/DL (ref 74–109)
GLUCOSE UR QL STRIP.AUTO: NEGATIVE MG/DL
HCT VFR BLD AUTO: 28.9 % (ref 42–52)
HGB BLD-MCNC: 8.5 GM/DL (ref 14–18)
HGB UR QL STRIP.AUTO: NEGATIVE
IMM GRANULOCYTES # BLD AUTO: 0.02 THOU/MM3 (ref 0–0.07)
IMM GRANULOCYTES NFR BLD AUTO: 0.2 %
IRON SATN MFR SERPL: 7 % (ref 20–50)
IRON SERPL-MCNC: 25 UG/DL (ref 61–157)
KETONES UR QL STRIP.AUTO: NEGATIVE
LYMPHOCYTES ABSOLUTE: 0.7 THOU/MM3 (ref 1–4.8)
LYMPHOCYTES NFR BLD AUTO: 8.6 %
MCH RBC QN AUTO: 27.7 PG (ref 26–33)
MCHC RBC AUTO-ENTMCNC: 29.4 GM/DL (ref 32.2–35.5)
MCV RBC AUTO: 94.1 FL (ref 80–94)
MONOCYTES ABSOLUTE: 0.8 THOU/MM3 (ref 0.4–1.3)
MONOCYTES NFR BLD AUTO: 10.1 %
NEUTROPHILS ABSOLUTE: 6.4 THOU/MM3 (ref 1.8–7.7)
NEUTROPHILS NFR BLD AUTO: 78.4 %
NITRITE UR QL STRIP: NEGATIVE
NRBC BLD AUTO-RTO: 0 /100 WBC
NT-PROBNP SERPL IA-MCNC: 8056 PG/ML (ref 0–124)
OSMOLALITY SERPL CALC.SUM OF ELEC: 277.6 MOSMOL/KG (ref 275–300)
PH UR STRIP.AUTO: 5.5 [PH] (ref 5–9)
PLATELET # BLD AUTO: 341 THOU/MM3 (ref 130–400)
PLATELET BLD QL SMEAR: ADEQUATE
PMV BLD AUTO: 10.3 FL (ref 9.4–12.4)
POIKILOCYTES: ABNORMAL
POTASSIUM SERPL-SCNC: 4.3 MEQ/L (ref 3.5–5.2)
PROT SERPL-MCNC: 6.8 G/DL (ref 6.4–8.3)
PROT UR STRIP.AUTO-MCNC: NEGATIVE MG/DL
RBC # BLD AUTO: 3.07 MILL/MM3 (ref 4.7–6.1)
SCAN OF BLOOD SMEAR: NORMAL
SCHISTOCYTES BLD QL SMEAR: ABNORMAL
SODIUM SERPL-SCNC: 137 MEQ/L (ref 135–145)
SP GR UR REFRACT.AUTO: 1.02 (ref 1–1.03)
TIBC SERPL-MCNC: 362 UG/DL (ref 171–450)
TROPONIN, HIGH SENSITIVITY: 33 NG/L (ref 0–12)
TROPONIN, HIGH SENSITIVITY: 35 NG/L (ref 0–12)
TSH SERPL DL<=0.05 MIU/L-ACNC: 1.61 UIU/ML (ref 0.27–4.2)
UROBILINOGEN, URINE: 1 EU/DL (ref 0–1)
WBC # BLD AUTO: 8.1 THOU/MM3 (ref 4.8–10.8)
WBC #/AREA URNS HPF: NEGATIVE /[HPF]

## 2025-04-30 PROCEDURE — 93005 ELECTROCARDIOGRAM TRACING: CPT | Performed by: EMERGENCY MEDICINE

## 2025-04-30 PROCEDURE — 82728 ASSAY OF FERRITIN: CPT

## 2025-04-30 PROCEDURE — 2500000003 HC RX 250 WO HCPCS: Performed by: INTERNAL MEDICINE

## 2025-04-30 PROCEDURE — 6370000000 HC RX 637 (ALT 250 FOR IP): Performed by: INTERNAL MEDICINE

## 2025-04-30 PROCEDURE — 1200000003 HC TELEMETRY R&B

## 2025-04-30 PROCEDURE — 6370000000 HC RX 637 (ALT 250 FOR IP): Performed by: EMERGENCY MEDICINE

## 2025-04-30 PROCEDURE — 83540 ASSAY OF IRON: CPT

## 2025-04-30 PROCEDURE — 83880 ASSAY OF NATRIURETIC PEPTIDE: CPT

## 2025-04-30 PROCEDURE — 84484 ASSAY OF TROPONIN QUANT: CPT

## 2025-04-30 PROCEDURE — 93010 ELECTROCARDIOGRAM REPORT: CPT | Performed by: NUCLEAR MEDICINE

## 2025-04-30 PROCEDURE — 6360000002 HC RX W HCPCS: Performed by: INTERNAL MEDICINE

## 2025-04-30 PROCEDURE — 36415 COLL VENOUS BLD VENIPUNCTURE: CPT

## 2025-04-30 PROCEDURE — 71046 X-RAY EXAM CHEST 2 VIEWS: CPT

## 2025-04-30 PROCEDURE — 83550 IRON BINDING TEST: CPT

## 2025-04-30 PROCEDURE — 81003 URINALYSIS AUTO W/O SCOPE: CPT

## 2025-04-30 PROCEDURE — 1200000000 HC SEMI PRIVATE

## 2025-04-30 PROCEDURE — 80076 HEPATIC FUNCTION PANEL: CPT

## 2025-04-30 PROCEDURE — 80048 BASIC METABOLIC PNL TOTAL CA: CPT

## 2025-04-30 PROCEDURE — 96360 HYDRATION IV INFUSION INIT: CPT

## 2025-04-30 PROCEDURE — 99285 EMERGENCY DEPT VISIT HI MDM: CPT

## 2025-04-30 PROCEDURE — 85025 COMPLETE CBC W/AUTO DIFF WBC: CPT

## 2025-04-30 PROCEDURE — 84443 ASSAY THYROID STIM HORMONE: CPT

## 2025-04-30 PROCEDURE — 99211 OFF/OP EST MAY X REQ PHY/QHP: CPT

## 2025-04-30 PROCEDURE — 2580000003 HC RX 258: Performed by: EMERGENCY MEDICINE

## 2025-04-30 RX ORDER — ASPIRIN 81 MG/1
81 TABLET ORAL DAILY
Status: DISCONTINUED | OUTPATIENT
Start: 2025-05-01 | End: 2025-05-28

## 2025-04-30 RX ORDER — 0.9 % SODIUM CHLORIDE 0.9 %
1000 INTRAVENOUS SOLUTION INTRAVENOUS ONCE
Status: COMPLETED | OUTPATIENT
Start: 2025-04-30 | End: 2025-04-30

## 2025-04-30 RX ORDER — CARVEDILOL 3.12 MG/1
3.12 TABLET ORAL 2 TIMES DAILY WITH MEALS
Status: DISCONTINUED | OUTPATIENT
Start: 2025-04-30 | End: 2025-04-30

## 2025-04-30 RX ORDER — ALPRAZOLAM 0.5 MG
0.5 TABLET ORAL 2 TIMES DAILY PRN
Status: DISCONTINUED | OUTPATIENT
Start: 2025-04-30 | End: 2025-05-28 | Stop reason: HOSPADM

## 2025-04-30 RX ORDER — POLYETHYLENE GLYCOL 3350 17 G/17G
17 POWDER, FOR SOLUTION ORAL DAILY PRN
Status: DISCONTINUED | OUTPATIENT
Start: 2025-04-30 | End: 2025-05-28 | Stop reason: HOSPADM

## 2025-04-30 RX ORDER — ACETAMINOPHEN 650 MG/1
650 SUPPOSITORY RECTAL EVERY 6 HOURS PRN
Status: DISCONTINUED | OUTPATIENT
Start: 2025-04-30 | End: 2025-05-10

## 2025-04-30 RX ORDER — SODIUM CHLORIDE 9 MG/ML
INJECTION, SOLUTION INTRAVENOUS PRN
Status: DISCONTINUED | OUTPATIENT
Start: 2025-04-30 | End: 2025-05-28 | Stop reason: HOSPADM

## 2025-04-30 RX ORDER — VITAMIN B COMPLEX
1000 TABLET ORAL DAILY
Status: DISCONTINUED | OUTPATIENT
Start: 2025-05-01 | End: 2025-05-28 | Stop reason: HOSPADM

## 2025-04-30 RX ORDER — FOLIC ACID 1 MG/1
1000 TABLET ORAL DAILY
Status: DISCONTINUED | OUTPATIENT
Start: 2025-05-01 | End: 2025-05-28 | Stop reason: HOSPADM

## 2025-04-30 RX ORDER — LEVOTHYROXINE SODIUM 75 UG/1
75 TABLET ORAL DAILY
Status: DISCONTINUED | OUTPATIENT
Start: 2025-05-01 | End: 2025-05-28 | Stop reason: HOSPADM

## 2025-04-30 RX ORDER — ESCITALOPRAM OXALATE 10 MG/1
10 TABLET ORAL DAILY
Status: DISCONTINUED | OUTPATIENT
Start: 2025-05-01 | End: 2025-05-28 | Stop reason: HOSPADM

## 2025-04-30 RX ORDER — POTASSIUM CHLORIDE 1500 MG/1
20 TABLET, EXTENDED RELEASE ORAL DAILY
Status: DISCONTINUED | OUTPATIENT
Start: 2025-05-01 | End: 2025-05-07

## 2025-04-30 RX ORDER — MIDODRINE HYDROCHLORIDE 10 MG/1
10 TABLET ORAL
Status: DISCONTINUED | OUTPATIENT
Start: 2025-05-01 | End: 2025-05-10

## 2025-04-30 RX ORDER — LISINOPRIL 2.5 MG/1
2.5 TABLET ORAL DAILY
Status: DISCONTINUED | OUTPATIENT
Start: 2025-05-01 | End: 2025-05-02

## 2025-04-30 RX ORDER — GABAPENTIN 400 MG/1
400 CAPSULE ORAL 3 TIMES DAILY
Status: DISCONTINUED | OUTPATIENT
Start: 2025-04-30 | End: 2025-05-09

## 2025-04-30 RX ORDER — MIDODRINE HYDROCHLORIDE 10 MG/1
10 TABLET ORAL
Status: DISCONTINUED | OUTPATIENT
Start: 2025-04-30 | End: 2025-04-30

## 2025-04-30 RX ORDER — ASCORBIC ACID 500 MG
500 TABLET ORAL DAILY
Status: DISCONTINUED | OUTPATIENT
Start: 2025-05-01 | End: 2025-05-28 | Stop reason: HOSPADM

## 2025-04-30 RX ORDER — SODIUM CHLORIDE 0.9 % (FLUSH) 0.9 %
5-40 SYRINGE (ML) INJECTION PRN
Status: DISCONTINUED | OUTPATIENT
Start: 2025-04-30 | End: 2025-05-28 | Stop reason: HOSPADM

## 2025-04-30 RX ORDER — POTASSIUM CHLORIDE 7.45 MG/ML
10 INJECTION INTRAVENOUS PRN
Status: DISCONTINUED | OUTPATIENT
Start: 2025-04-30 | End: 2025-05-13 | Stop reason: ALTCHOICE

## 2025-04-30 RX ORDER — ONDANSETRON 4 MG/1
4 TABLET, ORALLY DISINTEGRATING ORAL EVERY 8 HOURS PRN
Status: DISCONTINUED | OUTPATIENT
Start: 2025-04-30 | End: 2025-05-13

## 2025-04-30 RX ORDER — LISINOPRIL 2.5 MG/1
2.5 TABLET ORAL DAILY
Status: DISCONTINUED | OUTPATIENT
Start: 2025-04-30 | End: 2025-04-30

## 2025-04-30 RX ORDER — FUROSEMIDE 10 MG/ML
20 INJECTION INTRAMUSCULAR; INTRAVENOUS 2 TIMES DAILY
Status: DISCONTINUED | OUTPATIENT
Start: 2025-04-30 | End: 2025-05-01

## 2025-04-30 RX ORDER — ASPIRIN 81 MG/1
324 TABLET, CHEWABLE ORAL ONCE
Status: COMPLETED | OUTPATIENT
Start: 2025-04-30 | End: 2025-04-30

## 2025-04-30 RX ORDER — ENOXAPARIN SODIUM 100 MG/ML
40 INJECTION SUBCUTANEOUS DAILY
Status: DISCONTINUED | OUTPATIENT
Start: 2025-04-30 | End: 2025-05-19

## 2025-04-30 RX ORDER — CARVEDILOL 3.12 MG/1
3.12 TABLET ORAL 2 TIMES DAILY WITH MEALS
Status: DISCONTINUED | OUTPATIENT
Start: 2025-05-01 | End: 2025-05-02

## 2025-04-30 RX ORDER — POTASSIUM CHLORIDE 1500 MG/1
40 TABLET, EXTENDED RELEASE ORAL PRN
Status: DISCONTINUED | OUTPATIENT
Start: 2025-04-30 | End: 2025-05-13 | Stop reason: ALTCHOICE

## 2025-04-30 RX ORDER — SODIUM CHLORIDE 0.9 % (FLUSH) 0.9 %
5-40 SYRINGE (ML) INJECTION EVERY 12 HOURS SCHEDULED
Status: DISCONTINUED | OUTPATIENT
Start: 2025-04-30 | End: 2025-05-28 | Stop reason: HOSPADM

## 2025-04-30 RX ORDER — ATORVASTATIN CALCIUM 20 MG/1
20 TABLET, FILM COATED ORAL DAILY
Status: DISCONTINUED | OUTPATIENT
Start: 2025-05-01 | End: 2025-05-28 | Stop reason: HOSPADM

## 2025-04-30 RX ORDER — SUCRALFATE 1 G/1
1 TABLET ORAL
Status: DISCONTINUED | OUTPATIENT
Start: 2025-04-30 | End: 2025-05-19

## 2025-04-30 RX ORDER — ONDANSETRON 2 MG/ML
4 INJECTION INTRAMUSCULAR; INTRAVENOUS EVERY 6 HOURS PRN
Status: DISCONTINUED | OUTPATIENT
Start: 2025-04-30 | End: 2025-05-13

## 2025-04-30 RX ORDER — ACETAMINOPHEN 325 MG/1
650 TABLET ORAL EVERY 6 HOURS PRN
Status: DISCONTINUED | OUTPATIENT
Start: 2025-04-30 | End: 2025-05-07 | Stop reason: SDUPTHER

## 2025-04-30 RX ORDER — MAGNESIUM SULFATE IN WATER 40 MG/ML
2000 INJECTION, SOLUTION INTRAVENOUS PRN
Status: DISCONTINUED | OUTPATIENT
Start: 2025-04-30 | End: 2025-05-13

## 2025-04-30 RX ADMIN — ENOXAPARIN SODIUM 40 MG: 100 INJECTION SUBCUTANEOUS at 20:21

## 2025-04-30 RX ADMIN — FUROSEMIDE 20 MG: 10 INJECTION, SOLUTION INTRAMUSCULAR; INTRAVENOUS at 18:45

## 2025-04-30 RX ADMIN — ALPRAZOLAM 0.5 MG: 0.5 TABLET ORAL at 22:04

## 2025-04-30 RX ADMIN — SUCRALFATE 1 G: 1 TABLET ORAL at 20:21

## 2025-04-30 RX ADMIN — SODIUM CHLORIDE 1000 ML: 0.9 INJECTION, SOLUTION INTRAVENOUS at 14:09

## 2025-04-30 RX ADMIN — GABAPENTIN 400 MG: 400 CAPSULE ORAL at 20:21

## 2025-04-30 RX ADMIN — ASPIRIN 324 MG: 81 TABLET, CHEWABLE ORAL at 15:46

## 2025-04-30 RX ADMIN — SODIUM CHLORIDE, PRESERVATIVE FREE 10 ML: 5 INJECTION INTRAVENOUS at 20:22

## 2025-04-30 ASSESSMENT — PAIN SCALES - GENERAL
PAINLEVEL_OUTOF10: 3
PAINLEVEL_OUTOF10: 0

## 2025-04-30 ASSESSMENT — PAIN DESCRIPTION - DESCRIPTORS: DESCRIPTORS: PRESSURE

## 2025-04-30 ASSESSMENT — PAIN DESCRIPTION - LOCATION: LOCATION: CHEST

## 2025-04-30 ASSESSMENT — PAIN DESCRIPTION - ORIENTATION: ORIENTATION: MID

## 2025-04-30 NOTE — DISCHARGE INSTRUCTIONS
You are instructed to call the office or go to the Emergency Dept for further evaluation of your blood pressure, chest pain, and generalized feeling ill symptoms   Please continue to monitor for, and call the office or go to the ER for any future symptoms:    Chills,temperature of 100.4 or higher or any symptoms of infection.  Dizziness/lightheadedness   Acute nausea or vomiting-not relieved by medications  Headaches-not relieved by medications  New chest pain or pressure  New rash /itching  New shortness of breath      Make sure you are drinking 48 to 64 ounces of water daily-if you are unable to drink fluids let us know right away.

## 2025-04-30 NOTE — ED PROVIDER NOTES
[facility] EMERGENCY SERVICES ENCOUNTER        PATIENT NAME: Alberto Gilliland  MRN: [id number]  : 1965  MA: 2025  PROVIDER: Elver Diaz MD    Patient was seen and evaluated at 1:53 PM EDT. Nurses Notes are reviewed and I agree except as noted in the HPI.  Chief Complaint   Patient presents with    Fatigue    Hypotension     HISTORY OF PRESENT ILLNESS     A 60-year-old man presents with fatigue and hypotension.  Patient went to [place] and today to receive his iron transfusion, BP was noted to be at 88/56.  Patient has been having chronic fatigue for a long time.  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     He currently has no headache, no chest pain, no SOB, no nausea or vomiting, no abdominal pain, no urinary symptoms.     PAST MEDICAL HISTORY    has a past medical history of Acute exacerbation of chronic heart failure (HCC), Arthritis, Bilateral sciatica, Bleeding, CAD (coronary artery disease), Carpal tunnel syndrome, Cervicalgia, C5-7, Chronic anxiety, COPD, mild (HCC), Dysthymia (or depressive neurosis), ED (erectile dysfunction), ED (erectile dysfunction), Fatty liver, GERD (gastroesophageal reflux disease), Headache(784.0), History of blood transfusion, HTN (hypertension), Hyperlipidemia, Hypothyroidism, Iron deficiency anemia, blood loss, Lumbago, Lumbar radiculopathy, Lymphomatoid papulosis (HCC), Osteoarthritis (arthritis due to wear and tear of joints), S/P AVR, coumadin Tx, and Spondylosis of thoracic region without myelopathy or radiculopathy.    SURGICAL HISTORY      has a past surgical history that includes cardiovascular stress test (04/15/2010); Appendectomy (); Colonoscopy (); Small intestine surgery

## 2025-04-30 NOTE — PROGRESS NOTES
PT states he has been having Lt sided chest pain and not feeling well, lightheaded, dizzy.  VS noted to by hypotensive.    Dr De Leon informed and evaluated pt at infusion chair.  Wishes for pt to be seen in the ER for s/sx.   Provider is comfortable with pt going by private vehicle to the ER .

## 2025-04-30 NOTE — ED NOTES
Pt transfer to 6K 22. Pt is in stable condition at this time. Spoke with receiving floor prior to transfer.

## 2025-04-30 NOTE — ED TRIAGE NOTES
Pt to ED with a concern for hyotension. Pt states he was at the cancer center for an iron infusion. States that they would not do the infusion due to his blood pressure being low. Pt states his blood pressure was \"88/56.\" Pt states that he has been feeling more tired and fatigue than normal. EKG done. VSS

## 2025-04-30 NOTE — TELEPHONE ENCOUNTER
VO from Dr Luu   Pt needs left and right cath, RODRÍGUEZ and an appt with Dr Rivera after   Agree?    LM FOR PT TO CALL OFFICE   NO ORDERES PLACED YET UNTIL NOTIFIED

## 2025-04-30 NOTE — PLAN OF CARE
Problem: Safety - Adult  Goal: Free from fall injury  Outcome: Adequate for Discharge  Flowsheets (Taken 4/30/2025 1441)  Free From Fall Injury: Instruct family/caregiver on patient safety     Problem: Chronic Conditions and Co-morbidities  Goal: Patient's chronic conditions and co-morbidity symptoms are monitored and maintained or improved  Outcome: Adequate for Discharge  Flowsheets (Taken 4/30/2025 1441)  Care Plan - Patient's Chronic Conditions and Co-Morbidity Symptoms are Monitored and Maintained or Improved: Monitor and assess patient's chronic conditions and comorbid symptoms for stability, deterioration, or improvement     Problem: Discharge Planning  Goal: Discharge to home or other facility with appropriate resources  Outcome: Adequate for Discharge  Flowsheets (Taken 4/30/2025 1441)  Discharge to home or other facility with appropriate resources: Identify barriers to discharge with patient and caregiver   Care plan reviewed with patient. Patient verbalizes understanding of the plan of care and contributes to goal setting.

## 2025-04-30 NOTE — ED NOTES
ED to inpatient nurses report      Chief Complaint:  Chief Complaint   Patient presents with    Fatigue    Hypotension     Present to ED from: cancer center    MOA:     LOC: alert and orientated to name, place, date  Mobility: Independent/stand-by  Oxygen Baseline: 100    Current needs required: rA     Code Status:   Prior    What abnormal results were found and what did you give/do to treat them? Elevated bnp, elevated troponin  Any procedures or intervention occur? See MAR    Mental Status:  Level of Consciousness: Alert (0)    Psych Assessment:        Vitals:  Patient Vitals for the past 24 hrs:   BP Temp Pulse Resp SpO2 Weight   04/30/25 1511 (!) 92/54 -- 88 16 100 % --   04/30/25 1350 104/60 -- 87 21 99 % --   04/30/25 1235 97/67 98 °F (36.7 °C) 89 18 100 % 87.5 kg (193 lb)        LDAs:   Peripheral IV 04/30/25 Left Antecubital (Active)   Site Assessment Clean, dry & intact 04/30/25 1247   Line Status Blood return noted;Flushed;Specimen collected 04/30/25 1247   Phlebitis Assessment No symptoms 04/30/25 1247   Infiltration Assessment 0 04/30/25 1247   Dressing Status Clean, dry & intact 04/30/25 1247       Ambulatory Status:  Presents to emergency department  because of falls (Syncope, seizure, or loss of consciousness): No, Age > 70: No, Altered Mental Status, Intoxication with alcohol or substance confusion (Disorientation, impaired judgment, poor safety awaremess, or inability to follow instructions): No, Impaired Mobility: Ambulates or transfers with assistive devices or assistance; Unable to ambulate or transer.: No    Diagnosis:  DISPOSITION Admitted 04/30/2025 03:46:36 PM   Final diagnoses:   Acute on chronic congestive heart failure, unspecified heart failure type (HCC)   Hypotension, unspecified hypotension type   S/P AVR   Chronic anemia   Troponin level elevated        Consults:  IP CONSULT TO CARDIOLOGY     Pain Score:       C-SSRS:   Risk of Suicide: No Risk    Sepsis Screening:       Say Singh  Risk:  [place] 1 Fall Risk  Presents to emergency department  because of falls (Syncope, seizure, or loss of consciousness): No  Age > 70: No  Altered Mental Status, Intoxication with alcohol or substance confusion (Disorientation, impaired judgment, poor safety awaremess, or inability to follow instructions): No  Impaired Mobility: Ambulates or transfers with assistive devices or assistance; Unable to ambulate or transer.: No  Standard Fall Risk Interventions : [place] the patient to the environment, especially the location of the bathroom    Swallow Screening        Preferred Language:   English      ALLERGIES     Seasonal    SURGICAL HISTORY       Past Surgical History:   Procedure Laterality Date    AORTIC VALVE REPLACEMENT  2007    MECHANICAL [facility]    AORTIC VALVE REPLACEMENT  07/20/2016    extraction of mechanical valve and replacement with tissue valve    APPENDECTOMY  1980    BACK INJECTION Bilateral 01/03/2022    Bilateral SI injection performed by Juan Kiran MD at [facility] OR    BACK INJECTION Bilateral 03/14/2022    Bilateral SI MBB # 2 performed by Yehuda Marie DO at [facility] OR    BRONCHOSCOPY N/A 5/17/2024    EBUS performed by Jonny Mckinley MD at [facility] ENDOSCOPY    CARDIAC CATHETERIZATION  05/20/2010    Patent coronary arteries. Possible causes of the patient's chest pain is likely noncardiac at least based on this angiogram. Patient chould be able to proceed w/ scheduled surgery w/ paying attention to anticoagulation and trying to minimize the period of no anticoagulation.     CARDIAC CATHETERIZATION  2012, 6/9/16    [facility]    CARDIOVASCULAR STRESS TEST  04/15/2010    Moderate fixed inferior defect, possibly attenuation, cannot exclude a previous MI. Correlation w/ EKG is recommented. Significant degree of gut uptake which is likely to be the cause of the attenuation artifact seen. EF 46%    CERVICAL FUSION  2010    CERVICAL FUSION  03/09/2016    REMOVAL HARDWARE C5-7,

## 2025-05-01 ENCOUNTER — CARE COORDINATION (OUTPATIENT)
Dept: CARE COORDINATION | Age: 60
End: 2025-05-01

## 2025-05-01 LAB
ALBUMIN SERPL BCG-MCNC: 3.5 G/DL (ref 3.4–4.9)
ALP SERPL-CCNC: 88 U/L (ref 40–129)
ALT SERPL W/O P-5'-P-CCNC: 63 U/L (ref 10–50)
ANION GAP SERPL CALC-SCNC: 9 MEQ/L (ref 8–16)
ANISOCYTOSIS BLD QL SMEAR: PRESENT
APTT PPP: 31.4 SECONDS (ref 22–38)
AST SERPL-CCNC: 26 U/L (ref 10–50)
BASOPHILS ABSOLUTE: 0.1 THOU/MM3 (ref 0–0.1)
BASOPHILS NFR BLD AUTO: 0.7 %
BILIRUB CONJ SERPL-MCNC: 0.3 MG/DL (ref 0–0.2)
BILIRUB SERPL-MCNC: 0.4 MG/DL (ref 0.3–1.2)
BUN SERPL-MCNC: 21 MG/DL (ref 8–23)
CALCIUM SERPL-MCNC: 8.8 MG/DL (ref 8.8–10.2)
CHLORIDE SERPL-SCNC: 106 MEQ/L (ref 98–111)
CHOLEST SERPL-MCNC: 65 MG/DL (ref 100–199)
CO2 SERPL-SCNC: 24 MEQ/L (ref 22–29)
CREAT SERPL-MCNC: 1 MG/DL (ref 0.7–1.2)
DEPRECATED RDW RBC AUTO: 80.6 FL (ref 35–45)
EOSINOPHIL NFR BLD AUTO: 2.9 %
EOSINOPHILS ABSOLUTE: 0.2 THOU/MM3 (ref 0–0.4)
ERYTHROCYTE [DISTWIDTH] IN BLOOD BY AUTOMATED COUNT: 24.3 % (ref 11.5–14.5)
GFR SERPL CREATININE-BSD FRML MDRD: 85 ML/MIN/1.73M2
GLUCOSE SERPL-MCNC: 101 MG/DL (ref 74–109)
HCT VFR BLD AUTO: 24.7 % (ref 42–52)
HCT VFR BLD AUTO: 26.1 % (ref 42–52)
HDLC SERPL-MCNC: 19 MG/DL
HGB BLD-MCNC: 7.3 GM/DL (ref 14–18)
HGB BLD-MCNC: 7.7 GM/DL (ref 14–18)
IMM GRANULOCYTES # BLD AUTO: 0.02 THOU/MM3 (ref 0–0.07)
IMM GRANULOCYTES NFR BLD AUTO: 0.3 %
INR PPP: 1.41 (ref 0.85–1.13)
LDH SERPL L TO P-CCNC: 392 U/L (ref 135–225)
LDLC SERPL CALC-MCNC: 38 MG/DL
LYMPHOCYTES ABSOLUTE: 0.8 THOU/MM3 (ref 1–4.8)
LYMPHOCYTES NFR BLD AUTO: 10.7 %
MAGNESIUM SERPL-MCNC: 2 MG/DL (ref 1.6–2.6)
MCH RBC QN AUTO: 28 PG (ref 26–33)
MCHC RBC AUTO-ENTMCNC: 29.6 GM/DL (ref 32.2–35.5)
MCV RBC AUTO: 94.6 FL (ref 80–94)
MONOCYTES ABSOLUTE: 0.9 THOU/MM3 (ref 0.4–1.3)
MONOCYTES NFR BLD AUTO: 12.6 %
NEUTROPHILS ABSOLUTE: 5.3 THOU/MM3 (ref 1.8–7.7)
NEUTROPHILS NFR BLD AUTO: 72.8 %
NRBC BLD AUTO-RTO: 0 /100 WBC
PLATELET # BLD AUTO: 301 THOU/MM3 (ref 130–400)
PMV BLD AUTO: 10.6 FL (ref 9.4–12.4)
POTASSIUM SERPL-SCNC: 4.7 MEQ/L (ref 3.5–5.2)
PROT SERPL-MCNC: 5.9 G/DL (ref 6.4–8.3)
RBC # BLD AUTO: 2.61 MILL/MM3 (ref 4.7–6.1)
SCAN OF BLOOD SMEAR: NORMAL
SODIUM SERPL-SCNC: 139 MEQ/L (ref 135–145)
TRIGL SERPL-MCNC: 41 MG/DL (ref 0–199)
WBC # BLD AUTO: 7.3 THOU/MM3 (ref 4.8–10.8)

## 2025-05-01 PROCEDURE — 6370000000 HC RX 637 (ALT 250 FOR IP): Performed by: INTERNAL MEDICINE

## 2025-05-01 PROCEDURE — 83615 LACTATE (LD) (LDH) ENZYME: CPT

## 2025-05-01 PROCEDURE — 85025 COMPLETE CBC W/AUTO DIFF WBC: CPT

## 2025-05-01 PROCEDURE — 94761 N-INVAS EAR/PLS OXIMETRY MLT: CPT

## 2025-05-01 PROCEDURE — 85014 HEMATOCRIT: CPT

## 2025-05-01 PROCEDURE — 85610 PROTHROMBIN TIME: CPT

## 2025-05-01 PROCEDURE — 82248 BILIRUBIN DIRECT: CPT

## 2025-05-01 PROCEDURE — 1200000000 HC SEMI PRIVATE

## 2025-05-01 PROCEDURE — 36415 COLL VENOUS BLD VENIPUNCTURE: CPT

## 2025-05-01 PROCEDURE — 85018 HEMOGLOBIN: CPT

## 2025-05-01 PROCEDURE — 99223 1ST HOSP IP/OBS HIGH 75: CPT | Performed by: INTERNAL MEDICINE

## 2025-05-01 PROCEDURE — 6360000002 HC RX W HCPCS: Performed by: INTERNAL MEDICINE

## 2025-05-01 PROCEDURE — 2500000003 HC RX 250 WO HCPCS: Performed by: INTERNAL MEDICINE

## 2025-05-01 PROCEDURE — 83735 ASSAY OF MAGNESIUM: CPT

## 2025-05-01 PROCEDURE — 85730 THROMBOPLASTIN TIME PARTIAL: CPT

## 2025-05-01 PROCEDURE — 1200000003 HC TELEMETRY R&B

## 2025-05-01 PROCEDURE — 80053 COMPREHEN METABOLIC PANEL: CPT

## 2025-05-01 PROCEDURE — 94640 AIRWAY INHALATION TREATMENT: CPT

## 2025-05-01 PROCEDURE — 80061 LIPID PANEL: CPT

## 2025-05-01 RX ORDER — FUROSEMIDE 20 MG/1
20 TABLET ORAL 2 TIMES DAILY
Status: DISCONTINUED | OUTPATIENT
Start: 2025-05-01 | End: 2025-05-12

## 2025-05-01 RX ADMIN — CARVEDILOL 3.12 MG: 3.12 TABLET, FILM COATED ORAL at 16:29

## 2025-05-01 RX ADMIN — FUROSEMIDE 20 MG: 10 INJECTION, SOLUTION INTRAMUSCULAR; INTRAVENOUS at 08:44

## 2025-05-01 RX ADMIN — ENOXAPARIN SODIUM 40 MG: 100 INJECTION SUBCUTANEOUS at 20:29

## 2025-05-01 RX ADMIN — TIOTROPIUM BROMIDE AND OLODATEROL 2 PUFF: 3.124; 2.736 SPRAY, METERED RESPIRATORY (INHALATION) at 10:02

## 2025-05-01 RX ADMIN — SUCRALFATE 1 G: 1 TABLET ORAL at 06:29

## 2025-05-01 RX ADMIN — SUCRALFATE 1 G: 1 TABLET ORAL at 10:51

## 2025-05-01 RX ADMIN — GABAPENTIN 400 MG: 400 CAPSULE ORAL at 20:29

## 2025-05-01 RX ADMIN — Medication 1000 UNITS: at 08:44

## 2025-05-01 RX ADMIN — MIDODRINE HYDROCHLORIDE 10 MG: 10 TABLET ORAL at 08:44

## 2025-05-01 RX ADMIN — FUROSEMIDE 20 MG: 20 TABLET ORAL at 17:02

## 2025-05-01 RX ADMIN — MIDODRINE HYDROCHLORIDE 10 MG: 10 TABLET ORAL at 10:51

## 2025-05-01 RX ADMIN — ACETAMINOPHEN 650 MG: 325 TABLET ORAL at 10:38

## 2025-05-01 RX ADMIN — SUCRALFATE 1 G: 1 TABLET ORAL at 20:29

## 2025-05-01 RX ADMIN — ATORVASTATIN CALCIUM 20 MG: 20 TABLET, FILM COATED ORAL at 08:44

## 2025-05-01 RX ADMIN — GABAPENTIN 400 MG: 400 CAPSULE ORAL at 08:44

## 2025-05-01 RX ADMIN — GABAPENTIN 400 MG: 400 CAPSULE ORAL at 13:44

## 2025-05-01 RX ADMIN — POTASSIUM CHLORIDE 20 MEQ: 1500 TABLET, EXTENDED RELEASE ORAL at 08:44

## 2025-05-01 RX ADMIN — LISINOPRIL 2.5 MG: 2.5 TABLET ORAL at 10:22

## 2025-05-01 RX ADMIN — ALPRAZOLAM 0.5 MG: 0.5 TABLET ORAL at 22:39

## 2025-05-01 RX ADMIN — ESCITALOPRAM OXALATE 10 MG: 10 TABLET ORAL at 08:44

## 2025-05-01 RX ADMIN — MIDODRINE HYDROCHLORIDE 10 MG: 10 TABLET ORAL at 16:29

## 2025-05-01 RX ADMIN — CARVEDILOL 3.12 MG: 3.12 TABLET, FILM COATED ORAL at 08:44

## 2025-05-01 RX ADMIN — SUCRALFATE 1 G: 1 TABLET ORAL at 16:28

## 2025-05-01 RX ADMIN — OXYCODONE HYDROCHLORIDE AND ACETAMINOPHEN 500 MG: 500 TABLET ORAL at 08:44

## 2025-05-01 RX ADMIN — SODIUM CHLORIDE, PRESERVATIVE FREE 10 ML: 5 INJECTION INTRAVENOUS at 08:45

## 2025-05-01 RX ADMIN — ASPIRIN 81 MG: 81 TABLET, COATED ORAL at 08:44

## 2025-05-01 RX ADMIN — LEVOTHYROXINE SODIUM 75 MCG: 0.07 TABLET ORAL at 06:29

## 2025-05-01 RX ADMIN — ALPRAZOLAM 0.5 MG: 0.5 TABLET ORAL at 13:52

## 2025-05-01 RX ADMIN — SODIUM CHLORIDE, PRESERVATIVE FREE 10 ML: 5 INJECTION INTRAVENOUS at 20:29

## 2025-05-01 RX ADMIN — FOLIC ACID 1000 MCG: 1 TABLET ORAL at 08:44

## 2025-05-01 ASSESSMENT — PAIN SCALES - GENERAL
PAINLEVEL_OUTOF10: 0
PAINLEVEL_OUTOF10: 3

## 2025-05-01 ASSESSMENT — PAIN SCALES - WONG BAKER: WONGBAKER_NUMERICALRESPONSE: NO HURT

## 2025-05-01 ASSESSMENT — PAIN DESCRIPTION - LOCATION: LOCATION: HEAD

## 2025-05-01 ASSESSMENT — PAIN - FUNCTIONAL ASSESSMENT: PAIN_FUNCTIONAL_ASSESSMENT: ACTIVITIES ARE NOT PREVENTED

## 2025-05-01 ASSESSMENT — PAIN DESCRIPTION - DESCRIPTORS: DESCRIPTORS: ACHING

## 2025-05-01 NOTE — CONSULTS
[facility]   HEART FAILURE PROGRAM          NAME:  Alberto Gilliland  MEDICAL RECORD NUMBER:  [id number]  AGE: 60 y.o.   GENDER: male  : 1965  Attending: Damaso Mcwilliams MD TODAY'S DATE:  2025    Subjective:     VISIT TYPE: Heart Failure Education at bedside   Cardiac Medications       ACE Inhibitors       lisinopril (PRINIVIL;ZESTRIL) tablet 2.5 mg 2.5 mg, Oral, DAILY       Vasopressors       midodrine (PROAMATINE) tablet 10 mg 10 mg, Oral, 3 TIMES DAILY WITH MEALS, Do not give after 1800 or within 4 hrs of bedtime.       Loop Diuretics       furosemide (LASIX) injection 20 mg 20 mg, IntraVENous, 2 TIMES DAILY       HMG CoA Reductase Inhibitors       atorvastatin (LIPITOR) tablet 20 mg 20 mg, Oral, DAILY       Alpha-Beta Blockers       carvedilol (COREG) tablet 3.125 mg 3.125 mg, Oral, 2 TIMES DAILY WITH MEALS, Administer with food to minimize the risk of orthostatic hypotension       Salicylates       aspirin EC tablet 81 mg 81 mg, Oral, DAILY, Do not crush or break.       Heparins And Heparinoid-Like Agents       enoxaparin (LOVENOX) injection 40 mg 40 mg, SubCUTAneous, DAILY, Administer by deep subCUTAneous injection with pt lying down. Alternate injection sites on abdominal wall. Do not rub site after injection. Check with provider prior to any invasive procedure.            Results:   Is this patient a 30 day readmission patient: No      ADMISSION DIAGNOSIS:   Chronic anemia [D64.9]  Troponin level elevated [R79.89]  S/P AVR [Z95.2]  Hypotension, unspecified hypotension type [I95.9]  Acute on chronic congestive heart failure, unspecified heart failure type (HCC) [I50.9]  Acute exacerbation of chronic heart failure (HCC) [I50.9]     BP 98/63   Pulse 98   Temp 97.9 °F (36.6 °C) (Oral)   Resp 16   Ht 1.702 m (5' 7\")   Wt 87.5 kg (193 lb)   SpO2 97%   BMI 30.23 kg/m²     ADMIT:  Weight - Scale: 87.5 kg (193 lb)    TODAY: Weight - Scale: 87.5 kg (193 lb)    Wt Readings from Last

## 2025-05-01 NOTE — PLAN OF CARE
Problem: Discharge Planning  Goal: Discharge to home or other facility with appropriate resources  5/1/2025 0919 by Rosio Raman RN  Outcome: Progressing  4/30/2025 2134 by Clau Murray RN  Outcome: Progressing     Problem: ABCDS Injury Assessment  Goal: Absence of physical injury  5/1/2025 0919 by Rosio Raman RN  Outcome: Progressing  4/30/2025 2134 by Clau Murray RN  Outcome: Progressing     Problem: Safety - Adult  Goal: Free from fall injury  5/1/2025 0919 by Rosio Raman RN  Outcome: Progressing  4/30/2025 2134 by Clau Murray RN  Outcome: Progressing     Problem: Pain  Goal: Verbalizes/displays adequate comfort level or baseline comfort level  5/1/2025 0919 by Rosio Raman RN  Outcome: Progressing  4/30/2025 2134 by Clau Murray RN  Outcome: Progressing

## 2025-05-01 NOTE — H&P
Internal Medicine  History and Physical    Patient:  Alberto Gilliland  MRN: [id number]      History Obtained From:  patient  PCP: Juan Christiansen DO    CHIEF COMPLAINT: Fatigue, increasing weakness, iron deficiency state..    HISTORY OF PRESENT ILLNESS:   The patient is a 60 y.o. male who presents with increasing weakness fatigue in the outpatient setting.  Patient has a history of chronic severe iron deficiency anemia for which she follows up with the hematologist in the outpatient setting.  He has being evaluated by the gastroenterologist with multiple EGDs and colonoscopies.  He was in the hematologist office yesterday where patient reported weakness and dizziness.  He endorses dyspnea with mild to moderate exertion.  Has cough.  He was hypotensive.  He was subsequently sent to the emergency room where patient was evaluated.  Labs in the emergency room showed patient to be anemic with hemoglobin of 8.5.  He was bolused with IV fluid in emergency room and admitted for further evaluation.    Patient endorses a history of congestive heart failure and severe aortic stenosis.  He has had 2 prior aortic valve replacements.  He has a recurrent aortic valve stenosis and he is being evaluated for a repeat aortic valve surgery.  He continues to smoke 5 pack of cigarettes daily.       Past Medical History:        Diagnosis Date    Acute exacerbation of chronic heart failure (HCC) 4/30/2025    Arthritis     general    Bilateral sciatica     Bleeding     with coumadin    CAD (coronary artery disease)     Carpal tunnel syndrome     RIGHT    Cervicalgia, C5-7     Chronic anxiety     COPD, mild (HCC) 04/15/2023    Dysthymia (or depressive neurosis) 02/01/2016    ED (erectile dysfunction)     ED (erectile dysfunction)     Fatty liver 3/5/2024    GERD (gastroesophageal reflux disease)     Headache(784.0)     History of blood transfusion 03/2016    because of taking coumadin    HTN (hypertension)     Hyperlipidemia      failure ([facility]) I50.9       Damaso Mcwilliams MD, MD  Admitting Internist

## 2025-05-01 NOTE — CONSULTS
[facility]  Consult    Patient's Name/Date of Birth: Alberto Gilliland / 1965 (60 y.o.)    Date: May 1, 2025     Referring Provider: Damaso Mcwilliams MD    CHIEF COMPLAINT: fatigue/hypotension - pat sent to er from Hematology office for having low bp during iron infusion    CONSULT FOR: CHF/ Severe Aortic Stenosis     HPI: This is a pleasant 60-year-old male with a past medical history significant for hypertension, hyperlipidemia, coronary artery disease  atrial valve replacement, severe aortic stenosis, hypothyroidism, iron deficiency anemia (recent iron transfusion), COPD, and 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  He presented to [facility] ED with complaints of fatigue, hypotension, dyspnea on exertion, chest discomfort, and productive cough. He continues to smoke half a pack of cigarettes per day.  He was admitted for further evaluation and management of anemia. Given his cardiac history, a cardiology consult is requested for evaluation and management of congestive heart failure and severe aortic stenosis.  The patient denies fever, chills, neck or back pain, nausea, vomiting, or abdominal pain. He also denies orthopnea and does not require more than one pillow while sleeping.      Echo:    Left Ventricle: Moderately reduced left ventricular systolic function with a visually estimated EF of 30 - 35%. Left ventricle size is normal. Normal wall thickness. Moderate global hypokinesis present. Normal diastolic function.    Right Ventricle: Right ventricle size is normal. Normal systolic function.    Aortic Valve: Not well visualized. Bioprosthetic valve. AV mean gradient is 45 mmHg. Moderately calcified cusps. Severe stenosis of the aortic valve. Elevated prosthetic gradient. AV mean gradient is 45 mmHg. AV area by continuity VTI is 0.7 cm2.    Mitral Valve: Mild regurgitation.    Tricuspid Valve: Mild regurgitation.    Left Atrium: Left atrium is

## 2025-05-01 NOTE — CARE COORDINATION
RD's outreach regarding Dietitian referral scheduled for today 5/1/25. Per chart review, patient is currently inpatient at [facility]. Admission date 4/30/25. RD will outreach upon discharge.     Margarette Khanna RDN, LD  [phone]

## 2025-05-01 NOTE — CARE COORDINATION
Case Management Assessment Initial Evaluation    Date/Time of Evaluation: 2025 8:19 AM  Assessment Completed by: Sepideh Mclaughlin RN    If patient is discharged prior to next notation, then this note serves as note for discharge by case management.    Patient Name: Alberto Gilliland                   YOB: 1965  Diagnosis: Chronic anemia [D64.9]  Troponin level elevated [R79.89]  S/P AVR [Z95.2]  Hypotension, unspecified hypotension type [I95.9]  Acute on chronic congestive heart failure, unspecified heart failure type (HCC) [I50.9]  Acute exacerbation of chronic heart failure (HCC) [I50.9]                   Date / Time: 2025 12:24 PM  Location: [address] [place] [address]     Patient Admission Status: Inpatient   Readmission Risk Low 0-14, Mod 15-19), High > 20: Readmission Risk Score: 17.2    Current PCP: Juan Christiansen,   Health Care Decision Makers:   Primary Decision Maker: Lesa Mullen - Child - [phone]    Additional Case Management Notes: To ED w/ fatigue and hypotension. Went to receive iron transfusion, BP noted to be 88/56. IM following. Cardiology to see. Pro-BNP 8056. Hgb 7.3 (8.5) IV lasix BID. Midodrine.     Procedures: none    Imagin/30 CXR: No acute findings. Mild stable cardiomegaly     Patient Goals/Plan/Treatment Preferences: Met w/ Alberto. Verified insurance and PCP. He is independent and drives. States he talked to Tasha CONNELLY about getting set up with RPM program and kit should be delivered to his home. [facility] at [facility] this [facility]. Denies needs for DME, HH or OP services. Plans to return home alone.     10:45am- Spoke w/ Katarzyna Rico to follow up on RPM program; RPM kit delivery to home v. CM delivery to room prior to discharge. She will follow up with Tasha Wesley to see if RPM kit has already been shipped to patient's home and notify CM.   12:55am- Spoke w/ Katarzyna Rico. She will have [facility] Educ[facility] deliver RPM kit to bedside prior to discharge.    [phone]

## 2025-05-01 NOTE — PROGRESS NOTES
Prayer and encouragement   05/01/25 1624   Encounter Summary   Encounter Overview/Reason Initial Encounter   Service Provided For Patient   [facility]   Support System Family members   Last Encounter  05/01/25   Complexity of Encounter Low   Begin Time 1230   End Time  1235   Total Time Calculated 5 min   Spiritual/Emotional needs   Type Spiritual Support   Assessment/Intervention/Outcome   Assessment Hopeful   Intervention Empowerment

## 2025-05-02 ENCOUNTER — CARE COORDINATION (OUTPATIENT)
Dept: CARE COORDINATION | Age: 60
End: 2025-05-02

## 2025-05-02 PROBLEM — D50.9 IRON DEFICIENCY ANEMIA: Status: ACTIVE | Noted: 2025-05-02

## 2025-05-02 LAB
ANION GAP SERPL CALC-SCNC: 12 MEQ/L (ref 8–16)
BACTERIA: ABNORMAL
BILIRUB UR QL STRIP: NEGATIVE
BUN SERPL-MCNC: 27 MG/DL (ref 8–23)
CALCIUM SERPL-MCNC: 9 MG/DL (ref 8.8–10.2)
CASTS #/AREA URNS LPF: ABNORMAL /LPF
CASTS #/AREA URNS LPF: ABNORMAL /LPF
CHARACTER UR: ABNORMAL
CHARCOAL URNS QL MICRO: ABNORMAL
CHLORIDE SERPL-SCNC: 105 MEQ/L (ref 98–111)
CO2 SERPL-SCNC: 21 MEQ/L (ref 22–29)
COLOR UR: YELLOW
CREAT SERPL-MCNC: 1 MG/DL (ref 0.7–1.2)
CRYSTALS URNS QL MICRO: ABNORMAL
DEPRECATED RDW RBC AUTO: 79.5 FL (ref 35–45)
EPITHELIAL CELLS, UA: ABNORMAL /HPF
ERYTHROCYTE [DISTWIDTH] IN BLOOD BY AUTOMATED COUNT: 24.1 % (ref 11.5–14.5)
GFR SERPL CREATININE-BSD FRML MDRD: 85 ML/MIN/1.73M2
GLUCOSE SERPL-MCNC: 92 MG/DL (ref 74–109)
GLUCOSE UR QL STRIP.AUTO: NEGATIVE MG/DL
HCT VFR BLD AUTO: 23.8 % (ref 42–52)
HGB BLD-MCNC: 7.2 GM/DL (ref 14–18)
HGB UR QL STRIP.AUTO: NEGATIVE
KETONES UR QL STRIP.AUTO: ABNORMAL
LEUKOCYTE ESTERASE UR QL STRIP.AUTO: NEGATIVE
MAGNESIUM SERPL-MCNC: 2.1 MG/DL (ref 1.6–2.6)
MCH RBC QN AUTO: 28 PG (ref 26–33)
MCHC RBC AUTO-ENTMCNC: 30.3 GM/DL (ref 32.2–35.5)
MCV RBC AUTO: 92.6 FL (ref 80–94)
NITRITE UR QL STRIP.AUTO: NEGATIVE
NT-PROBNP SERPL IA-MCNC: ABNORMAL PG/ML (ref 0–124)
PH UR STRIP.AUTO: 5 [PH] (ref 5–9)
PLATELET # BLD AUTO: 296 THOU/MM3 (ref 130–400)
PMV BLD AUTO: 10.7 FL (ref 9.4–12.4)
POTASSIUM SERPL-SCNC: 4.3 MEQ/L (ref 3.5–5.2)
PROT UR STRIP.AUTO-MCNC: 30 MG/DL
RBC # BLD AUTO: 2.57 MILL/MM3 (ref 4.7–6.1)
RBC #/AREA URNS HPF: ABNORMAL /HPF
RENAL EPI CELLS #/AREA URNS HPF: ABNORMAL /[HPF]
SODIUM SERPL-SCNC: 138 MEQ/L (ref 135–145)
SPECIFIC GRAVITY UA: 1.02 (ref 1–1.03)
UROBILINOGEN, URINE: 1 EU/DL (ref 0–1)
WBC # BLD AUTO: 7.5 THOU/MM3 (ref 4.8–10.8)
WBC #/AREA URNS HPF: ABNORMAL /HPF
YEAST LIKE FUNGI URNS QL MICRO: ABNORMAL

## 2025-05-02 PROCEDURE — 1200000003 HC TELEMETRY R&B

## 2025-05-02 PROCEDURE — 6370000000 HC RX 637 (ALT 250 FOR IP): Performed by: INTERNAL MEDICINE

## 2025-05-02 PROCEDURE — 83880 ASSAY OF NATRIURETIC PEPTIDE: CPT

## 2025-05-02 PROCEDURE — 99232 SBSQ HOSP IP/OBS MODERATE 35: CPT | Performed by: PHYSICIAN ASSISTANT

## 2025-05-02 PROCEDURE — 2500000003 HC RX 250 WO HCPCS: Performed by: INTERNAL MEDICINE

## 2025-05-02 PROCEDURE — 36415 COLL VENOUS BLD VENIPUNCTURE: CPT

## 2025-05-02 PROCEDURE — 6360000002 HC RX W HCPCS

## 2025-05-02 PROCEDURE — 99222 1ST HOSP IP/OBS MODERATE 55: CPT | Performed by: NURSE PRACTITIONER

## 2025-05-02 PROCEDURE — 2580000003 HC RX 258

## 2025-05-02 PROCEDURE — 81001 URINALYSIS AUTO W/SCOPE: CPT

## 2025-05-02 PROCEDURE — 83735 ASSAY OF MAGNESIUM: CPT

## 2025-05-02 PROCEDURE — 85027 COMPLETE CBC AUTOMATED: CPT

## 2025-05-02 PROCEDURE — 6360000002 HC RX W HCPCS: Performed by: INTERNAL MEDICINE

## 2025-05-02 PROCEDURE — 94640 AIRWAY INHALATION TREATMENT: CPT

## 2025-05-02 PROCEDURE — 80048 BASIC METABOLIC PNL TOTAL CA: CPT

## 2025-05-02 PROCEDURE — 99223 1ST HOSP IP/OBS HIGH 75: CPT | Performed by: THORACIC SURGERY (CARDIOTHORACIC VASCULAR SURGERY)

## 2025-05-02 RX ORDER — PANTOPRAZOLE SODIUM 40 MG/10ML
40 INJECTION, POWDER, LYOPHILIZED, FOR SOLUTION INTRAVENOUS 2 TIMES DAILY
Status: DISCONTINUED | OUTPATIENT
Start: 2025-05-02 | End: 2025-05-09

## 2025-05-02 RX ORDER — METOPROLOL SUCCINATE 25 MG/1
12.5 TABLET, EXTENDED RELEASE ORAL DAILY
Status: DISCONTINUED | OUTPATIENT
Start: 2025-05-03 | End: 2025-05-22

## 2025-05-02 RX ADMIN — GABAPENTIN 400 MG: 400 CAPSULE ORAL at 15:21

## 2025-05-02 RX ADMIN — SODIUM CHLORIDE, PRESERVATIVE FREE 10 ML: 5 INJECTION INTRAVENOUS at 20:33

## 2025-05-02 RX ADMIN — PANTOPRAZOLE SODIUM 40 MG: 40 INJECTION, POWDER, LYOPHILIZED, FOR SOLUTION INTRAVENOUS at 20:32

## 2025-05-02 RX ADMIN — SODIUM CHLORIDE 80 MG: 9 INJECTION, SOLUTION INTRAVENOUS at 17:41

## 2025-05-02 RX ADMIN — MIDODRINE HYDROCHLORIDE 10 MG: 10 TABLET ORAL at 12:47

## 2025-05-02 RX ADMIN — ESCITALOPRAM OXALATE 10 MG: 10 TABLET ORAL at 08:46

## 2025-05-02 RX ADMIN — OXYCODONE HYDROCHLORIDE AND ACETAMINOPHEN 500 MG: 500 TABLET ORAL at 08:47

## 2025-05-02 RX ADMIN — ATORVASTATIN CALCIUM 20 MG: 20 TABLET, FILM COATED ORAL at 08:46

## 2025-05-02 RX ADMIN — TIOTROPIUM BROMIDE AND OLODATEROL 2 PUFF: 3.124; 2.736 SPRAY, METERED RESPIRATORY (INHALATION) at 09:50

## 2025-05-02 RX ADMIN — MIDODRINE HYDROCHLORIDE 10 MG: 10 TABLET ORAL at 08:46

## 2025-05-02 RX ADMIN — LEVOTHYROXINE SODIUM 75 MCG: 0.07 TABLET ORAL at 06:33

## 2025-05-02 RX ADMIN — SODIUM CHLORIDE 125 MG: 9 INJECTION, SOLUTION INTRAVENOUS at 14:56

## 2025-05-02 RX ADMIN — SODIUM CHLORIDE, PRESERVATIVE FREE 10 ML: 5 INJECTION INTRAVENOUS at 08:47

## 2025-05-02 RX ADMIN — Medication 1000 UNITS: at 08:46

## 2025-05-02 RX ADMIN — ACETAMINOPHEN 650 MG: 325 TABLET ORAL at 20:32

## 2025-05-02 RX ADMIN — MIDODRINE HYDROCHLORIDE 10 MG: 10 TABLET ORAL at 17:42

## 2025-05-02 RX ADMIN — SUCRALFATE 1 G: 1 TABLET ORAL at 06:33

## 2025-05-02 RX ADMIN — ENOXAPARIN SODIUM 40 MG: 100 INJECTION SUBCUTANEOUS at 20:34

## 2025-05-02 RX ADMIN — POTASSIUM CHLORIDE 20 MEQ: 1500 TABLET, EXTENDED RELEASE ORAL at 08:46

## 2025-05-02 RX ADMIN — GABAPENTIN 400 MG: 400 CAPSULE ORAL at 20:33

## 2025-05-02 RX ADMIN — ASPIRIN 81 MG: 81 TABLET, COATED ORAL at 08:46

## 2025-05-02 RX ADMIN — FOLIC ACID 1000 MCG: 1 TABLET ORAL at 08:46

## 2025-05-02 RX ADMIN — GABAPENTIN 400 MG: 400 CAPSULE ORAL at 08:46

## 2025-05-02 ASSESSMENT — PAIN DESCRIPTION - DESCRIPTORS
DESCRIPTORS: PRESSURE
DESCRIPTORS: ACHING

## 2025-05-02 ASSESSMENT — PAIN DESCRIPTION - PAIN TYPE: TYPE: ACUTE PAIN

## 2025-05-02 ASSESSMENT — PAIN - FUNCTIONAL ASSESSMENT: PAIN_FUNCTIONAL_ASSESSMENT: ACTIVITIES ARE NOT PREVENTED

## 2025-05-02 ASSESSMENT — PAIN DESCRIPTION - LOCATION
LOCATION: HEAD
LOCATION: CHEST

## 2025-05-02 ASSESSMENT — PAIN SCALES - GENERAL
PAINLEVEL_OUTOF10: 3
PAINLEVEL_OUTOF10: 7

## 2025-05-02 ASSESSMENT — PAIN DESCRIPTION - ORIENTATION: ORIENTATION: MID

## 2025-05-02 ASSESSMENT — PAIN DESCRIPTION - FREQUENCY: FREQUENCY: CONTINUOUS

## 2025-05-02 ASSESSMENT — PAIN DESCRIPTION - ONSET: ONSET: ON-GOING

## 2025-05-02 NOTE — PLAN OF CARE
Problem: Discharge Planning  Goal: Discharge to home or other facility with appropriate resources  Outcome: Progressing  Flowsheets (Taken 5/1/2025 2144 by Susanne Cho, RN)  Discharge to home or other facility with appropriate resources:   Identify barriers to discharge with patient and caregiver   Arrange for needed discharge resources and transportation as appropriate   Identify discharge learning needs (meds, wound care, etc)   Arrange for interpreters to assist at discharge as needed   Refer to discharge planning if patient needs post-hospital services based on physician order or complex needs related to functional status, cognitive ability or social support system     Problem: ABCDS Injury Assessment  Goal: Absence of physical injury  Outcome: Progressing  Flowsheets (Taken 5/1/2025 2144 by Susanne Cho, RN)  Absence of Physical Injury: Implement safety measures based on patient assessment     Problem: Safety - Adult  Goal: Free from fall injury  Outcome: Progressing  Flowsheets (Taken 5/1/2025 2144 by Susanne Cho RN)  Free From Fall Injury:   Instruct family/caregiver on patient safety   Based on caregiver fall risk screen, instruct family/caregiver to ask for assistance with transferring infant if caregiver noted to have fall risk factors     Problem: Pain  Goal: Verbalizes/displays adequate comfort level or baseline comfort level  Outcome: Progressing  Flowsheets (Taken 5/1/2025 2144 by Susanne Cho, RN)  Verbalizes/displays adequate comfort level or baseline comfort level:   Encourage patient to monitor pain and request assistance   Assess pain using appropriate pain scale   Administer analgesics based on type and severity of pain and evaluate response   Implement non-pharmacological measures as appropriate and evaluate response   Consider cultural and social influences on pain and pain management   Notify Licensed Independent Practitioner if interventions unsuccessful or patient reports new

## 2025-05-02 NOTE — CONSULTS
Consult History & Physical      Patient:  Alberto Gilliland  YOB: 1965  MRN: [id number]     Acct: [id number]  Code Status: Full Code  PCP: Juan Christiansen DO      Chief Complaint:    Chief Complaint   Patient presents with    Fatigue    Hypotension       Date of Service: Pt seen/examined in consultation on 5/2/2025    History Of Present Illness:      Alberto Gilliland  is a 60 y.o. male who we are asked to see/evaluate by Damaso Mcwilliams MD for medical management of increasing weakness and fatigue in the setting of chronic severe DIONISIO for which he sees hematology outpatient.  His last EGD was March 2024 showing distal esophagitis, irregular zline, moderate gastritis, and duodenitis. Last Colonoscopy June 2023 showed polyps which were removed and hemorrhoids.  Baseline hemoglobin appears to be 9-10, today he is 7.2.  He has chronically black stools given his oral iron supplementation.  He is pending [facility] after anemia workup.  He was scheduled for EGD/Colonoscopy 5/13/25 at [facility] with Dr. Neri.  He does report early satiety, decreased appetite and increased belching.  He denies any heartburn or indigestion.  He denies any changes in his bowel movements.     Past Medical History:    Past Medical History:   Diagnosis Date    Acute exacerbation of chronic heart failure (HCC) 4/30/2025    Arthritis     general    Bilateral sciatica     Bleeding     with coumadin    CAD (coronary artery disease)     Carpal tunnel syndrome     RIGHT    Cervicalgia, C5-7     Chronic anxiety     COPD, mild (HCC) 04/15/2023    Dysthymia (or depressive neurosis) 02/01/2016    ED (erectile dysfunction)     ED (erectile dysfunction)     Fatty liver 3/5/2024    GERD (gastroesophageal reflux disease)     Headache(784.0)     History of blood transfusion 03/2016    because of taking coumadin    HTN (hypertension)     Hyperlipidemia     Hypothyroidism     Iron deficiency anemia, blood loss     Lumbago     Lumbar radiculopathy       CABG    High Blood Pressure Father     Diabetes Sister         AS A CHILD    Kidney Disease Sister     High Blood Pressure Sister     COPD Sister     Heart Disease Sister     Lung Cancer Brother     Stroke Neg Hx        Past GI History:  Last EGD 2024  Last Colonoscopy 2023    Allergies:  Seasonal    Social History:   TOBACCO:   reports that he has been smoking cigarettes. He started smoking about 46 years ago. He has a 46.4 pack-year smoking history. He has been exposed to tobacco smoke. He has never used smokeless tobacco.  ETOH:   reports that he does not currently use alcohol.    Review of Systems   Constitutional:  Positive for activity change, appetite change and fatigue. Negative for unexpected weight change.   HENT:  Negative for trouble swallowing.    Eyes: Negative.    Respiratory: Negative.     Cardiovascular: Negative.    Gastrointestinal:  Negative for abdominal distention, abdominal pain, anal bleeding, blood in stool, constipation, diarrhea, nausea, rectal pain and vomiting.   Endocrine: Negative.    Genitourinary: Negative.    Musculoskeletal: Negative.    Skin: Negative.    Allergic/Immunologic: Negative.    Neurological:  Positive for dizziness, weakness and light-headedness.   Hematological: Negative.    Psychiatric/Behavioral: Negative.              PHYSICAL EXAM:  BP (!) 95/46   Pulse 96   Temp 98.1 °F (36.7 °C) (Oral)   Resp 15   Ht 1.702 m (5' 7\")   Wt 87.5 kg (193 lb)   SpO2 97%   BMI 30.23 kg/m²     Physical Exam  Vitals and nursing note reviewed.   Constitutional:       General: He is not in acute distress.     Appearance: Normal appearance. He is normal weight. He is not ill-appearing.   HENT:      Mouth/Throat:      Mouth: Mucous membranes are moist.      Pharynx: Oropharynx is clear.   Eyes:      General: No scleral icterus.     Pupils: Pupils are equal, round, and reactive to light.   Cardiovascular:      Rate and Rhythm: Normal rate and regular rhythm.      Heart sounds: Normal

## 2025-05-02 NOTE — PROGRESS NOTES
INTERNAL MEDICINE Progress Note  5/2/2025 12:29 PM  Subjective:   Admit Date: 4/30/2025  PCP: Juan Christiansen,   Interval History:     Weak, no SOB  No orthopnea.no PND    Objective:   Vitals: /74   Pulse 92   Temp 98 °F (36.7 °C) (Oral)   Resp 18   Ht 1.702 m (5' 7\")   Wt 87.5 kg (193 lb)   SpO2 99%   BMI 30.23 kg/m²   General appearance: alert and cooperative with exam  HEENT: Head: atraumatic  Neck: no adenopathy, no carotid bruit, and no JVD  Lungs: clear to auscultation bilaterally  Heart: regular rate and rhythm, S1, S2 normal, and 2/6 SM  Abdomen: soft, non-tender; bowel sounds normal; no masses,  no organomegaly  Extremities: extremities normal, atraumatic, no cyanosis or edema  Neurologic: Mental status: Alert, oriented, thought content appropriate      Medications:   Scheduled Meds:   ferric gluconate (FERRLECIT) 125 mg in sodium chloride 0.9 % 100 mL IVPB  125 mg IntraVENous Once per day on Monday Wednesday Friday    pantoprazole (PROTONIX) 80 mg in sodium chloride 0.9 % 50 mL bolus  80 mg IntraVENous Once    pantoprazole  40 mg IntraVENous BID    [START ON 5/3/2025] metoprolol succinate  12.5 mg Oral Daily    furosemide  20 mg Oral BID    sodium chloride flush  5-40 mL IntraVENous 2 times per day    enoxaparin  40 mg SubCUTAneous Daily    aspirin  81 mg Oral Daily    atorvastatin  20 mg Oral Daily    Vitamin D  1,000 Units Oral Daily    escitalopram  10 mg Oral Daily    folic acid  1,000 mcg Oral Daily    gabapentin  400 mg Oral TID    levothyroxine  75 mcg Oral Daily    potassium chloride  20 mEq Oral Daily    [Held by provider] sucralfate  1 g Oral 4x Daily AC & HS    tiotropium-olodaterol  2 puff Inhalation Daily RT    vitamin C  500 mg Oral Daily    midodrine  10 mg Oral TID WC     Continuous Infusions:   sodium chloride         Lab Results:   CBC:   Recent Labs     04/30/25  1237 05/01/25  0556 05/01/25  1540 05/02/25  0613   WBC 8.1 7.3  --  7.5   HGB 8.5* 7.3* 7.7* 7.2*   PLT  recommendations  COPD, stable  Tobacco dependence     cont PPI,   IV iron initiated  F/up H/H  Hematology vannaal      Damaso Mcwilliams MD, MD

## 2025-05-02 NOTE — PROGRESS NOTES
Cardiology Progress Note      Patient:  Alberto Gilliland  YOB: 1965  MRN: [id number]   Acct: [id number]  Admit Date:  4/30/2025  Primary Cardiologist: Tanisha Hathaway MD    Note per dr patel \"CHIEF COMPLAINT: fatigue/hypotension - pat sent to er from Hematology office for having low bp during iron infusion    CONSULT FOR: CHF/ Severe Aortic Stenosis      HPI: This is a pleasant 60-year-old male with a past medical history significant for hypertension, hyperlipidemia, coronary artery disease (status post CABG), atrial valve replacement, severe aortic stenosis, hypothyroidism, iron deficiency anemia (recent iron transfusion), COPD, and 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  He presented to [facility] ED with complaints of fatigue, hypotension, dyspnea on exertion, chest discomfort, and productive cough. He continues to smoke half a pack of cigarettes per day.  He was admitted for further evaluation and management of anemia. Given his cardiac history, a cardiology consult is requested for evaluation and management of congestive heart failure and severe aortic stenosis.  The patient denies fever, chills, neck or back pain, nausea, vomiting, or abdominal pain. He also denies orthopnea and does not require more than one pillow while sleeping.\"    Subjective (Events in last 24 hours): pt awake and alert.  NAD.  Pt with chronic, ongoing, left side chest pain, located left breast, tender, on going for years, tender to touch, unchanged.  Pt with ongoing MA for past few months. No edema or orthopnea.   Having diarrhea  Does not feel well, feels fatigued  On RA  Low bp, bp meds held today      Objective:   BP (!) 95/46   Pulse 96   Temp 98.1 °F (36.7 °C) (Oral)   Resp 15   Ht 1.702 m (5' 7\")   Wt 87.5 kg (193 lb)   SpO2 97%   BMI 30.23 kg/m²        TELEMETRY: not on tele    Physical Exam:  General Appearance: alert and oriented to person, place and time, in no acute

## 2025-05-02 NOTE — CONSULTS
[facility]    Patient - Alberto Gilliland   MRN -  [id number]   [facility] # - [id number]   - 1965      Date of Admission -  2025 12:24 PM  Date of evaluation -  2025  Room - -22/022-A   Hospital Day - 2  Consulting - Damaso Mcwilliams MD Primary Care Physician - Juan Christiansen DO     Inpatient consult to Hem/Onc  Consult performed by: Bambi Fisher, APRN - CNP  Consult ordered by: Damaso Mcwilliams MD           Reason for Consult    Iron deficiency anemia  Active Hospital Problem List      Active Hospital Problems    Diagnosis Date Noted    Acute on chronic congestive heart failure (HCC) [I50.9] 2025     HPI   Alberto Gilliland is a 60 y.o. male admitted 2025 for evaluation of lightheadedness, dizziness, hypotension, fatigue.  He was seen in our office for iron infusion and noted to be symptomatic, was sent to ED.  Did not receive IV iron infusion.    Labs upon presentation proBNP elevated at 8056.  H/H 8.5/28.9.  Iron low at 25, iron sat low at 7%.  EKG normal sinus rhythm.  Chest x-ray negative for acute process.  Admitting physician notes that cardiology note in epic recommends left heart cath and right heart cath RODRÍGUEZ and possible TAVR.  Admission indicated CHF with hypotension.  Cardiology consulted with recommendations of anemia workup inpatient, hematology and GI consults.  They recommended maintaining hemoglobin greater than 10.  They recommend consult for cardiovascular surgeon for TAVR versus SAVR recommendation.  Cardiac cath after anemia workup completed.  Cardiothoracic surgery consulted with recommendations of TAVR +/- PCI.  GI consulted with recommendations of EGD pending clinical course stating high risk given his severe AS.  Hematology consulted.  Of note, pt has history of small intestinal surgery with 8\" removed.  Likely malabsorption contributing to iron deficiency.    2025:  Pt resting in bed, family not present.  Pt reports fatigue,  pericardial effusion is identified. Prosthetic aortic valve is unchanged. Prominent mediastinal and hilar lymph nodes measuring up to 10 mm in short axis appear stable. Lungs: Moderate left lower lobe consolidation is now present. A new nodular opacity in the left lower lobe near the lingula measures up to 27 mm (series 604, image 269). Several new pulmonary nodules are identified for example a 7 mm nodule at the left lung apex is observed (series 601, image 71). An 8 mm right upper lobe subpleural nodule is new (series 601, image 62). A 12 mm right lower lobe pulmonary nodule is new (series 601, image 117). The 14 mm left lower lobe pulmonary nodule and a 6 mm left lower lobe pulmonary nodule are no longer visualized however there are new 10 mm and 11 mm left lower lobe pulmonary nodules present (series 601, image 173 and 167). A small right pleural effusion is present. A trace left pleural effusion is observed. Upper abdomen: No acute findings are visualized in the limited images through the upper abdomen. Musculoskeletal: Multilevel degenerative disc disease is present. Median sternotomy wires are observed.     1. No filling defects are noted within the pulmonary arterial vasculature to suggest the presence of pulmonary embolus. Several of the previously seen pulmonary nodules are no longer visualized however there are several new pulmonary nodules as described. Reportedly the patient has a history of prior biopsy. Correlation with the prior clinical history and biopsy pathology is advised. New left lower lobe consolidation and new small bilateral pleural effusions are also noted. Consider repeat histopathologic correlation or further evaluation with PET/CT examination if clinically indicated. Close clinical follow-up and follow-up to ensure resolution is advised. 2. Chronic findings are discussed. **This report has been created using voice recognition software.  It may contain minor errors which are inherent in

## 2025-05-02 NOTE — PROGRESS NOTES
Physician Progress Note      PATIENT:               MRAICARMEN TURNER  CSN #:                  [id number]  :                       1965  ADMIT DATE:       2025 12:24 PM  DISCH DATE:  RESPONDING  PROVIDER #:        Damaso Mcwilliams MD          QUERY TEXT:    \"Severe CHFrEF'\" is documented in the medical record H&P by Damaso Mcwilliams MD   at 2025  3:17 PM.  Please document the type and acuity:    The clinical indicators include:  Consults by Parvin Miller MD at 2025 12:05 PM  \"CHF: currently stable Chronic CHF. No sig of pul edema, no pitting edema\"  \" Cardiomyopathy: echo TTE on 25 moderate decreased in LVSF with EF   30-35% which drop down from 2022 EF 45-50%.\"  ECOH 2025 EF 30%.   bnp 8056  H&P by Damaso Mcwilliams MD at 2025  3:17 PM  \"Severe CHFrEF\"   CXR IMPRESSION:  1.No acute intrathoracic process.  2.Mild stable cardiomegaly.  Lasix 20 mg BID  accurate I/O, Cardiology eval, and Monitor BP and vitals  Options provided:  -- Acute on Chronic Systolic CHF/HFrEF  -- Acute Systolic CHF/HFrEF  -- Chronic Systolic CHF/HFrEF  -- Other - I will add my own diagnosis  -- Disagree - Not applicable / Not valid  -- Disagree - Clinically unable to determine / Unknown  -- Refer to Clinical Documentation Reviewer    PROVIDER RESPONSE TEXT:    This patient has chronic systolic CHF/HFrEF.    Query created by: India De Oliveira on 2025 8:09 AM      Electronically signed by:  Damaso Mcwilliams MD 2025 12:28 PM

## 2025-05-02 NOTE — PLAN OF CARE
Problem: Discharge Planning  Goal: Discharge to home or other facility with appropriate resources  5/1/2025 2141 by Susanne Cho RN  Outcome: Progressing  Flowsheets (Taken 5/1/2025 2141)  Discharge to home or other facility with appropriate resources:   Identify barriers to discharge with patient and caregiver   Arrange for needed discharge resources and transportation as appropriate   Identify discharge learning needs (meds, wound care, etc)   Arrange for interpreters to assist at discharge as needed   Refer to discharge planning if patient needs post-hospital services based on physician order or complex needs related to functional status, cognitive ability or social support system     Problem: ABCDS Injury Assessment  Goal: Absence of physical injury  5/1/2025 2141 by Susanne Cho RN  Outcome: Progressing  Flowsheets (Taken 5/1/2025 2141)  Absence of Physical Injury: Implement safety measures based on patient assessment     Problem: Safety - Adult  Goal: Free from fall injury  5/1/2025 2141 by Susanne Cho RN  Outcome: Progressing  Flowsheets (Taken 5/1/2025 2141)  Free From Fall Injury:   Instruct family/caregiver on patient safety   Based on caregiver fall risk screen, instruct family/caregiver to ask for assistance with transferring infant if caregiver noted to have fall risk factors     Problem: Pain  Goal: Verbalizes/displays adequate comfort level or baseline comfort level  5/1/2025 2141 by Susanne Cho RN  Outcome: Progressing  Flowsheets (Taken 5/1/2025 2141)  Verbalizes/displays adequate comfort level or baseline comfort level:   Encourage patient to monitor pain and request assistance   Assess pain using appropriate pain scale   Administer analgesics based on type and severity of pain and evaluate response   Implement non-pharmacological measures as appropriate and evaluate response   Consider cultural and social influences on pain and pain management   Notify Licensed Independent

## 2025-05-03 LAB
ANION GAP SERPL CALC-SCNC: 13 MEQ/L (ref 8–16)
BUN SERPL-MCNC: 28 MG/DL (ref 8–23)
CALCIUM SERPL-MCNC: 8.6 MG/DL (ref 8.8–10.2)
CHLORIDE SERPL-SCNC: 104 MEQ/L (ref 98–111)
CO2 SERPL-SCNC: 19 MEQ/L (ref 22–29)
CREAT SERPL-MCNC: 1 MG/DL (ref 0.7–1.2)
DEPRECATED RDW RBC AUTO: 78.6 FL (ref 35–45)
EKG ATRIAL RATE: 100 BPM
EKG ATRIAL RATE: 98 BPM
EKG P AXIS: 54 DEGREES
EKG P AXIS: 78 DEGREES
EKG P-R INTERVAL: 152 MS
EKG P-R INTERVAL: 186 MS
EKG Q-T INTERVAL: 376 MS
EKG Q-T INTERVAL: 390 MS
EKG QRS DURATION: 124 MS
EKG QRS DURATION: 124 MS
EKG QTC CALCULATION (BAZETT): 485 MS
EKG QTC CALCULATION (BAZETT): 497 MS
EKG R AXIS: 17 DEGREES
EKG R AXIS: 58 DEGREES
EKG T AXIS: 85 DEGREES
EKG T AXIS: 90 DEGREES
EKG VENTRICULAR RATE: 100 BPM
EKG VENTRICULAR RATE: 98 BPM
ERYTHROCYTE [DISTWIDTH] IN BLOOD BY AUTOMATED COUNT: 24.2 % (ref 11.5–14.5)
GFR SERPL CREATININE-BSD FRML MDRD: 85 ML/MIN/1.73M2
GLUCOSE SERPL-MCNC: 91 MG/DL (ref 74–109)
HCT VFR BLD AUTO: 24.4 % (ref 42–52)
HGB BLD-MCNC: 7.3 GM/DL (ref 14–18)
MAGNESIUM SERPL-MCNC: 2.1 MG/DL (ref 1.6–2.6)
MCH RBC QN AUTO: 27.9 PG (ref 26–33)
MCHC RBC AUTO-ENTMCNC: 29.9 GM/DL (ref 32.2–35.5)
MCV RBC AUTO: 93.1 FL (ref 80–94)
PLATELET # BLD AUTO: 282 THOU/MM3 (ref 130–400)
PMV BLD AUTO: 10.5 FL (ref 9.4–12.4)
POTASSIUM SERPL-SCNC: 4.7 MEQ/L (ref 3.5–5.2)
RBC # BLD AUTO: 2.62 MILL/MM3 (ref 4.7–6.1)
SODIUM SERPL-SCNC: 136 MEQ/L (ref 135–145)
TROPONIN, HIGH SENSITIVITY: 43 NG/L (ref 0–12)
TROPONIN, HIGH SENSITIVITY: 45 NG/L (ref 0–12)
WBC # BLD AUTO: 7.8 THOU/MM3 (ref 4.8–10.8)

## 2025-05-03 PROCEDURE — 6370000000 HC RX 637 (ALT 250 FOR IP): Performed by: INTERNAL MEDICINE

## 2025-05-03 PROCEDURE — 2500000003 HC RX 250 WO HCPCS: Performed by: INTERNAL MEDICINE

## 2025-05-03 PROCEDURE — 99232 SBSQ HOSP IP/OBS MODERATE 35: CPT | Performed by: PHYSICIAN ASSISTANT

## 2025-05-03 PROCEDURE — 85027 COMPLETE CBC AUTOMATED: CPT

## 2025-05-03 PROCEDURE — 93010 ELECTROCARDIOGRAM REPORT: CPT | Performed by: NUCLEAR MEDICINE

## 2025-05-03 PROCEDURE — 80048 BASIC METABOLIC PNL TOTAL CA: CPT

## 2025-05-03 PROCEDURE — 84484 ASSAY OF TROPONIN QUANT: CPT

## 2025-05-03 PROCEDURE — 6370000000 HC RX 637 (ALT 250 FOR IP): Performed by: PHYSICIAN ASSISTANT

## 2025-05-03 PROCEDURE — 94761 N-INVAS EAR/PLS OXIMETRY MLT: CPT

## 2025-05-03 PROCEDURE — 93005 ELECTROCARDIOGRAM TRACING: CPT | Performed by: INTERNAL MEDICINE

## 2025-05-03 PROCEDURE — 1200000003 HC TELEMETRY R&B

## 2025-05-03 PROCEDURE — 83735 ASSAY OF MAGNESIUM: CPT

## 2025-05-03 PROCEDURE — 94640 AIRWAY INHALATION TREATMENT: CPT

## 2025-05-03 PROCEDURE — 6360000002 HC RX W HCPCS

## 2025-05-03 PROCEDURE — 36415 COLL VENOUS BLD VENIPUNCTURE: CPT

## 2025-05-03 RX ADMIN — GABAPENTIN 400 MG: 400 CAPSULE ORAL at 21:18

## 2025-05-03 RX ADMIN — ESCITALOPRAM OXALATE 10 MG: 10 TABLET ORAL at 10:15

## 2025-05-03 RX ADMIN — MIDODRINE HYDROCHLORIDE 10 MG: 10 TABLET ORAL at 16:05

## 2025-05-03 RX ADMIN — ACETAMINOPHEN 650 MG: 325 TABLET ORAL at 21:18

## 2025-05-03 RX ADMIN — METOPROLOL SUCCINATE 12.5 MG: 25 TABLET, EXTENDED RELEASE ORAL at 10:15

## 2025-05-03 RX ADMIN — OXYCODONE HYDROCHLORIDE AND ACETAMINOPHEN 500 MG: 500 TABLET ORAL at 10:15

## 2025-05-03 RX ADMIN — NITROGLYCERIN 0.5 INCH: 20 OINTMENT TOPICAL at 16:04

## 2025-05-03 RX ADMIN — GABAPENTIN 400 MG: 400 CAPSULE ORAL at 10:15

## 2025-05-03 RX ADMIN — Medication 1000 UNITS: at 10:15

## 2025-05-03 RX ADMIN — LEVOTHYROXINE SODIUM 75 MCG: 0.07 TABLET ORAL at 06:43

## 2025-05-03 RX ADMIN — MIDODRINE HYDROCHLORIDE 10 MG: 10 TABLET ORAL at 10:15

## 2025-05-03 RX ADMIN — ATORVASTATIN CALCIUM 20 MG: 20 TABLET, FILM COATED ORAL at 10:15

## 2025-05-03 RX ADMIN — NITROGLYCERIN 0.5 INCH: 20 OINTMENT TOPICAL at 22:45

## 2025-05-03 RX ADMIN — TIOTROPIUM BROMIDE AND OLODATEROL 2 PUFF: 3.124; 2.736 SPRAY, METERED RESPIRATORY (INHALATION) at 07:55

## 2025-05-03 RX ADMIN — FUROSEMIDE 20 MG: 20 TABLET ORAL at 10:15

## 2025-05-03 RX ADMIN — FOLIC ACID 1000 MCG: 1 TABLET ORAL at 10:15

## 2025-05-03 RX ADMIN — SODIUM CHLORIDE, PRESERVATIVE FREE 10 ML: 5 INJECTION INTRAVENOUS at 10:15

## 2025-05-03 RX ADMIN — ALPRAZOLAM 0.5 MG: 0.5 TABLET ORAL at 12:21

## 2025-05-03 RX ADMIN — FUROSEMIDE 20 MG: 20 TABLET ORAL at 16:05

## 2025-05-03 RX ADMIN — SODIUM CHLORIDE, PRESERVATIVE FREE 10 ML: 5 INJECTION INTRAVENOUS at 21:18

## 2025-05-03 RX ADMIN — ASPIRIN 81 MG: 81 TABLET, COATED ORAL at 10:15

## 2025-05-03 RX ADMIN — PANTOPRAZOLE SODIUM 40 MG: 40 INJECTION, POWDER, LYOPHILIZED, FOR SOLUTION INTRAVENOUS at 21:18

## 2025-05-03 RX ADMIN — PANTOPRAZOLE SODIUM 40 MG: 40 INJECTION, POWDER, LYOPHILIZED, FOR SOLUTION INTRAVENOUS at 10:14

## 2025-05-03 RX ADMIN — MIDODRINE HYDROCHLORIDE 10 MG: 10 TABLET ORAL at 12:21

## 2025-05-03 RX ADMIN — POTASSIUM CHLORIDE 20 MEQ: 1500 TABLET, EXTENDED RELEASE ORAL at 10:15

## 2025-05-03 RX ADMIN — GABAPENTIN 400 MG: 400 CAPSULE ORAL at 16:03

## 2025-05-03 ASSESSMENT — PAIN DESCRIPTION - DESCRIPTORS
DESCRIPTORS: PRESSURE;SHARP
DESCRIPTORS: ACHING
DESCRIPTORS: PRESSURE;BURNING

## 2025-05-03 ASSESSMENT — PAIN - FUNCTIONAL ASSESSMENT: PAIN_FUNCTIONAL_ASSESSMENT: ACTIVITIES ARE NOT PREVENTED

## 2025-05-03 ASSESSMENT — PAIN SCALES - GENERAL
PAINLEVEL_OUTOF10: 7
PAINLEVEL_OUTOF10: 7
PAINLEVEL_OUTOF10: 3

## 2025-05-03 ASSESSMENT — PAIN DESCRIPTION - ORIENTATION
ORIENTATION: LEFT
ORIENTATION: RIGHT

## 2025-05-03 ASSESSMENT — PAIN DESCRIPTION - LOCATION
LOCATION: CHEST
LOCATION: CHEST
LOCATION: HEAD

## 2025-05-03 NOTE — PROCEDURES
PROCEDURE NOTE  Date: 5/3/2025   Name: Alberto Gilliland  YOB: 1965    Procedures  12 lead EKG completed. Results handed to Haydee BUTLER.

## 2025-05-03 NOTE — PROGRESS NOTES
Cardiology Progress Note      Patient:  Alberto Gilliland  YOB: 1965  MRN: [id number]   Acct: [id number]  Admit Date:  4/30/2025  Primary Cardiologist: Tanisha Hathaway MD    Note per dr patel \"CHIEF COMPLAINT: fatigue/hypotension - pat sent to er from Hematology office for having low bp during iron infusion    CONSULT FOR: CHF/ Severe Aortic Stenosis      HPI: This is a pleasant 60-year-old male with a past medical history significant for hypertension, hyperlipidemia, coronary artery disease (status post CABG), atrial valve replacement, severe aortic stenosis, hypothyroidism, iron deficiency anemia (recent iron transfusion), COPD, and 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  He presented to [facility] ED with complaints of fatigue, hypotension, dyspnea on exertion, chest discomfort, and productive cough. He continues to smoke half a pack of cigarettes per day.  He was admitted for further evaluation and management of anemia. Given his cardiac history, a cardiology consult is requested for evaluation and management of congestive heart failure and severe aortic stenosis.  The patient denies fever, chills, neck or back pain, nausea, vomiting, or abdominal pain. He also denies orthopnea and does not require more than one pillow while sleeping.\"    Subjective (Events in last 24 hours):   Pt awake and alert.  NAD.  Pt states he doesn't feel well. No appetite.  Has chest burning, intermittent for years.  No sob. No edema or orthopnea  On RA    Objective:   /74   Pulse (!) 103   Temp 98.1 °F (36.7 °C) (Oral)   Resp 18   Ht 1.702 m (5' 7\")   Wt 87.5 kg (193 lb)   SpO2 96%   BMI 30.23 kg/m²        TELEMETRY: not on tele    Physical Exam:  General Appearance: alert and oriented to person, place and time, in no acute distress  Cardiovascular: normal rate, regular rhythm, normal S1 and S2, +murmur  Pulmonary/Chest: clear to auscultation bilaterally- no wheezes, rales or

## 2025-05-03 NOTE — PROGRESS NOTES
INTERNAL MEDICINE Progress Note  5/3/2025 11:54 AM  Subjective:   Admit Date: 4/30/2025  PCP: Juan Christiansen,   Interval History:     Weak,   no SOB  No orthopnea.no PND    Objective:   Vitals: /74   Pulse (!) 103   Temp 98.1 °F (36.7 °C) (Oral)   Resp 18   Ht 1.702 m (5' 7\")   Wt 87.5 kg (193 lb)   SpO2 96%   BMI 30.23 kg/m²   General appearance: alert and cooperative with exam  HEENT:  atraumatic  Neck: no adenopathy, no carotid bruit, and no JVD  Lungs: clear to auscultation bilaterally  Heart: regular rate and rhythm, S1, S2 normal, and 2/6 SM  Abdomen: soft, non-tender; bowel sounds normal; no masses,  no organomegaly  Extremities: extremities normal, atraumatic, no cyanosis or edema  Neurologic: Alert, oriented, thought content appropriate      Medications:   Scheduled Meds:   ferric gluconate (FERRLECIT) 125 mg in sodium chloride 0.9 % 100 mL IVPB  125 mg IntraVENous Once per day on Monday Wednesday Friday    pantoprazole  40 mg IntraVENous BID    metoprolol succinate  12.5 mg Oral Daily    furosemide  20 mg Oral BID    sodium chloride flush  5-40 mL IntraVENous 2 times per day    [Held by provider] enoxaparin  40 mg SubCUTAneous Daily    aspirin  81 mg Oral Daily    atorvastatin  20 mg Oral Daily    Vitamin D  1,000 Units Oral Daily    escitalopram  10 mg Oral Daily    folic acid  1,000 mcg Oral Daily    gabapentin  400 mg Oral TID    levothyroxine  75 mcg Oral Daily    potassium chloride  20 mEq Oral Daily    [Held by provider] sucralfate  1 g Oral 4x Daily AC & HS    tiotropium-olodaterol  2 puff Inhalation Daily RT    vitamin C  500 mg Oral Daily    midodrine  10 mg Oral TID WC     Continuous Infusions:   sodium chloride         Lab Results:   CBC:   Recent Labs     05/01/25  0556 05/01/25  1540 05/02/25  0613 05/03/25  0519   WBC 7.3  --  7.5 7.8   HGB 7.3* 7.7* 7.2* 7.3*     --  296 282     BMP:    Recent Labs     05/01/25  0556 05/02/25  0613 05/03/25  0519    138 136

## 2025-05-03 NOTE — PLAN OF CARE
Problem: Respiratory - Adult  Goal: Achieves optimal ventilation and oxygenation  5/3/2025 0758 by Ramon Julio, P  Outcome: Progressing     Problem: Respiratory - Adult  Goal: Lung sounds clear or within normal limits for patient  Outcome: Progressing   Patient mutually agreed on goals.

## 2025-05-03 NOTE — PROGRESS NOTES
Gastroenterology Progress Note:     Patient Name:  Alberto Gilliland   MRN: [id number]  [id number]  YOB: 1965  Admit Date: 4/30/2025 12:24 PM  Primary Care Physician: Juan Christiansen DO   6K-22/022-A     Patient seen and examined.  24 hours events and chart reviewed.    Subjective: Patient reports fatigue and HGB down to 7.3 today. Would like to plan EGD/Colonoscopy on Monday pending cardiology clearance.     Objective:  BP (!) 92/53   Pulse (!) 101   Temp 98.4 °F (36.9 °C) (Oral)   Resp 18   Ht 1.702 m (5' 7\")   Wt 87.5 kg (193 lb)   SpO2 96%   BMI 30.23 kg/m²     Physical Exam:    General:  Nourished in no distress  HEENT: Atraumatic, normocephalic. Moist oral mucous membranes.  Neck: Supple without adenopathy, JVD, thyromegaly or masses. Trachea midline.  CV: Heart RRR, no murmurs, rubs, gallops.  Resp: Even, easy without cough or accessory use. Lungs clear to ascultation bilaterally.   Abd: Round, soft, non-tender. No hepatosplenomegaly or mass present. Active bowel sounds heard. No distention noted.   Ext:  Without cyanosis, clubbing, edema.   Skin: Pale, warm, dry  Neuro:  Alert, oriented x 3 with no obvious deficits.       Rectal: deferred    Labs:   CBC:   Lab Results   Component Value Date/Time    WBC 7.8 05/03/2025 05:19 AM    HGB 7.3 05/03/2025 05:19 AM    HGB 12.0 05/18/2012 01:10 PM    HCT 24.4 05/03/2025 05:19 AM    MCV 93.1 05/03/2025 05:19 AM     05/03/2025 05:19 AM     BMP:   Lab Results   Component Value Date/Time     05/03/2025 05:19 AM    K 4.7 05/03/2025 05:19 AM    K 4.3 04/30/2025 12:37 PM     05/03/2025 05:19 AM    CO2 19 05/03/2025 05:19 AM    BUN 28 05/03/2025 05:19 AM    CREATININE 1.0 05/03/2025 05:19 AM    CALCIUM 8.6 05/03/2025 05:19 AM     PT/INR:   Lab Results   Component Value Date/Time    PROTIME 2.95 12/02/2011 07:15 AM    INR 1.41 05/01/2025 03:40 PM     Lipids:   Lab Results   Component Value Date/Time    ALKPHOS 88 05/01/2025 05:56 AM     ALT 63 05/01/2025 05:56 AM    AST 26 05/01/2025 05:56 AM    BILITOT 0.4 05/01/2025 05:56 AM    BILIDIR 0.3 05/01/2025 05:56 AM    AMYLASE 49 08/17/2017 11:48 AM    LIPASE 31.0 04/15/2024 09:14 AM     Significant Diagnostic Studies:   N/A     Current Meds:  Scheduled Meds:   ferric gluconate (FERRLECIT) 125 mg in sodium chloride 0.9 % 100 mL IVPB  125 mg IntraVENous Once per day on Monday Wednesday Friday    pantoprazole  40 mg IntraVENous BID    metoprolol succinate  12.5 mg Oral Daily    furosemide  20 mg Oral BID    sodium chloride flush  5-40 mL IntraVENous 2 times per day    enoxaparin  40 mg SubCUTAneous Daily    aspirin  81 mg Oral Daily    atorvastatin  20 mg Oral Daily    Vitamin D  1,000 Units Oral Daily    escitalopram  10 mg Oral Daily    folic acid  1,000 mcg Oral Daily    gabapentin  400 mg Oral TID    levothyroxine  75 mcg Oral Daily    potassium chloride  20 mEq Oral Daily    [Held by provider] sucralfate  1 g Oral 4x Daily AC & HS    tiotropium-olodaterol  2 puff Inhalation Daily RT    vitamin C  500 mg Oral Daily    midodrine  10 mg Oral TID WC     Continuous Infusions:   sodium chloride         Alberto Gilliland is a 60 y.o. male who we are asked to see/evaluate by Damaso Mcwilliams MD for medical management of increasing weakness and fatigue in the setting of chronic severe DIONISIO for which he sees hematology outpatient. His last EGD was March 2024 showing distal esophagitis, irregular zline, moderate gastritis, and duodenitis. Last Colonoscopy June 2023 showed polyps which were removed and hemorrhoids. Baseline hemoglobin appears to be 9-10, today he is 7.2. He has chronically black stools given his oral iron supplementation. He is pending [facility] after anemia workup. He was scheduled for EGD/Colonoscopy 5/13/25 at [facility] with Dr. Neri. He does report early satiety, decreased appetite and increased belching.  He denies any heartburn or indigestion. He denies any changes in his bowel movements.

## 2025-05-03 NOTE — PLAN OF CARE
Problem: Discharge Planning  Goal: Discharge to home or other facility with appropriate resources  5/2/2025 2301 by Susanne Cho RN  Outcome: Progressing  Flowsheets (Taken 5/2/2025 2301)  Discharge to home or other facility with appropriate resources:   Identify barriers to discharge with patient and caregiver   Arrange for needed discharge resources and transportation as appropriate   Identify discharge learning needs (meds, wound care, etc)   Arrange for interpreters to assist at discharge as needed   Refer to discharge planning if patient needs post-hospital services based on physician order or complex needs related to functional status, cognitive ability or social support system     Problem: ABCDS Injury Assessment  Goal: Absence of physical injury  5/2/2025 2301 by Susanne Cho RN  Outcome: Progressing  Flowsheets (Taken 5/2/2025 2301)  Absence of Physical Injury: Implement safety measures based on patient assessment     Problem: Safety - Adult  Goal: Free from fall injury  5/2/2025 2301 by Susanne Cho RN  Outcome: Progressing  Flowsheets (Taken 5/2/2025 2301)  Free From Fall Injury:   Instruct family/caregiver on patient safety   Based on caregiver fall risk screen, instruct family/caregiver to ask for assistance with transferring infant if caregiver noted to have fall risk factors     Problem: Pain  Goal: Verbalizes/displays adequate comfort level or baseline comfort level  5/2/2025 2301 by Susanne Cho RN  Outcome: Progressing  Flowsheets (Taken 5/2/2025 2301)  Verbalizes/displays adequate comfort level or baseline comfort level:   Encourage patient to monitor pain and request assistance   Assess pain using appropriate pain scale   Administer analgesics based on type and severity of pain and evaluate response   Implement non-pharmacological measures as appropriate and evaluate response   Consider cultural and social influences on pain and pain management   Notify Licensed Independent

## 2025-05-03 NOTE — PROCEDURES
PROCEDURE NOTE  Date: 5/3/2025   Name: Alberto Gilliland  YOB: 1965    Procedures    12 lead EKG completed. Results handed to Sharmaine BUTLER.

## 2025-05-04 LAB
ANION GAP SERPL CALC-SCNC: 15 MEQ/L (ref 8–16)
BUN SERPL-MCNC: 34 MG/DL (ref 8–23)
CALCIUM SERPL-MCNC: 8.7 MG/DL (ref 8.8–10.2)
CHLORIDE SERPL-SCNC: 103 MEQ/L (ref 98–111)
CO2 SERPL-SCNC: 18 MEQ/L (ref 22–29)
CREAT SERPL-MCNC: 1.2 MG/DL (ref 0.7–1.2)
DEPRECATED RDW RBC AUTO: 80.9 FL (ref 35–45)
EKG ATRIAL RATE: 91 BPM
EKG P AXIS: 78 DEGREES
EKG P-R INTERVAL: 204 MS
EKG Q-T INTERVAL: 406 MS
EKG QRS DURATION: 126 MS
EKG QTC CALCULATION (BAZETT): 499 MS
EKG R AXIS: -13 DEGREES
EKG T AXIS: 105 DEGREES
EKG VENTRICULAR RATE: 91 BPM
ERYTHROCYTE [DISTWIDTH] IN BLOOD BY AUTOMATED COUNT: 24 % (ref 11.5–14.5)
GFR SERPL CREATININE-BSD FRML MDRD: 68 ML/MIN/1.73M2
GLUCOSE SERPL-MCNC: 84 MG/DL (ref 74–109)
HCT VFR BLD AUTO: 24.8 % (ref 42–52)
HGB BLD-MCNC: 7.3 GM/DL (ref 14–18)
MAGNESIUM SERPL-MCNC: 2.4 MG/DL (ref 1.6–2.6)
MCH RBC QN AUTO: 27.8 PG (ref 26–33)
MCHC RBC AUTO-ENTMCNC: 29.4 GM/DL (ref 32.2–35.5)
MCV RBC AUTO: 94.3 FL (ref 80–94)
NT-PROBNP SERPL IA-MCNC: ABNORMAL PG/ML (ref 0–124)
PLATELET # BLD AUTO: 260 THOU/MM3 (ref 130–400)
PMV BLD AUTO: 11.1 FL (ref 9.4–12.4)
POTASSIUM SERPL-SCNC: 4.7 MEQ/L (ref 3.5–5.2)
RBC # BLD AUTO: 2.63 MILL/MM3 (ref 4.7–6.1)
SODIUM SERPL-SCNC: 136 MEQ/L (ref 135–145)
WBC # BLD AUTO: 8.2 THOU/MM3 (ref 4.8–10.8)

## 2025-05-04 PROCEDURE — 94761 N-INVAS EAR/PLS OXIMETRY MLT: CPT

## 2025-05-04 PROCEDURE — 93010 ELECTROCARDIOGRAM REPORT: CPT | Performed by: NUCLEAR MEDICINE

## 2025-05-04 PROCEDURE — 6370000000 HC RX 637 (ALT 250 FOR IP): Performed by: INTERNAL MEDICINE

## 2025-05-04 PROCEDURE — 2500000003 HC RX 250 WO HCPCS

## 2025-05-04 PROCEDURE — 36415 COLL VENOUS BLD VENIPUNCTURE: CPT

## 2025-05-04 PROCEDURE — 6370000000 HC RX 637 (ALT 250 FOR IP): Performed by: PHYSICIAN ASSISTANT

## 2025-05-04 PROCEDURE — 6370000000 HC RX 637 (ALT 250 FOR IP)

## 2025-05-04 PROCEDURE — 80048 BASIC METABOLIC PNL TOTAL CA: CPT

## 2025-05-04 PROCEDURE — 1200000003 HC TELEMETRY R&B

## 2025-05-04 PROCEDURE — 83880 ASSAY OF NATRIURETIC PEPTIDE: CPT

## 2025-05-04 PROCEDURE — 85027 COMPLETE CBC AUTOMATED: CPT

## 2025-05-04 PROCEDURE — 83735 ASSAY OF MAGNESIUM: CPT

## 2025-05-04 PROCEDURE — 6360000002 HC RX W HCPCS: Performed by: STUDENT IN AN ORGANIZED HEALTH CARE EDUCATION/TRAINING PROGRAM

## 2025-05-04 PROCEDURE — 6360000002 HC RX W HCPCS

## 2025-05-04 PROCEDURE — 2500000003 HC RX 250 WO HCPCS: Performed by: INTERNAL MEDICINE

## 2025-05-04 PROCEDURE — 94640 AIRWAY INHALATION TREATMENT: CPT

## 2025-05-04 PROCEDURE — 93005 ELECTROCARDIOGRAM TRACING: CPT | Performed by: PHYSICIAN ASSISTANT

## 2025-05-04 PROCEDURE — 99232 SBSQ HOSP IP/OBS MODERATE 35: CPT | Performed by: STUDENT IN AN ORGANIZED HEALTH CARE EDUCATION/TRAINING PROGRAM

## 2025-05-04 RX ORDER — FUROSEMIDE 10 MG/ML
20 INJECTION INTRAMUSCULAR; INTRAVENOUS ONCE
Status: COMPLETED | OUTPATIENT
Start: 2025-05-04 | End: 2025-05-04

## 2025-05-04 RX ORDER — POLYETHYLENE GLYCOL 3350 17 G/17G
238 POWDER, FOR SOLUTION ORAL ONCE
Status: COMPLETED | OUTPATIENT
Start: 2025-05-04 | End: 2025-05-04

## 2025-05-04 RX ORDER — SODIUM CHLORIDE 0.9 % (FLUSH) 0.9 %
5-40 SYRINGE (ML) INJECTION EVERY 12 HOURS SCHEDULED
Status: DISCONTINUED | OUTPATIENT
Start: 2025-05-04 | End: 2025-05-05 | Stop reason: SDUPTHER

## 2025-05-04 RX ORDER — MAGNESIUM CARB/ALUMINUM HYDROX 105-160MG
296 TABLET,CHEWABLE ORAL ONCE
Status: COMPLETED | OUTPATIENT
Start: 2025-05-04 | End: 2025-05-04

## 2025-05-04 RX ORDER — SODIUM CHLORIDE 0.9 % (FLUSH) 0.9 %
5-40 SYRINGE (ML) INJECTION PRN
Status: DISCONTINUED | OUTPATIENT
Start: 2025-05-04 | End: 2025-05-05 | Stop reason: SDUPTHER

## 2025-05-04 RX ORDER — SENNOSIDES 8.6 MG/1
15 TABLET ORAL ONCE
Status: COMPLETED | OUTPATIENT
Start: 2025-05-04 | End: 2025-05-04

## 2025-05-04 RX ORDER — SODIUM CHLORIDE 9 MG/ML
25 INJECTION, SOLUTION INTRAVENOUS PRN
Status: DISCONTINUED | OUTPATIENT
Start: 2025-05-04 | End: 2025-05-05 | Stop reason: HOSPADM

## 2025-05-04 RX ADMIN — MIDODRINE HYDROCHLORIDE 10 MG: 10 TABLET ORAL at 09:50

## 2025-05-04 RX ADMIN — ATORVASTATIN CALCIUM 20 MG: 20 TABLET, FILM COATED ORAL at 09:50

## 2025-05-04 RX ADMIN — ESCITALOPRAM OXALATE 10 MG: 10 TABLET ORAL at 09:50

## 2025-05-04 RX ADMIN — FUROSEMIDE 20 MG: 10 INJECTION, SOLUTION INTRAMUSCULAR; INTRAVENOUS at 13:13

## 2025-05-04 RX ADMIN — OXYCODONE HYDROCHLORIDE AND ACETAMINOPHEN 500 MG: 500 TABLET ORAL at 09:50

## 2025-05-04 RX ADMIN — ALPRAZOLAM 0.5 MG: 0.5 TABLET ORAL at 01:09

## 2025-05-04 RX ADMIN — NITROGLYCERIN 0.5 INCH: 20 OINTMENT TOPICAL at 09:50

## 2025-05-04 RX ADMIN — METOPROLOL SUCCINATE 12.5 MG: 25 TABLET, EXTENDED RELEASE ORAL at 09:50

## 2025-05-04 RX ADMIN — GABAPENTIN 400 MG: 400 CAPSULE ORAL at 21:25

## 2025-05-04 RX ADMIN — TIOTROPIUM BROMIDE AND OLODATEROL 2 PUFF: 3.124; 2.736 SPRAY, METERED RESPIRATORY (INHALATION) at 09:12

## 2025-05-04 RX ADMIN — MIDODRINE HYDROCHLORIDE 10 MG: 10 TABLET ORAL at 13:14

## 2025-05-04 RX ADMIN — LEVOTHYROXINE SODIUM 75 MCG: 0.07 TABLET ORAL at 05:02

## 2025-05-04 RX ADMIN — MAJOR MAGNESIUM CITRATE ORAL SOLUTION - LEMON 296 ML: 1.75 LIQUID ORAL at 23:43

## 2025-05-04 RX ADMIN — GABAPENTIN 400 MG: 400 CAPSULE ORAL at 13:14

## 2025-05-04 RX ADMIN — PANTOPRAZOLE SODIUM 40 MG: 40 INJECTION, POWDER, LYOPHILIZED, FOR SOLUTION INTRAVENOUS at 09:50

## 2025-05-04 RX ADMIN — SENNOSIDES 129 MG: 8.6 TABLET, FILM COATED ORAL at 13:14

## 2025-05-04 RX ADMIN — Medication 1000 UNITS: at 09:50

## 2025-05-04 RX ADMIN — GABAPENTIN 400 MG: 400 CAPSULE ORAL at 09:50

## 2025-05-04 RX ADMIN — NITROGLYCERIN 0.5 INCH: 20 OINTMENT TOPICAL at 13:14

## 2025-05-04 RX ADMIN — FUROSEMIDE 20 MG: 20 TABLET ORAL at 09:50

## 2025-05-04 RX ADMIN — POLYETHYLENE GLYCOL 3350 238 G: 17 POWDER, FOR SOLUTION ORAL at 13:49

## 2025-05-04 RX ADMIN — PANTOPRAZOLE SODIUM 40 MG: 40 INJECTION, POWDER, LYOPHILIZED, FOR SOLUTION INTRAVENOUS at 21:24

## 2025-05-04 RX ADMIN — SODIUM CHLORIDE, PRESERVATIVE FREE 10 ML: 5 INJECTION INTRAVENOUS at 21:29

## 2025-05-04 RX ADMIN — FOLIC ACID 1000 MCG: 1 TABLET ORAL at 09:50

## 2025-05-04 RX ADMIN — SODIUM CHLORIDE, PRESERVATIVE FREE 10 ML: 5 INJECTION INTRAVENOUS at 13:14

## 2025-05-04 RX ADMIN — SODIUM CHLORIDE, PRESERVATIVE FREE 10 ML: 5 INJECTION INTRAVENOUS at 09:52

## 2025-05-04 RX ADMIN — NITROGLYCERIN 0.5 INCH: 20 OINTMENT TOPICAL at 21:24

## 2025-05-04 RX ADMIN — MIDODRINE HYDROCHLORIDE 10 MG: 10 TABLET ORAL at 18:05

## 2025-05-04 RX ADMIN — POTASSIUM CHLORIDE 20 MEQ: 1500 TABLET, EXTENDED RELEASE ORAL at 09:50

## 2025-05-04 RX ADMIN — ASPIRIN 81 MG: 81 TABLET, COATED ORAL at 09:50

## 2025-05-04 ASSESSMENT — PAIN DESCRIPTION - FREQUENCY
FREQUENCY: CONTINUOUS

## 2025-05-04 ASSESSMENT — PAIN SCALES - GENERAL
PAINLEVEL_OUTOF10: 5
PAINLEVEL_OUTOF10: 0
PAINLEVEL_OUTOF10: 5
PAINLEVEL_OUTOF10: 3
PAINLEVEL_OUTOF10: 3
PAINLEVEL_OUTOF10: 0
PAINLEVEL_OUTOF10: 2

## 2025-05-04 ASSESSMENT — PAIN DESCRIPTION - ONSET
ONSET: ON-GOING
ONSET: PROGRESSIVE
ONSET: ON-GOING

## 2025-05-04 ASSESSMENT — PAIN DESCRIPTION - DESCRIPTORS
DESCRIPTORS: PRESSURE;SHARP

## 2025-05-04 ASSESSMENT — PAIN - FUNCTIONAL ASSESSMENT
PAIN_FUNCTIONAL_ASSESSMENT: ACTIVITIES ARE NOT PREVENTED

## 2025-05-04 ASSESSMENT — PAIN DESCRIPTION - ORIENTATION
ORIENTATION: RIGHT
ORIENTATION: MID;LEFT
ORIENTATION: RIGHT

## 2025-05-04 ASSESSMENT — PAIN DESCRIPTION - PAIN TYPE
TYPE: ACUTE PAIN

## 2025-05-04 ASSESSMENT — PAIN DESCRIPTION - LOCATION
LOCATION: CHEST

## 2025-05-04 NOTE — PROGRESS NOTES
INTERNAL MEDICINE Progress Note  5/4/2025 1:33 PM  Subjective:   Admit Date: 4/30/2025  PCP: Juan Christiansen,   Interval History:     Weak,   Reports chest pain, on ntg  no SOB  No orthopnea.no PND    Objective:   Vitals: BP (!) 108/58   Pulse (!) 106   Temp 97.5 °F (36.4 °C) (Oral)   Resp 18   Ht 1.702 m (5' 7\")   Wt 87.5 kg (193 lb)   SpO2 98%   BMI 30.23 kg/m²   General appearance: alert and cooperative with exam  HEENT:  atraumatic  Neck: no adenopathy, no carotid bruit, and no JVD  Lungs: clear to auscultation bilaterally  Heart: regular rate and rhythm, S1, S2 normal, and 2/6 SM  Abdomen: soft, non-tender; bowel sounds normal; no masses,  no organomegaly  Extremities: extremities normal, atraumatic, no cyanosis or edema  Neurologic: Alert, oriented, thought content appropriate      Medications:   Scheduled Meds:   sodium chloride flush  5-40 mL IntraVENous 2 times per day    polyethylene glycol  238 g Oral Once    nitroglycerin  0.5 inch Topical 4 times per day    ferric gluconate (FERRLECIT) 125 mg in sodium chloride 0.9 % 100 mL IVPB  125 mg IntraVENous Once per day on Monday Wednesday Friday    pantoprazole  40 mg IntraVENous BID    metoprolol succinate  12.5 mg Oral Daily    [Held by provider] furosemide  20 mg Oral BID    sodium chloride flush  5-40 mL IntraVENous 2 times per day    [Held by provider] enoxaparin  40 mg SubCUTAneous Daily    aspirin  81 mg Oral Daily    atorvastatin  20 mg Oral Daily    Vitamin D  1,000 Units Oral Daily    escitalopram  10 mg Oral Daily    folic acid  1,000 mcg Oral Daily    gabapentin  400 mg Oral TID    levothyroxine  75 mcg Oral Daily    potassium chloride  20 mEq Oral Daily    [Held by provider] sucralfate  1 g Oral 4x Daily AC & HS    tiotropium-olodaterol  2 puff Inhalation Daily RT    vitamin C  500 mg Oral Daily    midodrine  10 mg Oral TID WC     Continuous Infusions:   sodium chloride      sodium chloride         Lab Results:   CBC:   Recent Labs      05/02/25  0613 05/03/25  0519 05/04/25  0517   WBC 7.5 7.8 8.2   HGB 7.2* 7.3* 7.3*    282 260     BMP:    Recent Labs     05/02/25  0613 05/03/25  0519 05/04/25  0517    136 136   K 4.3 4.7 4.7    104 103   CO2 21* 19* 18*   BUN 27* 28* 34*   CREATININE 1.0 1.0 1.2   GLUCOSE 92 91 84     Hepatic:   No results for input(s): \"AST\", \"ALT\", \"BILITOT\", \"ALKPHOS\" in the last 72 hours.    Invalid input(s): \"ALB\"    Lipids:   No results for input(s): \"CHOL\", \"HDL\", \"TRIG\", \"LDL\" in the last 72 hours.    Invalid input(s): \"LDLCALCU\"    INR:   Recent Labs     05/01/25  1540   INR 1.41*       Magnesium:    Lab Results   Component Value Date/Time    MG 2.4 05/04/2025 05:17 AM       HgBA1c:    Lab Results   Component Value Date/Time    LABA1C 5.2 01/02/2025 08:59 AM     TSH:    Lab Results   Component Value Date/Time    TSH 1.61 04/30/2025 12:37 PM       FOLATE:    Lab Results   Component Value Date/Time    FOLATE > 20.0 04/02/2025 09:19 AM     IRON:    Lab Results   Component Value Date/Time    IRON 25 04/30/2025 12:37 PM     FERRITIN:    Lab Results   Component Value Date/Time    FERRITIN 139 04/30/2025 12:37 PM     ECHO   04/21/2025      Left Ventricle: Moderately reduced left ventricular systolic function with a visually estimated EF of 30 - 35%. Left ventricle size is normal. Normal wall thickness. Moderate global hypokinesis present. Normal diastolic function.    Right Ventricle: Right ventricle size is normal. Normal systolic function.    Aortic Valve: Not well visualized. Bioprosthetic valve. AV mean gradient is 45 mmHg. Moderately calcified cusps. Severe stenosis of the aortic valve. Elevated prosthetic gradient. AV mean gradient is 45 mmHg. AV area by continuity VTI is 0.7 cm2.    Mitral Valve: Mild regurgitation.    Tricuspid Valve: Mild regurgitation.    Left Atrium: Left atrium is moderately dilated.    Image quality is adequate.    Assessment and Plan:   Severe iron def

## 2025-05-04 NOTE — PROGRESS NOTES
Gastroenterology Progress Note:     Patient Name:  Alberto Gilliland   MRN: [id number]  [id number]  YOB: 1965  Admit Date: 4/30/2025 12:24 PM  Primary Care Physician: Juan Christiansen DO   6K-22/022-A     Patient seen and examined.  24 hours events and chart reviewed.    Subjective: Patient HGB continues to trend down despite IV iron. Cardiology requesting GI workup prior to [facility].     Objective:  BP 90/63   Pulse 90   Temp 97.7 °F (36.5 °C) (Oral)   Resp 18   Ht 1.702 m (5' 7\")   Wt 87.5 kg (193 lb)   SpO2 96%   BMI 30.23 kg/m²     Physical Exam:    General:  Nourished in no distress  HEENT: Atraumatic, normocephalic. Moist oral mucous membranes.  Neck: Supple without adenopathy, JVD, thyromegaly or masses. Trachea midline.  CV: Heart RRR, no murmurs, rubs, gallops.  Resp: Even, easy without cough or accessory use. Lungs clear to ascultation bilaterally.   Abd: Round, soft, non-tender. No hepatosplenomegaly or mass present. Active bowel sounds heard. No distention noted.   Ext:  Without cyanosis, clubbing, edema.   Skin: Pink, warm, dry  Neuro:  Alert, oriented x 3 with no obvious deficits.       Rectal: deferred    Labs:   CBC:   Lab Results   Component Value Date/Time    WBC 8.2 05/04/2025 05:17 AM    HGB 7.3 05/04/2025 05:17 AM    HGB 12.0 05/18/2012 01:10 PM    HCT 24.8 05/04/2025 05:17 AM    MCV 94.3 05/04/2025 05:17 AM     05/04/2025 05:17 AM     BMP:   Lab Results   Component Value Date/Time     05/04/2025 05:17 AM    K 4.7 05/04/2025 05:17 AM    K 4.3 04/30/2025 12:37 PM     05/04/2025 05:17 AM    CO2 18 05/04/2025 05:17 AM    BUN 34 05/04/2025 05:17 AM    CREATININE 1.2 05/04/2025 05:17 AM    CALCIUM 8.7 05/04/2025 05:17 AM     PT/INR:   Lab Results   Component Value Date/Time    PROTIME 2.95 12/02/2011 07:15 AM    INR 1.41 05/01/2025 03:40 PM     Lipids:   Lab Results   Component Value Date/Time    ALKPHOS 88 05/01/2025 05:56 AM    ALT 63 05/01/2025 05:56 AM    AST 26

## 2025-05-04 NOTE — PROCEDURES
PROCEDURE NOTE  Date: 5/4/2025   Name: Alberto Gilliland  YOB: 1965    Procedures  EKG Completed. Handed to RN.

## 2025-05-04 NOTE — H&P
[facility] Endoscopy    Pre-Endoscopy H&PE      Patient: Alberto Gilliland    : 1965    Acct#: [id number]  Primary Care Physician: Juan Christiansen DO   Date of evaluation: 2025    Planned Procedure:    [x]EGD    []Enteroscopy    []PEG    []PEG change    []PEG removal  []Variceal banding    [x]Biopsy   []Dilation      []Control of bleeding  []Destruction of lesion by APC    []Stent Placement  []Foreign Body Removal    []Snare Polypectomy  []Other:       [x]Colonoscopy  []Flex Sigmoid []EUS, rectal      []Biopsy   []Dilation      []Control of bleeding  []Destruction of lesion by APC    []Stent Placement  []Foreign Body Removal    []Snare Polypectomy  []Other:      Planned Sedation  []Conscious Sedation [x]MAC/Propofol []Anesthesia    []None    Airway:  Adequate for planned sedation    Indication:   DIONISIO, early satiety, anorexia    History:  The patient is a 60 y.o. male with severe DIONISIO following with Hematologj for iv iron admitted 25 for hypotension with hgb 8.5 g/dL.  He has severe AS with history of AVR x2.  He continues to smoke.  Cardiology requests endoscopic workup for DIONISIO prior to [facility] in preparation for TAVR.  He reports early satiety, anorexia.     Cardiology ok to proceed with endoscopic evaluation.    Physical Exam:  VITALS: BP (!) 141/64   Pulse 92   Temp 97 °F (36.1 °C) (Temporal)   Resp 17   Ht 1.702 m (5' 7\")   Wt 87.5 kg (193 lb)   SpO2 98%   BMI 30.23 kg/m²   The patient is a 60 y.o. male in no acute distress.  HEAD: Normal cephalic/atraumatic. Extra-occular motions intact bilaterally.  NECK: No lymphadenopathy or bruits.  CHEST: Rises equally on inspiration. Clear to auscultation bilaterally.   CARDIOVASCULAR: Regular rate and rhythm without murmurs, rubs or gallops.   ABDOMEN: Soft, nontender, and nondistended with normal bowel sounds. No Hepatosplemomegaly.  UPPER EXTREMITIES: no cyanosis, clubbing, or edema.  DERM: no rash or jaundice.  LOWER EXTREMITIES:

## 2025-05-04 NOTE — PROGRESS NOTES
Spiritual Health History and Assessment/Progress Note  [facility]    Spiritual/Emotional Needs,  ,  ,      Name: Alberto Gilliland MRN: [id number]    Age: 60 y.o.     Sex: male   Language: English   Latter-day: None   Acute on chronic congestive heart failure (HCC)     Date: 5/4/2025            Total Time Calculated: 20 min              Spiritual Assessment continued in [facility] TELEMETRY 6K        Referral/Consult From: Nurse   Encounter Overview/Reason: Spiritual/Emotional Needs  Service Provided For: Patient    Shae, Belief, Meaning:   Patient identifies as spiritual and has beliefs or practices that help with coping during difficult times  Family/Friends No family/friends present      Importance and Influence:  Patient has spiritual/personal beliefs that influence decisions regarding their health  Family/Friends No family/friends present    Community:  Patient is connected with a spiritual community  Family/Friends No family/friends present    Assessment and Plan of Care:   PT is a 60 yr old single man, pt is laying down, there is a bedside commode to the left of the pt bed. Through active listening the pt shared \"Just a warning I am prepping for a colostomy tomorrow and may have to get up and go.\"  laughed with the pt. Pt shared \"he just loves the Lord, hasn't found a Mandaeism to attend yet, but knows the Lord has brought him through a lot and will still see him through.\" Pt shared \" all I am asking is for a season of prayer over him today and to pray for his recovery.\"  prayed for the pt per his Sikh wishes.   Patient Interventions include: Provided sacramental/Sikh ritual  Family/Friends Interventions include: No family/friends present    Patient Plan of Care: Spiritual Care available upon further referral  Family/Friends Plan of Care: No family/friends present    Electronically signed by Chaplain Malcolm on 5/4/2025 at 4:37 PM

## 2025-05-04 NOTE — PROGRESS NOTES
Cardiology Progress Note      Patient:  Alberto Gilliland  YOB: 1965  MRN: [id number]   Acct: [id number]  Admit Date:  4/30/2025  Primary Cardiologist: Tanisha Hathaway MD  Note per dr patel \"CHIEF COMPLAINT: fatigue/hypotension - pat sent to er from Hematology office for having low bp during iron infusion    CONSULT FOR: CHF/ Severe Aortic Stenosis      HPI: This is a pleasant 60-year-old male with a past medical history significant for hypertension, hyperlipidemia, coronary artery disease (status post CABG), atrial valve replacement, severe aortic stenosis, hypothyroidism, iron deficiency anemia (recent iron transfusion), COPD, and 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  He presented to [facility] ED with complaints of fatigue, hypotension, dyspnea on exertion, chest discomfort, and productive cough. He continues to smoke half a pack of cigarettes per day.  He was admitted for further evaluation and management of anemia. Given his cardiac history, a cardiology consult is requested for evaluation and management of congestive heart failure and severe aortic stenosis.  The patient denies fever, chills, neck or back pain, nausea, vomiting, or abdominal pain. He also denies orthopnea and does not require more than one pillow while sleeping.\"       Subjective (Events in last 24 hours):   Pt awake, alert. NAD. Still some chest discomfort despite not much activity. No complaints of swelling or orthopnea. Denies any dizzy or lightheaded symptoms. Discussed risks of EGD/colon given heart conditions. He expressed understanding.     Objective:   /68   Pulse 100   Temp 97.6 °F (36.4 °C) (Tympanic)   Resp 18   Ht 1.702 m (5' 7\")   Wt 87.5 kg (193 lb)   SpO2 97%   BMI 30.23 kg/m²      vss  TELEMETRY: nsr 100    Physical Exam:  General Appearance: alert and oriented to person, place and time, in no acute distress  Cardiovascular: borderline tachy rate, regular rhythm, normal

## 2025-05-05 ENCOUNTER — ANESTHESIA EVENT (OUTPATIENT)
Dept: ENDOSCOPY | Age: 60
End: 2025-05-05
Payer: COMMERCIAL

## 2025-05-05 ENCOUNTER — APPOINTMENT (OUTPATIENT)
Dept: ENDOSCOPY | Age: 60
DRG: 003 | End: 2025-05-05
Attending: INTERNAL MEDICINE
Payer: COMMERCIAL

## 2025-05-05 ENCOUNTER — ANESTHESIA (OUTPATIENT)
Dept: ENDOSCOPY | Age: 60
End: 2025-05-05
Payer: COMMERCIAL

## 2025-05-05 LAB
ANION GAP SERPL CALC-SCNC: 15 MEQ/L (ref 8–16)
BUN SERPL-MCNC: 37 MG/DL (ref 8–23)
CALCIUM SERPL-MCNC: 9 MG/DL (ref 8.8–10.2)
CHLORIDE SERPL-SCNC: 101 MEQ/L (ref 98–111)
CO2 SERPL-SCNC: 18 MEQ/L (ref 22–29)
CREAT SERPL-MCNC: 1.2 MG/DL (ref 0.7–1.2)
DEPRECATED RDW RBC AUTO: 77.8 FL (ref 35–45)
ERYTHROCYTE [DISTWIDTH] IN BLOOD BY AUTOMATED COUNT: 23.5 % (ref 11.5–14.5)
GFR SERPL CREATININE-BSD FRML MDRD: 68 ML/MIN/1.73M2
GLUCOSE SERPL-MCNC: 115 MG/DL (ref 74–109)
HCT VFR BLD AUTO: 24.8 % (ref 42–52)
HGB BLD-MCNC: 7.4 GM/DL (ref 14–18)
MAGNESIUM SERPL-MCNC: 2.6 MG/DL (ref 1.6–2.6)
MCH RBC QN AUTO: 27.9 PG (ref 26–33)
MCHC RBC AUTO-ENTMCNC: 29.8 GM/DL (ref 32.2–35.5)
MCV RBC AUTO: 93.6 FL (ref 80–94)
NT-PROBNP SERPL IA-MCNC: ABNORMAL PG/ML (ref 0–124)
PLATELET # BLD AUTO: 234 THOU/MM3 (ref 130–400)
PMV BLD AUTO: 11 FL (ref 9.4–12.4)
POTASSIUM SERPL-SCNC: 4.3 MEQ/L (ref 3.5–5.2)
RBC # BLD AUTO: 2.65 MILL/MM3 (ref 4.7–6.1)
SODIUM SERPL-SCNC: 134 MEQ/L (ref 135–145)
WBC # BLD AUTO: 9.2 THOU/MM3 (ref 4.8–10.8)

## 2025-05-05 PROCEDURE — 6360000002 HC RX W HCPCS

## 2025-05-05 PROCEDURE — 0DB98ZX EXCISION OF DUODENUM, VIA NATURAL OR ARTIFICIAL OPENING ENDOSCOPIC, DIAGNOSTIC: ICD-10-PCS | Performed by: INTERNAL MEDICINE

## 2025-05-05 PROCEDURE — 80048 BASIC METABOLIC PNL TOTAL CA: CPT

## 2025-05-05 PROCEDURE — 94640 AIRWAY INHALATION TREATMENT: CPT

## 2025-05-05 PROCEDURE — 2709999900 HC NON-CHARGEABLE SUPPLY: Performed by: INTERNAL MEDICINE

## 2025-05-05 PROCEDURE — 2500000003 HC RX 250 WO HCPCS

## 2025-05-05 PROCEDURE — 36415 COLL VENOUS BLD VENIPUNCTURE: CPT

## 2025-05-05 PROCEDURE — 88305 TISSUE EXAM BY PATHOLOGIST: CPT

## 2025-05-05 PROCEDURE — 1200000003 HC TELEMETRY R&B

## 2025-05-05 PROCEDURE — 6370000000 HC RX 637 (ALT 250 FOR IP): Performed by: PHYSICIAN ASSISTANT

## 2025-05-05 PROCEDURE — 2580000003 HC RX 258

## 2025-05-05 PROCEDURE — 7100000010 HC PHASE II RECOVERY - FIRST 15 MIN: Performed by: INTERNAL MEDICINE

## 2025-05-05 PROCEDURE — 83880 ASSAY OF NATRIURETIC PEPTIDE: CPT

## 2025-05-05 PROCEDURE — 3609012400 HC EGD TRANSORAL BIOPSY SINGLE/MULTIPLE: Performed by: INTERNAL MEDICINE

## 2025-05-05 PROCEDURE — 85027 COMPLETE CBC AUTOMATED: CPT

## 2025-05-05 PROCEDURE — 7100000011 HC PHASE II RECOVERY - ADDTL 15 MIN: Performed by: INTERNAL MEDICINE

## 2025-05-05 PROCEDURE — 3700000001 HC ADD 15 MINUTES (ANESTHESIA): Performed by: INTERNAL MEDICINE

## 2025-05-05 PROCEDURE — 0W3P8ZZ CONTROL BLEEDING IN GASTROINTESTINAL TRACT, VIA NATURAL OR ARTIFICIAL OPENING ENDOSCOPIC: ICD-10-PCS | Performed by: INTERNAL MEDICINE

## 2025-05-05 PROCEDURE — 2720000010 HC SURG SUPPLY STERILE: Performed by: INTERNAL MEDICINE

## 2025-05-05 PROCEDURE — 3700000000 HC ANESTHESIA ATTENDED CARE: Performed by: INTERNAL MEDICINE

## 2025-05-05 PROCEDURE — 99223 1ST HOSP IP/OBS HIGH 75: CPT | Performed by: INTERNAL MEDICINE

## 2025-05-05 PROCEDURE — 2500000003 HC RX 250 WO HCPCS: Performed by: INTERNAL MEDICINE

## 2025-05-05 PROCEDURE — 6360000002 HC RX W HCPCS: Performed by: STUDENT IN AN ORGANIZED HEALTH CARE EDUCATION/TRAINING PROGRAM

## 2025-05-05 PROCEDURE — 6370000000 HC RX 637 (ALT 250 FOR IP): Performed by: INTERNAL MEDICINE

## 2025-05-05 PROCEDURE — 3609009900 HC COLONOSCOPY W/CONTROL BLEEDING ANY METHOD: Performed by: INTERNAL MEDICINE

## 2025-05-05 PROCEDURE — 83735 ASSAY OF MAGNESIUM: CPT

## 2025-05-05 RX ORDER — PROPOFOL 10 MG/ML
INJECTION, EMULSION INTRAVENOUS
Status: DISCONTINUED | OUTPATIENT
Start: 2025-05-05 | End: 2025-05-05 | Stop reason: SDUPTHER

## 2025-05-05 RX ORDER — FUROSEMIDE 10 MG/ML
20 INJECTION INTRAMUSCULAR; INTRAVENOUS ONCE
Status: COMPLETED | OUTPATIENT
Start: 2025-05-05 | End: 2025-05-05

## 2025-05-05 RX ADMIN — MIDODRINE HYDROCHLORIDE 10 MG: 10 TABLET ORAL at 16:02

## 2025-05-05 RX ADMIN — SODIUM CHLORIDE 125 MG: 9 INJECTION, SOLUTION INTRAVENOUS at 14:46

## 2025-05-05 RX ADMIN — ALPRAZOLAM 0.5 MG: 0.5 TABLET ORAL at 21:39

## 2025-05-05 RX ADMIN — POTASSIUM CHLORIDE 20 MEQ: 1500 TABLET, EXTENDED RELEASE ORAL at 09:24

## 2025-05-05 RX ADMIN — PHENYLEPHRINE HYDROCHLORIDE 300 MCG: 10 INJECTION INTRAVENOUS at 11:16

## 2025-05-05 RX ADMIN — TIOTROPIUM BROMIDE AND OLODATEROL 2 PUFF: 3.124; 2.736 SPRAY, METERED RESPIRATORY (INHALATION) at 08:02

## 2025-05-05 RX ADMIN — PHENYLEPHRINE HYDROCHLORIDE 200 MCG: 10 INJECTION INTRAVENOUS at 11:31

## 2025-05-05 RX ADMIN — GABAPENTIN 400 MG: 400 CAPSULE ORAL at 09:24

## 2025-05-05 RX ADMIN — LEVOTHYROXINE SODIUM 75 MCG: 0.07 TABLET ORAL at 06:33

## 2025-05-05 RX ADMIN — Medication 50 MG: at 11:15

## 2025-05-05 RX ADMIN — PANTOPRAZOLE SODIUM 40 MG: 40 INJECTION, POWDER, LYOPHILIZED, FOR SOLUTION INTRAVENOUS at 09:24

## 2025-05-05 RX ADMIN — NITROGLYCERIN 0.5 INCH: 20 OINTMENT TOPICAL at 14:45

## 2025-05-05 RX ADMIN — FUROSEMIDE 20 MG: 10 INJECTION, SOLUTION INTRAMUSCULAR; INTRAVENOUS at 09:24

## 2025-05-05 RX ADMIN — PROPOFOL 20 MG: 10 INJECTION, EMULSION INTRAVENOUS at 11:27

## 2025-05-05 RX ADMIN — PROPOFOL 20 MG: 10 INJECTION, EMULSION INTRAVENOUS at 11:16

## 2025-05-05 RX ADMIN — PANTOPRAZOLE SODIUM 40 MG: 40 INJECTION, POWDER, LYOPHILIZED, FOR SOLUTION INTRAVENOUS at 21:38

## 2025-05-05 RX ADMIN — Medication 1000 UNITS: at 09:24

## 2025-05-05 RX ADMIN — PHENYLEPHRINE HYDROCHLORIDE 200 MCG: 10 INJECTION INTRAVENOUS at 11:15

## 2025-05-05 RX ADMIN — ATORVASTATIN CALCIUM 20 MG: 20 TABLET, FILM COATED ORAL at 09:24

## 2025-05-05 RX ADMIN — SODIUM CHLORIDE: 9 INJECTION, SOLUTION INTRAVENOUS at 11:12

## 2025-05-05 RX ADMIN — OXYCODONE HYDROCHLORIDE AND ACETAMINOPHEN 500 MG: 500 TABLET ORAL at 09:24

## 2025-05-05 RX ADMIN — SODIUM CHLORIDE, PRESERVATIVE FREE 10 ML: 5 INJECTION INTRAVENOUS at 09:25

## 2025-05-05 RX ADMIN — GABAPENTIN 400 MG: 400 CAPSULE ORAL at 14:45

## 2025-05-05 RX ADMIN — MIDODRINE HYDROCHLORIDE 10 MG: 10 TABLET ORAL at 14:45

## 2025-05-05 RX ADMIN — METOPROLOL SUCCINATE 12.5 MG: 25 TABLET, EXTENDED RELEASE ORAL at 09:23

## 2025-05-05 RX ADMIN — ESCITALOPRAM OXALATE 10 MG: 10 TABLET ORAL at 09:24

## 2025-05-05 RX ADMIN — NITROGLYCERIN 0.5 INCH: 20 OINTMENT TOPICAL at 18:24

## 2025-05-05 RX ADMIN — FOLIC ACID 1000 MCG: 1 TABLET ORAL at 09:24

## 2025-05-05 RX ADMIN — GABAPENTIN 400 MG: 400 CAPSULE ORAL at 21:38

## 2025-05-05 RX ADMIN — MIDODRINE HYDROCHLORIDE 10 MG: 10 TABLET ORAL at 09:24

## 2025-05-05 RX ADMIN — SODIUM CHLORIDE, PRESERVATIVE FREE 10 ML: 5 INJECTION INTRAVENOUS at 21:39

## 2025-05-05 ASSESSMENT — PAIN - FUNCTIONAL ASSESSMENT
PAIN_FUNCTIONAL_ASSESSMENT: NONE - DENIES PAIN

## 2025-05-05 ASSESSMENT — ENCOUNTER SYMPTOMS: SHORTNESS OF BREATH: 1

## 2025-05-05 NOTE — ANESTHESIA POSTPROCEDURE EVALUATION
Department of Anesthesiology  Postprocedure Note    Patient: Alberto Gilliland  MRN: [id number]  YOB: 1965  Date of evaluation: 5/5/2025    Procedure Summary       Date: 05/05/25 Room / Location: [place] [id number] / [facility]    Anesthesia Start: 1112 Anesthesia Stop: 1139    Procedures:       ESOPHAGOGASTRODUODENOSCOPY BIOPSY      COLONOSCOPY CONTROL HEMORRHAGE Diagnosis:       Acute anemia      (Acute anemia [D64.9])    Surgeons: Hany Allen MD Responsible Provider: Neal Ramires DO    Anesthesia Type: MAC ASA Status: 3            Anesthesia Type: No value filed.    Anna Phase I: Anna Score: 10    Anna Phase II:      Anesthesia Post Evaluation    Patient location during evaluation: bedside  Patient participation: complete - patient participated  Level of consciousness: awake  Pain score: 0  Airway patency: patent  Nausea & Vomiting: no nausea and no vomiting  Cardiovascular status: hemodynamically stable and blood pressure returned to baseline  Respiratory status: room air, nonlabored ventilation and spontaneous ventilation  Hydration status: stable        No notable events documented.

## 2025-05-05 NOTE — CARE COORDINATION
5/5/25, 2:53 PM EDT    DISCHARGE ON GOING EVALUATION    [facility] day: 5  Location: -22/022-A Reason for admit: Chronic anemia [D64.9]  Troponin level elevated [R79.89]  S/P AVR [Z95.2]  Hypotension, unspecified hypotension type [I95.9]  Acute on chronic congestive heart failure, unspecified heart failure type (HCC) [I50.9]  Acute exacerbation of chronic heart failure (HCC) [I50.9]     Procedures: 5/5 EGD- normal, bx taken  5/5 colonoscopy-  Large cecal angiectasia destroyed with APC     Imaging since last note: n/a     Barriers to Discharge: Hgb 7.4, BNP 60383. Iron infusions MWF. Okay to resume OAC in 48 hours per GI. Cardio following, need for heart cath and [id number].     PCP: Juan Christiansen,   Readmission Risk Score: 17.3    Patient Goals/Plan/Treatment Preferences: Home alone. LV referral faxed to [facility].

## 2025-05-05 NOTE — PROGRESS NOTES
Scope #  .  EGD completed, tolerated well.  biopsies taken, 1 jars labeled and sent to lab. Photos taken.     Scope # .  Colonoscopy completed, tolerated well. APC used on RT colon settings, grounding pad removed and skin intact. Photos taken.

## 2025-05-05 NOTE — PLAN OF CARE
Problem: Respiratory - Adult  Goal: Lung sounds clear or within normal limits for patient  Outcome: Progressing   Continue inhaler to improve breath sounds. Pt agrees with plan of care

## 2025-05-05 NOTE — ANESTHESIA PRE PROCEDURE
dentures      Pulmonary:   (+)  COPD:  shortness of breath:     decreased breath sounds                               Cardiovascular:  Exercise tolerance: poor (<4 METS)  (+) hypertension:, CAD:, CHF:      NYHA Classification: II  ECG reviewed  Rhythm: regular  Rate: normal  Echocardiogram reviewed         Beta Blocker:  Dose within 24 Hrs         Neuro/Psych:   (+) neuromuscular disease:, headaches:, psychiatric history:            GI/Hepatic/Renal:   (+) GERD:, PUD, liver disease:          Endo/Other:    (+) hypothyroidism::., malignancy/cancer.                 Abdominal:   (+) obese    Abdomen: soft.      Vascular:          Other Findings:             Anesthesia Plan      MAC     ASA 3       Induction: intravenous.      Anesthetic plan and risks discussed with patient and sibling.      Plan discussed with attending.                    JASWINDER Tatum - CRNA   5/5/2025

## 2025-05-05 NOTE — PROGRESS NOTES
Recovery mode. Denies discomfort, passing gas. Dr. Allen discussed findings and plan of care with patient and family.  Nurse called report to floor nurse.

## 2025-05-05 NOTE — PROGRESS NOTES
Pt admitted to Endo department and admitted to Endo room 12  Plan of care reviewed with patient.   Call light within reach.   Bed in lowest position, locked, with one bed rail up.   Appropriate arm bands on patient.   Bathroom offered.   All questions and concerns of patient addressed.  Allergies confirmed with patient.    Name: Guerrero  Relationship to patient: Brother

## 2025-05-05 NOTE — OP NOTE
[facility] Endoscopy    Patient: Alberto Gilliland  : 1965  Acct#: [id number]  Date of evaluation: 2025  Primary Care Physician: Juan Christiansen DO     Procedure:    [x]EGD          [x]Biopsy           [x]Colonoscopy        [x]Destruction of lesion by APC    [x] submucosal injection      Indication:   DIONISIO, early satiety, anorexia    History:  The patient is a 60 y.o. male with severe DIONISIO following with Hematologj for iv iron admitted 25 for hypotension with hgb 8.5 g/dL.  He has severe AS with history of AVR x2.  He continues to smoke.  Cardiology requests endoscopic workup for DIONISIO prior to [facility] in preparation for TAVR.  He reports early satiety, anorexia.     Cardiology ok to proceed with endoscopic evaluation.    Physical Exam:  VITALS: BP (!) 141/64   Pulse 92   Temp 97 °F (36.1 °C) (Temporal)   Resp 17   Ht 1.702 m (5' 7\")   Wt 87.5 kg (193 lb)   SpO2 98%   BMI 30.23 kg/m²   The patient is a 60 y.o. male in no acute distress.  HEAD: Normal cephalic/atraumatic. Extra-occular motions intact bilaterally.  NECK: No lymphadenopathy or bruits.  CHEST: Rises equally on inspiration. Clear to auscultation bilaterally.   CARDIOVASCULAR: Regular rate and rhythm without murmurs, rubs or gallops.   ABDOMEN: Soft, nontender, and nondistended with normal bowel sounds. No Hepatosplemomegaly.  UPPER EXTREMITIES: no cyanosis, clubbing, or edema.  DERM: no rash or jaundice.  LOWER EXTREMITIES: no cyanosis, clubbing, or edema.  NEURO: Alert and oriented times four. Patient moves all extremities and has gross sensation in all extremities.    ASA: ASA 3 - Patient with moderate systemic disease with functional limitations    MEDICATION:    MAC/Propofol/Anesthesia:  Yes     Photo:  Yes  Biopsy:  Yes      Description of Procedure:  The risks and benefits of the procedure were described to the patient or their representative if unable to give consent including but  Add Associated Diagnosis When Managing Medication Side Effects: Yes

## 2025-05-05 NOTE — CONSULTS
[facility]'s  Consult    Patient's Name/Date of Birth: Alberto Gilliland / 1965 (60 y.o.)    Date: May 5, 2025     Referring Provider: Damaso Mcwilliams MD    CHIEF COMPLAINT:  Severe AS      HPI: This is a pleasant 60 y.o. 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  Has intermittent chest pain and MA as well.  Progressively getting worse.  Not lightheaded now.  Awaiting GI evaluation for bleeding. EF is 30-35% with DELMI 0.7 cm2, mean gradient 45 mmHg. EF has gotten worse since prior which was 45-50% 4/2022.  Patient continues to smoke.  Borderline SBP 99 mmHg on midodrine.  CTS has seen the patient and he is not candidate for SAVR at this time.  ECG with NSR, IVCD, minimal voltage LVH.  K 4.3.  Cr 0.7.  BNP 21236.  Hgb 7.4, Plts 234.  INR 1.4.  No cancers.          All labs, EKG's, diagnostic testing and images as well as cardiac cath, stress testing were reviewed during this encounter    Past Medical History:   Diagnosis Date    Acute exacerbation of chronic heart failure (HCC) 4/30/2025    Arthritis     general    Bilateral sciatica     Bleeding     with coumadin    CAD (coronary artery disease)     Carpal tunnel syndrome     RIGHT    Cervicalgia, C5-7     Chronic anxiety     COPD, mild (HCC) 04/15/2023    Dysthymia (or depressive neurosis) 02/01/2016    ED (erectile dysfunction)     ED (erectile dysfunction)     Fatty liver 3/5/2024    GERD (gastroesophageal reflux disease)     Headache(784.0)     History of blood transfusion 03/2016    because of taking coumadin    HTN (hypertension)     Hyperlipidemia     Hypothyroidism     Iron deficiency anemia, blood loss     Lumbago     Lumbar radiculopathy     Lymphomatoid papulosis (HCC)     sores on skin    Osteoarthritis (arthritis due to wear and tear of joints)     S/P AVR, coumadin Tx 2016  sodium chloride 0.9 % 100 mL IVPB  125 mg IntraVENous Once per day on Monday Wednesday Friday    pantoprazole  40 mg IntraVENous BID    metoprolol succinate  12.5 mg Oral Daily    [Held by provider] furosemide  20 mg Oral BID    sodium chloride flush  5-40 mL IntraVENous 2 times per day    [Held by provider] enoxaparin  40 mg SubCUTAneous Daily    aspirin  81 mg Oral Daily    atorvastatin  20 mg Oral Daily    Vitamin D  1,000 Units Oral Daily    escitalopram  10 mg Oral Daily    folic acid  1,000 mcg Oral Daily    gabapentin  400 mg Oral TID    levothyroxine  75 mcg Oral Daily    potassium chloride  20 mEq Oral Daily    [Held by provider] sucralfate  1 g Oral 4x Daily AC & HS    tiotropium-olodaterol  2 puff Inhalation Daily RT    vitamin C  500 mg Oral Daily    midodrine  10 mg Oral TID WC     Continuous Infusions:   sodium chloride      sodium chloride       PRN Meds:.sodium chloride flush, sodium chloride, sodium chloride flush, sodium chloride, ondansetron **OR** ondansetron, polyethylene glycol, acetaminophen **OR** acetaminophen, potassium chloride **OR** potassium alternative oral replacement **OR** potassium chloride, magnesium sulfate, ALPRAZolam    Allergies   Allergen Reactions    Seasonal      Pollen- sneezing and watery eyes     Family History   Problem Relation Age of Onset    Cancer Mother     Diabetes Father     Cancer Father     Heart Disease Father 35        CABG    High Blood Pressure Father     Diabetes Sister         AS A CHILD    Kidney Disease Sister     High Blood Pressure Sister     COPD Sister     Heart Disease Sister     Lung Cancer Brother     Stroke Neg Hx      Social History     Socioeconomic History    Marital status:      Spouse name: Not on file    Number of children: 2    Years of education: 12    Highest education level: High school graduate   Occupational History    Not on file   Tobacco Use    Smoking status: Some Days     Current packs/day: 0.50     Average packs/day: 1

## 2025-05-05 NOTE — PROGRESS NOTES
INTERNAL MEDICINE Progress Note  5/5/2025 5:26 PM  Subjective:   Admit Date: 4/30/2025  PCP: Juan Christiansen, DO  Interval History:     Colonoscopy showed cecal AVM;    UPPER ENDOSCOPY: showed  1.  Esophagus - normal   2.  Stomach - normal   3.  Duodenum - normal, biopsied  4.  1 cm prolapsing submucosal lesion between the 1st and 2nd portion of the duodenum.  Using a biopsy forcep, a all surface areas are inspected and it is not ulcerated, inflamed, or bleeding.    no SOB  No orthopnea.  no PND    Objective:   Vitals: /61   Pulse 89   Temp 97 °F (36.1 °C)   Resp 16   Ht 1.702 m (5' 7\")   Wt 87.5 kg (193 lb)   SpO2 98%   BMI 30.23 kg/m²   General appearance: alert and cooperative with exam  HEENT:  atraumatic  Neck: no adenopathy, no carotid bruit, and no JVD  Lungs: clear to auscultation bilaterally  Heart: regular rate and rhythm, S1, S2 normal, and 2/6 SM  Abdomen: soft, non-tender; bowel sounds normal; no masses,  no organomegaly  Extremities: extremities normal, atraumatic, no cyanosis or edema  Neurologic: Alert, oriented, thought content appropriate      Medications:   Scheduled Meds:   nitroglycerin  0.5 inch Topical 4 times per day    ferric gluconate (FERRLECIT) 125 mg in sodium chloride 0.9 % 100 mL IVPB  125 mg IntraVENous Once per day on Monday Wednesday Friday    pantoprazole  40 mg IntraVENous BID    metoprolol succinate  12.5 mg Oral Daily    [Held by provider] furosemide  20 mg Oral BID    sodium chloride flush  5-40 mL IntraVENous 2 times per day    [Held by provider] enoxaparin  40 mg SubCUTAneous Daily    aspirin  81 mg Oral Daily    atorvastatin  20 mg Oral Daily    Vitamin D  1,000 Units Oral Daily    escitalopram  10 mg Oral Daily    folic acid  1,000 mcg Oral Daily    gabapentin  400 mg Oral TID    levothyroxine  75 mcg Oral Daily    potassium chloride  20 mEq Oral Daily    [Held by provider] sucralfate  1 g Oral 4x Daily AC & HS    tiotropium-olodaterol  2 puff Inhalation

## 2025-05-05 NOTE — PLAN OF CARE
Problem: Discharge Planning  Goal: Discharge to home or other facility with appropriate resources  5/5/2025 0009 by Ricky Alford RN  Outcome: Progressing     Problem: ABCDS Injury Assessment  Goal: Absence of physical injury  5/5/2025 0009 by Ricky Alford RN  Outcome: Progressing     Problem: Safety - Adult  Goal: Free from fall injury  5/5/2025 0009 by Ricky Alford RN  Outcome: Progressing     Problem: Pain  Goal: Verbalizes/displays adequate comfort level or baseline comfort level  5/5/2025 0009 by Ricky Alford RN  Outcome: Progressing     Problem: Respiratory - Adult  Goal: Achieves optimal ventilation and oxygenation  5/5/2025 0009 by Ricky Alford RN  Outcome: Progressing   Care plan reviewed with patient.  Patient verbalize understanding of the plan of care and contribute to goal setting.

## 2025-05-06 ENCOUNTER — TELEPHONE (OUTPATIENT)
Dept: ONCOLOGY | Age: 60
End: 2025-05-06

## 2025-05-06 PROBLEM — R07.9 CHEST PAIN: Status: ACTIVE | Noted: 2025-04-30

## 2025-05-06 LAB
ABO GROUP BLD: NORMAL
ANION GAP SERPL CALC-SCNC: 15 MEQ/L (ref 8–16)
BUN SERPL-MCNC: 47 MG/DL (ref 8–23)
CALCIUM SERPL-MCNC: 8.2 MG/DL (ref 8.8–10.2)
CHLORIDE SERPL-SCNC: 100 MEQ/L (ref 98–111)
CO2 SERPL-SCNC: 18 MEQ/L (ref 22–29)
CREAT SERPL-MCNC: 1.4 MG/DL (ref 0.7–1.2)
DEPRECATED RDW RBC AUTO: 80.2 FL (ref 35–45)
ERYTHROCYTE [DISTWIDTH] IN BLOOD BY AUTOMATED COUNT: 24 % (ref 11.5–14.5)
GFR SERPL CREATININE-BSD FRML MDRD: 57 ML/MIN/1.73M2
GLUCOSE SERPL-MCNC: 105 MG/DL (ref 74–109)
HCT VFR BLD AUTO: 26.4 % (ref 42–52)
HGB BLD-MCNC: 7.8 GM/DL (ref 14–18)
IAT IGG-SP REAG SERPL QL: NORMAL
MCH RBC QN AUTO: 28.1 PG (ref 26–33)
MCHC RBC AUTO-ENTMCNC: 29.5 GM/DL (ref 32.2–35.5)
MCV RBC AUTO: 95 FL (ref 80–94)
PLATELET # BLD AUTO: 218 THOU/MM3 (ref 130–400)
PMV BLD AUTO: 11.8 FL (ref 9.4–12.4)
POTASSIUM SERPL-SCNC: 4.9 MEQ/L (ref 3.5–5.2)
RBC # BLD AUTO: 2.78 MILL/MM3 (ref 4.7–6.1)
RH BLD: NORMAL
SODIUM SERPL-SCNC: 133 MEQ/L (ref 135–145)
WBC # BLD AUTO: 10.7 THOU/MM3 (ref 4.8–10.8)

## 2025-05-06 PROCEDURE — 36430 TRANSFUSION BLD/BLD COMPNT: CPT

## 2025-05-06 PROCEDURE — 6360000002 HC RX W HCPCS: Performed by: PHYSICIAN ASSISTANT

## 2025-05-06 PROCEDURE — 6370000000 HC RX 637 (ALT 250 FOR IP): Performed by: INTERNAL MEDICINE

## 2025-05-06 PROCEDURE — P9016 RBC LEUKOCYTES REDUCED: HCPCS

## 2025-05-06 PROCEDURE — 1200000003 HC TELEMETRY R&B

## 2025-05-06 PROCEDURE — 2500000003 HC RX 250 WO HCPCS: Performed by: INTERNAL MEDICINE

## 2025-05-06 PROCEDURE — 85027 COMPLETE CBC AUTOMATED: CPT

## 2025-05-06 PROCEDURE — 36415 COLL VENOUS BLD VENIPUNCTURE: CPT

## 2025-05-06 PROCEDURE — 94640 AIRWAY INHALATION TREATMENT: CPT

## 2025-05-06 PROCEDURE — 86901 BLOOD TYPING SEROLOGIC RH(D): CPT

## 2025-05-06 PROCEDURE — 86885 COOMBS TEST INDIRECT QUAL: CPT

## 2025-05-06 PROCEDURE — 86880 COOMBS TEST DIRECT: CPT

## 2025-05-06 PROCEDURE — 6360000002 HC RX W HCPCS: Performed by: INTERNAL MEDICINE

## 2025-05-06 PROCEDURE — 86923 COMPATIBILITY TEST ELECTRIC: CPT

## 2025-05-06 PROCEDURE — 6360000002 HC RX W HCPCS

## 2025-05-06 PROCEDURE — 30233N1 TRANSFUSION OF NONAUTOLOGOUS RED BLOOD CELLS INTO PERIPHERAL VEIN, PERCUTANEOUS APPROACH: ICD-10-PCS | Performed by: PHYSICIAN ASSISTANT

## 2025-05-06 PROCEDURE — 99232 SBSQ HOSP IP/OBS MODERATE 35: CPT | Performed by: PHYSICIAN ASSISTANT

## 2025-05-06 PROCEDURE — 86900 BLOOD TYPING SEROLOGIC ABO: CPT

## 2025-05-06 PROCEDURE — 80048 BASIC METABOLIC PNL TOTAL CA: CPT

## 2025-05-06 RX ORDER — METHOTREXATE 2.5 MG/1
7.5 TABLET ORAL EVERY 12 HOURS
Status: DISCONTINUED | OUTPATIENT
Start: 2025-05-06 | End: 2025-05-06

## 2025-05-06 RX ORDER — SODIUM CHLORIDE 9 MG/ML
INJECTION, SOLUTION INTRAVENOUS PRN
Status: DISCONTINUED | OUTPATIENT
Start: 2025-05-06 | End: 2025-05-10

## 2025-05-06 RX ORDER — FUROSEMIDE 10 MG/ML
20 INJECTION INTRAMUSCULAR; INTRAVENOUS ONCE
Status: COMPLETED | OUTPATIENT
Start: 2025-05-06 | End: 2025-05-06

## 2025-05-06 RX ORDER — METHOTREXATE 2.5 MG/1
7.5 TABLET ORAL EVERY 12 HOURS
Status: COMPLETED | OUTPATIENT
Start: 2025-05-06 | End: 2025-05-07

## 2025-05-06 RX ADMIN — GABAPENTIN 400 MG: 400 CAPSULE ORAL at 20:05

## 2025-05-06 RX ADMIN — POTASSIUM CHLORIDE 20 MEQ: 1500 TABLET, EXTENDED RELEASE ORAL at 09:00

## 2025-05-06 RX ADMIN — ASPIRIN 81 MG: 81 TABLET, COATED ORAL at 08:59

## 2025-05-06 RX ADMIN — MIDODRINE HYDROCHLORIDE 10 MG: 10 TABLET ORAL at 12:38

## 2025-05-06 RX ADMIN — MIDODRINE HYDROCHLORIDE 10 MG: 10 TABLET ORAL at 08:59

## 2025-05-06 RX ADMIN — FUROSEMIDE 20 MG: 10 INJECTION, SOLUTION INTRAMUSCULAR; INTRAVENOUS at 21:30

## 2025-05-06 RX ADMIN — ATORVASTATIN CALCIUM 20 MG: 20 TABLET, FILM COATED ORAL at 08:59

## 2025-05-06 RX ADMIN — GABAPENTIN 400 MG: 400 CAPSULE ORAL at 09:00

## 2025-05-06 RX ADMIN — GABAPENTIN 400 MG: 400 CAPSULE ORAL at 14:34

## 2025-05-06 RX ADMIN — SODIUM CHLORIDE, PRESERVATIVE FREE 10 ML: 5 INJECTION INTRAVENOUS at 21:30

## 2025-05-06 RX ADMIN — TIOTROPIUM BROMIDE AND OLODATEROL 2 PUFF: 3.124; 2.736 SPRAY, METERED RESPIRATORY (INHALATION) at 08:34

## 2025-05-06 RX ADMIN — METHOTREXATE 7.5 MG: 2.5 TABLET ORAL at 20:05

## 2025-05-06 RX ADMIN — ACETAMINOPHEN 650 MG: 325 TABLET ORAL at 16:52

## 2025-05-06 RX ADMIN — LEVOTHYROXINE SODIUM 75 MCG: 0.07 TABLET ORAL at 05:52

## 2025-05-06 RX ADMIN — PANTOPRAZOLE SODIUM 40 MG: 40 INJECTION, POWDER, LYOPHILIZED, FOR SOLUTION INTRAVENOUS at 21:30

## 2025-05-06 RX ADMIN — ESCITALOPRAM OXALATE 10 MG: 10 TABLET ORAL at 08:59

## 2025-05-06 RX ADMIN — FOLIC ACID 1000 MCG: 1 TABLET ORAL at 08:59

## 2025-05-06 RX ADMIN — PANTOPRAZOLE SODIUM 40 MG: 40 INJECTION, POWDER, LYOPHILIZED, FOR SOLUTION INTRAVENOUS at 08:59

## 2025-05-06 RX ADMIN — MIDODRINE HYDROCHLORIDE 10 MG: 10 TABLET ORAL at 16:53

## 2025-05-06 RX ADMIN — OXYCODONE HYDROCHLORIDE AND ACETAMINOPHEN 500 MG: 500 TABLET ORAL at 08:59

## 2025-05-06 RX ADMIN — Medication 1000 UNITS: at 09:00

## 2025-05-06 RX ADMIN — SODIUM CHLORIDE, PRESERVATIVE FREE 10 ML: 5 INJECTION INTRAVENOUS at 09:00

## 2025-05-06 ASSESSMENT — PAIN DESCRIPTION - DESCRIPTORS: DESCRIPTORS: ACHING

## 2025-05-06 ASSESSMENT — PAIN SCALES - GENERAL
PAINLEVEL_OUTOF10: 0
PAINLEVEL_OUTOF10: 0
PAINLEVEL_OUTOF10: 3
PAINLEVEL_OUTOF10: 0
PAINLEVEL_OUTOF10: 0

## 2025-05-06 ASSESSMENT — PAIN - FUNCTIONAL ASSESSMENT: PAIN_FUNCTIONAL_ASSESSMENT: ACTIVITIES ARE NOT PREVENTED

## 2025-05-06 ASSESSMENT — PAIN DESCRIPTION - ORIENTATION: ORIENTATION: MID

## 2025-05-06 ASSESSMENT — PAIN DESCRIPTION - LOCATION: LOCATION: HEAD

## 2025-05-06 ASSESSMENT — PAIN SCALES - WONG BAKER
WONGBAKER_NUMERICALRESPONSE: NO HURT

## 2025-05-06 NOTE — PROGRESS NOTES
Gastroenterology Progress Note:     Patient Name:  Alberto Gilliland   MRN: [id number]  [id number]  YOB: 1965  Admit Date: 4/30/2025 12:24 PM  Primary Care Physician: Juan Christiansen DO   [id number]     Patient seen and examined.  24 hours events and chart reviewed.    Subjective: Hemoglobin stable today at 7.8.  Patient reports he does not feel well today , he ate and was incontinent of diarrhea and has been nauseated since then.  Denies any abdominal pain. No melena or hematochezia.      Objective:  BP 90/61   Pulse 92   Temp 98 °F (36.7 °C)   Resp 18   Ht 1.702 m (5' 7\")   Wt 84.6 kg (186 lb 6.4 oz)   SpO2 96%   BMI 29.19 kg/m²     Physical Exam  Vitals and nursing note reviewed.   Constitutional:       Appearance: Normal appearance.   HENT:      Mouth/Throat:      Mouth: Mucous membranes are moist.      Pharynx: Oropharynx is clear.   Eyes:      Pupils: Pupils are equal, round, and reactive to light.   Cardiovascular:      Rate and Rhythm: Normal rate and regular rhythm.      Heart sounds: No murmur heard.  Pulmonary:      Effort: Pulmonary effort is normal.      Breath sounds: Normal breath sounds.   Abdominal:      General: Abdomen is flat. Bowel sounds are normal. There is no distension.      Palpations: Abdomen is soft. There is no mass.      Tenderness: There is no abdominal tenderness. There is no guarding or rebound.      Hernia: No hernia is present.   Skin:     General: Skin is warm and dry.      Capillary Refill: Capillary refill takes less than 2 seconds.   Neurological:      Mental Status: He is alert. Mental status is at baseline.           Labs:   CBC:   Lab Results   Component Value Date/Time    WBC 10.7 05/06/2025 05:49 AM    HGB 7.8 05/06/2025 05:49 AM    HGB 12.0 05/18/2012 01:10 PM    HCT 26.4 05/06/2025 05:49 AM    MCV 95.0 05/06/2025 05:49 AM     05/06/2025 05:49 AM     BMP:   Lab Results   Component Value Date/Time     05/06/2025 05:49 AM    K 4.9

## 2025-05-06 NOTE — PLAN OF CARE
Problem: Respiratory - Adult  Goal: Achieves optimal ventilation and oxygenation  5/6/2025 0836 by Ceci Olivas RCP  Outcome: Progressing     Problem: Respiratory - Adult  Goal: Lung sounds clear or within normal limits for patient  Outcome: Progressing

## 2025-05-06 NOTE — CONSENT
Informed Consent for Blood Component Transfusion Note    I have discussed with the patient the rationale for blood component transfusion; its benefits in treating or preventing fatigue, organ damage, or death; and its risk which includes mild transfusion reactions, rare risk of blood borne infection, or more serious but rare reactions. I have discussed the alternatives to transfusion, including the risk and consequences of not receiving transfusion. The patient had an opportunity to ask questions and had agreed to proceed with transfusion of blood components.    Electronically signed by Damaso Mcwilliams MD on 5/6/25 at 5:55 PM EDT

## 2025-05-06 NOTE — PLAN OF CARE
Problem: Discharge Planning  Goal: Discharge to home or other facility with appropriate resources  5/6/2025 1241 by Rosio Raman RN  Outcome: Progressing  5/5/2025 2244 by Clau Murray RN  Outcome: Progressing     Problem: ABCDS Injury Assessment  Goal: Absence of physical injury  5/6/2025 1241 by Rosio Raman RN  Outcome: Progressing  5/5/2025 2244 by Clau Murray RN  Outcome: Progressing     Problem: Safety - Adult  Goal: Free from fall injury  5/6/2025 1241 by Rosio Raman RN  Outcome: Progressing  5/5/2025 2244 by Clau Murray RN  Outcome: Progressing     Problem: Pain  Goal: Verbalizes/displays adequate comfort level or baseline comfort level  5/6/2025 1241 by Rosio Raman RN  Outcome: Progressing  5/5/2025 2244 by Clau Murray RN  Outcome: Progressing     Problem: Respiratory - Adult  Goal: Achieves optimal ventilation and oxygenation  5/6/2025 1241 by Rosio Raman RN  Outcome: Progressing  5/6/2025 0836 by Ceci Olivas RCP  Outcome: Progressing  5/5/2025 2244 by Clau Murray RN  Outcome: Progressing  Goal: Lung sounds clear or within normal limits for patient  5/6/2025 0836 by Ceci Olivas RCP  Outcome: Progressing     Problem: Chronic Conditions and Co-morbidities  Goal: Patient's chronic conditions and co-morbidity symptoms are monitored and maintained or improved  5/6/2025 1241 by Rosio Raman RN  Outcome: Progressing  5/5/2025 2244 by Clau Murray RN  Outcome: Progressing

## 2025-05-06 NOTE — PLAN OF CARE
Problem: Discharge Planning  Goal: Discharge to home or other facility with appropriate resources  Outcome: Progressing     Problem: ABCDS Injury Assessment  Goal: Absence of physical injury  Outcome: Progressing     Problem: Safety - Adult  Goal: Free from fall injury  Outcome: Progressing     Problem: Pain  Goal: Verbalizes/displays adequate comfort level or baseline comfort level  Outcome: Progressing     Problem: Respiratory - Adult  Goal: Achieves optimal ventilation and oxygenation  Outcome: Progressing     Problem: Chronic Conditions and Co-morbidities  Goal: Patient's chronic conditions and co-morbidity symptoms are monitored and maintained or improved  Outcome: Progressing

## 2025-05-06 NOTE — PROGRESS NOTES
Prayer and encouragement    05/06/25 1633   Encounter Summary   Encounter Overview/Reason Spiritual/Emotional Needs   Service Provided For Patient   Referral/Consult From Rounding   Support System Family members   Last Encounter  05/06/25   Complexity of Encounter Low   Begin Time 1330   End Time  1336   Total Time Calculated 6 min   Spiritual/Emotional needs   Type Spiritual Support   Assessment/Intervention/Outcome   Assessment Hopeful   Intervention Empowerment

## 2025-05-06 NOTE — TELEPHONE ENCOUNTER
Patient called to cx apt- lvm-currently in patient at [facility]- will cb to rs when out

## 2025-05-06 NOTE — PROGRESS NOTES
INTERNAL MEDICINE Progress Note  5/6/2025 5:55 PM  Subjective:   Admit Date: 4/30/2025  PCP: Juan Christiansen,   Interval History:     No new c/o    5/5/25: Colonoscopy showed cecal AVM;  UPPER ENDOSCOPY: showed  1.  Esophagus - normal   2.  Stomach - normal   3.  Duodenum - normal, biopsied  4.  1 cm prolapsing submucosal lesion between the 1st and 2nd portion of the duodenum.  Using a biopsy forcep, a all surface areas are inspected and it is not ulcerated, inflamed, or bleeding.    no SOB  No orthopnea.  no PND    Objective:   Vitals: BP 96/62   Pulse 100   Temp 97.6 °F (36.4 °C) (Oral)   Resp 20   Ht 1.702 m (5' 7\")   Wt 84.6 kg (186 lb 6.4 oz)   SpO2 96%   BMI 29.19 kg/m²   General appearance: alert and cooperative with exam  HEENT:  atraumatic  Neck: no adenopathy, no carotid bruit, and no JVD  Lungs: clear to auscultation bilaterally  Heart: regular rate and rhythm, S1, S2 normal, and 2/6 SM  Abdomen: soft, non-tender; bowel sounds normal; no masses,  no organomegaly  Extremities: extremities normal, atraumatic, no cyanosis or edema  Neurologic: Alert, oriented, thought content appropriate      Medications:   Scheduled Meds:   methotrexate  7.5 mg Oral Q12H    furosemide  20 mg IntraVENous Once    ferric gluconate (FERRLECIT) 125 mg in sodium chloride 0.9 % 100 mL IVPB  125 mg IntraVENous Once per day on Monday Wednesday Friday    pantoprazole  40 mg IntraVENous BID    metoprolol succinate  12.5 mg Oral Daily    [Held by provider] furosemide  20 mg Oral BID    sodium chloride flush  5-40 mL IntraVENous 2 times per day    [Held by provider] enoxaparin  40 mg SubCUTAneous Daily    aspirin  81 mg Oral Daily    atorvastatin  20 mg Oral Daily    Vitamin D  1,000 Units Oral Daily    escitalopram  10 mg Oral Daily    folic acid  1,000 mcg Oral Daily    gabapentin  400 mg Oral TID    levothyroxine  75 mcg Oral Daily    potassium chloride  20 mEq Oral Daily    [Held by provider] sucralfate  1 g Oral 4x

## 2025-05-06 NOTE — PROGRESS NOTES
Cardiology Progress Note      Patient:  Alberto Gilliland  YOB: 1965  MRN: [id number]   Acct: [id number]  Admit Date:  4/30/2025  Primary Cardiologist: Tanisha Hathaway MD    Note per dr patel \"CHIEF COMPLAINT: fatigue/hypotension - pat sent to er from Hematology office for having low bp during iron infusion    CONSULT FOR: CHF/ Severe Aortic Stenosis      HPI: This is a pleasant 60-year-old male with a past medical history significant for hypertension, hyperlipidemia, coronary artery disease (status post CABG), atrial valve replacement, severe aortic stenosis, hypothyroidism, iron deficiency anemia (recent iron transfusion), COPD, and 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  He presented to [facility] ED with complaints of fatigue, hypotension, dyspnea on exertion, chest discomfort, and productive cough. He continues to smoke half a pack of cigarettes per day.  He was admitted for further evaluation and management of anemia. Given his cardiac history, a cardiology consult is requested for evaluation and management of congestive heart failure and severe aortic stenosis.  The patient denies fever, chills, neck or back pain, nausea, vomiting, or abdominal pain. He also denies orthopnea and does not require more than one pillow while sleeping.\"    Subjective (Events in last 24 hours):   Pt awake and alert.  NAD.  No cp.  Has chronic MA. No edema or orthopnea  On RA    Net I/o +3.2 L - ?accurate    Objective:   /64   Pulse 91   Temp 97.8 °F (36.6 °C) (Oral)   Resp 16   Ht 1.702 m (5' 7\")   Wt 84.6 kg (186 lb 6.4 oz)   SpO2 99%   BMI 29.19 kg/m²        TELEMETRY: nsr    Physical Exam:  General Appearance: alert and oriented to person, place and time, in no acute distress  Cardiovascular: normal rate, regular rhythm, normal S1 and S2, +murmur  Pulmonary/Chest: clear to auscultation bilaterally- no wheezes, rales or rhonchi, normal air movement, no respiratory

## 2025-05-07 ENCOUNTER — APPOINTMENT (OUTPATIENT)
Age: 60
DRG: 003 | End: 2025-05-07
Attending: INTERNAL MEDICINE
Payer: COMMERCIAL

## 2025-05-07 ENCOUNTER — APPOINTMENT (OUTPATIENT)
Dept: GENERAL RADIOLOGY | Age: 60
DRG: 003 | End: 2025-05-07
Payer: COMMERCIAL

## 2025-05-07 ENCOUNTER — APPOINTMENT (OUTPATIENT)
Dept: ULTRASOUND IMAGING | Age: 60
DRG: 003 | End: 2025-05-07
Payer: COMMERCIAL

## 2025-05-07 ENCOUNTER — HOSPITAL ENCOUNTER (INPATIENT)
Age: 60
Discharge: HOME | DRG: 003 | End: 2025-05-09
Payer: COMMERCIAL

## 2025-05-07 DIAGNOSIS — Z92.89 HISTORY OF CARDIOVERSION: Primary | ICD-10-CM

## 2025-05-07 LAB
ALBUMIN SERPL BCG-MCNC: 3.5 G/DL (ref 3.4–4.9)
ALP SERPL-CCNC: 196 U/L (ref 40–129)
ALT SERPL W/O P-5'-P-CCNC: 1587 U/L (ref 10–50)
ANION GAP SERPL CALC-SCNC: 14 MEQ/L (ref 8–16)
ANION GAP SERPL CALC-SCNC: 15 MEQ/L (ref 8–16)
APTT PPP: 30.1 SECONDS (ref 22–38)
AST SERPL-CCNC: 757 U/L (ref 10–50)
BDY SITE: ABNORMAL
BDY SITE: NORMAL
BILIRUB CONJ SERPL-MCNC: 1.4 MG/DL (ref 0–0.2)
BILIRUB SERPL-MCNC: 2.4 MG/DL (ref 0.3–1.2)
BUN SERPL-MCNC: 56 MG/DL (ref 8–23)
BUN SERPL-MCNC: 58 MG/DL (ref 8–23)
CALCIUM SERPL-MCNC: 8.1 MG/DL (ref 8.8–10.2)
CALCIUM SERPL-MCNC: 8.4 MG/DL (ref 8.8–10.2)
CHLORIDE SERPL-SCNC: 100 MEQ/L (ref 98–111)
CHLORIDE SERPL-SCNC: 102 MEQ/L (ref 98–111)
CO2 SERPL-SCNC: 17 MEQ/L (ref 22–29)
CO2 SERPL-SCNC: 19 MEQ/L (ref 22–29)
COLLECTED BY:: ABNORMAL
COLLECTED BY:: NORMAL
CREAT SERPL-MCNC: 1.6 MG/DL (ref 0.7–1.2)
CREAT SERPL-MCNC: 1.6 MG/DL (ref 0.7–1.2)
CREAT UR-MCNC: 36.3 MG/DL
DEPRECATED RDW RBC AUTO: 76.9 FL (ref 35–45)
DEPRECATED RDW RBC AUTO: 78.1 FL (ref 35–45)
DEPRECATED RDW RBC AUTO: 80.6 FL (ref 35–45)
ECHO AV MEAN GRADIENT: 33 MMHG
ECHO AV MEAN VELOCITY: 2.6 M/S
ECHO AV PEAK GRADIENT: 60 MMHG
ECHO AV PEAK VELOCITY: 3.9 M/S
ECHO AV VTI: 77.6 CM
ECHO BSA: 2.03 M2
ECHO EST RA PRESSURE: 3 MMHG
ECHO MV REGURGITANT ALIASING (NYQUIST) VELOCITY: 39 CM/S
ECHO MV REGURGITANT PEAK GRADIENT: 159 MMHG
ECHO MV REGURGITANT PEAK VELOCITY: 6.3 M/S
ECHO MV REGURGITANT RADIUS PISA: 0.6 CM
ECHO MV REGURGITANT VTIA: 186 CM
ECHO RIGHT VENTRICULAR SYSTOLIC PRESSURE (RVSP): 29 MMHG
ECHO TV REGURGITANT MAX VELOCITY: 2.56 M/S
ECHO TV REGURGITANT PEAK GRADIENT: 26 MMHG
ERYTHROCYTE [DISTWIDTH] IN BLOOD BY AUTOMATED COUNT: 23.2 % (ref 11.5–14.5)
ERYTHROCYTE [DISTWIDTH] IN BLOOD BY AUTOMATED COUNT: 23.4 % (ref 11.5–14.5)
ERYTHROCYTE [DISTWIDTH] IN BLOOD BY AUTOMATED COUNT: 23.6 % (ref 11.5–14.5)
GFR SERPL CREATININE-BSD FRML MDRD: 48 ML/MIN/1.73M2
GFR SERPL CREATININE-BSD FRML MDRD: 48 ML/MIN/1.73M2
GLUCOSE SERPL-MCNC: 135 MG/DL (ref 74–109)
GLUCOSE SERPL-MCNC: 98 MG/DL (ref 74–109)
HCT VFR BLD AUTO: 29.2 % (ref 42–52)
HCT VFR BLD AUTO: 30.2 % (ref 42–52)
HCT VFR BLD AUTO: 30.9 % (ref 42–52)
HCT VFR BLD AUTO: 31.1 % (ref 42–52)
HGB BLD-MCNC: 8.7 GM/DL (ref 14–18)
HGB BLD-MCNC: 9 GM/DL (ref 14–18)
HGB BLD-MCNC: 9.1 GM/DL (ref 14–18)
HGB BLD-MCNC: 9.2 GM/DL (ref 14–18)
INR PPP: 1.92 (ref 0.85–1.13)
LACTATE SERPL-SCNC: 2.4 MMOL/L (ref 0.5–2)
MCH RBC QN AUTO: 27.8 PG (ref 26–33)
MCH RBC QN AUTO: 27.9 PG (ref 26–33)
MCH RBC QN AUTO: 28.1 PG (ref 26–33)
MCHC RBC AUTO-ENTMCNC: 29.1 GM/DL (ref 32.2–35.5)
MCHC RBC AUTO-ENTMCNC: 29.6 GM/DL (ref 32.2–35.5)
MCHC RBC AUTO-ENTMCNC: 29.8 GM/DL (ref 32.2–35.5)
MCV RBC AUTO: 94 FL (ref 80–94)
MCV RBC AUTO: 94.2 FL (ref 80–94)
MCV RBC AUTO: 95.7 FL (ref 80–94)
PLATELET # BLD AUTO: 158 THOU/MM3 (ref 130–400)
PLATELET # BLD AUTO: 182 THOU/MM3 (ref 130–400)
PLATELET # BLD AUTO: 184 THOU/MM3 (ref 130–400)
PMV BLD AUTO: 10.6 FL (ref 9.4–12.4)
PMV BLD AUTO: 11.9 FL (ref 9.4–12.4)
PMV BLD AUTO: 12.1 FL (ref 9.4–12.4)
POTASSIUM SERPL-SCNC: 4.3 MEQ/L (ref 3.5–5.2)
POTASSIUM SERPL-SCNC: 4.8 MEQ/L (ref 3.5–5.2)
PROT SERPL-MCNC: 6 G/DL (ref 6.4–8.3)
RBC # BLD AUTO: 3.1 MILL/MM3 (ref 4.7–6.1)
RBC # BLD AUTO: 3.23 MILL/MM3 (ref 4.7–6.1)
RBC # BLD AUTO: 3.31 MILL/MM3 (ref 4.7–6.1)
SAO2 % BLD: 50 % (ref 94–97)
SAO2 % BLD: 97 % (ref 94–97)
SODIUM SERPL-SCNC: 133 MEQ/L (ref 135–145)
SODIUM SERPL-SCNC: 134 MEQ/L (ref 135–145)
UUN 24H UR-MCNC: 307 MG/DL
WBC # BLD AUTO: 10.2 THOU/MM3 (ref 4.8–10.8)
WBC # BLD AUTO: 9.3 THOU/MM3 (ref 4.8–10.8)
WBC # BLD AUTO: 9.5 THOU/MM3 (ref 4.8–10.8)

## 2025-05-07 PROCEDURE — 82248 BILIRUBIN DIRECT: CPT

## 2025-05-07 PROCEDURE — 82810 BLOOD GASES O2 SAT ONLY: CPT

## 2025-05-07 PROCEDURE — 85730 THROMBOPLASTIN TIME PARTIAL: CPT

## 2025-05-07 PROCEDURE — 2500000003 HC RX 250 WO HCPCS: Performed by: INTERNAL MEDICINE

## 2025-05-07 PROCEDURE — C1894 INTRO/SHEATH, NON-LASER: HCPCS | Performed by: INTERNAL MEDICINE

## 2025-05-07 PROCEDURE — 85014 HEMATOCRIT: CPT

## 2025-05-07 PROCEDURE — 93325 DOPPLER ECHO COLOR FLOW MAPG: CPT | Performed by: INTERNAL MEDICINE

## 2025-05-07 PROCEDURE — 83605 ASSAY OF LACTIC ACID: CPT

## 2025-05-07 PROCEDURE — 99152 MOD SED SAME PHYS/QHP 5/>YRS: CPT | Performed by: INTERNAL MEDICINE

## 2025-05-07 PROCEDURE — 99153 MOD SED SAME PHYS/QHP EA: CPT | Performed by: INTERNAL MEDICINE

## 2025-05-07 PROCEDURE — 6360000002 HC RX W HCPCS

## 2025-05-07 PROCEDURE — 2709999900 HC NON-CHARGEABLE SUPPLY: Performed by: INTERNAL MEDICINE

## 2025-05-07 PROCEDURE — 4A023N6 MEASUREMENT OF CARDIAC SAMPLING AND PRESSURE, RIGHT HEART, PERCUTANEOUS APPROACH: ICD-10-PCS | Performed by: INTERNAL MEDICINE

## 2025-05-07 PROCEDURE — 82570 ASSAY OF URINE CREATININE: CPT

## 2025-05-07 PROCEDURE — 2580000003 HC RX 258: Performed by: INTERNAL MEDICINE

## 2025-05-07 PROCEDURE — 6370000000 HC RX 637 (ALT 250 FOR IP): Performed by: INTERNAL MEDICINE

## 2025-05-07 PROCEDURE — 2000000000 HC ICU R&B

## 2025-05-07 PROCEDURE — 87081 CULTURE SCREEN ONLY: CPT

## 2025-05-07 PROCEDURE — 84540 ASSAY OF URINE/UREA-N: CPT

## 2025-05-07 PROCEDURE — 93325 DOPPLER ECHO COLOR FLOW MAPG: CPT

## 2025-05-07 PROCEDURE — 99291 CRITICAL CARE FIRST HOUR: CPT | Performed by: INTERNAL MEDICINE

## 2025-05-07 PROCEDURE — 85018 HEMOGLOBIN: CPT

## 2025-05-07 PROCEDURE — 93975 VASCULAR STUDY: CPT

## 2025-05-07 PROCEDURE — C1769 GUIDE WIRE: HCPCS | Performed by: INTERNAL MEDICINE

## 2025-05-07 PROCEDURE — 36415 COLL VENOUS BLD VENIPUNCTURE: CPT

## 2025-05-07 PROCEDURE — 93456 R HRT CORONARY ARTERY ANGIO: CPT | Performed by: INTERNAL MEDICINE

## 2025-05-07 PROCEDURE — 6360000002 HC RX W HCPCS: Performed by: INTERNAL MEDICINE

## 2025-05-07 PROCEDURE — 6360000004 HC RX CONTRAST MEDICATION: Performed by: INTERNAL MEDICINE

## 2025-05-07 PROCEDURE — 71045 X-RAY EXAM CHEST 1 VIEW: CPT

## 2025-05-07 PROCEDURE — 85027 COMPLETE CBC AUTOMATED: CPT

## 2025-05-07 PROCEDURE — C1887 CATHETER, GUIDING: HCPCS | Performed by: INTERNAL MEDICINE

## 2025-05-07 PROCEDURE — 76705 ECHO EXAM OF ABDOMEN: CPT

## 2025-05-07 PROCEDURE — 80053 COMPREHEN METABOLIC PANEL: CPT

## 2025-05-07 PROCEDURE — 93005 ELECTROCARDIOGRAM TRACING: CPT | Performed by: PHYSICIAN ASSISTANT

## 2025-05-07 PROCEDURE — 99232 SBSQ HOSP IP/OBS MODERATE 35: CPT | Performed by: PHYSICIAN ASSISTANT

## 2025-05-07 PROCEDURE — 85610 PROTHROMBIN TIME: CPT

## 2025-05-07 PROCEDURE — 93320 DOPPLER ECHO COMPLETE: CPT | Performed by: INTERNAL MEDICINE

## 2025-05-07 PROCEDURE — 2580000003 HC RX 258

## 2025-05-07 PROCEDURE — 93312 ECHO TRANSESOPHAGEAL: CPT | Performed by: INTERNAL MEDICINE

## 2025-05-07 RX ORDER — SODIUM CHLORIDE 0.9 % (FLUSH) 0.9 %
5-40 SYRINGE (ML) INJECTION EVERY 12 HOURS SCHEDULED
Status: DISCONTINUED | OUTPATIENT
Start: 2025-05-07 | End: 2025-05-07 | Stop reason: SDUPTHER

## 2025-05-07 RX ORDER — PHENYLEPHRINE HCL IN 0.9% NACL 1 MG/10 ML
VIAL (ML) INTRAVENOUS PRN
Status: DISCONTINUED | OUTPATIENT
Start: 2025-05-07 | End: 2025-05-07 | Stop reason: HOSPADM

## 2025-05-07 RX ORDER — SODIUM CHLORIDE 9 MG/ML
INJECTION, SOLUTION INTRAVENOUS CONTINUOUS
Status: DISCONTINUED | OUTPATIENT
Start: 2025-05-07 | End: 2025-05-08

## 2025-05-07 RX ORDER — SODIUM CHLORIDE 9 MG/ML
INJECTION, SOLUTION INTRAVENOUS PRN
Status: DISCONTINUED | OUTPATIENT
Start: 2025-05-07 | End: 2025-05-15 | Stop reason: SDUPTHER

## 2025-05-07 RX ORDER — LIDOCAINE HYDROCHLORIDE 20 MG/ML
INJECTION, SOLUTION EPIDURAL; INFILTRATION; INTRACAUDAL; PERINEURAL PRN
Status: DISCONTINUED | OUTPATIENT
Start: 2025-05-07 | End: 2025-05-07 | Stop reason: HOSPADM

## 2025-05-07 RX ORDER — NITROGLYCERIN 0.4 MG/1
0.4 TABLET SUBLINGUAL EVERY 5 MIN PRN
Status: DISCONTINUED | OUTPATIENT
Start: 2025-05-07 | End: 2025-05-28 | Stop reason: HOSPADM

## 2025-05-07 RX ORDER — SODIUM CHLORIDE 0.9 % (FLUSH) 0.9 %
5-40 SYRINGE (ML) INJECTION PRN
Status: DISCONTINUED | OUTPATIENT
Start: 2025-05-07 | End: 2025-05-07 | Stop reason: SDUPTHER

## 2025-05-07 RX ORDER — FUROSEMIDE 10 MG/ML
INJECTION INTRAMUSCULAR; INTRAVENOUS PRN
Status: DISCONTINUED | OUTPATIENT
Start: 2025-05-07 | End: 2025-05-07 | Stop reason: HOSPADM

## 2025-05-07 RX ORDER — FENTANYL CITRATE 50 UG/ML
INJECTION, SOLUTION INTRAMUSCULAR; INTRAVENOUS PRN
Status: DISCONTINUED | OUTPATIENT
Start: 2025-05-07 | End: 2025-05-07 | Stop reason: HOSPADM

## 2025-05-07 RX ORDER — IOPAMIDOL 755 MG/ML
INJECTION, SOLUTION INTRAVASCULAR PRN
Status: DISCONTINUED | OUTPATIENT
Start: 2025-05-07 | End: 2025-05-07 | Stop reason: HOSPADM

## 2025-05-07 RX ORDER — POTASSIUM CHLORIDE 1500 MG/1
20 TABLET, EXTENDED RELEASE ORAL
Status: DISCONTINUED | OUTPATIENT
Start: 2025-05-07 | End: 2025-05-13

## 2025-05-07 RX ORDER — NOREPINEPHRINE BITARTRATE 0.06 MG/ML
1-100 INJECTION, SOLUTION INTRAVENOUS CONTINUOUS
Status: DISCONTINUED | OUTPATIENT
Start: 2025-05-07 | End: 2025-05-07

## 2025-05-07 RX ORDER — MIDAZOLAM HYDROCHLORIDE 1 MG/ML
INJECTION, SOLUTION INTRAMUSCULAR; INTRAVENOUS PRN
Status: DISCONTINUED | OUTPATIENT
Start: 2025-05-07 | End: 2025-05-07 | Stop reason: HOSPADM

## 2025-05-07 RX ORDER — ATROPINE SULFATE 0.4 MG/ML
0.5 INJECTION, SOLUTION INTRAVENOUS
Status: DISCONTINUED | OUTPATIENT
Start: 2025-05-07 | End: 2025-05-15 | Stop reason: SDUPTHER

## 2025-05-07 RX ORDER — ASPIRIN 325 MG
325 TABLET ORAL ONCE
Status: DISCONTINUED | OUTPATIENT
Start: 2025-05-07 | End: 2025-05-12

## 2025-05-07 RX ORDER — ACETAMINOPHEN 325 MG/1
650 TABLET ORAL EVERY 4 HOURS PRN
Status: DISCONTINUED | OUTPATIENT
Start: 2025-05-07 | End: 2025-05-10

## 2025-05-07 RX ORDER — SODIUM CHLORIDE 9 MG/ML
INJECTION, SOLUTION INTRAVENOUS PRN
Status: DISCONTINUED | OUTPATIENT
Start: 2025-05-07 | End: 2025-05-07 | Stop reason: SDUPTHER

## 2025-05-07 RX ADMIN — MIDODRINE HYDROCHLORIDE 10 MG: 10 TABLET ORAL at 11:09

## 2025-05-07 RX ADMIN — BUMETANIDE 0.5 MG/HR: 0.25 INJECTION INTRAMUSCULAR; INTRAVENOUS at 12:47

## 2025-05-07 RX ADMIN — MIDODRINE HYDROCHLORIDE 10 MG: 10 TABLET ORAL at 17:46

## 2025-05-07 RX ADMIN — GABAPENTIN 400 MG: 400 CAPSULE ORAL at 17:46

## 2025-05-07 RX ADMIN — METHOTREXATE 7.5 MG: 2.5 TABLET ORAL at 05:39

## 2025-05-07 RX ADMIN — FOLIC ACID 1000 MCG: 1 TABLET ORAL at 11:09

## 2025-05-07 RX ADMIN — SODIUM CHLORIDE, PRESERVATIVE FREE 10 ML: 5 INJECTION INTRAVENOUS at 21:13

## 2025-05-07 RX ADMIN — SODIUM CHLORIDE 125 MG: 9 INJECTION, SOLUTION INTRAVENOUS at 11:17

## 2025-05-07 RX ADMIN — ATORVASTATIN CALCIUM 20 MG: 20 TABLET, FILM COATED ORAL at 11:09

## 2025-05-07 RX ADMIN — LEVOTHYROXINE SODIUM 75 MCG: 0.07 TABLET ORAL at 05:39

## 2025-05-07 RX ADMIN — ESCITALOPRAM OXALATE 10 MG: 10 TABLET ORAL at 11:09

## 2025-05-07 RX ADMIN — PANTOPRAZOLE SODIUM 40 MG: 40 INJECTION, POWDER, LYOPHILIZED, FOR SOLUTION INTRAVENOUS at 11:10

## 2025-05-07 RX ADMIN — GABAPENTIN 400 MG: 400 CAPSULE ORAL at 21:13

## 2025-05-07 RX ADMIN — SODIUM CHLORIDE, PRESERVATIVE FREE 10 ML: 5 INJECTION INTRAVENOUS at 11:07

## 2025-05-07 RX ADMIN — OXYCODONE HYDROCHLORIDE AND ACETAMINOPHEN 500 MG: 500 TABLET ORAL at 11:09

## 2025-05-07 RX ADMIN — ASPIRIN 81 MG: 81 TABLET, COATED ORAL at 07:54

## 2025-05-07 RX ADMIN — ALPRAZOLAM 0.5 MG: 0.5 TABLET ORAL at 00:01

## 2025-05-07 RX ADMIN — Medication 1000 UNITS: at 11:09

## 2025-05-07 RX ADMIN — PANTOPRAZOLE SODIUM 40 MG: 40 INJECTION, POWDER, LYOPHILIZED, FOR SOLUTION INTRAVENOUS at 21:13

## 2025-05-07 RX ADMIN — GABAPENTIN 400 MG: 400 CAPSULE ORAL at 11:09

## 2025-05-07 ASSESSMENT — PAIN DESCRIPTION - DESCRIPTORS: DESCRIPTORS: ACHING

## 2025-05-07 ASSESSMENT — PAIN DESCRIPTION - LOCATION: LOCATION: ABDOMEN

## 2025-05-07 ASSESSMENT — PAIN SCALES - GENERAL: PAINLEVEL_OUTOF10: 2

## 2025-05-07 NOTE — PROGRESS NOTES
Brief Postoperative Note for Paracentesis    Jarek Earing  YOB: 1956  [id number]    Pre-operative Diagnosis:  Ascites     Post-operative Diagnosis: Same    Procedure: Ultrasound guided Paracentesis     Anesthesia: 1% Lidocaine     Surgeons/Assistants: Sade Morillo PA-C    Complications: none    EBL: Minimal    Specimens: Were obtained    Ultrasound guided paracentesis performed. 7300 ml clear yellow fluid obtained. Dressing applied.      Electronically signed by ANDRIA Haney on 4/5/2023 at 11:37 AM INTERNAL MEDICINE Progress Note  5/7/2025 12:34 PM  Subjective:   Admit Date: 4/30/2025  PCP: Juan Christiansen,   Interval History:     5/7/25: Had LHC this am, non obst CAD, NICM  Mild nonobstructive coronary   Decompensated CHF, elevated right and left filling pressures  CI = 2.2     5/5/25: Colonoscopy showed cecal AVM;  UPPER ENDOSCOPY: showed  1.  Esophagus - normal   2.  Stomach - normal   3.  Duodenum - normal, biopsied  4.  1 cm prolapsing submucosal lesion between the 1st and 2nd portion of the duodenum.  Using a biopsy forcep, a all surface areas are inspected and it is not ulcerated, inflamed, or bleeding.    no SOB  No orthopnea.  no PND    Objective:   Vitals: /67   Pulse 77   Temp 96.8 °F (36 °C) (Axillary)   Resp 16   Ht 1.702 m (5' 7\")   Wt 83.7 kg (184 lb 8.4 oz)   SpO2 100%   BMI 28.90 kg/m²   General appearance: alert and cooperative with exam  HEENT:  atraumatic  Neck: no adenopathy, no carotid bruit, and no JVD  Lungs: clear to auscultation bilaterally  Heart: regular rate and rhythm, S1, S2 normal, and 2/6 SM  Abdomen: soft, non-tender; bowel sounds normal; no masses,  no organomegaly  Extremities: extremities normal, atraumatic, no cyanosis or edema  Neurologic: Alert, oriented, thought content appropriate      Medications:   Scheduled Meds:   potassium chloride  20 mEq Oral TID WC    ferric gluconate (FERRLECIT) 125 mg in sodium chloride 0.9 % 100 mL IVPB  125 mg IntraVENous Once per day on Monday Wednesday Friday    pantoprazole  40 mg IntraVENous BID    metoprolol succinate  12.5 mg Oral Daily    [Held by provider] furosemide  20 mg Oral BID    sodium chloride flush  5-40 mL IntraVENous 2 times per day    [Held by provider] enoxaparin  40 mg SubCUTAneous Daily    aspirin  81 mg Oral Daily    atorvastatin  20 mg Oral Daily    Vitamin D  1,000 Units Oral Daily    escitalopram  10 mg Oral Daily    folic acid  1,000 mcg Oral Daily    gabapentin  400 mg Oral TID

## 2025-05-07 NOTE — PROGRESS NOTES
ace/arb due to hypotension  Now on midodrine  GDMT limited due to soft bp  Lifevest - ordered  Referral to chf clinic - ordered  referral to structural heart - ordered - dr higgins seen    CTS consulted - \"Since obviously the patient is not a candidate for mechanical AVR, and given the absence of benefit of longevity of surgical vs transaortic bioprosthetic AVR, standard of care favors TAVR +/- PCI \"     Electronically signed by Ruth Jaramillo PA-C on 5/7/2025 at 1:14 PM

## 2025-05-07 NOTE — PROGRESS NOTES
Patient arrived to unit from  via bed. Patient transferred to ICU bed and placed on continuous ICU bedside monitor. Patient admitted for Chronic anemia [D64.9]  Troponin level elevated [R79.89]  S/P AVR [Z95.2]  Hypotension, unspecified hypotension type [I95.9]  Acute on chronic congestive heart failure, unspecified heart failure type (HCC) [I50.9]  Acute exacerbation of chronic heart failure (HCC) [I50.9]. Vitals obtained. See flowsheets. Patient's IV access includes 22 g left hand. Current infusions and rates of infusion include bumex @ 0.5 mg/hr. Assessment completed by NICK Gil RN. Two nurse skin assessment completed by NICK Gil RN and PUJA Alvarez RN. See flowsheets for assessment details. Policies and procedures of ICU able to be explained to patient at this time. Family member(s)/representative(s) present at time of admission include NA. Patient rights explained to family member(s)/representatives and patient, as able. Patient/patient's family member(s)/representative(s) N/A to have physician notified of their admission. All questions posed by patient's family member(s)/representative(s) and patient answered at this time.

## 2025-05-07 NOTE — CARE COORDINATION
5/7/25, 10:47 AM EDT    DISCHARGE ON GOING EVALUATION    Alberto [facility] day: 7  Location: -22/022-A Reason for admit: Chronic anemia [D64.9]  Troponin level elevated [R79.89]  S/P AVR [Z95.2]  Hypotension, unspecified hypotension type [I95.9]  Acute on chronic congestive heart failure, unspecified heart failure type (HCC) [I50.9]  Acute exacerbation of chronic heart failure (HCC) [I50.9]     Procedures: 5/5 EGD- normal, bx taken  5/5 colonoscopy-  Large cecal angiectasia destroyed with APC     Imaging since last note: none    Barriers to Discharge: creat 1.6, CO2 17. Bumex gtt initiated, IV iron, IV protonix. RODRÍGUEZ/heart cath today.     PCP: Juan Christiansen,   Readmission Risk Score: 19.9    Patient Goals/Plan/Treatment Preferences: Home independently. Life Vest has been delivered.

## 2025-05-07 NOTE — TELEPHONE ENCOUNTER
RODRÍGUEZ and Cath have been done inpatient     Anil girls if you have the orders you can disregard

## 2025-05-07 NOTE — PLAN OF CARE
Problem: Discharge Planning  Goal: Discharge to home or other facility with appropriate resources  5/7/2025 0027 by Zulma Mendez RN  Outcome: Progressing  5/6/2025 1241 by Rosio Raman RN  Outcome: Progressing     Problem: ABCDS Injury Assessment  Goal: Absence of physical injury  5/7/2025 0027 by Zulma Mendez RN  Outcome: Progressing  5/6/2025 1241 by Rosio Raman RN  Outcome: Progressing     Problem: Safety - Adult  Goal: Free from fall injury  5/7/2025 0027 by Zulma Mendez RN  Outcome: Progressing  5/6/2025 1241 by Rosio Raman RN  Outcome: Progressing     Problem: Pain  Goal: Verbalizes/displays adequate comfort level or baseline comfort level  5/7/2025 0027 by Zulma Mendez RN  Outcome: Progressing  5/6/2025 1241 by Rosio Raman RN  Outcome: Progressing     Problem: Respiratory - Adult  Goal: Achieves optimal ventilation and oxygenation  5/7/2025 0027 by Zulma Mendez RN  Outcome: Progressing  5/6/2025 1241 by Rosio Raman RN  Outcome: Progressing     Problem: Chronic Conditions and Co-morbidities  Goal: Patient's chronic conditions and co-morbidity symptoms are monitored and maintained or improved  5/7/2025 0027 by Zulma Mendez RN  Outcome: Progressing  5/6/2025 1241 by Rosio Raman RN  Outcome: Progressing

## 2025-05-07 NOTE — PROCEDURES
PROCEDURE NOTE  Date: 5/7/2025   Name: Alberto Gilliland  YOB: 1965    Procedures EKG completed. Given to RN

## 2025-05-07 NOTE — H&P
[facility]  Sedation/Analgesia History & Physical    Pt Name: Alberto Gilliland  Account number: [id number]  MRN: [id number]  YOB: 1965  Provider Performing Procedure: Vanita Castle MD MD  Referring Provider: No att. providers found   Primary Care Physician: Juan Christiansen DO  Date: 5/7/2025    PRE-PROCEDURE    Code Status: FULL CODE  Brief History/Pre-Procedure Diagnosis:   Chest pain   Fatigue  Acute on chronic HFrEF  Worsening cardiomyopathy    EF 30-35%  Severe AS  Consent: : I have discussed with the patient risks, benefits, and alternatives (and relevant risks, benefits, and side effects related to alternatives or not receiving care), and likelihood of the success.   The patient and/or representative appear to understand and agree to proceed.  The discussion encompasses risks, benefits, and side effects related to the alternatives and the risks related to not receiving the proposed care, treatment, and services.     The indication, risks and benefits of the procedure and possible therapeutic consequences and alternatives were discussed with the patient. The patient was given the opportunity to ask questions and to have them answered to his/her satisfaction. Risks of the procedure include but are not limited to the following: Bleeding, hematoma including retroperitoneal hemmorhage, infection, pain and discomfort, injury to the aorta and other blood vessels, rhythm disturbance, low blood pressure, myocardial infarction, stroke, kidney damage/failure, myocardial perforation, allergic reactions to sedatives/contrast material, loss of pulse/vascular compromise requiring surgery, aneurysm/pseudoaneurysm formation, possible loss of a limb/hand/leg, needing blood transfusion, requiring emergent open heart surgery or emergent coronary intervention, the need for intubation/respiratory support, the requirement for defibrillation/cardioversion, and death. Alternatives to and omission of

## 2025-05-08 ENCOUNTER — APPOINTMENT (OUTPATIENT)
Dept: GENERAL RADIOLOGY | Age: 60
DRG: 003 | End: 2025-05-08
Payer: COMMERCIAL

## 2025-05-08 ENCOUNTER — APPOINTMENT (OUTPATIENT)
Dept: ENDOSCOPY | Age: 60
DRG: 003 | End: 2025-05-08
Attending: INTERNAL MEDICINE
Payer: COMMERCIAL

## 2025-05-08 ENCOUNTER — APPOINTMENT (OUTPATIENT)
Dept: CT IMAGING | Age: 60
DRG: 003 | End: 2025-05-08
Payer: COMMERCIAL

## 2025-05-08 LAB
AFP SERPL-MCNC: < 1.8 UG/L
ALBUMIN SERPL BCG-MCNC: 2.8 G/DL (ref 3.4–4.9)
ALP SERPL-CCNC: 159 U/L (ref 40–129)
ALT SERPL W/O P-5'-P-CCNC: 1031 U/L (ref 10–50)
ANION GAP SERPL CALC-SCNC: 13 MEQ/L (ref 8–16)
ANION GAP SERPL CALC-SCNC: 13 MEQ/L (ref 8–16)
ANTI-COMPLEMENT: NORMAL
ARTERIAL PATENCY WRIST A: ABNORMAL
AST SERPL-CCNC: 323 U/L (ref 10–50)
BASE EXCESS BLDA CALC-SCNC: -0.9 MMOL/L (ref -2.5–2.5)
BASE EXCESS BLDA CALC-SCNC: -1.2 MMOL/L (ref -2.5–2.5)
BASE EXCESS BLDA CALC-SCNC: -2.3 MMOL/L (ref -2–3)
BASE EXCESS BLDA CALC-SCNC: 0.4 MMOL/L (ref -2–3)
BASE EXCESS BLDA CALC-SCNC: 0.5 MMOL/L (ref -2–3)
BDY SITE: ABNORMAL
BDY SITE: ABNORMAL
BILIRUB SERPL-MCNC: 1.8 MG/DL (ref 0.3–1.2)
BUN SERPL-MCNC: 55 MG/DL (ref 8–23)
BUN SERPL-MCNC: 56 MG/DL (ref 8–23)
CA-I BLD ISE-SCNC: 0.92 MMOL/L (ref 1.12–1.32)
CA-I BLD ISE-SCNC: 1.07 MMOL/L (ref 1.12–1.32)
CALCIUM SERPL-MCNC: 7.3 MG/DL (ref 8.8–10.2)
CALCIUM SERPL-MCNC: 8.9 MG/DL (ref 8.8–10.2)
CHLORIDE SERPL-SCNC: 101 MEQ/L (ref 98–111)
CHLORIDE SERPL-SCNC: 106 MEQ/L (ref 98–111)
CO2 SERPL-SCNC: 21 MEQ/L (ref 22–29)
CO2 SERPL-SCNC: 22 MEQ/L (ref 22–29)
COLLECTED BY:: ABNORMAL
COLLECTED BY:: NORMAL
COLLECTED BY:: NORMAL
CREAT SERPL-MCNC: 1.5 MG/DL (ref 0.7–1.2)
CREAT SERPL-MCNC: 1.6 MG/DL (ref 0.7–1.2)
DAT IGG: NORMAL
DAT POLY-SP REAG RBC QL: NORMAL
DEPRECATED RDW RBC AUTO: 74.5 FL (ref 35–45)
DEVICE: ABNORMAL
DEVICE: NORMAL
DEVICE: NORMAL
EKG ATRIAL RATE: 91 BPM
EKG P AXIS: 66 DEGREES
EKG P-R INTERVAL: 180 MS
EKG Q-T INTERVAL: 402 MS
EKG QRS DURATION: 126 MS
EKG QTC CALCULATION (BAZETT): 494 MS
EKG R AXIS: -6 DEGREES
EKG T AXIS: 81 DEGREES
EKG VENTRICULAR RATE: 91 BPM
ERYTHROCYTE [DISTWIDTH] IN BLOOD BY AUTOMATED COUNT: 23.3 % (ref 11.5–14.5)
FIBRINOGEN PPP-MCNC: 264 MG/100ML (ref 155–475)
FIO2 ON VENT O2 ANALYZER: 4 %
GFR SERPL CREATININE-BSD FRML MDRD: 48 ML/MIN/1.73M2
GFR SERPL CREATININE-BSD FRML MDRD: 52 ML/MIN/1.73M2
GLUCOSE BLD STRIP.AUTO-MCNC: 190 MG/DL (ref 70–108)
GLUCOSE BLD-MCNC: 127 MG/DL (ref 70–108)
GLUCOSE SERPL-MCNC: 140 MG/DL (ref 74–109)
GLUCOSE SERPL-MCNC: 214 MG/DL (ref 74–109)
HAPTOGLOB SERPL-MCNC: 35 MG/DL (ref 30–200)
HCO3 BLDA-SCNC: 23 MMOL/L (ref 23–28)
HCO3 BLDA-SCNC: 24 MMOL/L (ref 23–28)
HCO3 BLDA-SCNC: 25 MMOL/L (ref 23–28)
HCO3 BLDA-SCNC: 27 MMOL/L (ref 23–28)
HCO3 BLDA-SCNC: 27 MMOL/L (ref 23–28)
HCT VFR BLD AUTO: 20.6 % (ref 42–52)
HCT VFR BLD AUTO: 43.5 % (ref 42–52)
HCT VFR BLD AUTO: 44.2 % (ref 42–52)
HEMOCCULT STL QL: NORMAL
HGB BLD-MCNC: 14.7 GM/DL (ref 14–18)
HGB BLD-MCNC: 14.9 GM/DL (ref 14–18)
HGB BLD-MCNC: 6.3 GM/DL (ref 14–18)
HGB RETIC QN AUTO: 30.5 PG (ref 28.2–35.7)
IMM RETICS NFR: 28.7 % (ref 2.3–13.4)
INR PPP: 1.43 (ref 0.85–1.13)
LACTATE SERPL-SCNC: 1.5 MMOL/L (ref 0.5–2)
LACTATE SERPL-SCNC: 1.6 MMOL/L (ref 0.5–2)
LACTATE SERPL-SCNC: 1.8 MMOL/L (ref 0.5–2)
LACTATE SERPL-SCNC: 2.2 MMOL/L (ref 0.5–2)
LACTIC ACID, SEPSIS: 1.7 MMOL/L (ref 0.5–1.9)
LDH SERPL L TO P-CCNC: 506 U/L (ref 135–225)
MAGNESIUM SERPL-MCNC: 2.5 MG/DL (ref 1.6–2.6)
MCH RBC QN AUTO: 28.1 PG (ref 26–33)
MCHC RBC AUTO-ENTMCNC: 30.6 GM/DL (ref 32.2–35.5)
MCV RBC AUTO: 92 FL (ref 80–94)
NT-PROBNP SERPL IA-MCNC: ABNORMAL PG/ML (ref 0–124)
PATHOLOGIST REVIEW: ABNORMAL
PCO2 BLDA: 39 MMHG (ref 35–45)
PCO2 BLDA: 44 MMHG (ref 35–45)
PCO2 TEMP ADJ BLDMV: 42 MMHG (ref 41–51)
PCO2 TEMP ADJ BLDMV: 47 MMHG (ref 41–51)
PCO2 TEMP ADJ BLDMV: 50 MMHG (ref 41–51)
PH BLDA: 7.36 [PH] (ref 7.35–7.45)
PH BLDA: 7.39 [PH] (ref 7.35–7.45)
PH BLDMV: 7.34 [PH] (ref 7.31–7.41)
PH BLDMV: 7.35 [PH] (ref 7.31–7.41)
PH BLDMV: 7.36 [PH] (ref 7.31–7.41)
PLATELET # BLD AUTO: 170 THOU/MM3 (ref 130–400)
PMV BLD AUTO: 11.5 FL (ref 9.4–12.4)
PO2 BLDA: 122 MMHG (ref 71–104)
PO2 BLDA: 134 MMHG (ref 71–104)
PO2 BLDMV: 31 MMHG (ref 25–40)
PO2 BLDMV: 37 MMHG (ref 25–40)
PO2 BLDMV: 39 MMHG (ref 25–40)
POTASSIUM SERPL-SCNC: 3.7 MEQ/L (ref 3.5–5.2)
POTASSIUM SERPL-SCNC: 4.6 MEQ/L (ref 3.5–5.2)
PROT SERPL-MCNC: 4.7 G/DL (ref 6.4–8.3)
RBC # BLD AUTO: 2.24 MILL/MM3 (ref 4.7–6.1)
RETICS # AUTO: 134 THOU/MM3 (ref 20–115)
RETICS/RBC NFR AUTO: 2.6 % (ref 0.5–2)
REVIEWED BY: NORMAL
SAO2 % BLDA: 99 %
SAO2 % BLDA: 99 %
SAO2 % BLDMV: 56 %
SAO2 % BLDMV: 67 %
SAO2 % BLDMV: 69 %
SCAN OF BLOOD SMEAR: NORMAL
SITE: ABNORMAL
SITE: NORMAL
SITE: NORMAL
SMEAR REVIEW: NORMAL
SODIUM SERPL-SCNC: 135 MEQ/L (ref 135–145)
SODIUM SERPL-SCNC: 141 MEQ/L (ref 135–145)
VENTILATION MODE VENT: ABNORMAL
VENTILATION MODE VENT: NORMAL
VENTILATION MODE VENT: NORMAL
WBC # BLD AUTO: 8.1 THOU/MM3 (ref 4.8–10.8)

## 2025-05-08 PROCEDURE — 71045 X-RAY EXAM CHEST 1 VIEW: CPT

## 2025-05-08 PROCEDURE — 2500000003 HC RX 250 WO HCPCS

## 2025-05-08 PROCEDURE — 83735 ASSAY OF MAGNESIUM: CPT

## 2025-05-08 PROCEDURE — 30233R1 TRANSFUSION OF NONAUTOLOGOUS PLATELETS INTO PERIPHERAL VEIN, PERCUTANEOUS APPROACH: ICD-10-PCS

## 2025-05-08 PROCEDURE — 82803 BLOOD GASES ANY COMBINATION: CPT

## 2025-05-08 PROCEDURE — 6360000002 HC RX W HCPCS

## 2025-05-08 PROCEDURE — 99152 MOD SED SAME PHYS/QHP 5/>YRS: CPT | Performed by: INTERNAL MEDICINE

## 2025-05-08 PROCEDURE — 93503 INSERT/PLACE HEART CATHETER: CPT

## 2025-05-08 PROCEDURE — 99291 CRITICAL CARE FIRST HOUR: CPT | Performed by: INTERNAL MEDICINE

## 2025-05-08 PROCEDURE — 6370000000 HC RX 637 (ALT 250 FOR IP): Performed by: INTERNAL MEDICINE

## 2025-05-08 PROCEDURE — 02HQ32Z INSERTION OF MONITORING DEVICE INTO RIGHT PULMONARY ARTERY, PERCUTANEOUS APPROACH: ICD-10-PCS | Performed by: INTERNAL MEDICINE

## 2025-05-08 PROCEDURE — 82330 ASSAY OF CALCIUM: CPT

## 2025-05-08 PROCEDURE — 82947 ASSAY GLUCOSE BLOOD QUANT: CPT

## 2025-05-08 PROCEDURE — 85610 PROTHROMBIN TIME: CPT

## 2025-05-08 PROCEDURE — P9017 PLASMA 1 DONOR FRZ W/IN 8 HR: HCPCS

## 2025-05-08 PROCEDURE — 2580000003 HC RX 258: Performed by: PHYSICIAN ASSISTANT

## 2025-05-08 PROCEDURE — 99153 MOD SED SAME PHYS/QHP EA: CPT | Performed by: INTERNAL MEDICINE

## 2025-05-08 PROCEDURE — 93503 INSERT/PLACE HEART CATHETER: CPT | Performed by: INTERNAL MEDICINE

## 2025-05-08 PROCEDURE — 36556 INSERT NON-TUNNEL CV CATH: CPT

## 2025-05-08 PROCEDURE — 2580000003 HC RX 258

## 2025-05-08 PROCEDURE — 82105 ALPHA-FETOPROTEIN SERUM: CPT

## 2025-05-08 PROCEDURE — 2700000000 HC OXYGEN THERAPY PER DAY

## 2025-05-08 PROCEDURE — 99232 SBSQ HOSP IP/OBS MODERATE 35: CPT | Performed by: NURSE PRACTITIONER

## 2025-05-08 PROCEDURE — 80053 COMPREHEN METABOLIC PANEL: CPT

## 2025-05-08 PROCEDURE — 83615 LACTATE (LD) (LDH) ENZYME: CPT

## 2025-05-08 PROCEDURE — 36620 INSERTION CATHETER ARTERY: CPT

## 2025-05-08 PROCEDURE — 2709999900 HC NON-CHARGEABLE SUPPLY: Performed by: INTERNAL MEDICINE

## 2025-05-08 PROCEDURE — 05HN33Z INSERTION OF INFUSION DEVICE INTO LEFT INTERNAL JUGULAR VEIN, PERCUTANEOUS APPROACH: ICD-10-PCS

## 2025-05-08 PROCEDURE — 93010 ELECTROCARDIOGRAM REPORT: CPT | Performed by: INTERNAL MEDICINE

## 2025-05-08 PROCEDURE — 82948 REAGENT STRIP/BLOOD GLUCOSE: CPT

## 2025-05-08 PROCEDURE — 85014 HEMATOCRIT: CPT

## 2025-05-08 PROCEDURE — 36600 WITHDRAWAL OF ARTERIAL BLOOD: CPT

## 2025-05-08 PROCEDURE — 2000000000 HC ICU R&B

## 2025-05-08 PROCEDURE — 30233K1 TRANSFUSION OF NONAUTOLOGOUS FROZEN PLASMA INTO PERIPHERAL VEIN, PERCUTANEOUS APPROACH: ICD-10-PCS

## 2025-05-08 PROCEDURE — 02H633Z INSERTION OF INFUSION DEVICE INTO RIGHT ATRIUM, PERCUTANEOUS APPROACH: ICD-10-PCS

## 2025-05-08 PROCEDURE — 6360000004 HC RX CONTRAST MEDICATION

## 2025-05-08 PROCEDURE — 3609027000 HC COLONOSCOPY: Performed by: INTERNAL MEDICINE

## 2025-05-08 PROCEDURE — 2500000003 HC RX 250 WO HCPCS: Performed by: INTERNAL MEDICINE

## 2025-05-08 PROCEDURE — 83880 ASSAY OF NATRIURETIC PEPTIDE: CPT

## 2025-05-08 PROCEDURE — 03HY32Z INSERTION OF MONITORING DEVICE INTO UPPER ARTERY, PERCUTANEOUS APPROACH: ICD-10-PCS

## 2025-05-08 PROCEDURE — 37799 UNLISTED PX VASCULAR SURGERY: CPT

## 2025-05-08 PROCEDURE — 6370000000 HC RX 637 (ALT 250 FOR IP)

## 2025-05-08 PROCEDURE — 36556 INSERT NON-TUNNEL CV CATH: CPT | Performed by: INTERNAL MEDICINE

## 2025-05-08 PROCEDURE — 83010 ASSAY OF HAPTOGLOBIN QUANT: CPT

## 2025-05-08 PROCEDURE — 83605 ASSAY OF LACTIC ACID: CPT

## 2025-05-08 PROCEDURE — 85046 RETICYTE/HGB CONCENTRATE: CPT

## 2025-05-08 PROCEDURE — 82272 OCCULT BLD FECES 1-3 TESTS: CPT

## 2025-05-08 PROCEDURE — 36430 TRANSFUSION BLD/BLD COMPNT: CPT

## 2025-05-08 PROCEDURE — 6360000002 HC RX W HCPCS: Performed by: INTERNAL MEDICINE

## 2025-05-08 PROCEDURE — 74174 CTA ABD&PLVS W/CONTRAST: CPT

## 2025-05-08 PROCEDURE — 6360000002 HC RX W HCPCS: Performed by: NURSE PRACTITIONER

## 2025-05-08 PROCEDURE — 94761 N-INVAS EAR/PLS OXIMETRY MLT: CPT

## 2025-05-08 PROCEDURE — 94640 AIRWAY INHALATION TREATMENT: CPT

## 2025-05-08 PROCEDURE — 85384 FIBRINOGEN ACTIVITY: CPT

## 2025-05-08 PROCEDURE — 36415 COLL VENOUS BLD VENIPUNCTURE: CPT

## 2025-05-08 PROCEDURE — P9037 PLATE PHERES LEUKOREDU IRRAD: HCPCS

## 2025-05-08 PROCEDURE — 85018 HEMOGLOBIN: CPT

## 2025-05-08 PROCEDURE — 0DJD8ZZ INSPECTION OF LOWER INTESTINAL TRACT, VIA NATURAL OR ARTIFICIAL OPENING ENDOSCOPIC: ICD-10-PCS | Performed by: INTERNAL MEDICINE

## 2025-05-08 PROCEDURE — 85027 COMPLETE CBC AUTOMATED: CPT

## 2025-05-08 RX ORDER — FENTANYL CITRATE 50 UG/ML
INJECTION, SOLUTION INTRAMUSCULAR; INTRAVENOUS
Status: COMPLETED
Start: 2025-05-08 | End: 2025-05-08

## 2025-05-08 RX ORDER — MILRINONE LACTATE 0.2 MG/ML
.0625-.75 INJECTION, SOLUTION INTRAVENOUS CONTINUOUS
Status: DISCONTINUED | OUTPATIENT
Start: 2025-05-08 | End: 2025-05-08

## 2025-05-08 RX ORDER — MIDAZOLAM HYDROCHLORIDE 1 MG/ML
0.5 INJECTION, SOLUTION INTRAMUSCULAR; INTRAVENOUS ONCE
Status: COMPLETED | OUTPATIENT
Start: 2025-05-08 | End: 2025-05-08

## 2025-05-08 RX ORDER — MIDAZOLAM HYDROCHLORIDE 1 MG/ML
INJECTION, SOLUTION INTRAMUSCULAR; INTRAVENOUS
Status: COMPLETED
Start: 2025-05-08 | End: 2025-05-08

## 2025-05-08 RX ORDER — IOPAMIDOL 755 MG/ML
80 INJECTION, SOLUTION INTRAVASCULAR
Status: COMPLETED | OUTPATIENT
Start: 2025-05-08 | End: 2025-05-08

## 2025-05-08 RX ORDER — CALCIUM CHLORIDE 100 MG/ML
1000 INJECTION INTRAVENOUS; INTRAVENTRICULAR PRN
Status: DISCONTINUED | OUTPATIENT
Start: 2025-05-08 | End: 2025-05-13 | Stop reason: SDUPTHER

## 2025-05-08 RX ORDER — SODIUM CHLORIDE 9 MG/ML
INJECTION, SOLUTION INTRAVENOUS PRN
Status: DISCONTINUED | OUTPATIENT
Start: 2025-05-08 | End: 2025-05-10

## 2025-05-08 RX ORDER — KETAMINE HCL IN 0.9 % NACL 50 MG/5 ML
SYRINGE (ML) INTRAVENOUS
Status: DISPENSED
Start: 2025-05-08 | End: 2025-05-08

## 2025-05-08 RX ORDER — NOREPINEPHRINE BITARTRATE 0.06 MG/ML
1-100 INJECTION, SOLUTION INTRAVENOUS CONTINUOUS
Status: DISCONTINUED | OUTPATIENT
Start: 2025-05-08 | End: 2025-05-10

## 2025-05-08 RX ORDER — KETAMINE HCL IN 0.9 % NACL 50 MG/5 ML
150 SYRINGE (ML) INTRAVENOUS ONCE
Status: DISCONTINUED | OUTPATIENT
Start: 2025-05-08 | End: 2025-05-10

## 2025-05-08 RX ORDER — NOREPINEPHRINE BITARTRATE 0.06 MG/ML
1-100 INJECTION, SOLUTION INTRAVENOUS CONTINUOUS
Status: DISCONTINUED | OUTPATIENT
Start: 2025-05-08 | End: 2025-05-08

## 2025-05-08 RX ORDER — MIDAZOLAM HYDROCHLORIDE 1 MG/ML
4 INJECTION, SOLUTION INTRAMUSCULAR; INTRAVENOUS ONCE
Status: COMPLETED | OUTPATIENT
Start: 2025-05-08 | End: 2025-05-08

## 2025-05-08 RX ORDER — PROPOFOL 10 MG/ML
5-50 INJECTION, EMULSION INTRAVENOUS CONTINUOUS
Status: DISCONTINUED | OUTPATIENT
Start: 2025-05-08 | End: 2025-05-08

## 2025-05-08 RX ORDER — MILRINONE LACTATE 0.2 MG/ML
.0625-.75 INJECTION, SOLUTION INTRAVENOUS CONTINUOUS
Status: DISCONTINUED | OUTPATIENT
Start: 2025-05-08 | End: 2025-05-19

## 2025-05-08 RX ADMIN — POTASSIUM CHLORIDE 20 MEQ: 1500 TABLET, EXTENDED RELEASE ORAL at 12:03

## 2025-05-08 RX ADMIN — SODIUM CHLORIDE, PRESERVATIVE FREE 10 ML: 5 INJECTION INTRAVENOUS at 09:06

## 2025-05-08 RX ADMIN — PHENYLEPHRINE HYDROCHLORIDE 100 MCG/MIN: 50 INJECTION INTRAVENOUS at 15:36

## 2025-05-08 RX ADMIN — ALPRAZOLAM 0.5 MG: 0.5 TABLET ORAL at 02:02

## 2025-05-08 RX ADMIN — MIDODRINE HYDROCHLORIDE 10 MG: 10 TABLET ORAL at 12:03

## 2025-05-08 RX ADMIN — Medication 5 MCG/MIN: at 03:58

## 2025-05-08 RX ADMIN — FOLIC ACID 1000 MCG: 1 TABLET ORAL at 09:02

## 2025-05-08 RX ADMIN — MIDAZOLAM HYDROCHLORIDE 0.5 MG: 1 INJECTION, SOLUTION INTRAMUSCULAR; INTRAVENOUS at 14:15

## 2025-05-08 RX ADMIN — MILRINONE LACTATE 0.12 MCG/KG/MIN: 0.2 INJECTION, SOLUTION INTRAVENOUS at 16:06

## 2025-05-08 RX ADMIN — PHENYLEPHRINE HYDROCHLORIDE 30 MCG/MIN: 50 INJECTION INTRAVENOUS at 01:59

## 2025-05-08 RX ADMIN — POTASSIUM CHLORIDE 20 MEQ: 1500 TABLET, EXTENDED RELEASE ORAL at 09:02

## 2025-05-08 RX ADMIN — MIDAZOLAM HYDROCHLORIDE 2 MG: 1 INJECTION, SOLUTION INTRAMUSCULAR; INTRAVENOUS at 05:25

## 2025-05-08 RX ADMIN — BUMETANIDE 1 MG/HR: 0.25 INJECTION INTRAMUSCULAR; INTRAVENOUS at 21:58

## 2025-05-08 RX ADMIN — OXYCODONE HYDROCHLORIDE AND ACETAMINOPHEN 500 MG: 500 TABLET ORAL at 09:02

## 2025-05-08 RX ADMIN — PANTOPRAZOLE SODIUM 40 MG: 40 INJECTION, POWDER, LYOPHILIZED, FOR SOLUTION INTRAVENOUS at 21:56

## 2025-05-08 RX ADMIN — PANTOPRAZOLE SODIUM 40 MG: 40 INJECTION, POWDER, LYOPHILIZED, FOR SOLUTION INTRAVENOUS at 09:01

## 2025-05-08 RX ADMIN — MIDODRINE HYDROCHLORIDE 10 MG: 10 TABLET ORAL at 18:01

## 2025-05-08 RX ADMIN — CALCIUM CHLORIDE 1000 MG: 100 INJECTION INTRAVENOUS; INTRAVENTRICULAR at 05:03

## 2025-05-08 RX ADMIN — TIOTROPIUM BROMIDE AND OLODATEROL 2 PUFF: 3.124; 2.736 SPRAY, METERED RESPIRATORY (INHALATION) at 10:18

## 2025-05-08 RX ADMIN — PROPOFOL 5 MCG/KG/MIN: 10 INJECTION, EMULSION INTRAVENOUS at 11:53

## 2025-05-08 RX ADMIN — IOPAMIDOL 80 ML: 755 INJECTION, SOLUTION INTRAVENOUS at 08:31

## 2025-05-08 RX ADMIN — ESCITALOPRAM OXALATE 10 MG: 10 TABLET ORAL at 09:02

## 2025-05-08 RX ADMIN — PHENYLEPHRINE HYDROCHLORIDE 175 MCG/MIN: 50 INJECTION INTRAVENOUS at 06:48

## 2025-05-08 RX ADMIN — ATORVASTATIN CALCIUM 20 MG: 20 TABLET, FILM COATED ORAL at 09:02

## 2025-05-08 RX ADMIN — FENTANYL CITRATE 25 MCG: 50 INJECTION INTRAMUSCULAR; INTRAVENOUS at 05:31

## 2025-05-08 RX ADMIN — GABAPENTIN 400 MG: 400 CAPSULE ORAL at 21:56

## 2025-05-08 RX ADMIN — GABAPENTIN 400 MG: 400 CAPSULE ORAL at 15:51

## 2025-05-08 RX ADMIN — SODIUM CHLORIDE: 0.9 INJECTION, SOLUTION INTRAVENOUS at 21:54

## 2025-05-08 RX ADMIN — SODIUM CHLORIDE, PRESERVATIVE FREE 10 ML: 5 INJECTION INTRAVENOUS at 09:02

## 2025-05-08 RX ADMIN — SODIUM CHLORIDE, PRESERVATIVE FREE 10 ML: 5 INJECTION INTRAVENOUS at 21:57

## 2025-05-08 RX ADMIN — MIDAZOLAM 0.5 MG: 1 INJECTION INTRAMUSCULAR; INTRAVENOUS at 14:15

## 2025-05-08 RX ADMIN — MIDAZOLAM 2 MG: 1 INJECTION INTRAMUSCULAR; INTRAVENOUS at 05:25

## 2025-05-08 RX ADMIN — Medication 1000 UNITS: at 09:06

## 2025-05-08 RX ADMIN — POTASSIUM CHLORIDE 20 MEQ: 1500 TABLET, EXTENDED RELEASE ORAL at 18:00

## 2025-05-08 RX ADMIN — GABAPENTIN 400 MG: 400 CAPSULE ORAL at 09:06

## 2025-05-08 RX ADMIN — CALCIUM CHLORIDE 1000 MG: 100 INJECTION INTRAVENOUS; INTRAVENTRICULAR at 06:34

## 2025-05-08 RX ADMIN — ONDANSETRON 4 MG: 2 INJECTION, SOLUTION INTRAMUSCULAR; INTRAVENOUS at 05:15

## 2025-05-08 RX ADMIN — MIDODRINE HYDROCHLORIDE 10 MG: 10 TABLET ORAL at 09:01

## 2025-05-08 ASSESSMENT — PAIN SCALES - WONG BAKER
WONGBAKER_NUMERICALRESPONSE: NO HURT

## 2025-05-08 ASSESSMENT — PAIN DESCRIPTION - LOCATION: LOCATION: ABDOMEN

## 2025-05-08 ASSESSMENT — PAIN DESCRIPTION - DESCRIPTORS: DESCRIPTORS: ACHING;DULL

## 2025-05-08 ASSESSMENT — PAIN SCALES - GENERAL
PAINLEVEL_OUTOF10: 0
PAINLEVEL_OUTOF10: 5
PAINLEVEL_OUTOF10: 0
PAINLEVEL_OUTOF10: 0

## 2025-05-08 NOTE — PROCEDURES
PROCEDURE NOTE  Date: 5/8/2025   Name: Alberto Gilliland  YOB: 1965    CENTRAL LINE    Date/Time: 5/8/2025 3:42 AM    Performed by: Thomas Black MD  Authorized by: Eduard Cheung MD  Consent: Written consent obtained.  Risks and benefits: risks, benefits and alternatives were discussed  Consent given by: patient  Patient understanding: patient states understanding of the procedure being performed  Patient consent: the patient's understanding of the procedure matches consent given  Procedure consent: procedure consent matches procedure scheduled  Site marked: the operative site was marked  Imaging studies: imaging studies available  Required items: required blood products, implants, devices, and special equipment available  Patient identity confirmed: verbally with patient and arm band  Time out: Immediately prior to procedure a \"time out\" was called to verify the correct patient, procedure, equipment, support staff and site/side marked as required.  Indications: vascular access  Anesthesia: local infiltration    Anesthesia:  Local Anesthetic: lidocaine 1% without epinephrine    Sedation:  Patient sedated: no    Preparation: skin prepped with 2% chlorhexidine  Skin prep agent dried: skin prep agent completely dried prior to procedure  Sterile barriers: all five maximum sterile barriers used - cap, mask, sterile gown, sterile gloves, and large sterile sheet  Hand hygiene: hand hygiene performed prior to central venous catheter insertion  Location details: left internal jugular  Site selection rationale: possible need for swan on the right  Patient position: reverse Trendelenburg  Catheter type: triple lumen  Catheter size: 7 Fr  Pre-procedure: landmarks identified  Ultrasound guidance: yes  Sterile ultrasound techniques: sterile gel and sterile probe covers were used  Number of attempts: 1  Successful placement: yes  Post-procedure: line sutured and dressing applied  Assessment: blood return through

## 2025-05-08 NOTE — PROGRESS NOTES
Complex Cardiology Service                                                         Daily Progress Note    Patient:  Alberto Gilliland  YOB: 1965    MRN: [id number]   Acct: [id number]    Admit Date:  4/30/2025    Rationale for Cardiology consult: severe AS and MR with decompensated HF; likely low-output failure - need for ICU monitoring and SGC guided HD optimization    HPI/PMH: Per Dr. Rivera consult note of 5/5/2025:   This is a pleasant 60 y.o. 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  Has intermittent chest pain and MA as well.  Progressively getting worse.  Not lightheaded now.  Awaiting GI evaluation for bleeding. EF is 30-35% with DELMI 0.7 cm2, mean gradient 45 mmHg. EF has gotten worse since prior which was 45-50% 4/2022.  Patient continues to smoke.  Borderline SBP 99 mmHg on midodrine.  CTS has seen the patient and he is not candidate for SAVR at this time.  ECG with NSR, IVCD, minimal voltage LVH.  K 4.3.  Cr 0.7.  BNP 04545.  Hgb 7.4, Plts 234.  INR 1.4.  No cancers.    RHC & LHC with RODRÍGUEZ obtained 5/6/2025; TRO to ICU for critical care monitoring and SGC guided HD optimization.     Subjective:  5/8  S/p RHC, LHC and RODRÍGUEZ 5/7/2025   - elevated RH pressures with PAP 78/38, CVP 22, PA sat 50%   - PCWP 33 and O2 sat 97%   - [place] CO 4.29 and CI 2.2 per cath lab report   - non-obstructive CAD  TRO ICU - SGC and arterial line placement pending  Bumex infusion started @ 0.5 m/hr  - no accurate I/O until past 12 hours  - I/O for past 12 hours: 5146/3205, [NET +1936]  Acute GIB OVN with hemorrhagic shock (not on anticoagulation)  -  required 2 rounds of MTP and vasopressor support w shayla and Levo   - total 9U PRBC, 8U FFP, 2U PLT   - Hgb 14.9/44.2 with rHgb at 1000 14.7  S/p stat GI eval with emergent

## 2025-05-08 NOTE — PROCEDURES
PROCEDURE NOTE  Date: 5/8/2025   Name: Alberto Gilliland  YOB: 1965    Procedures    Central Line Placement: Risks and benefits to the procedure were discussed.  Alternatives and their risks were discussed as well.  Patient was placed in the attention position.  Patient was placed in Trendelenburg position.  Patient was prepped using Chlorhexidene prep.  Patient was draped utilizing sterile gloves, hair net, mask, sterile gown and sterile OR towels.  Maximum barrier precautions were utilized.  Utilizing the right subclavian approach, patient received 5 cc of 1% lidocaine.  Utilizing an introducer needle, it was placed subcutaneously advanced under the clavicle and leveled flat.  It was subsequently advanced toward the thoracic inlet, until venous return was obtained.  A guide wire was placed through the introducer needle.  The introducer needle was subsequently withdrawn leaving the guide wire in place.  Utilizing a scalpel, a small incision was made in the skin.  A punch dilator was placed over the guide wire and subsequently withdrawn leaving the guide wire in place.  A cordis catheter was subsequently placed over the guide wire.  The guide wire was subsequently withdrawn leaving this catheter in place.  All ports had good venous return and were subsequently flushed with saline.  The catheter was secured using 2.0 silk.  Secondary cleansing with chlorhexidine prep was subsequently placed. Tegaderm with bio- patch dressing was utilized for secondary securing and protection.  There were no complications.    EBL:   Less than 5 mL      Indication:  decompensated heart failure    Electronically signed by Eduard Cheung MD

## 2025-05-08 NOTE — PROCEDURES
PROCEDURE NOTE  Date: 2025   Name: Alberto Gilliland  YOB: 1965    Procedures    [facility]  Colonoscopy    Patient: Alberto Gilliland  : 1965  Acct#: [id number]      PHYSICIAN:  Hannah Jorge MD    PREPROCEDURE DIAGNOSIS:  This is a 60 y.o., male,   with significant past medical history of  EMERGENCY  COLONOSCOPY. MASSIVE HEMATOCHEZIA . Please see details from original consult AVM in the cecum.  Has Aortic STenosis, S/P avr X 2 BIOPROSTHETIC , severe CHF, had [place] 25 with Dr. Allen.   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   EGD done at the same time  1cm submucosal lesion in the duodenum, NOT bleeding.   Pt. On RAPID TRANSFUSION PROTOCOL , has received 9 Units prbc's this AM.  No anticoagulation.   Dr. Black /Resident - is managing patient overnight.  Pt. Now on Levophed.   Pt. Has not had anything by mouth and no anticoagulants have been given.           MEDICATIONS: Conscious Sedation, versed 2mg and fentanyl 25 mcg iv  throughout.     Under the direction of Dr. Jorge IVCS was given.   IVCS start time: 0525  stop time of procedure:  06  Transfer to recovery: pt. In ICU , Dr. Jorge ws present unitl 0620    Airway: was adequate.    DESCRIPTION OF PROCEDURE: The risks of bleeding, trauma, infection, perforation, and medication-related cardiac and respiratory complications were discussed and written informed consent was obtained. After obtaining informed consent, a rectal exam was done.    The patient was continuously monitored to ensure adequate sedation and patient safety.  Subsequently, the colonoscope was placed in the rectum and advanced to the ileocecal valve and cecum.        The scope was  removed slowly while carefully examining color, mucosa, texture and anatomy of the colon were carefully examined with the scope.  Retroflexion was performed in the rectum to evaluate for hemorrhoids and anorectal pathology. Findings and maneuvers are listed in  impression below.  The scope was removed.  The patient was moved to the recovery area.        Difficulty of procedure: MODERATE    Quality of colon prep: FAIR.     Withdrawal time:  > 6 MINUTES    EBL : < 50 mL    IMPRESSION:   Reached the cecum .  Unable to visualize any active bleeding in the cecum or AVM.   In the splenic flexure and descending colon, there was a bright red blood and some clots, however, there was NO source of active bleeding under careful exam in this area.   The terminal ileum did NOT have blood in it.   Mild internal hemorrhoids, NOT bleeding.     RECOMMENDATIONS:    GIVEN THE findings of the procedure today, and the amount of blood loss, and requirement of pressor support and rapid transfusion protocol, I have discussed findings with the resident and the ICU team will contact IR FOR A STAT ANGIO EMBOLIZATION.   2.  GI team to follow.         Hannah Jorge MD, FACG      Specimens: were not obtained    (The following sections must be completed)  Post-Sedation Vital Signs: Vital signs were reviewed and were stable after the procedure (see flow sheet for vitals)            Post-Sedation Exam: Lungs: clear to auscultation bilaterally and Cardiovascular: tachycardic, normal rhythm           Complications: none

## 2025-05-08 NOTE — PROGRESS NOTES
0522 Dr. Jorge at bedside, endoscopy staff prepping patient for colonoscopy     0524 Time out performed at this time    0525 2mg versed given IVP by Dr. Black     0531 25mcg fentanyl given IVP Second round of MTP started at this time     0533 Patient O2 sat 89% on 6L nasal cannula, non re-breather applied at this time    0553 Patient O2 sat 84% on 15L non re-breather, respiratory at bedside to bag patient     0605 Dr. Jorge finished colonoscopy at this time, patient placed back on non re-breather

## 2025-05-08 NOTE — PROGRESS NOTES
0359 - Mass transfusion protocol ordered by Dr. Black. Patient continues to have large amount of blood stool output. Vitals obtained. See flowsheets.

## 2025-05-08 NOTE — PROCEDURES
PROCEDURE NOTE  Date: 5/8/2025   Name: Alberto Gilliland  YOB: 1965    Procedures    SWAN DEREJE CATHETER    Risks and benefits to the procedure were discussed.  Alternatives and their risks were discussed as well.    Indication:  decompensated heart failure    Complications:  none    Procedure:  After timeout was taken and informed consent was obtained, and after Cordis catheter was placed sterilely.  Patient was prepped and utilizing sterile gloves, sterile gown, sterile towels, in addition to mask and hair net.  Cordis sheath was attached.  [place]-Dereje was flushed through all 3 ports and verification of transducer occurred.  [place]-Dereje with placed in the sterile sheath.  [place]-Dereje catheter was advanced to 25 cm.  Balloon was inflated.  [place]-Dereje catheter was advanced watching waveforms from the right atrium to the right ventricle to the pulmonary artery.  Pressures were recorded by the nurse.  Catheter was placed in the pulmonary artery by waveforms and secured at 55 centimeters.  There were no complications.  Electronically signed by Eduard Cheung MD.

## 2025-05-08 NOTE — CARE COORDINATION
Transferred to ICU. Handoff report given to Sara LEAL. Electronically signed by Prudencio Avina RN on 5/8/2025 at 7:13 AM

## 2025-05-08 NOTE — PROGRESS NOTES
Connections: Socially Isolated (9/3/2024)    Social Connection and Isolation Panel [NHANES]     Frequency of Communication with Friends and Family: More than three times a week     Frequency of Social Gatherings with Friends and Family: More than three times a week     Attends [employer] Services: Never     Active Member of Clubs or Organizations: No     Attends Club or Organization Meetings: Never     Marital Status:    Intimate Partner Violence: Not on file   Housing Stability: Low Risk  (4/30/2025)    Housing Stability Vital Sign     Unable to Pay for Housing in the Last Year: No     Number of Times Moved in the Last Year: 0     Homeless in the Last Year: No     Family History          Problem Relation Age of Onset    Cancer Mother     Diabetes Father     Cancer Father     Heart Disease Father 35        CABG    High Blood Pressure Father     Diabetes Sister         AS A CHILD    Kidney Disease Sister     High Blood Pressure Sister     COPD Sister     Heart Disease Sister     Lung Cancer Brother     Stroke Neg Hx      ROS     10 pt ROS   Pertinent review of systems noted in HPI, all other ROS negative.   Vitals     height is 1.702 m (5' 7\") and weight is 84.5 kg (186 lb 4.6 oz). His axillary temperature is 98.4 °F (36.9 °C). His blood pressure is 104/80 and his pulse is 90. His respiration is 23 and oxygen saturation is 100%.     O2 Flow Rate (L/min): 6 L/min    Exam      General appearance: No apparent distress, alert and cooperative.  HEENT: Pupils equal, round, and reactive to light. Conjunctivae/corneas clear. Oral mucosa moist  Neck: Supple, with full range of motion. Trachea midline.   Respiratory:  Normal respiratory effort. Clear to auscultation, bilaterally without Rales/Wheezes/Rhonchi. On oxygen  Cardiovascular: Regular rate and rhythm with normal S1/S2 without murmurs, rubs or gallops.   Abdomen: Soft, non-tender, non-distended with active bowel sounds.  Musculoskeletal: No clubbing, cyanosis or  as reasonably achievable. CONTRAST: 80 cc Isovue-370. FINDINGS: Lung bases: Small bilateral pleural effusions are present. Mild consolidation and patchy groundglass opacities are noted at the lung bases. Liver/gallbladder/bilary tree: No radiopaque gallstones or biliary ductal dilatation is identified. Hepatomegaly and hepatic steatosis are unchanged. Pancreas: Normal. Spleen : Normal. Adrenal glands: Normal. Kidneys/ ureters/ bladder: No renal calculus, hydronephrosis, or hydroureter is visualized. The urinary bladder is partially distended and contains excreted contrast material from a prior contrast-enhanced exam. Gastrointestinal: No bowel obstruction, fluid collection, or free air is visualized. A surgical anastomosis within the bowel appears intact. Retroperitoneum / lymph nodes: No aortic aneurysm or dissection is present. The mesenteric branch vessels are patent. No arterial extravasation of contrast to suggest active bleeding is identified. No lymphadenopathy is visualized. Pelvis: The prostate gland is not enlarged. Musculoskeletal: Multilevel degenerative disc disease is present. The visualized skeletal structures appear intact.     1. No aortic aneurysm or dissection is visualized. The mesenteric branch vessels appear patent. No arterial extravasation or blush of contrast is identified to suggest active site of bleeding. 2. Small bilateral pleural effusions and patchy bilateral lower lobe groundglass opacities can be clinically correlated for pneumonia. Follow-up to ensure resolution is advised. No bowel obstruction, fluid collection, or free air is observed. Chronic findings are discussed. **This report has been created using voice recognition software.  It may contain minor errors which are inherent in voice recognition technology.** Electronically signed by Dr. Haydee Aranda    XR CHEST PORTABLE  Result Date: 5/8/2025  PROCEDURE: XR CHEST PORTABLE CLINICAL INFORMATION: Increased oxygen demand

## 2025-05-08 NOTE — SIGNIFICANT EVENT
I was alerted that the patient had been started on phenylephrine for hypotension and was requiring more than previously. Overnight patient did have 6 bowel movements that contained blood clots. He did have a colonoscopy on this admission for GI bleed however no bloody bowel movements since. Patient also appears more pale than previously. Given the worsening hypotension in the setting of bloody stools 3u PRBC ordered to be prepared STAT with 2u ordered to be given initially to see if there is improvement in blood pressure. Given pressor requirement a triple lumen central venous catheter was placed in the left IJ. Written consent was obtained.     0400: patients blood pressure dropping, MTP ordered.     0408: Called GI on call, Dr. Jorge. He will come in for colonoscopy     0420: Hb resulted and Hb dropped from 9.0 --> 6.3. Will continue with MTP.     Second round of MTP ordered. Patient off of levophed. Still on phenylephrine.     Dr. Danielson was unable to see the source of bleeding. Recommend stat CTA abdomen pelvis and consult to IR for embolization.     Electronically signed by Thomas Black MD on 5/8/2025 at 6:10 AM

## 2025-05-08 NOTE — H&P
[facility]  Sedation/Analgesia History & Physical    Patient: Alberto Gilliland : 1965  Med Rec#: [id number] Acc#: [id number]   Provider Performing Procedure: Hannah Jorge MD  Primary Care Physician: Juan Christiansen DO    PRE-PROCEDURE   Brief History/Pre-Procedure Diagnosis:The patient is a 60 y.o.,  male with significant past medical history of  EMERGENCY  COLONOSCOPY. MASSIVE HEMATOCHEZIA . Please see details from original consult AVM in the cecum.  Has Aortic STenosis, had [place] 25 with Dr. Allen.   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         MEDICAL HISTORY  []CAD/Valve  []Liver Disease  []Lung Disease []Diabetes  []Hypertension []Renal Disease  [x]Additional information:       has a past medical history of Acute exacerbation of chronic heart failure (HCC), Arthritis, Bilateral sciatica, Bleeding, CAD (coronary artery disease), Carpal tunnel syndrome, Cervicalgia, C5-7, Chronic anxiety, COPD, mild (HCC), Dysthymia (or depressive neurosis), ED (erectile dysfunction), ED (erectile dysfunction), Fatty liver, GERD (gastroesophageal reflux disease), Headache(784.0), History of blood transfusion, HTN (hypertension), Hyperlipidemia, Hypothyroidism, Iron deficiency anemia, blood loss, Lumbago, Lumbar radiculopathy, Lymphomatoid papulosis (HCC), Osteoarthritis (arthritis due to wear and tear of joints), S/P AVR, coumadin Tx, and Spondylosis of thoracic region without myelopathy or radiculopathy.    SURGICAL HISTORY   has a past surgical history that includes cardiovascular stress test (04/15/2010); Appendectomy (); Colonoscopy (); Small intestine surgery (); Upper gastrointestinal endoscopy (); Aortic valve replacement (); Endoscopy, colon, diagnostic; cervical fusion (); cervical fusion (2016); Cardiac catheterization (2010); Cardiac catheterization (, 16); Dilatation, esophagus; Aortic valve replacement (2016); other surgical

## 2025-05-08 NOTE — PROGRESS NOTES
CRITICAL CARE PROGRESS NOTE      Patient:  Alberto Gilliland    Unit/Bed:4D-03/003-A  YOB: 1965  MRN: [id number]   PCP: Juan Christiansen DO  Date of Admission: 4/30/2025  Chief Complaint:- fatigue and hypotension    Assessment and Plan:    Hemorrhagic shock-secondary to GI bleed. Overnight had multiple bloody bowel movements. Hb dropped from 9.2 to 6.3. Receive 1 units RBC and 2 massive transfusion protocol. Hb now 14.9.  Had colonoscopy 05/05 showed large cecal angiectasia likely the cause of DIONISIO. Urgent colonoscopy done overnight and no active bleed in cecum or AVM seen. Bright red blood and some clots seen in splenic flexure and descending colon. CTA abdomen/pelvis showed no active bleeding.   Monitor H&H.   Monitor stools  Concern for low-cardiac output heart failure- Patient remains hypotensive. RHC showed CI 2.2, CO 4.29. Liver and kidney function worsening.   Continue with Bumex 0.5 ml/hr   On midodrine 10mg TID  Strict I/O and daily weights  Monitor BNP for improvement with diuresis.   On phenylephrine. On 175 mcg/min and 5mg Levophed    Severe Aortic Stenosis:  Hx of mechanical AVR/resection of ascending aorta 2007 then switched to bioprosthetic in 2016 due to warfarin intolerance and GI bleeding. 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   CTS previously consulted 05/02 and recommended a TAVR +/- PCI. Structural heart following. Undergoing workup for  TAVR.     Acute on Chronic HFrEF- BNP 54930. RODRÍGUEZ 05/06 showed EF 30-35% and moderate valve global hypokinesis. Severe stenosis of the aortic valve AV mean 42,   On Bumex drip 0.5ml/hr   Na restriction 1,200 per daily once diet started  Fluid restrict to less than 2L daily   Daily weights.     Shock Liver:  Improving hepatic function.Albumin 2.8, , ALT 1,031, , Total bili 1.8. Suspected due to hypoperfusion in setting of severe AS. US Liver with doppler showed steatosis and liver

## 2025-05-09 LAB
ALBUMIN SERPL BCG-MCNC: 3.2 G/DL (ref 3.4–4.9)
ALP SERPL-CCNC: 110 U/L (ref 40–129)
ALT SERPL W/O P-5'-P-CCNC: 532 U/L (ref 10–50)
ANION GAP SERPL CALC-SCNC: 13 MEQ/L (ref 8–16)
ANION GAP SERPL CALC-SCNC: 13 MEQ/L (ref 8–16)
ARTERIAL PATENCY WRIST A: ABNORMAL
AST SERPL-CCNC: 160 U/L (ref 10–50)
BASE EXCESS BLDA CALC-SCNC: -0.2 MMOL/L (ref -2.5–2.5)
BASE EXCESS BLDA CALC-SCNC: -0.3 MMOL/L (ref -2.5–2.5)
BASE EXCESS BLDA CALC-SCNC: -0.7 MMOL/L (ref -2–3)
BASE EXCESS BLDA CALC-SCNC: -0.9 MMOL/L (ref -2.5–2.5)
BASE EXCESS BLDA CALC-SCNC: -1 MMOL/L (ref -2–3)
BASE EXCESS BLDA CALC-SCNC: -1.2 MMOL/L (ref -2.5–2.5)
BASE EXCESS BLDA CALC-SCNC: 0.1 MMOL/L (ref -2.5–2.5)
BASE EXCESS BLDA CALC-SCNC: 0.3 MMOL/L (ref -2–3)
BASE EXCESS BLDA CALC-SCNC: 0.5 MMOL/L (ref -2–3)
BASE EXCESS BLDA CALC-SCNC: 3.2 MMOL/L (ref -2–3)
BDY SITE: ABNORMAL
BILIRUB SERPL-MCNC: 3.9 MG/DL (ref 0.3–1.2)
BREATHS SETTING VENT: 18 BPM
BUN SERPL-MCNC: 57 MG/DL (ref 8–23)
BUN SERPL-MCNC: 60 MG/DL (ref 8–23)
CALCIUM SERPL-MCNC: 7.9 MG/DL (ref 8.8–10.2)
CALCIUM SERPL-MCNC: 8.1 MG/DL (ref 8.8–10.2)
CHLORIDE SERPL-SCNC: 103 MEQ/L (ref 98–111)
CHLORIDE SERPL-SCNC: 104 MEQ/L (ref 98–111)
CO2 SERPL-SCNC: 22 MEQ/L (ref 22–29)
CO2 SERPL-SCNC: 22 MEQ/L (ref 22–29)
COLLECTED BY:: ABNORMAL
COLLECTED BY:: NORMAL
COMMENT: ABNORMAL
COMMENT: NORMAL
CREAT SERPL-MCNC: 1.7 MG/DL (ref 0.7–1.2)
CREAT SERPL-MCNC: 1.8 MG/DL (ref 0.7–1.2)
DEPRECATED RDW RBC AUTO: 52 FL (ref 35–45)
DEVICE: ABNORMAL
DEVICE: NORMAL
ERYTHROCYTE [DISTWIDTH] IN BLOOD BY AUTOMATED COUNT: 18.1 % (ref 11.5–14.5)
FIBRINOGEN PPP-MCNC: 218 MG/100ML (ref 155–475)
FIO2 ON VENT O2 ANALYZER: 4 %
GFR SERPL CREATININE-BSD FRML MDRD: 42 ML/MIN/1.73M2
GFR SERPL CREATININE-BSD FRML MDRD: 45 ML/MIN/1.73M2
GLUCOSE BLD-MCNC: 102 MG/DL (ref 70–108)
GLUCOSE BLD-MCNC: 107 MG/DL (ref 70–108)
GLUCOSE SERPL-MCNC: 126 MG/DL (ref 74–109)
GLUCOSE SERPL-MCNC: 151 MG/DL (ref 74–109)
HCO3 BLDA-SCNC: 24 MMOL/L (ref 23–28)
HCO3 BLDA-SCNC: 24 MMOL/L (ref 23–28)
HCO3 BLDA-SCNC: 25 MMOL/L (ref 23–28)
HCO3 BLDA-SCNC: 27 MMOL/L (ref 23–28)
HCO3 BLDA-SCNC: 27 MMOL/L (ref 23–28)
HCO3 BLDA-SCNC: 30 MMOL/L (ref 23–28)
HCT VFR BLD AUTO: 37.4 % (ref 42–52)
HGB BLD-MCNC: 12.9 GM/DL (ref 14–18)
MAGNESIUM SERPL-MCNC: 2.1 MG/DL (ref 1.6–2.6)
MCH RBC QN AUTO: 29.1 PG (ref 26–33)
MCHC RBC AUTO-ENTMCNC: 34.5 GM/DL (ref 32.2–35.5)
MCV RBC AUTO: 84.4 FL (ref 80–94)
MRSA SPEC QL CULT: NORMAL
PCO2 BLDA: 40 MMHG (ref 35–45)
PCO2 BLDA: 40 MMHG (ref 35–45)
PCO2 BLDA: 42 MMHG (ref 35–45)
PCO2 BLDA: 43 MMHG (ref 35–45)
PCO2 BLDA: 45 MMHG (ref 35–45)
PCO2 TEMP ADJ BLDMV: 44 MMHG (ref 41–51)
PCO2 TEMP ADJ BLDMV: 46 MMHG (ref 41–51)
PCO2 TEMP ADJ BLDMV: 49 MMHG (ref 41–51)
PCO2 TEMP ADJ BLDMV: 49 MMHG (ref 41–51)
PCO2 TEMP ADJ BLDMV: 55 MMHG (ref 41–51)
PH BLDA: 7.36 [PH] (ref 7.35–7.45)
PH BLDA: 7.38 [PH] (ref 7.35–7.45)
PH BLDA: 7.38 [PH] (ref 7.35–7.45)
PH BLDA: 7.39 [PH] (ref 7.35–7.45)
PH BLDA: 7.39 [PH] (ref 7.35–7.45)
PH BLDMV: 7.35 [PH] (ref 7.31–7.41)
PH BLDMV: 7.36 [PH] (ref 7.31–7.41)
PLATELET # BLD AUTO: 75 THOU/MM3 (ref 130–400)
PMV BLD AUTO: 12.1 FL (ref 9.4–12.4)
PO2 BLDA: 110 MMHG (ref 71–104)
PO2 BLDA: 120 MMHG (ref 71–104)
PO2 BLDA: 123 MMHG (ref 71–104)
PO2 BLDA: 125 MMHG (ref 71–104)
PO2 BLDA: 125 MMHG (ref 71–104)
PO2 BLDMV: 36 MMHG (ref 25–40)
PO2 BLDMV: 37 MMHG (ref 25–40)
PO2 BLDMV: 37 MMHG (ref 25–40)
PO2 BLDMV: 38 MMHG (ref 25–40)
PO2 BLDMV: 39 MMHG (ref 25–40)
POTASSIUM SERPL-SCNC: 4.2 MEQ/L (ref 3.5–5.2)
POTASSIUM SERPL-SCNC: 4.6 MEQ/L (ref 3.5–5.2)
PROT SERPL-MCNC: 5.3 G/DL (ref 6.4–8.3)
RBC # BLD AUTO: 4.43 MILL/MM3 (ref 4.7–6.1)
REVIEWED BY: NORMAL
SAO2 % BLDA: 98 %
SAO2 % BLDA: 99 %
SAO2 % BLDMV: 64 %
SAO2 % BLDMV: 67 %
SAO2 % BLDMV: 68 %
SAO2 % BLDMV: 68 %
SAO2 % BLDMV: 70 %
SCAN OF BLOOD SMEAR: NORMAL
SITE: ABNORMAL
SITE: NORMAL
SMEAR REVIEW: NORMAL
SODIUM SERPL-SCNC: 138 MEQ/L (ref 135–145)
SODIUM SERPL-SCNC: 139 MEQ/L (ref 135–145)
VENTILATION MODE VENT: ABNORMAL
VENTILATION MODE VENT: NORMAL
WBC # BLD AUTO: 11.7 THOU/MM3 (ref 4.8–10.8)

## 2025-05-09 PROCEDURE — 83735 ASSAY OF MAGNESIUM: CPT

## 2025-05-09 PROCEDURE — 2000000000 HC ICU R&B

## 2025-05-09 PROCEDURE — 6360000002 HC RX W HCPCS

## 2025-05-09 PROCEDURE — 2500000003 HC RX 250 WO HCPCS: Performed by: INTERNAL MEDICINE

## 2025-05-09 PROCEDURE — 80053 COMPREHEN METABOLIC PANEL: CPT

## 2025-05-09 PROCEDURE — 99232 SBSQ HOSP IP/OBS MODERATE 35: CPT | Performed by: NURSE PRACTITIONER

## 2025-05-09 PROCEDURE — 85384 FIBRINOGEN ACTIVITY: CPT

## 2025-05-09 PROCEDURE — 6360000002 HC RX W HCPCS: Performed by: NURSE PRACTITIONER

## 2025-05-09 PROCEDURE — 82947 ASSAY GLUCOSE BLOOD QUANT: CPT

## 2025-05-09 PROCEDURE — 6370000000 HC RX 637 (ALT 250 FOR IP)

## 2025-05-09 PROCEDURE — 94640 AIRWAY INHALATION TREATMENT: CPT

## 2025-05-09 PROCEDURE — 36415 COLL VENOUS BLD VENIPUNCTURE: CPT

## 2025-05-09 PROCEDURE — 6370000000 HC RX 637 (ALT 250 FOR IP): Performed by: INTERNAL MEDICINE

## 2025-05-09 PROCEDURE — 37799 UNLISTED PX VASCULAR SURGERY: CPT

## 2025-05-09 PROCEDURE — 99291 CRITICAL CARE FIRST HOUR: CPT | Performed by: INTERNAL MEDICINE

## 2025-05-09 PROCEDURE — 94761 N-INVAS EAR/PLS OXIMETRY MLT: CPT

## 2025-05-09 PROCEDURE — 2700000000 HC OXYGEN THERAPY PER DAY

## 2025-05-09 PROCEDURE — 2580000003 HC RX 258

## 2025-05-09 PROCEDURE — 85027 COMPLETE CBC AUTOMATED: CPT

## 2025-05-09 PROCEDURE — 82803 BLOOD GASES ANY COMBINATION: CPT

## 2025-05-09 RX ORDER — PANTOPRAZOLE SODIUM 40 MG/1
40 TABLET, DELAYED RELEASE ORAL
Status: DISCONTINUED | OUTPATIENT
Start: 2025-05-09 | End: 2025-05-13

## 2025-05-09 RX ORDER — GABAPENTIN 300 MG/1
300 CAPSULE ORAL 3 TIMES DAILY
Status: DISCONTINUED | OUTPATIENT
Start: 2025-05-09 | End: 2025-05-13

## 2025-05-09 RX ADMIN — ACETAMINOPHEN 650 MG: 325 TABLET ORAL at 10:32

## 2025-05-09 RX ADMIN — MILRINONE LACTATE 0.25 MCG/KG/MIN: 0.2 INJECTION, SOLUTION INTRAVENOUS at 05:50

## 2025-05-09 RX ADMIN — OXYCODONE HYDROCHLORIDE AND ACETAMINOPHEN 500 MG: 500 TABLET ORAL at 08:29

## 2025-05-09 RX ADMIN — BUMETANIDE 1 MG/HR: 0.25 INJECTION INTRAMUSCULAR; INTRAVENOUS at 10:30

## 2025-05-09 RX ADMIN — MILRINONE LACTATE 0.25 MCG/KG/MIN: 0.2 INJECTION, SOLUTION INTRAVENOUS at 23:39

## 2025-05-09 RX ADMIN — PANTOPRAZOLE SODIUM 40 MG: 40 INJECTION, POWDER, LYOPHILIZED, FOR SOLUTION INTRAVENOUS at 08:29

## 2025-05-09 RX ADMIN — GABAPENTIN 300 MG: 400 CAPSULE ORAL at 21:38

## 2025-05-09 RX ADMIN — FOLIC ACID 1000 MCG: 1 TABLET ORAL at 08:30

## 2025-05-09 RX ADMIN — GABAPENTIN 300 MG: 400 CAPSULE ORAL at 12:56

## 2025-05-09 RX ADMIN — Medication 3 MG: at 22:05

## 2025-05-09 RX ADMIN — SODIUM CHLORIDE, PRESERVATIVE FREE 10 ML: 5 INJECTION INTRAVENOUS at 08:30

## 2025-05-09 RX ADMIN — MIDODRINE HYDROCHLORIDE 10 MG: 10 TABLET ORAL at 12:57

## 2025-05-09 RX ADMIN — POTASSIUM CHLORIDE 20 MEQ: 1500 TABLET, EXTENDED RELEASE ORAL at 12:56

## 2025-05-09 RX ADMIN — PANTOPRAZOLE SODIUM 40 MG: 40 TABLET, DELAYED RELEASE ORAL at 19:24

## 2025-05-09 RX ADMIN — ESCITALOPRAM OXALATE 10 MG: 10 TABLET ORAL at 08:30

## 2025-05-09 RX ADMIN — ALPRAZOLAM 0.5 MG: 0.5 TABLET ORAL at 22:05

## 2025-05-09 RX ADMIN — POTASSIUM CHLORIDE 20 MEQ: 1500 TABLET, EXTENDED RELEASE ORAL at 08:29

## 2025-05-09 RX ADMIN — MIDODRINE HYDROCHLORIDE 10 MG: 10 TABLET ORAL at 19:25

## 2025-05-09 RX ADMIN — GABAPENTIN 300 MG: 400 CAPSULE ORAL at 08:29

## 2025-05-09 RX ADMIN — BUMETANIDE 1 MG/HR: 0.25 INJECTION INTRAMUSCULAR; INTRAVENOUS at 23:22

## 2025-05-09 RX ADMIN — LEVOTHYROXINE SODIUM 75 MCG: 0.07 TABLET ORAL at 06:22

## 2025-05-09 RX ADMIN — MIDODRINE HYDROCHLORIDE 10 MG: 10 TABLET ORAL at 08:29

## 2025-05-09 RX ADMIN — TIOTROPIUM BROMIDE AND OLODATEROL 2 PUFF: 3.124; 2.736 SPRAY, METERED RESPIRATORY (INHALATION) at 08:48

## 2025-05-09 RX ADMIN — Medication 1000 UNITS: at 08:29

## 2025-05-09 RX ADMIN — POTASSIUM CHLORIDE 20 MEQ: 1500 TABLET, EXTENDED RELEASE ORAL at 19:24

## 2025-05-09 RX ADMIN — ATORVASTATIN CALCIUM 20 MG: 20 TABLET, FILM COATED ORAL at 08:30

## 2025-05-09 ASSESSMENT — PAIN SCALES - GENERAL: PAINLEVEL_OUTOF10: 3

## 2025-05-09 ASSESSMENT — PAIN DESCRIPTION - LOCATION
LOCATION: HEAD
LOCATION: CHEST

## 2025-05-09 ASSESSMENT — PAIN DESCRIPTION - DESCRIPTORS: DESCRIPTORS: HEAVINESS

## 2025-05-09 NOTE — PROGRESS NOTES
CRITICAL CARE PROGRESS NOTE      Patient:  Alberto Gilliland    Unit/Bed:4D-03/003-A  YOB: 1965  MRN: [id number]   PCP: Juan Christiansen DO  Date of Admission: 4/30/2025  Chief Complaint:- fatigue and hypotension    Assessment and Plan:    Hemorrhagic shock (improved): Secondary to GI bleed. Overnight on 5/8 had multiple bloody bowel movements. Hb dropped from 9.2 to 6.3. Receive 1 units RBC and 2 massive transfusion protocol. Hb now 12.9.  Had colonoscopy 05/05 showed large cecal angiectasia likely the cause of DIONISIO. Urgent colonoscopy done overnight on 5/8 and no active bleed in cecum or AVM seen. Bright red blood and some clots seen in splenic flexure and descending colon. CTA abdomen/pelvis showed no active bleeding. 5/9 no bloody bowel movements. Off pressor support; BP stable.   Monitor H&H.   Monitor stools  Concern for low-cardiac output heart failure- Patient remains hypotensive but showing improvement. 5/9 RHC readings showed CI 2.7, PAWP 30, CVP 9, CP 0.84, Prabhu 4.44. 5/8 BNP 60,319. Liver and kidney function worsening. Phil classification 2.  Continue with Bumex 1 ml/hr to get patient to dry weight  Continue milrinone drip   On midodrine 10mg TID  Strict I/O and daily weights  Monitor BNP for improvement with diuresis.   Off phenylephrine    Severe Aortic Stenosis: Hx of mechanical AVR/resection of ascending aorta 2007 then switched to bioprosthetic in 2016 due to warfarin intolerance and GI bleeding. 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 Patient was on Michael-Synephrine to increase afterload; currently off.   CTS previously consulted 05/02 and recommended a TAVR +/- PCI. Structural heart following. Undergoing workup for  TAVR.     Acute on Chronic HFrEF- BNP 35591. RODRÍGUEZ 05/06 showed EF 30-35% and moderate valve global hypokinesis. Severe stenosis of the aortic valve AV mean 42,   On Bumex drip 1 ml/hr   Na restriction 1,200 per  mg/hr (05/09/25 0407)    phenylephrine (MESHA-SYNEPHRINE) 50 mg in sodium chloride 0.9 % 250 mL infusion Stopped (05/09/25 0008)    sodium chloride      sodium chloride         PHYSICAL EXAMINATION:  T: 97.6.  P: 102. RR: 19. B/P: 101/63  O2 Sat:  100.  I/O: 5/9 +4,269.7, 5/8 +4,940.8, 5/7 +1,428.1  Body mass index is 29.97 kg/m².   GCS: 15/15  General:   acutely ill appearing male  HEENT:  normocephalic and atraumatic.  No scleral icterus. PERR  Neck: supple.  No Thyromegaly.  Lungs: clear to auscultation.  No retractions  Cardiac: RRR.  No JVD.  Abdomen: soft.  Nontender.  Extremities:  Scabs noted on hands and feet. No clubbing, cyanosis, or edema x 4.    Vasculature: capillary refill < 3 seconds. Palpable dorsalis pedis pulses.  Skin:  warm and dry.  Psych:  Alert and oriented x3.  Affect appropriate  Lymph:  No supraclavicular adenopathy.  Neurologic:  No focal deficit. No seizures.    Data: (All radiographs, tracings, PFTs, and imaging are personally viewed and interpreted unless otherwise noted).   Sodium 139, potassium 4.2, chloride 104, bicarb 22, BUN 57, creatinine 1.7, anion gap 13, estimated GFR 45, magnesium 2.1, glucose 126, calcium 8.1, total protein 5.3  BNP 60,319  Albumin 3.2, , , total bilirubin 3.9, total protein 5.3  WBC 11.7, hemoglobin 12.9, hematocrit 37.1, platelet count 75  Telemetry shows-sinus rhythm  5/9 ABG: pH 7.36, pCO2 45, pO2 110, bicarb 25  5/8 CXR - pulmonary cephalization. No infiltrate  5/8 CTA abdomen/pelvis - small bilateral pleural effusion. Patchy ground-glass opacities are noted on lung bases. No signs of active bleeding noted.         Seen with multidisciplinary ICU team .  Meets Continued ICU Level Care Criteria:    [x] Yes   [] No - Transfer Planned to listed location:  [] HOSPITALIST CONTACTED-      Case and plan discussed with Dr. Cheung.        Electronically signed by Alberto Fisher, DO  CRITICAL CARE SPECIALIST   Patient seen by me including key

## 2025-05-09 NOTE — PROCEDURES
PROCEDURE NOTE  Date: 5/8/2025   Name: Alberto Gilliland  YOB: 1965    Insert Arterial Line    Date/Time: 5/8/2025 8:45 PM    Performed by: Frank Lee DO  Authorized by: Frank Lee DO  Consent: Verbal consent obtained. Written consent obtained.  Risks and benefits: risks, benefits and alternatives were discussed  Consent given by: patient  Patient understanding: patient states understanding of the procedure being performed  Patient identity confirmed: verbally with patient and hospital-assigned identification number  Indications: hemodynamic monitoring  Location: left radial    Anesthesia:  Local Anesthetic: lidocaine 1% with epinephrine  Anesthetic total: 2 mL    Sedation:  Patient sedated: no    Mando's test normal: yes  Needle gauge: 21.  Seldinger technique: Seldinger technique used  Number of attempts: 2  Post-procedure: line sutured and dressing applied  Post-procedure CMS: normal  Patient tolerance: patient tolerated the procedure well with no immediate complications  Comments: I performed this procedure in conjunction with Dr. Thomas Black MD.

## 2025-05-09 NOTE — PROGRESS NOTES
Pt Name: Alberto Gilliland  MRN: [id number]  [id number]  YOB: 1965  Admit Date: 4/30/2025 12:24 PM  Date of evaluation: 5/9/2025  Primary Care Physician: Juan Christiansen,    4D-03/003-A     [facility]    S.  No complaints.      O.     Vitals:    05/09/25 1015   BP:    Pulse: 99   Resp: 17   Temp:    SpO2: 100%       Body mass index is 29.97 kg/m².   Awake and alert   clear to auscultation bilaterally   regular rate and rhythm   Soft and nondistended with normal BS, nontender   no cyanosis, clubbing or edema present      Current Meds    gabapentin  300 mg Oral TID    ketamine  150 mg IntraVENous Once    aspirin  325 mg Oral Once    potassium chloride  20 mEq Oral TID WC    pantoprazole  40 mg IntraVENous BID    [Held by provider] metoprolol succinate  12.5 mg Oral Daily    [Held by provider] furosemide  20 mg Oral BID    sodium chloride flush  5-40 mL IntraVENous 2 times per day    [Held by provider] enoxaparin  40 mg SubCUTAneous Daily    aspirin  81 mg Oral Daily    atorvastatin  20 mg Oral Daily    Vitamin D  1,000 Units Oral Daily    escitalopram  10 mg Oral Daily    folic acid  1,000 mcg Oral Daily    levothyroxine  75 mcg Oral Daily    [Held by provider] sucralfate  1 g Oral 4x Daily AC & HS    tiotropium-olodaterol  2 puff Inhalation Daily RT    vitamin C  500 mg Oral Daily    midodrine  10 mg Oral TID WC      sodium chloride      milrinone 0.25 mcg/kg/min (05/09/25 0913)    norepinephrine      sodium chloride 20 mL/hr at 05/09/25 0913    bumetanide (BUMEX) 12.5 mg in sodium chloride 0.9 % 125 mL infusion 1 mg/hr (05/09/25 1030)    phenylephrine (MESHA-SYNEPHRINE) 50 mg in sodium chloride 0.9 % 250 mL infusion Stopped (05/09/25 0008)    sodium chloride      sodium chloride       Diet: Diet NPO Exceptions are: Sips of Water with Meds    A.  The patient is a 60 y.o. male with severe DIONISIO following with Hematology for iv iron admitted 4/30/25 for hypotension with hgb 8.5 g/dL.  He has severe AS

## 2025-05-09 NOTE — PROGRESS NOTES
Pharmacy Renal Adjustment    Pharmacy renally adjusted the following medication(s) per P&T approved policy: gabapentin    Recent Labs     05/08/25  1615 05/09/25  0330   BUN 55* 57*   CREATININE 1.6* 1.7*     eGFR:   Recent Labs     05/08/25  0345 05/08/25  1615 05/09/25  0330   LABGLOM 52* 48* 45*     Estimated Creatinine Clearance: 47 mL/min (A) (based on SCr of 1.7 mg/dL (H)).    Assessment:  TC    Plan:   Decrease gabapentin from 400 mg TID to 300 mg TID    Please call pharmacy with any questions.    Ingrid Zhou, PharmD  PGY1 Pharmacy Resident  5/9/2025 8:18 AM

## 2025-05-09 NOTE — CARE COORDINATION
5/9/25, 4:08 PM EDT    DISCHARGE ON GOING EVALUATION    Alberto CORNELIUS [facility] day: 9  Location: -03/003-A Reason for admit: Chronic anemia [D64.9]  Troponin level elevated [R79.89]  S/P AVR [Z95.2]  Hypotension, unspecified hypotension type [I95.9]  Acute on chronic congestive heart failure, unspecified heart failure type (HCC) [I50.9]  Acute exacerbation of chronic heart failure (HCC) [I50.9]     Procedures:   5/5 EGD: normal, bx taken  5/5 colonoscopy:  Large cecal angiectasia destroyed with APC   5/7 Cath: Mild nonobstructive coronary; Decompensated CHF, elevated right and left filling pressures, CI 2.2  5/7 RODRÍGUEZ: EF 30-35%; mod mitral regurg; severe AS, mild aortic regurg; tricupid with mild to mod regrug; findings consistent w/mild pulmonary hypertension  5/8 Colonoscopy: Unable to find source of bleeding; bright red blood and some clots seen in splenic flexure and descending colon  5/8 SGC RIJ  5/8 CVC IJ    Imaging since last note:   5/8 US Abd/pelvis/Liver: 1. Coarsened hepatic echogenicity a finding which can indicate   hepatocellular disease including steatosis. The liver measures 16.8 cm.  2. The gallbladder wall is thickened and measures 6 mm. Finding may be on   the basis of underdistention however, if high clinical concern for acute   acalculous acute cholecystitis recommend nuclear medicine HIDA scan. No   cholelithiasis.  5/8 CXR: No acute findings  5/8 CTA Abd/pelvis: 1. No aortic aneurysm or dissection is visualized. The mesenteric branch vessels  appear patent. No arterial extravasation or blush of contrast is identified to  suggest active site of bleeding.  2. Small bilateral pleural effusions and patchy bilateral lower lobe groundglass  opacities can be clinically correlated for pneumonia. Follow-up to ensure  resolution is advised. No bowel obstruction, fluid collection, or free air is  observed. Chronic findings are discussed.    Barriers to Discharge: Transferred to ICU after cath

## 2025-05-09 NOTE — PROGRESS NOTES
Spiritual Health History and Assessment/Progress Note  [facility]    (P) Spiritual/Emotional Needs,  ,  ,      Name: Alberto Gilliland MRN: [id number]    Age: 60 y.o.     Sex: male   Language: English   Confucianism: None   Acute on chronic congestive heart failure (HCC)     Date: 5/9/2025            Total Time Calculated: (P) 5 min              Spiritual Assessment continued in [facility] ICU 4D        Referral/Consult From: (P) Rounding   Encounter Overview/Reason: (P) Spiritual/Emotional Needs  Service Provided For: (P) Patient and family together    Shae, Belief, Meaning:   Patient unable to assess at this time  Family/Friends Other: Unable to assess      Importance and Influence:  Patient unable to assess at this time  Family/Friends Other: Unable to assess    Community:  Patient feels well-supported. Support system includes: Extended family  Family/Friends feel well-supported. Support system includes: Extended family    Assessment and Plan of Care:     Patient Interventions include: Facilitated expression of thoughts and feelings, Explored spiritual coping/struggle/distress, and Other:  prayed for patient bedside.  Family/Friends Interventions include: Facilitated expression of thoughts and feelings, Explored spiritual coping/struggle/distress, and Other:  prayed for patient bedside.    Patient Plan of Care: Spiritual Care available upon further referral  Family/Friends Plan of Care: Spiritual Care available upon further referral    Electronically signed by Chaplain Adán on 5/9/2025 at 2:08 PM

## 2025-05-09 NOTE — PROGRESS NOTES
Comprehensive Nutrition Assessment    Type and Reason for Visit:  Initial, LOS    Nutrition Recommendations/Plan:   Initiate clear liquids per GI today - recommend diet advancements as GI function allows/tolerated  Start ONS: Ensure Clear (TID); Monitor for advancements in diet order and modify to Mighty Shakes - 4 oz ONS (TID)   Recommend to continue vitamin C and folic acid supplementation; Recommend addition of MVI as tolerated  Will monitor need for additional nutrition interventions as appropriate/able     Malnutrition Assessment:  Malnutrition Status:  Moderate malnutrition (05/09/25 1626)    Context:  Acute Illness     Findings of the 6 clinical characteristics of malnutrition:  Energy Intake:  50% or less of estimated energy requirements for 5 or more days (NPO/CLD this admit)  Weight Loss:  Unable to assess (CHF; unable to accurately assess)     Body Fat Loss:  Mild body fat loss Orbital   Muscle Mass Loss:  Mild muscle mass loss Temples (temporalis), Clavicles (pectoralis & deltoids)  Fluid Accumulation:  Mild Generalized, Extremities   Strength:  Not Performed    Nutrition Assessment:     Pt. moderately malnourished AEB criteria as listed above.  At risk for further nutrition compromise r/t admit with acute on chronic CHF - hypotensive s/p RODRÍGUEZ 5/6 and RHC 5/9, hemorrhagic shock 2/2 GIB - multiple bloody BM 5/8 s/p EGD 5/5 no active bleeding noted - bx duodenum taken and colonoscopy 5/8 with inability to fully visualize active bleeding - clots noted, shock liver, TC, symptomatic DIONISIO, LOS day 9 and underlying medical condition (PMHx: mechanical AVR/resection of ascending aorta 2007 - switched to bioprosthetic 2016 2/2 warfarin intolerance and GIB, CHF, CAD COPD, anxiety, WISE, lymphatoid papulosis, GERD, HTN, HLD, hypothyroidism, OA, hx/o small bowel resection 2008 - 8 inches removed, s/p robotic laparoscopy lysis of adhesions/ventral hernia repair/evacuation of abdominal hematoma with debridement of

## 2025-05-09 NOTE — PROGRESS NOTES
noted. Mildly elevated RVSP, consistent with mild pulmonary hypertension.   Pulmonic Valve The pulmonic valve visualization is suboptimal but appears to be functioning normally. Physiologically normal regurgitation. No stenosis noted.   Pulmonary Artery Normal pulmonary arteries.   Aorta Normal sized aortic root and ascending aorta.   IVC/Hepatic Veins IVC diameter is normal or and decreases greater than 50% during inspiration; therefore the estimated right atrial pressure is normal (~3 mmHg). IVC size is normal.   Pericardium No pericardial effusion.       Assessment:  Acute decompensated HFrEF 30 -35% per echo 4/21/2025 and RODRÍGUEZ 5/7/25    - BNP 60.3K on 5/8 post MTP x 2; 16.4 K  on 5/5   - LA 2.4 5/7 - now cleared   - RHC pressures with PAP PAP 78/38, CVP 22, PA sat 50% on 5/7   - PCWP 33 and O2 sat 97%   - Claire CO 4.29 and CI 2.2 per cath lab report 5/7/2025  - moved to ICU with Bumex infusion   -  I/O: 847/1200 (-352); NET +4384  - SGC placement 5/8 with initial HD:      - CO/CI: 2.93/1.49; PAP 75/38, CVP 18, SVRI 3691, SV 34.5    - HR 85, MAP 87; SVO2 56 on 6L/NC; Michael at 100 mcg,   This AM: Milrinone continues 0.25 mcg; Bumex at 1 mg/hr; weaned off Michael  HD@ 0830: CO/CI: 4.7/2.43; PAP 78/31, CVP 11, SVRI 2272, SV 48.6        MAP 80, HR 98, SVO2 68  CLAIRE: CO 4.7/ CI 2.3; SV 49  Severe AS   - previous SAVR with severe AS; mean gradient 42 mmHg   - TAVR work-up in process - possible IP TAVR - YTBD  Mod to Severe MR   - continue IV diuresis   - monitor function with re-evaluation post diuresis/post TAVR  Non-obstructive CAD   - s/p cath 5/7 - no intervention warranted   - no anginal sx  Acute GIB   - no additional bleeding  - several bloody stools with clots  OVN 5/8 with hypotension     - no anticoagulation at time of bleed   - s/p MTP: 9 U PRBCs, 8 U FFP, 2 PLT   - Hgb now stable at  12.9 (14.9, 14.7)   - s/p emergent colonoscopy and EGD  - no evidence of active bleeding - known AVM cecum - not bleeding. EGD  performed with 1 cm submucosal lesion noted in duodenum - not bleeding   - s/p CTA abdomen and pelvis    - no bleeding detected; no intervention  Shock liver; Elevated LFTs   - now down-trending    Plan:  Continue art line  HD with SVO2 q4h and prn; ABG with initial SGC placement and CLAIRE CO calculation q12h please. Goal: CI > 2; MAP > 65; SVO2 > 60  Continue bumex infusion at 0.5 mg/hr with goal CVP 10  Continue Milrinone infusion  BMP q12h and prn; keep K > 4 and Mag >/= 2  Repeat BNP - will trend  Monitor renal function closely  Trend LFTs  Strict I & O  Daily weight  2L fluid restriction once taking po  2 gm Na diet once cleared for po  Continue to follow H & H and monitor for bleeding  Continue TAVR work-up - structural heart team aware   - will need pre-TAVR CTAs - structural team to order when appropriate - likely sometime next week     No plan for immediate TAVR - will optimize cardiac function and volume status and re-evaluate.     Further recommendations based on results and clinical course. If any issues, please contact the Complex Cardiology/Structural Heart Service via [facility] on [employer]. (JASWINDER Collins CNP on call).    Electronically signed by JASWINDER Collins CNP on 5/9/2025 at 7:21 AM

## 2025-05-10 ENCOUNTER — APPOINTMENT (OUTPATIENT)
Dept: INTERVENTIONAL RADIOLOGY/VASCULAR | Age: 60
DRG: 003 | End: 2025-05-10
Payer: COMMERCIAL

## 2025-05-10 ENCOUNTER — APPOINTMENT (OUTPATIENT)
Dept: GENERAL RADIOLOGY | Age: 60
DRG: 003 | End: 2025-05-10
Payer: COMMERCIAL

## 2025-05-10 PROBLEM — I50.9 ACUTE ON CHRONIC CONGESTIVE HEART FAILURE (HCC): Status: ACTIVE | Noted: 2025-05-10

## 2025-05-10 PROBLEM — D64.9 CHRONIC ANEMIA: Status: ACTIVE | Noted: 2025-05-10

## 2025-05-10 PROBLEM — R79.89 TROPONIN LEVEL ELEVATED: Status: ACTIVE | Noted: 2025-05-10

## 2025-05-10 PROBLEM — I95.9 HYPOTENSION: Status: ACTIVE | Noted: 2025-05-10

## 2025-05-10 PROBLEM — I35.0 NONRHEUMATIC AORTIC VALVE STENOSIS: Status: ACTIVE | Noted: 2025-05-10

## 2025-05-10 LAB
ALBUMIN SERPL BCG-MCNC: 3.2 G/DL (ref 3.4–4.9)
ALBUMIN SERPL BCG-MCNC: 3.5 G/DL (ref 3.4–4.9)
ALP SERPL-CCNC: 100 U/L (ref 40–129)
ALP SERPL-CCNC: 112 U/L (ref 40–129)
ALT SERPL W/O P-5'-P-CCNC: 385 U/L (ref 10–50)
ALT SERPL W/O P-5'-P-CCNC: 471 U/L (ref 10–50)
ANION GAP SERPL CALC-SCNC: 13 MEQ/L (ref 8–16)
ANION GAP SERPL CALC-SCNC: 14 MEQ/L (ref 8–16)
ARTERIAL PATENCY WRIST A: NORMAL
AST SERPL-CCNC: 126 U/L (ref 10–50)
AST SERPL-CCNC: 98 U/L (ref 10–50)
BASE EXCESS BLDA CALC-SCNC: -0.1 MMOL/L (ref -2.5–2.5)
BASE EXCESS BLDA CALC-SCNC: -0.7 MMOL/L (ref -2–3)
BASE EXCESS BLDA CALC-SCNC: -0.8 MMOL/L (ref -2–3)
BASE EXCESS BLDA CALC-SCNC: -1.7 MMOL/L (ref -2.5–2.5)
BASE EXCESS BLDA CALC-SCNC: -2.1 MMOL/L (ref -2.5–2.5)
BASE EXCESS BLDA CALC-SCNC: 0.9 MMOL/L (ref -2.5–2.5)
BASE EXCESS BLDA CALC-SCNC: 1.2 MMOL/L (ref -2–3)
BASE EXCESS BLDA CALC-SCNC: 1.7 MMOL/L (ref -2–3)
BDY SITE: ABNORMAL
BDY SITE: ABNORMAL
BDY SITE: NORMAL
BDY SITE: NORMAL
BILIRUB SERPL-MCNC: 2.4 MG/DL (ref 0.3–1.2)
BILIRUB SERPL-MCNC: 2.7 MG/DL (ref 0.3–1.2)
BUN SERPL-MCNC: 61 MG/DL (ref 8–23)
BUN SERPL-MCNC: 63 MG/DL (ref 8–23)
CALCIUM SERPL-MCNC: 8.2 MG/DL (ref 8.8–10.2)
CALCIUM SERPL-MCNC: 8.4 MG/DL (ref 8.8–10.2)
CHLORIDE SERPL-SCNC: 102 MEQ/L (ref 98–111)
CHLORIDE SERPL-SCNC: 103 MEQ/L (ref 98–111)
CO2 SERPL-SCNC: 21 MEQ/L (ref 22–29)
CO2 SERPL-SCNC: 23 MEQ/L (ref 22–29)
COLLECTED BY:: ABNORMAL
COLLECTED BY:: NORMAL
CREAT SERPL-MCNC: 1.7 MG/DL (ref 0.7–1.2)
CREAT SERPL-MCNC: 1.8 MG/DL (ref 0.7–1.2)
DEPRECATED RDW RBC AUTO: 56.1 FL (ref 35–45)
DEVICE: ABNORMAL
DEVICE: NORMAL
ECHO BSA: 2.03 M2
ERYTHROCYTE [DISTWIDTH] IN BLOOD BY AUTOMATED COUNT: 18.5 % (ref 11.5–14.5)
FIBRINOGEN PPP-MCNC: 216 MG/100ML (ref 155–475)
FIO2 ON VENT O2 ANALYZER: 2 %
FIO2 ON VENT O2 ANALYZER: 5 %
FSP PPP LA-ACNC: > 20 MCG/ML
GFR SERPL CREATININE-BSD FRML MDRD: 42 ML/MIN/1.73M2
GFR SERPL CREATININE-BSD FRML MDRD: 45 ML/MIN/1.73M2
GLUCOSE SERPL-MCNC: 132 MG/DL (ref 74–109)
GLUCOSE SERPL-MCNC: 200 MG/DL (ref 74–109)
HCO3 BLDA-SCNC: 23 MMOL/L (ref 23–28)
HCO3 BLDA-SCNC: 23 MMOL/L (ref 23–28)
HCO3 BLDA-SCNC: 26 MMOL/L (ref 23–28)
HCO3 BLDA-SCNC: 27 MMOL/L (ref 23–28)
HCO3 BLDA-SCNC: 28 MMOL/L (ref 23–28)
HCO3 BLDA-SCNC: 28 MMOL/L (ref 23–28)
HCT VFR BLD AUTO: 36 % (ref 42–52)
HCT VFR BLD AUTO: 38 % (ref 42–52)
HGB BLD-MCNC: 11.7 GM/DL (ref 14–18)
HGB BLD-MCNC: 12.7 GM/DL (ref 14–18)
LACTATE SERPL-SCNC: 1.7 MMOL/L (ref 0.5–2)
MAGNESIUM SERPL-MCNC: 1.9 MG/DL (ref 1.6–2.6)
MCH RBC QN AUTO: 29.5 PG (ref 26–33)
MCHC RBC AUTO-ENTMCNC: 33.4 GM/DL (ref 32.2–35.5)
MCV RBC AUTO: 88.2 FL (ref 80–94)
PCO2 BLDA: 38 MMHG (ref 35–45)
PCO2 BLDA: 40 MMHG (ref 35–45)
PCO2 BLDA: 44 MMHG (ref 35–45)
PCO2 BLDA: 46 MMHG (ref 35–45)
PCO2 TEMP ADJ BLDMV: 49 MMHG (ref 41–51)
PCO2 TEMP ADJ BLDMV: 50 MMHG (ref 41–51)
PCO2 TEMP ADJ BLDMV: 52 MMHG (ref 41–51)
PCO2 TEMP ADJ BLDMV: 54 MMHG (ref 41–51)
PH BLDA: 7.36 [PH] (ref 7.35–7.45)
PH BLDA: 7.37 [PH] (ref 7.35–7.45)
PH BLDA: 7.39 [PH] (ref 7.35–7.45)
PH BLDA: 7.39 [PH] (ref 7.35–7.45)
PH BLDMV: 7.3 [PH] (ref 7.31–7.41)
PH BLDMV: 7.31 [PH] (ref 7.31–7.41)
PH BLDMV: 7.36 [PH] (ref 7.31–7.41)
PH BLDMV: 7.36 [PH] (ref 7.31–7.41)
PLATELET # BLD AUTO: 76 THOU/MM3 (ref 130–400)
PMV BLD AUTO: 11.9 FL (ref 9.4–12.4)
PO2 BLDA: 100 MMHG (ref 71–104)
PO2 BLDA: 103 MMHG (ref 71–104)
PO2 BLDA: 111 MMHG (ref 71–104)
PO2 BLDA: 74 MMHG (ref 71–104)
PO2 BLDMV: 31 MMHG (ref 25–40)
PO2 BLDMV: 34 MMHG (ref 25–40)
PO2 BLDMV: 36 MMHG (ref 25–40)
PO2 BLDMV: 40 MMHG (ref 25–40)
POTASSIUM SERPL-SCNC: 3.9 MEQ/L (ref 3.5–5.2)
POTASSIUM SERPL-SCNC: 4.8 MEQ/L (ref 3.5–5.2)
PROT SERPL-MCNC: 5.6 G/DL (ref 6.4–8.3)
PROT SERPL-MCNC: 5.8 G/DL (ref 6.4–8.3)
RBC # BLD AUTO: 4.31 MILL/MM3 (ref 4.7–6.1)
SAO2 % BLDA: 95 %
SAO2 % BLDA: 98 %
SAO2 % BLDMV: 55 %
SAO2 % BLDMV: 62 %
SAO2 % BLDMV: 63 %
SAO2 % BLDMV: 68 %
SITE: ABNORMAL
SITE: ABNORMAL
SITE: NORMAL
SITE: NORMAL
SODIUM SERPL-SCNC: 138 MEQ/L (ref 135–145)
SODIUM SERPL-SCNC: 138 MEQ/L (ref 135–145)
VENTILATION MODE VENT: ABNORMAL
VENTILATION MODE VENT: NORMAL
WBC # BLD AUTO: 13.3 THOU/MM3 (ref 4.8–10.8)

## 2025-05-10 PROCEDURE — 6360000002 HC RX W HCPCS

## 2025-05-10 PROCEDURE — 2700000000 HC OXYGEN THERAPY PER DAY

## 2025-05-10 PROCEDURE — 99291 CRITICAL CARE FIRST HOUR: CPT | Performed by: INTERNAL MEDICINE

## 2025-05-10 PROCEDURE — 36592 COLLECT BLOOD FROM PICC: CPT

## 2025-05-10 PROCEDURE — 6370000000 HC RX 637 (ALT 250 FOR IP): Performed by: INTERNAL MEDICINE

## 2025-05-10 PROCEDURE — 82803 BLOOD GASES ANY COMBINATION: CPT

## 2025-05-10 PROCEDURE — 2500000003 HC RX 250 WO HCPCS: Performed by: INTERNAL MEDICINE

## 2025-05-10 PROCEDURE — 85014 HEMATOCRIT: CPT

## 2025-05-10 PROCEDURE — 85018 HEMOGLOBIN: CPT

## 2025-05-10 PROCEDURE — 93970 EXTREMITY STUDY: CPT

## 2025-05-10 PROCEDURE — 6360000002 HC RX W HCPCS: Performed by: INTERNAL MEDICINE

## 2025-05-10 PROCEDURE — 85362 FIBRIN DEGRADATION PRODUCTS: CPT

## 2025-05-10 PROCEDURE — 36415 COLL VENOUS BLD VENIPUNCTURE: CPT

## 2025-05-10 PROCEDURE — 99233 SBSQ HOSP IP/OBS HIGH 50: CPT | Performed by: NURSE PRACTITIONER

## 2025-05-10 PROCEDURE — 94640 AIRWAY INHALATION TREATMENT: CPT

## 2025-05-10 PROCEDURE — 83605 ASSAY OF LACTIC ACID: CPT

## 2025-05-10 PROCEDURE — 2580000003 HC RX 258: Performed by: INTERNAL MEDICINE

## 2025-05-10 PROCEDURE — 71045 X-RAY EXAM CHEST 1 VIEW: CPT

## 2025-05-10 PROCEDURE — 94761 N-INVAS EAR/PLS OXIMETRY MLT: CPT

## 2025-05-10 PROCEDURE — 2000000000 HC ICU R&B

## 2025-05-10 PROCEDURE — 83735 ASSAY OF MAGNESIUM: CPT

## 2025-05-10 PROCEDURE — 37799 UNLISTED PX VASCULAR SURGERY: CPT

## 2025-05-10 PROCEDURE — 85027 COMPLETE CBC AUTOMATED: CPT

## 2025-05-10 PROCEDURE — 6360000002 HC RX W HCPCS: Performed by: NURSE PRACTITIONER

## 2025-05-10 PROCEDURE — 6370000000 HC RX 637 (ALT 250 FOR IP)

## 2025-05-10 PROCEDURE — P9047 ALBUMIN (HUMAN), 25%, 50ML: HCPCS

## 2025-05-10 PROCEDURE — 80053 COMPREHEN METABOLIC PANEL: CPT

## 2025-05-10 PROCEDURE — 85384 FIBRINOGEN ACTIVITY: CPT

## 2025-05-10 RX ORDER — ALBUMIN (HUMAN) 12.5 G/50ML
SOLUTION INTRAVENOUS
Status: COMPLETED
Start: 2025-05-10 | End: 2025-05-10

## 2025-05-10 RX ORDER — ALBUMIN (HUMAN) 12.5 G/50ML
25 SOLUTION INTRAVENOUS ONCE
Status: COMPLETED | OUTPATIENT
Start: 2025-05-10 | End: 2025-05-10

## 2025-05-10 RX ORDER — ALBUMIN HUMAN 50 G/1000ML
SOLUTION INTRAVENOUS
Status: DISPENSED
Start: 2025-05-10 | End: 2025-05-10

## 2025-05-10 RX ADMIN — PANTOPRAZOLE SODIUM 40 MG: 40 TABLET, DELAYED RELEASE ORAL at 16:52

## 2025-05-10 RX ADMIN — ALBUMIN (HUMAN) 25 G: 0.25 INJECTION, SOLUTION INTRAVENOUS at 11:23

## 2025-05-10 RX ADMIN — OXYCODONE HYDROCHLORIDE AND ACETAMINOPHEN 500 MG: 500 TABLET ORAL at 09:09

## 2025-05-10 RX ADMIN — MILRINONE LACTATE 0.38 MCG/KG/MIN: 0.2 INJECTION, SOLUTION INTRAVENOUS at 15:31

## 2025-05-10 RX ADMIN — GABAPENTIN 300 MG: 400 CAPSULE ORAL at 20:18

## 2025-05-10 RX ADMIN — ASPIRIN 81 MG: 81 TABLET, COATED ORAL at 09:09

## 2025-05-10 RX ADMIN — CHLOROTHIAZIDE SODIUM 1000 MG: 500 INJECTION, POWDER, LYOPHILIZED, FOR SOLUTION INTRAVENOUS at 12:37

## 2025-05-10 RX ADMIN — GABAPENTIN 300 MG: 400 CAPSULE ORAL at 16:52

## 2025-05-10 RX ADMIN — LEVOTHYROXINE SODIUM 75 MCG: 0.07 TABLET ORAL at 08:18

## 2025-05-10 RX ADMIN — ALBUMIN (HUMAN) 25 G: 12.5 SOLUTION INTRAVENOUS at 11:23

## 2025-05-10 RX ADMIN — POTASSIUM CHLORIDE 20 MEQ: 1500 TABLET, EXTENDED RELEASE ORAL at 12:11

## 2025-05-10 RX ADMIN — FOLIC ACID 1000 MCG: 1 TABLET ORAL at 09:09

## 2025-05-10 RX ADMIN — TIOTROPIUM BROMIDE AND OLODATEROL 2 PUFF: 3.124; 2.736 SPRAY, METERED RESPIRATORY (INHALATION) at 09:24

## 2025-05-10 RX ADMIN — Medication 1000 UNITS: at 09:09

## 2025-05-10 RX ADMIN — ESCITALOPRAM OXALATE 10 MG: 10 TABLET ORAL at 09:09

## 2025-05-10 RX ADMIN — MIDODRINE HYDROCHLORIDE 10 MG: 10 TABLET ORAL at 09:09

## 2025-05-10 RX ADMIN — PANTOPRAZOLE SODIUM 40 MG: 40 TABLET, DELAYED RELEASE ORAL at 08:18

## 2025-05-10 RX ADMIN — ALPRAZOLAM 0.5 MG: 0.5 TABLET ORAL at 21:48

## 2025-05-10 RX ADMIN — POTASSIUM CHLORIDE 20 MEQ: 1500 TABLET, EXTENDED RELEASE ORAL at 16:53

## 2025-05-10 RX ADMIN — BUMETANIDE 2 MG/HR: 0.25 INJECTION INTRAMUSCULAR; INTRAVENOUS at 12:51

## 2025-05-10 RX ADMIN — BUMETANIDE 2 MG/HR: 0.25 INJECTION INTRAMUSCULAR; INTRAVENOUS at 20:55

## 2025-05-10 RX ADMIN — POTASSIUM CHLORIDE 20 MEQ: 1500 TABLET, EXTENDED RELEASE ORAL at 09:09

## 2025-05-10 RX ADMIN — ATORVASTATIN CALCIUM 20 MG: 20 TABLET, FILM COATED ORAL at 09:10

## 2025-05-10 RX ADMIN — SODIUM CHLORIDE, PRESERVATIVE FREE 10 ML: 5 INJECTION INTRAVENOUS at 09:08

## 2025-05-10 RX ADMIN — GABAPENTIN 300 MG: 400 CAPSULE ORAL at 09:09

## 2025-05-10 NOTE — PROGRESS NOTES
Gastroenterology Progress Note:     Patient Name:  Alberto Gilliland   MRN: [id number]  [id number]  YOB: 1965  Admit Date: 4/30/2025 12:24 PM  Primary Care Physician: Juan Christiansen DO   4D-03/003-A     Patient seen and examined.  24 hours events and chart reviewed.    Subjective: Pt denies abdominal pain/discomfort. Pt states he doesn't feel well in general.     Objective:  /63   Pulse (!) 103   Temp 97.9 °F (36.6 °C) (Core)   Resp 23   Ht 1.702 m (5' 7\")   Wt 79.7 kg (175 lb 11.3 oz)   SpO2 100%   BMI 27.52 kg/m²     Physical Exam:    General:  Chronically ill appearing   HEENT: Atraumatic, normocephalic. Moist oral mucous membranes.  Neck: Supple without adenopathy, JVD, thyromegaly or masses. Trachea midline.  CV: Heart RRR, no murmurs, rubs, gallops.  Resp: Even, easy without cough or accessory use. Lungs clear to ascultation bilaterally.   Abd: Round, flat, nontender. No hepatosplenomegaly or mass present. Active bowel sounds heard. No distention noted.   Ext:  Without cyanosis, clubbing, edema.   Skin: Pink, warm, dry  Neuro:  Alert, oriented x 3 with no obvious deficits.       Rectal: deferred    Labs:   CBC:   Lab Results   Component Value Date/Time    WBC 13.3 05/10/2025 05:35 AM    HGB 12.7 05/10/2025 05:35 AM    HGB 12.0 05/18/2012 01:10 PM    HCT 38.0 05/10/2025 05:35 AM    MCV 88.2 05/10/2025 05:35 AM    PLT 76 05/10/2025 05:35 AM     BMP:   Lab Results   Component Value Date/Time     05/10/2025 05:35 AM    K 4.8 05/10/2025 05:35 AM    K 4.3 04/30/2025 12:37 PM     05/10/2025 05:35 AM    CO2 21 05/10/2025 05:35 AM    BUN 61 05/10/2025 05:35 AM    CREATININE 1.7 05/10/2025 05:35 AM    CALCIUM 8.2 05/10/2025 05:35 AM     PT/INR:   Lab Results   Component Value Date/Time    PROTIME 2.95 12/02/2011 07:15 AM    INR 1.43 05/08/2025 10:22 AM     LFTs:   Lab Results   Component Value Date/Time    ALKPHOS 112 05/10/2025 05:35 AM     05/10/2025 05:35 AM

## 2025-05-10 NOTE — PROGRESS NOTES
CRITICAL CARE PROGRESS NOTE      Patient:  Alberto Gilliland    Unit/Bed:4D-03/003-A  YOB: 1965  MRN: [id number]   PCP: Juan Christiansen DO  Date of Admission: 4/30/2025  Chief Complaint:- fatigue and hypotension    Assessment and Plan:   Acute on Chronic decompensated HFrEF- BNP 88997. RODRÍGUEZ 05/06 showed EF 30-35% and moderate valve global hypokinesis. Severe stenosis of the aortic valve AV mean 42,   -CI: 2.12, PAWP 38, Prabhu-2.8, CP 0.30  -Increased Bumex drip 2ml/hr and milirone 0.375. Given Dilurel IV as well  -Na restriction 1,200 per daily once diet started  -Fluid restrict to less than 2L daily   -Daily weights. Strict I&O.   Severe Aortic Stenosis:  Hx of mechanical AVR/resection of ascending aorta 2007 then switched to bioprosthetic in 2016 due to warfarin intolerance and GI bleeding. 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   CTS previously consulted 05/02 and recommended a TAVR +/- PCI. Structural heart following. Undergoing workup for  TAVR.   Moderate to severe MR- RODRÍGUEZ done 05/07 showed moderate annular calcification and moderate regurgitation with centrally directed jet.     Shock Liver, improving: Albumin 3.2,  , , Total bili 2.7. Suspected due to hypoperfusion in setting of severe AS. US Liver with doppler showed steatosis and liver 16.8 cm with thickened gallbladder.  If worsening ALT then stop statin tomorrow.     TC-Baseline 0.9. Cr 1.7. Suspect due to hypoperfusion and contrast induced. FeUrea 25.2%. Monitor BMP with diuresis.      Symptomatic Iron deficiency anemia: Iron sat 7, Iron 25, ferritin 139, TIBC 369. Had colonoscopy 05/05 showed large cecal angiectasia likely the cause of DIONISIO. Previously on IV iron. To follow up with GI in 2-4 weeks. Monitor Hb.   Thrombocytopenia, stable- Platelets dropped 170 to 75. Today No heparin drip and Levonox held in setting of GI bleed for >48hours. Smear ordered, fibrin  and fibrinogen split products ordered.     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 Per Dr. Fletcher needs to continue meds.   Hyperlipidemia:  On 20mg Atorvastatin  Hypertension:  On Valsartan 80 mg, Lasix 40 mg and Toprol 50 mg daily. Not ordered given hypotension.   Hx of WISE    COPD:  On Stiolto. Last PFT 08/2024 FEV1/FVC 79. Not keeping with obstructive disease. Lung volumes show mild restrictive disease. DLCO is mod diminished.    Lung nodules- supposed to go for biopsy. Previous biopsy negative for malignancy.   Hemorrhagic shock, stable-secondary to GI bleed. Overnight had multiple bloody bowel movements. Hb dropped from 9.2 to 6.3. Receive 1 units RBC and 2 massive transfusion protocol. Hb now 14.9.  Had colonoscopy 05/05 showed large cecal angiectasia likely the cause of DIONISIO. Urgent colonoscopy done overnight and no active bleed in cecum or AVM seen. Bright red blood and some clots seen in splenic flexure and descending colon. CTA abdomen/pelvis showed no active bleeding. Monitor H&H. Monitor stools  Lactic acidosis:  2.4. Likely due to reduced cardiac output. Trend lactic acid [id number].    INITIAL H AND P AND ICU COURSE:  Alberto Gilliland a 60 year old male presented due to fatigue and hypotension. PMHx severe AS, WISE, HLD, HTN, COPD, HFrEF. Notes over the last year or so has been having worsening SOB and exertional chest pain. Over the last few weeks has had a dry cough. Denies any chest pain. Denies any SOB. Noted to have DIONISIO and had colonoscopy done on 05/05 which showed showed large cecal angiectasia likely the cause of DIONISIO. Given AS there was concern for low cardiac output. CTS seen and recommended TAVR and PCI. Had right and left heart cath on 05/07 and was non-obstructive. Patient transferred to ICU for possible need for [place] guided diuresis and phenylephrine.     05/07: Overnight night patient had GI bleed requiring 2 massive transfusion protocols. GI scoped overnight and no active bleeding seen.

## 2025-05-10 NOTE — PROGRESS NOTES
Complex Cardiology Service                                                         Daily Progress Note    Patient:  Alberto Gilliland  YOB: 1965    MRN: [id number]   Acct: [id number]    Admit Date:  4/30/2025    Rationale for Cardiology consult: severe AS and MR with decompensated HF; likely low-output failure - need for ICU monitoring and SGC guided HD optimization    HPI/PMH: Per Dr. Rivera consult note of 5/5/2025:   This is a pleasant 60 y.o. 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  Has intermittent chest pain and MA as well.  Progressively getting worse.  Not lightheaded now.  Awaiting GI evaluation for bleeding. EF is 30-35% with DELMI 0.7 cm2, mean gradient 45 mmHg. EF has gotten worse since prior which was 45-50% 4/2022.  Patient continues to smoke.  Borderline SBP 99 mmHg on midodrine.  CTS has seen the patient and he is not candidate for SAVR at this time.  ECG with NSR, IVCD, minimal voltage LVH.  K 4.3.  Cr 0.7.  BNP 79541.  Hgb 7.4, Plts 234.  INR 1.4.  No cancers.    RHC & LHC with RODRÍGUEZ obtained 5/6/2025; TRO to ICU for critical care monitoring and SGC guided HD optimization.     Subjective:    5/10  Stable over night  Resting in bed this morning, feels more tired, did get xanax over night as well to help him sleep  Hemodynamics @ 9am: , MAP 84, PAP 80/35 CO 4.95 CI 2.53 SV 44.6 SVO2 55% on 2L NC  CLAIRE: CO 3.6 CI 1.8 SV 32  Milrinone at 0.25 Bumex gtt at 1mg/hr  UOP 1.4L/24hrs [+4L]   Cr remains at 1.7 BUN 40  HGB stable at 12.7  Plt remain low at 76 (75, 170) - dicussed with ICU team, suspect dilutional post mass transfusion   Advancing to clear liquid diet    5/9  Remains in ICU; stable OVN  SGC guided diuresis and CO optimization  S/p RHC, LHC and RODRÍGUEZ 5/7/2025   - elevated RH

## 2025-05-11 ENCOUNTER — APPOINTMENT (OUTPATIENT)
Dept: GENERAL RADIOLOGY | Age: 60
DRG: 003 | End: 2025-05-11
Payer: COMMERCIAL

## 2025-05-11 LAB
ACINETOBACTER CALCOACETICUS-BAUMANNII BY PCR: NOT DETECTED
ALBUMIN SERPL BCG-MCNC: 3.5 G/DL (ref 3.4–4.9)
ALBUMIN SERPL BCG-MCNC: 4 G/DL (ref 3.4–4.9)
ALP SERPL-CCNC: 106 U/L (ref 40–129)
ALP SERPL-CCNC: 85 U/L (ref 40–129)
ALT SERPL W/O P-5'-P-CCNC: 273 U/L (ref 10–50)
ALT SERPL W/O P-5'-P-CCNC: 345 U/L (ref 10–50)
ANION GAP SERPL CALC-SCNC: 14 MEQ/L (ref 8–16)
ANION GAP SERPL CALC-SCNC: 17 MEQ/L (ref 8–16)
APTT PPP: 28.3 SECONDS (ref 22–38)
ARTERIAL PATENCY WRIST A: ABNORMAL
ARTERIAL PATENCY WRIST A: NORMAL
AST SERPL-CCNC: 66 U/L (ref 10–50)
AST SERPL-CCNC: 88 U/L (ref 10–50)
BASE EXCESS BLDA CALC-SCNC: 1.7 MMOL/L (ref -2–3)
BASE EXCESS BLDA CALC-SCNC: 2.2 MMOL/L (ref -2.5–2.5)
BASE EXCESS BLDA CALC-SCNC: 2.5 MMOL/L (ref -2.5–2.5)
BASE EXCESS BLDA CALC-SCNC: 2.8 MMOL/L (ref -2–3)
BASE EXCESS BLDA CALC-SCNC: 2.9 MMOL/L (ref -2–3)
BASE EXCESS BLDA CALC-SCNC: 2.9 MMOL/L (ref -2–3)
BASE EXCESS BLDA CALC-SCNC: 3.1 MMOL/L (ref -2–3)
BASE EXCESS BLDA CALC-SCNC: 3.2 MMOL/L (ref -2–3)
BASE EXCESS BLDA CALC-SCNC: 4.8 MMOL/L (ref -2.5–2.5)
BASOPHILS ABSOLUTE: 0 THOU/MM3 (ref 0–0.1)
BASOPHILS NFR BLD AUTO: 0.1 %
BDY SITE: ABNORMAL
BDY SITE: ABNORMAL
BDY SITE: NORMAL
BILIRUB SERPL-MCNC: 2.1 MG/DL (ref 0.3–1.2)
BILIRUB SERPL-MCNC: 2.7 MG/DL (ref 0.3–1.2)
BLACTX-M ISLT/SPM QL: NORMAL
BLAIMP ISLT/SPM QL: NORMAL
BLAKPC ISLT/SPM QL: NORMAL
BLAOXA-48-LIKE ISLT/SPM QL: NORMAL
BLAVIM ISLT/SPM QL: NORMAL
BUN SERPL-MCNC: 62 MG/DL (ref 8–23)
BUN SERPL-MCNC: 63 MG/DL (ref 8–23)
C PNEUM DNA LOWER RESP QL NAA+NON-PROBE: NOT DETECTED
CA-I BLD ISE-SCNC: 1.05 MMOL/L (ref 1.12–1.32)
CALCIUM SERPL-MCNC: 8.4 MG/DL (ref 8.8–10.2)
CALCIUM SERPL-MCNC: 8.6 MG/DL (ref 8.8–10.2)
CHLORIDE SERPL-SCNC: 95 MEQ/L (ref 98–111)
CHLORIDE SERPL-SCNC: 98 MEQ/L (ref 98–111)
CO2 SERPL-SCNC: 22 MEQ/L (ref 22–29)
CO2 SERPL-SCNC: 23 MEQ/L (ref 22–29)
COLLECTED BY:: ABNORMAL
COLLECTED BY:: NORMAL
COLLECTED BY:: NORMAL
CREAT SERPL-MCNC: 1.6 MG/DL (ref 0.7–1.2)
CREAT SERPL-MCNC: 1.6 MG/DL (ref 0.7–1.2)
CRP SERPL-MCNC: 8.96 MG/DL (ref 0–0.5)
D DIMER PPP IA.FEU-MCNC: ABNORMAL NG/ML FEU (ref 0–500)
DEPRECATED RDW RBC AUTO: 54.1 FL (ref 35–45)
DEPRECATED RDW RBC AUTO: 55.5 FL (ref 35–45)
DEVICE: ABNORMAL
DEVICE: NORMAL
DEVICE: NORMAL
ENTEROBACTER CLOACAE COMPLEX BY PCR: NOT DETECTED
EOSINOPHIL NFR BLD AUTO: 0.9 %
EOSINOPHILS ABSOLUTE: 0.1 THOU/MM3 (ref 0–0.4)
ERYTHROCYTE [DISTWIDTH] IN BLOOD BY AUTOMATED COUNT: 17.5 % (ref 11.5–14.5)
ERYTHROCYTE [DISTWIDTH] IN BLOOD BY AUTOMATED COUNT: 17.8 % (ref 11.5–14.5)
ESCHERICHIA COLI BY PCR: NOT DETECTED
FIBRINOGEN PPP-MCNC: 269 MG/100ML (ref 155–475)
FIO2 ON VENT O2 ANALYZER: 2 %
FIO2 ON VENT O2 ANALYZER: 5 %
FLUAV RNA LOWER RESP QL NAA+NON-PROBE: NOT DETECTED
FLUBV RNA LOWER RESP QL NAA+NON-PROBE: NOT DETECTED
GFR SERPL CREATININE-BSD FRML MDRD: 48 ML/MIN/1.73M2
GFR SERPL CREATININE-BSD FRML MDRD: 48 ML/MIN/1.73M2
GLUCOSE BLD-MCNC: 186 MG/DL (ref 70–108)
GLUCOSE SERPL-MCNC: 131 MG/DL (ref 74–109)
GLUCOSE SERPL-MCNC: 169 MG/DL (ref 74–109)
HADV DNA LOWER RESP QL NAA+NON-PROBE: NOT DETECTED
HAEMOPHILUS INFLUENZAE BY PCR: NOT DETECTED
HCO3 BLDA-SCNC: 27 MMOL/L (ref 23–28)
HCO3 BLDA-SCNC: 28 MMOL/L (ref 23–28)
HCO3 BLDA-SCNC: 28 MMOL/L (ref 23–28)
HCO3 BLDA-SCNC: 29 MMOL/L (ref 23–28)
HCO3 BLDA-SCNC: 30 MMOL/L (ref 23–28)
HCOV RNA LOWER RESP QL NAA+NON-PROBE: NOT DETECTED
HCT VFR BLD AUTO: 36.3 % (ref 42–52)
HCT VFR BLD AUTO: 36.4 % (ref 42–52)
HGB BLD-MCNC: 11.9 GM/DL (ref 14–18)
HGB BLD-MCNC: 12 GM/DL (ref 14–18)
HMPV RNA LOWER RESP QL NAA+NON-PROBE: NOT DETECTED
HPIV RNA LOWER RESP QL NAA+NON-PROBE: NOT DETECTED
IMM GRANULOCYTES # BLD AUTO: 0.05 THOU/MM3 (ref 0–0.07)
IMM GRANULOCYTES NFR BLD AUTO: 0.4 %
INR PPP: 1.3 (ref 0.85–1.13)
IRON SATN MFR SERPL: 8 % (ref 20–50)
IRON SERPL-MCNC: 19 UG/DL (ref 61–157)
KLEBSIELLA AEROGENES BY PCR: NOT DETECTED
KLEBSIELLA OXYTOCA BY PCR: NOT DETECTED
KLEBSIELLA PNEUMONIAE GROUP BY PCR: NOT DETECTED
L PNEUMO DNA LOWER RESP QL NAA+NON-PROBE: NOT DETECTED
LACTATE SERPL-SCNC: 1.4 MMOL/L (ref 0.5–2)
LDH SERPL L TO P-CCNC: 452 U/L (ref 135–225)
LYMPHOCYTES ABSOLUTE: 0.3 THOU/MM3 (ref 1–4.8)
LYMPHOCYTES NFR BLD AUTO: 2.5 %
M PNEUMO DNA LOWER RESP QL NAA+NON-PROBE: NOT DETECTED
MAGNESIUM SERPL-MCNC: 1.8 MG/DL (ref 1.6–2.6)
MCH RBC QN AUTO: 29.1 PG (ref 26–33)
MCH RBC QN AUTO: 29.5 PG (ref 26–33)
MCHC RBC AUTO-ENTMCNC: 32.8 GM/DL (ref 32.2–35.5)
MCHC RBC AUTO-ENTMCNC: 33 GM/DL (ref 32.2–35.5)
MCV RBC AUTO: 88.1 FL (ref 80–94)
MCV RBC AUTO: 89.9 FL (ref 80–94)
MONOCYTES ABSOLUTE: 0.9 THOU/MM3 (ref 0.4–1.3)
MONOCYTES NFR BLD AUTO: 6.5 %
MORAXELLA CATARRHALIS BY PCR: NOT DETECTED
NEUTROPHILS ABSOLUTE: 12 THOU/MM3 (ref 1.8–7.7)
NEUTROPHILS NFR BLD AUTO: 89.6 %
NRBC BLD AUTO-RTO: 0 /100 WBC
NT-PROBNP SERPL IA-MCNC: ABNORMAL PG/ML (ref 0–124)
PCO2 BLDA: 40 MMHG (ref 35–45)
PCO2 BLDA: 44 MMHG (ref 35–45)
PCO2 BLDA: 47 MMHG (ref 35–45)
PCO2 TEMP ADJ BLDMV: 48 MMHG (ref 41–51)
PCO2 TEMP ADJ BLDMV: 50 MMHG (ref 41–51)
PCO2 TEMP ADJ BLDMV: 51 MMHG (ref 41–51)
PCO2 TEMP ADJ BLDMV: 51 MMHG (ref 41–51)
PCO2 TEMP ADJ BLDMV: 52 MMHG (ref 41–51)
PCO2 TEMP ADJ BLDMV: 56 MMHG (ref 41–51)
PH BLDA: 7.39 [PH] (ref 7.35–7.45)
PH BLDA: 7.4 [PH] (ref 7.35–7.45)
PH BLDA: 7.47 [PH] (ref 7.35–7.45)
PH BLDMV: 7.34 [PH] (ref 7.31–7.41)
PH BLDMV: 7.35 [PH] (ref 7.31–7.41)
PH BLDMV: 7.36 [PH] (ref 7.31–7.41)
PH BLDMV: 7.37 [PH] (ref 7.31–7.41)
PH BLDMV: 7.37 [PH] (ref 7.31–7.41)
PH BLDMV: 7.39 [PH] (ref 7.31–7.41)
PHOSPHATE SERPL-MCNC: 4.6 MG/DL (ref 2.5–4.5)
PLATELET # BLD AUTO: 65 THOU/MM3 (ref 130–400)
PLATELET # BLD AUTO: 69 THOU/MM3 (ref 130–400)
PMV BLD AUTO: 10.9 FL (ref 9.4–12.4)
PMV BLD AUTO: 12.3 FL (ref 9.4–12.4)
PO2 BLDA: 86 MMHG (ref 71–104)
PO2 BLDA: 87 MMHG (ref 71–104)
PO2 BLDA: 96 MMHG (ref 71–104)
PO2 BLDMV: 29 MMHG (ref 25–40)
PO2 BLDMV: 32 MMHG (ref 25–40)
PO2 BLDMV: 33 MMHG (ref 25–40)
PO2 BLDMV: 33 MMHG (ref 25–40)
PO2 BLDMV: 35 MMHG (ref 25–40)
PO2 BLDMV: 36 MMHG (ref 25–40)
POTASSIUM SERPL-SCNC: 3.5 MEQ/L (ref 3.5–5.2)
POTASSIUM SERPL-SCNC: 3.9 MEQ/L (ref 3.5–5.2)
PROT SERPL-MCNC: 5.8 G/DL (ref 6.4–8.3)
PROT SERPL-MCNC: 6.2 G/DL (ref 6.4–8.3)
PROTEUS SPECIES BY PCR: NOT DETECTED
PSEUDOMONAS AERUGINOSA BY PCR: NOT DETECTED
RBC # BLD AUTO: 4.04 MILL/MM3 (ref 4.7–6.1)
RBC # BLD AUTO: 4.13 MILL/MM3 (ref 4.7–6.1)
RESISTANT GENE MECA/C & MREJ BY PCR: NORMAL
RESISTANT GENE NDM BY PCR: NORMAL
REVIEWED BY: NORMAL
RSV RNA LOWER RESP QL NAA+NON-PROBE: NOT DETECTED
RV+EV RNA LOWER RESP QL NAA+NON-PROBE: NOT DETECTED
SAO2 % BLDA: 96 %
SAO2 % BLDA: 97 %
SAO2 % BLDA: 98 %
SAO2 % BLDMV: 52 %
SAO2 % BLDMV: 58 %
SAO2 % BLDMV: 59 %
SAO2 % BLDMV: 61 %
SAO2 % BLDMV: 64 %
SAO2 % BLDMV: 66 %
SERRATIA MARCESCENS BY PCR: NOT DETECTED
SITE: ABNORMAL
SITE: NORMAL
SMEAR REVIEW: NORMAL
SODIUM SERPL-SCNC: 134 MEQ/L (ref 135–145)
SODIUM SERPL-SCNC: 135 MEQ/L (ref 135–145)
SOURCE: NORMAL
SPECIMEN ACCEPTABILITY: NORMAL
STAPH AUREUS BY PCR: NOT DETECTED
STREP AGALACTIAE BY PCR: NOT DETECTED
STREP PNEUMONIAE BY PCR: NOT DETECTED
STREP PYOGENES BY PCR: NOT DETECTED
TIBC SERPL-MCNC: 232 UG/DL (ref 171–450)
VENTILATION MODE VENT: ABNORMAL
VENTILATION MODE VENT: NORMAL
VENTILATION MODE VENT: NORMAL
WBC # BLD AUTO: 13.4 THOU/MM3 (ref 4.8–10.8)
WBC # BLD AUTO: 14.3 THOU/MM3 (ref 4.8–10.8)

## 2025-05-11 PROCEDURE — 6370000000 HC RX 637 (ALT 250 FOR IP): Performed by: INTERNAL MEDICINE

## 2025-05-11 PROCEDURE — 6370000000 HC RX 637 (ALT 250 FOR IP)

## 2025-05-11 PROCEDURE — 83605 ASSAY OF LACTIC ACID: CPT

## 2025-05-11 PROCEDURE — 87449 NOS EACH ORGANISM AG IA: CPT

## 2025-05-11 PROCEDURE — 87899 AGENT NOS ASSAY W/OPTIC: CPT

## 2025-05-11 PROCEDURE — P9047 ALBUMIN (HUMAN), 25%, 50ML: HCPCS | Performed by: INTERNAL MEDICINE

## 2025-05-11 PROCEDURE — 85379 FIBRIN DEGRADATION QUANT: CPT

## 2025-05-11 PROCEDURE — 2580000003 HC RX 258: Performed by: INTERNAL MEDICINE

## 2025-05-11 PROCEDURE — 83540 ASSAY OF IRON: CPT

## 2025-05-11 PROCEDURE — 87798 DETECT AGENT NOS DNA AMP: CPT

## 2025-05-11 PROCEDURE — 99291 CRITICAL CARE FIRST HOUR: CPT | Performed by: INTERNAL MEDICINE

## 2025-05-11 PROCEDURE — 82803 BLOOD GASES ANY COMBINATION: CPT

## 2025-05-11 PROCEDURE — 2700000000 HC OXYGEN THERAPY PER DAY

## 2025-05-11 PROCEDURE — 83010 ASSAY OF HAPTOGLOBIN QUANT: CPT

## 2025-05-11 PROCEDURE — 85730 THROMBOPLASTIN TIME PARTIAL: CPT

## 2025-05-11 PROCEDURE — 6360000002 HC RX W HCPCS: Performed by: INTERNAL MEDICINE

## 2025-05-11 PROCEDURE — 2580000003 HC RX 258

## 2025-05-11 PROCEDURE — 87070 CULTURE OTHR SPECIMN AEROBIC: CPT

## 2025-05-11 PROCEDURE — 83550 IRON BINDING TEST: CPT

## 2025-05-11 PROCEDURE — 2500000003 HC RX 250 WO HCPCS

## 2025-05-11 PROCEDURE — 87541 LEGION PNEUMO DNA AMP PROB: CPT

## 2025-05-11 PROCEDURE — 83615 LACTATE (LD) (LDH) ENZYME: CPT

## 2025-05-11 PROCEDURE — 94640 AIRWAY INHALATION TREATMENT: CPT

## 2025-05-11 PROCEDURE — 87581 M.PNEUMON DNA AMP PROBE: CPT

## 2025-05-11 PROCEDURE — 83880 ASSAY OF NATRIURETIC PEPTIDE: CPT

## 2025-05-11 PROCEDURE — 71045 X-RAY EXAM CHEST 1 VIEW: CPT

## 2025-05-11 PROCEDURE — 87205 SMEAR GRAM STAIN: CPT

## 2025-05-11 PROCEDURE — 87631 RESP VIRUS 3-5 TARGETS: CPT

## 2025-05-11 PROCEDURE — 6360000002 HC RX W HCPCS: Performed by: NURSE PRACTITIONER

## 2025-05-11 PROCEDURE — 87486 CHLMYD PNEUM DNA AMP PROBE: CPT

## 2025-05-11 PROCEDURE — 99233 SBSQ HOSP IP/OBS HIGH 50: CPT | Performed by: NURSE PRACTITIONER

## 2025-05-11 PROCEDURE — 36415 COLL VENOUS BLD VENIPUNCTURE: CPT

## 2025-05-11 PROCEDURE — 85025 COMPLETE CBC W/AUTO DIFF WBC: CPT

## 2025-05-11 PROCEDURE — 37799 UNLISTED PX VASCULAR SURGERY: CPT

## 2025-05-11 PROCEDURE — 80053 COMPREHEN METABOLIC PANEL: CPT

## 2025-05-11 PROCEDURE — 86140 C-REACTIVE PROTEIN: CPT

## 2025-05-11 PROCEDURE — 6360000002 HC RX W HCPCS

## 2025-05-11 PROCEDURE — 82330 ASSAY OF CALCIUM: CPT

## 2025-05-11 PROCEDURE — 85027 COMPLETE CBC AUTOMATED: CPT

## 2025-05-11 PROCEDURE — 85610 PROTHROMBIN TIME: CPT

## 2025-05-11 PROCEDURE — 83735 ASSAY OF MAGNESIUM: CPT

## 2025-05-11 PROCEDURE — 2000000000 HC ICU R&B

## 2025-05-11 PROCEDURE — 86022 PLATELET ANTIBODIES: CPT

## 2025-05-11 PROCEDURE — 2500000003 HC RX 250 WO HCPCS: Performed by: INTERNAL MEDICINE

## 2025-05-11 PROCEDURE — 84100 ASSAY OF PHOSPHORUS: CPT

## 2025-05-11 PROCEDURE — 85384 FIBRINOGEN ACTIVITY: CPT

## 2025-05-11 PROCEDURE — 82947 ASSAY GLUCOSE BLOOD QUANT: CPT

## 2025-05-11 PROCEDURE — 6370000000 HC RX 637 (ALT 250 FOR IP): Performed by: NURSE PRACTITIONER

## 2025-05-11 RX ORDER — SENNOSIDES 8.6 MG/1
2 TABLET ORAL 2 TIMES DAILY
Status: DISCONTINUED | OUTPATIENT
Start: 2025-05-11 | End: 2025-05-28 | Stop reason: HOSPADM

## 2025-05-11 RX ORDER — DOXYCYCLINE HYCLATE 100 MG
100 TABLET ORAL EVERY 12 HOURS SCHEDULED
Status: DISCONTINUED | OUTPATIENT
Start: 2025-05-11 | End: 2025-05-11

## 2025-05-11 RX ORDER — LANOLIN ALCOHOL/MO/W.PET/CERES
400 CREAM (GRAM) TOPICAL DAILY
Status: DISCONTINUED | OUTPATIENT
Start: 2025-05-11 | End: 2025-05-28 | Stop reason: HOSPADM

## 2025-05-11 RX ORDER — ALBUMIN (HUMAN) 12.5 G/50ML
50 SOLUTION INTRAVENOUS ONCE
Status: COMPLETED | OUTPATIENT
Start: 2025-05-11 | End: 2025-05-11

## 2025-05-11 RX ORDER — HYDROCODONE BITARTRATE AND ACETAMINOPHEN 7.5; 325 MG/1; MG/1
1 TABLET ORAL EVERY 6 HOURS PRN
Refills: 0 | Status: DISCONTINUED | OUTPATIENT
Start: 2025-05-11 | End: 2025-05-28 | Stop reason: HOSPADM

## 2025-05-11 RX ORDER — CEFEPIME HYDROCHLORIDE 2 G/1
2 INJECTION, POWDER, FOR SOLUTION INTRAVENOUS ONCE
Status: DISCONTINUED | OUTPATIENT
Start: 2025-05-11 | End: 2025-05-11 | Stop reason: SDUPTHER

## 2025-05-11 RX ADMIN — HYDROCODONE BITARTRATE AND ACETAMINOPHEN 1 TABLET: 7.5; 325 TABLET ORAL at 20:38

## 2025-05-11 RX ADMIN — CEFEPIME 2000 MG: 2 INJECTION, POWDER, FOR SOLUTION INTRAVENOUS at 23:45

## 2025-05-11 RX ADMIN — LEVOTHYROXINE SODIUM 75 MCG: 0.07 TABLET ORAL at 07:35

## 2025-05-11 RX ADMIN — BUMETANIDE 2 MG/HR: 0.25 INJECTION INTRAMUSCULAR; INTRAVENOUS at 16:59

## 2025-05-11 RX ADMIN — POTASSIUM CHLORIDE 20 MEQ: 1500 TABLET, EXTENDED RELEASE ORAL at 11:51

## 2025-05-11 RX ADMIN — HYDROCODONE BITARTRATE AND ACETAMINOPHEN 1 TABLET: 7.5; 325 TABLET ORAL at 08:07

## 2025-05-11 RX ADMIN — GABAPENTIN 300 MG: 400 CAPSULE ORAL at 08:08

## 2025-05-11 RX ADMIN — ATORVASTATIN CALCIUM 20 MG: 20 TABLET, FILM COATED ORAL at 08:09

## 2025-05-11 RX ADMIN — PANTOPRAZOLE SODIUM 40 MG: 40 TABLET, DELAYED RELEASE ORAL at 15:02

## 2025-05-11 RX ADMIN — MILRINONE LACTATE 0.38 MCG/KG/MIN: 0.2 INJECTION, SOLUTION INTRAVENOUS at 14:11

## 2025-05-11 RX ADMIN — MILRINONE LACTATE 0.38 MCG/KG/MIN: 0.2 INJECTION, SOLUTION INTRAVENOUS at 03:13

## 2025-05-11 RX ADMIN — WATER 2000 MG: 1 INJECTION INTRAMUSCULAR; INTRAVENOUS; SUBCUTANEOUS at 08:24

## 2025-05-11 RX ADMIN — CHLOROTHIAZIDE SODIUM 1000 MG: 500 INJECTION, POWDER, LYOPHILIZED, FOR SOLUTION INTRAVENOUS at 12:34

## 2025-05-11 RX ADMIN — ALPRAZOLAM 0.5 MG: 0.5 TABLET ORAL at 20:38

## 2025-05-11 RX ADMIN — CEFEPIME 2000 MG: 2 INJECTION, POWDER, FOR SOLUTION INTRAVENOUS at 11:53

## 2025-05-11 RX ADMIN — OXYCODONE HYDROCHLORIDE AND ACETAMINOPHEN 500 MG: 500 TABLET ORAL at 08:09

## 2025-05-11 RX ADMIN — Medication 1000 UNITS: at 08:07

## 2025-05-11 RX ADMIN — ALBUMIN (HUMAN) 50 G: 0.25 INJECTION, SOLUTION INTRAVENOUS at 11:32

## 2025-05-11 RX ADMIN — SODIUM CHLORIDE, PRESERVATIVE FREE 10 ML: 5 INJECTION INTRAVENOUS at 08:07

## 2025-05-11 RX ADMIN — GABAPENTIN 300 MG: 400 CAPSULE ORAL at 20:38

## 2025-05-11 RX ADMIN — FOLIC ACID 1000 MCG: 1 TABLET ORAL at 08:09

## 2025-05-11 RX ADMIN — ASPIRIN 81 MG: 81 TABLET, COATED ORAL at 08:09

## 2025-05-11 RX ADMIN — Medication 400 MG: at 15:01

## 2025-05-11 RX ADMIN — PANTOPRAZOLE SODIUM 40 MG: 40 TABLET, DELAYED RELEASE ORAL at 07:35

## 2025-05-11 RX ADMIN — POTASSIUM CHLORIDE 20 MEQ: 1500 TABLET, EXTENDED RELEASE ORAL at 08:08

## 2025-05-11 RX ADMIN — SENNOSIDES 17.2 MG: 8.6 TABLET, FILM COATED ORAL at 20:38

## 2025-05-11 RX ADMIN — GABAPENTIN 300 MG: 400 CAPSULE ORAL at 15:02

## 2025-05-11 RX ADMIN — ESCITALOPRAM OXALATE 10 MG: 10 TABLET ORAL at 08:09

## 2025-05-11 RX ADMIN — BUMETANIDE 2 MG/HR: 0.25 INJECTION INTRAMUSCULAR; INTRAVENOUS at 09:52

## 2025-05-11 RX ADMIN — DOXYCYCLINE HYCLATE 100 MG: 100 TABLET, COATED ORAL at 08:08

## 2025-05-11 RX ADMIN — POTASSIUM CHLORIDE 20 MEQ: 1500 TABLET, EXTENDED RELEASE ORAL at 16:59

## 2025-05-11 RX ADMIN — BUMETANIDE 2 MG/HR: 0.25 INJECTION INTRAMUSCULAR; INTRAVENOUS at 03:16

## 2025-05-11 RX ADMIN — TIOTROPIUM BROMIDE AND OLODATEROL 2 PUFF: 3.124; 2.736 SPRAY, METERED RESPIRATORY (INHALATION) at 09:14

## 2025-05-11 RX ADMIN — SENNOSIDES 17.2 MG: 8.6 TABLET, FILM COATED ORAL at 11:51

## 2025-05-11 RX ADMIN — BUMETANIDE 2 MG/HR: 0.25 INJECTION INTRAMUSCULAR; INTRAVENOUS at 23:40

## 2025-05-11 ASSESSMENT — PAIN SCALES - GENERAL
PAINLEVEL_OUTOF10: 3
PAINLEVEL_OUTOF10: 5
PAINLEVEL_OUTOF10: 5
PAINLEVEL_OUTOF10: 3

## 2025-05-11 ASSESSMENT — PAIN DESCRIPTION - LOCATION
LOCATION: GENERALIZED

## 2025-05-11 ASSESSMENT — PAIN DESCRIPTION - DESCRIPTORS
DESCRIPTORS: ACHING

## 2025-05-11 NOTE — PROGRESS NOTES
Gastroenterology Progress Note:     Patient Name:  Alberto Gilliland   MRN: [id number]  [id number]  YOB: 1965  Admit Date: 4/30/2025 12:24 PM  Primary Care Physician: Juan Christiansen DO   4D-03/003-A     Patient seen and examined.  24 hours events and chart reviewed.    Subjective: No new concerns voiced. Family at bedside.      Objective:  BP (!) 85/61   Pulse (!) 110   Temp 98.6 °F (37 °C) (Core)   Resp 18   Ht 1.702 m (5' 7\")   Wt 78.8 kg (173 lb 11.6 oz)   SpO2 100%   BMI 27.21 kg/m²     Physical Exam:    General:  Chronically ill appearing   HEENT: Atraumatic, normocephalic. Moist oral mucous membranes.  Neck: Supple without adenopathy, JVD, thyromegaly or masses. Trachea midline.  CV: Heart RRR, no murmurs, rubs, gallops.  Resp: Even, easy without cough or accessory use. Lungs clear to ascultation bilaterally.   Abd: Round, flat, nontender. No hepatosplenomegaly or mass present. Active bowel sounds heard. No distention noted.   Ext:  Without cyanosis, clubbing, edema.   Skin: Pink, warm, dry  Neuro:  Alert, oriented x 3 with no obvious deficits.       Rectal: deferred    Labs:   CBC:   Lab Results   Component Value Date/Time    WBC 13.4 05/11/2025 07:49 AM    HGB 12.0 05/11/2025 07:49 AM    HGB 12.0 05/18/2012 01:10 PM    HCT 36.4 05/11/2025 07:49 AM    MCV 88.1 05/11/2025 07:49 AM    PLT 69 05/11/2025 07:49 AM     BMP:   Lab Results   Component Value Date/Time     05/11/2025 05:00 AM    K 3.9 05/11/2025 05:00 AM    K 4.3 04/30/2025 12:37 PM    CL 98 05/11/2025 05:00 AM    CO2 23 05/11/2025 05:00 AM    PHOS 4.6 05/11/2025 08:47 AM    BUN 62 05/11/2025 05:00 AM    CREATININE 1.6 05/11/2025 05:00 AM    CALCIUM 8.6 05/11/2025 05:00 AM     PT/INR:   Lab Results   Component Value Date/Time    PROTIME 2.95 12/02/2011 07:15 AM    INR 1.30 05/11/2025 07:49 AM     LFTs:   Lab Results   Component Value Date/Time    ALKPHOS 106 05/11/2025 05:00 AM     05/11/2025 05:00 AM    AST 88  05/11/2025 05:00 AM    BILITOT 2.7 05/11/2025 05:00 AM    BILIDIR 1.4 05/07/2025 04:33 PM    AMYLASE 49 08/17/2017 11:48 AM    LIPASE 31.0 04/15/2024 09:14 AM     Significant Diagnostic Studies:   5/5/25 EGD/Colonoscopy  IMPRESSION, UPPER ENDOSCOPY:  1.  Esophagus - normal   2.  Stomach - normal   3.  Duodenum - normal, biopsied  4.  1 cm prolapsing submucosal lesion between the 1st and 2nd portion of the duodenum.  Using a biopsy forcep, a all surface areas are inspected and it is not ulcerated, inflamed, or bleeding.     IMPRESSION, COLONOSCOPY:  1.  Large cecal angiectasia destroyed with APC after submucosal injection with saline to decrease damage to the cecal wall.  This is a patient's likely cause of DIONISIO.     RECOMMENDATIONS:    1.  Okay per GI to resume or initiate either antiplatelet or OAC in 48 hours.  2.  Repeat colonoscopy in 10 years for screening  3.  Evaluation of duodenal submucosal lesion with EUS as outpatient once patient has recovered from TAVR.  He will likely have to be off antiplatelets/OAC for this.       Current Meds:  Scheduled Meds:   chlorothiazide (DIURIL) 1,000 mg in sodium chloride 0.9 % 50 mL IVPB  1,000 mg IntraVENous Once    senna  2 tablet Oral BID    albumin human 25%  50 g IntraVENous Once    [START ON 5/12/2025] cefepime  2,000 mg IntraVENous Q12H    ceFEPIme  2 g IntraVENous Once    gabapentin  300 mg Oral TID    pantoprazole  40 mg Oral BID AC    aspirin  325 mg Oral Once    potassium chloride  20 mEq Oral TID WC    [Held by provider] metoprolol succinate  12.5 mg Oral Daily    [Held by provider] furosemide  20 mg Oral BID    sodium chloride flush  5-40 mL IntraVENous 2 times per day    [Held by provider] enoxaparin  40 mg SubCUTAneous Daily    aspirin  81 mg Oral Daily    atorvastatin  20 mg Oral Daily    Vitamin D  1,000 Units Oral Daily    escitalopram  10 mg Oral Daily    folic acid  1,000 mcg Oral Daily    levothyroxine  75 mcg Oral Daily    [Held by provider] sucralfate

## 2025-05-11 NOTE — PROGRESS NOTES
Complex Cardiology Service                                                         Daily Progress Note    Patient:  Alberto Gilliland  YOB: 1965    MRN: [id number]   Acct: [id number]    Admit Date:  4/30/2025    Rationale for Cardiology consult: severe AS and MR with decompensated HF; likely low-output failure - need for ICU monitoring and SGC guided HD optimization    HPI/PMH: Per Dr. Rivera consult note of 5/5/2025:   This is a pleasant 60 y.o. 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  Has intermittent chest pain and MA as well.  Progressively getting worse.  Not lightheaded now.  Awaiting GI evaluation for bleeding. EF is 30-35% with DELMI 0.7 cm2, mean gradient 45 mmHg. EF has gotten worse since prior which was 45-50% 4/2022.  Patient continues to smoke.  Borderline SBP 99 mmHg on midodrine.  CTS has seen the patient and he is not candidate for SAVR at this time.  ECG with NSR, IVCD, minimal voltage LVH.  K 4.3.  Cr 0.7.  BNP 68251.  Hgb 7.4, Plts 234.  INR 1.4.  No cancers.    RHC & LHC with RODRÍGUEZ obtained 5/6/2025; TRO to ICU for critical care monitoring and SGC guided HD optimization.     Subjective:    5/11  Remains stable  Continued fatigue, did sleep well over night  ICU team is concerned for sepsis pneumonia - sputum culture sent  Afebrile at this point, WBC remains elevated  Started on cefepime   HIT panel sent this am - PLT lower today, hgb stable yet   Peripheral smear shows <0.5% schistocytes   Pt denies chest pain, SOB, orthopnea  Hemodynamics @ 1151: CO 4.37 CI 2.23, , MAP 66, PAP 78/32, CVP 17   Remains on bumex gtt 2mg/hr and milrinone at 0.375  UOP 2.8L/24 hrs (doubled from day prior) [+ 2.6L] - given 1000mg of diuril yesterday and getting again today per ICU team  Renal function

## 2025-05-11 NOTE — PROGRESS NOTES
Pharmacy Note - Extended Infusion Beta-Lactam Dose Adjustment    Cefepime 2000 mg q8h extended infusion for treatment of Hospital acquired pneumonia. Per [facility] Extended Infusion Beta-Lactam Policy, cefepime will be changed to 2000 mg loading dose followed by 2000 mg q12h extended infusion    Estimated Creatinine Clearance: Estimated Creatinine Clearance: 45 mL/min (A) (based on SCr of 1.6 mg/dL (H)).    Dialysis Status, TC, CKD: TC    BMI: Body mass index is 27.21 kg/m².    Rationale for Adjustment: Dose adjusted per [facility] Extended Infusion Policy based on renal function and indication. The above medication is renally eliminated and demonstrates time-dependent effects on bacterial eradication. Extended-infusion dosing strategy aims to enhance microbiologic and clinical efficacy.     Pharmacy will monitor renal function daily and adjust dose as necessary.    Please call with any questions.    Thank you,  Rui Arellano, PharmD 5/11/2025, 10:56 AM

## 2025-05-11 NOTE — PLAN OF CARE
Problem: Discharge Planning  Goal: Discharge to home or other facility with appropriate resources  Outcome: Progressing  Flowsheets (Taken 5/11/2025 0800)  Discharge to home or other facility with appropriate resources: Identify barriers to discharge with patient and caregiver     Problem: Safety - Adult  Goal: Free from fall injury  Outcome: Progressing  Flowsheets (Taken 5/11/2025 0906)  Free From Fall Injury:   Instruct family/caregiver on patient safety   Based on caregiver fall risk screen, instruct family/caregiver to ask for assistance with transferring infant if caregiver noted to have fall risk factors     Problem: Pain  Goal: Verbalizes/displays adequate comfort level or baseline comfort level  Outcome: Progressing  Flowsheets (Taken 5/11/2025 0906)  Verbalizes/displays adequate comfort level or baseline comfort level:   Encourage patient to monitor pain and request assistance   Assess pain using appropriate pain scale   Administer analgesics based on type and severity of pain and evaluate response   Implement non-pharmacological measures as appropriate and evaluate response   Consider cultural and social influences on pain and pain management   Notify Licensed Independent Practitioner if interventions unsuccessful or patient reports new pain     Problem: Respiratory - Adult  Goal: Achieves optimal ventilation and oxygenation  Outcome: Progressing  Flowsheets (Taken 5/11/2025 0800)  Achieves optimal ventilation and oxygenation:   Assess for changes in respiratory status   Assess for changes in mentation and behavior   Position to facilitate oxygenation and minimize respiratory effort   Oxygen supplementation based on oxygen saturation or arterial blood gases   Encourage broncho-pulmonary hygiene including cough, deep breathe, incentive spirometry   Assess and instruct to report shortness of breath or any respiratory difficulty   Respiratory therapy support as indicated     Problem: Cardiovascular -

## 2025-05-11 NOTE — PROGRESS NOTES
CRITICAL CARE PROGRESS NOTE      Patient:  Alberto Gilliland    Unit/Bed:4D-03/003-A  YOB: 1965  MRN: [id number]   PCP: Juan Christiansen DO  Date of Admission: 4/30/2025  Chief Complaint:- fatigue and hypotension    Assessment and Plan:   Acute on Chronic decompensated HFrEF- BNP 26958. RODRÍGUEZ 05/06 showed EF 30-35% and moderate valve global hypokinesis. Severe stenosis of the aortic valve AV mean 42. Repeat BNP 05/11 51488.   -CI: 2.58, PAWP 34, Prabhu-3.06, CP 0.55  -On Bumex drip 2ml/hr and milirone 0.375.   -Fluid restrict to less than 2L daily   -Daily weights. Strict I&O.     Severe Aortic Stenosis:  Had Mechanical AVR in 2007 then switched to bioprosthetic in 2016 due to warfarin intolerance and GI bleeding. 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 Structural heart following. Undergoing workup and medical optimization for possible TAVR.     Pneumonia / Sepsis- Worsening productive cough, increased O2 requirement and leukocytosis. CXR with possible RLL infiltrate. Pneumonia panel and resp cultures ordered. Decision made to start on HAP coverage, will also check urine strep pna.      Shock Liver, improving: Albumin 3.5,  , AST 88, Total bili 2.7. Suspected due to hypoperfusion in setting of severe AS. US Liver with doppler showed steatosis and liver 16.8 cm with thickened gallbladder. If worsening ALT stop statin.    Thrombocytopenia- Platelets dropped 170 ->75 ->65.  Levonox held in setting of GI bleed for >48hours. Fibrinogen 05/11 269 and fibrinogen split products >20. Smear from 05/09 shows <0.5% schistocytes. Haptoglobin, INR, fibrinogen and LD ordered.     Moderate to severe MR- RODRÍGUEZ done 05/07 showed moderate annular calcification and moderate regurgitation with centrally directed jet.   TC, improving-Baseline 0.9.  05/11/25 1.6. Suspect due to hypoperfusion and contrast induced. FeUrea 25.2%. Monitor BMP with diuresis.       Symptomatic Iron deficiency anemia: Iron sat 7, Iron 25, ferritin 139, TIBC 369. Had colonoscopy 05/05 showed large cecal angiectasia likely the cause of DIONISIO. Previously on IV iron. To follow up with GI in 2-4 weeks. Monitor Hb.     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 Per Dr. Fletcher needs to continue meds.   Hyperlipidemia:  On 20mg Atorvastatin  Hypertension:  On Valsartan 80 mg, Lasix 40 mg and Toprol 50 mg daily. Not ordered given hypotension.   WISE:  US Liver with doppler showed steatosis and liver 16.8 cm  COPD:  On Stiolto. Last PFT 08/2024 FEV1/FVC 79. Not keeping with obstructive disease. Lung volumes show mild restrictive disease. DLCO is mod diminished.    Lung nodules- supposed to go for biopsy. Previous biopsy negative for malignancy.   Hemorrhagic shock-secondary to GI bleed. Overnight had multiple bloody bowel movements. Hb dropped from 9.2 to 6.3. Receive 1 units RBC and 2 massive transfusion protocol. Hb now 14.9.  Had colonoscopy 05/05 showed large cecal angiectasia likely the cause of DIONISIO. Urgent colonoscopy done overnight and no active bleed in cecum or AVM seen. Bright red blood and some clots seen in splenic flexure and descending colon. CTA abdomen/pelvis showed no active bleeding. Monitor H&H. Monitor stools  High risk of bleeding 10-15 days from procedure. Resume OAC after 05/20.   Lactic acidosis:  2.4. Likely due to reduced cardiac output. Trend lactic acid [id number], trended down, can stop.     INITIAL H AND P AND ICU COURSE:  Alberto Gilliland a 60 year old male presented due to fatigue and hypotension. PMHx severe AS, WISE, HLD, HTN, COPD, HFrEF. Notes over the last year or so has been having worsening SOB and exertional chest pain. Over the last few weeks has had a dry cough. Denies any chest pain. Denies any SOB. Noted to have DIONISIO and had colonoscopy done on 05/05 which showed showed large cecal angiectasia likely the cause of DIONISIO. Given AS there was concern for low cardiac output.

## 2025-05-12 ENCOUNTER — APPOINTMENT (OUTPATIENT)
Dept: GENERAL RADIOLOGY | Age: 60
DRG: 003 | End: 2025-05-12
Payer: COMMERCIAL

## 2025-05-12 PROBLEM — N17.0 ARF (ACUTE RENAL FAILURE) WITH TUBULAR NECROSIS: Status: ACTIVE | Noted: 2025-05-12

## 2025-05-12 PROBLEM — I95.89 OTHER HYPOTENSION: Status: ACTIVE | Noted: 2025-05-10

## 2025-05-12 PROBLEM — E87.79 OTHER FLUID OVERLOAD: Status: ACTIVE | Noted: 2025-05-12

## 2025-05-12 LAB
ALBUMIN SERPL BCG-MCNC: 3.8 G/DL (ref 3.4–4.9)
ALBUMIN SERPL BCG-MCNC: 4 G/DL (ref 3.4–4.9)
ALP SERPL-CCNC: 83 U/L (ref 40–129)
ALP SERPL-CCNC: 87 U/L (ref 40–129)
ALT SERPL W/O P-5'-P-CCNC: 199 U/L (ref 10–50)
ALT SERPL W/O P-5'-P-CCNC: 251 U/L (ref 10–50)
ANION GAP SERPL CALC-SCNC: 16 MEQ/L (ref 8–16)
ANION GAP SERPL CALC-SCNC: 16 MEQ/L (ref 8–16)
ARTERIAL PATENCY WRIST A: ABNORMAL
AST SERPL-CCNC: 40 U/L (ref 10–50)
AST SERPL-CCNC: 50 U/L (ref 10–50)
BASE EXCESS BLDA CALC-SCNC: 0.2 MMOL/L (ref -2–3)
BASE EXCESS BLDA CALC-SCNC: 0.7 MMOL/L (ref -2–3)
BASE EXCESS BLDA CALC-SCNC: 2.2 MMOL/L (ref -2–3)
BASE EXCESS BLDA CALC-SCNC: 2.4 MMOL/L (ref -2–3)
BASE EXCESS BLDA CALC-SCNC: 2.7 MMOL/L (ref -2.5–2.5)
BASE EXCESS BLDA CALC-SCNC: 2.7 MMOL/L (ref -2–3)
BASE EXCESS BLDA CALC-SCNC: 4 MMOL/L (ref -2–3)
BDY SITE: ABNORMAL
BILIRUB SERPL-MCNC: 2.1 MG/DL (ref 0.3–1.2)
BILIRUB SERPL-MCNC: 2.3 MG/DL (ref 0.3–1.2)
BUN SERPL-MCNC: 66 MG/DL (ref 8–23)
BUN SERPL-MCNC: 73 MG/DL (ref 8–23)
CALCIUM SERPL-MCNC: 8.4 MG/DL (ref 8.8–10.2)
CALCIUM SERPL-MCNC: 8.9 MG/DL (ref 8.8–10.2)
CHLORIDE SERPL-SCNC: 93 MEQ/L (ref 98–111)
CHLORIDE SERPL-SCNC: 93 MEQ/L (ref 98–111)
CO2 SERPL-SCNC: 25 MEQ/L (ref 22–29)
CO2 SERPL-SCNC: 25 MEQ/L (ref 22–29)
COLLECTED BY:: ABNORMAL
COLLECTED BY:: NORMAL
CREAT SERPL-MCNC: 1.7 MG/DL (ref 0.7–1.2)
CREAT SERPL-MCNC: 1.8 MG/DL (ref 0.7–1.2)
DEPRECATED RDW RBC AUTO: 55.3 FL (ref 35–45)
DEVICE: ABNORMAL
DEVICE: NORMAL
ERYTHROCYTE [DISTWIDTH] IN BLOOD BY AUTOMATED COUNT: 17 % (ref 11.5–14.5)
FIO2 ON VENT O2 ANALYZER: 2 %
FIO2 ON VENT O2 ANALYZER: 4 %
FIO2 ON VENT O2 ANALYZER: 4 %
FIO2 ON VENT O2 ANALYZER: 6 %
GFR SERPL CREATININE-BSD FRML MDRD: 42 ML/MIN/1.73M2
GFR SERPL CREATININE-BSD FRML MDRD: 45 ML/MIN/1.73M2
GLUCOSE SERPL-MCNC: 116 MG/DL (ref 74–109)
GLUCOSE SERPL-MCNC: 122 MG/DL (ref 74–109)
HAPTOGLOB SERPL-MCNC: 21 MG/DL (ref 30–200)
HCO3 BLDA-SCNC: 27 MMOL/L (ref 23–28)
HCO3 BLDA-SCNC: 27 MMOL/L (ref 23–28)
HCO3 BLDA-SCNC: 30 MMOL/L (ref 23–28)
HCO3 BLDA-SCNC: 31 MMOL/L (ref 23–28)
HCT VFR BLD AUTO: 33.9 % (ref 42–52)
HGB BLD-MCNC: 11.1 GM/DL (ref 14–18)
L PNEUMO1 AG UR QL IA.RAPID: NEGATIVE
MAGNESIUM SERPL-MCNC: 2 MG/DL (ref 1.6–2.6)
MCH RBC QN AUTO: 30 PG (ref 26–33)
MCHC RBC AUTO-ENTMCNC: 32.7 GM/DL (ref 32.2–35.5)
MCV RBC AUTO: 91.6 FL (ref 80–94)
PCO2 BLDA: 56 MMHG (ref 35–45)
PCO2 TEMP ADJ BLDMV: 51 MMHG (ref 41–51)
PCO2 TEMP ADJ BLDMV: 55 MMHG (ref 41–51)
PCO2 TEMP ADJ BLDMV: 57 MMHG (ref 41–51)
PCO2 TEMP ADJ BLDMV: 59 MMHG (ref 41–51)
PH BLDA: 7.33 [PH] (ref 7.35–7.45)
PH BLDMV: 7.3 [PH] (ref 7.31–7.41)
PH BLDMV: 7.31 [PH] (ref 7.31–7.41)
PH BLDMV: 7.32 [PH] (ref 7.31–7.41)
PH BLDMV: 7.32 [PH] (ref 7.31–7.41)
PH BLDMV: 7.33 [PH] (ref 7.31–7.41)
PH BLDMV: 7.33 [PH] (ref 7.31–7.41)
PLATELET # BLD AUTO: 63 THOU/MM3 (ref 130–400)
PMV BLD AUTO: 13.3 FL (ref 9.4–12.4)
PO2 BLDA: 102 MMHG (ref 71–104)
PO2 BLDMV: 33 MMHG (ref 25–40)
PO2 BLDMV: 36 MMHG (ref 25–40)
PO2 BLDMV: 37 MMHG (ref 25–40)
PO2 BLDMV: 37 MMHG (ref 25–40)
PO2 BLDMV: 39 MMHG (ref 25–40)
PO2 BLDMV: 41 MMHG (ref 25–40)
POTASSIUM SERPL-SCNC: 3.4 MEQ/L (ref 3.5–5.2)
POTASSIUM SERPL-SCNC: 3.7 MEQ/L (ref 3.5–5.2)
PROT SERPL-MCNC: 6.2 G/DL (ref 6.4–8.3)
PROT SERPL-MCNC: 6.4 G/DL (ref 6.4–8.3)
RBC # BLD AUTO: 3.7 MILL/MM3 (ref 4.7–6.1)
REVIEWED BY: NORMAL
SAO2 % BLDA: 97 %
SAO2 % BLDMV: 58 %
SAO2 % BLDMV: 62 %
SAO2 % BLDMV: 63 %
SAO2 % BLDMV: 66 %
SAO2 % BLDMV: 68 %
SAO2 % BLDMV: 70 %
SITE: ABNORMAL
SITE: NORMAL
SMEAR REVIEW: NORMAL
SODIUM SERPL-SCNC: 134 MEQ/L (ref 135–145)
SODIUM SERPL-SCNC: 134 MEQ/L (ref 135–145)
STREP PNEUMO AG, UR: NEGATIVE
VENTILATION MODE VENT: ABNORMAL
VENTILATION MODE VENT: NORMAL
WBC # BLD AUTO: 12.3 THOU/MM3 (ref 4.8–10.8)

## 2025-05-12 PROCEDURE — 2580000003 HC RX 258

## 2025-05-12 PROCEDURE — 99223 1ST HOSP IP/OBS HIGH 75: CPT | Performed by: INTERNAL MEDICINE

## 2025-05-12 PROCEDURE — P9047 ALBUMIN (HUMAN), 25%, 50ML: HCPCS | Performed by: NURSE PRACTITIONER

## 2025-05-12 PROCEDURE — 6370000000 HC RX 637 (ALT 250 FOR IP)

## 2025-05-12 PROCEDURE — 6360000002 HC RX W HCPCS: Performed by: NURSE PRACTITIONER

## 2025-05-12 PROCEDURE — 2700000000 HC OXYGEN THERAPY PER DAY

## 2025-05-12 PROCEDURE — 6370000000 HC RX 637 (ALT 250 FOR IP): Performed by: INTERNAL MEDICINE

## 2025-05-12 PROCEDURE — 94761 N-INVAS EAR/PLS OXIMETRY MLT: CPT

## 2025-05-12 PROCEDURE — 37799 UNLISTED PX VASCULAR SURGERY: CPT

## 2025-05-12 PROCEDURE — 2580000003 HC RX 258: Performed by: NURSE PRACTITIONER

## 2025-05-12 PROCEDURE — 6360000002 HC RX W HCPCS

## 2025-05-12 PROCEDURE — 03HY32Z INSERTION OF MONITORING DEVICE INTO UPPER ARTERY, PERCUTANEOUS APPROACH: ICD-10-PCS | Performed by: NURSE PRACTITIONER

## 2025-05-12 PROCEDURE — 2000000000 HC ICU R&B

## 2025-05-12 PROCEDURE — 99233 SBSQ HOSP IP/OBS HIGH 50: CPT | Performed by: INTERNAL MEDICINE

## 2025-05-12 PROCEDURE — 94640 AIRWAY INHALATION TREATMENT: CPT

## 2025-05-12 PROCEDURE — 99221 1ST HOSP IP/OBS SF/LOW 40: CPT | Performed by: NURSE PRACTITIONER

## 2025-05-12 PROCEDURE — 71045 X-RAY EXAM CHEST 1 VIEW: CPT

## 2025-05-12 PROCEDURE — 83735 ASSAY OF MAGNESIUM: CPT

## 2025-05-12 PROCEDURE — 36415 COLL VENOUS BLD VENIPUNCTURE: CPT

## 2025-05-12 PROCEDURE — 82803 BLOOD GASES ANY COMBINATION: CPT

## 2025-05-12 PROCEDURE — 2500000003 HC RX 250 WO HCPCS: Performed by: INTERNAL MEDICINE

## 2025-05-12 PROCEDURE — 6370000000 HC RX 637 (ALT 250 FOR IP): Performed by: NURSE PRACTITIONER

## 2025-05-12 PROCEDURE — 6360000002 HC RX W HCPCS: Performed by: INTERNAL MEDICINE

## 2025-05-12 PROCEDURE — 85027 COMPLETE CBC AUTOMATED: CPT

## 2025-05-12 PROCEDURE — 2580000003 HC RX 258: Performed by: INTERNAL MEDICINE

## 2025-05-12 PROCEDURE — 80053 COMPREHEN METABOLIC PANEL: CPT

## 2025-05-12 PROCEDURE — 36620 INSERTION CATHETER ARTERY: CPT

## 2025-05-12 RX ORDER — ALBUMIN (HUMAN) 12.5 G/50ML
25 SOLUTION INTRAVENOUS EVERY 12 HOURS
Status: COMPLETED | OUTPATIENT
Start: 2025-05-12 | End: 2025-05-12

## 2025-05-12 RX ORDER — HEPARIN SODIUM 1000 [USP'U]/ML
5000 INJECTION, SOLUTION INTRAVENOUS; SUBCUTANEOUS ONCE
Status: COMPLETED | OUTPATIENT
Start: 2025-05-12 | End: 2025-05-12

## 2025-05-12 RX ADMIN — PANTOPRAZOLE SODIUM 40 MG: 40 TABLET, DELAYED RELEASE ORAL at 16:53

## 2025-05-12 RX ADMIN — CEFEPIME 2000 MG: 2 INJECTION, POWDER, FOR SOLUTION INTRAVENOUS at 11:52

## 2025-05-12 RX ADMIN — GABAPENTIN 300 MG: 400 CAPSULE ORAL at 13:46

## 2025-05-12 RX ADMIN — MILRINONE LACTATE 0.38 MCG/KG/MIN: 0.2 INJECTION, SOLUTION INTRAVENOUS at 22:25

## 2025-05-12 RX ADMIN — POTASSIUM CHLORIDE 20 MEQ: 1500 TABLET, EXTENDED RELEASE ORAL at 08:22

## 2025-05-12 RX ADMIN — POTASSIUM CHLORIDE 20 MEQ: 1500 TABLET, EXTENDED RELEASE ORAL at 16:53

## 2025-05-12 RX ADMIN — ALPRAZOLAM 0.5 MG: 0.5 TABLET ORAL at 20:41

## 2025-05-12 RX ADMIN — BUMETANIDE 3 MG/HR: 0.25 INJECTION INTRAMUSCULAR; INTRAVENOUS at 19:08

## 2025-05-12 RX ADMIN — BUMETANIDE 2 MG/HR: 0.25 INJECTION INTRAMUSCULAR; INTRAVENOUS at 12:49

## 2025-05-12 RX ADMIN — CHLOROTHIAZIDE SODIUM 500 MG: 500 INJECTION, POWDER, LYOPHILIZED, FOR SOLUTION INTRAVENOUS at 10:45

## 2025-05-12 RX ADMIN — ALBUMIN (HUMAN) 25 G: 0.25 INJECTION, SOLUTION INTRAVENOUS at 21:56

## 2025-05-12 RX ADMIN — LEVOTHYROXINE SODIUM 75 MCG: 0.07 TABLET ORAL at 06:12

## 2025-05-12 RX ADMIN — ASPIRIN 81 MG: 81 TABLET, COATED ORAL at 08:22

## 2025-05-12 RX ADMIN — CHLOROTHIAZIDE SODIUM 500 MG: 500 INJECTION, POWDER, LYOPHILIZED, FOR SOLUTION INTRAVENOUS at 21:53

## 2025-05-12 RX ADMIN — BUMETANIDE 2 MG/HR: 0.25 INJECTION INTRAMUSCULAR; INTRAVENOUS at 06:29

## 2025-05-12 RX ADMIN — TIOTROPIUM BROMIDE AND OLODATEROL 2 PUFF: 3.124; 2.736 SPRAY, METERED RESPIRATORY (INHALATION) at 08:49

## 2025-05-12 RX ADMIN — MILRINONE LACTATE 0.38 MCG/KG/MIN: 0.2 INJECTION, SOLUTION INTRAVENOUS at 11:51

## 2025-05-12 RX ADMIN — ALBUMIN (HUMAN) 25 G: 0.25 INJECTION, SOLUTION INTRAVENOUS at 10:20

## 2025-05-12 RX ADMIN — HEPARIN SODIUM 2000 UNITS: 1000 INJECTION INTRAVENOUS; SUBCUTANEOUS at 13:18

## 2025-05-12 RX ADMIN — HYDROCODONE BITARTRATE AND ACETAMINOPHEN 1 TABLET: 7.5; 325 TABLET ORAL at 06:03

## 2025-05-12 RX ADMIN — OXYCODONE HYDROCHLORIDE AND ACETAMINOPHEN 500 MG: 500 TABLET ORAL at 08:24

## 2025-05-12 RX ADMIN — POTASSIUM CHLORIDE 40 MEQ: 1500 TABLET, EXTENDED RELEASE ORAL at 06:05

## 2025-05-12 RX ADMIN — PANTOPRAZOLE SODIUM 40 MG: 40 TABLET, DELAYED RELEASE ORAL at 06:03

## 2025-05-12 RX ADMIN — FOLIC ACID 1000 MCG: 1 TABLET ORAL at 08:24

## 2025-05-12 RX ADMIN — SENNOSIDES 17.2 MG: 8.6 TABLET, FILM COATED ORAL at 20:36

## 2025-05-12 RX ADMIN — SENNOSIDES 17.2 MG: 8.6 TABLET, FILM COATED ORAL at 08:23

## 2025-05-12 RX ADMIN — GABAPENTIN 300 MG: 400 CAPSULE ORAL at 08:24

## 2025-05-12 RX ADMIN — SODIUM CHLORIDE, PRESERVATIVE FREE 10 ML: 5 INJECTION INTRAVENOUS at 08:33

## 2025-05-12 RX ADMIN — ESCITALOPRAM OXALATE 10 MG: 10 TABLET ORAL at 08:23

## 2025-05-12 RX ADMIN — POTASSIUM CHLORIDE 20 MEQ: 1500 TABLET, EXTENDED RELEASE ORAL at 11:34

## 2025-05-12 RX ADMIN — MILRINONE LACTATE 0.38 MCG/KG/MIN: 0.2 INJECTION, SOLUTION INTRAVENOUS at 00:45

## 2025-05-12 RX ADMIN — BUMETANIDE 3 MG/HR: 0.25 INJECTION INTRAMUSCULAR; INTRAVENOUS at 23:23

## 2025-05-12 RX ADMIN — Medication 400 MG: at 08:24

## 2025-05-12 RX ADMIN — Medication 1000 UNITS: at 08:24

## 2025-05-12 RX ADMIN — GABAPENTIN 300 MG: 400 CAPSULE ORAL at 20:36

## 2025-05-12 RX ADMIN — ATORVASTATIN CALCIUM 20 MG: 20 TABLET, FILM COATED ORAL at 08:24

## 2025-05-12 RX ADMIN — SODIUM CHLORIDE, PRESERVATIVE FREE 10 ML: 5 INJECTION INTRAVENOUS at 20:36

## 2025-05-12 ASSESSMENT — PAIN SCALES - GENERAL
PAINLEVEL_OUTOF10: 3
PAINLEVEL_OUTOF10: 6
PAINLEVEL_OUTOF10: 3
PAINLEVEL_OUTOF10: 2

## 2025-05-12 ASSESSMENT — PAIN DESCRIPTION - LOCATION
LOCATION: GENERALIZED

## 2025-05-12 ASSESSMENT — PAIN DESCRIPTION - DESCRIPTORS: DESCRIPTORS: ACHING

## 2025-05-12 ASSESSMENT — PAIN DESCRIPTION - ORIENTATION: ORIENTATION: MID

## 2025-05-12 NOTE — PLAN OF CARE
Problem: Discharge Planning  Goal: Discharge to home or other facility with appropriate resources  5/12/2025 1311 by Emma Teixeira, RN  Outcome: Progressing  Flowsheets (Taken 5/12/2025 0800)  Discharge to home or other facility with appropriate resources: Identify barriers to discharge with patient and caregiver       Problem: Safety - Adult  Goal: Free from fall injury  5/12/2025 1311 by Emma Teixeira, RN  Outcome: Progressing  Flowsheets (Taken 5/12/2025 1311)  Free From Fall Injury:   Instruct family/caregiver on patient safety   Based on caregiver fall risk screen, instruct family/caregiver to ask for assistance with transferring infant if caregiver noted to have fall risk factors    Problem: Pain  Goal: Verbalizes/displays adequate comfort level or baseline comfort level  5/12/2025 1311 by Emma Teixeira, RN  Outcome: Progressing  Flowsheets (Taken 5/12/2025 1311)  Verbalizes/displays adequate comfort level or baseline comfort level:   Encourage patient to monitor pain and request assistance   Administer analgesics based on type and severity of pain and evaluate response   Consider cultural and social influences on pain and pain management   Assess pain using appropriate pain scale   Implement non-pharmacological measures as appropriate and evaluate response   Notify Licensed Independent Practitioner if interventions unsuccessful or patient reports new pain       Problem: Respiratory - Adult  Goal: Achieves optimal ventilation and oxygenation  5/12/2025 1311 by Emma Teixeira, RN  Outcome: Progressing  Flowsheets (Taken 5/12/2025 0800)  Achieves optimal ventilation and oxygenation:   Assess for changes in respiratory status   Assess for changes in mentation and behavior   Position to facilitate oxygenation and minimize respiratory effort   Oxygen supplementation based on oxygen saturation or arterial blood gases   Encourage broncho-pulmonary hygiene including cough, deep  intact  Description: 1.  Monitor for areas of redness and/or skin breakdown2.  Assess vascular access sites hourly3.  Every 4-6 hours minimum:  Change oxygen saturation probe site4.  Every 4-6 hours:  If on nasal continuous positive airway pressure, respiratory therapy assess nares and determine need for appliance change or resting period  Recent Flowsheet Documentation  Taken 5/12/2025 0800 by Emma Teixeira RN  Skin Integrity Remains Intact:   Monitor for areas of redness and/or skin breakdown   Assess vascular access sites hourly   Every 4-6 hours minimum:  Change oxygen saturation probe site   Every 4-6 hours:  If on nasal continuous positive airway pressure, assess nares and determine need for appliance change or resting period   Turn and reposition as indicated   Pressure redistribution bed/mattress (bed type)   Check visual cues for pain    Care plan reviewed with patient.  Patient  verbalizes understanding of the plan of care and contribute to goal setting.

## 2025-05-12 NOTE — PROGRESS NOTES
Complex Cardiology Service                                                         Daily Progress Note    Patient:  Alberto Gilliland  YOB: 1965    MRN: [id number]   Acct: [id number]    Admit Date:  4/30/2025    Rationale for Cardiology consult: severe AS and MR with decompensated HF; likely low-output failure - need for ICU monitoring and SGC guided HD optimization    HPI/PMH: Per Dr. Rivera consult note of 5/5/2025:   This is a pleasant 60 y.o. 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  Has intermittent chest pain and MA as well.  Progressively getting worse.  Not lightheaded now.  Awaiting GI evaluation for bleeding. EF is 30-35% with DELMI 0.7 cm2, mean gradient 45 mmHg. EF has gotten worse since prior which was 45-50% 4/2022.  Patient continues to smoke.  Borderline SBP 99 mmHg on midodrine.  CTS has seen the patient and he is not candidate for SAVR at this time.  ECG with NSR, IVCD, minimal voltage LVH.  K 4.3.  Cr 0.7.  BNP 23569.  Hgb 7.4, Plts 234.  INR 1.4.  No cancers.    RHC & LHC with RODRÍGUEZ obtained 5/6/2025; TRO to ICU for critical care monitoring and SGC guided HD optimization.     Subjective:    5/12  Stable over night  Hemodynamics @ 0408: CO 5.26 CI 2.68 MAP 77 PAP 74/29 CVP 15 SVO2 - 68% 4L   Milrinone 0.375 Bumex gtt 2mg/hr  UOP 4.9L/24hrs [-453cc]  Hgb remains stable, PLTs at 63 (69)  Blood smear completed yesterday - shows isolated thrombocytopenia w/ no platelet clumping or RBC fragmentation   Remains afebrile, pneumonia panel negative, still w/ mild leukocytosis       5/11  Remains stable  Continued fatigue, did sleep well over night  ICU team is concerned for sepsis pneumonia - sputum culture sent  Afebrile at this point, WBC remains elevated  Started on cefepime   HIT panel sent this

## 2025-05-12 NOTE — PROGRESS NOTES
[facility]    Patient - Alberto Gilliland   MRN -  [id number]   [facility] # - [id number]   - 1965      Date of Admission -  2025 12:24 PM  Date of evaluation -  2025  Room - 4D--A   Hospital Day - 12  Consulting - Jonny Mckinley MD Primary Care Physician - Juan Christiansen DO       Reason for Consult    Iron deficiency anemia   Active Hospital Problem List      Active Hospital Problems    Diagnosis Date Noted    Nonrheumatic aortic valve stenosis [I35.0] 05/10/2025     Priority: High    Acute on chronic congestive heart failure (HCC) [I50.9] 05/10/2025     Priority: High    Chronic anemia [D64.9] 05/10/2025    Troponin level elevated [R79.89] 05/10/2025    Hypotension [I95.9] 05/10/2025    Iron deficiency anemia [D50.9] 2025    Low output heart failure (HCC) [I50.9] 2025    Moderate malnutrition [E44.0] 2024     Class: Acute    S/P AVR, coumadin Tx [Z95.2] 10/13/2011     HPI/Subjective   Alberto Gilliland is a 60 y.o. male admitted 2025 for evaluation of lightheadedness, dizziness, hypotension, fatigue.  He was seen in our office for iron infusion and noted to be symptomatic, was sent to ED.  Did not receive IV iron infusion.    Labs upon presentation proBNP elevated at 8056.  H/H 8.5/28.9.  Iron low at 25, iron sat low at 7%.  EKG normal sinus rhythm.  Chest x-ray negative for acute process.  Admitting physician notes that cardiology note in epic recommends left heart cath and right heart cath RODRÍGUEZ and possible TAVR.  Admission indicated CHF with hypotension.  Cardiology consulted with recommendations of anemia workup inpatient, hematology and GI consults.  They recommended maintaining hemoglobin greater than 10.  They recommend consult for cardiovascular surgeon for TAVR versus SAVR recommendation.  Cardiac cath after anemia workup completed.  Cardiothoracic surgery consulted with recommendations of TAVR +/- PCI.  GI consulted with recommendations

## 2025-05-12 NOTE — PROCEDURES
PROCEDURE NOTE  Date: 5/12/2025   Name: Alberto Gilliland  YOB: 1965    Insert Arterial Line    Date/Time: 5/12/2025 4:11 PM    Performed by: Marian Vincent APRN - CNP  Authorized by: Marian Vincent APRN - CNP  Consent: Verbal consent obtained. Written consent obtained.  Consent given by: patient  Patient understanding: patient states understanding of the procedure being performed  Patient consent: the patient's understanding of the procedure matches consent given  Procedure consent: procedure consent matches procedure scheduled  Relevant documents: relevant documents present and verified  Test results: test results available and properly labeled  Site marked: the operative site was marked  Imaging studies: imaging studies available  Required items: required blood products, implants, devices, and special equipment available  Patient identity confirmed: verbally with patient and arm band  Time out: Immediately prior to procedure a \"time out\" was called to verify the correct patient, procedure, equipment, support staff and site/side marked as required.  Preparation: Patient was prepped and draped in the usual sterile fashion.  Indications: multiple ABGs, respiratory failure and hemodynamic monitoring  Location: right radial    Anesthesia:  Local Anesthetic: lidocaine 1% without epinephrine  Anesthetic total: 2 mL    Sedation:  Patient sedated: no    Mando's test normal: yes  Needle gauge: 22  Seldinger technique: Seldinger technique used  Number of attempts: 2  Post-procedure: line sutured and dressing applied  Post-procedure CMS: normal and unchanged  Patient tolerance: patient tolerated the procedure well with no immediate complications        Low pulsatility amplification waveform.  Possible vasospasm.  Was able to retract arterial blood for blood gas.      Electronically signed by JASWINDER Garcia CNP on 5/12/2025 at 4:12 PM

## 2025-05-12 NOTE — CONSULTS
[facility]          [address]        [address]        [place], [place] [id number] [phone]           Inpatient Initial consult note         5/12/2025 12:21 PM      Patient Name:   Alberto Gilliland  YOB: 1965  Primary Care Physician:  Juan Christiansen DO   Admit Date:    4/30/2025 12:24 PM    Consultation requested by : Jonny Mckinley MD    Reason for Consult : Nephrology following for TC/CKD/fluid overload.    History of presenting illness :Alberto Gilliland is a 60 y.o.   male with Past Medical History as stated below presented with a chief complaint of Fatigue and Hypotension   on 4/30/2025 .    Patient presented almost 10 days ago with chief complaints of weakness fatigue which has been worsening he has a severe iron deficiency anemia for which he was seeing hematology had endoscopies and.  He was at hematologist office and was complaining of shortness of breath with mild to moderate exertion and was having cough he was also hypotensive so he was sent to the ER where he was admitted for iron deficiency anemia and CHF hematology cardiology has been consulted    During the hospitalization patient had a upper and lower endoscopy.  Patient has right and left heart cath done on 5/7 which was nonobstructive his PA pressures were elevated so he was transferred to ICU and a [place] has been placed.  Since then patient has been extensively diuresed with multiple diuretics but his PA pressures have still remained high.  His blood pressures have been borderline and he was getting significant inputs as well affectively less negative however yesterday he was -3 L    Patient also had a CT scan done with contrast on the eighth.  Patient's creatinine initially at presentation was around 1.0 and subsequently has been worsening and has been getting excessive amounts of diuretics as well    Past History:  Past Medical History:   Diagnosis Date    Acute exacerbation of chronic heart  (TOPROL XL) 50 MG extended release tablet Take 1 tablet by mouth daily 9/10/24  Yes Juan Christiansen DO   atorvastatin (LIPITOR) 20 MG tablet Take 1 tablet by mouth daily 9/10/24  Yes Juan Christiansen DO   tiotropium-olodaterol (STIOLTO RESPIMAT) 2.5-2.5 MCG/ACT AERS Inhale 2 puffs into the lungs daily 8/26/24 8/26/25 Yes Jonny Mckinley MD   ondansetron (ZOFRAN) 4 MG tablet Take 1 tablet by mouth every 4-6 hours as needed for Nausea or Vomiting 5/3/24  Yes Ivy De Leon MD   sucralfate (CARAFATE) 1 GM tablet Take 1 tablet by mouth 4 times daily (before meals and nightly) 4/16/24  Yes Devika Landrum, PA   clobetasol (TEMOVATE) 0.05 % cream Apply 1 each topically 2 times daily   Yes Ying Sevilla MD   acetaminophen (TYLENOL) 325 MG tablet Take 2 tablets by mouth every 6 hours as needed for Pain   Yes Provider, MD Ying   escitalopram (LEXAPRO) 10 MG tablet Take 1 tablet by mouth daily 1/4/24  Yes Juan Christiansen DO   esomeprazole (NEXIUM) 40 MG delayed release capsule TAKE 1 CAPSULE BY MOUTH IN THE MORNING before breakfast 4/13/23  Yes Tanisha Hathaway MD   folic acid (FOLVITE) 1 MG tablet Take 1 tablet by mouth daily 11/27/22  Yes Ying Sevilla MD   methotrexate (RHEUMATREX) 2.5 MG chemo tablet Take 3 tablets by mouth once a week 3 tabs in the morning and 3 tabs in the evening once a week about 12 hour APART Monday takes 11/27/22  Yes Ying Sevilla MD   Cholecalciferol (VITAMIN D3) 1000 units TABS Take 1 tablet by mouth Daily   Yes ProviderYing MD   Multiple Vitamin (MULTIVITAMIN PO) Take 1 tablet by mouth daily    Yes ProviderYing MD     Allergies: Seasonal  IP meds : Scheduled Meds:   chlorothiazide (DIURIL) 500 mg in sodium chloride 0.9 % 50 mL IVPB  500 mg IntraVENous Q12H    albumin human 25%  25 g IntraVENous Q12H    senna  2 tablet Oral BID    cefepime  2,000 mg IntraVENous Q12H    magnesium oxide  400 mg Oral Daily    gabapentin  300 mg Oral

## 2025-05-12 NOTE — PROGRESS NOTES
Gastroenterology Progress Note:     Patient Name:  Alberto Gilliland   MRN: [id number]  [id number]  YOB: 1965  Admit Date: 4/30/2025 12:24 PM  Primary Care Physician: Juan Christiansen DO   4D-03/003-A     Patient seen and examined.  24 hours events and chart reviewed.    Subjective: Patient remains in ICU.  No further overt s/s of GI bleeding noted.  Denies any abdominal pain, nausea, or vomiting.  Is passing flatus.  Remains on bumex and milrinone infusions.      Objective:  BP (!) 91/52   Pulse (!) 112   Temp 97.7 °F (36.5 °C) (Core)   Resp 19   Ht 1.702 m (5' 7\")   Wt 85.4 kg (188 lb 4.4 oz)   SpO2 99%   BMI 29.49 kg/m²     Physical Exam  Vitals and nursing note reviewed.   Constitutional:       General: He is not in acute distress.     Appearance: He is normal weight. He is ill-appearing.   HENT:      Mouth/Throat:      Mouth: Mucous membranes are moist.      Pharynx: Oropharynx is clear.   Eyes:      General: No scleral icterus.     Pupils: Pupils are equal, round, and reactive to light.   Cardiovascular:      Rate and Rhythm: Regular rhythm. Tachycardia present.      Heart sounds: Murmur heard.   Pulmonary:      Effort: Pulmonary effort is normal. No respiratory distress.      Breath sounds: Normal breath sounds. No wheezing or rales.   Abdominal:      General: Abdomen is protuberant. Bowel sounds are normal.      Palpations: Abdomen is soft.   Skin:     General: Skin is warm and dry.      Capillary Refill: Capillary refill takes less than 2 seconds.   Neurological:      Mental Status: He is alert. Mental status is at baseline.           Labs:   CBC:   Lab Results   Component Value Date/Time    WBC 12.3 05/12/2025 04:15 AM    HGB 11.1 05/12/2025 04:15 AM    HGB 12.0 05/18/2012 01:10 PM    HCT 33.9 05/12/2025 04:15 AM    MCV 91.6 05/12/2025 04:15 AM    PLT 63 05/12/2025 04:15 AM     BMP:   Lab Results   Component Value Date/Time     05/12/2025 04:15 AM    K 3.4 05/12/2025 04:15 AM

## 2025-05-12 NOTE — PROGRESS NOTES
male presented due to fatigue and hypotension. PMHx severe AS, WISE, HLD, HTN, COPD, HFrEF. Notes over the last year or so has been having worsening SOB and exertional chest pain. Over the last few weeks has had a dry cough. Denies any chest pain. Denies any SOB. Noted to have DIONISIO and had colonoscopy done on 05/05 which showed showed large cecal angiectasia likely the cause of DIONISIO. Given AS there was concern for low cardiac output. CTS seen and recommended TAVR and PCI. Had right and left heart cath on 05/07 and was non-obstructive. Patient transferred to ICU for possible need for [place] guided diuresis and phenylephrine.     05/07: Overnight night patient had GI bleed requiring 2 massive transfusion protocols. GI scoped overnight and no active bleeding seen. Patient is currently stable. No further episodes of bloody stool. Denies any chest pain, headache. No abdominal pain.   5/09: Patient seen and evaluated at bedside. A&Ox4. Saturating at 100% on RA. Per nursing patient's numbers are improved, SVO2 67. Patient on milrinone drip, being Jardiance with Bumex 1.  Patient off Michael-Synephrine at 11 PM on 5/8; blood pressure stable. Had 800 cc output overnight. CVP 13. Reports no bloody bowel movements. Patient reports doing good.  Reports sleeping well overnight.  Reports no chest pain or SOB. N.p.o. reports no bowel movement; normal urination.     05/10: Patient notes slept well last night. Received Xanax. Denies any chest pain, SOB. Diet progressed. Notes mild headache this morning just really wants to eat.     05/11: Notes feeling achy all over. Remains afebrile. Slight increase in white count. Worsening productive cough. CXR looks like worsening pulmonary edema.   05/12: No acute events overnight. On Bumex drip and milirone. Started on antibiotics. CXR has some improvement. Overall notes is feeling a lot better.     Past Medical History:  Per HPI.  Family History:  Mother-Cancer. Father-diabetes, cancer, heart  disease, hypertension  Social History:  46.4 pack year.    ROS   Denies any chest pain, orthopnea, SOB, peripheral edema, abdominal pain. No bruising. No bleeding.     Scheduled Meds:   senna  2 tablet Oral BID    cefepime  2,000 mg IntraVENous Q12H    magnesium oxide  400 mg Oral Daily    gabapentin  300 mg Oral TID    pantoprazole  40 mg Oral BID AC    aspirin  325 mg Oral Once    potassium chloride  20 mEq Oral TID WC    [Held by provider] metoprolol succinate  12.5 mg Oral Daily    [Held by provider] furosemide  20 mg Oral BID    sodium chloride flush  5-40 mL IntraVENous 2 times per day    [Held by provider] enoxaparin  40 mg SubCUTAneous Daily    aspirin  81 mg Oral Daily    atorvastatin  20 mg Oral Daily    Vitamin D  1,000 Units Oral Daily    escitalopram  10 mg Oral Daily    folic acid  1,000 mcg Oral Daily    levothyroxine  75 mcg Oral Daily    [Held by provider] sucralfate  1 g Oral 4x Daily AC & HS    tiotropium-olodaterol  2 puff Inhalation Daily RT    vitamin C  500 mg Oral Daily     Continuous Infusions:   milrinone 0.375 mcg/kg/min (05/12/25 0745)    sodium chloride 20 mL/hr at 05/12/25 0745    bumetanide (BUMEX) 12.5 mg in sodium chloride 0.9 % 125 mL infusion 2 mg/hr (05/12/25 0745)    sodium chloride         PHYSICAL EXAMINATION:  T:  98.8 P: 111 RR: 12 B/P: 94/62 O2 Sat:  98 (4L).  I/O:  -3,141, -2,010, -571, Urine: 4950  Body mass index is 29.49 kg/m².   GCS:   15/15  General:   acutely ill appearing male  HEENT:  normocephalic and atraumatic.  No scleral icterus. PERR  Neck: supple.  No Thyromegaly.  Lungs: diminished air sounds bilaterally.   No retractions.  Bruising noted under right arm.   Cardiac: systolic murmur noted  No JVD.  Abdomen: soft.  Nontender.  Extremities:  Scabs noted on hands and feet. No clubbing, cyanosis, or edema x 4.    Vasculature: capillary refill < 3 seconds. Palpable dorsalis pedis pulses.  Skin:  warm and dry.  Psych:  Alert and oriented x3.  Affect

## 2025-05-13 ENCOUNTER — APPOINTMENT (OUTPATIENT)
Dept: CT IMAGING | Age: 60
DRG: 003 | End: 2025-05-13
Payer: COMMERCIAL

## 2025-05-13 ENCOUNTER — APPOINTMENT (OUTPATIENT)
Dept: GENERAL RADIOLOGY | Age: 60
DRG: 003 | End: 2025-05-13
Payer: COMMERCIAL

## 2025-05-13 ENCOUNTER — TELEPHONE (OUTPATIENT)
Dept: CARDIOLOGY CLINIC | Age: 60
End: 2025-05-13

## 2025-05-13 PROBLEM — R57.0 CARDIOGENIC SHOCK (HCC): Status: ACTIVE | Noted: 2025-05-13

## 2025-05-13 LAB
ABO GROUP BLD: NORMAL
ACTIVATED CLOTTING TIME: 227 SECONDS (ref 1–150)
ALBUMIN SERPL BCG-MCNC: 3.3 G/DL (ref 3.4–4.9)
ALBUMIN SERPL BCG-MCNC: 3.6 G/DL (ref 3.4–4.9)
ALBUMIN SERPL BCG-MCNC: 4.2 G/DL (ref 3.4–4.9)
ALP SERPL-CCNC: 71 U/L (ref 40–129)
ALP SERPL-CCNC: 75 U/L (ref 40–129)
ALP SERPL-CCNC: 77 U/L (ref 40–129)
ALP SERPL-CCNC: 78 U/L (ref 40–129)
ALP SERPL-CCNC: 82 U/L (ref 40–129)
ALT SERPL W/O P-5'-P-CCNC: 143 U/L (ref 10–50)
ALT SERPL W/O P-5'-P-CCNC: 171 U/L (ref 10–50)
ALT SERPL W/O P-5'-P-CCNC: 366 U/L (ref 10–50)
ALT SERPL W/O P-5'-P-CCNC: 690 U/L (ref 10–50)
ALT SERPL W/O P-5'-P-CCNC: 691 U/L (ref 10–50)
ANION GAP SERPL CALC-SCNC: 15 MEQ/L (ref 8–16)
ANION GAP SERPL CALC-SCNC: 19 MEQ/L (ref 8–16)
ANION GAP SERPL CALC-SCNC: 19 MEQ/L (ref 8–16)
ANION GAP SERPL CALC-SCNC: 20 MEQ/L (ref 8–16)
ANION GAP SERPL CALC-SCNC: 25 MEQ/L (ref 8–16)
ANION GAP SERPL CALC-SCNC: 30 MEQ/L (ref 8–16)
APTT PPP: 38 SECONDS (ref 22–38)
APTT PPP: 38.5 SECONDS (ref 22–38)
APTT PPP: 64.3 SECONDS (ref 22–38)
ARTERIAL PATENCY WRIST A: ABNORMAL
ARTERIAL PATENCY WRIST A: POSITIVE
AST SERPL-CCNC: 1055 U/L (ref 10–50)
AST SERPL-CCNC: 1097 U/L (ref 10–50)
AST SERPL-CCNC: 35 U/L (ref 10–50)
AST SERPL-CCNC: 36 U/L (ref 10–50)
AST SERPL-CCNC: 477 U/L (ref 10–50)
BASE EXCESS BLDA CALC-SCNC: -0.1 MMOL/L (ref -2–3)
BASE EXCESS BLDA CALC-SCNC: -1.1 MMOL/L (ref -2.5–2.5)
BASE EXCESS BLDA CALC-SCNC: -10.2 MMOL/L (ref -2.5–2.5)
BASE EXCESS BLDA CALC-SCNC: -10.3 MMOL/L (ref -2.5–2.5)
BASE EXCESS BLDA CALC-SCNC: -10.7 MMOL/L (ref -2–3)
BASE EXCESS BLDA CALC-SCNC: -2.6 MMOL/L (ref -2.5–2.5)
BASE EXCESS BLDA CALC-SCNC: -3.3 MMOL/L (ref -2.5–2.5)
BASE EXCESS BLDA CALC-SCNC: -4.2 MMOL/L (ref -2.5–2.5)
BASE EXCESS BLDA CALC-SCNC: -4.5 MMOL/L (ref -2–3)
BASE EXCESS BLDA CALC-SCNC: -4.9 MMOL/L (ref -2–3)
BASE EXCESS BLDA CALC-SCNC: -5.4 MMOL/L (ref -2.5–2.5)
BASE EXCESS BLDA CALC-SCNC: -7.8 MMOL/L (ref -2–3)
BASE EXCESS BLDA CALC-SCNC: -8.1 MMOL/L (ref -2.5–2.5)
BASE EXCESS BLDA CALC-SCNC: -9.1 MMOL/L (ref -2–3)
BASE EXCESS BLDA CALC-SCNC: 0.5 MMOL/L (ref -2.5–2.5)
BASE EXCESS BLDA CALC-SCNC: 1.1 MMOL/L (ref -2–3)
BASE EXCESS BLDA CALC-SCNC: 2.2 MMOL/L (ref -2–3)
BDY SITE: ABNORMAL
BILIRUB SERPL-MCNC: 2.4 MG/DL (ref 0.3–1.2)
BILIRUB SERPL-MCNC: 2.5 MG/DL (ref 0.3–1.2)
BILIRUB SERPL-MCNC: 3.1 MG/DL (ref 0.3–1.2)
BILIRUB SERPL-MCNC: 4.1 MG/DL (ref 0.3–1.2)
BILIRUB SERPL-MCNC: 4.5 MG/DL (ref 0.3–1.2)
BREATHS SETTING VENT: 12 BPM
BUN SERPL-MCNC: 62 MG/DL (ref 8–23)
BUN SERPL-MCNC: 72 MG/DL (ref 8–23)
BUN SERPL-MCNC: 77 MG/DL (ref 8–23)
BUN SERPL-MCNC: 81 MG/DL (ref 8–23)
BUN SERPL-MCNC: 81 MG/DL (ref 8–23)
BUN SERPL-MCNC: 82 MG/DL (ref 8–23)
CA-I BLD ISE-SCNC: 0.98 MMOL/L (ref 1.12–1.32)
CA-I BLD ISE-SCNC: 1.04 MMOL/L (ref 1.12–1.32)
CA-I BLD ISE-SCNC: 1.06 MMOL/L (ref 1.12–1.32)
CA-I BLD ISE-SCNC: 1.14 MMOL/L (ref 1.12–1.32)
CALCIUM SERPL-MCNC: 8.1 MG/DL (ref 8.8–10.2)
CALCIUM SERPL-MCNC: 8.3 MG/DL (ref 8.8–10.2)
CALCIUM SERPL-MCNC: 8.4 MG/DL (ref 8.8–10.2)
CALCIUM SERPL-MCNC: 8.5 MG/DL (ref 8.8–10.2)
CALCIUM SERPL-MCNC: 9.2 MG/DL (ref 8.8–10.2)
CALCIUM SERPL-MCNC: 9.3 MG/DL (ref 8.8–10.2)
CHLORIDE SERPL-SCNC: 102 MEQ/L (ref 98–111)
CHLORIDE SERPL-SCNC: 94 MEQ/L (ref 98–111)
CHLORIDE SERPL-SCNC: 95 MEQ/L (ref 98–111)
CHLORIDE SERPL-SCNC: 98 MEQ/L (ref 98–111)
CO2 SERPL-SCNC: 17 MEQ/L (ref 22–29)
CO2 SERPL-SCNC: 18 MEQ/L (ref 22–29)
CO2 SERPL-SCNC: 18 MEQ/L (ref 22–29)
CO2 SERPL-SCNC: 21 MEQ/L (ref 22–29)
CO2 SERPL-SCNC: 22 MEQ/L (ref 22–29)
CO2 SERPL-SCNC: 23 MEQ/L (ref 22–29)
COLLECTED BY:: ABNORMAL
COMMENT: ABNORMAL
COMMENT: ABNORMAL
CREAT SERPL-MCNC: 2 MG/DL (ref 0.7–1.2)
CREAT SERPL-MCNC: 2 MG/DL (ref 0.7–1.2)
CREAT SERPL-MCNC: 2.1 MG/DL (ref 0.7–1.2)
CREAT SERPL-MCNC: 2.2 MG/DL (ref 0.7–1.2)
CREAT SERPL-MCNC: 2.4 MG/DL (ref 0.7–1.2)
CREAT SERPL-MCNC: 2.7 MG/DL (ref 0.7–1.2)
DEPRECATED RDW RBC AUTO: 54.1 FL (ref 35–45)
DEPRECATED RDW RBC AUTO: 54.4 FL (ref 35–45)
DEPRECATED RDW RBC AUTO: 55.5 FL (ref 35–45)
DEPRECATED RDW RBC AUTO: 56.7 FL (ref 35–45)
DEPRECATED RDW RBC AUTO: 58.5 FL (ref 35–45)
DEVICE: ABNORMAL
EKG ATRIAL RATE: 120 BPM
EKG P AXIS: 75 DEGREES
EKG P-R INTERVAL: 160 MS
EKG Q-T INTERVAL: 330 MS
EKG QRS DURATION: 128 MS
EKG QTC CALCULATION (BAZETT): 466 MS
EKG R AXIS: 91 DEGREES
EKG T AXIS: 68 DEGREES
EKG VENTRICULAR RATE: 120 BPM
ERYTHROCYTE [DISTWIDTH] IN BLOOD BY AUTOMATED COUNT: 17.2 % (ref 11.5–14.5)
ERYTHROCYTE [DISTWIDTH] IN BLOOD BY AUTOMATED COUNT: 17.2 % (ref 11.5–14.5)
ERYTHROCYTE [DISTWIDTH] IN BLOOD BY AUTOMATED COUNT: 17.3 % (ref 11.5–14.5)
ERYTHROCYTE [DISTWIDTH] IN BLOOD BY AUTOMATED COUNT: 17.3 % (ref 11.5–14.5)
ERYTHROCYTE [DISTWIDTH] IN BLOOD BY AUTOMATED COUNT: 17.4 % (ref 11.5–14.5)
FIBRINOGEN PPP-MCNC: 292 MG/100ML (ref 155–475)
FIBRINOGEN PPP-MCNC: 354 MG/100ML (ref 155–475)
FIO2 ON VENT O2 ANALYZER: 100 %
FIO2 ON VENT O2 ANALYZER: 6 %
FIO2 ON VENT O2 ANALYZER: 90 %
GFR SERPL CREATININE-BSD FRML MDRD: 26 ML/MIN/1.73M2
GFR SERPL CREATININE-BSD FRML MDRD: 30 ML/MIN/1.73M2
GFR SERPL CREATININE-BSD FRML MDRD: 33 ML/MIN/1.73M2
GFR SERPL CREATININE-BSD FRML MDRD: 35 ML/MIN/1.73M2
GFR SERPL CREATININE-BSD FRML MDRD: 37 ML/MIN/1.73M2
GFR SERPL CREATININE-BSD FRML MDRD: 37 ML/MIN/1.73M2
GLUCOSE BLD-MCNC: 109 MG/DL (ref 70–108)
GLUCOSE BLD-MCNC: 167 MG/DL (ref 70–108)
GLUCOSE BLD-MCNC: 173 MG/DL (ref 70–108)
GLUCOSE BLD-MCNC: 206 MG/DL (ref 70–108)
GLUCOSE BLD-MCNC: 206 MG/DL (ref 70–108)
GLUCOSE BLD-MCNC: 212 MG/DL (ref 70–108)
GLUCOSE BLD-MCNC: 214 MG/DL (ref 70–108)
GLUCOSE SERPL-MCNC: 136 MG/DL (ref 74–109)
GLUCOSE SERPL-MCNC: 154 MG/DL (ref 74–109)
GLUCOSE SERPL-MCNC: 169 MG/DL (ref 74–109)
GLUCOSE SERPL-MCNC: 192 MG/DL (ref 74–109)
GLUCOSE SERPL-MCNC: 208 MG/DL (ref 74–109)
GLUCOSE SERPL-MCNC: 217 MG/DL (ref 74–109)
HCO3 BLDA-SCNC: 18 MMOL/L (ref 23–28)
HCO3 BLDA-SCNC: 19 MMOL/L (ref 23–28)
HCO3 BLDA-SCNC: 20 MMOL/L (ref 23–28)
HCO3 BLDA-SCNC: 20 MMOL/L (ref 23–28)
HCO3 BLDA-SCNC: 21 MMOL/L (ref 23–28)
HCO3 BLDA-SCNC: 22 MMOL/L (ref 23–28)
HCO3 BLDA-SCNC: 23 MMOL/L (ref 23–28)
HCO3 BLDA-SCNC: 23 MMOL/L (ref 23–28)
HCO3 BLDA-SCNC: 24 MMOL/L (ref 23–28)
HCO3 BLDA-SCNC: 24 MMOL/L (ref 23–28)
HCO3 BLDA-SCNC: 27 MMOL/L (ref 23–28)
HCO3 BLDA-SCNC: 27 MMOL/L (ref 23–28)
HCO3 BLDA-SCNC: 28 MMOL/L (ref 23–28)
HCT VFR BLD AUTO: 28.7 % (ref 42–52)
HCT VFR BLD AUTO: 31.2 % (ref 42–52)
HCT VFR BLD AUTO: 33.3 % (ref 42–52)
HCT VFR BLD AUTO: 33.4 % (ref 42–52)
HCT VFR BLD AUTO: 35.2 % (ref 42–52)
HCT VFR BLD AUTO: 35.5 % (ref 42–52)
HGB BLD-MCNC: 10.2 GM/DL (ref 14–18)
HGB BLD-MCNC: 10.3 GM/DL (ref 14–18)
HGB BLD-MCNC: 10.9 GM/DL (ref 14–18)
HGB BLD-MCNC: 10.9 GM/DL (ref 14–18)
HGB BLD-MCNC: 11.4 GM/DL (ref 14–18)
HGB BLD-MCNC: 9.4 GM/DL (ref 14–18)
IAT IGG-SP REAG SERPL QL: NORMAL
INR PPP: 1.94 (ref 0.85–1.13)
LACTATE SERPL-SCNC: 12.4 MMOL/L (ref 0.5–2)
LACTATE SERPL-SCNC: 2.6 MMOL/L (ref 0.5–2)
LACTATE SERPL-SCNC: 2.7 MMOL/L (ref 0.5–2)
LACTATE SERPL-SCNC: 3.8 MMOL/L (ref 0.5–2)
LACTATE SERPL-SCNC: 7.5 MMOL/L (ref 0.5–2)
LACTATE SERPL-SCNC: 8.8 MMOL/L (ref 0.5–2)
MAGNESIUM SERPL-MCNC: 2.2 MG/DL (ref 1.6–2.6)
MAGNESIUM SERPL-MCNC: 2.2 MG/DL (ref 1.6–2.6)
MAGNESIUM SERPL-MCNC: 2.3 MG/DL (ref 1.6–2.6)
MAGNESIUM SERPL-MCNC: 2.3 MG/DL (ref 1.6–2.6)
MCH RBC QN AUTO: 29.3 PG (ref 26–33)
MCH RBC QN AUTO: 29.3 PG (ref 26–33)
MCH RBC QN AUTO: 29.5 PG (ref 26–33)
MCH RBC QN AUTO: 29.8 PG (ref 26–33)
MCH RBC QN AUTO: 29.9 PG (ref 26–33)
MCHC RBC AUTO-ENTMCNC: 30.8 GM/DL (ref 32.2–35.5)
MCHC RBC AUTO-ENTMCNC: 31 GM/DL (ref 32.2–35.5)
MCHC RBC AUTO-ENTMCNC: 32.7 GM/DL (ref 32.2–35.5)
MCHC RBC AUTO-ENTMCNC: 32.7 GM/DL (ref 32.2–35.5)
MCHC RBC AUTO-ENTMCNC: 32.8 GM/DL (ref 32.2–35.5)
MCV RBC AUTO: 90.2 FL (ref 80–94)
MCV RBC AUTO: 91.1 FL (ref 80–94)
MCV RBC AUTO: 91.5 FL (ref 80–94)
MCV RBC AUTO: 94.6 FL (ref 80–94)
MCV RBC AUTO: 95.2 FL (ref 80–94)
PCO2 BLDA: 29 MMHG (ref 35–45)
PCO2 BLDA: 32 MMHG (ref 35–45)
PCO2 BLDA: 34 MMHG (ref 35–45)
PCO2 BLDA: 36 MMHG (ref 35–45)
PCO2 BLDA: 36 MMHG (ref 35–45)
PCO2 BLDA: 39 MMHG (ref 35–45)
PCO2 BLDA: 60 MMHG (ref 35–45)
PCO2 BLDA: 60 MMHG (ref 35–45)
PCO2 BLDA: 70 MMHG (ref 35–45)
PCO2 TEMP ADJ BLDMV: 32 MMHG (ref 41–51)
PCO2 TEMP ADJ BLDMV: 40 MMHG (ref 41–51)
PCO2 TEMP ADJ BLDMV: 41 MMHG (ref 41–51)
PCO2 TEMP ADJ BLDMV: 56 MMHG (ref 41–51)
PCO2 TEMP ADJ BLDMV: 57 MMHG (ref 41–51)
PCO2 TEMP ADJ BLDMV: 63 MMHG (ref 41–51)
PCO2 TEMP ADJ BLDMV: 63 MMHG (ref 41–51)
PCO2 TEMP ADJ BLDMV: 80 MMHG (ref 41–51)
PEEP SETTING VENT: 10 MMHG
PEEP SETTING VENT: 15 MMHG
PH BLDA: 7.06 [PH] (ref 7.35–7.45)
PH BLDA: 7.11 [PH] (ref 7.35–7.45)
PH BLDA: 7.2 [PH] (ref 7.35–7.45)
PH BLDA: 7.28 [PH] (ref 7.35–7.45)
PH BLDA: 7.41 [PH] (ref 7.35–7.45)
PH BLDA: 7.43 [PH] (ref 7.35–7.45)
PH BLDA: 7.44 [PH] (ref 7.35–7.45)
PH BLDMV: 7.02 [PH] (ref 7.31–7.41)
PH BLDMV: 7.12 [PH] (ref 7.31–7.41)
PH BLDMV: 7.19 [PH] (ref 7.31–7.41)
PH BLDMV: 7.27 [PH] (ref 7.31–7.41)
PH BLDMV: 7.29 [PH] (ref 7.31–7.41)
PH BLDMV: 7.31 [PH] (ref 7.31–7.41)
PH BLDMV: 7.4 [PH] (ref 7.31–7.41)
PH BLDMV: 7.44 [PH] (ref 7.31–7.41)
PHOSPHATE SERPL-MCNC: 3 MG/DL (ref 2.5–4.5)
PHOSPHATE SERPL-MCNC: 3.5 MG/DL (ref 2.5–4.5)
PHOSPHATE SERPL-MCNC: 8.6 MG/DL (ref 2.5–4.5)
PHOSPHATE SERPL-MCNC: 9.5 MG/DL (ref 2.5–4.5)
PIP: 30 CMH2O
PIP: 30 CMH2O
PLATELET # BLD AUTO: 104 THOU/MM3 (ref 130–400)
PLATELET # BLD AUTO: 107 THOU/MM3 (ref 130–400)
PLATELET # BLD AUTO: 114 THOU/MM3 (ref 130–400)
PLATELET # BLD AUTO: 81 THOU/MM3 (ref 130–400)
PLATELET # BLD AUTO: 92 THOU/MM3 (ref 130–400)
PMV BLD AUTO: 11.6 FL (ref 9.4–12.4)
PMV BLD AUTO: 12 FL (ref 9.4–12.4)
PMV BLD AUTO: 12.5 FL (ref 9.4–12.4)
PMV BLD AUTO: 12.8 FL (ref 9.4–12.4)
PMV BLD AUTO: 12.9 FL (ref 9.4–12.4)
PO2 BLDA: 119 MMHG (ref 71–104)
PO2 BLDA: 124 MMHG (ref 71–104)
PO2 BLDA: 405 MMHG (ref 71–104)
PO2 BLDA: 492 MMHG (ref 71–104)
PO2 BLDA: 505 MMHG (ref 71–104)
PO2 BLDA: 505 MMHG (ref 71–104)
PO2 BLDA: 512 MMHG (ref 71–104)
PO2 BLDA: 518 MMHG (ref 71–104)
PO2 BLDA: 93 MMHG (ref 71–104)
PO2 BLDMV: 33 MMHG (ref 25–40)
PO2 BLDMV: 38 MMHG (ref 25–40)
PO2 BLDMV: 41 MMHG (ref 25–40)
PO2 BLDMV: 46 MMHG (ref 25–40)
PO2 BLDMV: 47 MMHG (ref 25–40)
PO2 BLDMV: 51 MMHG (ref 25–40)
PO2 BLDMV: 63 MMHG (ref 25–40)
PO2 BLDMV: 66 MMHG (ref 25–40)
POTASSIUM SERPL-SCNC: 3.4 MEQ/L (ref 3.5–5.2)
POTASSIUM SERPL-SCNC: 3.7 MEQ/L (ref 3.5–5.2)
POTASSIUM SERPL-SCNC: 3.9 MEQ/L (ref 3.5–5.2)
POTASSIUM SERPL-SCNC: 4.1 MEQ/L (ref 3.5–5.2)
POTASSIUM SERPL-SCNC: 4.1 MEQ/L (ref 3.5–5.2)
POTASSIUM SERPL-SCNC: 4.7 MEQ/L (ref 3.5–5.2)
PROT SERPL-MCNC: 5.1 G/DL (ref 6.4–8.3)
PROT SERPL-MCNC: 5.2 G/DL (ref 6.4–8.3)
PROT SERPL-MCNC: 5.2 G/DL (ref 6.4–8.3)
PROT SERPL-MCNC: 5.8 G/DL (ref 6.4–8.3)
PROT SERPL-MCNC: 6.7 G/DL (ref 6.4–8.3)
RBC # BLD AUTO: 3.15 MILL/MM3 (ref 4.7–6.1)
RBC # BLD AUTO: 3.46 MILL/MM3 (ref 4.7–6.1)
RBC # BLD AUTO: 3.51 MILL/MM3 (ref 4.7–6.1)
RBC # BLD AUTO: 3.64 MILL/MM3 (ref 4.7–6.1)
RBC # BLD AUTO: 3.72 MILL/MM3 (ref 4.7–6.1)
RH BLD: NORMAL
SAO2 % BLDA: 100 %
SAO2 % BLDA: 95 %
SAO2 % BLDA: 97 %
SAO2 % BLDA: 98 %
SAO2 % BLDMV: 53 %
SAO2 % BLDMV: 56 %
SAO2 % BLDMV: 69 %
SAO2 % BLDMV: 71 %
SAO2 % BLDMV: 76 %
SAO2 % BLDMV: 78 %
SAO2 % BLDMV: 87 %
SAO2 % BLDMV: 93 %
SCAN OF BLOOD SMEAR: NORMAL
SET PEEP: 15 MMHG
SET RESPIRATORY RATE: 12 BPM
SITE: ABNORMAL
SODIUM SERPL-SCNC: 132 MEQ/L (ref 135–145)
SODIUM SERPL-SCNC: 135 MEQ/L (ref 135–145)
SODIUM SERPL-SCNC: 135 MEQ/L (ref 135–145)
SODIUM SERPL-SCNC: 140 MEQ/L (ref 135–145)
SODIUM SERPL-SCNC: 141 MEQ/L (ref 135–145)
SODIUM SERPL-SCNC: 141 MEQ/L (ref 135–145)
TROPONIN, HIGH SENSITIVITY: 835 NG/L (ref 0–12)
VENTILATION MODE VENT: ABNORMAL
VENTILATION MODE VENT: AC
VENTILATION MODE VENT: AC
WBC # BLD AUTO: 13.1 THOU/MM3 (ref 4.8–10.8)
WBC # BLD AUTO: 13.9 THOU/MM3 (ref 4.8–10.8)
WBC # BLD AUTO: 23.1 THOU/MM3 (ref 4.8–10.8)
WBC # BLD AUTO: 23.8 THOU/MM3 (ref 4.8–10.8)
WBC # BLD AUTO: 29.9 THOU/MM3 (ref 4.8–10.8)

## 2025-05-13 PROCEDURE — 80053 COMPREHEN METABOLIC PANEL: CPT

## 2025-05-13 PROCEDURE — 33991 INSJ PERQ VAD L HRT ARTL&VEN: CPT | Performed by: INTERNAL MEDICINE

## 2025-05-13 PROCEDURE — 82330 ASSAY OF CALCIUM: CPT

## 2025-05-13 PROCEDURE — 94002 VENT MGMT INPAT INIT DAY: CPT

## 2025-05-13 PROCEDURE — 6360000002 HC RX W HCPCS: Performed by: INTERNAL MEDICINE

## 2025-05-13 PROCEDURE — 0BH17EZ INSERTION OF ENDOTRACHEAL AIRWAY INTO TRACHEA, VIA NATURAL OR ARTIFICIAL OPENING: ICD-10-PCS

## 2025-05-13 PROCEDURE — 2500000003 HC RX 250 WO HCPCS: Performed by: INTERNAL MEDICINE

## 2025-05-13 PROCEDURE — 2500000003 HC RX 250 WO HCPCS: Performed by: NURSE PRACTITIONER

## 2025-05-13 PROCEDURE — 99233 SBSQ HOSP IP/OBS HIGH 50: CPT | Performed by: NURSE PRACTITIONER

## 2025-05-13 PROCEDURE — 90945 DIALYSIS ONE EVALUATION: CPT

## 2025-05-13 PROCEDURE — 85347 COAGULATION TIME ACTIVATED: CPT

## 2025-05-13 PROCEDURE — 82947 ASSAY GLUCOSE BLOOD QUANT: CPT

## 2025-05-13 PROCEDURE — 6360000002 HC RX W HCPCS: Performed by: NURSE PRACTITIONER

## 2025-05-13 PROCEDURE — 2720000010 HC SURG SUPPLY STERILE: Performed by: INTERNAL MEDICINE

## 2025-05-13 PROCEDURE — 6360000004 HC RX CONTRAST MEDICATION: Performed by: INTERNAL MEDICINE

## 2025-05-13 PROCEDURE — 74174 CTA ABD&PLVS W/CONTRAST: CPT

## 2025-05-13 PROCEDURE — 33949 ECMO/ECLS DAILY MGMT ARTERY: CPT

## 2025-05-13 PROCEDURE — 94640 AIRWAY INHALATION TREATMENT: CPT

## 2025-05-13 PROCEDURE — 85018 HEMOGLOBIN: CPT

## 2025-05-13 PROCEDURE — 6360000002 HC RX W HCPCS

## 2025-05-13 PROCEDURE — 92950 HEART/LUNG RESUSCITATION CPR: CPT | Performed by: INTERNAL MEDICINE

## 2025-05-13 PROCEDURE — 70450 CT HEAD/BRAIN W/O DYE: CPT

## 2025-05-13 PROCEDURE — 2500000003 HC RX 250 WO HCPCS

## 2025-05-13 PROCEDURE — 2580000003 HC RX 258: Performed by: NURSE PRACTITIONER

## 2025-05-13 PROCEDURE — 36556 INSERT NON-TUNNEL CV CATH: CPT | Performed by: INTERNAL MEDICINE

## 2025-05-13 PROCEDURE — 85730 THROMBOPLASTIN TIME PARTIAL: CPT

## 2025-05-13 PROCEDURE — 5A1522F EXTRACORPOREAL OXYGENATION, MEMBRANE, CENTRAL: ICD-10-PCS | Performed by: INTERNAL MEDICINE

## 2025-05-13 PROCEDURE — 82803 BLOOD GASES ANY COMBINATION: CPT

## 2025-05-13 PROCEDURE — 31500 INSERT EMERGENCY AIRWAY: CPT

## 2025-05-13 PROCEDURE — 5A12012 PERFORMANCE OF CARDIAC OUTPUT, SINGLE, MANUAL: ICD-10-PCS | Performed by: INTERNAL MEDICINE

## 2025-05-13 PROCEDURE — 2580000003 HC RX 258: Performed by: INTERNAL MEDICINE

## 2025-05-13 PROCEDURE — 86885 COOMBS TEST INDIRECT QUAL: CPT

## 2025-05-13 PROCEDURE — 85384 FIBRINOGEN ACTIVITY: CPT

## 2025-05-13 PROCEDURE — 36558 INSERT TUNNELED CV CATH: CPT | Performed by: INTERNAL MEDICINE

## 2025-05-13 PROCEDURE — 37799 UNLISTED PX VASCULAR SURGERY: CPT

## 2025-05-13 PROCEDURE — 33947 ECMO/ECLS INITIATION ARTERY: CPT | Performed by: INTERNAL MEDICINE

## 2025-05-13 PROCEDURE — 86901 BLOOD TYPING SEROLOGIC RH(D): CPT

## 2025-05-13 PROCEDURE — 94761 N-INVAS EAR/PLS OXIMETRY MLT: CPT

## 2025-05-13 PROCEDURE — 99291 CRITICAL CARE FIRST HOUR: CPT | Performed by: INTERNAL MEDICINE

## 2025-05-13 PROCEDURE — 2580000003 HC RX 258: Performed by: PHYSICIAN ASSISTANT

## 2025-05-13 PROCEDURE — 71045 X-RAY EXAM CHEST 1 VIEW: CPT

## 2025-05-13 PROCEDURE — C1769 GUIDE WIRE: HCPCS | Performed by: INTERNAL MEDICINE

## 2025-05-13 PROCEDURE — 71275 CT ANGIOGRAPHY CHEST: CPT

## 2025-05-13 PROCEDURE — 2709999900 HC NON-CHARGEABLE SUPPLY: Performed by: INTERNAL MEDICINE

## 2025-05-13 PROCEDURE — C1760 CLOSURE DEV, VASC: HCPCS | Performed by: INTERNAL MEDICINE

## 2025-05-13 PROCEDURE — 87086 URINE CULTURE/COLONY COUNT: CPT

## 2025-05-13 PROCEDURE — C1725 CATH, TRANSLUMIN NON-LASER: HCPCS | Performed by: INTERNAL MEDICINE

## 2025-05-13 PROCEDURE — 36600 WITHDRAWAL OF ARTERIAL BLOOD: CPT

## 2025-05-13 PROCEDURE — 6370000000 HC RX 637 (ALT 250 FOR IP)

## 2025-05-13 PROCEDURE — 85014 HEMATOCRIT: CPT

## 2025-05-13 PROCEDURE — 2000000000 HC ICU R&B

## 2025-05-13 PROCEDURE — P9041 ALBUMIN (HUMAN),5%, 50ML: HCPCS | Performed by: INTERNAL MEDICINE

## 2025-05-13 PROCEDURE — 33952 ECMO/ECLS INSJ PRPH CANNULA: CPT | Performed by: INTERNAL MEDICINE

## 2025-05-13 PROCEDURE — 83735 ASSAY OF MAGNESIUM: CPT

## 2025-05-13 PROCEDURE — 6370000000 HC RX 637 (ALT 250 FOR IP): Performed by: INTERNAL MEDICINE

## 2025-05-13 PROCEDURE — C1887 CATHETER, GUIDING: HCPCS | Performed by: INTERNAL MEDICINE

## 2025-05-13 PROCEDURE — 86923 COMPATIBILITY TEST ELECTRIC: CPT

## 2025-05-13 PROCEDURE — 36415 COLL VENOUS BLD VENIPUNCTURE: CPT

## 2025-05-13 PROCEDURE — 93005 ELECTROCARDIOGRAM TRACING: CPT

## 2025-05-13 PROCEDURE — C1893 INTRO/SHEATH, FIXED,NON-PEEL: HCPCS | Performed by: INTERNAL MEDICINE

## 2025-05-13 PROCEDURE — 99223 1ST HOSP IP/OBS HIGH 75: CPT | Performed by: INTERNAL MEDICINE

## 2025-05-13 PROCEDURE — 2700000000 HC OXYGEN THERAPY PER DAY

## 2025-05-13 PROCEDURE — 2580000003 HC RX 258

## 2025-05-13 PROCEDURE — 83605 ASSAY OF LACTIC ACID: CPT

## 2025-05-13 PROCEDURE — 93010 ELECTROCARDIOGRAM REPORT: CPT | Performed by: INTERNAL MEDICINE

## 2025-05-13 PROCEDURE — 99233 SBSQ HOSP IP/OBS HIGH 50: CPT | Performed by: INTERNAL MEDICINE

## 2025-05-13 PROCEDURE — C1894 INTRO/SHEATH, NON-LASER: HCPCS | Performed by: INTERNAL MEDICINE

## 2025-05-13 PROCEDURE — 84100 ASSAY OF PHOSPHORUS: CPT

## 2025-05-13 PROCEDURE — 84484 ASSAY OF TROPONIN QUANT: CPT

## 2025-05-13 PROCEDURE — 85027 COMPLETE CBC AUTOMATED: CPT

## 2025-05-13 PROCEDURE — 5A1D90Z PERFORMANCE OF URINARY FILTRATION, CONTINUOUS, GREATER THAN 18 HOURS PER DAY: ICD-10-PCS | Performed by: INTERNAL MEDICINE

## 2025-05-13 PROCEDURE — 70498 CT ANGIOGRAPHY NECK: CPT

## 2025-05-13 PROCEDURE — 85610 PROTHROMBIN TIME: CPT

## 2025-05-13 PROCEDURE — 5A1955Z RESPIRATORY VENTILATION, GREATER THAN 96 CONSECUTIVE HOURS: ICD-10-PCS

## 2025-05-13 PROCEDURE — 86900 BLOOD TYPING SEROLOGIC ABO: CPT

## 2025-05-13 RX ORDER — FENTANYL CITRATE-0.9 % NACL/PF 10 MCG/ML
25-200 PLASTIC BAG, INJECTION (ML) INTRAVENOUS CONTINUOUS
Refills: 0 | Status: DISCONTINUED | OUTPATIENT
Start: 2025-05-13 | End: 2025-05-14

## 2025-05-13 RX ORDER — MIDAZOLAM HYDROCHLORIDE 1 MG/ML
INJECTION, SOLUTION INTRAMUSCULAR; INTRAVENOUS
Status: COMPLETED | OUTPATIENT
Start: 2025-05-13 | End: 2025-05-13

## 2025-05-13 RX ORDER — METRONIDAZOLE 500 MG/100ML
500 INJECTION, SOLUTION INTRAVENOUS EVERY 8 HOURS
Status: DISCONTINUED | OUTPATIENT
Start: 2025-05-13 | End: 2025-05-13

## 2025-05-13 RX ORDER — MIDAZOLAM HYDROCHLORIDE 1 MG/ML
1-10 INJECTION, SOLUTION INTRAVENOUS CONTINUOUS
Status: DISCONTINUED | OUTPATIENT
Start: 2025-05-13 | End: 2025-05-19

## 2025-05-13 RX ORDER — NITROGLYCERIN 20 MG/100ML
10 INJECTION INTRAVENOUS CONTINUOUS
Status: DISCONTINUED | OUTPATIENT
Start: 2025-05-13 | End: 2025-05-19

## 2025-05-13 RX ORDER — HEPARIN SODIUM 10000 [USP'U]/100ML
5-30 INJECTION, SOLUTION INTRAVENOUS CONTINUOUS
Status: DISCONTINUED | OUTPATIENT
Start: 2025-05-13 | End: 2025-05-17 | Stop reason: CLARIF

## 2025-05-13 RX ORDER — IPRATROPIUM BROMIDE AND ALBUTEROL SULFATE 2.5; .5 MG/3ML; MG/3ML
1 SOLUTION RESPIRATORY (INHALATION) ONCE
Status: COMPLETED | OUTPATIENT
Start: 2025-05-13 | End: 2025-05-13

## 2025-05-13 RX ORDER — MAGNESIUM SULFATE 1 G/100ML
1000 INJECTION INTRAVENOUS PRN
Status: DISCONTINUED | OUTPATIENT
Start: 2025-05-13 | End: 2025-05-17 | Stop reason: ALTCHOICE

## 2025-05-13 RX ORDER — NOREPINEPHRINE BITARTRATE 0.06 MG/ML
1-100 INJECTION, SOLUTION INTRAVENOUS CONTINUOUS
Status: DISCONTINUED | OUTPATIENT
Start: 2025-05-13 | End: 2025-05-19

## 2025-05-13 RX ORDER — PANTOPRAZOLE SODIUM 40 MG/10ML
40 INJECTION, POWDER, LYOPHILIZED, FOR SOLUTION INTRAVENOUS 2 TIMES DAILY
Status: DISCONTINUED | OUTPATIENT
Start: 2025-05-13 | End: 2025-05-13

## 2025-05-13 RX ORDER — ETOMIDATE 2 MG/ML
INJECTION INTRAVENOUS
Status: COMPLETED | OUTPATIENT
Start: 2025-05-13 | End: 2025-05-13

## 2025-05-13 RX ORDER — HEPARIN SODIUM 1000 [USP'U]/ML
INJECTION, SOLUTION INTRAVENOUS; SUBCUTANEOUS PRN
Status: DISCONTINUED | OUTPATIENT
Start: 2025-05-13 | End: 2025-05-13 | Stop reason: HOSPADM

## 2025-05-13 RX ORDER — HEPARIN SODIUM 1000 [USP'U]/ML
INJECTION, SOLUTION INTRAVENOUS; SUBCUTANEOUS PRN
Status: DISCONTINUED | OUTPATIENT
Start: 2025-05-13 | End: 2025-05-20

## 2025-05-13 RX ORDER — FENTANYL CITRATE 50 UG/ML
INJECTION, SOLUTION INTRAMUSCULAR; INTRAVENOUS
Status: DISPENSED
Start: 2025-05-13 | End: 2025-05-13

## 2025-05-13 RX ORDER — EPINEPHRINE 0.1 MG/ML
INJECTION INTRAVENOUS PRN
Status: DISCONTINUED | OUTPATIENT
Start: 2025-05-13 | End: 2025-05-13 | Stop reason: HOSPADM

## 2025-05-13 RX ORDER — CALCIUM GLUCONATE 20 MG/ML
1000 INJECTION, SOLUTION INTRAVENOUS PRN
Status: DISCONTINUED | OUTPATIENT
Start: 2025-05-13 | End: 2025-05-28 | Stop reason: HOSPADM

## 2025-05-13 RX ORDER — CALCIUM GLUCONATE 20 MG/ML
2000 INJECTION, SOLUTION INTRAVENOUS PRN
Status: DISCONTINUED | OUTPATIENT
Start: 2025-05-13 | End: 2025-05-28 | Stop reason: HOSPADM

## 2025-05-13 RX ORDER — FENTANYL CITRATE 50 UG/ML
INJECTION, SOLUTION INTRAMUSCULAR; INTRAVENOUS
Status: COMPLETED | OUTPATIENT
Start: 2025-05-13 | End: 2025-05-13

## 2025-05-13 RX ORDER — IOPAMIDOL 755 MG/ML
INJECTION, SOLUTION INTRAVASCULAR PRN
Status: DISCONTINUED | OUTPATIENT
Start: 2025-05-13 | End: 2025-05-13 | Stop reason: HOSPADM

## 2025-05-13 RX ORDER — IOPAMIDOL 755 MG/ML
125 INJECTION, SOLUTION INTRAVASCULAR
Status: COMPLETED | OUTPATIENT
Start: 2025-05-13 | End: 2025-05-13

## 2025-05-13 RX ORDER — POTASSIUM CHLORIDE 29.8 MG/ML
20 INJECTION INTRAVENOUS PRN
Status: DISCONTINUED | OUTPATIENT
Start: 2025-05-13 | End: 2025-05-17 | Stop reason: ALTCHOICE

## 2025-05-13 RX ORDER — PROCHLORPERAZINE EDISYLATE 5 MG/ML
10 INJECTION INTRAMUSCULAR; INTRAVENOUS EVERY 6 HOURS PRN
Status: DISCONTINUED | OUTPATIENT
Start: 2025-05-13 | End: 2025-05-28 | Stop reason: HOSPADM

## 2025-05-13 RX ORDER — ALBUMIN HUMAN 50 G/1000ML
25 SOLUTION INTRAVENOUS
Status: DISCONTINUED | OUTPATIENT
Start: 2025-05-13 | End: 2025-05-28 | Stop reason: HOSPADM

## 2025-05-13 RX ORDER — INDOMETHACIN 25 MG/1
CAPSULE ORAL PRN
Status: DISCONTINUED | OUTPATIENT
Start: 2025-05-13 | End: 2025-05-13 | Stop reason: HOSPADM

## 2025-05-13 RX ORDER — ATROPINE SULFATE 0.1 MG/ML
INJECTION INTRAVENOUS
Status: DISPENSED
Start: 2025-05-13 | End: 2025-05-13

## 2025-05-13 RX ORDER — GABAPENTIN 300 MG/1
300 CAPSULE ORAL 2 TIMES DAILY
Status: DISCONTINUED | OUTPATIENT
Start: 2025-05-13 | End: 2025-05-28 | Stop reason: HOSPADM

## 2025-05-13 RX ORDER — INSULIN LISPRO 100 [IU]/ML
0-4 INJECTION, SOLUTION INTRAVENOUS; SUBCUTANEOUS
Status: DISCONTINUED | OUTPATIENT
Start: 2025-05-13 | End: 2025-05-26

## 2025-05-13 RX ORDER — EPINEPHRINE 0.1 MG/ML
INJECTION INTRAVENOUS
Status: DISPENSED
Start: 2025-05-13 | End: 2025-05-13

## 2025-05-13 RX ORDER — ATROPINE SULFATE 1 MG/ML
INJECTION, SOLUTION INTRAVENOUS PRN
Status: DISCONTINUED | OUTPATIENT
Start: 2025-05-13 | End: 2025-05-13 | Stop reason: HOSPADM

## 2025-05-13 RX ORDER — EPINEPHRINE 1 MG/ML(1)
AMPUL (ML) INJECTION PRN
Status: DISCONTINUED | OUTPATIENT
Start: 2025-05-13 | End: 2025-05-13 | Stop reason: HOSPADM

## 2025-05-13 RX ORDER — LIDOCAINE HYDROCHLORIDE 20 MG/ML
INJECTION, SOLUTION EPIDURAL; INFILTRATION; INTRACAUDAL; PERINEURAL PRN
Status: DISCONTINUED | OUTPATIENT
Start: 2025-05-13 | End: 2025-05-13 | Stop reason: HOSPADM

## 2025-05-13 RX ADMIN — Medication 10 MCG/MIN: at 15:34

## 2025-05-13 RX ADMIN — SODIUM CHLORIDE, PRESERVATIVE FREE 10 ML: 5 INJECTION INTRAVENOUS at 09:18

## 2025-05-13 RX ADMIN — Medication: at 11:59

## 2025-05-13 RX ADMIN — Medication: at 19:54

## 2025-05-13 RX ADMIN — ETOMIDATE 20 MG: 2 INJECTION INTRAVENOUS at 06:44

## 2025-05-13 RX ADMIN — ALBUMIN (HUMAN) 25 G: 12.5 INJECTION, SOLUTION INTRAVENOUS at 17:31

## 2025-05-13 RX ADMIN — Medication: at 11:58

## 2025-05-13 RX ADMIN — FENTANYL CITRATE 50 MCG: 50 INJECTION, SOLUTION INTRAMUSCULAR; INTRAVENOUS at 06:43

## 2025-05-13 RX ADMIN — BUMETANIDE 1 MG/HR: 0.25 INJECTION INTRAMUSCULAR; INTRAVENOUS at 19:56

## 2025-05-13 RX ADMIN — IPRATROPIUM BROMIDE AND ALBUTEROL SULFATE 1 DOSE: .5; 3 SOLUTION RESPIRATORY (INHALATION) at 02:11

## 2025-05-13 RX ADMIN — CEFEPIME 2000 MG: 2 INJECTION, POWDER, FOR SOLUTION INTRAVENOUS at 00:10

## 2025-05-13 RX ADMIN — HEPARIN SODIUM 5 UNITS/KG/HR: 10000 INJECTION, SOLUTION INTRAVENOUS at 16:15

## 2025-05-13 RX ADMIN — MILRINONE LACTATE 0.38 MCG/KG/MIN: 0.2 INJECTION, SOLUTION INTRAVENOUS at 10:13

## 2025-05-13 RX ADMIN — CALCIUM GLUCONATE 1000 MG: 20 INJECTION, SOLUTION INTRAVENOUS at 14:47

## 2025-05-13 RX ADMIN — NITROGLYCERIN 1 INCH: 20 OINTMENT TOPICAL at 14:56

## 2025-05-13 RX ADMIN — HYDROCODONE BITARTRATE AND ACETAMINOPHEN 1 TABLET: 7.5; 325 TABLET ORAL at 01:45

## 2025-05-13 RX ADMIN — SUCRALFATE 1 G: 1 TABLET ORAL at 20:53

## 2025-05-13 RX ADMIN — Medication 50 MCG/HR: at 18:37

## 2025-05-13 RX ADMIN — SODIUM CHLORIDE, PRESERVATIVE FREE 10 ML: 5 INJECTION INTRAVENOUS at 20:53

## 2025-05-13 RX ADMIN — VASOPRESSIN 0.03 UNITS/MIN: 20 INJECTION INTRAVENOUS at 07:06

## 2025-05-13 RX ADMIN — IOPAMIDOL 125 ML: 755 INJECTION, SOLUTION INTRAVENOUS at 08:52

## 2025-05-13 RX ADMIN — VASOPRESSIN 0.04 UNITS/MIN: 20 INJECTION INTRAVENOUS at 14:49

## 2025-05-13 RX ADMIN — POTASSIUM CHLORIDE 20 MEQ: 29.8 INJECTION INTRAVENOUS at 18:06

## 2025-05-13 RX ADMIN — Medication 50 MCG/HR: at 06:55

## 2025-05-13 RX ADMIN — BUMETANIDE 3 MG/HR: 0.25 INJECTION INTRAMUSCULAR; INTRAVENOUS at 09:17

## 2025-05-13 RX ADMIN — ALPRAZOLAM 0.5 MG: 0.5 TABLET ORAL at 23:18

## 2025-05-13 RX ADMIN — SODIUM CHLORIDE: 0.9 INJECTION, SOLUTION INTRAVENOUS at 18:30

## 2025-05-13 RX ADMIN — POTASSIUM CHLORIDE 20 MEQ: 29.8 INJECTION INTRAVENOUS at 17:24

## 2025-05-13 RX ADMIN — Medication 20 MCG/MIN: at 17:14

## 2025-05-13 RX ADMIN — PANTOPRAZOLE SODIUM 8 MG/HR: 40 INJECTION, POWDER, LYOPHILIZED, FOR SOLUTION INTRAVENOUS at 09:04

## 2025-05-13 RX ADMIN — BUMETANIDE 3 MG/HR: 0.25 INJECTION INTRAMUSCULAR; INTRAVENOUS at 03:56

## 2025-05-13 RX ADMIN — Medication 5 MCG/MIN: at 04:01

## 2025-05-13 RX ADMIN — GABAPENTIN 300 MG: 300 CAPSULE ORAL at 20:53

## 2025-05-13 RX ADMIN — METRONIDAZOLE 500 MG: 500 INJECTION, SOLUTION INTRAVENOUS at 14:19

## 2025-05-13 RX ADMIN — MIDAZOLAM 2 MG: 1 INJECTION INTRAMUSCULAR; INTRAVENOUS at 06:40

## 2025-05-13 RX ADMIN — NITROGLYCERIN 1 INCH: 20 OINTMENT TOPICAL at 18:26

## 2025-05-13 RX ADMIN — INSULIN LISPRO 1 UNITS: 100 INJECTION, SOLUTION INTRAVENOUS; SUBCUTANEOUS at 20:53

## 2025-05-13 RX ADMIN — CEFEPIME 2000 MG: 2 INJECTION, POWDER, FOR SOLUTION INTRAVENOUS at 14:12

## 2025-05-13 RX ADMIN — CALCIUM GLUCONATE 1000 MG: 20 INJECTION, SOLUTION INTRAVENOUS at 16:47

## 2025-05-13 RX ADMIN — Medication 2 MG/HR: at 23:38

## 2025-05-13 RX ADMIN — Medication: at 20:06

## 2025-05-13 RX ADMIN — Medication 2 MCG/MIN: at 09:58

## 2025-05-13 RX ADMIN — PANTOPRAZOLE SODIUM 8 MG/HR: 40 INJECTION, POWDER, LYOPHILIZED, FOR SOLUTION INTRAVENOUS at 16:27

## 2025-05-13 RX ADMIN — SUCRALFATE 1 G: 1 TABLET ORAL at 17:36

## 2025-05-13 RX ADMIN — CEFEPIME 2000 MG: 2 INJECTION, POWDER, FOR SOLUTION INTRAVENOUS at 20:52

## 2025-05-13 RX ADMIN — FENTANYL CITRATE 50 MCG: 50 INJECTION, SOLUTION INTRAMUSCULAR; INTRAVENOUS at 06:40

## 2025-05-13 ASSESSMENT — PULMONARY FUNCTION TESTS
PIF_VALUE: 29
PIF_VALUE: 30
PIF_VALUE: 30
PIF_VALUE: 29
PIF_VALUE: 30
PIF_VALUE: 30
PIF_VALUE: 29
PIF_VALUE: 29
PIF_VALUE: 30
PIF_VALUE: 30
PIF_VALUE: 33
PIF_VALUE: 29
PIF_VALUE: 30
PIF_VALUE: 29
PIF_VALUE: 30
PIF_VALUE: 29
PIF_VALUE: 30
PIF_VALUE: 29
PIF_VALUE: 30
PIF_VALUE: 29
PIF_VALUE: 30
PIF_VALUE: 30
PIF_VALUE: 29
PIF_VALUE: 30
PIF_VALUE: 29
PIF_VALUE: 30
PIF_VALUE: 30
PIF_VALUE: 29
PIF_VALUE: 29
PIF_VALUE: 30
PIF_VALUE: 29
PIF_VALUE: 30

## 2025-05-13 ASSESSMENT — PAIN DESCRIPTION - LOCATION: LOCATION: BACK

## 2025-05-13 ASSESSMENT — PAIN DESCRIPTION - DESCRIPTORS: DESCRIPTORS: ACHING;DISCOMFORT

## 2025-05-13 ASSESSMENT — PAIN SCALES - GENERAL
PAINLEVEL_OUTOF10: 4
PAINLEVEL_OUTOF10: 7

## 2025-05-13 ASSESSMENT — PAIN DESCRIPTION - ORIENTATION: ORIENTATION: MID

## 2025-05-13 NOTE — PROGRESS NOTES
[facility]   Nephrology progress note  5/13/2025, 8:32 AM      Pt Name:    Alberto Gilliland  MRN:     [id number]     YOB: 1965  Admit Date:    4/30/2025 12:24 PM    Chief Complaint: Nephrology following for acute kidney injury and fluid overload.    Subjective:  Patient seen and examined  Patient's urine output acutely dropped overnight  He decompensated and was intubated as well  He is currently on pressors  Being planned for initiation of ECMO    Objective:  24HR INTAKE/OUTPUT:    Intake/Output Summary (Last 24 hours) at 5/13/2025 0832  Last data filed at 5/13/2025 0715  Gross per 24 hour   Intake 1440.37 ml   Output 2150 ml   Net -709.63 ml      Admission weight: 87.5 kg (193 lb)  Wt Readings from Last 3 Encounters:   05/13/25 85.8 kg (189 lb 2.5 oz)   04/30/25 87.7 kg (193 lb 6.4 oz)   04/25/25 89.2 kg (196 lb 9.6 oz)        Vitals :   Vitals:    05/13/25 0733 05/13/25 0736 05/13/25 0739 05/13/25 0745   BP: (!) 74/55 91/60 (!) 82/61 101/64   Pulse: (!) 117 (!) 116 (!) 119 (!) 118   Resp: 24 17 24 19   Temp:       TempSrc:       SpO2: 97% 97% 97% 95%   Weight:       Height:           Physical examination  General Appearance: Ill-appearing  Mouth/Throat: ET tube in place  Neck: No accessory muscle use  Lungs:  Breath sounds: Diminished  Heart::  S1,S2 heard  Abdomen:  Soft, non - tender  Musculoskeletal:  Edema -noted    Medications:  Infusion:    norepinephrine 7 mcg/min (05/13/25 0444)    fentaNYL 50 mcg/hr (05/13/25 0655)    VASOpressin 20 Units in sodium chloride 0.9 % 100 mL infusion 0.03 Units/min (05/13/25 0706)    pantoprazole (PROTONIX) 80 mg in sodium chloride 0.9 % 100 mL infusion      milrinone 0.375 mcg/kg/min (05/13/25 0444)    sodium chloride 20 mL/hr at 05/13/25 0444    bumetanide (BUMEX) 12.5 mg in sodium chloride 0.9 % 125 mL infusion 3 mg/hr (05/13/25 0526)    sodium chloride       Meds:    fentaNYL        EPINEPHrine        atropine        senna  2 tablet Oral

## 2025-05-13 NOTE — PROGRESS NOTES
[facility]  PHYSICAL THERAPY MISSED TREATMENT NOTE  STRZ ICU 4D    Date: 2025  Patient Name: Alberto Gilliland        MRN: [id number]   : 1965  (60 y.o.)  Gender: male                REASON FOR MISSED TREATMENT:  Missed Treat.        Pt was just intubated 720 this am, per MD note possible initiation of ECMO. Will hold & check back  as Pt becomes more medically stable.

## 2025-05-13 NOTE — BRIEF OP NOTE
[facility]  Sedation/Analgesia Post Sedation Record    Pt Name: lAberto Gilliland  Account number: [id number]  MRN: [id number]  YOB: 1965  Procedure Performed By: Nikita Rivera MD MD FACC, FSCAI, RPVI  Primary Care Physician: Juan Christiansen,   Date: 5/13/2025    POST-PROCEDURE    Physicians/Assistants: Nikita Rivera MD, JOHN, [facility], RPVI    Procedure Performed:LAVA ECMO    Sedation/Anesthesia: Versed/ Fentanyl and 2% xylocaine local anesthesia.      Estimated Blood Loss: < 50 ml.     Specimens Removed: None         Disposition of Specimen: N/A        Complications: No Immediate Complications.       Post-procedure Diagnosis/Findings:         Patient had PEA arrest during cannulation  We performed ECLS with chest compressions 1-2 minutes and we were able to cannulate him and get his pulse back    LAVA ECMO - 25Fr LA-RA-IVC, 19 FR LFA            Electronically signed by Nikita Rivera MD on 5/13/25 at 12:07 PM EDT   Interventional Cardiology

## 2025-05-13 NOTE — PROCEDURES
PROCEDURE NOTE  Date: 5/13/2025   Name: Alberto Gilliland  YOB: 1965    Procedures  12 lead EKG completed. Results handed to Cynthia BUTLER.

## 2025-05-13 NOTE — FLOWSHEET NOTE
0601 100 mcg shayla given by Dale CAGE RN    0705 Dr. Rivera at bedside    0710 ABG and VBG completed.

## 2025-05-13 NOTE — PROGRESS NOTES
CRITICAL CARE PROGRESS NOTE      Patient:  Alberto Gilliland    Unit/Bed:4D-03/003-A  YOB: 1965  MRN: [id number]   PCP: Juan Christiansen DO  Date of Admission: 4/30/2025  Chief Complaint:- fatigue and hypotension    Assessment and Plan:   Acute on Chronic decompensated HFrEF- BNP 34858. RODRÍGUEZ 05/06 showed EF 30-35% and moderate valve global hypokinesis. Severe stenosis of the aortic valve AV mean 42. Repeat BNP 05/11 59440.   -CI: 2.25, PAWP 46, Prabhu-2.27, CP 0.62  -On Bumex drip 3mg/hr and milirone 0.375.   -Fluid restrict to less than 2L daily   -Daily weights. Strict I&O.   - CTA ordered. Possible ECMO and TAVR as per cardiologist    Acute Hypoxic Respiratory Failure-Baseline room air. Episode of tachypnea and increased work of breathing. HFNC attempted with no success. Patient intubated  Patient planned for possible ECMO    Severe Aortic Stenosis:  Had Mechanical AVR in 2007 then switched to bioprosthetic in 2016 due to warfarin intolerance and GI bleeding. 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 Structural heart following. Undergoing workup and medical optimization  Patient planned for possible TAVR     Pneumonia / Sepsis- Worsening productive cough, increased O2 requirement and leukocytosis. CXR with possible RLL infiltrate. Pneumonia panel and resp cultures ordered. Decision made to start on HAP coverage, Urine strep pna and legionella pending.    05/11 Pneumonia panel negative. Resp cultures moderate segmented neutrophils, moderate gram positive cocci singly and in pairs. Few gram negative bacilli. Budding yeast.   On Cefepime 2000 mg BID (05/11-05/17)    Shock Liver, improving: Albumin 3.5,  , AST 88, Total bili 2.7. Suspected due to hypoperfusion in setting of severe AS. US Liver with doppler showed steatosis and liver 16.8 cm with thickened gallbladder. If worsening ALT stop statin.    Thrombocytopenia- Platelets dropped 170  is feeling a lot better.     5/13: Patient overnight was placed on HFNC for increased oxygen requirements. Patient had an episode of dark vomit with blood-oscar component to it. Patient reported severe nausea prior to that episode. Patient was developing increased work of breathing with retractions and tachypnea and ended up being intubated. Patient seen by Dr. Rivera and requested CTA stat.    Past Medical History:  Per HPI.  Family History:  Mother-Cancer. Father-diabetes, cancer, heart disease, hypertension  Social History:  46.4 pack year.    ROS   Nauseous and vomiting. Increased WOB, tachypnea and retractions. Patient got intubated.     Scheduled Meds:   pantoprazole  40 mg IntraVENous BID    fentaNYL        senna  2 tablet Oral BID    cefepime  2,000 mg IntraVENous Q12H    magnesium oxide  400 mg Oral Daily    gabapentin  300 mg Oral TID    potassium chloride  20 mEq Oral TID WC    [Held by provider] metoprolol succinate  12.5 mg Oral Daily    sodium chloride flush  5-40 mL IntraVENous 2 times per day    [Held by provider] enoxaparin  40 mg SubCUTAneous Daily    aspirin  81 mg Oral Daily    atorvastatin  20 mg Oral Daily    Vitamin D  1,000 Units Oral Daily    escitalopram  10 mg Oral Daily    folic acid  1,000 mcg Oral Daily    levothyroxine  75 mcg Oral Daily    [Held by provider] sucralfate  1 g Oral 4x Daily AC & HS    tiotropium-olodaterol  2 puff Inhalation Daily RT    vitamin C  500 mg Oral Daily     Continuous Infusions:   norepinephrine 7 mcg/min (05/13/25 0444)    fentaNYL 50 mcg/hr (05/13/25 0655)    VASOpressin 20 Units in sodium chloride 0.9 % 100 mL infusion 0.03 Units/min (05/13/25 0706)    milrinone 0.375 mcg/kg/min (05/13/25 0444)    sodium chloride 20 mL/hr at 05/13/25 0444    bumetanide (BUMEX) 12.5 mg in sodium chloride 0.9 % 125 mL infusion 3 mg/hr (05/13/25 0526)    sodium chloride         PHYSICAL EXAMINATION:  T:  97.6 P: 116 RR: 20 B/P: 97/72 O2 Sat:  98 , intubated  I/O:  -3,141,

## 2025-05-13 NOTE — PROGRESS NOTES
[facility]  OCCUPATIONAL THERAPY MISSED TREATMENT NOTE  [facility] ICU 4D  4D-003-A      Date: 2025  Patient Name: Alberto Gilliland        CSN: [id number]   : 1965  (60 y.o.)  Gender: male                REASON FOR MISSED TREATMENT:  Pt was just intubated 720 this am, per MD note possible initiation of ECMO. Will hold & check back  as Pt becomes more medically stable.

## 2025-05-13 NOTE — SIGNIFICANT EVENT
ECMO Alert 0945  ECMO Start Time 1050  ECMO Type LAVA  Arterial Site Left FA  [place] Size 19Fr  Venous Site Right FV [place] Size 25Fr  Distal Limb Perfusion 6Fr   Inserting Physician MIKE Rivera MD        Pre ECLS Gas        Latest Reference Range & Units 5/13  0709   Source:   Art Line   pH, Blood Gas 7.35 - 7.45  7.20   PCO2 35 - 45 mmhg 60   pO2 71 - 104 mmhg 93   HCO3 23 - 28 mmol/l 23   Base Excess (Calculated) -2.5 - 2.5 mmol/l -5.4   O2 Sat % 95   Mando Test   N/A   IFIO2   100   PIP cmH2O 24      PC 22 PEEP 12 FI02 100 RR 16     Hemodynamics      B/P: 75/60  SV02: 53        Post ECLS Gas          Latest Reference Range & Units 5/13/25  1103   pH, Blood Gas 7.35 - 7.45  7.06   PCO2 35 - 45 mmhg 70   pO2 71 - 104 mmhg 512   HCO3 23 - 28 mmol/l 20   Base Excess (Calculated) -2.5 - 2.5 mmol/l -10.3   O2 Sat % 100         PC 22 PEEP 12 FI02 100 ECMO flow 5.75 liters sweep 2      B/P: 106/69 (arterial)   SV02: 73

## 2025-05-13 NOTE — PROCEDURES
PROCEDURE NOTE  Date: 5/13/2025   Name: Alberto Gilliland  YOB: 1965    Intubation    Date/Time: 5/13/2025 6:24 AM    Performed by: Frank Lee DO  Authorized by: Frank Lee DO  Consent: Verbal consent obtained.  Risks and benefits: risks, benefits and alternatives were discussed  Consent given by: patient  Patient understanding: patient states understanding of the procedure being performed  Patient identity confirmed: arm band  Time out: Immediately prior to procedure a \"time out\" was called to verify the correct patient, procedure, equipment, support staff and site/side marked as required.  Indications: respiratory distress and respiratory failure  Intubation method: video-assisted  Patient status: sedated  Preoxygenation: BVM  Pretreatment medications: midazolam and fentanyl  Sedatives: etomidate  Laryngoscope size: S3.  Tube size: 7.5 mm  Tube type: cuffed  Number of attempts: 1  Ventilation between attempts: BVM  Cricoid pressure: no  Cords visualized: yes  Post-procedure assessment: chest rise and ETCO2 monitor  Breath sounds: equal  Cuff inflated: yes  ETT to lip: 24 cm  Tube secured with: ETT hoffmann  Chest x-ray findings: endotracheal tube in appropriate position  Patient tolerance: patient tolerated the procedure well with no immediate complications  Comments: Scant blood with mucous was present in trachea during intubation.  Electronically signed by Frank Lee DO IM PGY-2 on 5/13/2025.

## 2025-05-13 NOTE — PROGRESS NOTES
1050- Patient on ECMO at 5L @ 3060 RPM and sweep 3 L.     1103- ABG completed at this time.   pH, Blood Gas: 7.06   PCO2: 70   pO2: 512  HCO3: 20   Base Excess (Calculated): -10.3   O2 Sat: 100  IFIO2: 100  Sweep increased from 3L to 6L.     PA SvO2  PO2, Mixed: 63   O2 Sat, Mixed: 78  Milrinone gtt turned off per Dr. Rivera.     1124- Repeat ABG from right axillary  pH, Blood Gas: 7.11   PCO2: 60   pO2: 119   HCO3: 19   Base Excess (Calculated): -10.2  O2 Sat: 97  Sweep increased to 8 L, ECMO flows increased to 6 L.     1326- Patient in ICU  pH, Blood Gas: 7.28   PCO2: 39  pO2: 124   HCO3: 18   Base Excess (Calculated): -8.1   O2 Sat: 98    PO2, Mixed: 47   O2 Sat, Mixed: 76  ECMO 5.89 LPM @ 3180 RPM  Sweep 8 lpm   CLAIRE calculation:   CO 8.3  CI 4.1  SV 65   No changes at this time.     1534- Dr. Rivera at bedside, updated on arterial flow to popliteal artery, concern for flow in left foot. See orders for heparin gtt.     1627- Repeat ABG  pH, Blood Gas: 7.41  PCO2: 32  pO2: 505   HCO3: 21   Base Excess (Calculated): -3.3  O2 Sat: 100  IFIO2: 100    PO2, Mixed: 66   O2 Sat, Mixed: 93  Sweep decreased to 6L.     1723-  pH, Blood Gas: 7.41  PCO2: 34   pO2: 518   HCO3: 22   Base Excess (Calculated): -2.6   O2 Sat: 100  IFIO2: 100  Sweep decreased to 5L.     1737- Updated Dr. Rivera on continued arterial flow to popliteal artery, concern for flow in left foot.     1828-  pH, Blood Gas: 7.41  PCO2: 36  pO2: 505   HCO3: 23  Base Excess (Calculated): -1.1  O2 Sat: 100  IFIO2: 100  Sweep decreased to 4L.

## 2025-05-13 NOTE — PROGRESS NOTES
Gastroenterology Progress Note:     Patient Name:  Alberto Gilliland   MRN: [id number]  [id number]  YOB: 1965  Admit Date: 4/30/2025 12:24 PM  Primary Care Physician: Juan Christiansen DO   4D-03/003-A     Patient seen and examined.  24 hours events and chart reviewed.    Subjective: Patient had an episode of vomiting of dark red blood with clots last night x 2 episodes.  GI was reconsulted for this overnight.  Patient was intubated this AM and taken for ECMO, he did have PEA arrest during cannulation.  He is seen after being returned to ICU room.  He does follow commands currently.  He is intubated on mechanical ventilation, on ECMO and CRRT.      Objective:  /64   Pulse (!) 118   Temp 97.9 °F (36.6 °C) (Core)   Resp 19   Ht 1.702 m (5' 7\")   Wt 85.8 kg (189 lb 2.5 oz)   SpO2 95%   BMI 29.63 kg/m²     Physical Exam  Vitals and nursing note reviewed.   Constitutional:       General: He is not in acute distress.     Appearance: Normal appearance. He is normal weight. He is not ill-appearing.   HENT:      Mouth/Throat:      Mouth: Mucous membranes are dry.      Pharynx: Oropharynx is clear.      Comments: Orally intubated on mechanical ventilator.   Cardiovascular:      Rate and Rhythm: Regular rhythm. Tachycardia present.   Pulmonary:      Effort: No respiratory distress.      Breath sounds: Normal breath sounds.   Abdominal:      General: Abdomen is flat. Bowel sounds are normal. There is no distension.      Palpations: Abdomen is soft. There is no mass.      Tenderness: There is no abdominal tenderness. There is no guarding or rebound.      Hernia: No hernia is present.   Skin:     General: Skin is warm and dry.      Capillary Refill: Capillary refill takes less than 2 seconds.   Neurological:      Mental Status: He is alert.           Labs:   CBC:   Lab Results   Component Value Date/Time    WBC 13.1 05/13/2025 04:25 AM    HGB 11.4 05/13/2025 06:25 AM    HGB 12.0 05/18/2012 01:10 PM

## 2025-05-13 NOTE — PROGRESS NOTES
Complex Cardiology Service                                                         Daily Progress Note    Patient:  Alberto Gilliland  YOB: 1965    MRN: [id number]   Acct: [id number]    Admit Date:  4/30/2025    Rationale for Cardiology consult: severe AS and MR with decompensated HF; likely low-output failure - need for ICU monitoring and SGC guided HD optimization    HPI/PMH: Per Dr. Rivera consult note of 5/5/2025:   This is a pleasant 60 y.o. 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  Has intermittent chest pain and MA as well.  Progressively getting worse.  Not lightheaded now.  Awaiting GI evaluation for bleeding. EF is 30-35% with DELMI 0.7 cm2, mean gradient 45 mmHg. EF has gotten worse since prior which was 45-50% 4/2022.  Patient continues to smoke.  Borderline SBP 99 mmHg on midodrine.  CTS has seen the patient and he is not candidate for SAVR at this time.  ECG with NSR, IVCD, minimal voltage LVH.  K 4.3.  Cr 0.7.  BNP 88577.  Hgb 7.4, Plts 234.  INR 1.4.  No cancers.    RHC & LHC with RODRÍGUEZ obtained 5/6/2025; TRO to ICU for critical care monitoring and SGC guided HD optimization.     Subjective:    5/13  Acute respiratory failure over night  S/p intubation early this am   Pulmonary edema with concern of aspiration pneumonia  Renal function declined over night, UOP only 500cc overnight on bumex gtt.   [+650]  Hemodynamics @ 0502: CO 4.57 CI 2.33 MAP 63 PAP 87/37 CVP 24 - SVO2 69 on high flow 40L 90%   On milrinone 0.375 Bumex 2mg/hr levo 7mcg/min  Bi-V failure Cardiogenic shock SCAI D now   Lactic 2.6  Requiring , Levo and now EPI  Hgb is stable plt are improving - 92 (63 yesterday)    5/12  Stable over night  Hemodynamics @ 0408: CO 5.26 CI 2.68 MAP 77 PAP 74/29 CVP 15 SVO2 - 68% 4L   Milrinone

## 2025-05-13 NOTE — SIGNIFICANT EVENT
Internal medicine resident significant event note:    Patient is 60M with history severe aortic stenosis, HFrEF, recent history of significant lower GI bleeding.  Approximately 5:30 AM, patient had severe emesis.  Emesis tinge of blood, however not arsalan hematemesis.  Patient admitted complaining of left-sided chest pain, similar to what he has had in the past.  Obtain stat hemoglobin, troponin, EKG.  EKG showed high amplitude QRS, in addition to ST segment elevations in leads V2, V3, V4.  Minor ST elevations documented in previous EKGs.  Immediate call made to interventional cardiology for recommendations.  Secondary to emesis, patient required progression of HHFNC.  At max high flow nasal cannula SPO2 88-90%.  Patient was intubated for acute respiratory failure and work of breathing.

## 2025-05-13 NOTE — PROGRESS NOTES
Comprehensive Nutrition Assessment    Type and Reason for Visit:  Reassess, Nutrition support    Nutrition Recommendations/Plan:   On (5/14), if tube feeds able to be started would consider Vital 1.2cal/ml 10ml/hr as appropriate. Will need to hold tube feeds 30 minutes before & after Synthroid dose once daily.    Free water flushes per provider.  When appropriate, estimated goal feeds is Vital 1.2cal/ml 60ml/hr x 23 hrs/day( hold 230 minutes before to 30 minutes after Synthroid dose daily) to yield ~ 1380ml, 1656kcals, 104 grams protein, 153 grams CHO, 7 grams fiber, 1119ml water from tube feeds.  When able to tolerate goal feeds, may want to consider proteinex 2.5 ounce bolus once daily to yield additional 104kcals & 26 grams protein.  Monitor NPO,  feeding plans, labs, & wt      Malnutrition Assessment:  Malnutrition Status:  Moderate malnutrition (05/09/25 1626)    Context:  Acute Illness     Findings of the 6 clinical characteristics of malnutrition:  Energy Intake:  50% or less of estimated energy requirements for 5 or more days (NPO/CLD this admit)  Weight Loss:  Unable to assess (CHF; unable to accurately assess)     Body Fat Loss:  Mild body fat loss Orbital   Muscle Mass Loss:  Mild muscle mass loss Temples (temporalis), Clavicles (pectoralis & deltoids)  Fluid Accumulation:  Mild Generalized, Extremities   Strength:  Not Performed    Nutrition Assessment:      Pt. Declining from a nutritional standpoint  AEB pt  intubated (5/13) & is  NPO.   At risk for further nutrition compromise r/t admit with acute on chronic CHF - hypotensive s/p RODRÍGUEZ 5/6 and RHC 5/9, hemorrhagic shock 2/2 GIB - multiple bloody BM 5/8 s/p EGD 5/5 no active bleeding noted - bx duodenum taken and colonoscopy 5/8 with inability to fully visualize active bleeding - clots noted, shock liver, TC, symptomatic DIONISIO,moderate malnutrition, LOS day 13 , intubated 5/13/25, and underlying medical condition (PMHx: mechanical AVR/resection of  Diagnosis:   Moderate malnutrition, in context of acute illness or injury related to altered GI function, inadequate protein-energy intake as evidenced by criteria as identified in malnutrition assessment    Nutrition Interventions:   Food and/or Nutrient Delivery: Continue NPO  Nutrition Education/Counseling: Education/Counseling not appropriate  Coordination of Nutrition Care: Continue to monitor while inpatient, Interdisciplinary Rounds       Goals:  Goals: Initiate nutrition support, within 2 days  Type of Goal: New goal  Previous Goal Met: No Progress toward Goal(s)    Nutrition Monitoring and Evaluation:      Food/Nutrient Intake Outcomes: Progression of Nutrition, Diet Advancement/Tolerance, Food and Nutrient Intake, Supplement Intake  Physical Signs/Symptoms Outcomes: Biochemical Data, GI Status, Nausea or Vomiting, Hemodynamic Status, Fluid Status or Edema, Nutrition Focused Physical Findings, Skin, Weight    Discharge Planning:    Too soon to determine     Bailey Rogers RD, LD  Contact: [phone]

## 2025-05-13 NOTE — PROGRESS NOTES
Pharmacy Renal Adjustment    Pharmacy consulted to renally adjusted medication(s) per: Dr Jenkins    Recent Labs     05/13/25  0114 05/13/25 0425   BUN 77* 81*   CREATININE 2.0* 2.1*     eGFR:   Recent Labs     05/12/25  1648 05/13/25  0114 05/13/25 0425   LABGLOM 42* 37* 35*     Estimated Creatinine Clearance: 38 mL/min (A) (based on SCr of 2.1 mg/dL (H)).    Assessment:  TC    Plan:   Increase cefepime from 2 g q12h to 2 g q8h  Decrease gabapentin from 300mg TID to 300mg BID    Please call pharmacy with any questions.    Paige Holley, Pharm.D., BCPS, BCCCP 5/13/2025 9:10 AM

## 2025-05-13 NOTE — PROGRESS NOTES
A size 7.5 endotracheal tube was successfully placed using a size 3 blade. The Lot number for he endotracheal tube was [id number]

## 2025-05-13 NOTE — H&P
[facility]  Sedation/Analgesia History & Physical    Pt Name: Alberto Gilliland  Account number: [id number]  MRN: [id number]  YOB: 1965  Provider Performing Procedure: Nikita Rivera MD MD  Referring Provider: Jonny Mckinley MD   Primary Care Physician: Juan Christiansen,   Date: 5/13/2025    PRE-PROCEDURE    Code Status: FULL CODE  Brief History/Pre-Procedure Diagnosis:   CV shock     Consent: : I have discussed with the patient risks, benefits, and alternatives (and relevant risks, benefits, and side effects related to alternatives or not receiving care), and likelihood of the success.   The patient and/or representative appear to understand and agree to proceed.  The discussion encompasses risks, benefits, and side effects related to the alternatives and the risks related to not receiving the proposed care, treatment, and services.     The indication, risks and benefits of the procedure and possible therapeutic consequences and alternatives were discussed with the patient. The patient was given the opportunity to ask questions and to have them answered to his/her satisfaction. Risks of the procedure include but are not limited to the following: Bleeding, hematoma including retroperitoneal hemmorhage, infection, pain and discomfort, injury to the aorta and other blood vessels, rhythm disturbance, low blood pressure, myocardial infarction, stroke, kidney damage/failure, myocardial perforation, allergic reactions to sedatives/contrast material, loss of pulse/vascular compromise requiring surgery, aneurysm/pseudoaneurysm formation, possible loss of a limb/hand/leg, needing blood transfusion, requiring emergent open heart surgery or emergent coronary intervention, the need for intubation/respiratory support, the requirement for defibrillation/cardioversion, and death. Alternatives to and omission of the suggested procedure were discussed. The patient had no further questions and

## 2025-05-13 NOTE — PROGRESS NOTES
Physician Progress Note      PATIENT:               MARICARMEN TURNER  [facility] #:                  [id number]  :                       1965  ADMIT DATE:       2025 12:24 PM  DISCH DATE:  RESPONDING  PROVIDER #:        Ru Martin MD          QUERY TEXT:    Please clarify the patient?s nutritional status:    The clinical indicators include:  Malnutrition Assessment:  Malnutrition Status:  Moderate malnutrition (25 1626)  Context:  Acute Illness  Findings of the 6 clinical characteristics of malnutrition:  Energy Intake:  50% or less of estimated energy requirements for 5 or more   days (NPO/CLD this admit)  Weight Loss:  Unable to assess (CHF; unable to accurately assess)  Body Fat Loss:  Mild body fat loss Orbital  Muscle Mass Loss:  Mild muscle mass loss Temples (temporalis), Clavicles   (pectoralis & deltoids)  Fluid Accumulation:  Mild Generalized, Extremities   Strength:  Not Performed    Nutrition Assessment:  Pt. moderately malnourished AEB criteria as listed above.  At risk for further   nutrition compromise r/t admit with acute on chronic CHF - hypotensive s/p   RODRÍGUEZ  and RHC , hemorrhagic shock  GIB - multiple bloody BM  s/p   EGD  no active bleeding noted - bx duodenum taken and colonoscopy  with   inability to fully visualize active bleeding - clots noted, shock liver, TC,   symptomatic DIONISIO, LOS day 9 and underlying medical condition (PMHx: mechanical   AVR/resection of ascending aorta  - switched to bioprosthetic    warfarin intolerance and GIB, CHF, CAD     ADULT ORAL NUTRITION SUPPLEMENT; Breakfast, Lunch, Dinner; Clear Liquid Oral   Supplement  Options provided:  -- Moderate Protein Calorie Malnutrition confirmed  -- Other - I will add my own diagnosis  -- Disagree - Not applicable / Not valid  -- Disagree - Clinically unable to determine / Unknown  -- Refer to Clinical Documentation Reviewer    PROVIDER RESPONSE TEXT:    Moderate Protein Calorie

## 2025-05-14 ENCOUNTER — APPOINTMENT (OUTPATIENT)
Dept: GENERAL RADIOLOGY | Age: 60
DRG: 003 | End: 2025-05-14
Payer: COMMERCIAL

## 2025-05-14 PROBLEM — Z51.5 PALLIATIVE CARE PATIENT: Status: ACTIVE | Noted: 2025-05-14

## 2025-05-14 PROBLEM — I50.82 BIVENTRICULAR HEART FAILURE (HCC): Status: ACTIVE | Noted: 2025-05-14

## 2025-05-14 LAB
ACINETOBACTER CALCOACETICUS-BAUMANNII BY PCR: NOT DETECTED
ALBUMIN SERPL BCG-MCNC: 3.1 G/DL (ref 3.4–4.9)
ALBUMIN SERPL BCG-MCNC: 3.2 G/DL (ref 3.4–4.9)
ALBUMIN SERPL BCG-MCNC: 3.3 G/DL (ref 3.4–4.9)
ALP SERPL-CCNC: 70 U/L (ref 40–129)
ALP SERPL-CCNC: 71 U/L (ref 40–129)
ALP SERPL-CCNC: 74 U/L (ref 40–129)
ALT SERPL W/O P-5'-P-CCNC: 620 U/L (ref 10–50)
ALT SERPL W/O P-5'-P-CCNC: 627 U/L (ref 10–50)
ALT SERPL W/O P-5'-P-CCNC: 639 U/L (ref 10–50)
ALT SERPL W/O P-5'-P-CCNC: 683 U/L (ref 10–50)
ALT SERPL W/O P-5'-P-CCNC: 691 U/L (ref 10–50)
ANION GAP SERPL CALC-SCNC: 10 MEQ/L (ref 8–16)
ANION GAP SERPL CALC-SCNC: 11 MEQ/L (ref 8–16)
ANION GAP SERPL CALC-SCNC: 11 MEQ/L (ref 8–16)
ANION GAP SERPL CALC-SCNC: 12 MEQ/L (ref 8–16)
ANION GAP SERPL CALC-SCNC: 13 MEQ/L (ref 8–16)
APTT PPP: 49.1 SECONDS (ref 22–38)
APTT PPP: 50.9 SECONDS (ref 22–38)
APTT PPP: 54.7 SECONDS (ref 22–38)
ARTERIAL PATENCY WRIST A: ABNORMAL
AST SERPL-CCNC: 1098 U/L (ref 10–50)
AST SERPL-CCNC: 1128 U/L (ref 10–50)
AST SERPL-CCNC: 794 U/L (ref 10–50)
AST SERPL-CCNC: 875 U/L (ref 10–50)
AST SERPL-CCNC: 962 U/L (ref 10–50)
BACTERIA SPEC RESP CULT: NORMAL
BASE EXCESS BLDA CALC-SCNC: 0.2 MMOL/L (ref -2.5–2.5)
BASE EXCESS BLDA CALC-SCNC: 2 MMOL/L (ref -2–3)
BASE EXCESS BLDA CALC-SCNC: 3.8 MMOL/L (ref -2.5–2.5)
BASE EXCESS BLDA CALC-SCNC: 4 MMOL/L (ref -2.5–2.5)
BASE EXCESS BLDA CALC-SCNC: 4 MMOL/L (ref -2–3)
BASE EXCESS BLDA CALC-SCNC: 4.2 MMOL/L (ref -2.5–2.5)
BASE EXCESS BLDA CALC-SCNC: 4.2 MMOL/L (ref -2–3)
BASE EXCESS BLDA CALC-SCNC: 4.5 MMOL/L (ref -2.5–2.5)
BASE EXCESS BLDA CALC-SCNC: 4.6 MMOL/L (ref -2.5–2.5)
BASE EXCESS BLDA CALC-SCNC: 4.6 MMOL/L (ref -2.5–2.5)
BASE EXCESS BLDA CALC-SCNC: 4.7 MMOL/L (ref -2–3)
BASE EXCESS BLDA CALC-SCNC: 4.9 MMOL/L (ref -2.5–2.5)
BASE EXCESS BLDA CALC-SCNC: 5.5 MMOL/L (ref -2.5–2.5)
BASE EXCESS BLDA CALC-SCNC: 5.6 MMOL/L (ref -2.5–2.5)
BASE EXCESS BLDA CALC-SCNC: 6.4 MMOL/L (ref -2–3)
BASE EXCESS BLDA CALC-SCNC: 6.8 MMOL/L (ref -2–3)
BDY SITE: ABNORMAL
BILIRUB SERPL-MCNC: 4.7 MG/DL (ref 0.3–1.2)
BILIRUB SERPL-MCNC: 5.2 MG/DL (ref 0.3–1.2)
BILIRUB SERPL-MCNC: 5.8 MG/DL (ref 0.3–1.2)
BILIRUB SERPL-MCNC: 5.8 MG/DL (ref 0.3–1.2)
BILIRUB SERPL-MCNC: 6.1 MG/DL (ref 0.3–1.2)
BLACTX-M ISLT/SPM QL: NORMAL
BLAIMP ISLT/SPM QL: NORMAL
BLAKPC ISLT/SPM QL: NORMAL
BLAOXA-48-LIKE ISLT/SPM QL: NORMAL
BLAVIM ISLT/SPM QL: NORMAL
BREATHS SETTING VENT: 12 BPM
BUN SERPL-MCNC: 45 MG/DL (ref 8–23)
BUN SERPL-MCNC: 47 MG/DL (ref 8–23)
BUN SERPL-MCNC: 50 MG/DL (ref 8–23)
BUN SERPL-MCNC: 51 MG/DL (ref 8–23)
BUN SERPL-MCNC: 56 MG/DL (ref 8–23)
C PNEUM DNA LOWER RESP QL NAA+NON-PROBE: NOT DETECTED
CA-I BLD ISE-SCNC: 1.09 MMOL/L (ref 1.12–1.32)
CA-I BLD ISE-SCNC: 1.17 MMOL/L (ref 1.12–1.32)
CA-I BLD ISE-SCNC: 1.18 MMOL/L (ref 1.12–1.32)
CA-I BLD ISE-SCNC: 1.2 MMOL/L (ref 1.12–1.32)
CA-I BLD ISE-SCNC: 1.21 MMOL/L (ref 1.12–1.32)
CALCIUM SERPL-MCNC: 8.1 MG/DL (ref 8.8–10.2)
CALCIUM SERPL-MCNC: 8.2 MG/DL (ref 8.8–10.2)
CALCIUM SERPL-MCNC: 8.5 MG/DL (ref 8.8–10.2)
CALCIUM SERPL-MCNC: 8.5 MG/DL (ref 8.8–10.2)
CALCIUM SERPL-MCNC: 8.6 MG/DL (ref 8.8–10.2)
CFT BLD TEG: 1.8 MINUTES (ref 0.8–2.1)
CFT BLD TEG: 2.5 MINUTES (ref 0.8–2.1)
CHLORIDE SERPL-SCNC: 102 MEQ/L (ref 98–111)
CHLORIDE SERPL-SCNC: 104 MEQ/L (ref 98–111)
CHLORIDE SERPL-SCNC: 104 MEQ/L (ref 98–111)
CHLORIDE SERPL-SCNC: 105 MEQ/L (ref 98–111)
CHLORIDE SERPL-SCNC: 105 MEQ/L (ref 98–111)
CLOT ANGLE BLD TEG: 17.4 MM (ref 15–32)
CLOT ANGLE BLD TEG: 17.6 MM (ref 15–32)
CLOT ANGLE BLD TEG: 61.3 DEG (ref 63–78)
CLOT ANGLE BLD TEG: 64.3 DEG (ref 63–78)
CLOT INIT BLD TEG: 12.2 MINUTES (ref 4.6–9.1)
CLOT INIT BLD TEG: 9.9 MINUTES (ref 4.6–9.1)
CLOT INIT P HPASE BLD TEG: 9.2 MINUTES (ref 4.3–8.3)
CLOT INIT P HPASE BLD TEG: 9.8 MINUTES (ref 4.3–8.3)
CO2 SERPL-SCNC: 24 MEQ/L (ref 22–29)
CO2 SERPL-SCNC: 25 MEQ/L (ref 22–29)
CO2 SERPL-SCNC: 26 MEQ/L (ref 22–29)
COLLECTED BY:: ABNORMAL
CREAT SERPL-MCNC: 1.2 MG/DL (ref 0.7–1.2)
CREAT SERPL-MCNC: 1.4 MG/DL (ref 0.7–1.2)
CREAT SERPL-MCNC: 1.5 MG/DL (ref 0.7–1.2)
CREAT SERPL-MCNC: 1.6 MG/DL (ref 0.7–1.2)
CREAT SERPL-MCNC: 1.7 MG/DL (ref 0.7–1.2)
DEPRECATED RDW RBC AUTO: 53.4 FL (ref 35–45)
DEPRECATED RDW RBC AUTO: 54.1 FL (ref 35–45)
DEPRECATED RDW RBC AUTO: 55.4 FL (ref 35–45)
DEPRECATED RDW RBC AUTO: 55.7 FL (ref 35–45)
DEPRECATED RDW RBC AUTO: 56.9 FL (ref 35–45)
DEVICE: ABNORMAL
ENTEROBACTER CLOACAE COMPLEX BY PCR: NOT DETECTED
ERYTHROCYTE [DISTWIDTH] IN BLOOD BY AUTOMATED COUNT: 17.3 % (ref 11.5–14.5)
ERYTHROCYTE [DISTWIDTH] IN BLOOD BY AUTOMATED COUNT: 17.5 % (ref 11.5–14.5)
ERYTHROCYTE [DISTWIDTH] IN BLOOD BY AUTOMATED COUNT: 17.7 % (ref 11.5–14.5)
ERYTHROCYTE [DISTWIDTH] IN BLOOD BY AUTOMATED COUNT: 17.8 % (ref 11.5–14.5)
ERYTHROCYTE [DISTWIDTH] IN BLOOD BY AUTOMATED COUNT: 17.8 % (ref 11.5–14.5)
ESCHERICHIA COLI BY PCR: NOT DETECTED
FIBRINOGEN PPP-MCNC: 285 MG/100ML (ref 155–475)
FIBRINOGEN PPP-MCNC: 288 MG/100ML (ref 155–475)
FIBRINOGEN PPP-MCNC: 292 MG/100ML (ref 155–475)
FIO2 ON VENT O2 ANALYZER: 40 %
FIO2 ON VENT O2 ANALYZER: 50 %
FIO2 ON VENT O2 ANALYZER: 50 %
FIO2 ON VENT O2 ANALYZER: 60 %
FIO2 ON VENT O2 ANALYZER: 60 %
FIO2 ON VENT O2 ANALYZER: 80 %
FIO2 ON VENT O2 ANALYZER: 80 %
FLUAV RNA LOWER RESP QL NAA+NON-PROBE: NOT DETECTED
FLUBV RNA LOWER RESP QL NAA+NON-PROBE: NOT DETECTED
FUNCT FIBRINOGEN LEVEL: 317.5 MG/DL (ref 278–581)
FUNCT FIBRINOGEN LEVEL: 321.2 MG/DL (ref 278–581)
GFR SERPL CREATININE-BSD FRML MDRD: 45 ML/MIN/1.73M2
GFR SERPL CREATININE-BSD FRML MDRD: 48 ML/MIN/1.73M2
GFR SERPL CREATININE-BSD FRML MDRD: 52 ML/MIN/1.73M2
GFR SERPL CREATININE-BSD FRML MDRD: 57 ML/MIN/1.73M2
GFR SERPL CREATININE-BSD FRML MDRD: 68 ML/MIN/1.73M2
GLUCOSE BLD-MCNC: 110 MG/DL (ref 70–108)
GLUCOSE BLD-MCNC: 117 MG/DL (ref 70–108)
GLUCOSE BLD-MCNC: 119 MG/DL (ref 70–108)
GLUCOSE BLD-MCNC: 119 MG/DL (ref 70–108)
GLUCOSE BLD-MCNC: 125 MG/DL (ref 70–108)
GLUCOSE BLD-MCNC: 127 MG/DL (ref 70–108)
GLUCOSE BLD-MCNC: 128 MG/DL (ref 70–108)
GLUCOSE BLD-MCNC: 128 MG/DL (ref 70–108)
GLUCOSE BLD-MCNC: 133 MG/DL (ref 70–108)
GLUCOSE BLD-MCNC: 138 MG/DL (ref 70–108)
GLUCOSE BLD-MCNC: 152 MG/DL (ref 70–108)
GLUCOSE BLD-MCNC: 157 MG/DL (ref 70–108)
GLUCOSE SERPL-MCNC: 123 MG/DL (ref 74–109)
GLUCOSE SERPL-MCNC: 125 MG/DL (ref 74–109)
GLUCOSE SERPL-MCNC: 126 MG/DL (ref 74–109)
GLUCOSE SERPL-MCNC: 142 MG/DL (ref 74–109)
GLUCOSE SERPL-MCNC: 167 MG/DL (ref 74–109)
GRAM STN SPEC: NORMAL
HADV DNA LOWER RESP QL NAA+NON-PROBE: NOT DETECTED
HAEMOPHILUS INFLUENZAE BY PCR: NOT DETECTED
HCO3 BLDA-SCNC: 27 MMOL/L (ref 23–28)
HCO3 BLDA-SCNC: 28 MMOL/L (ref 23–28)
HCO3 BLDA-SCNC: 29 MMOL/L (ref 23–28)
HCO3 BLDA-SCNC: 30 MMOL/L (ref 23–28)
HCO3 BLDA-SCNC: 31 MMOL/L (ref 23–28)
HCO3 BLDA-SCNC: 31 MMOL/L (ref 23–28)
HCOV RNA LOWER RESP QL NAA+NON-PROBE: NOT DETECTED
HCT VFR BLD AUTO: 25.1 % (ref 42–52)
HCT VFR BLD AUTO: 25.4 % (ref 42–52)
HCT VFR BLD AUTO: 25.8 % (ref 42–52)
HCT VFR BLD AUTO: 26.3 % (ref 42–52)
HCT VFR BLD AUTO: 27.4 % (ref 42–52)
HGB BLD-MCNC: 8.1 GM/DL (ref 14–18)
HGB BLD-MCNC: 8.2 GM/DL (ref 14–18)
HGB BLD-MCNC: 8.4 GM/DL (ref 14–18)
HGB BLD-MCNC: 8.7 GM/DL (ref 14–18)
HGB BLD-MCNC: 9.2 GM/DL (ref 14–18)
HMPV RNA LOWER RESP QL NAA+NON-PROBE: NOT DETECTED
HPIV RNA LOWER RESP QL NAA+NON-PROBE: NOT DETECTED
INR PPP: 1.87 (ref 0.85–1.13)
KCT BLD-ACNC: 164 SECONDS (ref 1–150)
KLEBSIELLA AEROGENES BY PCR: NOT DETECTED
KLEBSIELLA OXYTOCA BY PCR: NOT DETECTED
KLEBSIELLA PNEUMONIAE GROUP BY PCR: NOT DETECTED
L PNEUMO DNA LOWER RESP QL NAA+NON-PROBE: NOT DETECTED
LACTATE SERPL-SCNC: 0.7 MMOL/L (ref 0.5–2)
LACTATE SERPL-SCNC: 0.7 MMOL/L (ref 0.5–2)
LACTATE SERPL-SCNC: 1 MMOL/L (ref 0.5–2)
LACTATE SERPL-SCNC: 1.1 MMOL/L (ref 0.5–2)
LACTATE SERPL-SCNC: 1.4 MMOL/L (ref 0.5–2)
LACTATE SERPL-SCNC: 1.5 MMOL/L (ref 0.5–2)
LACTATE SERPL-SCNC: 2.1 MMOL/L (ref 0.5–2)
M PNEUMO DNA LOWER RESP QL NAA+NON-PROBE: NOT DETECTED
MAGNESIUM SERPL-MCNC: 2.2 MG/DL (ref 1.6–2.6)
MAGNESIUM SERPL-MCNC: 2.2 MG/DL (ref 1.6–2.6)
MAGNESIUM SERPL-MCNC: 2.3 MG/DL (ref 1.6–2.6)
MAGNESIUM SERPL-MCNC: 2.3 MG/DL (ref 1.6–2.6)
MAGNESIUM SERPL-MCNC: 2.4 MG/DL (ref 1.6–2.6)
MCF BLD TEG: 53.1 MM (ref 52–69)
MCF BLD TEG: 55.4 MM (ref 52–69)
MCF.EXTRINSIC BLD ROTEM: 58.1 MM (ref 52–70)
MCF.EXTRINSIC BLD ROTEM: 58.5 MM (ref 52–70)
MCH RBC QN AUTO: 29 PG (ref 26–33)
MCH RBC QN AUTO: 29.5 PG (ref 26–33)
MCH RBC QN AUTO: 29.8 PG (ref 26–33)
MCH RBC QN AUTO: 30.1 PG (ref 26–33)
MCH RBC QN AUTO: 30.3 PG (ref 26–33)
MCHC RBC AUTO-ENTMCNC: 31.8 GM/DL (ref 32.2–35.5)
MCHC RBC AUTO-ENTMCNC: 32.3 GM/DL (ref 32.2–35.5)
MCHC RBC AUTO-ENTMCNC: 33.1 GM/DL (ref 32.2–35.5)
MCHC RBC AUTO-ENTMCNC: 33.1 GM/DL (ref 32.2–35.5)
MCHC RBC AUTO-ENTMCNC: 33.6 GM/DL (ref 32.2–35.5)
MCV RBC AUTO: 89.5 FL (ref 80–94)
MCV RBC AUTO: 90.1 FL (ref 80–94)
MCV RBC AUTO: 91.2 FL (ref 80–94)
MCV RBC AUTO: 91.3 FL (ref 80–94)
MCV RBC AUTO: 91.7 FL (ref 80–94)
MORAXELLA CATARRHALIS BY PCR: NOT DETECTED
PCO2 BLDA: 33 MMHG (ref 35–45)
PCO2 BLDA: 33 MMHG (ref 35–45)
PCO2 BLDA: 35 MMHG (ref 35–45)
PCO2 BLDA: 38 MMHG (ref 35–45)
PCO2 BLDA: 41 MMHG (ref 35–45)
PCO2 BLDA: 42 MMHG (ref 35–45)
PCO2 BLDA: 44 MMHG (ref 35–45)
PCO2 BLDA: 46 MMHG (ref 35–45)
PCO2 BLDA: 48 MMHG (ref 35–45)
PCO2 BLDA: 58 MMHG (ref 35–45)
PCO2 TEMP ADJ BLDMV: 34 MMHG (ref 41–51)
PCO2 TEMP ADJ BLDMV: 40 MMHG (ref 41–51)
PCO2 TEMP ADJ BLDMV: 43 MMHG (ref 41–51)
PCO2 TEMP ADJ BLDMV: 44 MMHG (ref 41–51)
PCO2 TEMP ADJ BLDMV: 53 MMHG (ref 41–51)
PCO2 TEMP ADJ BLDMV: 61 MMHG (ref 41–51)
PEEP SETTING VENT: 15 MMHG
PH BLDA: 7.3 [PH] (ref 7.35–7.45)
PH BLDA: 7.39 [PH] (ref 7.35–7.45)
PH BLDA: 7.42 [PH] (ref 7.35–7.45)
PH BLDA: 7.43 [PH] (ref 7.35–7.45)
PH BLDA: 7.46 [PH] (ref 7.35–7.45)
PH BLDA: 7.46 [PH] (ref 7.35–7.45)
PH BLDA: 7.48 [PH] (ref 7.35–7.45)
PH BLDA: 7.51 [PH] (ref 7.35–7.45)
PH BLDA: 7.52 [PH] (ref 7.35–7.45)
PH BLDA: 7.54 [PH] (ref 7.35–7.45)
PH BLDMV: 7.29 [PH] (ref 7.31–7.41)
PH BLDMV: 7.36 [PH] (ref 7.31–7.41)
PH BLDMV: 7.43 [PH] (ref 7.31–7.41)
PH BLDMV: 7.46 [PH] (ref 7.31–7.41)
PH BLDMV: 7.49 [PH] (ref 7.31–7.41)
PH BLDMV: 7.52 [PH] (ref 7.31–7.41)
PHOSPHATE SERPL-MCNC: 2.9 MG/DL (ref 2.5–4.5)
PHOSPHATE SERPL-MCNC: 3 MG/DL (ref 2.5–4.5)
PHOSPHATE SERPL-MCNC: 3.5 MG/DL (ref 2.5–4.5)
PHOSPHATE SERPL-MCNC: 3.5 MG/DL (ref 2.5–4.5)
PHOSPHATE SERPL-MCNC: 3.6 MG/DL (ref 2.5–4.5)
PIP: 30 CMH2O
PLATELET # BLD AUTO: 108 THOU/MM3 (ref 130–400)
PLATELET # BLD AUTO: 78 THOU/MM3 (ref 130–400)
PLATELET # BLD AUTO: 80 THOU/MM3 (ref 130–400)
PLATELET # BLD AUTO: 81 THOU/MM3 (ref 130–400)
PLATELET # BLD AUTO: 89 THOU/MM3 (ref 130–400)
PMV BLD AUTO: 11.2 FL (ref 9.4–12.4)
PMV BLD AUTO: 11.4 FL (ref 9.4–12.4)
PMV BLD AUTO: 12.4 FL (ref 9.4–12.4)
PMV BLD AUTO: 12.5 FL (ref 9.4–12.4)
PMV BLD AUTO: 12.8 FL (ref 9.4–12.4)
PO2 BLDA: 118 MMHG (ref 71–104)
PO2 BLDA: 159 MMHG (ref 71–104)
PO2 BLDA: 179 MMHG (ref 71–104)
PO2 BLDA: 287 MMHG (ref 71–104)
PO2 BLDA: 301 MMHG (ref 71–104)
PO2 BLDA: 306 MMHG (ref 71–104)
PO2 BLDA: 381 MMHG (ref 71–104)
PO2 BLDA: 395 MMHG (ref 71–104)
PO2 BLDA: 447 MMHG (ref 71–104)
PO2 BLDA: 540 MMHG (ref 71–104)
PO2 BLDMV: 363 MMHG (ref 25–40)
PO2 BLDMV: 44 MMHG (ref 25–40)
PO2 BLDMV: 46 MMHG (ref 25–40)
PO2 BLDMV: 49 MMHG (ref 25–40)
PO2 BLDMV: 52 MMHG (ref 25–40)
PO2 BLDMV: 53 MMHG (ref 25–40)
POTASSIUM SERPL-SCNC: 3.3 MEQ/L (ref 3.5–5.2)
POTASSIUM SERPL-SCNC: 3.5 MEQ/L (ref 3.5–5.2)
POTASSIUM SERPL-SCNC: 3.6 MEQ/L (ref 3.5–5.2)
POTASSIUM SERPL-SCNC: 3.6 MEQ/L (ref 3.5–5.2)
POTASSIUM SERPL-SCNC: 3.7 MEQ/L (ref 3.5–5.2)
PRESSURE SUPPORT SETTING VENT: 15 CMH2O
PROT SERPL-MCNC: 4.8 G/DL (ref 6.4–8.3)
PROT SERPL-MCNC: 4.9 G/DL (ref 6.4–8.3)
PROT SERPL-MCNC: 5 G/DL (ref 6.4–8.3)
PROTEUS SPECIES BY PCR: NOT DETECTED
PSEUDOMONAS AERUGINOSA BY PCR: NOT DETECTED
RBC # BLD AUTO: 2.75 MILL/MM3 (ref 4.7–6.1)
RBC # BLD AUTO: 2.77 MILL/MM3 (ref 4.7–6.1)
RBC # BLD AUTO: 2.83 MILL/MM3 (ref 4.7–6.1)
RBC # BLD AUTO: 2.92 MILL/MM3 (ref 4.7–6.1)
RBC # BLD AUTO: 3.06 MILL/MM3 (ref 4.7–6.1)
RESISTANT GENE MECA/C & MREJ BY PCR: NORMAL
RESISTANT GENE NDM BY PCR: NORMAL
RSV RNA LOWER RESP QL NAA+NON-PROBE: NOT DETECTED
RV+EV RNA LOWER RESP QL NAA+NON-PROBE: NOT DETECTED
SAO2 % BLDA: 100 %
SAO2 % BLDA: 98 %
SAO2 % BLDMV: 100 %
SAO2 % BLDMV: 79 %
SAO2 % BLDMV: 81 %
SAO2 % BLDMV: 85 %
SAO2 % BLDMV: 86 %
SAO2 % BLDMV: 88 %
SERRATIA MARCESCENS BY PCR: NOT DETECTED
SET PEEP: 15 MMHG
SET PRESS SUPP: 15 CMH2O
SET RESPIRATORY RATE: 12 BPM
SITE: ABNORMAL
SODIUM SERPL-SCNC: 139 MEQ/L (ref 135–145)
SODIUM SERPL-SCNC: 140 MEQ/L (ref 135–145)
SODIUM SERPL-SCNC: 141 MEQ/L (ref 135–145)
SOURCE: NORMAL
SPECIMEN ACCEPTABILITY: NORMAL
STAPH AUREUS BY PCR: NOT DETECTED
STREP AGALACTIAE BY PCR: NOT DETECTED
STREP PNEUMONIAE BY PCR: NOT DETECTED
STREP PYOGENES BY PCR: NOT DETECTED
VENTILATION MODE VENT: ABNORMAL
VENTILATION MODE VENT: AC
WBC # BLD AUTO: 16.3 THOU/MM3 (ref 4.8–10.8)
WBC # BLD AUTO: 16.3 THOU/MM3 (ref 4.8–10.8)
WBC # BLD AUTO: 16.7 THOU/MM3 (ref 4.8–10.8)
WBC # BLD AUTO: 17.6 THOU/MM3 (ref 4.8–10.8)
WBC # BLD AUTO: 20.7 THOU/MM3 (ref 4.8–10.8)

## 2025-05-14 PROCEDURE — 2500000003 HC RX 250 WO HCPCS: Performed by: NURSE PRACTITIONER

## 2025-05-14 PROCEDURE — 85384 FIBRINOGEN ACTIVITY: CPT

## 2025-05-14 PROCEDURE — 85576 BLOOD PLATELET AGGREGATION: CPT

## 2025-05-14 PROCEDURE — 2580000003 HC RX 258: Performed by: INTERNAL MEDICINE

## 2025-05-14 PROCEDURE — 6370000000 HC RX 637 (ALT 250 FOR IP): Performed by: INTERNAL MEDICINE

## 2025-05-14 PROCEDURE — 87106 FUNGI IDENTIFICATION YEAST: CPT

## 2025-05-14 PROCEDURE — 99292 CRITICAL CARE ADDL 30 MIN: CPT | Performed by: INTERNAL MEDICINE

## 2025-05-14 PROCEDURE — 2500000003 HC RX 250 WO HCPCS: Performed by: INTERNAL MEDICINE

## 2025-05-14 PROCEDURE — 99221 1ST HOSP IP/OBS SF/LOW 40: CPT | Performed by: STUDENT IN AN ORGANIZED HEALTH CARE EDUCATION/TRAINING PROGRAM

## 2025-05-14 PROCEDURE — 80053 COMPREHEN METABOLIC PANEL: CPT

## 2025-05-14 PROCEDURE — 99233 SBSQ HOSP IP/OBS HIGH 50: CPT | Performed by: INTERNAL MEDICINE

## 2025-05-14 PROCEDURE — 87486 CHLMYD PNEUM DNA AMP PROBE: CPT

## 2025-05-14 PROCEDURE — 94003 VENT MGMT INPAT SUBQ DAY: CPT

## 2025-05-14 PROCEDURE — 89220 SPUTUM SPECIMEN COLLECTION: CPT

## 2025-05-14 PROCEDURE — 71045 X-RAY EXAM CHEST 1 VIEW: CPT

## 2025-05-14 PROCEDURE — 82330 ASSAY OF CALCIUM: CPT

## 2025-05-14 PROCEDURE — 85610 PROTHROMBIN TIME: CPT

## 2025-05-14 PROCEDURE — 87070 CULTURE OTHR SPECIMN AEROBIC: CPT

## 2025-05-14 PROCEDURE — 33949 ECMO/ECLS DAILY MGMT ARTERY: CPT | Performed by: INTERNAL MEDICINE

## 2025-05-14 PROCEDURE — 82947 ASSAY GLUCOSE BLOOD QUANT: CPT

## 2025-05-14 PROCEDURE — 36415 COLL VENOUS BLD VENIPUNCTURE: CPT

## 2025-05-14 PROCEDURE — 36592 COLLECT BLOOD FROM PICC: CPT

## 2025-05-14 PROCEDURE — 99233 SBSQ HOSP IP/OBS HIGH 50: CPT | Performed by: STUDENT IN AN ORGANIZED HEALTH CARE EDUCATION/TRAINING PROGRAM

## 2025-05-14 PROCEDURE — 2000000000 HC ICU R&B

## 2025-05-14 PROCEDURE — 6360000002 HC RX W HCPCS

## 2025-05-14 PROCEDURE — 83735 ASSAY OF MAGNESIUM: CPT

## 2025-05-14 PROCEDURE — 6370000000 HC RX 637 (ALT 250 FOR IP): Performed by: STUDENT IN AN ORGANIZED HEALTH CARE EDUCATION/TRAINING PROGRAM

## 2025-05-14 PROCEDURE — 2700000000 HC OXYGEN THERAPY PER DAY

## 2025-05-14 PROCEDURE — 85730 THROMBOPLASTIN TIME PARTIAL: CPT

## 2025-05-14 PROCEDURE — 94640 AIRWAY INHALATION TREATMENT: CPT

## 2025-05-14 PROCEDURE — 6360000002 HC RX W HCPCS: Performed by: STUDENT IN AN ORGANIZED HEALTH CARE EDUCATION/TRAINING PROGRAM

## 2025-05-14 PROCEDURE — P9041 ALBUMIN (HUMAN),5%, 50ML: HCPCS | Performed by: INTERNAL MEDICINE

## 2025-05-14 PROCEDURE — 94761 N-INVAS EAR/PLS OXIMETRY MLT: CPT

## 2025-05-14 PROCEDURE — 87541 LEGION PNEUMO DNA AMP PROB: CPT

## 2025-05-14 PROCEDURE — 6360000002 HC RX W HCPCS: Performed by: INTERNAL MEDICINE

## 2025-05-14 PROCEDURE — 82803 BLOOD GASES ANY COMBINATION: CPT

## 2025-05-14 PROCEDURE — 85347 COAGULATION TIME ACTIVATED: CPT

## 2025-05-14 PROCEDURE — 33949 ECMO/ECLS DAILY MGMT ARTERY: CPT

## 2025-05-14 PROCEDURE — 87205 SMEAR GRAM STAIN: CPT

## 2025-05-14 PROCEDURE — 6370000000 HC RX 637 (ALT 250 FOR IP): Performed by: NURSE PRACTITIONER

## 2025-05-14 PROCEDURE — 83605 ASSAY OF LACTIC ACID: CPT

## 2025-05-14 PROCEDURE — 87631 RESP VIRUS 3-5 TARGETS: CPT

## 2025-05-14 PROCEDURE — 87798 DETECT AGENT NOS DNA AMP: CPT

## 2025-05-14 PROCEDURE — 87581 M.PNEUMON DNA AMP PROBE: CPT

## 2025-05-14 PROCEDURE — 99291 CRITICAL CARE FIRST HOUR: CPT | Performed by: INTERNAL MEDICINE

## 2025-05-14 PROCEDURE — 37799 UNLISTED PX VASCULAR SURGERY: CPT

## 2025-05-14 PROCEDURE — 84100 ASSAY OF PHOSPHORUS: CPT

## 2025-05-14 PROCEDURE — 85027 COMPLETE CBC AUTOMATED: CPT

## 2025-05-14 PROCEDURE — 6370000000 HC RX 637 (ALT 250 FOR IP)

## 2025-05-14 RX ORDER — FENTANYL CITRATE-0.9 % NACL/PF 10 MCG/ML
25-200 PLASTIC BAG, INJECTION (ML) INTRAVENOUS CONTINUOUS
Status: DISCONTINUED | OUTPATIENT
Start: 2025-05-14 | End: 2025-05-19

## 2025-05-14 RX ORDER — CHLORHEXIDINE GLUCONATE ORAL RINSE 1.2 MG/ML
15 SOLUTION DENTAL 2 TIMES DAILY
Status: DISCONTINUED | OUTPATIENT
Start: 2025-05-14 | End: 2025-05-19

## 2025-05-14 RX ADMIN — SODIUM CHLORIDE, PRESERVATIVE FREE 10 ML: 5 INJECTION INTRAVENOUS at 21:13

## 2025-05-14 RX ADMIN — Medication: at 03:06

## 2025-05-14 RX ADMIN — Medication 400 MG: at 09:44

## 2025-05-14 RX ADMIN — ASPIRIN 81 MG: 81 TABLET, COATED ORAL at 09:43

## 2025-05-14 RX ADMIN — CHLORHEXIDINE GLUCONATE 15 ML: 1.2 RINSE ORAL at 11:34

## 2025-05-14 RX ADMIN — ESCITALOPRAM OXALATE 10 MG: 10 TABLET ORAL at 09:44

## 2025-05-14 RX ADMIN — Medication 5 MCG/MIN: at 16:29

## 2025-05-14 RX ADMIN — CHLORHEXIDINE GLUCONATE 15 ML: 1.2 RINSE ORAL at 21:08

## 2025-05-14 RX ADMIN — CEFEPIME 2000 MG: 2 INJECTION, POWDER, FOR SOLUTION INTRAVENOUS at 13:14

## 2025-05-14 RX ADMIN — GABAPENTIN 300 MG: 300 CAPSULE ORAL at 09:44

## 2025-05-14 RX ADMIN — CEFEPIME 2000 MG: 2 INJECTION, POWDER, FOR SOLUTION INTRAVENOUS at 05:08

## 2025-05-14 RX ADMIN — ALBUMIN (HUMAN) 25 G: 12.5 INJECTION, SOLUTION INTRAVENOUS at 04:22

## 2025-05-14 RX ADMIN — Medication 1000 UNITS: at 10:02

## 2025-05-14 RX ADMIN — Medication 125 MCG/HR: at 13:37

## 2025-05-14 RX ADMIN — Medication 8 MCG/MIN: at 00:48

## 2025-05-14 RX ADMIN — GABAPENTIN 300 MG: 300 CAPSULE ORAL at 21:08

## 2025-05-14 RX ADMIN — SUCRALFATE 1 G: 1 TABLET ORAL at 06:11

## 2025-05-14 RX ADMIN — SUCRALFATE 1 G: 1 TABLET ORAL at 11:21

## 2025-05-14 RX ADMIN — SODIUM CHLORIDE, PRESERVATIVE FREE 10 ML: 5 INJECTION INTRAVENOUS at 09:45

## 2025-05-14 RX ADMIN — SENNOSIDES 17.2 MG: 8.6 TABLET, FILM COATED ORAL at 09:43

## 2025-05-14 RX ADMIN — Medication: at 03:09

## 2025-05-14 RX ADMIN — NITROGLYCERIN 1 INCH: 20 OINTMENT TOPICAL at 16:30

## 2025-05-14 RX ADMIN — POTASSIUM CHLORIDE 20 MEQ: 29.8 INJECTION INTRAVENOUS at 18:45

## 2025-05-14 RX ADMIN — NITROGLYCERIN 1 INCH: 20 OINTMENT TOPICAL at 11:34

## 2025-05-14 RX ADMIN — POTASSIUM CHLORIDE 20 MEQ: 29.8 INJECTION INTRAVENOUS at 06:04

## 2025-05-14 RX ADMIN — FOLIC ACID 1000 MCG: 1 TABLET ORAL at 09:44

## 2025-05-14 RX ADMIN — OXYCODONE HYDROCHLORIDE AND ACETAMINOPHEN 500 MG: 500 TABLET ORAL at 09:44

## 2025-05-14 RX ADMIN — CEFEPIME 2000 MG: 2 INJECTION, POWDER, FOR SOLUTION INTRAVENOUS at 20:39

## 2025-05-14 RX ADMIN — LEVOTHYROXINE SODIUM 75 MCG: 0.07 TABLET ORAL at 06:10

## 2025-05-14 RX ADMIN — CHLOROTHIAZIDE SODIUM 250 MG: 500 INJECTION, POWDER, LYOPHILIZED, FOR SOLUTION INTRAVENOUS at 20:59

## 2025-05-14 RX ADMIN — NITROGLYCERIN 1 INCH: 20 OINTMENT TOPICAL at 00:15

## 2025-05-14 RX ADMIN — TIOTROPIUM BROMIDE AND OLODATEROL 2 PUFF: 3.124; 2.736 SPRAY, METERED RESPIRATORY (INHALATION) at 07:30

## 2025-05-14 RX ADMIN — PANTOPRAZOLE SODIUM 8 MG/HR: 40 INJECTION, POWDER, LYOPHILIZED, FOR SOLUTION INTRAVENOUS at 15:21

## 2025-05-14 RX ADMIN — POTASSIUM CHLORIDE 20 MEQ: 29.8 INJECTION INTRAVENOUS at 06:36

## 2025-05-14 RX ADMIN — SENNOSIDES 17.2 MG: 8.6 TABLET, FILM COATED ORAL at 21:08

## 2025-05-14 RX ADMIN — NITROGLYCERIN 1 INCH: 20 OINTMENT TOPICAL at 06:09

## 2025-05-14 RX ADMIN — Medication 125 MCG/HR: at 05:12

## 2025-05-14 RX ADMIN — CALCIUM GLUCONATE 1000 MG: 20 INJECTION, SOLUTION INTRAVENOUS at 05:03

## 2025-05-14 RX ADMIN — Medication 125 MCG/HR: at 23:37

## 2025-05-14 RX ADMIN — PANTOPRAZOLE SODIUM 8 MG/HR: 40 INJECTION, POWDER, LYOPHILIZED, FOR SOLUTION INTRAVENOUS at 03:18

## 2025-05-14 RX ADMIN — BUMETANIDE 1 MG/HR: 0.25 INJECTION INTRAMUSCULAR; INTRAVENOUS at 10:36

## 2025-05-14 RX ADMIN — POTASSIUM CHLORIDE 20 MEQ: 29.8 INJECTION INTRAVENOUS at 17:19

## 2025-05-14 RX ADMIN — Medication: at 03:18

## 2025-05-14 ASSESSMENT — PULMONARY FUNCTION TESTS
PIF_VALUE: 29

## 2025-05-14 ASSESSMENT — PAIN SCALES - GENERAL: PAINLEVEL_OUTOF10: 1

## 2025-05-14 NOTE — PROGRESS NOTES
Sedation paused.  TV low, less than 20 at times.  TV increase to 700 with some breaths.  RR 8-10 bpm.  Had increased the versed gtt. Earlier to satisfy the new RASS goals -2 to -3.  End tidal C02 also higher at 55-60.  ETT suctioned x2.  1837-  Suctioned ETT, moderate amount of thick yellow sputum.  1843-  Awake following commands. Sedation restarted

## 2025-05-14 NOTE — PROGRESS NOTES
[facility]  OCCUPATIONAL THERAPY MISSED TREATMENT NOTE  [facility] ICU 4D  4D-03/003-A      Date: 2025  Patient Name: Alberto Gilliland        CSN: [id number]   : 1965  (60 y.o.)  Gender: male                REASON FOR MISSED TREATMENT:    Pt on ECMO, not medically stable for OT at this time. Will defer OT orders & await new orders when, Pt becomes more medically stable.

## 2025-05-14 NOTE — PROGRESS NOTES
Cardiology Progress Note      Patient:  Alberto Gilliland  YOB: 1965  MRN: [id number]   Acct: [id number]  Admit Date:  4/30/2025  Primary Cardiologist:  Rivas    Chief Complaint: CV shock    Subjective (Events in last 24 hours):   LAVA ECMO  Pa pressures down from 100 to 40s  Making urine  CVVH temporary but UOP > 100/hr   No bleeding  Hgb stable  No fevers  Pulses intact  Awakening and following commands  Daughter at bedside      Objective:   BP 98/64   Pulse 84   Temp 98.2 °F (36.8 °C) (Core)   Resp 17   Ht 1.702 m (5' 7\")   Wt 89.4 kg (197 lb 1.5 oz)   SpO2 100%   BMI 30.87 kg/m²        TELEMETRY: NSR/ST    Physical Exam:  General Appearance: Intubated/sedated  Cardiovascular: normal rate, regular rhythm, normal S1 and S2, 3/6 JESUS  Pulmonary/Chest: Bilateral crackles  Abdomen: soft, non-tender, non-distended, normal bowel sounds, no masses Extremities: no cyanosis, clubbing or edema, pulse 1-2+  Skin: warm and dry, no rash or erythema  Head: normocephalic and atraumatic  Eyes: pupils equal, round, and reactive to light  Neck: supple and non-tender without mass, no thyromegaly   Musculoskeletal: normal range of motion, no joint swelling, deformity or tenderness  Neurological: sedated, intubated, follows commands    Medications:    chlorhexidine  15 mL Mouth/Throat BID    cefepime  2,000 mg IntraVENous Q8H    gabapentin  300 mg Oral BID    nitroglycerin  1 inch Topical 4 times per day    insulin lispro  0-4 Units SubCUTAneous 4x Daily AC & HS    senna  2 tablet Oral BID    magnesium oxide  400 mg Oral Daily    [Held by provider] metoprolol succinate  12.5 mg Oral Daily    sodium chloride flush  5-40 mL IntraVENous 2 times per day    [Held by provider] enoxaparin  40 mg SubCUTAneous Daily    aspirin  81 mg Oral Daily    [Held by provider] atorvastatin  20 mg Oral Daily    Vitamin D  1,000 Units Oral Daily    escitalopram  10 mg Oral Daily    folic acid  1,000 mcg Oral Daily    levothyroxine   75 mcg Oral Daily    [Held by provider] sucralfate  1 g Oral 4x Daily AC & HS    tiotropium-olodaterol  2 puff Inhalation Daily RT    vitamin C  500 mg Oral Daily      fentaNYL      norepinephrine Stopped (05/13/25 1600)    VASOpressin 20 Units in sodium chloride 0.9 % 100 mL infusion Stopped (05/14/25 0030)    pantoprazole (PROTONIX) 80 mg in sodium chloride 0.9 % 100 mL infusion 8 mg/hr (05/14/25 1117)    prismaSATE BGK 4/2.5 700 mL/hr at 05/14/25 0306    prismaSATE BGK 4/2.5 700 mL/hr at 05/14/25 0318    prismaSATE BGK 4/2.5 700 mL/hr at 05/14/25 0309    midazolam 2 mg/hr (05/14/25 1117)    EPINEPHrine 5 mcg/min (05/14/25 1117)    nitroGLYCERIN 10 mcg/min (05/14/25 1117)    heparin (porcine) 10 Units/kg/hr (05/14/25 1117)    milrinone Stopped (05/13/25 1104)    sodium chloride 20 mL/hr at 05/14/25 1117    bumetanide (BUMEX) 12.5 mg in sodium chloride 0.9 % 125 mL infusion 1 mg/hr (05/14/25 1117)    sodium chloride       prochlorperazine, 10 mg, Q6H PRN  potassium chloride, 20 mEq, PRN  magnesium sulfate, 1,000 mg, PRN  sodium phosphate 6 mmol in sodium chloride 0.9 % 250 mL IVPB, 6 mmol, PRN   Or  sodium phosphate 12 mmol in sodium chloride 0.9 % 250 mL IVPB, 12 mmol, PRN   Or  sodium phosphate 18 mmol in sodium chloride 0.9 % 500 mL IVPB, 18 mmol, PRN   Or  sodium phosphate 24 mmol in sodium chloride 0.9 % 500 mL IVPB, 24 mmol, PRN  heparin (porcine), 1,100-1,900 Units, PRN   And  heparin (porcine), 1,100-1,900 Units, PRN  calcium gluconate, 1,000 mg, PRN   Or  calcium gluconate, 2,000 mg, PRN   Or  calcium gluconate, 3,000 mg, PRN   Or  calcium gluconate, 4,000 mg, PRN  albumin human 5%, 25 g, Q2H PRN  HYDROcodone-acetaminophen, 1 tablet, Q6H PRN  melatonin, 3 mg, Nightly PRN  sodium chloride, , PRN  nitroGLYCERIN, 0.4 mg, Q5 Min PRN  atropine, 0.5 mg, Once PRN  sodium chloride flush, 5-40 mL, PRN  sodium chloride, , PRN  polyethylene glycol, 17 g, Daily PRN  ALPRAZolam, 0.5 mg, BID PRN      Lab

## 2025-05-14 NOTE — H&P
[facility]  Sedation/Analgesia History & Physical    Pt Name: Alberto Gilliland  Account number: [id number]  MRN: [id number]  YOB: 1965  Provider Performing Procedure: Nikita Rivera MD MD  Referring Provider: Haseeb Espinoza DO   Primary Care Physician: Juan Christiansen DO  Date: 5/14/2025    PRE-PROCEDURE    Code Status: FULL CODE  Brief History/Pre-Procedure Diagnosis:   Severe AS  CV shock    Consent: : I have discussed with the patient risks, benefits, and alternatives (and relevant risks, benefits, and side effects related to alternatives or not receiving care), and likelihood of the success.   The patient and/or representative appear to understand and agree to proceed.  The discussion encompasses risks, benefits, and side effects related to the alternatives and the risks related to not receiving the proposed care, treatment, and services.     The indication, risks and benefits of the procedure and possible therapeutic consequences and alternatives were discussed with the patient. The patient was given the opportunity to ask questions and to have them answered to his/her satisfaction. Risks of the procedure include but are not limited to the following: Bleeding, hematoma including retroperitoneal hemmorhage, infection, pain and discomfort, injury to the aorta and other blood vessels, rhythm disturbance, low blood pressure, myocardial infarction, stroke, kidney damage/failure, myocardial perforation, allergic reactions to sedatives/contrast material, loss of pulse/vascular compromise requiring surgery, aneurysm/pseudoaneurysm formation, possible loss of a limb/hand/leg, needing blood transfusion, requiring emergent open heart surgery or emergent coronary intervention, the need for intubation/respiratory support, the requirement for defibrillation/cardioversion, and death. Alternatives to and omission of the suggested procedure were discussed. The patient had no further questions  05/14/25 0306    prismaSATE BGK 4/2.5 dialysis solution, , Dialysis, Continuous, Lazaro Jenkins MD, Last Rate: 700 mL/hr at 05/14/25 0318, New Bag at 05/14/25 0318    prismaSATE BGK 4/2.5 dialysis solution, , Dialysis, Continuous, Lazaro Jenkins MD, Last Rate: 700 mL/hr at 05/14/25 0309, New Bag at 05/14/25 0309    heparin (porcine) injection 1,100-1,900 Units, 1,100-1,900 Units, IntraCATHeter, PRN **AND** heparin (porcine) injection 1,100-1,900 Units, 1,100-1,900 Units, IntraCATHeter, PRN, Lazaro Jenkins MD    ceFEPIme (MAXIPIME) 2,000 mg in sodium chloride 0.9 % 100 mL IVPB (addEASE), 2,000 mg, IntraVENous, Q8H, Lazaro Jenkins MD, Last Rate: 25 mL/hr at 05/14/25 1511, Rate Verify at 05/14/25 1511    gabapentin (NEURONTIN) capsule 300 mg, 300 mg, Oral, BID, Lazaro Jenkins MD, 300 mg at 05/14/25 0944    midazolam (VERSED) 100mg/100mL in NS infusion, 1-10 mg/hr, IntraVENous, Continuous, Ru Martin MD, Last Rate: 3 mL/hr at 05/14/25 1511, 3 mg/hr at 05/14/25 1511    EPINEPHrine 5 mg in sodium chloride 0.9 % 250 ml infusion, 1-20 mcg/min, IntraVENous, Continuous, Amanda Garcia APRN - CNP, Last Rate: 15 mL/hr at 05/14/25 1511, 5 mcg/min at 05/14/25 1511    nitroglycerin (NITRO-BID) 2 % ointment 1 inch, 1 inch, Topical, 4 times per day, Nikita Rivera MD, 1 inch at 05/14/25 1134    calcium gluconate 1,000 mg in sodium chloride 50 mL, 1,000 mg, IntraVENous, PRN, Stopped at 05/14/25 0556 **OR** calcium gluconate 2,000 mg in sodium chloride 100 mL, 2,000 mg, IntraVENous, PRN **OR** calcium gluconate 3,000 mg in sodium chloride 0.9 % 100 mL IVPB, 3,000 mg, IntraVENous, PRN **OR** calcium gluconate 4,000 mg in sodium chloride 0.9 % 100 mL IVPB, 4,000 mg, IntraVENous, PRN, Nikita Rivera MD    nitroGLYCERIN 200 mcg/mL in dextrose 5%, 10 mcg/min, IntraVENous, Continuous, Nikita Rivera MD, Last Rate: 3 mL/hr at 05/14/25 1511, 10 mcg/min at 05/14/25 1511    heparin 25,000 units in dextrose 5% 250 mL

## 2025-05-14 NOTE — PROGRESS NOTES
Patient Weaning Progress    The patient's vent settings was able to be weaned this shift.    Ventilator settings that were weaned              [] Mode   [] Pressure support weaned   [x] Fio2 weaned   [] Peep weaned    Spontaneous weaning trial was not attempted due to defined parameters for SBT (spontaneous breathing trial) not being met.    Reason that defined ventilator parameters for SBT was not met              [x] Patient condition requires increased ventilator settings  [x] Requires increased sedation   [x] Settings not within weaning range   [] SAT not completed   [] Physician orders    Evac tube was hooked up with continuous low suction (20-30mmHg)    Cuff was not deflated to determine cuff leak.    Unable to get agreement for goals because no family is present and patient cannot respond.        Problem: Respiratory - Adult    Goal: Achieves optimal ventilation and oxygenation  Achieves optimal ventilation and oxygenation:   Assess for changes in respiratory status   Position to facilitate oxygenation and minimize respiratory effort   Assess the need for suctioning and aspirate as needed   Respiratory therapy support as indicated   Oxygen supplementation based on oxygen saturation or arterial blood gases    Outcome: Progressing

## 2025-05-14 NOTE — PLAN OF CARE
Problem: Discharge Planning  Goal: Discharge to home or other facility with appropriate resources  Outcome: Progressing  Flowsheets (Taken 5/14/2025 1616)  Discharge to home or other facility with appropriate resources:   Identify discharge learning needs (meds, wound care, etc)   Identify barriers to discharge with patient and caregiver     Problem: ABCDS Injury Assessment  Goal: Absence of physical injury  Flowsheets (Taken 5/2/2025 2301 by Susanne Cho, RN)  Absence of Physical Injury: Implement safety measures based on patient assessment     Problem: Pain  Goal: Verbalizes/displays adequate comfort level or baseline comfort level  Outcome: Progressing  Flowsheets (Taken 5/12/2025 1311 by Emma Teixeira, RN)  Verbalizes/displays adequate comfort level or baseline comfort level:   Encourage patient to monitor pain and request assistance   Administer analgesics based on type and severity of pain and evaluate response   Consider cultural and social influences on pain and pain management   Assess pain using appropriate pain scale   Implement non-pharmacological measures as appropriate and evaluate response   Notify Licensed Independent Practitioner if interventions unsuccessful or patient reports new pain     Problem: Cardiovascular - Adult  Goal: Maintains optimal cardiac output and hemodynamic stability  Outcome: Progressing  Flowsheets (Taken 5/12/2025 0800 by Emma Teixeira, RN)  Maintains optimal cardiac output and hemodynamic stability:   Monitor blood pressure and heart rate   Monitor urine output and notify Licensed Independent Practitioner for values outside of normal range   Assess for signs of decreased cardiac output   Administer fluid and/or volume expanders as ordered   Administer vasoactive medications as ordered     Problem: Hematologic - Adult  Goal: Maintains hematologic stability  Outcome: Progressing  Flowsheets (Taken 5/12/2025 0800 by Emma Teixeira, RN)  Maintains

## 2025-05-14 NOTE — PROGRESS NOTES
Gastroenterology Progress Note:     Patient Name:  Alberto Gilliland   MRN: [id number]  [id number]  YOB: 1965  Admit Date: 4/30/2025 12:24 PM  Primary Care Physician: Juan Christiansen DO   4D-03/003-A     Patient seen and examined.  24 hours events and chart reviewed.    Subjective: Patient remains lightly sedated on ventilator at this time.  No further overt s/s of bleeding.  Hgb 8.1 from 10.3 yesterday however this is expected.  No family present in room today.  Patient was started on heparin infusion.  Planning TAVR tomorrow.      Objective:  BP 98/64   Pulse 82   Temp 98.2 °F (36.8 °C) (Core)   Resp 17   Ht 1.702 m (5' 7\")   Wt 89.4 kg (197 lb 1.5 oz)   SpO2 100%   BMI 30.87 kg/m²     Physical Exam  Vitals and nursing note reviewed.   Constitutional:       General: He is not in acute distress.     Appearance: He is normal weight. He is ill-appearing.   HENT:      Mouth/Throat:      Mouth: Mucous membranes are moist.      Pharynx: Oropharynx is clear.   Eyes:      Pupils: Pupils are equal, round, and reactive to light.   Cardiovascular:      Rate and Rhythm: Normal rate and regular rhythm.      Heart sounds: Normal heart sounds. No murmur heard.     Comments: ECMO continues    Pulmonary:      Effort: Pulmonary effort is normal.      Breath sounds: Normal breath sounds.   Abdominal:      General: Abdomen is flat. Bowel sounds are normal.      Palpations: Abdomen is soft.   Skin:     General: Skin is warm and dry.   Neurological:      Mental Status: He is alert.           Labs:   CBC:   Lab Results   Component Value Date/Time    WBC 17.6 05/14/2025 04:35 AM    HGB 8.7 05/14/2025 04:35 AM    HGB 12.0 05/18/2012 01:10 PM    HCT 26.3 05/14/2025 04:35 AM    MCV 90.1 05/14/2025 04:35 AM    PLT 89 05/14/2025 04:35 AM     BMP:   Lab Results   Component Value Date/Time     05/14/2025 04:35 AM    K 3.3 05/14/2025 04:35 AM    K 4.3 04/30/2025 12:37 PM     05/14/2025 04:35 AM    CO2 25

## 2025-05-14 NOTE — CONSULTS
[facility]  Consult    Patient's Name/Date of Birth: Alberto Gilliland / 1965 (60 y.o.)    Date: May 13, 2025     Referring Provider: Jonny Mckinley MD    CHIEF COMPLAINT: CV shock      HPI: This is a pleasant 60 y.o. male being followed by cardiology service for low-output failure, volume overload, and TC in the setting of severe AS/GI bleed.  Overnight decompensated.  Now being called to assess for MCS and urgent TAVR.  No imaging of valve done.  Coronaries patent.  Patient awake and talking, but then decompensated needing urgent intubation.Patient had significant decompensation overnight, see below.   PA pressures up to 100s and CVP 25  Has not urinated significantly despite high dose diuretics  Cr rising  Patient had some vomiting as well  No bleeding  Labs have improved including Hgb/Plts  Patient became significantly hypoxic, thus he was intubated  BP was 70s  Started on high dose pressors - despite 3 pressors and inotropes, could not maintain BP  I spoke with ICU physician  Thought was that this was related to aspiration, however he continues to decline  He has clear biventricular failure, respiratory failure, and worsening TC  He has severe AS and on 5/7/2025 EF 30-35% with mean gradient 42 mmHg across the bioprosthesis with moderate MR    After much discussion, we proceeded with discussion regarding LAVA-ECMO to avoid multiple access sites and allow for LV decompression  Will need TAVR CTA to help plan procedure  Will also need urgent CVVH - discussed with Dr. Jenkins who will help get him started to combat volume, acidosis, and uremia    I was able to speak to the daughter and sisters.  Everyone wants to proceed with urgent management and aggressive care  I offered Palliative care as an option but at this time family wants to proceed with all possible measures      All labs, EKG's, diagnostic testing and images as well as cardiac cath, stress testing were reviewed during

## 2025-05-14 NOTE — PROGRESS NOTES
CRITICAL CARE PROGRESS NOTE      Patient:  Alberto Gilliland    Unit/Bed:4D-03/003-A  YOB: 1965  MRN: [id number]   PCP: Juan Christiansen DO  Date of Admission: 4/30/2025  Chief Complaint:- fatigue and hypotension    Assessment and Plan:   Acute on Chronic decompensated HFrEF. Patient is on ECMO BNP 52407. RODRÍGUEZ 05/06 showed EF 30-35% and moderate valve global hypokinesis. Severe stenosis of the aortic valve AV mean 42. Repeat BNP 05/11 31358.    -CI: 2.48, PAWP 26, Prabhu-2.33, CP 0.85  -On Bumex drip 1mg/hr   -Fluid restrict to less than 2L daily   -Daily weights. Strict I&O.     Acute Hypoxic Respiratory Failure-Baseline room air. Episode of tachypnea and increased work of breathing. HFNC attempted with no success. Vomited dark-oscar matter with blood tinged. Given concern for aspiration, patient intubated. CTA 5/13/2025 shows larying right sided pleural effusion with moderate consolidation dependently in lungs with GGOs in perihilar (likley pulmonary edema). XR today does not show major changes.    Severe Aortic Stenosis:  Had Mechanical AVR in 2007 then switched to bioprosthetic in 2016 due to warfarin intolerance and GI bleeding. 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 Structural heart following. Undergoing workup and medical optimization  Patient planned for TAVR on 5/15/2025    Pneumonia / Sepsis- Worsening productive cough, increased O2 requirement and leukocytosis. CXR with possible RLL infiltrate. Pneumonia panel and resp cultures ordered. Decision made to start on HAP coverage, Urine strep pna and legionella negative. 05/11 Pneumonia panel negative. Resp cultures moderate segmented neutrophils, moderate gram positive cocci singly and in pairs. Few gram negative bacilli. Budding yeast.   On Cefepime 2000 mg BID (05/11-05/17)  Resp panel and culture taken from BAL given consolidation on XR    Shock Liver: Albumin 3.2, ALP 71 , AST 1128

## 2025-05-14 NOTE — FLOWSHEET NOTE
1900- Report from off going RN's. All ECMO flows/cannulas/gtts/ and goals reviewed    2030- ABG: pH 7.44; pCO2 36;  pO2 492; HCO3 24, %O2 Sat 100. BE 0.5   VBG: pO2 51; %O2 Sat 87  ECMO 5.91 LPM @ 3220 RPM  Sweep 4 lpm  CLAIRE calculation:   CO 15.4  CI 7.6      Thermodilution:  CO- 4.1  CI- 2.09  SVRI- 2790  SV- 43  - Vital flow calibrated. FIO2 on ventilator decreased to 80%    2130- Dr. Rivera called and updated on patient status at this time. Plan to try and wean FIO2 on ventilator, keep PEEP were it is, try and wean Vasopressin off and utilize Epi, with goal to keep MAP of 70 or higher. Updated on absent radial pulse in RUE- with good Ulnar flow and LLE fleeting flows with ultrasound    0000- ABG: pH 7.45; pCO2 40.9;  pO2 446; HCO3 28.9, %O2 Sat 100. BE 4.6   VBG: pO2 52.8; %O2 Sat 87  ECMO 6 LPM @ 3220 RPM  Sweep 4 lpm    Thermodilution-  CO- 4.03  CI- 2.06  SVRI- 2917  SV- 45.8  - Vital flow calibrated. FIO2 on ventilator decreased to 60%. Vasopressin gtt shut off at this time    0119- Thermodilution  CO- 4.6  CI- 2.35  SVRI- 2249    0200- ABG: pH 7.52; pCO2 33;  pO2 395; HCO3 27, %O2 Sat 100. BE 4.2   -FIO2 on ventilator decreased to 40%    0400- ABG: pH 7.54; pCO2 33;  pO2 287; HCO3 28, %O2 Sat 100. BE 4.9   VBG: pO2 44; %O2 Sat 85  ECMO 6.16 LPM @ 3220 RPM  Sweep 3 lpm   CLAIRE calculation:   CO 14.4  CI 7.1      Thermodilution-  CO-4.77  CI-2.43  SVRI-2267  SV-54.8    - Vital flow calibrated. Sweep decreased to 3 at this time.     0518- ABG: pH 7.51; pCO2 35;  pO2 301; HCO3 28, %O2 Sat 100. BE 4.5

## 2025-05-14 NOTE — PROGRESS NOTES
[facility]   Nephrology progress note  5/14/2025, 10:32 AM      Pt Name:    Alberto Gilliland  MRN:     [id number]     YOB: 1965  Admit Date:    4/30/2025 12:24 PM    Chief Complaint: Nephrology following for acute kidney injury and fluid overload.    Subjective:  Patient seen and examined  Currently patient is on ECMO  Continues to be on CVVH  Having excellent urine output  Pressor requirement getting better  On Bumex drip    Objective:  24HR INTAKE/OUTPUT:    Intake/Output Summary (Last 24 hours) at 5/14/2025 1032  Last data filed at 5/14/2025 0812  Gross per 24 hour   Intake 3297.56 ml   Output 3348 ml   Net -50.44 ml      Admission weight: 87.5 kg (193 lb)  Wt Readings from Last 3 Encounters:   05/14/25 89.4 kg (197 lb 1.5 oz)   04/30/25 87.7 kg (193 lb 6.4 oz)   04/25/25 89.2 kg (196 lb 9.6 oz)        Vitals :   Vitals:    05/14/25 0730 05/14/25 0747 05/14/25 0812 05/14/25 0900   BP:       Pulse: 85 84 82 80   Resp: 16 17 17 16   Temp:   98.2 °F (36.8 °C)    TempSrc:   Core    SpO2: 100% 100% 100% 100%   Weight:       Height:           Physical examination  General Appearance: Ill-appearing  Mouth/Throat: ET tube in place  Neck: No accessory muscle use  Lungs:  Breath sounds: Diminished  Heart::  S1,S2 heard  Abdomen:  Soft, non - tender  Musculoskeletal:  Edema -noted    Medications:  Infusion:    norepinephrine Stopped (05/13/25 1600)    fentaNYL 125 mcg/hr (05/14/25 0701)    VASOpressin 20 Units in sodium chloride 0.9 % 100 mL infusion Stopped (05/14/25 0030)    pantoprazole (PROTONIX) 80 mg in sodium chloride 0.9 % 100 mL infusion 8 mg/hr (05/14/25 0701)    prismaSATE BGK 4/2.5 700 mL/hr at 05/14/25 0306    prismaSATE BGK 4/2.5 700 mL/hr at 05/14/25 0318    prismaSATE BGK 4/2.5 700 mL/hr at 05/14/25 0309    midazolam 2 mg/hr (05/14/25 0701)    EPINEPHrine 5 mcg/min (05/14/25 0701)    nitroGLYCERIN 10 mcg/min (05/14/25 0701)    heparin (porcine) 7 Units/kg/hr (05/14/25 0701)

## 2025-05-14 NOTE — CARE COORDINATION
5/14/25, 3:12 PM EDT    DISCHARGE ON GOING EVALUATION    [facility] day: 14  Location: -03/003-A Reason for admit: Chronic anemia [D64.9]  Troponin level elevated [R79.89]  S/P AVR [Z95.2]  Hypotension, unspecified hypotension type [I95.9]  Acute on chronic congestive heart failure, unspecified heart failure type (HCC) [I50.9]  Acute exacerbation of chronic heart failure (HCC) [I50.9]     Procedures:   5/5 EGD: normal, bx taken  5/5 colonoscopy:  Large cecal angiectasia destroyed with APC   5/7 Cath: Mild nonobstructive coronary; Decompensated CHF, elevated right and left filling pressures, CI 2.2  5/7 RODRÍGUEZ: EF 30-35%; mod mitral regurg; severe AS, mild aortic regurg; tricupid with mild to mod regrug; findings consistent w/mild pulmonary hypertension  5/8 Colonoscopy: Unable to find source of bleeding; bright red blood and some clots seen in splenic flexure and descending colon  5/8 SGC RIJ  5/8 CVC IJ  5/13 Intubated  5/13 LAVA ECMO initiated  5/13 Code Blue - PEA during cannulation; 1-2 min CPR to ROSC  5/13 TESSIO left femoral  5/13 CRRT initiated  5/14 CRRT stopped    Imaging since last note:   4/13 CTA Chest/Abd/pelvis: 1. Preoperative TAVR measurements as listed above.  2. Layering right-sided pleural effusion.  3. Moderate consolidation dependently in the lungs with moderate severity patchy  groundglass opacities in a perihilar distribution suggesting pulmonary edema.  4/13 CTA Neck: No stenosis of either carotid bulb.   4/13 CT Head: Stable CT scan of the brain, no interval change since previous MRI scan dated 9/13/2024.  4/14 CXR: Support devices as noted.  No significant change in cardiomegaly or lung consolidations.    Barriers to Discharge: On 5/13 pt vomited brownish/red material. Was on HFNC then required intubation on 5/13. Pt was taken to cath lab and LAVA ECMO was initiated. Pt went into PEA arrest during cannulation and received 1-2 min CPR before ROSC. TESSIO placed in cath

## 2025-05-14 NOTE — CONSULTS
Provider Consult Note        Patient:   Alberto Gilliland  YOB: 1965  Age:  60 y.o.  Room:  [facility]03/003-A  MRN:  [id number]   Acct: [id number]  PCP: Juan Christiansen DO    Date of Admission:  4/30/2025 12:24 PM  Date of Service:  5/14/2025    Reason for Consult: ECMO and Family Support            Subjective    Chief Complaint:-    Chief Complaint   Patient presents with    Fatigue    Hypotension        History Obtained From:-  Family member(s) - Moose Mcfadden, Electronic Medical Record, Patient's primary nurse, ICU team, ECMO team, and Reason patient could not give history: Intubated and Sedated    History of Present Illness:-            Alberto Gilliland is a 60 y.o. male who  has a past medical history of Acute exacerbation of chronic heart failure (HCC), Arthritis, Bilateral sciatica, Bleeding, CAD (coronary artery disease), Carpal tunnel syndrome, Cervicalgia, C5-7, Chronic anxiety, COPD, mild (HCC), Dysthymia (or depressive neurosis), ED (erectile dysfunction), ED (erectile dysfunction), Fatty liver, GERD (gastroesophageal reflux disease), Headache(784.0), History of blood transfusion, HTN (hypertension), Hyperlipidemia, Hypothyroidism, Iron deficiency anemia, blood loss, Lumbago, Lumbar radiculopathy, Lymphomatoid papulosis (HCC), Osteoarthritis (arthritis due to wear and tear of joints), S/P AVR, coumadin Tx, and Spondylosis of thoracic region without myelopathy or radiculopathy. They present to the hospital with Fatigue and Hypotension and are admitted for Low output heart failure (HCC). Alberto has had heart failure with reduced ejection fraction since at least 2007.  He had an echocardiogram in August 2007 with a reported ejection fraction of 35%.  He had his aortic valve replaced in 2007 and again in 2016.  He follows with hematology oncology as an outpatient for iron deficiency anemia and lung nodules.  CT of his chest in April 2024 was noted to have small right    Palliative Care will continue to follow the patient while they are hospitalized   Please Perfect[employer] or call the Palliative Care team with any questions or concerns    CURRENT CODE STATUS:  Full Code No additional code details    Parts of this note may have been dictated by use of voice recognition software and electronically transcribed. The note may contain errors not detected in proofreading        Electronically signed by Juan Bernabe MD on 5/14/2025 at 9:01 AM           Palliative Care Office: [phone]

## 2025-05-14 NOTE — PROGRESS NOTES
VenoArterial Extracorporeal Membrane Oxygenation Management    Re: Biventricular Shock with Hypoxic Respiratory Failure - Valvular shock    Insertion Date: 5/13/2025    Days on ECMO: 1    Access Sites: Stable, no bleeding around the 19Fr Arterial Cannula noted, no bleeding around 25Fr LAVA cannula noted  Distal Perfusion Cannulas: Intact, pulses and pleth on foot show good waveforms, bilateral LE warm  Tubing: No kinks/condensation, arterial and venous limb color changes are appropriate, no thrombus seen  Oxygenator: No clots or air bubbles, spit valve emptied, no indication for change out  Pump: No air or clots seen, no concerns for malfunction  Power Console: No issues, plugged in, back up available  Flows: 6 L/min  Sweep: 2-4L min of O2, target PaCO2 40-42  Adjunctive Devices: LA-VA      Progress:  Wean flows to 5 LPM  TEG - titrate heparin to get to R-time 12  Keep PEEP at 15  May need  today, PAO2 goal of 200s  CVVH can be stopped, d/w Neph  Continue Bumex gtt  Follow labs  Rishi TAVR tomorrow as long as no issues  No indication for transfusions  No GI bleeding    Dispo:  LAVA ECMO to stay in place  Plan Rishi TAVR tomorrow  [facility] is appropriate for continued care  Multidisciplinary rounds BID/TID completed - no major issues identified for continued management at [facility]  Family updated.    Nikita Rivera MD  Interventional Cardiology

## 2025-05-14 NOTE — PROGRESS NOTES
Comprehensive Nutrition Assessment    Type and Reason for Visit:  Reassess, Consult, Nutrition support (trophic TF start)    Nutrition Recommendations/Plan:   Begin trophc TF - Vital 1.2 10ml/hour hold 30 minutes before and after synthroid - no increase for today - plan NPO at 2400 for TAVR 5/15.  Spoke with PharmD - carafate to be discontinued - continuing protonix  Additional free H20 per Providers  Monitoring meds, GI status for TF advancement as appropriate     Malnutrition Assessment:  Malnutrition Status:  Moderate malnutrition (05/09/25 1626)    Context:  Acute Illness     Findings of the 6 clinical characteristics of malnutrition:  Energy Intake:  50% or less of estimated energy requirements for 5 or more days (NPO/CLD this admit)  Weight Loss:  Unable to assess (CHF; unable to accurately assess)     Body Fat Loss:  Mild body fat loss Orbital   Muscle Mass Loss:  Mild muscle mass loss Temples (temporalis), Clavicles (pectoralis & deltoids)  Fluid Accumulation:  Mild Generalized, Extremities   Strength:  Not Performed    Nutrition Assessment:     Pt. Nutritionally compromised AEB pt  intubated 5/13, NPO, need for nutrition support. At risk for further nutrition compromise r/t admit with acute on chronic CHF - hypotensive s/p RODRÍGUEZ 5/6 and RHC 5/9, hemorrhagic shock 2/2 GIB - multiple bloody BM 5/8 s/p EGD 5/5 no active bleeding noted - bx duodenum taken and colonoscopy 5/8 with inability to fully visualize active bleeding - clots noted, shock liver, TC, symptomatic DIONISIO,moderate malnutrition, LOS day 13 and underlying medical condition (PMHx: mechanical AVR/resection of ascending aorta 2007 - switched to bioprosthetic 2016 2/2 warfarin intolerance and GIB, CHF, CAD COPD, anxiety, WISE, lymphatoid papulosis, GERD, HTN, HLD, hypothyroidism, OA, hx/o small bowel resection 2008 - 8 inches removed, s/p robotic laparoscopy lysis of adhesions/ventral hernia repair/evacuation of abdominal hematoma with debridement  30.8  Usual Body Weight:  (Per EMR: 12/7/23: 185# 6 oz, 5/3/24: 199# 10 oz, 8/23/24: 202# 10 oz, 12/30/24: 205# 11 oz, 4/10/25: 205# 10 oz)   Weight Adjustment For: No Adjustment   BMI Categories: Overweight (BMI 25.0-29.9)    Estimated Daily Nutrient Needs:  Energy Requirements Based On: Kcal/kg  Weight Used for Energy Requirements:  (85.8kgm (5/13))  Energy (kcal/day): ~1630kcals (19kcals/kgm) ECMO pt.  Weight Used for Protein Requirements: Ideal  Protein (g/day): 134-168 grams (2-2.5 grams protein/kgm IBW) CRRT pt  Fluid (ml/day): per Physician    Nutrition Diagnosis:   Moderate malnutrition, in context of acute illness or injury related to altered GI function, inadequate protein-energy intake as evidenced by criteria as identified in malnutrition assessment    Nutrition Interventions:   Food and/or Nutrient Delivery: Continue NPO, Start Tube Feeding  Nutrition Education/Counseling: Education/Counseling not appropriate  Coordination of Nutrition Care: Continue to monitor while inpatient, Interdisciplinary Rounds       Goals:  Goals: Tolerate nutrition support at goal rate, by next RD assessment  Type of Goal: New goal  Previous Goal Met: No Progress toward Goal(s)    Nutrition Monitoring and Evaluation:      Food/Nutrient Intake Outcomes: Enteral Nutrition Intake/Tolerance  Physical Signs/Symptoms Outcomes: Biochemical Data, GI Status, Nausea or Vomiting, Hemodynamic Status, Fluid Status or Edema, Nutrition Focused Physical Findings, Skin, Weight    Discharge Planning:    Too soon to determine     Bambi Mark, TANIA, LD  Contact: [phone]

## 2025-05-14 NOTE — PROGRESS NOTES
[facility]  PHYSICAL THERAPY MISSED TREATMENT NOTE  [facility] ICU 4D    Date: 2025  Patient Name: Alberto Gilliland        MRN: [id number]   : 1965  (60 y.o.)  Gender: male                REASON FOR MISSED TREATMENT:  Pt on ECMO, not medically stable for PT at this time. Will defer PT orders & await new orders when, Pt becomes more medically stable. .      Mandi Payne, MPT [phone]

## 2025-05-15 ENCOUNTER — APPOINTMENT (OUTPATIENT)
Dept: GENERAL RADIOLOGY | Age: 60
DRG: 003 | End: 2025-05-15
Payer: COMMERCIAL

## 2025-05-15 PROBLEM — T82.857A STENOSIS OF PROSTHETIC AORTIC VALVE: Status: ACTIVE | Noted: 2025-05-15

## 2025-05-15 PROBLEM — I42.8 CARDIOMYOPATHY, NONISCHEMIC (HCC): Status: ACTIVE | Noted: 2025-05-15

## 2025-05-15 LAB
ABO GROUP BLD: NORMAL
ACTIVATED CLOTTING TIME: 153 SECONDS (ref 1–150)
ACTIVATED CLOTTING TIME: 245 SECONDS (ref 1–150)
ACTIVATED CLOTTING TIME: 262 SECONDS (ref 1–150)
ALBUMIN SERPL BCG-MCNC: 3.2 G/DL (ref 3.4–4.9)
ALBUMIN SERPL BCG-MCNC: 3.2 G/DL (ref 3.4–4.9)
ALP SERPL-CCNC: 80 U/L (ref 40–129)
ALP SERPL-CCNC: 86 U/L (ref 40–129)
ALT SERPL W/O P-5'-P-CCNC: 414 U/L (ref 10–50)
ALT SERPL W/O P-5'-P-CCNC: 528 U/L (ref 10–50)
ANION GAP SERPL CALC-SCNC: 12 MEQ/L (ref 8–16)
ANION GAP SERPL CALC-SCNC: 13 MEQ/L (ref 8–16)
ANION GAP SERPL CALC-SCNC: 13 MEQ/L (ref 8–16)
ANION GAP SERPL CALC-SCNC: 14 MEQ/L (ref 8–16)
APTT PPP: 157.8 SECONDS (ref 22–38)
APTT PPP: 41.2 SECONDS (ref 22–38)
APTT PPP: 60.7 SECONDS (ref 22–38)
APTT PPP: 64.3 SECONDS (ref 22–38)
ARTERIAL PATENCY WRIST A: ABNORMAL
ARTERIAL PATENCY WRIST A: POSITIVE
AST SERPL-CCNC: 337 U/L (ref 10–50)
AST SERPL-CCNC: 489 U/L (ref 10–50)
BACTERIA UR CULT: NORMAL
BASE EXCESS BLDA CALC-SCNC: 4.7 MMOL/L (ref -2.5–2.5)
BASE EXCESS BLDA CALC-SCNC: 4.7 MMOL/L (ref -2.5–2.5)
BASE EXCESS BLDA CALC-SCNC: 5.2 MMOL/L (ref -2.5–2.5)
BASE EXCESS BLDA CALC-SCNC: 5.3 MMOL/L (ref -2.5–2.5)
BASE EXCESS BLDA CALC-SCNC: 5.7 MMOL/L (ref -2–3)
BASE EXCESS BLDA CALC-SCNC: 5.8 MMOL/L (ref -2–3)
BASE EXCESS BLDA CALC-SCNC: 5.8 MMOL/L (ref -2–3)
BASE EXCESS BLDA CALC-SCNC: 6.2 MMOL/L (ref -2.5–2.5)
BASE EXCESS BLDA CALC-SCNC: 6.4 MMOL/L (ref -2.5–2.5)
BASE EXCESS BLDA CALC-SCNC: 6.8 MMOL/L (ref -2–3)
BASE EXCESS BLDA CALC-SCNC: 6.9 MMOL/L (ref -2.5–2.5)
BASE EXCESS BLDA CALC-SCNC: 6.9 MMOL/L (ref -2.5–2.5)
BASE EXCESS BLDA CALC-SCNC: 7.1 MMOL/L (ref -2.5–2.5)
BASE EXCESS BLDA CALC-SCNC: 7.2 MMOL/L (ref -2–3)
BASE EXCESS BLDA CALC-SCNC: 7.7 MMOL/L (ref -2.5–2.5)
BASE EXCESS BLDA CALC-SCNC: 7.9 MMOL/L (ref -2.5–2.5)
BDY SITE: ABNORMAL
BILIRUB CONJ SERPL-MCNC: 4.2 MG/DL (ref 0–0.2)
BILIRUB SERPL-MCNC: 6 MG/DL (ref 0.3–1.2)
BILIRUB SERPL-MCNC: 6.4 MG/DL (ref 0.3–1.2)
BREATHS SETTING VENT: 12 BPM
BUN SERPL-MCNC: 56 MG/DL (ref 8–23)
BUN SERPL-MCNC: 59 MG/DL (ref 8–23)
BUN SERPL-MCNC: 60 MG/DL (ref 8–23)
BUN SERPL-MCNC: 61 MG/DL (ref 8–23)
CA-I BLD ISE-SCNC: 1.2 MMOL/L (ref 1.12–1.32)
CALCIUM SERPL-MCNC: 8.6 MG/DL (ref 8.8–10.2)
CALCIUM SERPL-MCNC: 8.9 MG/DL (ref 8.8–10.2)
CALCIUM SERPL-MCNC: 8.9 MG/DL (ref 8.8–10.2)
CALCIUM SERPL-MCNC: 9.1 MG/DL (ref 8.8–10.2)
CFT BLD TEG: 2.8 MINUTES (ref 0.8–2.1)
CFT BLD TEG: 3 MINUTES (ref 0.8–2.1)
CHLORIDE SERPL-SCNC: 103 MEQ/L (ref 98–111)
CHLORIDE SERPL-SCNC: 105 MEQ/L (ref 98–111)
CHLORIDE SERPL-SCNC: 105 MEQ/L (ref 98–111)
CHLORIDE SERPL-SCNC: 106 MEQ/L (ref 98–111)
CLOT ANGLE BLD TEG: 20.6 MM (ref 15–32)
CLOT ANGLE BLD TEG: 24.4 MM (ref 15–32)
CLOT ANGLE BLD TEG: 55.6 DEG (ref 63–78)
CLOT ANGLE BLD TEG: 57.4 DEG (ref 63–78)
CLOT INIT BLD TEG: 14.1 MINUTES (ref 4.6–9.1)
CLOT INIT BLD TEG: 16.4 MINUTES (ref 4.6–9.1)
CLOT INIT P HPASE BLD TEG: 9.5 MINUTES (ref 4.3–8.3)
CLOT INIT P HPASE BLD TEG: 9.9 MINUTES (ref 4.3–8.3)
CO2 SERPL-SCNC: 23 MEQ/L (ref 22–29)
CO2 SERPL-SCNC: 24 MEQ/L (ref 22–29)
CO2 SERPL-SCNC: 25 MEQ/L (ref 22–29)
CO2 SERPL-SCNC: 26 MEQ/L (ref 22–29)
COLLECTED BY:: ABNORMAL
CREAT SERPL-MCNC: 1.7 MG/DL (ref 0.7–1.2)
CREAT SERPL-MCNC: 1.8 MG/DL (ref 0.7–1.2)
CREAT SERPL-MCNC: 1.9 MG/DL (ref 0.7–1.2)
CREAT SERPL-MCNC: 1.9 MG/DL (ref 0.7–1.2)
DEPRECATED RDW RBC AUTO: 57.5 FL (ref 35–45)
DEPRECATED RDW RBC AUTO: 57.7 FL (ref 35–45)
DEPRECATED RDW RBC AUTO: 58.9 FL (ref 35–45)
DEPRECATED RDW RBC AUTO: 59 FL (ref 35–45)
DEVICE: ABNORMAL
ECHO BSA: 2.03 M2
EKG ATRIAL RATE: 76 BPM
EKG P AXIS: 40 DEGREES
EKG P-R INTERVAL: 188 MS
EKG Q-T INTERVAL: 476 MS
EKG QRS DURATION: 136 MS
EKG QTC CALCULATION (BAZETT): 535 MS
EKG R AXIS: -63 DEGREES
EKG T AXIS: -99 DEGREES
EKG VENTRICULAR RATE: 76 BPM
ERYTHROCYTE [DISTWIDTH] IN BLOOD BY AUTOMATED COUNT: 17.9 % (ref 11.5–14.5)
ERYTHROCYTE [DISTWIDTH] IN BLOOD BY AUTOMATED COUNT: 18 % (ref 11.5–14.5)
ERYTHROCYTE [DISTWIDTH] IN BLOOD BY AUTOMATED COUNT: 18 % (ref 11.5–14.5)
ERYTHROCYTE [DISTWIDTH] IN BLOOD BY AUTOMATED COUNT: 18.1 % (ref 11.5–14.5)
FIBRINOGEN PPP-MCNC: 313 MG/100ML (ref 155–475)
FIBRINOGEN PPP-MCNC: 326 MG/100ML (ref 155–475)
FIBRINOGEN PPP-MCNC: 344 MG/100ML (ref 155–475)
FIBRINOGEN PPP-MCNC: 382 MG/100ML (ref 155–475)
FIBRINOGEN PPP-MCNC: 430 MG/100ML (ref 155–475)
FIO2 ON VENT O2 ANALYZER: 40 %
FIO2 ON VENT O2 ANALYZER: 50 %
FIO2 ON VENT O2 ANALYZER: 60 %
FIO2 ON VENT O2 ANALYZER: 60 %
FUNCT FIBRINOGEN LEVEL: 375.9 MG/DL (ref 278–581)
FUNCT FIBRINOGEN LEVEL: 445.3 MG/DL (ref 278–581)
GFR SERPL CREATININE-BSD FRML MDRD: 39 ML/MIN/1.73M2
GFR SERPL CREATININE-BSD FRML MDRD: 39 ML/MIN/1.73M2
GFR SERPL CREATININE-BSD FRML MDRD: 42 ML/MIN/1.73M2
GFR SERPL CREATININE-BSD FRML MDRD: 45 ML/MIN/1.73M2
GLUCOSE BLD-MCNC: 100 MG/DL (ref 70–108)
GLUCOSE BLD-MCNC: 101 MG/DL (ref 70–108)
GLUCOSE BLD-MCNC: 125 MG/DL (ref 70–108)
GLUCOSE BLD-MCNC: 126 MG/DL (ref 70–108)
GLUCOSE BLD-MCNC: 129 MG/DL (ref 70–108)
GLUCOSE BLD-MCNC: 131 MG/DL (ref 70–108)
GLUCOSE BLD-MCNC: 92 MG/DL (ref 70–108)
GLUCOSE BLD-MCNC: 94 MG/DL (ref 70–108)
GLUCOSE BLD-MCNC: 95 MG/DL (ref 70–108)
GLUCOSE SERPL-MCNC: 110 MG/DL (ref 74–109)
GLUCOSE SERPL-MCNC: 134 MG/DL (ref 74–109)
GLUCOSE SERPL-MCNC: 140 MG/DL (ref 74–109)
GLUCOSE SERPL-MCNC: 99 MG/DL (ref 74–109)
HCO3 BLDA-SCNC: 29 MMOL/L (ref 23–28)
HCO3 BLDA-SCNC: 30 MMOL/L (ref 23–28)
HCO3 BLDA-SCNC: 31 MMOL/L (ref 23–28)
HCO3 BLDA-SCNC: 32 MMOL/L (ref 23–28)
HCO3 BLDA-SCNC: 33 MMOL/L (ref 23–28)
HCT VFR BLD AUTO: 26.1 % (ref 42–52)
HCT VFR BLD AUTO: 26.2 % (ref 42–52)
HCT VFR BLD AUTO: 27.1 % (ref 42–52)
HCT VFR BLD AUTO: 27.2 % (ref 42–52)
HGB BLD-MCNC: 8.3 GM/DL (ref 14–18)
HGB BLD-MCNC: 8.4 GM/DL (ref 14–18)
HGB BLD-MCNC: 8.7 GM/DL (ref 14–18)
HGB BLD-MCNC: 8.7 GM/DL (ref 14–18)
IAT IGG-SP REAG SERPL QL: NORMAL
INR PPP: 1.73 (ref 0.85–1.13)
LACTATE SERPL-SCNC: 0.6 MMOL/L (ref 0.5–2)
LACTATE SERPL-SCNC: 0.6 MMOL/L (ref 0.5–2)
LACTATE SERPL-SCNC: 0.8 MMOL/L (ref 0.5–2)
LACTATE SERPL-SCNC: 0.9 MMOL/L (ref 0.5–2)
MAGNESIUM SERPL-MCNC: 2.3 MG/DL (ref 1.6–2.6)
MCF BLD TEG: 55.1 MM (ref 52–69)
MCF BLD TEG: 57.7 MM (ref 52–69)
MCF.EXTRINSIC BLD ROTEM: 60 MM (ref 52–70)
MCF.EXTRINSIC BLD ROTEM: 62.9 MM (ref 52–70)
MCH RBC QN AUTO: 29.3 PG (ref 26–33)
MCH RBC QN AUTO: 29.3 PG (ref 26–33)
MCH RBC QN AUTO: 29.6 PG (ref 26–33)
MCH RBC QN AUTO: 29.7 PG (ref 26–33)
MCHC RBC AUTO-ENTMCNC: 31.8 GM/DL (ref 32.2–35.5)
MCHC RBC AUTO-ENTMCNC: 32 GM/DL (ref 32.2–35.5)
MCHC RBC AUTO-ENTMCNC: 32.1 GM/DL (ref 32.2–35.5)
MCHC RBC AUTO-ENTMCNC: 32.1 GM/DL (ref 32.2–35.5)
MCV RBC AUTO: 91.3 FL (ref 80–94)
MCV RBC AUTO: 92.2 FL (ref 80–94)
MCV RBC AUTO: 92.2 FL (ref 80–94)
MCV RBC AUTO: 92.8 FL (ref 80–94)
PCO2 BLDA: 37 MMHG (ref 35–45)
PCO2 BLDA: 38 MMHG (ref 35–45)
PCO2 BLDA: 38 MMHG (ref 35–45)
PCO2 BLDA: 39 MMHG (ref 35–45)
PCO2 BLDA: 40 MMHG (ref 35–45)
PCO2 BLDA: 41 MMHG (ref 35–45)
PCO2 BLDA: 43 MMHG (ref 35–45)
PCO2 BLDA: 44 MMHG (ref 35–45)
PCO2 BLDA: 45 MMHG (ref 35–45)
PCO2 TEMP ADJ BLDMV: 40 MMHG (ref 41–51)
PCO2 TEMP ADJ BLDMV: 42 MMHG (ref 41–51)
PCO2 TEMP ADJ BLDMV: 43 MMHG (ref 41–51)
PCO2 TEMP ADJ BLDMV: 45 MMHG (ref 41–51)
PCO2 TEMP ADJ BLDMV: 47 MMHG (ref 41–51)
PEEP SETTING VENT: 15 MMHG
PF4 HEPARIN CMPLX AB SER QL: NORMAL
PH BLDA: 7.46 [PH] (ref 7.35–7.45)
PH BLDA: 7.46 [PH] (ref 7.35–7.45)
PH BLDA: 7.47 [PH] (ref 7.35–7.45)
PH BLDA: 7.48 [PH] (ref 7.35–7.45)
PH BLDA: 7.48 [PH] (ref 7.35–7.45)
PH BLDA: 7.49 [PH] (ref 7.35–7.45)
PH BLDA: 7.5 [PH] (ref 7.35–7.45)
PH BLDMV: 7.44 [PH] (ref 7.31–7.41)
PH BLDMV: 7.44 [PH] (ref 7.31–7.41)
PH BLDMV: 7.45 [PH] (ref 7.31–7.41)
PH BLDMV: 7.47 [PH] (ref 7.31–7.41)
PH BLDMV: 7.48 [PH] (ref 7.31–7.41)
PHOSPHATE SERPL-MCNC: 3.5 MG/DL (ref 2.5–4.5)
PHOSPHATE SERPL-MCNC: 3.5 MG/DL (ref 2.5–4.5)
PHOSPHATE SERPL-MCNC: 3.6 MG/DL (ref 2.5–4.5)
PIP: 30 CMH2O
PLATELET # BLD AUTO: 101 THOU/MM3 (ref 130–400)
PLATELET # BLD AUTO: 88 THOU/MM3 (ref 130–400)
PLATELET # BLD AUTO: 89 THOU/MM3 (ref 130–400)
PLATELET # BLD AUTO: 92 THOU/MM3 (ref 130–400)
PMV BLD AUTO: 11.9 FL (ref 9.4–12.4)
PMV BLD AUTO: 12.3 FL (ref 9.4–12.4)
PMV BLD AUTO: 12.3 FL (ref 9.4–12.4)
PMV BLD AUTO: 12.5 FL (ref 9.4–12.4)
PO2 BLDA: 116 MMHG (ref 71–104)
PO2 BLDA: 121 MMHG (ref 71–104)
PO2 BLDA: 125 MMHG (ref 71–104)
PO2 BLDA: 126 MMHG (ref 71–104)
PO2 BLDA: 137 MMHG (ref 71–104)
PO2 BLDA: 164 MMHG (ref 71–104)
PO2 BLDA: 271 MMHG (ref 71–104)
PO2 BLDA: 407 MMHG (ref 71–104)
PO2 BLDA: 432 MMHG (ref 71–104)
PO2 BLDA: 458 MMHG (ref 71–104)
PO2 BLDA: 494 MMHG (ref 71–104)
PO2 BLDMV: 42 MMHG (ref 25–40)
PO2 BLDMV: 42 MMHG (ref 25–40)
PO2 BLDMV: 43 MMHG (ref 25–40)
PO2 BLDMV: 44 MMHG (ref 25–40)
PO2 BLDMV: 44 MMHG (ref 25–40)
POTASSIUM SERPL-SCNC: 3.2 MEQ/L (ref 3.5–5.2)
POTASSIUM SERPL-SCNC: 3.7 MEQ/L (ref 3.5–5.2)
POTASSIUM SERPL-SCNC: 3.9 MEQ/L (ref 3.5–5.2)
POTASSIUM SERPL-SCNC: 4.1 MEQ/L (ref 3.5–5.2)
PRESSURE SUPPORT SETTING VENT: 15 CMH2O
PRESSURE SUPPORT SETTING VENT: 15 CMH2O
PROT SERPL-MCNC: 5 G/DL (ref 6.4–8.3)
PROT SERPL-MCNC: 5 G/DL (ref 6.4–8.3)
RBC # BLD AUTO: 2.83 MILL/MM3 (ref 4.7–6.1)
RBC # BLD AUTO: 2.87 MILL/MM3 (ref 4.7–6.1)
RBC # BLD AUTO: 2.93 MILL/MM3 (ref 4.7–6.1)
RBC # BLD AUTO: 2.94 MILL/MM3 (ref 4.7–6.1)
RH BLD: NORMAL
SAO2 % BLD: 76 % (ref 94–97)
SAO2 % BLDA: 100 %
SAO2 % BLDA: 99 %
SAO2 % BLDMV: 78 %
SAO2 % BLDMV: 80 %
SAO2 % BLDMV: 82 %
SEROTONIN RELEASING ASSAY: NORMAL
SET PEEP: 15 MMHG
SET RESPIRATORY RATE: 12 BPM
SITE: ABNORMAL
SODIUM SERPL-SCNC: 140 MEQ/L (ref 135–145)
SODIUM SERPL-SCNC: 142 MEQ/L (ref 135–145)
SODIUM SERPL-SCNC: 143 MEQ/L (ref 135–145)
SODIUM SERPL-SCNC: 144 MEQ/L (ref 135–145)
VENTILATION MODE VENT: ABNORMAL
VENTILATION MODE VENT: AC
WBC # BLD AUTO: 14.5 THOU/MM3 (ref 4.8–10.8)
WBC # BLD AUTO: 14.8 THOU/MM3 (ref 4.8–10.8)
WBC # BLD AUTO: 15.8 THOU/MM3 (ref 4.8–10.8)
WBC # BLD AUTO: 16.8 THOU/MM3 (ref 4.8–10.8)

## 2025-05-15 PROCEDURE — C1760 CLOSURE DEV, VASC: HCPCS | Performed by: INTERNAL MEDICINE

## 2025-05-15 PROCEDURE — 99233 SBSQ HOSP IP/OBS HIGH 50: CPT | Performed by: INTERNAL MEDICINE

## 2025-05-15 PROCEDURE — 6360000002 HC RX W HCPCS: Performed by: NURSE PRACTITIONER

## 2025-05-15 PROCEDURE — 2580000003 HC RX 258: Performed by: INTERNAL MEDICINE

## 2025-05-15 PROCEDURE — 85576 BLOOD PLATELET AGGREGATION: CPT

## 2025-05-15 PROCEDURE — C1769 GUIDE WIRE: HCPCS | Performed by: INTERNAL MEDICINE

## 2025-05-15 PROCEDURE — 85384 FIBRINOGEN ACTIVITY: CPT

## 2025-05-15 PROCEDURE — 6360000002 HC RX W HCPCS: Performed by: INTERNAL MEDICINE

## 2025-05-15 PROCEDURE — 2500000003 HC RX 250 WO HCPCS: Performed by: NURSE PRACTITIONER

## 2025-05-15 PROCEDURE — 36415 COLL VENOUS BLD VENIPUNCTURE: CPT

## 2025-05-15 PROCEDURE — 6370000000 HC RX 637 (ALT 250 FOR IP): Performed by: STUDENT IN AN ORGANIZED HEALTH CARE EDUCATION/TRAINING PROGRAM

## 2025-05-15 PROCEDURE — 99292 CRITICAL CARE ADDL 30 MIN: CPT | Performed by: INTERNAL MEDICINE

## 2025-05-15 PROCEDURE — 94640 AIRWAY INHALATION TREATMENT: CPT

## 2025-05-15 PROCEDURE — 82810 BLOOD GASES O2 SAT ONLY: CPT

## 2025-05-15 PROCEDURE — 86923 COMPATIBILITY TEST ELECTRIC: CPT

## 2025-05-15 PROCEDURE — 2709999900 HC NON-CHARGEABLE SUPPLY: Performed by: INTERNAL MEDICINE

## 2025-05-15 PROCEDURE — 99231 SBSQ HOSP IP/OBS SF/LOW 25: CPT | Performed by: STUDENT IN AN ORGANIZED HEALTH CARE EDUCATION/TRAINING PROGRAM

## 2025-05-15 PROCEDURE — 6360000002 HC RX W HCPCS: Performed by: STUDENT IN AN ORGANIZED HEALTH CARE EDUCATION/TRAINING PROGRAM

## 2025-05-15 PROCEDURE — 99153 MOD SED SAME PHYS/QHP EA: CPT | Performed by: INTERNAL MEDICINE

## 2025-05-15 PROCEDURE — 99291 CRITICAL CARE FIRST HOUR: CPT | Performed by: INTERNAL MEDICINE

## 2025-05-15 PROCEDURE — 85730 THROMBOPLASTIN TIME PARTIAL: CPT

## 2025-05-15 PROCEDURE — C1887 CATHETER, GUIDING: HCPCS | Performed by: INTERNAL MEDICINE

## 2025-05-15 PROCEDURE — 93451 RIGHT HEART CATH: CPT | Performed by: INTERNAL MEDICINE

## 2025-05-15 PROCEDURE — 6370000000 HC RX 637 (ALT 250 FOR IP)

## 2025-05-15 PROCEDURE — 93005 ELECTROCARDIOGRAM TRACING: CPT | Performed by: INTERNAL MEDICINE

## 2025-05-15 PROCEDURE — 6370000000 HC RX 637 (ALT 250 FOR IP): Performed by: INTERNAL MEDICINE

## 2025-05-15 PROCEDURE — 33361 REPLACE AORTIC VALVE PERQ: CPT | Performed by: INTERNAL MEDICINE

## 2025-05-15 PROCEDURE — 85610 PROTHROMBIN TIME: CPT

## 2025-05-15 PROCEDURE — 2000000000 HC ICU R&B

## 2025-05-15 PROCEDURE — C1725 CATH, TRANSLUMIN NON-LASER: HCPCS | Performed by: INTERNAL MEDICINE

## 2025-05-15 PROCEDURE — 82330 ASSAY OF CALCIUM: CPT

## 2025-05-15 PROCEDURE — 86885 COOMBS TEST INDIRECT QUAL: CPT

## 2025-05-15 PROCEDURE — 82248 BILIRUBIN DIRECT: CPT

## 2025-05-15 PROCEDURE — 33949 ECMO/ECLS DAILY MGMT ARTERY: CPT | Performed by: INTERNAL MEDICINE

## 2025-05-15 PROCEDURE — 2720000010 HC SURG SUPPLY STERILE: Performed by: INTERNAL MEDICINE

## 2025-05-15 PROCEDURE — 85027 COMPLETE CBC AUTOMATED: CPT

## 2025-05-15 PROCEDURE — 86900 BLOOD TYPING SEROLOGIC ABO: CPT

## 2025-05-15 PROCEDURE — 83735 ASSAY OF MAGNESIUM: CPT

## 2025-05-15 PROCEDURE — 82947 ASSAY GLUCOSE BLOOD QUANT: CPT

## 2025-05-15 PROCEDURE — 36600 WITHDRAWAL OF ARTERIAL BLOOD: CPT

## 2025-05-15 PROCEDURE — 84100 ASSAY OF PHOSPHORUS: CPT

## 2025-05-15 PROCEDURE — 2700000000 HC OXYGEN THERAPY PER DAY

## 2025-05-15 PROCEDURE — 6370000000 HC RX 637 (ALT 250 FOR IP): Performed by: NURSE PRACTITIONER

## 2025-05-15 PROCEDURE — 83605 ASSAY OF LACTIC ACID: CPT

## 2025-05-15 PROCEDURE — 93010 ELECTROCARDIOGRAM REPORT: CPT | Performed by: INTERNAL MEDICINE

## 2025-05-15 PROCEDURE — 80053 COMPREHEN METABOLIC PANEL: CPT

## 2025-05-15 PROCEDURE — 99152 MOD SED SAME PHYS/QHP 5/>YRS: CPT | Performed by: INTERNAL MEDICINE

## 2025-05-15 PROCEDURE — C1751 CATH, INF, PER/CENT/MIDLINE: HCPCS | Performed by: INTERNAL MEDICINE

## 2025-05-15 PROCEDURE — 02RF38Z REPLACEMENT OF AORTIC VALVE WITH ZOOPLASTIC TISSUE, PERCUTANEOUS APPROACH: ICD-10-PCS | Performed by: THORACIC SURGERY (CARDIOTHORACIC VASCULAR SURGERY)

## 2025-05-15 PROCEDURE — 89220 SPUTUM SPECIMEN COLLECTION: CPT

## 2025-05-15 PROCEDURE — 37799 UNLISTED PX VASCULAR SURGERY: CPT

## 2025-05-15 PROCEDURE — C1894 INTRO/SHEATH, NON-LASER: HCPCS | Performed by: INTERNAL MEDICINE

## 2025-05-15 PROCEDURE — 71045 X-RAY EXAM CHEST 1 VIEW: CPT

## 2025-05-15 PROCEDURE — C1889 IMPLANT/INSERT DEVICE, NOC: HCPCS | Performed by: INTERNAL MEDICINE

## 2025-05-15 PROCEDURE — 33361 REPLACE AORTIC VALVE PERQ: CPT | Performed by: THORACIC SURGERY (CARDIOTHORACIC VASCULAR SURGERY)

## 2025-05-15 PROCEDURE — 86901 BLOOD TYPING SEROLOGIC RH(D): CPT

## 2025-05-15 PROCEDURE — G0269 OCCLUSIVE DEVICE IN VEIN ART: HCPCS | Performed by: INTERNAL MEDICINE

## 2025-05-15 PROCEDURE — 51702 INSERT TEMP BLADDER CATH: CPT

## 2025-05-15 PROCEDURE — 2580000003 HC RX 258: Performed by: STUDENT IN AN ORGANIZED HEALTH CARE EDUCATION/TRAINING PROGRAM

## 2025-05-15 PROCEDURE — 82803 BLOOD GASES ANY COMBINATION: CPT

## 2025-05-15 PROCEDURE — 2500000003 HC RX 250 WO HCPCS: Performed by: INTERNAL MEDICINE

## 2025-05-15 PROCEDURE — 85347 COAGULATION TIME ACTIVATED: CPT

## 2025-05-15 PROCEDURE — 94003 VENT MGMT INPAT SUBQ DAY: CPT

## 2025-05-15 PROCEDURE — 94761 N-INVAS EAR/PLS OXIMETRY MLT: CPT

## 2025-05-15 DEVICE — TAV EVFXPLUS-26 COMM US
Type: IMPLANTABLE DEVICE | Status: FUNCTIONAL
Brand: EVOLUT™ FX+

## 2025-05-15 DEVICE — ANGIO-SEAL VIP VASCULAR CLOSURE DEVICE
Type: IMPLANTABLE DEVICE | Status: FUNCTIONAL
Brand: ANGIO-SEAL

## 2025-05-15 RX ORDER — SODIUM CHLORIDE 0.9 % (FLUSH) 0.9 %
5-40 SYRINGE (ML) INJECTION EVERY 12 HOURS SCHEDULED
Status: DISCONTINUED | OUTPATIENT
Start: 2025-05-15 | End: 2025-05-15 | Stop reason: SDUPTHER

## 2025-05-15 RX ORDER — HYDRALAZINE HYDROCHLORIDE 20 MG/ML
10 INJECTION INTRAMUSCULAR; INTRAVENOUS EVERY 10 MIN PRN
Status: DISCONTINUED | OUTPATIENT
Start: 2025-05-15 | End: 2025-05-28 | Stop reason: HOSPADM

## 2025-05-15 RX ORDER — SODIUM CHLORIDE 0.9 % (FLUSH) 0.9 %
5-40 SYRINGE (ML) INJECTION PRN
Status: DISCONTINUED | OUTPATIENT
Start: 2025-05-15 | End: 2025-05-15 | Stop reason: SDUPTHER

## 2025-05-15 RX ORDER — SODIUM CHLORIDE 9 MG/ML
INJECTION, SOLUTION INTRAVENOUS CONTINUOUS
Status: DISCONTINUED | OUTPATIENT
Start: 2025-05-15 | End: 2025-05-24

## 2025-05-15 RX ORDER — FENTANYL CITRATE 50 UG/ML
INJECTION, SOLUTION INTRAMUSCULAR; INTRAVENOUS PRN
Status: DISCONTINUED | OUTPATIENT
Start: 2025-05-15 | End: 2025-05-15 | Stop reason: HOSPADM

## 2025-05-15 RX ORDER — MIDAZOLAM HYDROCHLORIDE 1 MG/ML
INJECTION, SOLUTION INTRAMUSCULAR; INTRAVENOUS PRN
Status: DISCONTINUED | OUTPATIENT
Start: 2025-05-15 | End: 2025-05-15 | Stop reason: HOSPADM

## 2025-05-15 RX ORDER — HYDROCORTISONE SODIUM SUCCINATE 100 MG/2ML
200 INJECTION INTRAMUSCULAR; INTRAVENOUS ONCE
Status: DISCONTINUED | OUTPATIENT
Start: 2025-05-15 | End: 2025-05-22

## 2025-05-15 RX ORDER — BISACODYL 5 MG/1
5 TABLET, DELAYED RELEASE ORAL DAILY PRN
Status: DISCONTINUED | OUTPATIENT
Start: 2025-05-15 | End: 2025-05-28 | Stop reason: HOSPADM

## 2025-05-15 RX ORDER — SODIUM CHLORIDE 9 MG/ML
INJECTION, SOLUTION INTRAVENOUS PRN
Status: DISCONTINUED | OUTPATIENT
Start: 2025-05-15 | End: 2025-05-15 | Stop reason: SDUPTHER

## 2025-05-15 RX ORDER — DIPHENHYDRAMINE HYDROCHLORIDE 50 MG/ML
50 INJECTION, SOLUTION INTRAMUSCULAR; INTRAVENOUS ONCE
Status: DISCONTINUED | OUTPATIENT
Start: 2025-05-15 | End: 2025-05-24

## 2025-05-15 RX ORDER — ATROPINE SULFATE 0.4 MG/ML
0.5 INJECTION, SOLUTION INTRAVENOUS
Status: DISCONTINUED | OUTPATIENT
Start: 2025-05-15 | End: 2025-05-28 | Stop reason: HOSPADM

## 2025-05-15 RX ORDER — ACETAMINOPHEN 325 MG/1
650 TABLET ORAL EVERY 4 HOURS PRN
Status: DISCONTINUED | OUTPATIENT
Start: 2025-05-15 | End: 2025-05-28 | Stop reason: HOSPADM

## 2025-05-15 RX ORDER — ONDANSETRON 2 MG/ML
4 INJECTION INTRAMUSCULAR; INTRAVENOUS EVERY 6 HOURS PRN
Status: DISCONTINUED | OUTPATIENT
Start: 2025-05-15 | End: 2025-05-15

## 2025-05-15 RX ORDER — HEPARIN SODIUM 10000 [USP'U]/ML
INJECTION, SOLUTION INTRAVENOUS; SUBCUTANEOUS PRN
Status: DISCONTINUED | OUTPATIENT
Start: 2025-05-15 | End: 2025-05-15 | Stop reason: HOSPADM

## 2025-05-15 RX ORDER — POLYETHYLENE GLYCOL 3350 17 G/17G
17 POWDER, FOR SOLUTION ORAL DAILY PRN
Status: DISCONTINUED | OUTPATIENT
Start: 2025-05-15 | End: 2025-05-15 | Stop reason: SDUPTHER

## 2025-05-15 RX ORDER — ONDANSETRON 4 MG/1
4 TABLET, ORALLY DISINTEGRATING ORAL EVERY 8 HOURS PRN
Status: DISCONTINUED | OUTPATIENT
Start: 2025-05-15 | End: 2025-05-15

## 2025-05-15 RX ADMIN — LEVOTHYROXINE SODIUM 75 MCG: 0.07 TABLET ORAL at 10:58

## 2025-05-15 RX ADMIN — SODIUM CHLORIDE, PRESERVATIVE FREE 10 ML: 5 INJECTION INTRAVENOUS at 20:39

## 2025-05-15 RX ADMIN — HEPARIN SODIUM 10 UNITS/KG/HR: 10000 INJECTION, SOLUTION INTRAVENOUS at 06:29

## 2025-05-15 RX ADMIN — CEFEPIME 2000 MG: 2 INJECTION, POWDER, FOR SOLUTION INTRAVENOUS at 13:40

## 2025-05-15 RX ADMIN — POTASSIUM CHLORIDE 20 MEQ: 29.8 INJECTION INTRAVENOUS at 15:43

## 2025-05-15 RX ADMIN — PANTOPRAZOLE SODIUM 8 MG/HR: 40 INJECTION, POWDER, LYOPHILIZED, FOR SOLUTION INTRAVENOUS at 12:55

## 2025-05-15 RX ADMIN — ESCITALOPRAM OXALATE 10 MG: 10 TABLET ORAL at 10:58

## 2025-05-15 RX ADMIN — CHLOROTHIAZIDE SODIUM 250 MG: 500 INJECTION, POWDER, LYOPHILIZED, FOR SOLUTION INTRAVENOUS at 13:58

## 2025-05-15 RX ADMIN — NITROGLYCERIN 1 INCH: 20 OINTMENT TOPICAL at 11:05

## 2025-05-15 RX ADMIN — Medication 3 MCG/MIN: at 16:58

## 2025-05-15 RX ADMIN — PANTOPRAZOLE SODIUM 8 MG/HR: 40 INJECTION, POWDER, LYOPHILIZED, FOR SOLUTION INTRAVENOUS at 02:21

## 2025-05-15 RX ADMIN — Medication 1000 UNITS: at 10:58

## 2025-05-15 RX ADMIN — BUMETANIDE 1 MG/HR: 0.25 INJECTION INTRAMUSCULAR; INTRAVENOUS at 00:08

## 2025-05-15 RX ADMIN — GABAPENTIN 300 MG: 300 CAPSULE ORAL at 20:40

## 2025-05-15 RX ADMIN — NITROGLYCERIN 1 INCH: 20 OINTMENT TOPICAL at 00:28

## 2025-05-15 RX ADMIN — CHLORHEXIDINE GLUCONATE 15 ML: 1.2 RINSE ORAL at 12:34

## 2025-05-15 RX ADMIN — CHLORHEXIDINE GLUCONATE 15 ML: 1.2 RINSE ORAL at 20:29

## 2025-05-15 RX ADMIN — TIOTROPIUM BROMIDE AND OLODATEROL 2 PUFF: 3.124; 2.736 SPRAY, METERED RESPIRATORY (INHALATION) at 07:50

## 2025-05-15 RX ADMIN — NITROGLYCERIN 1 INCH: 20 OINTMENT TOPICAL at 05:37

## 2025-05-15 RX ADMIN — SENNOSIDES 17.2 MG: 8.6 TABLET, FILM COATED ORAL at 11:06

## 2025-05-15 RX ADMIN — POTASSIUM CHLORIDE 20 MEQ: 29.8 INJECTION INTRAVENOUS at 16:27

## 2025-05-15 RX ADMIN — Medication 2 MG/HR: at 12:40

## 2025-05-15 RX ADMIN — ASPIRIN 81 MG: 81 TABLET, COATED ORAL at 11:06

## 2025-05-15 RX ADMIN — NITROGLYCERIN 1 INCH: 20 OINTMENT TOPICAL at 18:12

## 2025-05-15 RX ADMIN — NITROGLYCERIN 1 INCH: 20 OINTMENT TOPICAL at 23:05

## 2025-05-15 RX ADMIN — GABAPENTIN 300 MG: 300 CAPSULE ORAL at 10:58

## 2025-05-15 RX ADMIN — FOLIC ACID 1000 MCG: 1 TABLET ORAL at 10:58

## 2025-05-15 RX ADMIN — BUMETANIDE 1 MG/HR: 0.25 INJECTION INTRAMUSCULAR; INTRAVENOUS at 13:42

## 2025-05-15 RX ADMIN — SODIUM CHLORIDE, PRESERVATIVE FREE 10 ML: 5 INJECTION INTRAVENOUS at 11:00

## 2025-05-15 RX ADMIN — Medication 100 MCG/HR: at 15:58

## 2025-05-15 RX ADMIN — CEFEPIME 2000 MG: 2 INJECTION, POWDER, FOR SOLUTION INTRAVENOUS at 20:33

## 2025-05-15 RX ADMIN — CEFEPIME 2000 MG: 2 INJECTION, POWDER, FOR SOLUTION INTRAVENOUS at 04:42

## 2025-05-15 RX ADMIN — POTASSIUM CHLORIDE 20 MEQ: 29.8 INJECTION INTRAVENOUS at 17:28

## 2025-05-15 RX ADMIN — SENNOSIDES 17.2 MG: 8.6 TABLET, FILM COATED ORAL at 20:39

## 2025-05-15 RX ADMIN — OXYCODONE HYDROCHLORIDE AND ACETAMINOPHEN 500 MG: 500 TABLET ORAL at 10:58

## 2025-05-15 RX ADMIN — PANTOPRAZOLE SODIUM 8 MG/HR: 40 INJECTION, POWDER, LYOPHILIZED, FOR SOLUTION INTRAVENOUS at 23:57

## 2025-05-15 RX ADMIN — Medication 400 MG: at 10:58

## 2025-05-15 ASSESSMENT — PULMONARY FUNCTION TESTS
PIF_VALUE: 28
PIF_VALUE: 30
PIF_VALUE: 29
PIF_VALUE: 29
PIF_VALUE: 28
PIF_VALUE: 29
PIF_VALUE: 28
PIF_VALUE: 29

## 2025-05-15 NOTE — PLAN OF CARE
Alberto Gilliland is a 60 y.o. male admitted 4/30/2025 for evaluation of lightheadedness, dizziness, hypotension, fatigue. He was seen in our office for iron infusion and noted to be symptomatic, was sent to ED. Did not receive IV iron infusion. Labs upon presentation proBNP elevated at 8056. H/H 8.5/28.9. Iron low at 25, iron sat low at 7%. EKG normal sinus rhythm. Chest x-ray negative for acute process. Admitting physician notes that cardiology note in epic recommends left heart cath and right heart cath RODRÍGUEZ and possible TAVR. Admission indicated CHF with hypotension. Cardiology consulted with recommendations of anemia workup inpatient, hematology and GI consults. They recommended maintaining hemoglobin greater than 10. They recommend consult for cardiovascular surgeon for TAVR versus SAVR recommendation. Cardiac cath after anemia workup completed. Cardiothoracic surgery consulted with recommendations of TAVR +/- PCI. GI consulted with recommendations of EGD pending clinical course stating high risk given his severe AS. Hematology consulted. Of note, pt has history of small intestinal surgery with 8\" removed. Likely malabsorption contributing to iron deficiency.     He is now on ECMO      Iron deficiency anemia  Hx small intestinal surgery with 8\" removed after GI bleed.  Likely malabsorption contributing to iron deficiency.  On nexium.  Recent iron labs low, scheduled for IV iron infusion but sent to ED.  Pt scheduled for Ferrlecit M-W F with last dose on 5/7.  GI consulted and did colonoscopy which showed Large cecal angiectasia destroyed with APC after submucosal injection with saline to decrease damage to the cecal wall. This is a patient's likely cause of DIONISIO.  Hgb dropped to 6.3 today he was transfused   5/7/25 Developed hypotension requiring pressors and CBC showed hemoglobin of 6.3/hematocrit 20.9.  INR 1.9.  Transfusion of RBCs and massive transfusion protocol initiated.  GI was consulted and did emergent

## 2025-05-15 NOTE — PROGRESS NOTES
[facility]   Nephrology progress note  5/15/2025, 2:48 PM      Pt Name:    Alberto Gilliland  MRN:     [id number]     YOB: 1965  Admit Date:    4/30/2025 12:24 PM    Chief Complaint: Nephrology following for acute kidney injury and fluid overload.    Subjective:  Patient seen and examined  Currently patient is on ECMO  off CVVH  Having excellent urine output  Pressor requirement much better. Only on epi  On Bumex drip    Objective:  24HR INTAKE/OUTPUT:    Intake/Output Summary (Last 24 hours) at 5/15/2025 1448  Last data filed at 5/15/2025 1300  Gross per 24 hour   Intake 1282.27 ml   Output 3520 ml   Net -2237.73 ml      Admission weight: 87.5 kg (193 lb)  Wt Readings from Last 3 Encounters:   05/15/25 85 kg (187 lb 6.3 oz)   04/30/25 87.7 kg (193 lb 6.4 oz)   04/25/25 89.2 kg (196 lb 9.6 oz)        Vitals :   Vitals:    05/15/25 1200 05/15/25 1215 05/15/25 1230 05/15/25 1300   BP:       Pulse: 76 76 79 79   Resp: 12 12 15 17   Temp:    98.6 °F (37 °C)   TempSrc:    Core   SpO2: 100% 100% 100%    Weight:       Height:           Physical examination  General Appearance: Ill-appearing  Mouth/Throat: ET tube in place  Neck: No accessory muscle use  Lungs:  Breath sounds: Diminished  Heart::  S1,S2 heard  Abdomen:  Soft, non - tender  Musculoskeletal:  Edema -noted    Medications:  Infusion:    sodium chloride      fentaNYL 125 mcg/hr (05/15/25 0056)    norepinephrine Stopped (05/13/25 1600)    VASOpressin 20 Units in sodium chloride 0.9 % 100 mL infusion Stopped (05/14/25 0030)    pantoprazole (PROTONIX) 80 mg in sodium chloride 0.9 % 100 mL infusion 8 mg/hr (05/15/25 1255)    prismaSATE BGK 4/2.5 700 mL/hr at 05/14/25 0306    prismaSATE BGK 4/2.5 700 mL/hr at 05/14/25 0318    prismaSATE BGK 4/2.5 700 mL/hr at 05/14/25 0309    midazolam 2 mg/hr (05/15/25 1240)    EPINEPHrine 4 mcg/min (05/15/25 0109)    nitroGLYCERIN 10 mcg/min (05/15/25 0056)    heparin (porcine) 10 Units/kg/hr

## 2025-05-15 NOTE — PROGRESS NOTES
2010 - Vt . ETT lavaged and suctioned with improvement in Vt 100-300.  2019 - Dr. Rivera updated. Telephone orders received.

## 2025-05-15 NOTE — PROGRESS NOTES
Gastroenterology Progress Note:     Patient Name:  Alberto Gilliland   MRN: [id number]  [id number]  YOB: 1965  Admit Date: 4/30/2025 12:24 PM  Primary Care Physician: Juan Christiansen DO   4D-03/003-A     Patient seen and examined.  24 hours events and chart reviewed.    Subjective: Patient remains lightly sedated on ventilator and ECMO.  Going for TAVR today.  Hgb stable.  No further overt s/s of GI bleeding.  Tolerated trickle tube feeds per OG tube.      Objective:  BP 98/64   Pulse 81   Temp 98.4 °F (36.9 °C)   Resp 24   Ht 1.702 m (5' 7\")   Wt 85 kg (187 lb 6.3 oz)   SpO2 100%   BMI 29.35 kg/m²     Physical Exam  Vitals and nursing note reviewed.   Constitutional:       General: He is not in acute distress.     Appearance: He is ill-appearing.      Comments: Lightly sedated     HENT:      Mouth/Throat:      Mouth: Mucous membranes are dry.      Pharynx: Oropharynx is clear.   Cardiovascular:      Comments: ECMO    Pulmonary:      Effort: Pulmonary effort is normal. No respiratory distress.      Breath sounds: Normal breath sounds. No stridor. No wheezing, rhonchi or rales.   Chest:      Chest wall: No tenderness.   Abdominal:      General: Abdomen is flat. Bowel sounds are normal. There is no distension.      Palpations: Abdomen is soft. There is no mass.      Tenderness: There is no abdominal tenderness. There is no guarding or rebound.      Hernia: No hernia is present.   Skin:     General: Skin is warm and dry.      Capillary Refill: Capillary refill takes less than 2 seconds.   Neurological:      Mental Status: He is oriented to person, place, and time.   Psychiatric:         Mood and Affect: Mood normal.         Behavior: Behavior normal.         Thought Content: Thought content normal.         Judgment: Judgment normal.           Labs:   CBC:   Lab Results   Component Value Date/Time    WBC 15.8 05/15/2025 04:08 AM    HGB 8.4 05/15/2025 04:08 AM    HGB 12.0 05/18/2012 01:10 PM    HCT

## 2025-05-15 NOTE — PROGRESS NOTES
Patient Weaning Progress    The patient's vent settings was not able to be weaned this shift.    Ventilator settings that were weaned              [] Mode   [] Pressure support weaned   [x] Fio2 weaned   [] Peep weaned    Spontaneous weaning trial was not attempted due to defined parameters for SBT (spontaneous breathing trial) not being met.    Reason that defined ventilator parameters for SBT was not met              [x] Patient condition requires increased ventilator settings  [x] Requires increased sedation   [x] Settings not within weaning range   [] SAT not completed   [] Physician orders    Evac tube was hooked up with continuous low suction (20-30mmHg).    Cuff was not deflated to determine cuff leak.    Unable to get agreement for goals because no family is present and patient cannot respond.        Problem: Respiratory - Adult    Goal: Achieves optimal ventilation and oxygenation  Achieves optimal ventilation and oxygenation:   Assess for changes in respiratory status   Position to facilitate oxygenation and minimize respiratory effort   Assess the need for suctioning and aspirate as needed   Respiratory therapy support as indicated   Oxygen supplementation based on oxygen saturation or arterial blood gases    Outcome: Progressing

## 2025-05-15 NOTE — FLOWSHEET NOTE
0600 Mr Gilliland was prepped and transported to the Cath lab for TAVR. His family have come to see.

## 2025-05-15 NOTE — FLOWSHEET NOTE
1100 Mr Gilliland has returned from the Cath lab S/P TAVR procedure. He is intubated with 7.5 ETT at 25 cm to his lips. An OGT was inserted to about 62 cm and placement was confirmed with a CXR. He has a swan yury catheter right SC site clear with good wave forms at the 65 cm mao. He has an art line left radial site clear with good wave forms. He has a temp sensing wong paatent to gravity draining clear yellow urine. He has ECMO cannulas venous right groin with clear, dry dressing and arterial return left groin site clear dressing with a mechanical bypass from the return to a left groin introducer. ECMO flows are at about 4 LPM and sweep of 2 lpm upon return from the cath lab. Plan to try to wean ECMO flow as he tolerates. Thermodilution CO was completed. (See flow sheet)    1200  ABG: pH 7.49; pCO2 39;  pO2 126; HCO3 30, %O2 Sat 99. BE 6.2   VBG: pO2 44; %O2 Sat 82  ECMO 4.0 LPM @ 3250 RPM  Sweep 2 lpm   100    Decrease sweep to 1 L    1300  ABG: pH 7.46; pCO2 44;  pO2 121; HCO3 32, %O2 Sat 99. BE 6.9   ECMO 4 LPM @ 3250 RPM  Sweep 1 lpm   100    Dr Rivera was notified of the ABG results and we will hold weaning for now. Give diuril and reassess later.    1600 Dr Rivera in to see. ABG results from both arms are identical indicating the mixing cloud (if present) has moved beyond the left SC artery, suggesting his heart muscle is improving. We will decrease the ECMO flow and redraw again from both arms. ECMO BFR decreased to 3.5 LPM    1700  ABG: pH 7.47; pCO2 45;  pO2 125; HCO3 33, %O2 Sat 99. BE 7.9   VBG: pO2 42; %O2 Sat 78  ECMO 3.5 LPM @ 2300 RPM  Sweep 1 lpm   100  Thermodilution  CO 5.3  CI   2.7  SV  62.4

## 2025-05-15 NOTE — OP NOTE
DATE: 05/15/25    PATIENT: Alberto Gilliland    MRN: [id number]     Acct: [id number]    PREOP DIAGNOSIS:   Severe prosthetic aortic stenosis    Postop diagnosis:  Severe prosthetic aortic stenosis    Procedure:  Transfemoral transcatheter aortic valve replacement with a Medtronic 26 mm Evolut FX prosthesis    OPERATORS:  Nikita Rivera MD; Jonathan Martinez MD    ESTIMATED BLOOD LOSS: 50 ml    A pre-procedure discussion was conducted with the patient to confirm/exclude candidacy for open surgical salvage in case of procedure failure. The patient is previously s/p bioprosthetic AVR, who presents in cardiogenic shock in context of severe prosthetic aortic stenosis.    After informed consent was obtained from the patient, the patient was brought to the hybrid operating room and prepped in sterile fashion. Infiltration of the bilateral inguinal regions was accomplished with 2% lidocaine. Using micropuncture and modified Seldinger technique, a 6 Fr sheath was inserted into the right internal jugular vein. A 5 Fr pacemaker was inserted into position under fluoroscopic guidance into the right ventricle, with verification of appropriate pacing thresholds.    Using the same technique under fluoroscopic guidance, a 4 Fr sheath, followed after contrast verification by a 5 Fr sheath was inserted into the right common femoral artery. A Supra Core wire was used to traverse the aortic arch.  We then performed standard preclose technique by deploying a Perclose and leaving it incomplete. A 14 Fr sheath was inserted over the wire under guidance. The patient was heparinized to an ACT above 250 seconds.     The aortic valve was crossed using a straight wire through an AL1 catheter. The guidewire was changed to a J-tipped wire, on which was inserted an angled pigtail.  Direct hemodynamic measurements were obtained. The valve was then checked for appropriate loading on fluoroscopy.  A Safari wire was inserted into the angled pigtail, with

## 2025-05-15 NOTE — PROGRESS NOTES
Cardiology Progress Note      Patient:  Alberto Gilliland  YOB: 1965  MRN: [id number]   Acct: [id number]  Admit Date:  4/30/2025  Primary Cardiologist:  Rivas    Chief Complaint: CV shock    Subjective (Events in last 24 hours):   Doing very well this AM  Diuresed significantly on Bumex  CXR improved  Vent settings minimal  Weaned flows to 4.5 LPM  ABG/VBG acceptable  Plan for TAVR today  Family at bedside - all questions answered      Objective:   BP 98/64   Pulse 87   Temp 98.6 °F (37 °C) (Core)   Resp 18   Ht 1.702 m (5' 7\")   Wt 85 kg (187 lb 6.3 oz)   SpO2 100%   BMI 29.35 kg/m²        TELEMETRY: NSR/ST    Physical Exam:  General Appearance: Intubated/sedated  Cardiovascular: normal rate, regular rhythm, normal S1 and S2, 3/6 JESUS  Pulmonary/Chest: Improved aeration bilaterally  Abdomen: soft, non-tender, non-distended, normal bowel sounds, no masses Extremities: no cyanosis, clubbing or edema, pulse 1-2+  Skin: warm and dry, no rash or erythema  Head: normocephalic and atraumatic  Eyes: pupils equal, round, and reactive to light  Neck: supple and non-tender without mass, no thyromegaly   Musculoskeletal: normal range of motion, no joint swelling, deformity or tenderness  Neurological: sedated, intubated, follows commands    Medications:    diphenhydrAMINE  50 mg IntraVENous Once    hydrocortisone sodium succinate PF  200 mg IntraVENous Once    chlorhexidine  15 mL Mouth/Throat BID    cefepime  2,000 mg IntraVENous Q8H    gabapentin  300 mg Oral BID    nitroglycerin  1 inch Topical 4 times per day    insulin lispro  0-4 Units SubCUTAneous 4x Daily AC & HS    senna  2 tablet Oral BID    magnesium oxide  400 mg Oral Daily    [Held by provider] metoprolol succinate  12.5 mg Oral Daily    sodium chloride flush  5-40 mL IntraVENous 2 times per day    [Held by provider] enoxaparin  40 mg SubCUTAneous Daily    [Held by provider] aspirin  81 mg Oral Daily    [Held by provider] atorvastatin  20 mg  3 mg, Nightly PRN  nitroGLYCERIN, 0.4 mg, Q5 Min PRN  sodium chloride flush, 5-40 mL, PRN  sodium chloride, , PRN  polyethylene glycol, 17 g, Daily PRN  ALPRAZolam, 0.5 mg, BID PRN      Lab Data:    Cardiac Enzymes:  No results for input(s): \"CKTOTAL\", \"CKMB\", \"CKMBINDEX\", \"TROPONINI\" in the last 72 hours.    CBC:   Lab Results   Component Value Date/Time    WBC 14.8 05/15/2025 02:15 PM    RBC 2.83 05/15/2025 02:15 PM    RBC 4.40 05/18/2012 01:10 PM    HGB 8.3 05/15/2025 02:15 PM    HGB 12.0 05/18/2012 01:10 PM    HCT 26.1 05/15/2025 02:15 PM    PLT 88 05/15/2025 02:15 PM       CMP:    Lab Results   Component Value Date/Time     05/15/2025 02:15 PM    K 3.2 05/15/2025 02:15 PM    K 4.3 04/30/2025 12:37 PM     05/15/2025 02:15 PM    CO2 26 05/15/2025 02:15 PM    BUN 60 05/15/2025 02:15 PM    CREATININE 1.9 05/15/2025 02:15 PM    LABGLOM 39 05/15/2025 02:15 PM    LABGLOM >90 04/16/2024 05:29 AM    GLUCOSE 99 05/15/2025 02:15 PM    CALCIUM 8.9 05/15/2025 02:15 PM       Hepatic Function Panel:    Lab Results   Component Value Date/Time    ALKPHOS 80 05/15/2025 04:08 AM     05/15/2025 04:08 AM     05/15/2025 04:08 AM    BILITOT 6.4 05/15/2025 04:08 AM    BILIDIR 1.4 05/07/2025 04:33 PM       Magnesium:    Lab Results   Component Value Date/Time    MG 2.3 05/15/2025 04:08 AM       PT/INR:    Lab Results   Component Value Date/Time    PROTIME 2.95 12/02/2011 07:15 AM    INR 1.73 05/15/2025 02:50 AM       HgBA1c:    Lab Results   Component Value Date/Time    LABA1C 5.2 01/02/2025 08:59 AM       FLP:    Lab Results   Component Value Date/Time    TRIG 41 05/01/2025 05:56 AM    HDL 19 05/01/2025 05:56 AM       TSH:    Lab Results   Component Value Date/Time    TSH 1.61 04/30/2025 12:37 PM         Assessment:  PEA during cannulation  Biventricular Valvular Shock, SCAI D --> E  Severe bioprosthetic AS  GI bleed s/p EGD and therapy for AVM - s/p 2 rounds MTP  Prior mechanical AVR s/p redo bioprosthetic

## 2025-05-15 NOTE — PROGRESS NOTES
VenoArterial Extracorporeal Membrane Oxygenation Management    Re: Biventricular Shock with Hypoxic Respiratory Failure - Valvular shock    Insertion Date: 5/13/2025    Days on ECMO: 2    Access Sites: Stable, no bleeding around the 19Fr Arterial Cannula noted, no bleeding around 25Fr LAVA cannula noted  Distal Perfusion Cannulas: Intact, pulses and pleth on foot show good waveforms, bilateral LE warm  Tubing: No kinks/condensation, arterial and venous limb color changes are appropriate, no thrombus seen  Oxygenator: No clots or air bubbles, spit valve emptied, no indication for change out  Pump: No air or clots seen, no concerns for malfunction  Power Console: No issues, plugged in, back up available  Flows: 4.5 L/min  Sweep: 1-2L min of O2, target PaCO2 40-42  Adjunctive Devices: LA-VA      Progress:  Wean flows today  TAVR today  Monitor Cr, if rising, may need to back off Bumex  Pressures adequate  Heparin appears therapeutic, will check TEG today  No fevers  No GI bleeding    Dispo:  LAVA ECMO to stay in place  Rishi TAVR  Wean ECMO post TAVR if tolerated  [facility] is appropriate for continued care  Multidisciplinary rounds BID/TID completed - no major issues identified for continued management at [facility]  Family updated.    Nikita Rivera MD  Interventional Cardiology

## 2025-05-15 NOTE — PROGRESS NOTES
The transport originated from ICU. Pt. was transported to cath lab. Assisting with the transport was this MICHELE, Geovanna BUTLER and cath lab team.   A defibrillator was brought along on transport. Appropriate devices were applied to monitor the patient's condition during transport. A transport backpack was brought on transport, to be used in case of emergency. Patient transported  via ventilator. Patient tolerated procedure well.

## 2025-05-15 NOTE — PROGRESS NOTES
Provider Progress Note        Patient:   Alberto Gilliland  YOB: 1965  Age:  60 y.o.  Room:  [facility]03/003-A  MRN:  [id number]   Acct: [id number]  PCP: Juan Christiansen DO    Date of Admission:  4/30/2025 12:24 PM  Date of Service:  5/15/2025    Reason for Consult: ECMO and Family Support.    History Obtained From:-  Family member(s) - Daughter, Electronic Medical Record, Patient's primary nurse, ICU team, and Reason patient could not give history: Intubated and Sedated             Subjective   Alberto Gilliland was seen in their room today for follow up with the palliative care team. There was family present at the bedside.  He just returned from his TAVR. Patient is unable to report symptoms as they are Intubated and Sedated.       Objective   Vitals:-    BP 98/64   Pulse 75   Temp 98.4 °F (36.9 °C)   Resp 13   Ht 1.702 m (5' 7\")   Wt 85 kg (187 lb 6.3 oz)   SpO2 100%   BMI 29.35 kg/m²     Physical Exam  Vitals and nursing note reviewed.   Constitutional:       General: He is not in acute distress.     Appearance: He is ill-appearing.      Interventions: He is sedated and intubated.      Comments: On ECMO   HENT:      Head: Normocephalic and atraumatic.      Right Ear: External ear normal.      Left Ear: External ear normal.      Nose: No rhinorrhea.   Eyes:      General:         Right eye: No discharge.         Left eye: No discharge.   Pulmonary:      Effort: He is intubated.   Musculoskeletal:      Cervical back: Neck supple.   Psychiatric:         Speech: He is noncommunicative.         Behavior: Behavior normal. Behavior is not agitated, aggressive or hyperactive. Behavior is cooperative.         Cognition and Memory: Cognition is impaired.          Palliative Performance Scale   30%  Bed Bound; Extensive disease; Total care; intake reduced; LOC full/confusion     Assessment and Plan   Alberto Gilliland is a 60 y.o. who is admitted to [facility]

## 2025-05-15 NOTE — BRIEF OP NOTE
[facility]  Sedation/Analgesia Post Sedation Record    Pt Name: Alberto Gilliland  Account number: [id number]  MRN: [id number]  YOB: 1965  Procedure Performed By: Nikita Rivera MD MD FACC, [facility], RPVI  Primary Care Physician: Juan Christiansen DO  Date: 5/15/2025    POST-PROCEDURE    Physicians/Assistants: Nikita Rivera MD, JOHN, [facility], RPVI    Procedure Performed: TAVR    Sedation/Anesthesia: Versed/ Fentanyl and 2% xylocaine local anesthesia.      Estimated Blood Loss: < 50 ml.     Specimens Removed: None         Disposition of Specimen: N/A        Complications: No Immediate Complications.       Post-procedure Diagnosis/Findings:       EFX+ 26      Electronically signed by Nikita Rivera MD on 5/15/25 at 8:54 AM EDT   Interventional Cardiology

## 2025-05-15 NOTE — PROGRESS NOTES
Spoke to primary RN Bryanna regarding CVC dressing due to be changed.  Per Bryanna RN - she will change dressing this shift. IV team to follow.

## 2025-05-15 NOTE — PROGRESS NOTES
CRITICAL CARE PROGRESS NOTE      Patient:  Alberto Gilliland    Unit/Bed:4D-03/003-A  YOB: 1965  MRN: [id number]   PCP: Juan Christiansen DO  Date of Admission: 4/30/2025  Chief Complaint:- fatigue and hypotension    Assessment and Plan:   Acute on Chronic decompensated HFrEF. Patient is on ECMO. Patient is S/P TAVR: BNP 26702. RODRÍGUEZ 05/06 showed EF 30-35% and moderate valve global hypokinesis. Severe stenosis of the aortic valve AV mean 42. Repeat BNP 05/11 85065.  PAP 44/23, CVP 12m SvO2 78.8, CO 4.1, CI 2.1.  S/P TAVR on 5/15 with no immediate complications  ECMO being weaned down  On Bumex drip 1mg/hr   Fluid restrct to less than 2L daily   Daily weights. Strict I&O.     Acute Hypoxic Respiratory Failure-Baseline room air. Episode of tachypnea and increased work of breathing. HFNC attempted with no success. Vomited dark-oscar matter with blood tinged. Given concern for aspiration, patient intubated. CTA 5/13/2025 shows larying right sided pleural effusion with moderate consolidation dependently in lungs with GGOs in perihilar (likley pulmonary edema). XR shows pulmonary edema on right significantly improved.    Severe Aortic Stenosis:  Had Mechanical AVR in 2007 then switched to bioprosthetic in 2016 due to warfarin intolerance and GI bleeding. 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 Structural heart following. Undergoing workup and medical optimization  TAVR done on 5/15 with no immediate complications.    Pneumonia / Sepsis- Worsening productive cough, increased O2 requirement and leukocytosis. CXR with possible RLL infiltrate. Pneumonia panel and resp cultures ordered. Decision made to start on HAP coverage, Urine strep pna and legionella negative. 05/11 Pneumonia panel negative. Resp cultures moderate segmented neutrophils, moderate gram positive cocci singly and in pairs. Few gram negative bacilli. Budding yeast.   On Cefepime 2000 mg BID

## 2025-05-15 NOTE — PROGRESS NOTES
Spiritual Health History and Assessment/Progress Note  [facility]    (P) Spiritual/Emotional Needs,  ,  ,      Name: Alberto Gilliland MRN: [id number]    Age: 60 y.o.     Sex: male   Language: English   Rastafarian: None   Low output heart failure (HCC)     Date: 5/15/2025            Total Time Calculated: (P) 5 min              Spiritual Assessment continued in [facility] ICU 4D        Referral/Consult From: (P) Rounding   Encounter Overview/Reason: (P) Spiritual/Emotional Needs  Service Provided For: (P) Patient and family together    Shae, Belief, Meaning:   Patient unable to assess at this time  Family/Friends Other: Unable to assess      Importance and Influence:  Patient unable to assess at this time  Family/Friends have spiritual/personal beliefs that influence decisions regarding the patient's health    Community:  Patient Other: NR-unable to assess  Family/Friends feel well-supported. Support system includes: Parent/s    Assessment and Plan of Care:     Patient Interventions include: Other:  prayed for patient bedside  Family/Friends Interventions include: Facilitated expression of thoughts and feelings, Explored spiritual coping/struggle/distress, and Other:  prayed for patient bedside, with daughter present.    Patient Plan of Care: Spiritual Care available upon further referral  Family/Friends Plan of Care: Spiritual Care available upon further referral    Electronically signed by Chaplain Adán on 5/15/2025 at 3:09 PM

## 2025-05-16 ENCOUNTER — APPOINTMENT (OUTPATIENT)
Dept: GENERAL RADIOLOGY | Age: 60
DRG: 003 | End: 2025-05-16
Payer: COMMERCIAL

## 2025-05-16 ENCOUNTER — APPOINTMENT (OUTPATIENT)
Age: 60
DRG: 003 | End: 2025-05-16
Attending: INTERNAL MEDICINE
Payer: COMMERCIAL

## 2025-05-16 LAB
ALBUMIN SERPL BCG-MCNC: 3.1 G/DL (ref 3.4–4.9)
ALP SERPL-CCNC: 87 U/L (ref 40–129)
ALT SERPL W/O P-5'-P-CCNC: 328 U/L (ref 10–50)
ANION GAP SERPL CALC-SCNC: 12 MEQ/L (ref 8–16)
ANION GAP SERPL CALC-SCNC: 13 MEQ/L (ref 8–16)
ANION GAP SERPL CALC-SCNC: 13 MEQ/L (ref 8–16)
ANION GAP SERPL CALC-SCNC: 14 MEQ/L (ref 8–16)
ANION GAP SERPL CALC-SCNC: 15 MEQ/L (ref 8–16)
APTT PPP: 39.7 SECONDS (ref 22–38)
APTT PPP: 40.2 SECONDS (ref 22–38)
APTT PPP: 41.3 SECONDS (ref 22–38)
APTT PPP: 43.3 SECONDS (ref 22–38)
APTT PPP: 47.1 SECONDS (ref 22–38)
ARTERIAL PATENCY WRIST A: ABNORMAL
ARTERIAL PATENCY WRIST A: POSITIVE
AST SERPL-CCNC: 234 U/L (ref 10–50)
BASE EXCESS BLDA CALC-SCNC: 5.1 MMOL/L (ref -2.5–2.5)
BASE EXCESS BLDA CALC-SCNC: 5.3 MMOL/L (ref -2.5–2.5)
BASE EXCESS BLDA CALC-SCNC: 5.6 MMOL/L (ref -2.5–2.5)
BASE EXCESS BLDA CALC-SCNC: 6.2 MMOL/L (ref -2.5–2.5)
BASE EXCESS BLDA CALC-SCNC: 6.2 MMOL/L (ref -2–3)
BASE EXCESS BLDA CALC-SCNC: 6.3 MMOL/L (ref -2.5–2.5)
BASE EXCESS BLDA CALC-SCNC: 6.4 MMOL/L (ref -2.5–2.5)
BASE EXCESS BLDA CALC-SCNC: 7.2 MMOL/L (ref -2–3)
BASE EXCESS BLDA CALC-SCNC: 7.3 MMOL/L (ref -2.5–2.5)
BASE EXCESS BLDA CALC-SCNC: 7.3 MMOL/L (ref -2.5–2.5)
BASE EXCESS BLDA CALC-SCNC: 7.7 MMOL/L (ref -2–3)
BASE EXCESS BLDA CALC-SCNC: 7.8 MMOL/L (ref -2–3)
BASE EXCESS BLDA CALC-SCNC: 8 MMOL/L (ref -2.5–2.5)
BASE EXCESS BLDA CALC-SCNC: 8 MMOL/L (ref -2.5–2.5)
BASE EXCESS BLDA CALC-SCNC: 8.2 MMOL/L (ref -2.5–2.5)
BASE EXCESS BLDA CALC-SCNC: 8.4 MMOL/L (ref -2–3)
BASE EXCESS BLDA CALC-SCNC: 8.7 MMOL/L (ref -2–3)
BASE EXCESS BLDA CALC-SCNC: 9 MMOL/L (ref -2–3)
BDY SITE: ABNORMAL
BILIRUB SERPL-MCNC: 5.8 MG/DL (ref 0.3–1.2)
BREATHS SETTING VENT: 12 BPM
BUN SERPL-MCNC: 65 MG/DL (ref 8–23)
BUN SERPL-MCNC: 66 MG/DL (ref 8–23)
BUN SERPL-MCNC: 67 MG/DL (ref 8–23)
BUN SERPL-MCNC: 68 MG/DL (ref 8–23)
BUN SERPL-MCNC: 73 MG/DL (ref 8–23)
CA-I BLD ISE-SCNC: 1.21 MMOL/L (ref 1.12–1.32)
CALCIUM SERPL-MCNC: 8.9 MG/DL (ref 8.8–10.2)
CALCIUM SERPL-MCNC: 8.9 MG/DL (ref 8.8–10.2)
CALCIUM SERPL-MCNC: 9 MG/DL (ref 8.8–10.2)
CALCIUM SERPL-MCNC: 9.2 MG/DL (ref 8.8–10.2)
CALCIUM SERPL-MCNC: 9.3 MG/DL (ref 8.8–10.2)
CFT BLD TEG: 2.6 MINUTES (ref 0.8–2.1)
CHLORIDE SERPL-SCNC: 104 MEQ/L (ref 98–111)
CHLORIDE SERPL-SCNC: 105 MEQ/L (ref 98–111)
CHLORIDE SERPL-SCNC: 106 MEQ/L (ref 98–111)
CLOT ANGLE BLD TEG: 29.5 MM (ref 15–32)
CLOT ANGLE BLD TEG: 61.9 DEG (ref 63–78)
CLOT INIT BLD TEG: > 17 MINUTES (ref 4.6–9.1)
CLOT INIT P HPASE BLD TEG: 10.7 MINUTES (ref 4.3–8.3)
CO2 SERPL-SCNC: 25 MEQ/L (ref 22–29)
CO2 SERPL-SCNC: 26 MEQ/L (ref 22–29)
CO2 SERPL-SCNC: 27 MEQ/L (ref 22–29)
COLLECTED BY:: ABNORMAL
CREAT SERPL-MCNC: 1.9 MG/DL (ref 0.7–1.2)
CREAT SERPL-MCNC: 2 MG/DL (ref 0.7–1.2)
CREAT SERPL-MCNC: 2 MG/DL (ref 0.7–1.2)
CREAT SERPL-MCNC: 2.2 MG/DL (ref 0.7–1.2)
CREAT SERPL-MCNC: 2.3 MG/DL (ref 0.7–1.2)
DEPRECATED RDW RBC AUTO: 58.8 FL (ref 35–45)
DEPRECATED RDW RBC AUTO: 59.2 FL (ref 35–45)
DEPRECATED RDW RBC AUTO: 59.4 FL (ref 35–45)
DEPRECATED RDW RBC AUTO: 59.9 FL (ref 35–45)
DEPRECATED RDW RBC AUTO: 60.9 FL (ref 35–45)
DEVICE: ABNORMAL
ECHO AO ASC DIAM: 3.4 CM
ECHO AO ASCENDING AORTA INDEX: 1.73 CM/M2
ECHO AV ACCELERATION TIME: 88 MS
ECHO AV AREA PEAK VELOCITY: 1.6 CM2
ECHO AV AREA VTI: 1.6 CM2
ECHO AV AREA/BSA PEAK VELOCITY: 0.8 CM2/M2
ECHO AV AREA/BSA VTI: 0.8 CM2/M2
ECHO AV MEAN GRADIENT: 8 MMHG
ECHO AV MEAN VELOCITY: 1.3 M/S
ECHO AV PEAK GRADIENT: 14 MMHG
ECHO AV PEAK VELOCITY: 1.9 M/S
ECHO AV VELOCITY RATIO: 0.53
ECHO AV VTI: 27.4 CM
ECHO BSA: 2.03 M2
ECHO LA AREA 2C: 17.6 CM2
ECHO LA AREA 4C: 14.5 CM2
ECHO LA DIAMETER INDEX: 2.39 CM/M2
ECHO LA DIAMETER: 4.7 CM
ECHO LA MAJOR AXIS: 5.3 CM
ECHO LA MINOR AXIS: 4.9 CM
ECHO LA VOL BP: 41 ML (ref 18–58)
ECHO LA VOL MOD A2C: 51 ML (ref 18–58)
ECHO LA VOL MOD A4C: 31 ML (ref 18–58)
ECHO LA VOL/BSA BIPLANE: 21 ML/M2 (ref 16–34)
ECHO LA VOLUME INDEX MOD A2C: 26 ML/M2 (ref 16–34)
ECHO LA VOLUME INDEX MOD A4C: 16 ML/M2 (ref 16–34)
ECHO LV E' LATERAL VELOCITY: 4.8 CM/S
ECHO LV E' SEPTAL VELOCITY: 4.4 CM/S
ECHO LV EDV A4C: 168 ML
ECHO LV EDV INDEX A4C: 85 ML/M2
ECHO LV EF PHYSICIAN: 35 %
ECHO LV EJECTION FRACTION A4C: 26 %
ECHO LV ESV A4C: 125 ML
ECHO LV ESV INDEX A4C: 63 ML/M2
ECHO LV FRACTIONAL SHORTENING: 12 % (ref 28–44)
ECHO LV INTERNAL DIMENSION DIASTOLE INDEX: 2.64 CM/M2
ECHO LV INTERNAL DIMENSION DIASTOLIC: 5.2 CM (ref 4.2–5.9)
ECHO LV INTERNAL DIMENSION SYSTOLIC INDEX: 2.34 CM/M2
ECHO LV INTERNAL DIMENSION SYSTOLIC: 4.6 CM
ECHO LV ISOVOLUMETRIC RELAXATION TIME (IVRT): 70 MS
ECHO LV IVSD: 1.4 CM (ref 0.6–1)
ECHO LV MASS 2D: 325.8 G (ref 88–224)
ECHO LV MASS INDEX 2D: 165.4 G/M2 (ref 49–115)
ECHO LV POSTERIOR WALL DIASTOLIC: 1.5 CM (ref 0.6–1)
ECHO LV RELATIVE WALL THICKNESS RATIO: 0.58
ECHO LVOT AREA: 3.1 CM2
ECHO LVOT AV VTI INDEX: 0.51
ECHO LVOT DIAM: 2 CM
ECHO LVOT MEAN GRADIENT: 2 MMHG
ECHO LVOT PEAK GRADIENT: 4 MMHG
ECHO LVOT PEAK VELOCITY: 1 M/S
ECHO LVOT STROKE VOLUME INDEX: 22.5 ML/M2
ECHO LVOT SV: 44.3 ML
ECHO LVOT VTI: 14.1 CM
ECHO MV A VELOCITY: 0.77 M/S
ECHO MV E DECELERATION TIME (DT): 146 MS
ECHO MV E VELOCITY: 0.95 M/S
ECHO MV E/A RATIO: 1.23
ECHO MV E/E' LATERAL: 19.79
ECHO MV E/E' RATIO (AVERAGED): 20.69
ECHO MV E/E' SEPTAL: 21.59
ECHO MV REGURGITANT PEAK GRADIENT: 92 MMHG
ECHO MV REGURGITANT PEAK VELOCITY: 4.8 M/S
ECHO PULMONARY ARTERY END DIASTOLIC PRESSURE: 6 MMHG
ECHO PV REGURGITANT MAX VELOCITY: 1.2 M/S
ECHO RV INTERNAL DIMENSION: 3.6 CM
ECHO RV TAPSE: 1.4 CM (ref 1.7–?)
ECHO TV E WAVE: 0.6 M/S
EKG ATRIAL RATE: 92 BPM
EKG P AXIS: 70 DEGREES
EKG P-R INTERVAL: 186 MS
EKG Q-T INTERVAL: 402 MS
EKG QRS DURATION: 124 MS
EKG QTC CALCULATION (BAZETT): 497 MS
EKG R AXIS: 65 DEGREES
EKG T AXIS: -179 DEGREES
EKG VENTRICULAR RATE: 92 BPM
ERYTHROCYTE [DISTWIDTH] IN BLOOD BY AUTOMATED COUNT: 18.1 % (ref 11.5–14.5)
ERYTHROCYTE [DISTWIDTH] IN BLOOD BY AUTOMATED COUNT: 18.1 % (ref 11.5–14.5)
ERYTHROCYTE [DISTWIDTH] IN BLOOD BY AUTOMATED COUNT: 18.2 % (ref 11.5–14.5)
ERYTHROCYTE [DISTWIDTH] IN BLOOD BY AUTOMATED COUNT: 18.2 % (ref 11.5–14.5)
ERYTHROCYTE [DISTWIDTH] IN BLOOD BY AUTOMATED COUNT: 18.3 % (ref 11.5–14.5)
FIBRINOGEN PPP-MCNC: 438 MG/100ML (ref 155–475)
FIBRINOGEN PPP-MCNC: 463 MG/100ML (ref 155–475)
FIBRINOGEN PPP-MCNC: 512 MG/100ML (ref 155–475)
FIO2 ON VENT O2 ANALYZER: 30 %
FIO2 ON VENT O2 ANALYZER: 30 %
FIO2 ON VENT O2 ANALYZER: 35 %
FIO2 ON VENT O2 ANALYZER: 40 %
FUNCT FIBRINOGEN LEVEL: 538.3 MG/DL (ref 278–581)
GFR SERPL CREATININE-BSD FRML MDRD: 31 ML/MIN/1.73M2
GFR SERPL CREATININE-BSD FRML MDRD: 33 ML/MIN/1.73M2
GFR SERPL CREATININE-BSD FRML MDRD: 37 ML/MIN/1.73M2
GFR SERPL CREATININE-BSD FRML MDRD: 37 ML/MIN/1.73M2
GFR SERPL CREATININE-BSD FRML MDRD: 39 ML/MIN/1.73M2
GLUCOSE BLD-MCNC: 117 MG/DL (ref 70–108)
GLUCOSE BLD-MCNC: 121 MG/DL (ref 70–108)
GLUCOSE BLD-MCNC: 127 MG/DL (ref 70–108)
GLUCOSE BLD-MCNC: 129 MG/DL (ref 70–108)
GLUCOSE BLD-MCNC: 132 MG/DL (ref 70–108)
GLUCOSE BLD-MCNC: 133 MG/DL (ref 70–108)
GLUCOSE BLD-MCNC: 134 MG/DL (ref 70–108)
GLUCOSE BLD-MCNC: 136 MG/DL (ref 70–108)
GLUCOSE BLD-MCNC: 146 MG/DL (ref 70–108)
GLUCOSE SERPL-MCNC: 136 MG/DL (ref 74–109)
GLUCOSE SERPL-MCNC: 138 MG/DL (ref 74–109)
GLUCOSE SERPL-MCNC: 144 MG/DL (ref 74–109)
GLUCOSE SERPL-MCNC: 144 MG/DL (ref 74–109)
GLUCOSE SERPL-MCNC: 163 MG/DL (ref 74–109)
HCO3 BLDA-SCNC: 29 MMOL/L (ref 23–28)
HCO3 BLDA-SCNC: 30 MMOL/L (ref 23–28)
HCO3 BLDA-SCNC: 30 MMOL/L (ref 23–28)
HCO3 BLDA-SCNC: 31 MMOL/L (ref 23–28)
HCO3 BLDA-SCNC: 32 MMOL/L (ref 23–28)
HCO3 BLDA-SCNC: 33 MMOL/L (ref 23–28)
HCO3 BLDA-SCNC: 34 MMOL/L (ref 23–28)
HCO3 BLDA-SCNC: 35 MMOL/L (ref 23–28)
HCT VFR BLD AUTO: 25.7 % (ref 42–52)
HCT VFR BLD AUTO: 25.9 % (ref 42–52)
HCT VFR BLD AUTO: 26 % (ref 42–52)
HCT VFR BLD AUTO: 26 % (ref 42–52)
HCT VFR BLD AUTO: 26.4 % (ref 42–52)
HGB BLD-MCNC: 8 GM/DL (ref 14–18)
HGB BLD-MCNC: 8.1 GM/DL (ref 14–18)
HGB BLD-MCNC: 8.3 GM/DL (ref 14–18)
HGB BLD-MCNC: 8.3 GM/DL (ref 14–18)
HGB BLD-MCNC: 8.4 GM/DL (ref 14–18)
INR PPP: 1.53 (ref 0.85–1.13)
LACTATE SERPL-SCNC: 0.6 MMOL/L (ref 0.5–2)
LACTATE SERPL-SCNC: 0.7 MMOL/L (ref 0.5–2)
MAGNESIUM SERPL-MCNC: 2 MG/DL (ref 1.6–2.6)
MCF BLD TEG: 59.2 MM (ref 52–69)
MCF.EXTRINSIC BLD ROTEM: 65.4 MM (ref 52–70)
MCH RBC QN AUTO: 28.9 PG (ref 26–33)
MCH RBC QN AUTO: 29.1 PG (ref 26–33)
MCH RBC QN AUTO: 29.3 PG (ref 26–33)
MCH RBC QN AUTO: 29.5 PG (ref 26–33)
MCH RBC QN AUTO: 29.7 PG (ref 26–33)
MCHC RBC AUTO-ENTMCNC: 31.1 GM/DL (ref 32.2–35.5)
MCHC RBC AUTO-ENTMCNC: 31.2 GM/DL (ref 32.2–35.5)
MCHC RBC AUTO-ENTMCNC: 31.8 GM/DL (ref 32.2–35.5)
MCHC RBC AUTO-ENTMCNC: 31.9 GM/DL (ref 32.2–35.5)
MCHC RBC AUTO-ENTMCNC: 32 GM/DL (ref 32.2–35.5)
MCV RBC AUTO: 91.9 FL (ref 80–94)
MCV RBC AUTO: 92.2 FL (ref 80–94)
MCV RBC AUTO: 92.9 FL (ref 80–94)
MCV RBC AUTO: 93.3 FL (ref 80–94)
MCV RBC AUTO: 93.5 FL (ref 80–94)
PCO2 BLDA: 38 MMHG (ref 35–45)
PCO2 BLDA: 41 MMHG (ref 35–45)
PCO2 BLDA: 41 MMHG (ref 35–45)
PCO2 BLDA: 42 MMHG (ref 35–45)
PCO2 BLDA: 42 MMHG (ref 35–45)
PCO2 BLDA: 43 MMHG (ref 35–45)
PCO2 BLDA: 44 MMHG (ref 35–45)
PCO2 BLDA: 50 MMHG (ref 35–45)
PCO2 BLDA: 51 MMHG (ref 35–45)
PCO2 BLDA: 54 MMHG (ref 35–45)
PCO2 BLDA: 55 MMHG (ref 35–45)
PCO2 TEMP ADJ BLDMV: 45 MMHG (ref 41–51)
PCO2 TEMP ADJ BLDMV: 45 MMHG (ref 41–51)
PCO2 TEMP ADJ BLDMV: 46 MMHG (ref 41–51)
PCO2 TEMP ADJ BLDMV: 48 MMHG (ref 41–51)
PCO2 TEMP ADJ BLDMV: 49 MMHG (ref 41–51)
PCO2 TEMP ADJ BLDMV: 55 MMHG (ref 41–51)
PCO2 TEMP ADJ BLDMV: 57 MMHG (ref 41–51)
PEEP SETTING VENT: 12 MMHG
PEEP SETTING VENT: 15 MMHG
PH BLDA: 7.38 [PH] (ref 7.35–7.45)
PH BLDA: 7.38 [PH] (ref 7.35–7.45)
PH BLDA: 7.4 [PH] (ref 7.35–7.45)
PH BLDA: 7.42 [PH] (ref 7.35–7.45)
PH BLDA: 7.44 [PH] (ref 7.35–7.45)
PH BLDA: 7.48 [PH] (ref 7.35–7.45)
PH BLDA: 7.48 [PH] (ref 7.35–7.45)
PH BLDA: 7.49 [PH] (ref 7.35–7.45)
PH BLDA: 7.5 [PH] (ref 7.35–7.45)
PH BLDMV: 7.4 [PH] (ref 7.31–7.41)
PH BLDMV: 7.4 [PH] (ref 7.31–7.41)
PH BLDMV: 7.44 [PH] (ref 7.31–7.41)
PH BLDMV: 7.45 [PH] (ref 7.31–7.41)
PH BLDMV: 7.48 [PH] (ref 7.31–7.41)
PHOSPHATE SERPL-MCNC: 3.9 MG/DL (ref 2.5–4.5)
PHOSPHATE SERPL-MCNC: 4 MG/DL (ref 2.5–4.5)
PHOSPHATE SERPL-MCNC: 4.2 MG/DL (ref 2.5–4.5)
PHOSPHATE SERPL-MCNC: 4.6 MG/DL (ref 2.5–4.5)
PHOSPHATE SERPL-MCNC: 4.9 MG/DL (ref 2.5–4.5)
PIP: 30 CMH2O
PLATELET # BLD AUTO: 101 THOU/MM3 (ref 130–400)
PLATELET # BLD AUTO: 102 THOU/MM3 (ref 130–400)
PLATELET # BLD AUTO: 105 THOU/MM3 (ref 130–400)
PLATELET # BLD AUTO: 93 THOU/MM3 (ref 130–400)
PLATELET # BLD AUTO: 97 THOU/MM3 (ref 130–400)
PMV BLD AUTO: 11 FL (ref 9.4–12.4)
PMV BLD AUTO: 11.1 FL (ref 9.4–12.4)
PMV BLD AUTO: 12.1 FL (ref 9.4–12.4)
PMV BLD AUTO: 12.3 FL (ref 9.4–12.4)
PMV BLD AUTO: 12.5 FL (ref 9.4–12.4)
PO2 BLDA: 109 MMHG (ref 71–104)
PO2 BLDA: 121 MMHG (ref 71–104)
PO2 BLDA: 122 MMHG (ref 71–104)
PO2 BLDA: 124 MMHG (ref 71–104)
PO2 BLDA: 132 MMHG (ref 71–104)
PO2 BLDA: 145 MMHG (ref 71–104)
PO2 BLDA: 151 MMHG (ref 71–104)
PO2 BLDA: 68 MMHG (ref 71–104)
PO2 BLDA: 75 MMHG (ref 71–104)
PO2 BLDA: 82 MMHG (ref 71–104)
PO2 BLDA: 85 MMHG (ref 71–104)
PO2 BLDMV: 38 MMHG (ref 25–40)
PO2 BLDMV: 41 MMHG (ref 25–40)
PO2 BLDMV: 41 MMHG (ref 25–40)
PO2 BLDMV: 42 MMHG (ref 25–40)
PO2 BLDMV: 44 MMHG (ref 25–40)
POTASSIUM SERPL-SCNC: 3.6 MEQ/L (ref 3.5–5.2)
POTASSIUM SERPL-SCNC: 3.8 MEQ/L (ref 3.5–5.2)
POTASSIUM SERPL-SCNC: 4 MEQ/L (ref 3.5–5.2)
POTASSIUM SERPL-SCNC: 4.2 MEQ/L (ref 3.5–5.2)
PRESSURE SUPPORT SETTING VENT: 15 CMH2O
PROT SERPL-MCNC: 5.1 G/DL (ref 6.4–8.3)
RBC # BLD AUTO: 2.75 MILL/MM3 (ref 4.7–6.1)
RBC # BLD AUTO: 2.8 MILL/MM3 (ref 4.7–6.1)
RBC # BLD AUTO: 2.81 MILL/MM3 (ref 4.7–6.1)
RBC # BLD AUTO: 2.83 MILL/MM3 (ref 4.7–6.1)
RBC # BLD AUTO: 2.83 MILL/MM3 (ref 4.7–6.1)
SAO2 % BLDA: 100 %
SAO2 % BLDA: 93 %
SAO2 % BLDA: 95 %
SAO2 % BLDA: 96 %
SAO2 % BLDA: 97 %
SAO2 % BLDA: 98 %
SAO2 % BLDA: 99 %
SAO2 % BLDMV: 75 %
SAO2 % BLDMV: 76 %
SAO2 % BLDMV: 76 %
SAO2 % BLDMV: 78 %
SAO2 % BLDMV: 78 %
SAO2 % BLDMV: 79 %
SAO2 % BLDMV: 82 %
SET PEEP: 15 MMHG
SET RESPIRATORY RATE: 12 BPM
SET RESPIRATORY RATE: 16 BPM
SITE: ABNORMAL
SODIUM SERPL-SCNC: 143 MEQ/L (ref 135–145)
SODIUM SERPL-SCNC: 145 MEQ/L (ref 135–145)
SODIUM SERPL-SCNC: 146 MEQ/L (ref 135–145)
VENTILATION MODE VENT: ABNORMAL
VENTILATION MODE VENT: AC
WBC # BLD AUTO: 14.1 THOU/MM3 (ref 4.8–10.8)
WBC # BLD AUTO: 14.4 THOU/MM3 (ref 4.8–10.8)
WBC # BLD AUTO: 14.7 THOU/MM3 (ref 4.8–10.8)
WBC # BLD AUTO: 15.3 THOU/MM3 (ref 4.8–10.8)
WBC # BLD AUTO: 15.4 THOU/MM3 (ref 4.8–10.8)

## 2025-05-16 PROCEDURE — 82330 ASSAY OF CALCIUM: CPT

## 2025-05-16 PROCEDURE — 85610 PROTHROMBIN TIME: CPT

## 2025-05-16 PROCEDURE — 82803 BLOOD GASES ANY COMBINATION: CPT

## 2025-05-16 PROCEDURE — 94761 N-INVAS EAR/PLS OXIMETRY MLT: CPT

## 2025-05-16 PROCEDURE — 93005 ELECTROCARDIOGRAM TRACING: CPT | Performed by: INTERNAL MEDICINE

## 2025-05-16 PROCEDURE — 85730 THROMBOPLASTIN TIME PARTIAL: CPT

## 2025-05-16 PROCEDURE — 85384 FIBRINOGEN ACTIVITY: CPT

## 2025-05-16 PROCEDURE — 80053 COMPREHEN METABOLIC PANEL: CPT

## 2025-05-16 PROCEDURE — 2580000003 HC RX 258: Performed by: INTERNAL MEDICINE

## 2025-05-16 PROCEDURE — 6360000002 HC RX W HCPCS

## 2025-05-16 PROCEDURE — 33949 ECMO/ECLS DAILY MGMT ARTERY: CPT | Performed by: INTERNAL MEDICINE

## 2025-05-16 PROCEDURE — 6370000000 HC RX 637 (ALT 250 FOR IP): Performed by: NURSE PRACTITIONER

## 2025-05-16 PROCEDURE — 93010 ELECTROCARDIOGRAM REPORT: CPT | Performed by: INTERNAL MEDICINE

## 2025-05-16 PROCEDURE — 37799 UNLISTED PX VASCULAR SURGERY: CPT

## 2025-05-16 PROCEDURE — 84100 ASSAY OF PHOSPHORUS: CPT

## 2025-05-16 PROCEDURE — 99292 CRITICAL CARE ADDL 30 MIN: CPT | Performed by: INTERNAL MEDICINE

## 2025-05-16 PROCEDURE — 94640 AIRWAY INHALATION TREATMENT: CPT

## 2025-05-16 PROCEDURE — 83605 ASSAY OF LACTIC ACID: CPT

## 2025-05-16 PROCEDURE — 2000000000 HC ICU R&B

## 2025-05-16 PROCEDURE — 74018 RADEX ABDOMEN 1 VIEW: CPT

## 2025-05-16 PROCEDURE — 85027 COMPLETE CBC AUTOMATED: CPT

## 2025-05-16 PROCEDURE — 71045 X-RAY EXAM CHEST 1 VIEW: CPT

## 2025-05-16 PROCEDURE — 85347 COAGULATION TIME ACTIVATED: CPT

## 2025-05-16 PROCEDURE — 93306 TTE W/DOPPLER COMPLETE: CPT

## 2025-05-16 PROCEDURE — 85576 BLOOD PLATELET AGGREGATION: CPT

## 2025-05-16 PROCEDURE — 82947 ASSAY GLUCOSE BLOOD QUANT: CPT

## 2025-05-16 PROCEDURE — 6370000000 HC RX 637 (ALT 250 FOR IP): Performed by: INTERNAL MEDICINE

## 2025-05-16 PROCEDURE — 6360000002 HC RX W HCPCS: Performed by: INTERNAL MEDICINE

## 2025-05-16 PROCEDURE — 2700000000 HC OXYGEN THERAPY PER DAY

## 2025-05-16 PROCEDURE — 6360000002 HC RX W HCPCS: Performed by: STUDENT IN AN ORGANIZED HEALTH CARE EDUCATION/TRAINING PROGRAM

## 2025-05-16 PROCEDURE — 99233 SBSQ HOSP IP/OBS HIGH 50: CPT | Performed by: INTERNAL MEDICINE

## 2025-05-16 PROCEDURE — 2500000003 HC RX 250 WO HCPCS: Performed by: NURSE PRACTITIONER

## 2025-05-16 PROCEDURE — 83735 ASSAY OF MAGNESIUM: CPT

## 2025-05-16 PROCEDURE — 99291 CRITICAL CARE FIRST HOUR: CPT | Performed by: INTERNAL MEDICINE

## 2025-05-16 PROCEDURE — 6370000000 HC RX 637 (ALT 250 FOR IP): Performed by: STUDENT IN AN ORGANIZED HEALTH CARE EDUCATION/TRAINING PROGRAM

## 2025-05-16 PROCEDURE — 2500000003 HC RX 250 WO HCPCS: Performed by: INTERNAL MEDICINE

## 2025-05-16 PROCEDURE — 99232 SBSQ HOSP IP/OBS MODERATE 35: CPT | Performed by: INTERNAL MEDICINE

## 2025-05-16 PROCEDURE — 36415 COLL VENOUS BLD VENIPUNCTURE: CPT

## 2025-05-16 PROCEDURE — 94003 VENT MGMT INPAT SUBQ DAY: CPT

## 2025-05-16 PROCEDURE — 93306 TTE W/DOPPLER COMPLETE: CPT | Performed by: INTERNAL MEDICINE

## 2025-05-16 RX ORDER — PANTOPRAZOLE SODIUM 40 MG/10ML
40 INJECTION, POWDER, LYOPHILIZED, FOR SOLUTION INTRAVENOUS 2 TIMES DAILY
Status: DISCONTINUED | OUTPATIENT
Start: 2025-05-16 | End: 2025-05-20

## 2025-05-16 RX ADMIN — ESCITALOPRAM OXALATE 10 MG: 10 TABLET ORAL at 08:37

## 2025-05-16 RX ADMIN — NITROGLYCERIN 1 INCH: 20 OINTMENT TOPICAL at 05:40

## 2025-05-16 RX ADMIN — SENNOSIDES 17.2 MG: 8.6 TABLET, FILM COATED ORAL at 08:37

## 2025-05-16 RX ADMIN — CHLORHEXIDINE GLUCONATE 15 ML: 1.2 RINSE ORAL at 08:37

## 2025-05-16 RX ADMIN — CEFEPIME 2000 MG: 2 INJECTION, POWDER, FOR SOLUTION INTRAVENOUS at 04:35

## 2025-05-16 RX ADMIN — Medication 100 MCG/HR: at 22:34

## 2025-05-16 RX ADMIN — HEPARIN SODIUM 12 UNITS/KG/HR: 10000 INJECTION, SOLUTION INTRAVENOUS at 13:44

## 2025-05-16 RX ADMIN — ACETAZOLAMIDE SODIUM 250 MG: 500 INJECTION, POWDER, LYOPHILIZED, FOR SOLUTION INTRAVENOUS at 14:40

## 2025-05-16 RX ADMIN — NITROGLYCERIN 1 INCH: 20 OINTMENT TOPICAL at 12:19

## 2025-05-16 RX ADMIN — GABAPENTIN 300 MG: 300 CAPSULE ORAL at 08:37

## 2025-05-16 RX ADMIN — SODIUM CHLORIDE, PRESERVATIVE FREE 10 ML: 5 INJECTION INTRAVENOUS at 20:46

## 2025-05-16 RX ADMIN — PANTOPRAZOLE SODIUM 40 MG: 40 INJECTION, POWDER, LYOPHILIZED, FOR SOLUTION INTRAVENOUS at 20:45

## 2025-05-16 RX ADMIN — CEFEPIME 2000 MG: 2 INJECTION, POWDER, FOR SOLUTION INTRAVENOUS at 16:35

## 2025-05-16 RX ADMIN — Medication 5 MCG/MIN: at 20:24

## 2025-05-16 RX ADMIN — CHLORHEXIDINE GLUCONATE 15 ML: 1.2 RINSE ORAL at 20:45

## 2025-05-16 RX ADMIN — NITROGLYCERIN 1 INCH: 20 OINTMENT TOPICAL at 18:35

## 2025-05-16 RX ADMIN — POTASSIUM CHLORIDE 20 MEQ: 29.8 INJECTION INTRAVENOUS at 11:02

## 2025-05-16 RX ADMIN — FOLIC ACID 1000 MCG: 1 TABLET ORAL at 08:39

## 2025-05-16 RX ADMIN — TIOTROPIUM BROMIDE AND OLODATEROL 2 PUFF: 3.124; 2.736 SPRAY, METERED RESPIRATORY (INHALATION) at 08:35

## 2025-05-16 RX ADMIN — Medication 400 MG: at 08:37

## 2025-05-16 RX ADMIN — OXYCODONE HYDROCHLORIDE AND ACETAMINOPHEN 500 MG: 500 TABLET ORAL at 08:37

## 2025-05-16 RX ADMIN — Medication 100 MCG/HR: at 09:45

## 2025-05-16 RX ADMIN — POTASSIUM CHLORIDE 20 MEQ: 29.8 INJECTION INTRAVENOUS at 09:55

## 2025-05-16 RX ADMIN — Medication 1000 UNITS: at 08:39

## 2025-05-16 RX ADMIN — Medication 125 MCG/HR: at 00:52

## 2025-05-16 RX ADMIN — GABAPENTIN 300 MG: 300 CAPSULE ORAL at 21:18

## 2025-05-16 RX ADMIN — LEVOTHYROXINE SODIUM 75 MCG: 0.07 TABLET ORAL at 07:42

## 2025-05-16 RX ADMIN — SODIUM CHLORIDE: 0.9 INJECTION, SOLUTION INTRAVENOUS at 09:42

## 2025-05-16 RX ADMIN — SENNOSIDES 17.2 MG: 8.6 TABLET, FILM COATED ORAL at 21:10

## 2025-05-16 ASSESSMENT — PULMONARY FUNCTION TESTS
PIF_VALUE: 26
PIF_VALUE: 29
PIF_VALUE: 30
PIF_VALUE: 28
PIF_VALUE: 26
PIF_VALUE: 29

## 2025-05-16 NOTE — PROGRESS NOTES
0700 resp therapy in decreased peep from 15 to 12.  0730 bedside rounds and report completed.  0740  in spoke with team discussed goals of care including wean of peep and decrease ecmo flow.  0800 labs drawn, venous gases from distal pap.and arterial gases  from lt radial glenna done.  0825 Dr. Jenkins in to see pt, updated.  0830 GI NP Jayden in to see pt plan to stop protonix drip and change to bid ivp.  0840 Daughter in updated.   0850  in to see pt updated.  0905 echocardiogram being done.  0945 Adjusted heparin drip bases on algorithm for aptt 39.7 increased drip to 12 units/kg/hr.  1155 after abg drawn  ph 7.40,pco2, 50,po2 ,75 ,hco3 31.base excess 5.1 decreased sweep gas to .5l.increased o2 to 40 % on vent.  1210 pt repositioned linens changed . No redness , or pressure injury noted on posterior surface.  Noted pt moves rt arm toward face and able to raise it into air,lt arm weaker. Does not move lt arm spontaneously, Unable to hold lt arm up.Does move fingers on LT with slight hand grasp.  1300 repeated ABG. Ph7.38. pco2,  ,55 po2,  68, RETURNED SWEEP BACK TO 1.per resp therapy based on ABG results.  1343 decreased versed to 1 due to pt maybe too sedated to initiate/trigger vent.  1430 repeat ABG ph 7.38 pco2,po2,.82.  Returned peep to 15 weaned sedation see EMAR.  1653 abg 7.48 pco2 41.7, pao2, 151.4  1715 decreased flow to 3.0 on ecmo.rpm 2080.  1730  in to see pt,updated.spoke with daughter Lesa. Still hoping to explant in am if pt tolerates weaning flow of ecmo.  1745 orders received to put epi drip to 5, wean pump to 2.5 and keep weaning to get to 2.0. wanting APTT to 60 with decrease in flow.  1805 daughter left for home. Plan to return by 7 am tomorrow.  1830 clarified APTT goal.keep between 60-80. Notified pharmacy. New APTT drawn and sent to lab.

## 2025-05-16 NOTE — PROGRESS NOTES
CRITICAL CARE PROGRESS NOTE      Patient:  Alberto Gilliland    Unit/Bed:4D-03/003-A  YOB: 1965  MRN: [id number]   PCP: Juan Christiansen DO  Date of Admission: 4/30/2025  Chief Complaint:- fatigue and hypotension    Assessment and Plan:   Acute on Chronic decompensated HFrEF. Patient is on ECMO. Patient is S/P TAVR: BNP 40347. RODRÍGUEZ 05/06 showed EF 30-35% and moderate valve global hypokinesis. Severe stenosis of the aortic valve AV mean 42. Repeat BNP 05/11 12526.  PAP 33/18, CVP 11, SvO2 81.2 CO 5.9, CI 3. Good PA pressure. Good UOP.   S/P TAVR on 5/15 with no immediate complications  ECMO being weaned down  OFF Bumex gtt  Fluid restrct to less than 2L daily   Daily weights. Strict I&O.     Acute Hypoxic Respiratory Failure-Baseline room air. Episode of tachypnea and increased work of breathing. HFNC attempted with no success. Vomited dark-oscar matter with blood tinged. Given concern for aspiration, patient intubated. CTA 5/13/2025 shows larying right sided pleural effusion with moderate consolidation dependently in lungs with GGOs in perihilar (likley pulmonary edema). XR shows pulmonary edema on right significantly improved.    Severe Aortic Stenosis:  Had Mechanical AVR in 2007 then switched to bioprosthetic in 2016 due to warfarin intolerance and GI bleeding. 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 Structural heart following. Undergoing workup and medical optimization  TAVR done on 5/15 with no immediate complications.    Pneumonia / Sepsis- Worsening productive cough, increased O2 requirement and leukocytosis. CXR with possible RLL infiltrate. Pneumonia panel and resp cultures ordered. Decision made to start on HAP coverage, Urine strep pna and legionella negative. 05/11 Pneumonia panel negative. Resp cultures moderate segmented neutrophils, moderate gram positive cocci singly and in pairs. Few gram negative bacilli. Budding yeast.   On  colonoscopy 05/05 showed large cecal angiectasia likely the cause of DIONISIO. Urgent colonoscopy done overnight and no active bleed in cecum or AVM seen. Bright red blood and some clots seen in splenic flexure and descending colon. CTA abdomen/pelvis showed no active bleeding. Monitor H&H. Monitor stools  High risk of bleeding 10-15 days from procedure. Resume OAC after 05/20.   Lactic acidosis, improved: 2.4. Likely due to reduced cardiac output. Trend lactic acid [id number], trended down, can stop.     INITIAL H AND P AND ICU COURSE:  Alberto Gilliland a 60 year old male presented due to fatigue and hypotension. PMHx severe AS, WISE, HLD, HTN, COPD, HFrEF. Notes over the last year or so has been having worsening SOB and exertional chest pain. Over the last few weeks has had a dry cough. Denies any chest pain. Denies any SOB. Noted to have DIONISIO and had colonoscopy done on 05/05 which showed showed large cecal angiectasia likely the cause of DIONISIO. Given AS there was concern for low cardiac output. CTS seen and recommended TAVR and PCI. Had right and left heart cath on 05/07 and was non-obstructive. Patient transferred to ICU for possible need for [place] guided diuresis and phenylephrine.     05/07: Overnight night patient had GI bleed requiring 2 massive transfusion protocols. GI scoped overnight and no active bleeding seen. Patient is currently stable. No further episodes of bloody stool. Denies any chest pain, headache. No abdominal pain.   5/09: Patient seen and evaluated at bedside. A&Ox4. Saturating at 100% on RA. Per nursing patient's numbers are improved, SVO2 67. Patient on milrinone drip, being Jardiance with Bumex 1.  Patient off Michael-Synephrine at 11 PM on 5/8; blood pressure stable. Had 800 cc output overnight. CVP 13. Reports no bloody bowel movements. Patient reports doing good.  Reports sleeping well overnight.  Reports no chest pain or SOB. N.p.o. reports no bowel movement; normal urination.     05/10: Patient

## 2025-05-16 NOTE — PLAN OF CARE
Problem: Discharge Planning  Goal: Discharge to home or other facility with appropriate resources  5/16/2025 0702 by Bryanna Ozuna RN  Outcome: Not Progressing  5/15/2025 2246 by Geovanna Hinojosa RN  Outcome: Progressing  Flowsheets  Taken 5/15/2025 2000 by Geovanna Hinojosa RN  Discharge to home or other facility with appropriate resources:   Identify barriers to discharge with patient and caregiver   Arrange for needed discharge resources and transportation as appropriate   Identify discharge learning needs (meds, wound care, etc)   Arrange for interpreters to assist at discharge as needed   Refer to discharge planning if patient needs post-hospital services based on physician order or complex needs related to functional status, cognitive ability or social support system  Taken 5/15/2025 1300 by Bryanna Ozuna RN  Discharge to home or other facility with appropriate resources:   Identify barriers to discharge with patient and caregiver   Arrange for needed discharge resources and transportation as appropriate     Problem: Nutrition Deficit:  Goal: Optimize nutritional status  5/16/2025 0702 by Bryanna Ozuna RN  Outcome: Not Progressing  5/15/2025 2246 by Geovanna Hinojosa RN  Outcome: Progressing  Flowsheets (Taken 5/15/2025 2246)  Nutrient intake appropriate for improving, restoring, or maintaining nutritional needs: Monitor oral intake, labs, and treatment plans

## 2025-05-16 NOTE — PROGRESS NOTES
Comprehensive Nutrition Assessment    Type and Reason for Visit:  Reassess, Nutrition support (trophic TF)    Nutrition Recommendations/Plan:   Continue Vital 1.2 10ml/hour x23h holding 30 minutes before and after synthroid dose. (Maintaining trophic TF for today as plan ECMO explant 5/17 - will then advance depending on overall status)  Additional free H20 per Providers  If explanted then recommend increasing TF 10ml every 8h as tolerated to initial goal of 60ml/hour x23h daily to meet low end of needs off ECMO (1656 kcals, 104gms protein, 153gms cho)  Order received today for education - monitoring and will provide when appropriate     Malnutrition Assessment:  Malnutrition Status:  Moderate malnutrition (05/09/25 1626)    Context:  Acute Illness     Findings of the 6 clinical characteristics of malnutrition:  Energy Intake:  50% or less of estimated energy requirements for 5 or more days (NPO/CLD this admit)  Weight Loss:  Unable to assess (CHF; unable to accurately assess)     Body Fat Loss:  Mild body fat loss Orbital   Muscle Mass Loss:  Mild muscle mass loss Temples (temporalis), Clavicles (pectoralis & deltoids)  Fluid Accumulation:  Mild Generalized, Extremities   Strength:  Not Performed    Nutrition Assessment:     Pt. Nutritionally compromised AEB  intubated 5/13, NPO, tolerating trophic TF only. At risk for further nutrition compromise r/t admit with CHF - hypotensive s/p RODRÍGUEZ 5/6 and RHC 5/9, hemorrhagic shock 2/2 GIB - multiple bloody BM 5/8 s/p EGD 5/5 no active bleeding noted - bx duodenum taken and colonoscopy 5/8 with inability to fully visualize active bleeding - clots noted, ECMO 5/13, shock liver, TC, symptomatic DIONISIO,moderate malnutrition,CRRT 5/13-14;TAVR 5/15, LOS day 15 and underlying medical condition (PMHx: mechanical AVR/resection of ascending aorta 2007 - switched to bioprosthetic 2016 2/2 warfarin intolerance and GIB, CHF, CAD COPD, anxiety, WISE, lymphatoid papulosis, GERD, HTN,  HLD, hypothyroidism, OA, hx/o small bowel resection 2008 - 8 inches removed, s/p robotic laparoscopy lysis of adhesions/ventral hernia repair/evacuation of abdominal hematoma with debridement of hematoma capsule 2/23/24, current smoker, marijuana use)     Nutrition Related Findings:    Pt. Report/Treatments/Miscellaneous: pt. Seen; spoke with RN and family; tolerating trophic TF; plan explant ECMO 5/17 so recommend continuing trophic for today - recs above for goal after explantation; UO 5105ml; intubated; per GI service Hgb stable - they signed off  GI Status: BM x1 5/15 per I/O  Pertinent Labs: glucose 136  BUN 66  creatinine 2  na 146  K+ 3.6  phosphorus 4.2      lactic 0.6 Hgb 8.4  Pertinent Meds: vitamin C, folic acid, synthroid, senokot, vitamin D, bumex, epinephrine 3mcg, fentanyl, protonix    Wound Type:  (femoral puncture d/t ECMO 5/13)       Current Nutrition Intake & Therapies:    Average Meal Intake: NPO  Average Supplements Intake: NPO  ADULT TUBE FEEDING; Orogastric; Peptide Based; Continuous; 10; No; 30; Q 4 hours  Current Tube Feeding (TF) Orders:  Feeding Route: Orogastric  Formula: Peptide Based (Vital 1.2 started 5/14)  Schedule: Continuous  Feeding Regimen: Vital 1.2 10ml/hour - hold 30 min before and after synthroid for a 23h infusion  Additives/Modulars: None  Water Flushes: per Physician  Current TF Provides: Vital 1.2 10ml/hour provides 276 kcals, 17gms protein, 25gms cho, 1gm fiber, 187ml free H20 in 230ml total volume/23h    Anthropometric Measures:  Height: 170.2 cm (5' 7\")  Ideal Body Weight (IBW): 148 lbs (67 kg)    Admission Body Weight: 87.4 kg (192 lb 11 oz) (4/30: facial non-pitting edema)  Current Body Weight: 85.3 kg (188 lb) (5/16 with trace edema),   IBW.    Current BMI (kg/m2): 29.4  Usual Body Weight:  (Per EMR: 12/7/23: 185# 6 oz, 5/3/24: 199# 10 oz, 8/23/24: 202# 10 oz, 12/30/24: 205# 11 oz, 4/10/25: 205# 10 oz)   Weight Adjustment For: No Adjustment   BMI

## 2025-05-16 NOTE — PROCEDURES
PROCEDURE NOTE  Date: 5/16/2025   Name: Alberto Gilliland  YOB: 1965    Procedures    12 lead EKG completed. Results handed to Geovanna BUTLER.

## 2025-05-16 NOTE — FLOWSHEET NOTE
1955   ABG: pH 7.47, pCO2 43, pO2 164, HCO3 31, O2 Sat 100, BE 6.9  VBG: pO2 45, SVO2 80.4  ECMO 3.5 LPM @ 2300RPM  Sweep 1 LPM,  100%    1957 Vent saying disconnected, RT and Resource RN in room bagging patient, vent exchanged    1958 Dr Rivera updated with current hemodynamics, awaiting lab results. Try to wean flows as tolerated for goal of 3LPM.    2005 Patient agitated with vent exchange, fentanyl increased, see eMar for increase.    2050 Creat 1.9, decrease bumex gtt to 0.5 per Dr Rivera.    0040  ABG: pH 7.48, pCO2 43.4, pO2 122.1, HCO3 32.5, O2 Sat 99, BE 8.2  VBG: pO2 44.4, SVO2 81.6  Sweep 1 LPM,  100%  ECMO 3.4 LPM @ 2260  Weaned vent FIO2 to 30%      0405  ABG: pH 7.48, pCO2 42.4, pO2 85.2, HCO3 32.2, O2 Sat 97.1, BE 8  VBG: pO2 44.4, SVO2 81.6  Sweep 1 LPM,  100%  ECMO 3.28 LPM @ 2190    Increased vent FIO2 to 35%    0515  ABG: pH 7.48, pCO2 41.2, pO2 121.1, HCO3 31.3, O2 Sat 99, BE 7.3  Sweep 1 LPM,  100%  ECMO 3.28 LPM @ 2190

## 2025-05-16 NOTE — CARE COORDINATION
5/16/25, 1:46 PM EDT    DISCHARGE ON GOING EVALUATION    Alberto CORNELIUS [facility] day: 16  Location: -03/003-A Reason for admit: Chronic anemia [D64.9]  Troponin level elevated [R79.89]  S/P AVR [Z95.2]  Hypotension, unspecified hypotension type [I95.9]  Acute on chronic congestive heart failure, unspecified heart failure type (HCC) [I50.9]  Acute exacerbation of chronic heart failure (HCC) [I50.9]     Procedures:   5/5 EGD: normal, bx taken  5/5 colonoscopy:  Large cecal angiectasia destroyed with APC   5/7 Cath: Mild nonobstructive coronary; Decompensated CHF, elevated right and left filling pressures, CI 2.2  5/7 RODRÍGUEZ: EF 30-35%; mod mitral regurg; severe AS, mild aortic regurg; tricupid with mild to mod regrug; findings consistent w/mild pulmonary hypertension  5/8 Colonoscopy: Unable to find source of bleeding; bright red blood and some clots seen in splenic flexure and descending colon  5/8 SGC RIJ  5/8 CVC IJ  5/13 Intubated  5/13 LAVA ECMO initiated  5/13 Code Blue - PEA during cannulation; 1-2 min CPR to ROSC  5/13 TESSIO left femoral  5/13 CRRT initiated  5/14 CRRT stopped  5/15 TAVR  5/16 ECHO with EF 25-30%; severe global hypokinesis; mild mitral regurg; TAVR well-seated; LA moderately dilated    Imaging since last note:   5/16 CXR: Mild pulmonary vascular congestion, improved.     Barriers to Discharge: TAVR done on 5/15. Weaning ECMO flows; currently at 3LPM. Echo done today - see note above. Plan for possible explant tomorrow. GI signed off today.     LAVA ECMO continues. Remains on vent w/ETT on AC/PC, peep 12, FIO2 35%, sats 98%. Afebrile. NSR. Follows commands x4. Intensivist, IM, Nephrology, Cardiology, and Hem/Onc  following. Telemetry, glenna, CVC, SGC, OG w/TF @ 10 ml/hr, wong, SCDs. IVF, epi  @ 3 mcg/min, fentanyl @ 100 mcg/hr, heparin drip, versed @ 1 mg/hr, nitro @ 10 mcg/min, protonix @ 8 mg/hr, prn albumin, prn xanax, IV cefepime, peridex, lexapro, folic acid, neurontin, prn norco,

## 2025-05-16 NOTE — PROGRESS NOTES
[facility]   Nephrology progress note  5/16/2025, 10:14 AM      Pt Name:    Alberto Gilliland  MRN:     [id number]     YOB: 1965  Admit Date:    4/30/2025 12:24 PM    Chief Complaint: Nephrology following for acute kidney injury and fluid overload.    Subjective:  Patient seen and examined  Currently patient is on ECMO  Off Bumex drip  Excellent urine output  Appears to be slightly more awake and alert    Objective:  24HR INTAKE/OUTPUT:    Intake/Output Summary (Last 24 hours) at 5/16/2025 1014  Last data filed at 5/16/2025 0700  Gross per 24 hour   Intake 2113.97 ml   Output 4905 ml   Net -2791.03 ml      Admission weight: 87.5 kg (193 lb)  Wt Readings from Last 3 Encounters:   05/16/25 85.3 kg (188 lb 0.8 oz)   04/30/25 87.7 kg (193 lb 6.4 oz)   04/25/25 89.2 kg (196 lb 9.6 oz)        Vitals :   Vitals:    05/16/25 0715 05/16/25 0800 05/16/25 0846 05/16/25 0848   BP:       Pulse: 90 86 89 89   Resp: 12  12 13   Temp:       TempSrc:       SpO2: 100%  99% 99%   Weight:       Height:           Physical examination  General Appearance: Ill-appearing  Mouth/Throat: ET tube in place  Neck: No accessory muscle use  Lungs:  Breath sounds: Diminished  Heart::  S1,S2 heard  Abdomen:  Soft, non - tender  Musculoskeletal:  Edema -noted    Medications:  Infusion:    sodium chloride 50 mL/hr at 05/16/25 0942    bumetanide (BUMEX) 12.5 mg in sodium chloride 0.9 % 125 mL infusion Stopped (05/16/25 0309)    fentaNYL 100 mcg/hr (05/16/25 0945)    norepinephrine Stopped (05/13/25 1600)    VASOpressin 20 Units in sodium chloride 0.9 % 100 mL infusion Stopped (05/14/25 0030)    pantoprazole (PROTONIX) 80 mg in sodium chloride 0.9 % 100 mL infusion 8 mg/hr (05/16/25 0414)    prismaSATE BGK 4/2.5 700 mL/hr at 05/14/25 0306    prismaSATE BGK 4/2.5 700 mL/hr at 05/14/25 0318    prismaSATE BGK 4/2.5 700 mL/hr at 05/14/25 0309    midazolam 2 mg/hr (05/16/25 0414)    EPINEPHrine 3 mcg/min (05/16/25 0414)     05/16/25  0510   * 337* 234*   * 414* 328*   BILITOT 6.4* 6.0* 5.8*   ALKPHOS 80 86 87       Assessment and Plan:  Renal -acute kidney injury due to use of multiple IV contrast exposures and the use of diuretics.  Blood pressure also was running somewhat borderline.  Patient making good urine output and electrolytes are stable  Creat slowly rising, but UOP is good and PA pressures are looking well as well.  Mostly would anticipate improvement in the next few days  Electrolytes -within normal limits  Borderline blood pressure continues to be on epinephrine  Severe aortic stenosis  Fluid overload right heart pressures much improved.  Off Bumex drip  Meds reviewed and discussed with nursing staff    Lazaro Jenkins MD  [facility]    This report has been created using voice recognition software. It may contain minor errors which are inherent in voice recognition technology

## 2025-05-16 NOTE — PROGRESS NOTES
Pharmacy Note - Extended Infusion Beta-Lactam Dose Adjustment    Cefepime 2000 mg q8h extended infusion for treatment of Hospital acquired pneumonia. Per [facility] Extended Infusion Beta-Lactam Policy, cefepime will be changed to   2000 mg q12h extended infusion    Estimated Creatinine Clearance: Estimated Creatinine Clearance: 40 mL/min (A) (based on SCr of 2 mg/dL (H)).    Dialysis Status, TC, CKD:  CRRT discontinued    BMI: Body mass index is 29.45 kg/m².    Rationale for Adjustment: Dose adjusted per [facility] Extended Infusion Policy based on renal function and indication. The above medication is renally eliminated and demonstrates time-dependent effects on bacterial eradication. Extended-infusion dosing strategy aims to enhance microbiologic and clinical efficacy.     Pharmacy will monitor renal function daily and adjust dose as necessary.    Please call with any questions.    Thank you,  Karina Watters, PharmD 5/16/2025 11:10 AM

## 2025-05-16 NOTE — PROGRESS NOTES
Bedside report received from Bryanna BUTLER. All gtts reviewed at this time and verified. Cannulation sites, circuit, skin assessment  and LDAs assessed. Patient POC and discussion of orders received throughout the day from Dr. Rivera for the night prior to possible explant tomorrow reviewed.     2000 ABG:  pO2 145; pCO2 41.2; pH 7.48;  HCO3 30.3, %O2 Sat 99  VBG:  pO2  42.2; %Sat 79       at this time and obtain new chest x-ray.    2145 Call from Dr. Rivera, request to pull SGC back.  SGC pulled back 2.5cm.       2215 Dr. Rivera on floor. Verified placement of SGC by portable CXR at this time. Request for ABG/VBG to be drawn at this time. Call placed to Vazquez RT.    2220  ABG: pH 7.44 ; pCO2 44;  pO2 132; HCO3 30, %O2 Sat 99. BE 5.3   VBG: pO2 42; %O2 Sat 78  ECMO 2.5LPM @ 1950 RPM  Sweep 1 lpm   100      2230 Sweep weaned to 0.5LPM      0000  ABG: pH 7.45; pCO2 44;  pO2 99; HCO3 31, %O2 Sat 99. BE 6.4   VBG: pO2 44; %O2 Sat 81  ECMO 2.5 LPM @ 1950 RPM  Sweep 0.5 lpm   100    CO 7.45  CI 3.8  SV 88.7       0415  ABG: pH 7.44; pCO2 12;  pO2 129; HCO3 31, %O2 Sat 99 BE 5.7   VBG: pO2 50; %O2 Sat 85  ECMO 2 LPM @ 1800 RPM  Sweep 1 lpm   100

## 2025-05-16 NOTE — PLAN OF CARE
Problem: Discharge Planning  Goal: Discharge to home or other facility with appropriate resources  Outcome: Progressing  Flowsheets  Taken 5/15/2025 2000 by Geovanna Hinojosa RN  Discharge to home or other facility with appropriate resources:   Identify barriers to discharge with patient and caregiver   Arrange for needed discharge resources and transportation as appropriate   Identify discharge learning needs (meds, wound care, etc)   Arrange for interpreters to assist at discharge as needed   Refer to discharge planning if patient needs post-hospital services based on physician order or complex needs related to functional status, cognitive ability or social support system  Taken 5/15/2025 1300 by Bryanna Ozuna RN  Discharge to home or other facility with appropriate resources:   Identify barriers to discharge with patient and caregiver   Arrange for needed discharge resources and transportation as appropriate     Problem: ABCDS Injury Assessment  Goal: Absence of physical injury  Flowsheets (Taken 5/15/2025 2246)  Absence of Physical Injury: Implement safety measures based on patient assessment     Problem: Safety - Adult  Goal: Free from fall injury  Outcome: Progressing  Flowsheets (Taken 5/15/2025 2246)  Free From Fall Injury:   Instruct family/caregiver on patient safety   Based on caregiver fall risk screen, instruct family/caregiver to ask for assistance with transferring infant if caregiver noted to have fall risk factors     Problem: Pain  Goal: Verbalizes/displays adequate comfort level or baseline comfort level  Outcome: Progressing  Flowsheets (Taken 5/15/2025 2000)  Verbalizes/displays adequate comfort level or baseline comfort level:   Encourage patient to monitor pain and request assistance   Assess pain using appropriate pain scale   Administer analgesics based on type and severity of pain and evaluate response   Implement non-pharmacological measures as appropriate and evaluate response

## 2025-05-16 NOTE — PROGRESS NOTES
Patient Weaning Progress    The patient's vent settings was able to be weaned this shift.    Ventilator settings that were weaned              [] Mode   [] Pressure support weaned   [x] Fio2 weaned   [] Peep weaned    Spontaneous weaning trial was not attempted due to defined parameters for SBT (spontaneous breathing trial) not being met.     Reason that defined ventilator parameters for SBT was not met              [x] Patient condition requires increased ventilator settings  [x] Requires increased sedation   [x] Settings not within weaning range   [] SAT not completed   [] Physician orders    Evac tube was hooked up with continuous low suction (20-30mmHg).    Cuff was not deflated to determine cuff leak.    Unable to get agreement for goals because no family is present and patient cannot respond.        Problem: Respiratory - Adult    Goal: Achieves optimal ventilation and oxygenation  Achieves optimal ventilation and oxygenation:   Assess for changes in respiratory status   Position to facilitate oxygenation and minimize respiratory effort   Assess the need for suctioning and aspirate as needed   Respiratory therapy support as indicated   Assess for changes in mentation and behavior   Assess and instruct to report shortness of breath or any respiratory difficulty    Outcome: Progressing

## 2025-05-16 NOTE — PROGRESS NOTES
Gastroenterology Progress Note:     Patient Name:  Alberto Gilliland   MRN: [id number]  [id number]  YOB: 1965  Admit Date: 4/30/2025 12:24 PM  Primary Care Physician: Juan Christiansen DO   4D-03/003-A     Patient seen and examined.  24 hours events and chart reviewed.    Subjective: Patient remains on ECMO today.  Hgb stable. Tolerating tube feedings.  No further overt s/s of bleeding.  Daughter updated on care.  Per primary nurse plan is to continue to wean PEEP on vent and wean flow on ECMO with possible explant tomorrow 5/17/25.     Objective:  BP 98/64   Pulse 86   Temp 98.4 °F (36.9 °C)   Resp 12   Ht 1.702 m (5' 7\")   Wt 85.3 kg (188 lb 0.8 oz)   SpO2 100%   BMI 29.45 kg/m²     Physical Exam  Vitals and nursing note reviewed.   Constitutional:       Comments: Lightly sedated on ventilator.    HENT:      Mouth/Throat:      Mouth: Mucous membranes are dry.      Pharynx: Oropharynx is clear.   Cardiovascular:      Rate and Rhythm: Normal rate and regular rhythm.      Comments: ECMO    Pulmonary:      Effort: Pulmonary effort is normal. No respiratory distress.      Breath sounds: Normal breath sounds. No stridor. No wheezing, rhonchi or rales.      Comments: With ventilator support    Chest:      Chest wall: No tenderness.   Abdominal:      General: Abdomen is flat. Bowel sounds are normal. There is no distension.      Palpations: Abdomen is soft. There is no mass.      Tenderness: There is no abdominal tenderness. There is no guarding or rebound.      Hernia: No hernia is present.   Skin:     General: Skin is warm and dry.   Neurological:      Comments: Sedated             Labs:   CBC:   Lab Results   Component Value Date/Time    WBC 14.4 05/16/2025 08:00 AM    HGB 8.4 05/16/2025 08:00 AM    HGB 12.0 05/18/2012 01:10 PM    HCT 26.4 05/16/2025 08:00 AM    MCV 93.3 05/16/2025 08:00 AM     05/16/2025 08:00 AM     BMP:   Lab Results   Component Value Date/Time     05/16/2025 05:10

## 2025-05-17 ENCOUNTER — APPOINTMENT (OUTPATIENT)
Dept: GENERAL RADIOLOGY | Age: 60
DRG: 003 | End: 2025-05-17
Payer: COMMERCIAL

## 2025-05-17 ENCOUNTER — APPOINTMENT (OUTPATIENT)
Age: 60
DRG: 003 | End: 2025-05-17
Attending: INTERNAL MEDICINE
Payer: COMMERCIAL

## 2025-05-17 PROBLEM — N17.9 AKI (ACUTE KIDNEY INJURY): Status: ACTIVE | Noted: 2025-05-17

## 2025-05-17 LAB
ACTIVATED CLOTTING TIME: 164 SECONDS (ref 1–150)
ALBUMIN SERPL BCG-MCNC: 2.9 G/DL (ref 3.4–4.9)
ALP SERPL-CCNC: 98 U/L (ref 40–129)
ALT SERPL W/O P-5'-P-CCNC: 213 U/L (ref 10–50)
ANION GAP SERPL CALC-SCNC: 12 MEQ/L (ref 8–16)
ANION GAP SERPL CALC-SCNC: 13 MEQ/L (ref 8–16)
ANION GAP SERPL CALC-SCNC: 13 MEQ/L (ref 8–16)
ANION GAP SERPL CALC-SCNC: 14 MEQ/L (ref 8–16)
ANION GAP SERPL CALC-SCNC: 14 MEQ/L (ref 8–16)
APTT PPP: 41.9 SECONDS (ref 22–38)
APTT PPP: 59.5 SECONDS (ref 22–38)
ARTERIAL PATENCY WRIST A: ABNORMAL
AST SERPL-CCNC: 146 U/L (ref 10–50)
BASE EXCESS BLDA CALC-SCNC: 1.8 MMOL/L (ref -2–3)
BASE EXCESS BLDA CALC-SCNC: 3.6 MMOL/L (ref -2.5–2.5)
BASE EXCESS BLDA CALC-SCNC: 4.5 MMOL/L (ref -2–3)
BASE EXCESS BLDA CALC-SCNC: 5.4 MMOL/L (ref -2.5–2.5)
BASE EXCESS BLDA CALC-SCNC: 5.7 MMOL/L (ref -2.5–2.5)
BASE EXCESS BLDA CALC-SCNC: 5.8 MMOL/L (ref -2.5–2.5)
BASE EXCESS BLDA CALC-SCNC: 6.4 MMOL/L (ref -2.5–2.5)
BASE EXCESS BLDA CALC-SCNC: 6.4 MMOL/L (ref -2.5–2.5)
BASE EXCESS BLDA CALC-SCNC: 6.4 MMOL/L (ref -2–3)
BASE EXCESS BLDA CALC-SCNC: 7.1 MMOL/L (ref -2–3)
BASE EXCESS BLDA CALC-SCNC: 7.9 MMOL/L (ref -2.5–2.5)
BASE EXCESS BLDA CALC-SCNC: 7.9 MMOL/L (ref -2–3)
BASE EXCESS BLDA CALC-SCNC: 8.1 MMOL/L (ref -2–3)
BASE EXCESS BLDA CALC-SCNC: 8.2 MMOL/L (ref -2–3)
BASE EXCESS BLDA CALC-SCNC: 9 MMOL/L (ref -2.5–2.5)
BASE EXCESS BLDA CALC-SCNC: 9 MMOL/L (ref -2.5–2.5)
BASE EXCESS BLDA CALC-SCNC: 9 MMOL/L (ref -2–3)
BDY SITE: ABNORMAL
BILIRUB SERPL-MCNC: 4.3 MG/DL (ref 0.3–1.2)
BREATHS SETTING VENT: 12 BPM
BUN SERPL-MCNC: 75 MG/DL (ref 8–23)
BUN SERPL-MCNC: 75 MG/DL (ref 8–23)
BUN SERPL-MCNC: 76 MG/DL (ref 8–23)
BUN SERPL-MCNC: 77 MG/DL (ref 8–23)
BUN SERPL-MCNC: 80 MG/DL (ref 8–23)
CA-I BLD ISE-SCNC: 1.17 MMOL/L (ref 1.12–1.32)
CALCIUM SERPL-MCNC: 8.8 MG/DL (ref 8.8–10.2)
CALCIUM SERPL-MCNC: 8.9 MG/DL (ref 8.8–10.2)
CALCIUM SERPL-MCNC: 9 MG/DL (ref 8.8–10.2)
CALCIUM SERPL-MCNC: 9.1 MG/DL (ref 8.8–10.2)
CALCIUM SERPL-MCNC: 9.2 MG/DL (ref 8.8–10.2)
CFT BLD TEG: > 5 MINUTES (ref 0.8–2.1)
CHLORIDE SERPL-SCNC: 106 MEQ/L (ref 98–111)
CHLORIDE SERPL-SCNC: 107 MEQ/L (ref 98–111)
CHLORIDE SERPL-SCNC: 108 MEQ/L (ref 98–111)
CLOT ANGLE BLD TEG: 38.4 MM (ref 15–32)
CLOT ANGLE BLD TEG: < 39 DEG (ref 63–78)
CLOT INIT BLD TEG: > 17 MINUTES (ref 4.6–9.1)
CLOT INIT P HPASE BLD TEG: 10.9 MINUTES (ref 4.3–8.3)
CO2 SERPL-SCNC: 26 MEQ/L (ref 22–29)
CO2 SERPL-SCNC: 27 MEQ/L (ref 22–29)
CO2 SERPL-SCNC: 27 MEQ/L (ref 22–29)
COLLECTED BY:: ABNORMAL
CREAT SERPL-MCNC: 2.4 MG/DL (ref 0.7–1.2)
CREAT SERPL-MCNC: 2.5 MG/DL (ref 0.7–1.2)
CREAT SERPL-MCNC: 2.6 MG/DL (ref 0.7–1.2)
CREAT SERPL-MCNC: 2.7 MG/DL (ref 0.7–1.2)
CREAT SERPL-MCNC: 2.7 MG/DL (ref 0.7–1.2)
DEPRECATED RDW RBC AUTO: 60.8 FL (ref 35–45)
DEPRECATED RDW RBC AUTO: 61.5 FL (ref 35–45)
DEPRECATED RDW RBC AUTO: 61.6 FL (ref 35–45)
DEPRECATED RDW RBC AUTO: 61.8 FL (ref 35–45)
DEPRECATED RDW RBC AUTO: 62.8 FL (ref 35–45)
DEVICE: ABNORMAL
ECHO AV MEAN GRADIENT: 24 MMHG
ECHO AV MEAN VELOCITY: 2.2 M/S
ECHO AV PEAK GRADIENT: 47 MMHG
ECHO AV PEAK VELOCITY: 3.4 M/S
ECHO AV VELOCITY RATIO: 0.26
ECHO AV VTI: 56.1 CM
ECHO BSA: 2.03 M2
ECHO LVOT AV VTI INDEX: 0.24
ECHO LVOT MEAN GRADIENT: 2 MMHG
ECHO LVOT PEAK GRADIENT: 3 MMHG
ECHO LVOT PEAK VELOCITY: 0.9 M/S
ECHO LVOT VTI: 13.3 CM
ERYTHROCYTE [DISTWIDTH] IN BLOOD BY AUTOMATED COUNT: 18.1 % (ref 11.5–14.5)
ERYTHROCYTE [DISTWIDTH] IN BLOOD BY AUTOMATED COUNT: 18.2 % (ref 11.5–14.5)
ERYTHROCYTE [DISTWIDTH] IN BLOOD BY AUTOMATED COUNT: 18.4 % (ref 11.5–14.5)
FIO2 ON VENT O2 ANALYZER: 40 %
FIO2 ON VENT O2 ANALYZER: 45 %
FIO2 ON VENT O2 ANALYZER: 50 %
FUNCT FIBRINOGEN LEVEL: 700.7 MG/DL (ref 278–581)
GFR SERPL CREATININE-BSD FRML MDRD: 26 ML/MIN/1.73M2
GFR SERPL CREATININE-BSD FRML MDRD: 26 ML/MIN/1.73M2
GFR SERPL CREATININE-BSD FRML MDRD: 27 ML/MIN/1.73M2
GFR SERPL CREATININE-BSD FRML MDRD: 28 ML/MIN/1.73M2
GFR SERPL CREATININE-BSD FRML MDRD: 30 ML/MIN/1.73M2
GLUCOSE BLD-MCNC: 117 MG/DL (ref 70–108)
GLUCOSE BLD-MCNC: 120 MG/DL (ref 70–108)
GLUCOSE BLD-MCNC: 124 MG/DL (ref 70–108)
GLUCOSE BLD-MCNC: 126 MG/DL (ref 70–108)
GLUCOSE BLD-MCNC: 132 MG/DL (ref 70–108)
GLUCOSE BLD-MCNC: 153 MG/DL (ref 70–108)
GLUCOSE BLD-MCNC: 90 MG/DL (ref 70–108)
GLUCOSE BLD-MCNC: 95 MG/DL (ref 70–108)
GLUCOSE SERPL-MCNC: 102 MG/DL (ref 74–109)
GLUCOSE SERPL-MCNC: 120 MG/DL (ref 74–109)
GLUCOSE SERPL-MCNC: 127 MG/DL (ref 74–109)
GLUCOSE SERPL-MCNC: 136 MG/DL (ref 74–109)
GLUCOSE SERPL-MCNC: 160 MG/DL (ref 74–109)
HCO3 BLDA-SCNC: 30 MMOL/L (ref 23–28)
HCO3 BLDA-SCNC: 31 MMOL/L (ref 23–28)
HCO3 BLDA-SCNC: 32 MMOL/L (ref 23–28)
HCO3 BLDA-SCNC: 33 MMOL/L (ref 23–28)
HCO3 BLDA-SCNC: 34 MMOL/L (ref 23–28)
HCT VFR BLD AUTO: 24.4 % (ref 42–52)
HCT VFR BLD AUTO: 24.9 % (ref 42–52)
HCT VFR BLD AUTO: 25.1 % (ref 42–52)
HCT VFR BLD AUTO: 25.9 % (ref 42–52)
HCT VFR BLD AUTO: 26.3 % (ref 42–52)
HGB BLD-MCNC: 7.6 GM/DL (ref 14–18)
HGB BLD-MCNC: 7.7 GM/DL (ref 14–18)
HGB BLD-MCNC: 7.7 GM/DL (ref 14–18)
HGB BLD-MCNC: 7.9 GM/DL (ref 14–18)
HGB BLD-MCNC: 8.1 GM/DL (ref 14–18)
INR PPP: 1.39 (ref 0.85–1.13)
LACTATE BLD-SCNC: < 0.4 MMOL/L (ref 0.5–1.9)
LACTATE SERPL-SCNC: 0.7 MMOL/L (ref 0.5–2)
MAGNESIUM SERPL-MCNC: 2.1 MG/DL (ref 1.6–2.6)
MCF BLD TEG: 60.4 MM (ref 52–69)
MCF.EXTRINSIC BLD ROTEM: 67.3 MM (ref 52–70)
MCH RBC QN AUTO: 28.8 PG (ref 26–33)
MCH RBC QN AUTO: 29.3 PG (ref 26–33)
MCH RBC QN AUTO: 29.3 PG (ref 26–33)
MCH RBC QN AUTO: 29.4 PG (ref 26–33)
MCH RBC QN AUTO: 29.4 PG (ref 26–33)
MCHC RBC AUTO-ENTMCNC: 30.5 GM/DL (ref 32.2–35.5)
MCHC RBC AUTO-ENTMCNC: 30.7 GM/DL (ref 32.2–35.5)
MCHC RBC AUTO-ENTMCNC: 30.8 GM/DL (ref 32.2–35.5)
MCHC RBC AUTO-ENTMCNC: 30.9 GM/DL (ref 32.2–35.5)
MCHC RBC AUTO-ENTMCNC: 31.1 GM/DL (ref 32.2–35.5)
MCV RBC AUTO: 94 FL (ref 80–94)
MCV RBC AUTO: 94.2 FL (ref 80–94)
MCV RBC AUTO: 95 FL (ref 80–94)
MCV RBC AUTO: 95.3 FL (ref 80–94)
MCV RBC AUTO: 96.3 FL (ref 80–94)
PCO2 BLDA: 44 MMHG (ref 35–45)
PCO2 BLDA: 45 MMHG (ref 35–45)
PCO2 BLDA: 49 MMHG (ref 35–45)
PCO2 BLDA: 49 MMHG (ref 35–45)
PCO2 BLDA: 51 MMHG (ref 35–45)
PCO2 BLDA: 52 MMHG (ref 35–45)
PCO2 BLDA: 55 MMHG (ref 35–45)
PCO2 BLDA: 55 MMHG (ref 35–45)
PCO2 BLDA: 60 MMHG (ref 35–45)
PCO2 TEMP ADJ BLDMV: 48 MMHG (ref 41–51)
PCO2 TEMP ADJ BLDMV: 50 MMHG (ref 41–51)
PCO2 TEMP ADJ BLDMV: 52 MMHG (ref 41–51)
PCO2 TEMP ADJ BLDMV: 52 MMHG (ref 41–51)
PCO2 TEMP ADJ BLDMV: 53 MMHG (ref 41–51)
PCO2 TEMP ADJ BLDMV: 57 MMHG (ref 41–51)
PCO2 TEMP ADJ BLDMV: 60 MMHG (ref 41–51)
PCO2 TEMP ADJ BLDMV: 62 MMHG (ref 41–51)
PEEP SETTING VENT: 10 MMHG
PEEP SETTING VENT: 12 MMHG
PEEP SETTING VENT: 6 MMHG
PEEP SETTING VENT: 8 MMHG
PH BLDA: 7.35 [PH] (ref 7.35–7.45)
PH BLDA: 7.36 [PH] (ref 7.35–7.45)
PH BLDA: 7.37 [PH] (ref 7.35–7.45)
PH BLDA: 7.41 [PH] (ref 7.35–7.45)
PH BLDA: 7.42 [PH] (ref 7.35–7.45)
PH BLDA: 7.44 [PH] (ref 7.35–7.45)
PH BLDA: 7.44 [PH] (ref 7.35–7.45)
PH BLDA: 7.45 [PH] (ref 7.35–7.45)
PH BLDA: 7.45 [PH] (ref 7.35–7.45)
PH BLDMV: 7.29 [PH] (ref 7.31–7.41)
PH BLDMV: 7.35 [PH] (ref 7.31–7.41)
PH BLDMV: 7.36 [PH] (ref 7.31–7.41)
PH BLDMV: 7.41 [PH] (ref 7.31–7.41)
PH BLDMV: 7.42 [PH] (ref 7.31–7.41)
PH BLDMV: 7.43 [PH] (ref 7.31–7.41)
PHOSPHATE SERPL-MCNC: 4.7 MG/DL (ref 2.5–4.5)
PHOSPHATE SERPL-MCNC: 5.1 MG/DL (ref 2.5–4.5)
PHOSPHATE SERPL-MCNC: 5.1 MG/DL (ref 2.5–4.5)
PHOSPHATE SERPL-MCNC: 5.5 MG/DL (ref 2.5–4.5)
PHOSPHATE SERPL-MCNC: 5.9 MG/DL (ref 2.5–4.5)
PIP: 22 CMH2O
PIP: 24 CMH2O
PIP: 27 CMH2O
PIP: 27 CMH2O
PLATELET # BLD AUTO: 102 THOU/MM3 (ref 130–400)
PLATELET # BLD AUTO: 106 THOU/MM3 (ref 130–400)
PLATELET # BLD AUTO: 106 THOU/MM3 (ref 130–400)
PLATELET # BLD AUTO: 114 THOU/MM3 (ref 130–400)
PLATELET # BLD AUTO: 116 THOU/MM3 (ref 130–400)
PMV BLD AUTO: 11 FL (ref 9.4–12.4)
PMV BLD AUTO: 11.7 FL (ref 9.4–12.4)
PMV BLD AUTO: 12.1 FL (ref 9.4–12.4)
PMV BLD AUTO: 12.2 FL (ref 9.4–12.4)
PMV BLD AUTO: 12.3 FL (ref 9.4–12.4)
PO2 BLDA: 106 MMHG (ref 71–104)
PO2 BLDA: 112 MMHG (ref 71–104)
PO2 BLDA: 120 MMHG (ref 71–104)
PO2 BLDA: 122 MMHG (ref 71–104)
PO2 BLDA: 125 MMHG (ref 71–104)
PO2 BLDA: 129 MMHG (ref 71–104)
PO2 BLDA: 134 MMHG (ref 71–104)
PO2 BLDA: 276 MMHG (ref 71–104)
PO2 BLDA: 93 MMHG (ref 71–104)
PO2 BLDMV: 41 MMHG (ref 25–40)
PO2 BLDMV: 43 MMHG (ref 25–40)
PO2 BLDMV: 44 MMHG (ref 25–40)
PO2 BLDMV: 46 MMHG (ref 25–40)
PO2 BLDMV: 48 MMHG (ref 25–40)
PO2 BLDMV: 50 MMHG (ref 25–40)
PO2 BLDMV: 51 MMHG (ref 25–40)
PO2 BLDMV: 53 MMHG (ref 25–40)
POTASSIUM SERPL-SCNC: 3.5 MEQ/L (ref 3.5–5.2)
POTASSIUM SERPL-SCNC: 3.6 MEQ/L (ref 3.5–5.2)
POTASSIUM SERPL-SCNC: 3.8 MEQ/L (ref 3.5–5.2)
POTASSIUM SERPL-SCNC: 3.9 MEQ/L (ref 3.5–5.2)
PROT SERPL-MCNC: 5.1 G/DL (ref 6.4–8.3)
RBC # BLD AUTO: 2.59 MILL/MM3 (ref 4.7–6.1)
RBC # BLD AUTO: 2.62 MILL/MM3 (ref 4.7–6.1)
RBC # BLD AUTO: 2.67 MILL/MM3 (ref 4.7–6.1)
RBC # BLD AUTO: 2.69 MILL/MM3 (ref 4.7–6.1)
RBC # BLD AUTO: 2.76 MILL/MM3 (ref 4.7–6.1)
SAO2 % BLDA: 100 %
SAO2 % BLDA: 97 %
SAO2 % BLDA: 98 %
SAO2 % BLDA: 99 %
SAO2 % BLDMV: 72 %
SAO2 % BLDMV: 75 %
SAO2 % BLDMV: 80 %
SAO2 % BLDMV: 81 %
SAO2 % BLDMV: 82 %
SAO2 % BLDMV: 82 %
SAO2 % BLDMV: 85 %
SAO2 % BLDMV: 87 %
SET PEEP: 10 MMHG
SET PEEP: 10 MMHG
SET PEEP: 12 MMHG
SET PEEP: 6 MMHG
SET PEEP: 8 MMHG
SET RESPIRATORY RATE: 12 BPM
SITE: ABNORMAL
SODIUM SERPL-SCNC: 145 MEQ/L (ref 135–145)
SODIUM SERPL-SCNC: 146 MEQ/L (ref 135–145)
SODIUM SERPL-SCNC: 147 MEQ/L (ref 135–145)
SODIUM SERPL-SCNC: 147 MEQ/L (ref 135–145)
SODIUM SERPL-SCNC: 148 MEQ/L (ref 135–145)
VENTILATION MODE VENT: ABNORMAL
WBC # BLD AUTO: 13.2 THOU/MM3 (ref 4.8–10.8)
WBC # BLD AUTO: 13.9 THOU/MM3 (ref 4.8–10.8)
WBC # BLD AUTO: 14 THOU/MM3 (ref 4.8–10.8)
WBC # BLD AUTO: 15.1 THOU/MM3 (ref 4.8–10.8)
WBC # BLD AUTO: 15.1 THOU/MM3 (ref 4.8–10.8)

## 2025-05-17 PROCEDURE — 6370000000 HC RX 637 (ALT 250 FOR IP): Performed by: INTERNAL MEDICINE

## 2025-05-17 PROCEDURE — 6370000000 HC RX 637 (ALT 250 FOR IP): Performed by: NURSE PRACTITIONER

## 2025-05-17 PROCEDURE — 93325 DOPPLER ECHO COLOR FLOW MAPG: CPT | Performed by: INTERNAL MEDICINE

## 2025-05-17 PROCEDURE — 2500000003 HC RX 250 WO HCPCS: Performed by: INTERNAL MEDICINE

## 2025-05-17 PROCEDURE — 82803 BLOOD GASES ANY COMBINATION: CPT

## 2025-05-17 PROCEDURE — 80053 COMPREHEN METABOLIC PANEL: CPT

## 2025-05-17 PROCEDURE — 83605 ASSAY OF LACTIC ACID: CPT

## 2025-05-17 PROCEDURE — 6360000002 HC RX W HCPCS: Performed by: INTERNAL MEDICINE

## 2025-05-17 PROCEDURE — 75710 ARTERY X-RAYS ARM/LEG: CPT | Performed by: INTERNAL MEDICINE

## 2025-05-17 PROCEDURE — 2500000003 HC RX 250 WO HCPCS: Performed by: NURSE PRACTITIONER

## 2025-05-17 PROCEDURE — 36415 COLL VENOUS BLD VENIPUNCTURE: CPT

## 2025-05-17 PROCEDURE — 93005 ELECTROCARDIOGRAM TRACING: CPT | Performed by: INTERNAL MEDICINE

## 2025-05-17 PROCEDURE — 85384 FIBRINOGEN ACTIVITY: CPT

## 2025-05-17 PROCEDURE — 33966 ECMO/ECLS RMVL PRPH CANNULA: CPT | Performed by: INTERNAL MEDICINE

## 2025-05-17 PROCEDURE — C1760 CLOSURE DEV, VASC: HCPCS | Performed by: INTERNAL MEDICINE

## 2025-05-17 PROCEDURE — 99232 SBSQ HOSP IP/OBS MODERATE 35: CPT | Performed by: INTERNAL MEDICINE

## 2025-05-17 PROCEDURE — 82330 ASSAY OF CALCIUM: CPT

## 2025-05-17 PROCEDURE — 02PY33Z REMOVAL OF INFUSION DEVICE FROM GREAT VESSEL, PERCUTANEOUS APPROACH: ICD-10-PCS | Performed by: INTERNAL MEDICINE

## 2025-05-17 PROCEDURE — 85610 PROTHROMBIN TIME: CPT

## 2025-05-17 PROCEDURE — 94640 AIRWAY INHALATION TREATMENT: CPT

## 2025-05-17 PROCEDURE — 84100 ASSAY OF PHOSPHORUS: CPT

## 2025-05-17 PROCEDURE — 99291 CRITICAL CARE FIRST HOUR: CPT | Performed by: INTERNAL MEDICINE

## 2025-05-17 PROCEDURE — 85347 COAGULATION TIME ACTIVATED: CPT

## 2025-05-17 PROCEDURE — C1725 CATH, TRANSLUMIN NON-LASER: HCPCS | Performed by: INTERNAL MEDICINE

## 2025-05-17 PROCEDURE — 82947 ASSAY GLUCOSE BLOOD QUANT: CPT

## 2025-05-17 PROCEDURE — 6360000002 HC RX W HCPCS

## 2025-05-17 PROCEDURE — 37224 PR REVSC OPN/PRG FEM/POP W/ANGIOPLASTY UNI: CPT | Performed by: INTERNAL MEDICINE

## 2025-05-17 PROCEDURE — 94003 VENT MGMT INPAT SUBQ DAY: CPT

## 2025-05-17 PROCEDURE — 6370000000 HC RX 637 (ALT 250 FOR IP): Performed by: STUDENT IN AN ORGANIZED HEALTH CARE EDUCATION/TRAINING PROGRAM

## 2025-05-17 PROCEDURE — 85027 COMPLETE CBC AUTOMATED: CPT

## 2025-05-17 PROCEDURE — 37799 UNLISTED PX VASCULAR SURGERY: CPT

## 2025-05-17 PROCEDURE — 85730 THROMBOPLASTIN TIME PARTIAL: CPT

## 2025-05-17 PROCEDURE — 93325 DOPPLER ECHO COLOR FLOW MAPG: CPT

## 2025-05-17 PROCEDURE — 93320 DOPPLER ECHO COMPLETE: CPT | Performed by: INTERNAL MEDICINE

## 2025-05-17 PROCEDURE — C1769 GUIDE WIRE: HCPCS | Performed by: INTERNAL MEDICINE

## 2025-05-17 PROCEDURE — 2700000000 HC OXYGEN THERAPY PER DAY

## 2025-05-17 PROCEDURE — 6360000004 HC RX CONTRAST MEDICATION: Performed by: INTERNAL MEDICINE

## 2025-05-17 PROCEDURE — 93312 ECHO TRANSESOPHAGEAL: CPT | Performed by: INTERNAL MEDICINE

## 2025-05-17 PROCEDURE — 2000000000 HC ICU R&B

## 2025-05-17 PROCEDURE — 71045 X-RAY EXAM CHEST 1 VIEW: CPT

## 2025-05-17 PROCEDURE — 2580000003 HC RX 258: Performed by: INTERNAL MEDICINE

## 2025-05-17 PROCEDURE — 85576 BLOOD PLATELET AGGREGATION: CPT

## 2025-05-17 PROCEDURE — G0269 OCCLUSIVE DEVICE IN VEIN ART: HCPCS | Performed by: INTERNAL MEDICINE

## 2025-05-17 PROCEDURE — 33949 ECMO/ECLS DAILY MGMT ARTERY: CPT | Performed by: INTERNAL MEDICINE

## 2025-05-17 PROCEDURE — 83735 ASSAY OF MAGNESIUM: CPT

## 2025-05-17 PROCEDURE — 33949 ECMO/ECLS DAILY MGMT ARTERY: CPT

## 2025-05-17 PROCEDURE — 2580000003 HC RX 258

## 2025-05-17 PROCEDURE — 6360000002 HC RX W HCPCS: Performed by: STUDENT IN AN ORGANIZED HEALTH CARE EDUCATION/TRAINING PROGRAM

## 2025-05-17 PROCEDURE — 99292 CRITICAL CARE ADDL 30 MIN: CPT | Performed by: INTERNAL MEDICINE

## 2025-05-17 PROCEDURE — 93312 ECHO TRANSESOPHAGEAL: CPT

## 2025-05-17 PROCEDURE — 94761 N-INVAS EAR/PLS OXIMETRY MLT: CPT

## 2025-05-17 PROCEDURE — 047L3ZZ DILATION OF LEFT FEMORAL ARTERY, PERCUTANEOUS APPROACH: ICD-10-PCS | Performed by: INTERNAL MEDICINE

## 2025-05-17 DEVICE — ANGIO-SEAL VIP VASCULAR CLOSURE DEVICE
Type: IMPLANTABLE DEVICE | Status: FUNCTIONAL
Brand: ANGIO-SEAL

## 2025-05-17 RX ORDER — ATROPINE SULFATE 0.1 MG/ML
INJECTION INTRAVENOUS
Status: DISPENSED
Start: 2025-05-17 | End: 2025-05-17

## 2025-05-17 RX ORDER — IOPAMIDOL 755 MG/ML
INJECTION, SOLUTION INTRAVASCULAR PRN
Status: DISCONTINUED | OUTPATIENT
Start: 2025-05-17 | End: 2025-05-17 | Stop reason: HOSPADM

## 2025-05-17 RX ORDER — HEPARIN SODIUM 10000 [USP'U]/100ML
500 INJECTION, SOLUTION INTRAVENOUS CONTINUOUS
Status: DISCONTINUED | OUTPATIENT
Start: 2025-05-17 | End: 2025-05-18

## 2025-05-17 RX ORDER — EPINEPHRINE 0.1 MG/ML
INJECTION INTRAVENOUS
Status: DISPENSED
Start: 2025-05-17 | End: 2025-05-17

## 2025-05-17 RX ORDER — SODIUM CHLORIDE 9 MG/ML
INJECTION, SOLUTION INTRAVENOUS PRN
Status: DISCONTINUED | OUTPATIENT
Start: 2025-05-17 | End: 2025-05-20

## 2025-05-17 RX ADMIN — Medication 10 MCG/MIN: at 10:27

## 2025-05-17 RX ADMIN — CEFEPIME 2000 MG: 2 INJECTION, POWDER, FOR SOLUTION INTRAVENOUS at 05:26

## 2025-05-17 RX ADMIN — CHLORHEXIDINE GLUCONATE 15 ML: 1.2 RINSE ORAL at 20:16

## 2025-05-17 RX ADMIN — Medication 2 MCG/MIN: at 22:19

## 2025-05-17 RX ADMIN — LEVOTHYROXINE SODIUM 75 MCG: 0.07 TABLET ORAL at 07:12

## 2025-05-17 RX ADMIN — ESCITALOPRAM OXALATE 10 MG: 10 TABLET ORAL at 07:45

## 2025-05-17 RX ADMIN — OXYCODONE HYDROCHLORIDE AND ACETAMINOPHEN 500 MG: 500 TABLET ORAL at 07:45

## 2025-05-17 RX ADMIN — SODIUM CHLORIDE, PRESERVATIVE FREE 10 ML: 5 INJECTION INTRAVENOUS at 20:16

## 2025-05-17 RX ADMIN — CHLORHEXIDINE GLUCONATE 15 ML: 1.2 RINSE ORAL at 07:45

## 2025-05-17 RX ADMIN — PANTOPRAZOLE SODIUM 40 MG: 40 INJECTION, POWDER, LYOPHILIZED, FOR SOLUTION INTRAVENOUS at 20:22

## 2025-05-17 RX ADMIN — NITROGLYCERIN 1 INCH: 20 OINTMENT TOPICAL at 06:49

## 2025-05-17 RX ADMIN — CEFEPIME 2000 MG: 2 INJECTION, POWDER, FOR SOLUTION INTRAVENOUS at 17:31

## 2025-05-17 RX ADMIN — SODIUM CHLORIDE, PRESERVATIVE FREE 10 ML: 5 INJECTION INTRAVENOUS at 07:45

## 2025-05-17 RX ADMIN — Medication 100 MCG/HR: at 09:00

## 2025-05-17 RX ADMIN — HEPARIN SODIUM 500 UNITS/HR: 10000 INJECTION, SOLUTION INTRAVENOUS at 04:32

## 2025-05-17 RX ADMIN — VASOPRESSIN 0.02 UNITS/MIN: 20 INJECTION INTRAVENOUS at 16:30

## 2025-05-17 RX ADMIN — Medication 25 MCG/HR: at 19:02

## 2025-05-17 RX ADMIN — Medication 1000 UNITS: at 07:45

## 2025-05-17 RX ADMIN — NITROGLYCERIN 1 INCH: 20 OINTMENT TOPICAL at 18:29

## 2025-05-17 RX ADMIN — PANTOPRAZOLE SODIUM 40 MG: 40 INJECTION, POWDER, LYOPHILIZED, FOR SOLUTION INTRAVENOUS at 07:45

## 2025-05-17 RX ADMIN — Medication 400 MG: at 07:45

## 2025-05-17 RX ADMIN — GABAPENTIN 300 MG: 300 CAPSULE ORAL at 07:45

## 2025-05-17 RX ADMIN — NITROGLYCERIN 1 INCH: 20 OINTMENT TOPICAL at 00:25

## 2025-05-17 RX ADMIN — FOLIC ACID 1000 MCG: 1 TABLET ORAL at 07:45

## 2025-05-17 RX ADMIN — GABAPENTIN 300 MG: 300 CAPSULE ORAL at 20:23

## 2025-05-17 RX ADMIN — SENNOSIDES 17.2 MG: 8.6 TABLET, FILM COATED ORAL at 20:22

## 2025-05-17 RX ADMIN — TIOTROPIUM BROMIDE AND OLODATEROL 2 PUFF: 3.124; 2.736 SPRAY, METERED RESPIRATORY (INHALATION) at 08:33

## 2025-05-17 RX ADMIN — SENNOSIDES 17.2 MG: 8.6 TABLET, FILM COATED ORAL at 07:45

## 2025-05-17 ASSESSMENT — PULMONARY FUNCTION TESTS
PIF_VALUE: 20
PIF_VALUE: 18
PIF_VALUE: 23
PIF_VALUE: 23
PIF_VALUE: 21

## 2025-05-17 NOTE — PROCEDURES
PROCEDURE NOTE  Date: 5/17/2025   Name: Alberto Gilliland  YOB: 1965    Procedures    EKG Completed. Handed to RN.

## 2025-05-17 NOTE — PROGRESS NOTES
VenoArterial Extracorporeal Membrane Oxygenation Management    Re: Biventricular Shock with Hypoxic Respiratory Failure - Valvular shock    Insertion Date: 5/13/2025    Days on ECMO: 4    Access Sites: Stable, no bleeding around the 19Fr Arterial Cannula noted, no bleeding around 25Fr LAVA cannula noted  Distal Perfusion Cannulas: Intact, pulses and pleth on foot show good waveforms, bilateral LE warm  Tubing: No kinks/condensation, arterial and venous limb color changes are appropriate, no thrombus seen  Oxygenator: No clots or air bubbles, spit valve emptied, no indication for change out  Pump: No air or clots seen, no concerns for malfunction  Power Console: No issues, plugged in, back up available  Flows: 2 L/min  Sweep: 1L/min of O2, target PaCO2 40-42  Adjunctive Devices: LA-VA      Progress:  RODRÍGUEZ with clamp passed  Vent minimal  Volume status  TC still making urine  Hgb 7-8, no indication for transfusion  Daughter at bedside      Dispo:  LAVA ECMO explant today  Multidisciplinary rounds BID/TID completed - no major issues identified for continued management at [facility]  Family updated.    Nikita Rivera MD  Interventional Cardiology

## 2025-05-17 NOTE — PROGRESS NOTES
1337-  Pt off ECMO.    1401- Repeat ABG/VBG  pH, Blood Gas: 7.36  PCO2: 55   pO2: 276   HCO3: 31   Base Excess (Calculated): 3.6   O2 Sat: 100    PO2, Mixed: 51   O2 Sat, Mixed: 80    Epi gtt @ 3 mcg/min, vent 14/8, 100%.

## 2025-05-17 NOTE — PROGRESS NOTES
Spiritual Health History and Assessment/Progress Note  [facility]    (P) Follow-up,  ,  ,      Name: Alberto Gilliland MRN: [id number]    Age: 60 y.o.     Sex: male   Language: English   Lutheran: None   Low output heart failure (HCC)     Date: 5/17/2025            Total Time Calculated: (P) 5 min              Spiritual Assessment continued in [facility] ICU 4D        Referral/Consult From: (P) Rounding   Encounter Overview/Reason: (P) Follow-up  Service Provided For: (P) Patient    Shae, Belief, Meaning:   Patient unable to assess at this time  Family/Friends No family/friends present      Importance and Influence:  Patient unable to assess at this time  Family/Friends No family/friends present    Community:  Patient Other: unable to assess at this time  Family/Friends No family/friends present    Assessment and Plan of Care:     Patient Interventions include: Other: collaborated with medical team for ongoing  support  Family/Friends Interventions include: No family/friends present    Patient Plan of Care: Spiritual Care available upon further referral  Family/Friends Plan of Care: Spiritual Care available upon further referral    Electronically signed by Chaplain Idania on 5/17/2025 at 2:44 PM

## 2025-05-17 NOTE — PROGRESS NOTES
VenoArterial Extracorporeal Membrane Oxygenation Management    Re: Biventricular Shock with Hypoxic Respiratory Failure - Valvular shock    Insertion Date: 5/13/2025    Days on ECMO: 3    Access Sites: Stable, no bleeding around the 19Fr Arterial Cannula noted, no bleeding around 25Fr LAVA cannula noted  Distal Perfusion Cannulas: Intact, pulses and pleth on foot show good waveforms, bilateral LE warm  Tubing: No kinks/condensation, arterial and venous limb color changes are appropriate, no thrombus seen  Oxygenator: No clots or air bubbles, spit valve emptied, no indication for change out  Pump: No air or clots seen, no concerns for malfunction  Power Console: No issues, plugged in, back up available  Flows: 3.5 L/min  Sweep: 1L/min of O2, target PaCO2 40-42  Adjunctive Devices: LA-VA      Progress:  Wean to 2.5 LPM or less  Heparin more closer to therapeutic range  Wean vent today  Hold on diuresis and keep UOP ~ 100/hr  He is getting close to explant, he is passing clamp tests, once we are less ~2 LPM of flow will plan explant    Dispo:  LAVA ECMO to stay in place  [facility] is appropriate for continued care  Multidisciplinary rounds BID/TID completed - no major issues identified for continued management at [facility]  Family updated.    Nikita Rivera MD  Interventional Cardiology

## 2025-05-17 NOTE — PROGRESS NOTES
Cardiology Progress Note      Patient:  Alberto Gilliland  YOB: 1965  MRN: [id number]   Acct: [id number]  Admit Date:  4/30/2025  Primary Cardiologist:  Rivas    Chief Complaint: CV shock    Subjective (Events in last 24 hours):   Cr rising  Diuresis held  Clamp test shows pulsatility and unchanged PA pressures  CXR with some congestion  No fevers  Responding to all commands  Flows down to 3.5 LPM      Objective:   BP 98/64   Pulse 88   Temp 98.4 °F (36.9 °C) (Core)   Resp 17   Ht 1.702 m (5' 7\")   Wt 85.3 kg (188 lb 0.8 oz)   SpO2 100%   BMI 29.45 kg/m²        TELEMETRY: NSR/ST    Physical Exam:  General Appearance: Intubated/sedated  Cardiovascular: normal rate, regular rhythm, normal S1 and S2  Pulmonary/Chest: Soft crackles bilaterally  Abdomen: soft, non-tender, non-distended, normal bowel sounds, no masses Extremities: no cyanosis, clubbing or edema, pulse 1-2+  Skin: warm and dry, no rash or erythema  Head: normocephalic and atraumatic  Eyes: pupils equal, round, and reactive to light  Neck: supple and non-tender without mass, no thyromegaly   Musculoskeletal: normal range of motion, no joint swelling, deformity or tenderness  Neurological: sedated, intubated, follows commands    Medications:    cefepime  2,000 mg IntraVENous Q12H    pantoprazole  40 mg IntraVENous BID    diphenhydrAMINE  50 mg IntraVENous Once    hydrocortisone sodium succinate PF  200 mg IntraVENous Once    chlorhexidine  15 mL Mouth/Throat BID    gabapentin  300 mg Oral BID    nitroglycerin  1 inch Topical 4 times per day    insulin lispro  0-4 Units SubCUTAneous 4x Daily AC & HS    senna  2 tablet Oral BID    magnesium oxide  400 mg Oral Daily    [Held by provider] metoprolol succinate  12.5 mg Oral Daily    sodium chloride flush  5-40 mL IntraVENous 2 times per day    [Held by provider] enoxaparin  40 mg SubCUTAneous Daily    [Held by provider] aspirin  81 mg Oral Daily    [Held by provider] atorvastatin  20 mg

## 2025-05-17 NOTE — PLAN OF CARE
Problem: Discharge Planning  Goal: Discharge to home or other facility with appropriate resources  Outcome: Progressing  Flowsheets (Taken 5/17/2025 0949)  Discharge to home or other facility with appropriate resources: Identify barriers to discharge with patient and caregiver     Problem: ABCDS Injury Assessment  Goal: Absence of physical injury  Outcome: Progressing  Flowsheets (Taken 5/17/2025 0949)  Absence of Physical Injury: Implement safety measures based on patient assessment     Problem: Safety - Adult  Goal: Free from fall injury  Outcome: Progressing  Flowsheets (Taken 5/17/2025 0949)  Free From Fall Injury: Instruct family/caregiver on patient safety     Problem: Pain  Goal: Verbalizes/displays adequate comfort level or baseline comfort level  Outcome: Progressing  Flowsheets  Taken 5/17/2025 0949 by Simona Gil RN  Verbalizes/displays adequate comfort level or baseline comfort level:   Encourage patient to monitor pain and request assistance   Implement non-pharmacological measures as appropriate and evaluate response   Assess pain using appropriate pain scale   Consider cultural and social influences on pain and pain management   Administer analgesics based on type and severity of pain and evaluate response   Notify Licensed Independent Practitioner if interventions unsuccessful or patient reports new pain    Problem: Respiratory - Adult  Goal: Achieves optimal ventilation and oxygenation  5/17/2025 0949 by Simnoa Gil RN  Outcome: Progressing  Flowsheets (Taken 5/17/2025 0949)  Achieves optimal ventilation and oxygenation:   Assess for changes in respiratory status   Oxygen supplementation based on oxygen saturation or arterial blood gases   Assess the need for suctioning and aspirate as needed   Assess for changes in mentation and behavior   Assess and instruct to report shortness of breath or any respiratory difficulty   Position to facilitate oxygenation and minimize respiratory

## 2025-05-17 NOTE — PROGRESS NOTES
Patient Weaning Progress    The patient's vent settings was able to be weaned this shift.      Ventilator settings that were weaned              [] Mode   [x] Pressure support weaned   [] Fio2 weaned   [x] Peep weaned      Spontaneous weaning trial  was not attempted.     due to defined parameters for SBT (spontaneous breathing trial) not being met.     *Results of SBT documented under SBTOUTCOME note.    Reason that defined ventilator parameters for SBT was not met              [] Patient condition requires increased ventilator settings  [x] Requires increased sedation   [] Settings not within weaning range   [] SAT not completed   [] Physician orders      Evac tube was  hooked up with continuous low suction(20-30mmHg)      Cuff was not  deflated to determine cuff leak.     Unable to get agreement for goals because no family is present and patient cannot respond.

## 2025-05-17 NOTE — PROGRESS NOTES
CRITICAL CARE PROGRESS NOTE      Patient:  Alberto Gilliland    Unit/Bed:4D-03/003-A  YOB: 1965  MRN: [id number]   PCP: Juan Christiansen DO  Date of Admission: 4/30/2025  Chief Complaint:- fatigue and hypotension    Assessment and Plan:   Acute on Chronic decompensated HFrEF. Patient is on ECMO. Patient is S/P TAVR: BNP 17485. RODRÍGUEZ 05/06 showed EF 30-35% and moderate valve global hypokinesis. Severe stenosis of the aortic valve AV mean 42. Repeat BNP 05/11 79608.  PAP 33/18, CVP 11, SvO2 81.2 CO 5.9, CI 3. Good PA pressure. Good UOP. S/P TAVR on 5/15 with no immediate complications.  Cardiology consulted, weaning ECMO.  Plan for ECMO explant today.  Bumex 0.5 mg/hr gtt  Fluid restrct to less than 2L daily   Daily weights. Strict I&O.     Acute Hypoxic Respiratory Failure. Baseline room air. Intubated 5/13 for respiratory failure secondary to aspiration event. CTA 5/13/2025 shows larying right sided pleural effusion with moderate consolidation dependently in lungs with GGOs in perihilar (likley pulmonary edema).     Severe Aortic Stenosis:  Had Mechanical AVR in 2007 then switched to bioprosthetic in 2016 due to warfarin intolerance and GI bleeding. 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 Structural heart following. Undergoing workup and medical optimization  TAVR done on 5/15 with no immediate complications.    Pneumonia / Sepsis- Worsening productive cough, increased O2 requirement and leukocytosis. CXR with possible RLL infiltrate. Pneumonia panel and resp cultures ordered. Decision made to start on HAP coverage, Urine strep pna and legionella negative. 05/11 Pneumonia panel negative. Resp cultures moderate segmented neutrophils, moderate gram positive cocci singly and in pairs. Few gram negative bacilli. Budding yeast.   On Cefepime 2000 mg BID (05/11-05/17)  Legionella and strep pneumonia negative     Shock Liver: Albumin 3.2, ALP 71 ,    CREATININE 1.8*   < > 1.9*   < > 2.5* 2.7* 2.6* 2.7*   GLUCOSE 134*   < > 144*   < > 136* 127* 120* 102   MG 2.3  --  2.0  --  2.1  --   --   --    PHOS 3.5   < > 3.9   < > 5.1* 5.1* 5.9* 5.5*   CALCIUM 8.9   < > 8.9   < > 8.9 9.2 9.1 9.0    < > = values in this interval not displayed.     Hepatic:   Recent Labs     05/15/25  1751 05/16/25  0510 05/17/25  0600   * 234* 146*   * 328* 213*   BILITOT 6.0* 5.8* 4.3*   ALKPHOS 86 87 98     Cardiac Enzymes: No results for input(s): \"CKTOTAL\", \"CKMB\", \"TROPONINI\" in the last 72 hours.  BNP: No results for input(s): \"BNP\" in the last 72 hours.  INR:   Recent Labs     05/15/25  0250 05/16/25  0510 05/17/25  0600   INR 1.73* 1.53* 1.39*     POC No results for input(s): \"POCGLU\" in the last 72 hours.  Recent Labs     05/16/25  1400 05/16/25  2050 05/17/25  0130   LACTA 0.6 0.7 0.7        sodium chloride      heparin (PORCINE) Infusion Stopped (05/17/25 1334)    sodium chloride Stopped (05/17/25 1411)    bumetanide (BUMEX) 12.5 mg in sodium chloride 0.9 % 125 mL infusion Stopped (05/16/25 0309)    fentaNYL 25 mcg/hr (05/17/25 1904)    norepinephrine Stopped (05/13/25 1600)    VASOpressin 20 Units in sodium chloride 0.9 % 100 mL infusion Stopped (05/17/25 1721)    midazolam Stopped (05/16/25 1517)    EPINEPHrine 2 mcg/min (05/17/25 1904)    nitroGLYCERIN Stopped (05/17/25 1334)    milrinone Stopped (05/13/25 1104)    sodium chloride          [START ON 5/18/2025] cefepime  1,000 mg IntraVENous Q24H    pantoprazole  40 mg IntraVENous BID    diphenhydrAMINE  50 mg IntraVENous Once    hydrocortisone sodium succinate PF  200 mg IntraVENous Once    chlorhexidine  15 mL Mouth/Throat BID    gabapentin  300 mg Oral BID    nitroglycerin  1 inch Topical 4 times per day    insulin lispro  0-4 Units SubCUTAneous 4x Daily AC & HS    senna  2 tablet Oral BID    magnesium oxide  400 mg Oral Daily    [Held by provider] metoprolol succinate  12.5 mg Oral Daily    sodium chloride

## 2025-05-17 NOTE — PROGRESS NOTES
[facility]   Nephrology progress note  5/17/2025, 10:55 AM      Pt Name:    Alberto Gilliland  MRN:     [id number]     YOB: 1965  Admit Date:    4/30/2025 12:24 PM    Chief Complaint: Nephrology following for acute kidney injury and fluid overload.    Subjective:  Patient seen and examined this morning.   Good urine output noted 2.6 liters/24 hours.  On epi.  CVP 11-12 overnight.   Being planned for ECMO explant.      Objective:  24HR INTAKE/OUTPUT:    Intake/Output Summary (Last 24 hours) at 5/17/2025 1055  Last data filed at 5/17/2025 1000  Gross per 24 hour   Intake 2640.44 ml   Output 2765 ml   Net -124.56 ml      Admission weight: 87.5 kg (193 lb)  Wt Readings from Last 3 Encounters:   05/16/25 85.3 kg (188 lb 0.8 oz)   04/30/25 87.7 kg (193 lb 6.4 oz)   04/25/25 89.2 kg (196 lb 9.6 oz)        Vitals :   Vitals:    05/17/25 0930 05/17/25 0945 05/17/25 1000 05/17/25 1015   BP:       Pulse: 79 78 78 80   Resp: 16 16 16 16   Temp:   98.2 °F (36.8 °C)    TempSrc:       SpO2: 100% 100% 100% 100%   Weight:       Height:           Physical examination  General Appearance: Ill-appearing  Mouth/Throat: ET tube in place  Neck: No accessory muscle use  Lungs:  Breath sounds: Diminished  Heart::  S1,S2 heard  Abdomen:  Soft, non - tender  Musculoskeletal:  Edema -noted    Medications:  Infusion:    sodium chloride      heparin (PORCINE) Infusion 500 Units/hr (05/17/25 0946)    sodium chloride 50 mL/hr at 05/17/25 0946    bumetanide (BUMEX) 12.5 mg in sodium chloride 0.9 % 125 mL infusion Stopped (05/16/25 0309)    fentaNYL 100 mcg/hr (05/17/25 0946)    norepinephrine Stopped (05/13/25 1600)    VASOpressin 20 Units in sodium chloride 0.9 % 100 mL infusion Stopped (05/14/25 0030)    midazolam Stopped (05/16/25 1517)    EPINEPHrine 4 mcg/min (05/17/25 0946)    nitroGLYCERIN 10 mcg/min (05/17/25 1027)    milrinone Stopped (05/13/25 1104)    sodium chloride       Meds:    EPINEPHrine

## 2025-05-17 NOTE — PROGRESS NOTES
Cardiology Progress Note      Patient:  Alberto Gilliland  YOB: 1965  MRN: [id number]   Acct: [id number]  Admit Date:  4/30/2025  Primary Cardiologist:  Rivas    Chief Complaint: CV shock    Subjective (Events in last 24 hours):   TC worse this AM  Nephrology follow  He is still making urine   RODRÍGUEZ shows EF improving 35-40%  Clamp test adequate  No obvious bleeding, Hgb slowly trending down      Objective:   BP 98/64   Pulse 80   Temp 98.2 °F (36.8 °C)   Resp 16   Ht 1.702 m (5' 7\")   Wt 85.3 kg (188 lb 0.8 oz)   SpO2 100%   BMI 29.45 kg/m²        TELEMETRY: NSR/ST    Physical Exam:  General Appearance: Intubated/sedated  Cardiovascular: normal rate, regular rhythm, normal S1 and S2, 2/6 JESUS  Pulmonary/Chest: Soft crackles bilaterally  Abdomen: soft, non-tender, non-distended, normal bowel sounds, no masses Extremities: no cyanosis, clubbing or edema, pulse 1-2+  Skin: warm and dry, no rash or erythema  Head: normocephalic and atraumatic  Eyes: pupils equal, round, and reactive to light  Neck: supple and non-tender without mass, no thyromegaly   Musculoskeletal: normal range of motion, no joint swelling, deformity or tenderness  Neurological: sedated, intubated, follows commands    Medications:    EPINEPHrine        atropine        cefepime  2,000 mg IntraVENous Q12H    pantoprazole  40 mg IntraVENous BID    diphenhydrAMINE  50 mg IntraVENous Once    hydrocortisone sodium succinate PF  200 mg IntraVENous Once    chlorhexidine  15 mL Mouth/Throat BID    gabapentin  300 mg Oral BID    nitroglycerin  1 inch Topical 4 times per day    insulin lispro  0-4 Units SubCUTAneous 4x Daily AC & HS    senna  2 tablet Oral BID    magnesium oxide  400 mg Oral Daily    [Held by provider] metoprolol succinate  12.5 mg Oral Daily    sodium chloride flush  5-40 mL IntraVENous 2 times per day    [Held by provider] enoxaparin  40 mg SubCUTAneous Daily    [Held by provider] aspirin  81 mg Oral Daily    [Held by

## 2025-05-17 NOTE — BRIEF OP NOTE
[facility]  Sedation/Analgesia Post Sedation Record    Pt Name: Alberto Gilliland  Account number: [id number]  MRN: [id number]  YOB: 1965  Procedure Performed By: Nikita Rivera MD MD FACC, FSCAI, RPVI  Primary Care Physician: Juan Christiansen,   Date: 5/17/2025    POST-PROCEDURE    Physicians/Assistants: Nikita Rivera MD, JOHN, American Hospital AssociationJUAN J, RPVI    Procedure Performed: ECMO explant    Sedation/Anesthesia: Versed/ Fentanyl and 2% xylocaine local anesthesia.      Estimated Blood Loss: < 50 ml.     Specimens Removed: None         Disposition of Specimen: N/A        Complications: No Immediate Complications.       Post-procedure Diagnosis/Findings:       ECMO explant  Small L to R  EF 35-40%  LLE - 2V runoff to the legs        Electronically signed by Nikita Rivera MD on 5/17/25 at 2:14 PM EDT   Interventional Cardiology

## 2025-05-17 NOTE — PLAN OF CARE
Problem: Respiratory - Adult  Goal: Achieves optimal ventilation and oxygenation  Outcome: Progressing     Problem: Respiratory - Adult  Goal: Lung sounds clear or within normal limits for patient  Outcome: Progressing

## 2025-05-17 NOTE — H&P
[facility]  Sedation/Analgesia History & Physical    Pt Name: Alberto Gilliland  Account number: [id number]  MRN: [id number]  YOB: 1965  Provider Performing Procedure: Nikita Rivera MD MD  Referring Provider: Haseeb Espinoza DO   Primary Care Physician: Juan Christiansen DO  Date: 5/17/2025    PRE-PROCEDURE    Code Status: FULL CODE  Brief History/Pre-Procedure Diagnosis:   Recovered CV shock  ECMO explant    Consent: : I have discussed with the patient risks, benefits, and alternatives (and relevant risks, benefits, and side effects related to alternatives or not receiving care), and likelihood of the success.   The patient and/or representative appear to understand and agree to proceed.  The discussion encompasses risks, benefits, and side effects related to the alternatives and the risks related to not receiving the proposed care, treatment, and services.     The indication, risks and benefits of the procedure and possible therapeutic consequences and alternatives were discussed with the patient. The patient was given the opportunity to ask questions and to have them answered to his/her satisfaction. Risks of the procedure include but are not limited to the following: Bleeding, hematoma including retroperitoneal hemmorhage, infection, pain and discomfort, injury to the aorta and other blood vessels, rhythm disturbance, low blood pressure, myocardial infarction, stroke, kidney damage/failure, myocardial perforation, allergic reactions to sedatives/contrast material, loss of pulse/vascular compromise requiring surgery, aneurysm/pseudoaneurysm formation, possible loss of a limb/hand/leg, needing blood transfusion, requiring emergent open heart surgery or emergent coronary intervention, the need for intubation/respiratory support, the requirement for defibrillation/cardioversion, and death. Alternatives to and omission of the suggested procedure were discussed. The patient had no further  questions and wished to proceed; the consent form was signed.        MEDICAL HISTORY   has a past medical history of Acute exacerbation of chronic heart failure (HCC), Arthritis, Bilateral sciatica, Bleeding, CAD (coronary artery disease), Carpal tunnel syndrome, Cervicalgia, C5-7, Chronic anxiety, COPD, mild (HCC), Dysthymia (or depressive neurosis), ED (erectile dysfunction), ED (erectile dysfunction), Fatty liver, GERD (gastroesophageal reflux disease), Headache(784.0), History of blood transfusion, HTN (hypertension), Hyperlipidemia, Hypothyroidism, Iron deficiency anemia, blood loss, Lumbago, Lumbar radiculopathy, Lymphomatoid papulosis (HCC), Osteoarthritis (arthritis due to wear and tear of joints), S/P AVR, coumadin Tx, and Spondylosis of thoracic region without myelopathy or radiculopathy.    SURGICAL HISTORY   has a past surgical history that includes cardiovascular stress test (04/15/2010); Appendectomy (1980); Colonoscopy (2010); Small intestine surgery (2010); Upper gastrointestinal endoscopy (2010); Aortic valve replacement (2007); Endoscopy, colon, diagnostic; cervical fusion (2010); cervical fusion (03/09/2016); Cardiac catheterization (05/20/2010); Cardiac catheterization (2012, 6/9/16); Dilatation, esophagus; Aortic valve replacement (07/20/2016); other surgical history (05/02/2017); Nerve Surgery (Bilateral, 07/25/2017); pr inj,paravertebral l/s,1 level (Bilateral, 07/25/2017); other surgical history (09/12/2017); pr njx dx/ther sbst epidural/subarach lumbar/sacral (N/A, 09/12/2017); Tooth Extraction (02/2019); Back Injection (Bilateral, 01/03/2022); Back Injection (Bilateral, 03/14/2022); Pain management procedure (Left, 05/17/2022); US BIOPSY LIVER PERCUTANEOUS (01/31/2024); laparoscopy (N/A, 02/23/2024); bronchoscopy (N/A, 05/17/2024); Upper gastrointestinal endoscopy (N/A, 05/05/2025); Colonoscopy (N/A, 05/05/2025); Colonoscopy (N/A, 5/8/2025); and Cardiac procedure (N/A,

## 2025-05-18 ENCOUNTER — APPOINTMENT (OUTPATIENT)
Dept: GENERAL RADIOLOGY | Age: 60
DRG: 003 | End: 2025-05-18
Payer: COMMERCIAL

## 2025-05-18 LAB
ANION GAP SERPL CALC-SCNC: 15 MEQ/L (ref 8–16)
ANION GAP SERPL CALC-SCNC: 18 MEQ/L (ref 8–16)
ARTERIAL PATENCY WRIST A: ABNORMAL
ARTERIAL PATENCY WRIST A: POSITIVE
BASE EXCESS BLDA CALC-SCNC: 2.5 MMOL/L (ref -2.5–2.5)
BASE EXCESS BLDA CALC-SCNC: 2.8 MMOL/L (ref -2.5–2.5)
BASE EXCESS BLDA CALC-SCNC: 2.9 MMOL/L (ref -2–3)
BASE EXCESS BLDA CALC-SCNC: 4.7 MMOL/L (ref -2–3)
BASE EXCESS BLDA CALC-SCNC: 5.1 MMOL/L (ref -2.5–2.5)
BASE EXCESS BLDA CALC-SCNC: 5.7 MMOL/L (ref -2.5–2.5)
BASE EXCESS BLDA CALC-SCNC: 6.3 MMOL/L (ref -2–3)
BASE EXCESS BLDA CALC-SCNC: 6.4 MMOL/L (ref -2.5–2.5)
BDY SITE: ABNORMAL
BREATHS SETTING VENT: 12 BPM
BUN SERPL-MCNC: 83 MG/DL (ref 8–23)
BUN SERPL-MCNC: 84 MG/DL (ref 8–23)
CALCIUM SERPL-MCNC: 9.2 MG/DL (ref 8.8–10.2)
CALCIUM SERPL-MCNC: 9.3 MG/DL (ref 8.8–10.2)
CFT BLD TEG: 1.3 MINUTES (ref 0.8–2.1)
CHLORIDE SERPL-SCNC: 106 MEQ/L (ref 98–111)
CHLORIDE SERPL-SCNC: 108 MEQ/L (ref 98–111)
CLOT ANGLE BLD TEG: 26.5 MM (ref 15–32)
CLOT ANGLE BLD TEG: 72.8 DEG (ref 63–78)
CLOT INIT BLD TEG: 4.5 MINUTES (ref 4.6–9.1)
CLOT INIT P HPASE BLD TEG: 5.7 MINUTES (ref 4.3–8.3)
CO2 SERPL-SCNC: 24 MEQ/L (ref 22–29)
CO2 SERPL-SCNC: 26 MEQ/L (ref 22–29)
COLLECTED BY:: ABNORMAL
CREAT SERPL-MCNC: 2.8 MG/DL (ref 0.7–1.2)
CREAT SERPL-MCNC: 3 MG/DL (ref 0.7–1.2)
DEPRECATED RDW RBC AUTO: 61.9 FL (ref 35–45)
DEPRECATED RDW RBC AUTO: 62.1 FL (ref 35–45)
DEVICE: ABNORMAL
EKG ATRIAL RATE: 83 BPM
EKG ATRIAL RATE: 87 BPM
EKG P AXIS: 27 DEGREES
EKG P AXIS: 70 DEGREES
EKG P-R INTERVAL: 152 MS
EKG P-R INTERVAL: 192 MS
EKG Q-T INTERVAL: 372 MS
EKG Q-T INTERVAL: 458 MS
EKG QRS DURATION: 120 MS
EKG QRS DURATION: 130 MS
EKG QTC CALCULATION (BAZETT): 447 MS
EKG QTC CALCULATION (BAZETT): 538 MS
EKG R AXIS: 16 DEGREES
EKG R AXIS: 37 DEGREES
EKG T AXIS: -152 DEGREES
EKG T AXIS: 108 DEGREES
EKG VENTRICULAR RATE: 83 BPM
EKG VENTRICULAR RATE: 87 BPM
ERYTHROCYTE [DISTWIDTH] IN BLOOD BY AUTOMATED COUNT: 18.2 % (ref 11.5–14.5)
ERYTHROCYTE [DISTWIDTH] IN BLOOD BY AUTOMATED COUNT: 18.3 % (ref 11.5–14.5)
FIO2 ON VENT O2 ANALYZER: 30 %
FIO2 ON VENT O2 ANALYZER: 45 %
FIO2 ON VENT O2 ANALYZER: 50 %
FIO2 ON VENT O2 ANALYZER: 50 %
FUNCT FIBRINOGEN LEVEL: 483.6 MG/DL (ref 278–581)
GFR SERPL CREATININE-BSD FRML MDRD: 23 ML/MIN/1.73M2
GFR SERPL CREATININE-BSD FRML MDRD: 25 ML/MIN/1.73M2
GLUCOSE BLD STRIP.AUTO-MCNC: 120 MG/DL (ref 70–108)
GLUCOSE BLD-MCNC: 74 MG/DL (ref 70–108)
GLUCOSE BLD-MCNC: 78 MG/DL (ref 70–108)
GLUCOSE BLD-MCNC: 87 MG/DL (ref 70–108)
GLUCOSE BLD-MCNC: 90 MG/DL (ref 70–108)
GLUCOSE BLD-MCNC: 94 MG/DL (ref 70–108)
GLUCOSE SERPL-MCNC: 101 MG/DL (ref 74–109)
GLUCOSE SERPL-MCNC: 104 MG/DL (ref 74–109)
HCO3 BLDA-SCNC: 27 MMOL/L (ref 23–28)
HCO3 BLDA-SCNC: 28 MMOL/L (ref 23–28)
HCO3 BLDA-SCNC: 29 MMOL/L (ref 23–28)
HCO3 BLDA-SCNC: 29 MMOL/L (ref 23–28)
HCO3 BLDA-SCNC: 30 MMOL/L (ref 23–28)
HCO3 BLDA-SCNC: 31 MMOL/L (ref 23–28)
HCO3 BLDA-SCNC: 32 MMOL/L (ref 23–28)
HCO3 BLDA-SCNC: 32 MMOL/L (ref 23–28)
HCT VFR BLD AUTO: 25.8 % (ref 42–52)
HCT VFR BLD AUTO: 26 % (ref 42–52)
HGB BLD-MCNC: 7.9 GM/DL (ref 14–18)
HGB BLD-MCNC: 7.9 GM/DL (ref 14–18)
LACTATE SERPL-SCNC: 0.7 MMOL/L (ref 0.5–2)
MAGNESIUM SERPL-MCNC: 2.2 MG/DL (ref 1.6–2.6)
MCF BLD TEG: 59.1 MM (ref 52–69)
MCF.EXTRINSIC BLD ROTEM: 59.9 MM (ref 52–70)
MCH RBC QN AUTO: 29 PG (ref 26–33)
MCH RBC QN AUTO: 29 PG (ref 26–33)
MCHC RBC AUTO-ENTMCNC: 30.4 GM/DL (ref 32.2–35.5)
MCHC RBC AUTO-ENTMCNC: 30.6 GM/DL (ref 32.2–35.5)
MCV RBC AUTO: 94.9 FL (ref 80–94)
MCV RBC AUTO: 95.6 FL (ref 80–94)
OSMOLALITY SERPL: 336 MOSMOL/KG (ref 275–295)
OSMOLALITY UR: 338 MOSMOL/KG (ref 250–750)
PCO2 BLDA: 39 MMHG (ref 35–45)
PCO2 BLDA: 44 MMHG (ref 35–45)
PCO2 BLDA: 47 MMHG (ref 35–45)
PCO2 BLDA: 48 MMHG (ref 35–45)
PCO2 BLDA: 50 MMHG (ref 35–45)
PCO2 TEMP ADJ BLDMV: 44 MMHG (ref 41–51)
PCO2 TEMP ADJ BLDMV: 49 MMHG (ref 41–51)
PCO2 TEMP ADJ BLDMV: 49 MMHG (ref 41–51)
PEEP SETTING VENT: 6 MMHG
PH BLDA: 7.38 [PH] (ref 7.35–7.45)
PH BLDA: 7.41 [PH] (ref 7.35–7.45)
PH BLDA: 7.42 [PH] (ref 7.35–7.45)
PH BLDA: 7.44 [PH] (ref 7.35–7.45)
PH BLDA: 7.45 [PH] (ref 7.35–7.45)
PH BLDMV: 7.37 [PH] (ref 7.31–7.41)
PH BLDMV: 7.42 [PH] (ref 7.31–7.41)
PH BLDMV: 7.44 [PH] (ref 7.31–7.41)
PHOSPHATE SERPL-MCNC: 5.8 MG/DL (ref 2.5–4.5)
PHOSPHATE SERPL-MCNC: 5.9 MG/DL (ref 2.5–4.5)
PIP: 22 CMH2O
PIP: 22 CMH2O
PLATELET # BLD AUTO: 121 THOU/MM3 (ref 130–400)
PLATELET # BLD AUTO: 128 THOU/MM3 (ref 130–400)
PMV BLD AUTO: 11.7 FL (ref 9.4–12.4)
PMV BLD AUTO: 12.2 FL (ref 9.4–12.4)
PO2 BLDA: 111 MMHG (ref 71–104)
PO2 BLDA: 148 MMHG (ref 71–104)
PO2 BLDA: 171 MMHG (ref 71–104)
PO2 BLDA: 88 MMHG (ref 71–104)
PO2 BLDA: 89 MMHG (ref 71–104)
PO2 BLDMV: 37 MMHG (ref 25–40)
PO2 BLDMV: 38 MMHG (ref 25–40)
PO2 BLDMV: 52 MMHG (ref 25–40)
POTASSIUM SERPL-SCNC: 3.6 MEQ/L (ref 3.5–5.2)
POTASSIUM SERPL-SCNC: 3.7 MEQ/L (ref 3.5–5.2)
PRESSURE SUPPORT SETTING VENT: 14 CMH2O
PRESSURE SUPPORT SETTING VENT: 16 CMH2O
PRESSURE SUPPORT SETTING VENT: 16 CMH2O
RBC # BLD AUTO: 2.72 MILL/MM3 (ref 4.7–6.1)
RBC # BLD AUTO: 2.72 MILL/MM3 (ref 4.7–6.1)
SAO2 % BLDA: 100 %
SAO2 % BLDA: 97 %
SAO2 % BLDA: 97 %
SAO2 % BLDA: 98 %
SAO2 % BLDA: 99 %
SAO2 % BLDMV: 71 %
SAO2 % BLDMV: 74 %
SAO2 % BLDMV: 85 %
SET PEEP: 6 MMHG
SET PEEP: 6 MMHG
SET PRESS SUPP: 16 CMH2O
SET RESPIRATORY RATE: 12 BPM
SITE: ABNORMAL
SODIUM SERPL-SCNC: 147 MEQ/L (ref 135–145)
SODIUM SERPL-SCNC: 147 MEQ/L (ref 135–145)
SODIUM SERPL-SCNC: 150 MEQ/L (ref 135–145)
SODIUM UR-SCNC: 83 MEQ/L
VENTILATION MODE VENT: ABNORMAL
WBC # BLD AUTO: 13.9 THOU/MM3 (ref 4.8–10.8)
WBC # BLD AUTO: 14.6 THOU/MM3 (ref 4.8–10.8)

## 2025-05-18 PROCEDURE — 2500000003 HC RX 250 WO HCPCS: Performed by: INTERNAL MEDICINE

## 2025-05-18 PROCEDURE — 83935 ASSAY OF URINE OSMOLALITY: CPT

## 2025-05-18 PROCEDURE — 99291 CRITICAL CARE FIRST HOUR: CPT | Performed by: INTERNAL MEDICINE

## 2025-05-18 PROCEDURE — 94640 AIRWAY INHALATION TREATMENT: CPT

## 2025-05-18 PROCEDURE — 83605 ASSAY OF LACTIC ACID: CPT

## 2025-05-18 PROCEDURE — 84300 ASSAY OF URINE SODIUM: CPT

## 2025-05-18 PROCEDURE — 6370000000 HC RX 637 (ALT 250 FOR IP): Performed by: NURSE PRACTITIONER

## 2025-05-18 PROCEDURE — 6360000002 HC RX W HCPCS: Performed by: INTERNAL MEDICINE

## 2025-05-18 PROCEDURE — 85027 COMPLETE CBC AUTOMATED: CPT

## 2025-05-18 PROCEDURE — 84295 ASSAY OF SERUM SODIUM: CPT

## 2025-05-18 PROCEDURE — 82803 BLOOD GASES ANY COMBINATION: CPT

## 2025-05-18 PROCEDURE — 6360000002 HC RX W HCPCS

## 2025-05-18 PROCEDURE — 6370000000 HC RX 637 (ALT 250 FOR IP): Performed by: STUDENT IN AN ORGANIZED HEALTH CARE EDUCATION/TRAINING PROGRAM

## 2025-05-18 PROCEDURE — 6370000000 HC RX 637 (ALT 250 FOR IP): Performed by: INTERNAL MEDICINE

## 2025-05-18 PROCEDURE — 82948 REAGENT STRIP/BLOOD GLUCOSE: CPT

## 2025-05-18 PROCEDURE — 84100 ASSAY OF PHOSPHORUS: CPT

## 2025-05-18 PROCEDURE — 82947 ASSAY GLUCOSE BLOOD QUANT: CPT

## 2025-05-18 PROCEDURE — 71045 X-RAY EXAM CHEST 1 VIEW: CPT

## 2025-05-18 PROCEDURE — 85347 COAGULATION TIME ACTIVATED: CPT

## 2025-05-18 PROCEDURE — 93010 ELECTROCARDIOGRAM REPORT: CPT | Performed by: INTERNAL MEDICINE

## 2025-05-18 PROCEDURE — 80048 BASIC METABOLIC PNL TOTAL CA: CPT

## 2025-05-18 PROCEDURE — 2580000003 HC RX 258: Performed by: STUDENT IN AN ORGANIZED HEALTH CARE EDUCATION/TRAINING PROGRAM

## 2025-05-18 PROCEDURE — 37799 UNLISTED PX VASCULAR SURGERY: CPT

## 2025-05-18 PROCEDURE — 99232 SBSQ HOSP IP/OBS MODERATE 35: CPT | Performed by: INTERNAL MEDICINE

## 2025-05-18 PROCEDURE — 94003 VENT MGMT INPAT SUBQ DAY: CPT

## 2025-05-18 PROCEDURE — 6360000002 HC RX W HCPCS: Performed by: STUDENT IN AN ORGANIZED HEALTH CARE EDUCATION/TRAINING PROGRAM

## 2025-05-18 PROCEDURE — 2700000000 HC OXYGEN THERAPY PER DAY

## 2025-05-18 PROCEDURE — 85384 FIBRINOGEN ACTIVITY: CPT

## 2025-05-18 PROCEDURE — 83930 ASSAY OF BLOOD OSMOLALITY: CPT

## 2025-05-18 PROCEDURE — 85576 BLOOD PLATELET AGGREGATION: CPT

## 2025-05-18 PROCEDURE — 36415 COLL VENOUS BLD VENIPUNCTURE: CPT

## 2025-05-18 PROCEDURE — 94761 N-INVAS EAR/PLS OXIMETRY MLT: CPT

## 2025-05-18 PROCEDURE — 93005 ELECTROCARDIOGRAM TRACING: CPT | Performed by: INTERNAL MEDICINE

## 2025-05-18 PROCEDURE — 2580000003 HC RX 258: Performed by: INTERNAL MEDICINE

## 2025-05-18 PROCEDURE — 83735 ASSAY OF MAGNESIUM: CPT

## 2025-05-18 PROCEDURE — 2000000000 HC ICU R&B

## 2025-05-18 RX ORDER — HEPARIN SODIUM 5000 [USP'U]/ML
5000 INJECTION, SOLUTION INTRAVENOUS; SUBCUTANEOUS EVERY 8 HOURS SCHEDULED
Status: DISCONTINUED | OUTPATIENT
Start: 2025-05-18 | End: 2025-05-27

## 2025-05-18 RX ADMIN — NITROGLYCERIN 1 INCH: 20 OINTMENT TOPICAL at 23:13

## 2025-05-18 RX ADMIN — Medication 175 MCG/HR: at 20:43

## 2025-05-18 RX ADMIN — OXYCODONE HYDROCHLORIDE AND ACETAMINOPHEN 500 MG: 500 TABLET ORAL at 08:12

## 2025-05-18 RX ADMIN — GABAPENTIN 300 MG: 300 CAPSULE ORAL at 08:12

## 2025-05-18 RX ADMIN — Medication 400 MG: at 08:12

## 2025-05-18 RX ADMIN — NITROGLYCERIN 1 INCH: 20 OINTMENT TOPICAL at 17:52

## 2025-05-18 RX ADMIN — LEVOTHYROXINE SODIUM 75 MCG: 0.07 TABLET ORAL at 08:12

## 2025-05-18 RX ADMIN — CHLORHEXIDINE GLUCONATE 15 ML: 1.2 RINSE ORAL at 21:05

## 2025-05-18 RX ADMIN — ESCITALOPRAM OXALATE 10 MG: 10 TABLET ORAL at 08:12

## 2025-05-18 RX ADMIN — NITROGLYCERIN 1 INCH: 20 OINTMENT TOPICAL at 15:04

## 2025-05-18 RX ADMIN — SODIUM CHLORIDE, PRESERVATIVE FREE 10 ML: 5 INJECTION INTRAVENOUS at 08:12

## 2025-05-18 RX ADMIN — CHLORHEXIDINE GLUCONATE 15 ML: 1.2 RINSE ORAL at 08:12

## 2025-05-18 RX ADMIN — NITROGLYCERIN 1 INCH: 20 OINTMENT TOPICAL at 02:14

## 2025-05-18 RX ADMIN — HEPARIN SODIUM 5000 UNITS: 5000 INJECTION INTRAVENOUS; SUBCUTANEOUS at 23:14

## 2025-05-18 RX ADMIN — CEFEPIME 1000 MG: 1 INJECTION, POWDER, FOR SOLUTION INTRAMUSCULAR; INTRAVENOUS at 16:17

## 2025-05-18 RX ADMIN — Medication 1000 UNITS: at 08:12

## 2025-05-18 RX ADMIN — GABAPENTIN 300 MG: 300 CAPSULE ORAL at 21:03

## 2025-05-18 RX ADMIN — TIOTROPIUM BROMIDE AND OLODATEROL 2 PUFF: 3.124; 2.736 SPRAY, METERED RESPIRATORY (INHALATION) at 08:49

## 2025-05-18 RX ADMIN — PANTOPRAZOLE SODIUM 40 MG: 40 INJECTION, POWDER, LYOPHILIZED, FOR SOLUTION INTRAVENOUS at 08:12

## 2025-05-18 RX ADMIN — FOLIC ACID 1000 MCG: 1 TABLET ORAL at 08:12

## 2025-05-18 RX ADMIN — PANTOPRAZOLE SODIUM 40 MG: 40 INJECTION, POWDER, LYOPHILIZED, FOR SOLUTION INTRAVENOUS at 21:12

## 2025-05-18 RX ADMIN — SENNOSIDES 17.2 MG: 8.6 TABLET, FILM COATED ORAL at 21:11

## 2025-05-18 RX ADMIN — HEPARIN SODIUM 5000 UNITS: 5000 INJECTION INTRAVENOUS; SUBCUTANEOUS at 15:04

## 2025-05-18 RX ADMIN — NITROGLYCERIN 1 INCH: 20 OINTMENT TOPICAL at 06:53

## 2025-05-18 RX ADMIN — SODIUM CHLORIDE, PRESERVATIVE FREE 10 ML: 5 INJECTION INTRAVENOUS at 21:12

## 2025-05-18 ASSESSMENT — PULMONARY FUNCTION TESTS
PIF_VALUE: 20
PIF_VALUE: 21
PIF_VALUE: 22
PIF_VALUE: 21
PIF_VALUE: 19
PIF_VALUE: 19

## 2025-05-18 NOTE — PROGRESS NOTES
CRITICAL CARE PROGRESS NOTE      Patient:  Alberto Gilliland    Unit/Bed:4D-03/003-A  YOB: 1965  MRN: [id number]   PCP: Juan Christiansen DO  Date of Admission: 4/30/2025  Chief Complaint:- fatigue and hypotension    Assessment and Plan:   Acute on Chronic decompensated HFrEF. Patient is off ECMO. Patient is S/P TAVR: BNP 34746. RODRÍGUEZ 05/06 showed EF 30-35% and moderate valve global hypokinesis. Severe stenosis of the aortic valve AV mean 42. Repeat BNP 05/11 81202.  High PA pressure but patient having good UOP. S/P TAVR on 5/15 with no immediate complications.  Cardiology following  Diamox if UOP drops off  Fluid restrct to less than 2L daily   Daily weights. Strict I&O.   Restart ASA daily  Add plavix when no concerns for bleeding - check with cardiology  Follow up bilateral arterial/venous duplex post ECMO    Acute Hypoxic Respiratory Failure. Baseline room air. Intubated 5/13 for respiratory failure secondary to aspiration event. CTA 5/13/2025 shows larying right sided pleural effusion with moderate consolidation dependently in lungs with GGOs in perihilar (likley pulmonary edema). Minimal settings  Possible extubation 5/18    Hypernatremia: Na = 150. Patient adequate UOP. No diuretics. Nephrology following  Add water flushes 100 cc Q6H    Severe Aortic Stenosis:  Had Mechanical AVR in 2007 then switched to bioprosthetic in 2016 due to warfarin intolerance and GI bleeding. 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 Structural heart following. Undergoing workup and medical optimization  TAVR done on 5/15 with no immediate complications.    Pneumonia / Sepsis- Worsening productive cough, increased O2 requirement and leukocytosis. CXR with possible RLL infiltrate. Pneumonia panel and resp cultures ordered. Decision made to start on HAP coverage, Urine strep pna and legionella negative. 05/11 Pneumonia panel negative. Resp cultures moderate segmented

## 2025-05-18 NOTE — PROGRESS NOTES
[facility]   Nephrology progress note  5/18/2025, 11:22 AM      Pt Name:    Alberto Gilliland  MRN:     [id number]     YOB: 1965  Admit Date:    4/30/2025 12:24 PM    Chief Complaint: Nephrology following for acute kidney injury and fluid overload.    Subjective:  Patient seen and examined this morning.   ECMO explanted yesterday.   Urine output 3 liters/24 hours.   CVP around 13.      Objective:  24HR INTAKE/OUTPUT:    Intake/Output Summary (Last 24 hours) at 5/18/2025 1122  Last data filed at 5/18/2025 0809  Gross per 24 hour   Intake 522.96 ml   Output 2415 ml   Net -1892.04 ml      Admission weight: 87.5 kg (193 lb)  Wt Readings from Last 3 Encounters:   05/16/25 85.3 kg (188 lb 0.8 oz)   04/30/25 87.7 kg (193 lb 6.4 oz)   04/25/25 89.2 kg (196 lb 9.6 oz)        Vitals :   Vitals:    05/18/25 0849 05/18/25 0900 05/18/25 0915 05/18/25 0930   BP:  (!) 116/51  (!) 106/46   Pulse: 79 80 82 80   Resp: 18 13 20 17   Temp:       TempSrc:       SpO2:  100% 100% 99%   Weight:       Height:           Physical examination  General Appearance: Ill-appearing  Mouth/Throat: ET tube in place  Neck: No accessory muscle use  Lungs:  Breath sounds: Diminished  Heart::  S1,S2 heard  Abdomen:  Soft, non - tender  Musculoskeletal:  Edema -noted    Medications:  Infusion:    sodium chloride      sodium chloride Stopped (05/17/25 1411)    bumetanide (BUMEX) 12.5 mg in sodium chloride 0.9 % 125 mL infusion Stopped (05/16/25 0309)    fentaNYL 25 mcg/hr (05/18/25 0809)    norepinephrine Stopped (05/13/25 1600)    VASOpressin 20 Units in sodium chloride 0.9 % 100 mL infusion Stopped (05/17/25 1721)    midazolam Stopped (05/16/25 1517)    EPINEPHrine 2 mcg/min (05/18/25 0809)    nitroGLYCERIN Stopped (05/17/25 1334)    milrinone Stopped (05/13/25 1104)    sodium chloride       Meds:    heparin (porcine)  5,000 Units SubCUTAneous 3 times per day    cefepime  1,000 mg IntraVENous Q24H    pantoprazole  40 mg

## 2025-05-18 NOTE — PROCEDURES
PROCEDURE NOTE  Date: 5/18/2025   Name: Alberto Gilliland  YOB: 1965    Procedures  EKG Completed. Handed to RN.

## 2025-05-18 NOTE — PLAN OF CARE
Problem: Respiratory - Adult  Goal: Achieves optimal ventilation and oxygenation  Recent Flowsheet Documentation  Taken 5/18/2025 0859 by Katelyn Ramos RCP  Achieves optimal ventilation and oxygenation:   Assess for changes in respiratory status   Respiratory therapy support as indicated                                                Patient Weaning Progress    The patient's vent settings was able to be weaned this shift.      Ventilator settings that were weaned              [] Mode   [x] Pressure support weaned   [x] Fio2 weaned   [] Peep weaned      Spontaneous weaning trial  was attempted.     due to defined parameters for SBT (spontaneous breathing trial) not being met.     *Results of SBT documented under SBTOUTCOME note.    Reason that defined ventilator parameters for SBT was not met              [x] Patient condition requires increased ventilator settings  [] Requires increased sedation   [x] Settings not within weaning range   [] SAT not completed   [] Physician orders    The patient was able to tolerate SBT.      Evac tube was  hooked up with continuous low suction(20-30mmHg)      Cuff was  deflated to determine cuff leak. A leak  was detected.   Patient mutually agreed on goals.

## 2025-05-18 NOTE — PROGRESS NOTES
Cardiology Progress Note      Patient:  Alberto Gilliland  YOB: 1965  MRN: [id number]   Acct: [id number]  Admit Date:  4/30/2025  Primary Cardiologist:  Rivas    Chief Complaint: CV shock    Subjective (Events in last 24 hours):   Cr continues to climb  Making urine  No diuretics  BP stable  CVP and PA pressures elevated but slowly coming down  Follows all commands  Hb 7-8  Na 150  He is trying to pull out tube        Objective:   BP (!) 137/57   Pulse 83   Temp 97.9 °F (36.6 °C)   Resp 11   Ht 1.702 m (5' 7\")   Wt 85.3 kg (188 lb 0.8 oz)   SpO2 100%   BMI 29.45 kg/m²        TELEMETRY: NSR/ST    Physical Exam:  General Appearance: Intubated/sedated  Cardiovascular: normal rate, regular rhythm, normal S1 and S2, 2/6 JESUS  Pulmonary/Chest: Soft crackles bilaterally  Abdomen: soft, non-tender, non-distended, normal bowel sounds, no masses Extremities: no cyanosis, clubbing or edema, pulse 1-2+  Skin: warm and dry, no rash or erythema  Head: normocephalic and atraumatic  Eyes: pupils equal, round, and reactive to light  Neck: supple and non-tender without mass, no thyromegaly   Musculoskeletal: normal range of motion, no joint swelling, deformity or tenderness  Neurological: sedated, intubated, follows commands    Medications:    cefepime  1,000 mg IntraVENous Q24H    pantoprazole  40 mg IntraVENous BID    diphenhydrAMINE  50 mg IntraVENous Once    hydrocortisone sodium succinate PF  200 mg IntraVENous Once    chlorhexidine  15 mL Mouth/Throat BID    gabapentin  300 mg Oral BID    nitroglycerin  1 inch Topical 4 times per day    insulin lispro  0-4 Units SubCUTAneous 4x Daily AC & HS    senna  2 tablet Oral BID    magnesium oxide  400 mg Oral Daily    [Held by provider] metoprolol succinate  12.5 mg Oral Daily    sodium chloride flush  5-40 mL IntraVENous 2 times per day    [Held by provider] enoxaparin  40 mg SubCUTAneous Daily    [Held by provider] aspirin  81 mg Oral Daily    [Held by provider]

## 2025-05-19 ENCOUNTER — APPOINTMENT (OUTPATIENT)
Dept: INTERVENTIONAL RADIOLOGY/VASCULAR | Age: 60
DRG: 003 | End: 2025-05-19
Attending: STUDENT IN AN ORGANIZED HEALTH CARE EDUCATION/TRAINING PROGRAM
Payer: COMMERCIAL

## 2025-05-19 ENCOUNTER — APPOINTMENT (OUTPATIENT)
Dept: GENERAL RADIOLOGY | Age: 60
DRG: 003 | End: 2025-05-19
Payer: COMMERCIAL

## 2025-05-19 LAB
ALBUMIN SERPL BCG-MCNC: 3 G/DL (ref 3.4–4.9)
ALP SERPL-CCNC: 115 U/L (ref 40–129)
ALT SERPL W/O P-5'-P-CCNC: 137 U/L (ref 10–50)
ANION GAP SERPL CALC-SCNC: 14 MEQ/L (ref 8–16)
ANION GAP SERPL CALC-SCNC: 18 MEQ/L (ref 8–16)
ARTERIAL PATENCY WRIST A: ABNORMAL
ARTERIAL PATENCY WRIST A: ABNORMAL
AST SERPL-CCNC: 102 U/L (ref 10–50)
BACTERIA SPEC RESP CULT: ABNORMAL
BACTERIA SPEC RESP CULT: ABNORMAL
BASE EXCESS BLDA CALC-SCNC: 2.7 MMOL/L (ref -2.5–2.5)
BASE EXCESS BLDA CALC-SCNC: 3.9 MMOL/L (ref -2–3)
BASE EXCESS BLDA CALC-SCNC: 4.3 MMOL/L (ref -2.5–2.5)
BASE EXCESS BLDA CALC-SCNC: 5.7 MMOL/L (ref -2–3)
BASOPHILS ABSOLUTE: 0 THOU/MM3 (ref 0–0.1)
BASOPHILS NFR BLD AUTO: 0.3 %
BDY SITE: ABNORMAL
BDY SITE: ABNORMAL
BILIRUB CONJ SERPL-MCNC: 2.3 MG/DL (ref 0–0.2)
BILIRUB SERPL-MCNC: 3.1 MG/DL (ref 0.3–1.2)
BREATHS SETTING VENT: 12 BPM
BREATHS SETTING VENT: 12 BPM
BUN SERPL-MCNC: 94 MG/DL (ref 8–23)
BUN SERPL-MCNC: 97 MG/DL (ref 8–23)
CALCIUM SERPL-MCNC: 9.1 MG/DL (ref 8.8–10.2)
CALCIUM SERPL-MCNC: 9.1 MG/DL (ref 8.8–10.2)
CHLORIDE SERPL-SCNC: 106 MEQ/L (ref 98–111)
CHLORIDE SERPL-SCNC: 107 MEQ/L (ref 98–111)
CO2 SERPL-SCNC: 24 MEQ/L (ref 22–29)
CO2 SERPL-SCNC: 27 MEQ/L (ref 22–29)
COLLECTED BY:: ABNORMAL
CREAT SERPL-MCNC: 3.5 MG/DL (ref 0.7–1.2)
CREAT SERPL-MCNC: 3.7 MG/DL (ref 0.7–1.2)
DEPRECATED RDW RBC AUTO: 61.3 FL (ref 35–45)
DEVICE: ABNORMAL
ECHO BSA: 2.03 M2
ECHO BSA: 2.03 M2
EKG ATRIAL RATE: 76 BPM
EKG P AXIS: 55 DEGREES
EKG P-R INTERVAL: 192 MS
EKG Q-T INTERVAL: 478 MS
EKG QRS DURATION: 134 MS
EKG QTC CALCULATION (BAZETT): 537 MS
EKG R AXIS: 25 DEGREES
EKG T AXIS: -105 DEGREES
EKG VENTRICULAR RATE: 76 BPM
EOSINOPHIL NFR BLD AUTO: 2 %
EOSINOPHILS ABSOLUTE: 0.3 THOU/MM3 (ref 0–0.4)
ERYTHROCYTE [DISTWIDTH] IN BLOOD BY AUTOMATED COUNT: 18.3 % (ref 11.5–14.5)
FIO2 ON VENT O2 ANALYZER: 30 %
FIO2 ON VENT O2 ANALYZER: 4 %
GFR SERPL CREATININE-BSD FRML MDRD: 18 ML/MIN/1.73M2
GFR SERPL CREATININE-BSD FRML MDRD: 19 ML/MIN/1.73M2
GLUCOSE BLD STRIP.AUTO-MCNC: 106 MG/DL (ref 70–108)
GLUCOSE BLD-MCNC: 130 MG/DL (ref 70–108)
GLUCOSE BLD-MCNC: 72 MG/DL (ref 70–108)
GLUCOSE BLD-MCNC: 77 MG/DL (ref 70–108)
GLUCOSE SERPL-MCNC: 119 MG/DL (ref 74–109)
GLUCOSE SERPL-MCNC: 92 MG/DL (ref 74–109)
GRAM STN SPEC: ABNORMAL
HBV CORE IGM SERPL QL IA: NONREACTIVE
HBV SURFACE AB TITR SER: 16.7 MIU/ML
HBV SURFACE AG SERPL QL IA: NONREACTIVE
HCO3 BLDA-SCNC: 27 MMOL/L (ref 23–28)
HCO3 BLDA-SCNC: 29 MMOL/L (ref 23–28)
HCO3 BLDA-SCNC: 29 MMOL/L (ref 23–28)
HCO3 BLDA-SCNC: 32 MMOL/L (ref 23–28)
HCT VFR BLD AUTO: 25.6 % (ref 42–52)
HGB BLD-MCNC: 7.9 GM/DL (ref 14–18)
IMM GRANULOCYTES # BLD AUTO: 0.12 THOU/MM3 (ref 0–0.07)
IMM GRANULOCYTES NFR BLD AUTO: 0.8 %
INR PPP: 1.29 (ref 0.85–1.13)
LYMPHOCYTES ABSOLUTE: 0.6 THOU/MM3 (ref 1–4.8)
LYMPHOCYTES NFR BLD AUTO: 4.2 %
MAGNESIUM SERPL-MCNC: 3.1 MG/DL (ref 1.6–2.6)
MCH RBC QN AUTO: 29.5 PG (ref 26–33)
MCHC RBC AUTO-ENTMCNC: 30.9 GM/DL (ref 32.2–35.5)
MCV RBC AUTO: 95.5 FL (ref 80–94)
MONOCYTES ABSOLUTE: 1.5 THOU/MM3 (ref 0.4–1.3)
MONOCYTES NFR BLD AUTO: 10.7 %
NEUTROPHILS ABSOLUTE: 11.6 THOU/MM3 (ref 1.8–7.7)
NEUTROPHILS NFR BLD AUTO: 82 %
NRBC BLD AUTO-RTO: 0 /100 WBC
ORGANISM: ABNORMAL
PCO2 BLDA: 41 MMHG (ref 35–45)
PCO2 BLDA: 43 MMHG (ref 35–45)
PCO2 TEMP ADJ BLDMV: 47 MMHG (ref 41–51)
PCO2 TEMP ADJ BLDMV: 56 MMHG (ref 41–51)
PEEP SETTING VENT: 8 MMHG
PEEP SETTING VENT: 8 MMHG
PH BLDA: 7.43 [PH] (ref 7.35–7.45)
PH BLDA: 7.44 [PH] (ref 7.35–7.45)
PH BLDMV: 7.37 [PH] (ref 7.31–7.41)
PH BLDMV: 7.4 [PH] (ref 7.31–7.41)
PIP: 22 CMH2O
PIP: 24 CMH2O
PIP: 24 CMH2O
PLATELET # BLD AUTO: 149 THOU/MM3 (ref 130–400)
PMV BLD AUTO: 12 FL (ref 9.4–12.4)
PO2 BLDA: 109 MMHG (ref 71–104)
PO2 BLDA: 129 MMHG (ref 71–104)
PO2 BLDMV: 35 MMHG (ref 25–40)
PO2 BLDMV: 44 MMHG (ref 25–40)
POTASSIUM SERPL-SCNC: 3.2 MEQ/L (ref 3.5–5.2)
POTASSIUM SERPL-SCNC: 3.4 MEQ/L (ref 3.5–5.2)
PROT SERPL-MCNC: 5.6 G/DL (ref 6.4–8.3)
RBC # BLD AUTO: 2.68 MILL/MM3 (ref 4.7–6.1)
SAO2 % BLDA: 98 %
SAO2 % BLDA: 99 %
SAO2 % BLDMV: 64 %
SAO2 % BLDMV: 79 %
SET PEEP: 8 MMHG
SET RESPIRATORY RATE: 12 BPM
SITE: ABNORMAL
SITE: ABNORMAL
SODIUM SERPL-SCNC: 147 MEQ/L (ref 135–145)
SODIUM SERPL-SCNC: 149 MEQ/L (ref 135–145)
VENTILATION MODE VENT: ABNORMAL
WBC # BLD AUTO: 14.1 THOU/MM3 (ref 4.8–10.8)

## 2025-05-19 PROCEDURE — 85610 PROTHROMBIN TIME: CPT

## 2025-05-19 PROCEDURE — 6360000002 HC RX W HCPCS: Performed by: INTERNAL MEDICINE

## 2025-05-19 PROCEDURE — 6370000000 HC RX 637 (ALT 250 FOR IP): Performed by: INTERNAL MEDICINE

## 2025-05-19 PROCEDURE — 82947 ASSAY GLUCOSE BLOOD QUANT: CPT

## 2025-05-19 PROCEDURE — 83735 ASSAY OF MAGNESIUM: CPT

## 2025-05-19 PROCEDURE — 37799 UNLISTED PX VASCULAR SURGERY: CPT

## 2025-05-19 PROCEDURE — 93925 LOWER EXTREMITY STUDY: CPT

## 2025-05-19 PROCEDURE — 94761 N-INVAS EAR/PLS OXIMETRY MLT: CPT

## 2025-05-19 PROCEDURE — 93010 ELECTROCARDIOGRAM REPORT: CPT | Performed by: INTERNAL MEDICINE

## 2025-05-19 PROCEDURE — 2580000003 HC RX 258: Performed by: INTERNAL MEDICINE

## 2025-05-19 PROCEDURE — 2700000000 HC OXYGEN THERAPY PER DAY

## 2025-05-19 PROCEDURE — 6370000000 HC RX 637 (ALT 250 FOR IP)

## 2025-05-19 PROCEDURE — 86706 HEP B SURFACE ANTIBODY: CPT

## 2025-05-19 PROCEDURE — 6360000002 HC RX W HCPCS

## 2025-05-19 PROCEDURE — 87340 HEPATITIS B SURFACE AG IA: CPT

## 2025-05-19 PROCEDURE — 99233 SBSQ HOSP IP/OBS HIGH 50: CPT | Performed by: NURSE PRACTITIONER

## 2025-05-19 PROCEDURE — 93005 ELECTROCARDIOGRAM TRACING: CPT

## 2025-05-19 PROCEDURE — 94640 AIRWAY INHALATION TREATMENT: CPT

## 2025-05-19 PROCEDURE — 99233 SBSQ HOSP IP/OBS HIGH 50: CPT | Performed by: INTERNAL MEDICINE

## 2025-05-19 PROCEDURE — 90935 HEMODIALYSIS ONE EVALUATION: CPT

## 2025-05-19 PROCEDURE — 2000000000 HC ICU R&B

## 2025-05-19 PROCEDURE — 71045 X-RAY EXAM CHEST 1 VIEW: CPT

## 2025-05-19 PROCEDURE — 6360000002 HC RX W HCPCS: Performed by: STUDENT IN AN ORGANIZED HEALTH CARE EDUCATION/TRAINING PROGRAM

## 2025-05-19 PROCEDURE — 93005 ELECTROCARDIOGRAM TRACING: CPT | Performed by: INTERNAL MEDICINE

## 2025-05-19 PROCEDURE — 94003 VENT MGMT INPAT SUBQ DAY: CPT

## 2025-05-19 PROCEDURE — 82803 BLOOD GASES ANY COMBINATION: CPT

## 2025-05-19 PROCEDURE — 80053 COMPREHEN METABOLIC PANEL: CPT

## 2025-05-19 PROCEDURE — 2500000003 HC RX 250 WO HCPCS: Performed by: INTERNAL MEDICINE

## 2025-05-19 PROCEDURE — 82948 REAGENT STRIP/BLOOD GLUCOSE: CPT

## 2025-05-19 PROCEDURE — 36415 COLL VENOUS BLD VENIPUNCTURE: CPT

## 2025-05-19 PROCEDURE — 6370000000 HC RX 637 (ALT 250 FOR IP): Performed by: NURSE PRACTITIONER

## 2025-05-19 PROCEDURE — 86705 HEP B CORE ANTIBODY IGM: CPT

## 2025-05-19 PROCEDURE — 93970 EXTREMITY STUDY: CPT

## 2025-05-19 PROCEDURE — 82248 BILIRUBIN DIRECT: CPT

## 2025-05-19 PROCEDURE — 85025 COMPLETE CBC W/AUTO DIFF WBC: CPT

## 2025-05-19 RX ORDER — MAGNESIUM SULFATE IN WATER 40 MG/ML
2000 INJECTION, SOLUTION INTRAVENOUS ONCE
Status: COMPLETED | OUTPATIENT
Start: 2025-05-19 | End: 2025-05-19

## 2025-05-19 RX ORDER — DEXTROSE MONOHYDRATE 50 MG/ML
INJECTION, SOLUTION INTRAVENOUS CONTINUOUS
Status: DISCONTINUED | OUTPATIENT
Start: 2025-05-19 | End: 2025-05-19

## 2025-05-19 RX ORDER — CLOPIDOGREL 300 MG/1
300 TABLET, FILM COATED ORAL ONCE
Status: COMPLETED | OUTPATIENT
Start: 2025-05-19 | End: 2025-05-19

## 2025-05-19 RX ORDER — CLOPIDOGREL BISULFATE 75 MG/1
75 TABLET ORAL DAILY
Status: DISCONTINUED | OUTPATIENT
Start: 2025-05-20 | End: 2025-05-28

## 2025-05-19 RX ORDER — BUMETANIDE 0.25 MG/ML
2 INJECTION, SOLUTION INTRAMUSCULAR; INTRAVENOUS EVERY 8 HOURS
Status: DISCONTINUED | OUTPATIENT
Start: 2025-05-19 | End: 2025-05-27

## 2025-05-19 RX ADMIN — SODIUM CHLORIDE, PRESERVATIVE FREE 10 ML: 5 INJECTION INTRAVENOUS at 14:38

## 2025-05-19 RX ADMIN — OXYCODONE HYDROCHLORIDE AND ACETAMINOPHEN 500 MG: 500 TABLET ORAL at 12:53

## 2025-05-19 RX ADMIN — FOLIC ACID 1000 MCG: 1 TABLET ORAL at 12:54

## 2025-05-19 RX ADMIN — HEPARIN SODIUM 5000 UNITS: 5000 INJECTION INTRAVENOUS; SUBCUTANEOUS at 06:31

## 2025-05-19 RX ADMIN — HEPARIN SODIUM 5000 UNITS: 5000 INJECTION INTRAVENOUS; SUBCUTANEOUS at 20:35

## 2025-05-19 RX ADMIN — ASPIRIN 81 MG: 81 TABLET, COATED ORAL at 12:53

## 2025-05-19 RX ADMIN — Medication 150 MCG/HR: at 03:21

## 2025-05-19 RX ADMIN — PANTOPRAZOLE SODIUM 40 MG: 40 INJECTION, POWDER, LYOPHILIZED, FOR SOLUTION INTRAVENOUS at 09:03

## 2025-05-19 RX ADMIN — CLOPIDOGREL BISULFATE 300 MG: 300 TABLET, FILM COATED ORAL at 12:53

## 2025-05-19 RX ADMIN — Medication 1000 UNITS: at 12:53

## 2025-05-19 RX ADMIN — NITROGLYCERIN 1 INCH: 20 OINTMENT TOPICAL at 14:39

## 2025-05-19 RX ADMIN — TIOTROPIUM BROMIDE AND OLODATEROL 2 PUFF: 3.124; 2.736 SPRAY, METERED RESPIRATORY (INHALATION) at 08:03

## 2025-05-19 RX ADMIN — BUMETANIDE 2 MG: 0.25 INJECTION INTRAMUSCULAR; INTRAVENOUS at 10:08

## 2025-05-19 RX ADMIN — SENNOSIDES 17.2 MG: 8.6 TABLET, FILM COATED ORAL at 20:35

## 2025-05-19 RX ADMIN — DEXTROSE: 5 SOLUTION INTRAVENOUS at 10:04

## 2025-05-19 RX ADMIN — POTASSIUM BICARBONATE 20 MEQ: 782 TABLET, EFFERVESCENT ORAL at 06:45

## 2025-05-19 RX ADMIN — SENNOSIDES 17.2 MG: 8.6 TABLET, FILM COATED ORAL at 12:54

## 2025-05-19 RX ADMIN — PROCHLORPERAZINE EDISYLATE 10 MG: 5 INJECTION INTRAMUSCULAR; INTRAVENOUS at 21:22

## 2025-05-19 RX ADMIN — ESCITALOPRAM OXALATE 10 MG: 10 TABLET ORAL at 12:53

## 2025-05-19 RX ADMIN — HEPARIN SODIUM 5000 UNITS: 5000 INJECTION INTRAVENOUS; SUBCUTANEOUS at 14:42

## 2025-05-19 RX ADMIN — MAGNESIUM SULFATE HEPTAHYDRATE 2000 MG: 40 INJECTION, SOLUTION INTRAVENOUS at 09:03

## 2025-05-19 RX ADMIN — Medication 400 MG: at 12:54

## 2025-05-19 RX ADMIN — PANTOPRAZOLE SODIUM 40 MG: 40 INJECTION, POWDER, LYOPHILIZED, FOR SOLUTION INTRAVENOUS at 20:35

## 2025-05-19 RX ADMIN — SODIUM CHLORIDE, PRESERVATIVE FREE 10 ML: 5 INJECTION INTRAVENOUS at 20:45

## 2025-05-19 RX ADMIN — GABAPENTIN 300 MG: 300 CAPSULE ORAL at 12:54

## 2025-05-19 RX ADMIN — LEVOTHYROXINE SODIUM 75 MCG: 0.07 TABLET ORAL at 05:17

## 2025-05-19 RX ADMIN — GABAPENTIN 300 MG: 300 CAPSULE ORAL at 20:35

## 2025-05-19 RX ADMIN — NITROGLYCERIN 1 INCH: 20 OINTMENT TOPICAL at 05:17

## 2025-05-19 RX ADMIN — NITROGLYCERIN 1 INCH: 20 OINTMENT TOPICAL at 18:05

## 2025-05-19 ASSESSMENT — PULMONARY FUNCTION TESTS
PIF_VALUE: 23
PIF_VALUE: 23

## 2025-05-19 NOTE — PROGRESS NOTES
Patient Weaning Progress    The patient's vent settings was not able to be weaned this shift.      Ventilator settings that were weaned              [] Mode   [] Pressure support weaned   [] Fio2 weaned   [] Peep weaned      Spontaneous weaning trial  was attempted.     The patient was able to tolerate SBT.   RSBI  was 42 with EtCO2 of 43 and SpO2 of 100 on 30% FiO2.  Spontanteous VT was 430 and RR 18 breaths/min.  The patient was put on SBT @ 0416    Evac tube was  hooked up with continuous low suction(20-30mmHg)      Cuff was not  deflated to determine cuff leak.     Patient mutually agreed on goals.    Family mutually agreed on goals.

## 2025-05-19 NOTE — FLOWSHEET NOTE
0800 Mr Gilliland rests in the bed after being extubated this AM. He has a swan yury catheter right SC site clear with good wave forms at the 65 cm mao. He has an art line left radial site clear with good wave forms. He has a temp sensing wong paatent to gravity draining clear yellow urine. Thermodilution CO was completed. (See flow sheet) Dr Jenkins has come to see and we will try to diurese instead of dialysis today.    1100 He was able to swallow a few ice chips without difficulty. His brother has come to see.

## 2025-05-19 NOTE — PLAN OF CARE
Problem: Discharge Planning  Goal: Discharge to home or other facility with appropriate resources  5/19/2025 1831 by Cornelio Echols RN  Outcome: Progressing  Flowsheets (Taken 5/19/2025 1829)  Discharge to home or other facility with appropriate resources:   Identify barriers to discharge with patient and caregiver   Identify discharge learning needs (meds, wound care, etc)     Problem: ABCDS Injury Assessment  Goal: Absence of physical injury  5/19/2025 1831 by Cornelio Echols RN  Outcome: Progressing  Flowsheets (Taken 5/19/2025 1829)  Absence of Physical Injury: Implement safety measures based on patient assessment     Problem: Safety - Adult  Goal: Free from fall injury  5/19/2025 1831 by Cornelio Echols RN  Outcome: Progressing  Flowsheets (Taken 5/19/2025 1829)  Free From Fall Injury:   Instruct family/caregiver on patient safety   Based on caregiver fall risk screen, instruct family/caregiver to ask for assistance with transferring infant if caregiver noted to have fall risk factors     Problem: Pain  Goal: Verbalizes/displays adequate comfort level or baseline comfort level  5/19/2025 1831 by Cornelio Echols RN  Outcome: Progressing  Flowsheets (Taken 5/19/2025 1829)  Verbalizes/displays adequate comfort level or baseline comfort level:   Encourage patient to monitor pain and request assistance   Assess pain using appropriate pain scale   Implement non-pharmacological measures as appropriate and evaluate response     Problem: Respiratory - Adult  Goal: Achieves optimal ventilation and oxygenation  5/19/2025 1831 by Cornelio Echols RN  Outcome: Progressing  Flowsheets (Taken 5/19/2025 1829)  Achieves optimal ventilation and oxygenation:   Assess for changes in respiratory status   Assess for changes in mentation and behavior   Oxygen supplementation based on oxygen saturation or arterial blood gases     Problem: Cardiovascular - Adult  Goal: Maintains optimal cardiac output and hemodynamic

## 2025-05-19 NOTE — PROGRESS NOTES
[facility]   Nephrology progress note  5/19/2025, 8:57 AM      Pt Name:    Alberto Gilliland  MRN:     [id number]     YOB: 1965  Admit Date:    4/30/2025 12:24 PM    Chief Complaint: Nephrology following for TC and fluid overload    Subjective:  Patient was seen and examined this morning.  Extubated on 5/18. Remains on epinephrine drip.   Urine output 1310 mL/24h.    Objective:  24HR INTAKE/OUTPUT:    Intake/Output Summary (Last 24 hours) at 5/19/2025 0857  Last data filed at 5/19/2025 0757  Gross per 24 hour   Intake 824.76 ml   Output 1165 ml   Net -340.24 ml       I/O last 3 completed shifts:  In: 1001.7 [I.V.:587; NG/GT:313; IV Piggyback:101.6]  Out: 2630 [Urine:2630]  I/O this shift:  In: 22.2 [I.V.:22.2]  Out: -    Admission weight: 87.5 kg (193 lb)  Wt Readings from Last 3 Encounters:   05/16/25 85.3 kg (188 lb 0.8 oz)   04/30/25 87.7 kg (193 lb 6.4 oz)   04/25/25 89.2 kg (196 lb 9.6 oz)        Vitals :   Vitals:    05/19/25 0700 05/19/25 0715 05/19/25 0730 05/19/25 0804   BP:       Pulse: 75 81 85 85   Resp: 16 26 20 18   Temp: 98.4 °F (36.9 °C)      TempSrc:       SpO2: 90% 94% 96% 97%   Weight:       Height:           Physical examination  General Appearance: ill appearing  Mouth/Throat: Oral mucosa moist  Neck: No JVD  Lungs: diminished breath sounds in R lower lobe, no rales, no use of accessory muscles  Heart:  S1, S2 heard  GI: soft, non-tender, no guarding  Extremities: 2+pitting b/l LE edema    Medications:  Infusion:    sodium chloride      sodium chloride Stopped (05/17/25 1411)    bumetanide (BUMEX) 12.5 mg in sodium chloride 0.9 % 125 mL infusion Stopped (05/16/25 0309)    EPINEPHrine 2 mcg/min (05/19/25 9047)    sodium chloride       Meds:    magnesium sulfate  2,000 mg IntraVENous Once    heparin (porcine)  5,000 Units SubCUTAneous 3 times per day    pantoprazole  40 mg IntraVENous BID    diphenhydrAMINE  50 mg IntraVENous Once    hydrocortisone sodium succinate

## 2025-05-19 NOTE — PROGRESS NOTES
Cardiology Progress Note      Patient:  Alberto Gilliland  YOB: 1965  MRN: [id number]   Acct: [id number]  Admit Date:  4/30/2025  Primary Cardiologist:  Rivas    Chief Complaint: CV shock    Subjective (Events in last 24 hours):     5/19  Cr up even further this morning  Na 149  Pt remains intubated  Epi at 2  Hgb remains stable  Hemodynamics @ 0338 CO 8.77 CI 4.47 MAP 79 PAP 70/20 CVP 16  UOP 1.3L/24hrs [- 10.9L]          5/18  Cr continues to climb  Making urine  No diuretics  BP stable  CVP and PA pressures elevated but slowly coming down  Follows all commands  Hb 7-8  Na 150  He is trying to pull out tube        Objective:   BP (!) 59/39   Pulse 74   Temp 98.1 °F (36.7 °C)   Resp 15   Ht 1.702 m (5' 7\")   Wt 85.3 kg (188 lb 0.8 oz)   SpO2 92%   BMI 29.45 kg/m²        TELEMETRY: NSR/ST    Physical Exam:  General Appearance: Intubated/sedated  Cardiovascular: normal rate, regular rhythm, normal S1 and S2, 2/6 JESUS  Pulmonary/Chest: Soft crackles bilaterally  Abdomen: soft, non-tender, non-distended, normal bowel sounds, no masses   Extremities: no cyanosis, +1 pedal edema, pulse 1-2+  Skin: warm and dry, no rash or erythema  Head: normocephalic and atraumatic  Eyes: pupils equal, round, and reactive to light  Neck: supple and non-tender without mass, no thyromegaly   Musculoskeletal: normal range of motion, no joint swelling, deformity or tenderness  Neurological: sedated, intubated, follows commands    Medications:    potassium bicarb-citric acid  20 mEq Orogastric Once    heparin (porcine)  5,000 Units SubCUTAneous 3 times per day    pantoprazole  40 mg IntraVENous BID    diphenhydrAMINE  50 mg IntraVENous Once    hydrocortisone sodium succinate PF  200 mg IntraVENous Once    chlorhexidine  15 mL Mouth/Throat BID    gabapentin  300 mg Oral BID    nitroglycerin  1 inch Topical 4 times per day    insulin lispro  0-4 Units SubCUTAneous 4x Daily AC & HS    senna  2 tablet Oral BID    magnesium

## 2025-05-19 NOTE — PLAN OF CARE
Problem: Respiratory - Adult  Goal: Achieves optimal ventilation and oxygenation  Outcome: Progressing  Pt has been extubated to a nasal cannula today.  Continue to wean oxygen as tolerated

## 2025-05-19 NOTE — PROCEDURES
PROCEDURE NOTE  Date: 5/19/2025   Name: Alberto Gilliland  YOB: 1965    Procedures  12 lead EKG completed. Results handed to Jaquelin BUTLER.

## 2025-05-19 NOTE — PROGRESS NOTES
Comprehensive Nutrition Assessment    Type and Reason for Visit:  Reassess    Nutrition Recommendations/Plan:   Currently NPO - Speech Therapy to eval swallow as appropriate   Will offer ONS when po diet diet allows   Tube Feed order discontinued as OG tube removed with extubation   Will provide cardiac diet education as appropriate      Malnutrition Assessment:  Malnutrition Status:  Moderate malnutrition (05/09/25 1626)    Context:  Acute Illness     Findings of the 6 clinical characteristics of malnutrition:  Energy Intake:  50% or less of estimated energy requirements for 5 or more days (NPO/CLD this admit)  Weight Loss:  Unable to assess (CHF; unable to accurately assess)     Body Fat Loss:  Mild body fat loss Orbital   Muscle Mass Loss:  Mild muscle mass loss Temples (temporalis), Clavicles (pectoralis & deltoids)  Fluid Accumulation:  Mild Generalized, Extremities   Strength:  Not Performed    Nutrition Assessment:     Pt. Nutritionally compromised AEB intubated 5/13-5/19, NPO, tolerated only trophic TF while intubated. At risk for further nutrition compromise r/t admit with CHF - hypotensive s/p RODRÍGUEZ 5/6 and RHC 5/9, hemorrhagic shock 2/2 GIB - multiple bloody BM 5/8 s/p EGD 5/5 no active bleeding noted - bx duodenum taken and colonoscopy 5/8 with inability to fully visualize active bleeding - clots noted, ECMO 5/13-5/17, shock liver, TC, symptomatic DIONISIO, moderate malnutrition, CRRT 5/13-14;TAVR 5/15, LOS day 18 and underlying medical condition (PMHx: mechanical AVR/resection of ascending aorta 2007 - switched to bioprosthetic 2016 2/2 warfarin intolerance and GIB, CHF, CAD COPD, anxiety, WISE, lymphatoid papulosis, GERD, HTN, HLD, hypothyroidism, OA, hx/o small bowel resection 2008 - 8 inches removed, s/p robotic laparoscopy lysis of adhesions/ventral hernia repair/evacuation of abdominal hematoma with debridement of hematoma capsule 2/23/24, current smoker, marijuana use)     Nutrition Related

## 2025-05-20 ENCOUNTER — APPOINTMENT (OUTPATIENT)
Dept: GENERAL RADIOLOGY | Age: 60
DRG: 003 | End: 2025-05-20
Payer: COMMERCIAL

## 2025-05-20 LAB
ABO GROUP BLD: NORMAL
ANION GAP SERPL CALC-SCNC: 14 MEQ/L (ref 8–16)
BASOPHILS ABSOLUTE: 0 THOU/MM3 (ref 0–0.1)
BASOPHILS NFR BLD AUTO: 0.3 %
BUN SERPL-MCNC: 77 MG/DL (ref 8–23)
CALCIUM SERPL-MCNC: 8.9 MG/DL (ref 8.8–10.2)
CHLORIDE SERPL-SCNC: 110 MEQ/L (ref 98–111)
CO2 SERPL-SCNC: 23 MEQ/L (ref 22–29)
CREAT SERPL-MCNC: 3.3 MG/DL (ref 0.7–1.2)
DEPRECATED RDW RBC AUTO: 61.5 FL (ref 35–45)
EKG ATRIAL RATE: 78 BPM
EKG P AXIS: 29 DEGREES
EKG P-R INTERVAL: 178 MS
EKG Q-T INTERVAL: 404 MS
EKG QRS DURATION: 134 MS
EKG QTC CALCULATION (BAZETT): 460 MS
EKG R AXIS: 30 DEGREES
EKG T AXIS: -149 DEGREES
EKG VENTRICULAR RATE: 78 BPM
EOSINOPHIL NFR BLD AUTO: 1.9 %
EOSINOPHILS ABSOLUTE: 0.3 THOU/MM3 (ref 0–0.4)
ERYTHROCYTE [DISTWIDTH] IN BLOOD BY AUTOMATED COUNT: 17.9 % (ref 11.5–14.5)
GFR SERPL CREATININE-BSD FRML MDRD: 20 ML/MIN/1.73M2
GLUCOSE BLD STRIP.AUTO-MCNC: 102 MG/DL (ref 70–108)
GLUCOSE BLD STRIP.AUTO-MCNC: 91 MG/DL (ref 70–108)
GLUCOSE SERPL-MCNC: 90 MG/DL (ref 74–109)
HCT VFR BLD AUTO: 21.4 % (ref 42–52)
HCT VFR BLD AUTO: 23.1 % (ref 42–52)
HCT VFR BLD AUTO: 23.2 % (ref 42–52)
HCT VFR BLD AUTO: 24.2 % (ref 42–52)
HGB BLD-MCNC: 6.5 GM/DL (ref 14–18)
HGB BLD-MCNC: 7.1 GM/DL (ref 14–18)
HGB BLD-MCNC: 7.2 GM/DL (ref 14–18)
HGB BLD-MCNC: 7.6 GM/DL (ref 14–18)
IAT IGG-SP REAG SERPL QL: NORMAL
IMM GRANULOCYTES # BLD AUTO: 0.09 THOU/MM3 (ref 0–0.07)
IMM GRANULOCYTES NFR BLD AUTO: 0.6 %
INR PPP: 1.32 (ref 0.85–1.13)
LYMPHOCYTES ABSOLUTE: 0.7 THOU/MM3 (ref 1–4.8)
LYMPHOCYTES NFR BLD AUTO: 4.6 %
MAGNESIUM SERPL-MCNC: 2.7 MG/DL (ref 1.6–2.6)
MCH RBC QN AUTO: 29.5 PG (ref 26–33)
MCHC RBC AUTO-ENTMCNC: 30.6 GM/DL (ref 32.2–35.5)
MCV RBC AUTO: 96.3 FL (ref 80–94)
MONOCYTES ABSOLUTE: 1.2 THOU/MM3 (ref 0.4–1.3)
MONOCYTES NFR BLD AUTO: 8.3 %
NEUTROPHILS ABSOLUTE: 12.6 THOU/MM3 (ref 1.8–7.7)
NEUTROPHILS NFR BLD AUTO: 84.3 %
NRBC BLD AUTO-RTO: 0 /100 WBC
PLATELET # BLD AUTO: 174 THOU/MM3 (ref 130–400)
PMV BLD AUTO: 11.7 FL (ref 9.4–12.4)
POTASSIUM SERPL-SCNC: 3.7 MEQ/L (ref 3.5–5.2)
RBC # BLD AUTO: 2.41 MILL/MM3 (ref 4.7–6.1)
RH BLD: NORMAL
SODIUM SERPL-SCNC: 147 MEQ/L (ref 135–145)
WBC # BLD AUTO: 14.9 THOU/MM3 (ref 4.8–10.8)

## 2025-05-20 PROCEDURE — 85025 COMPLETE CBC W/AUTO DIFF WBC: CPT

## 2025-05-20 PROCEDURE — 80048 BASIC METABOLIC PNL TOTAL CA: CPT

## 2025-05-20 PROCEDURE — 6370000000 HC RX 637 (ALT 250 FOR IP): Performed by: INTERNAL MEDICINE

## 2025-05-20 PROCEDURE — 71045 X-RAY EXAM CHEST 1 VIEW: CPT

## 2025-05-20 PROCEDURE — 6360000002 HC RX W HCPCS: Performed by: INTERNAL MEDICINE

## 2025-05-20 PROCEDURE — 99233 SBSQ HOSP IP/OBS HIGH 50: CPT | Performed by: NURSE PRACTITIONER

## 2025-05-20 PROCEDURE — 02HV33Z INSERTION OF INFUSION DEVICE INTO SUPERIOR VENA CAVA, PERCUTANEOUS APPROACH: ICD-10-PCS | Performed by: NURSE PRACTITIONER

## 2025-05-20 PROCEDURE — 6370000000 HC RX 637 (ALT 250 FOR IP)

## 2025-05-20 PROCEDURE — 85018 HEMOGLOBIN: CPT

## 2025-05-20 PROCEDURE — 2700000000 HC OXYGEN THERAPY PER DAY

## 2025-05-20 PROCEDURE — 6370000000 HC RX 637 (ALT 250 FOR IP): Performed by: NURSE PRACTITIONER

## 2025-05-20 PROCEDURE — 6360000002 HC RX W HCPCS

## 2025-05-20 PROCEDURE — 97162 PT EVAL MOD COMPLEX 30 MIN: CPT

## 2025-05-20 PROCEDURE — 92610 EVALUATE SWALLOWING FUNCTION: CPT

## 2025-05-20 PROCEDURE — 2000000000 HC ICU R&B

## 2025-05-20 PROCEDURE — 99233 SBSQ HOSP IP/OBS HIGH 50: CPT | Performed by: INTERNAL MEDICINE

## 2025-05-20 PROCEDURE — 85610 PROTHROMBIN TIME: CPT

## 2025-05-20 PROCEDURE — 86901 BLOOD TYPING SEROLOGIC RH(D): CPT

## 2025-05-20 PROCEDURE — 93010 ELECTROCARDIOGRAM REPORT: CPT | Performed by: INTERNAL MEDICINE

## 2025-05-20 PROCEDURE — 2580000003 HC RX 258

## 2025-05-20 PROCEDURE — 36415 COLL VENOUS BLD VENIPUNCTURE: CPT

## 2025-05-20 PROCEDURE — 93503 INSERT/PLACE HEART CATHETER: CPT | Performed by: NURSE PRACTITIONER

## 2025-05-20 PROCEDURE — P9016 RBC LEUKOCYTES REDUCED: HCPCS

## 2025-05-20 PROCEDURE — 36556 INSERT NON-TUNNEL CV CATH: CPT | Performed by: NURSE PRACTITIONER

## 2025-05-20 PROCEDURE — 97110 THERAPEUTIC EXERCISES: CPT

## 2025-05-20 PROCEDURE — 85014 HEMATOCRIT: CPT

## 2025-05-20 PROCEDURE — 86885 COOMBS TEST INDIRECT QUAL: CPT

## 2025-05-20 PROCEDURE — 86923 COMPATIBILITY TEST ELECTRIC: CPT

## 2025-05-20 PROCEDURE — 2500000003 HC RX 250 WO HCPCS: Performed by: INTERNAL MEDICINE

## 2025-05-20 PROCEDURE — 94640 AIRWAY INHALATION TREATMENT: CPT

## 2025-05-20 PROCEDURE — 86900 BLOOD TYPING SEROLOGIC ABO: CPT

## 2025-05-20 PROCEDURE — 82948 REAGENT STRIP/BLOOD GLUCOSE: CPT

## 2025-05-20 PROCEDURE — 36430 TRANSFUSION BLD/BLD COMPNT: CPT

## 2025-05-20 PROCEDURE — 83735 ASSAY OF MAGNESIUM: CPT

## 2025-05-20 RX ORDER — SODIUM CHLORIDE 9 MG/ML
INJECTION, SOLUTION INTRAVENOUS PRN
Status: DISCONTINUED | OUTPATIENT
Start: 2025-05-20 | End: 2025-05-28 | Stop reason: HOSPADM

## 2025-05-20 RX ORDER — FERROUS SULFATE 325(65) MG
325 TABLET ORAL 2 TIMES DAILY WITH MEALS
Status: DISCONTINUED | OUTPATIENT
Start: 2025-05-20 | End: 2025-05-28 | Stop reason: HOSPADM

## 2025-05-20 RX ADMIN — SENNOSIDES 17.2 MG: 8.6 TABLET, FILM COATED ORAL at 08:45

## 2025-05-20 RX ADMIN — Medication 400 MG: at 08:45

## 2025-05-20 RX ADMIN — TIOTROPIUM BROMIDE AND OLODATEROL 2 PUFF: 3.124; 2.736 SPRAY, METERED RESPIRATORY (INHALATION) at 08:33

## 2025-05-20 RX ADMIN — OXYCODONE HYDROCHLORIDE AND ACETAMINOPHEN 500 MG: 500 TABLET ORAL at 08:44

## 2025-05-20 RX ADMIN — SODIUM CHLORIDE, PRESERVATIVE FREE 10 ML: 5 INJECTION INTRAVENOUS at 20:59

## 2025-05-20 RX ADMIN — GABAPENTIN 300 MG: 300 CAPSULE ORAL at 08:45

## 2025-05-20 RX ADMIN — FOLIC ACID 1000 MCG: 1 TABLET ORAL at 08:45

## 2025-05-20 RX ADMIN — SODIUM CHLORIDE 40 MG: 9 INJECTION INTRAMUSCULAR; INTRAVENOUS; SUBCUTANEOUS at 08:45

## 2025-05-20 RX ADMIN — BUMETANIDE 2 MG: 0.25 INJECTION INTRAMUSCULAR; INTRAVENOUS at 18:09

## 2025-05-20 RX ADMIN — ESCITALOPRAM OXALATE 10 MG: 10 TABLET ORAL at 08:45

## 2025-05-20 RX ADMIN — ASPIRIN 81 MG: 81 TABLET, COATED ORAL at 08:44

## 2025-05-20 RX ADMIN — CLOPIDOGREL BISULFATE 75 MG: 75 TABLET, FILM COATED ORAL at 08:45

## 2025-05-20 RX ADMIN — Medication 8 MG/HR: at 09:55

## 2025-05-20 RX ADMIN — Medication 8 MG/HR: at 23:10

## 2025-05-20 RX ADMIN — Medication 1000 UNITS: at 08:45

## 2025-05-20 RX ADMIN — GABAPENTIN 300 MG: 300 CAPSULE ORAL at 20:59

## 2025-05-20 RX ADMIN — Medication 325 MG: at 16:33

## 2025-05-20 RX ADMIN — SODIUM CHLORIDE, PRESERVATIVE FREE 10 ML: 5 INJECTION INTRAVENOUS at 10:24

## 2025-05-20 RX ADMIN — LEVOTHYROXINE SODIUM 75 MCG: 0.07 TABLET ORAL at 07:01

## 2025-05-20 RX ADMIN — BUMETANIDE 2 MG: 0.25 INJECTION INTRAMUSCULAR; INTRAVENOUS at 10:22

## 2025-05-20 RX ADMIN — BUMETANIDE 2 MG: 0.25 INJECTION INTRAMUSCULAR; INTRAVENOUS at 01:49

## 2025-05-20 RX ADMIN — HEPARIN SODIUM 5000 UNITS: 5000 INJECTION INTRAVENOUS; SUBCUTANEOUS at 06:13

## 2025-05-20 NOTE — PROGRESS NOTES
[facility]  SPEECH THERAPY  [facility]Z ICU 4D  Clinical Swallow Evaluation    Discharge Recommendations: Inpatient Rehabilitation  DIET ORDER RECOMMENDATIONS AFTER EVALUATION: NPO; exceptions for ice chips, medications whole in puree  Strategies:   Oral care q2h      SLP Individual Minutes  Time In: 1128  Time Out: 1146  Minutes: 18  Timed Code Treatment Minutes: 0 Minutes       Date: 2025  Patient Name: Alberto Gilliland      CSN: [id number]   : 1965  (60 y.o.)  Gender: male   Referring Physician:  Nikita Rivera MD   Diagnosis: Low output heart failure (HCC)    History of Present Illness/Injury: Patient admitted to [facility] with above med dx; please refer to physician H&P for full details. Per chart review, patient with complicated medical course:    \"60-year-old male with a past medical history of HFrEF, AS s/p AVR, CAD, HTN, HLD, hypothyroidism, DIONISIO, GERD, COPD and fatty liver disease, who presented for hypotension. Per report, the patient's has had progressively worsening shortness of breath, exertional chest pain and as of the last few weeks prior to arrival dry cough. He does have a history of DIONISIO and underwent a colonoscopy that showed a large cecal angiectasia on 2025. Upon arrival given his history of severe aortic stenosis with prior mechanical valve followed by repeat bioprosthetic valve due to inability to tolerate anticoagulation there was concerns for low cardiac output. A RODRÍGUEZ has showed an EF of 30-35%, moderate global hypokinesis with severe aortic stenosis mean AVG 42 mmHg, mild-moderate TR with mildly elevated RVSP 2025. CTS evaluated and advised TAVR with PCI. Patient underwent a right and left heart cath that was nonobstructive . He was transferred to the ICU for SGC guided diuresis and vasopressor/inotropic support. During that night he had a GI bleed with bloody stools requiring 2 massive transfusion protocols. He underwent emergent colonoscopy that did  radiculopathy     Lymphomatoid papulosis (HCC)     sores on skin    Osteoarthritis (arthritis due to wear and tear of joints)     S/P AVR, coumadin Tx 2016    Spondylosis of thoracic region without myelopathy or radiculopathy        SUBJECTIVE:  LUKE Byrne with approval to proceed with evaluation. Patient resting in recliner chair upon arrival with spontaneous wakefulness. Fair awareness to immediate context, however, (+) for hallucinations with non-sensical language; variable affect, re-directions required. Patient's brother at bedside, reports for baseline diet of regular solids/thin liquids.     OBJECTIVE:    Pain:  No pain reported.    Current Diet: NPO    Respiratory Status:  Nasal Canula: 4 L/min    Behavioral Observation:  Alert and Confused    CRANIAL NERVE ASSESSMENT   CN V (Trigeminal) Closes and Opens Mandible WFL    Rotary Jaw Movement WFL      CN VII (Facial) Cheeks Hold Food out of Sulci WFL    Opens, Closes/Seals, Protrudes, Retracts Lips WFL    General Appearance WFL    Sensation WFL      CN X (Vagus - Pharyngeal) Raises Back of Tongue WFL      CN XI (Accessory) Lifts Soft Palate WFL      CN XII (Hypoglossal) Elevates Tongue Up and Back WFL    Protrusion   WFL    Lateralizes Tongue WFL    Sensation WFL      Other Observations Dentition Edentulous; full dentures, defers placement for oral consumption PTA    Vocal Quality Reduced functional intensity     Cough WFL     Patient Evaluated Using: Secretions, Ice Chips, and Puree    Oral Phase:  Impaired:  Impaired AP Movement    Pharyngeal Phase: Impaired:  Suspected Decreased Airway Protection  *not able to fully validate presence of pharyngeal phase deficits without formal instrumentation/supportive imaging     Signs and Symptoms of Laryngeal Penetration/Aspiration: No signs/symptoms of aspiration evident in this evaluation, but cannot rule out silent aspiration.    Aspiration Pneumonia Predictors:  Decreased Cognition, Limited Mobility, Compromised Oral

## 2025-05-20 NOTE — PROGRESS NOTES
Gastroenterology Progress Note:     Patient Name:  Alberto Gilliland   MRN: [id number]  [id number]  YOB: 1965  Admit Date: 4/30/2025 12:24 PM  Primary Care Physician: Juan Christiansen DO   [id number]     Patient seen and examined.  24 hours events and chart reviewed.    Subjective: GI was re-consulted due to 2 episodes of maroon stools with associated drop in hemoglobin from 7.1 to 6.5 requiring transfusion after loading dose of 300 mg Plavix yesterday 5/19/25.      Objective:  /88   Pulse 82   Temp 97.7 °F (36.5 °C)   Resp 12   Ht 1.702 m (5' 7\")   Wt 85.8 kg (189 lb 2.5 oz)   SpO2 99%   BMI 29.63 kg/m²     Physical Exam  Vitals and nursing note reviewed.   Constitutional:       General: He is not in acute distress.     Appearance: He is normal weight. He is ill-appearing.   HENT:      Mouth/Throat:      Mouth: Mucous membranes are moist.      Pharynx: Oropharynx is clear.   Eyes:      Pupils: Pupils are equal, round, and reactive to light.   Cardiovascular:      Rate and Rhythm: Normal rate and regular rhythm.      Heart sounds: Normal heart sounds. No murmur heard.  Pulmonary:      Effort: Pulmonary effort is normal. No respiratory distress.      Breath sounds: Normal breath sounds. No stridor. No wheezing, rhonchi or rales.   Chest:      Chest wall: No tenderness.   Abdominal:      General: Abdomen is flat. Bowel sounds are normal. There is no distension.      Palpations: Abdomen is soft. There is no mass.      Tenderness: There is no abdominal tenderness. There is no guarding or rebound.      Hernia: No hernia is present.   Skin:     General: Skin is warm and dry.      Capillary Refill: Capillary refill takes less than 2 seconds.   Neurological:      Mental Status: He is alert.           Labs:   CBC:   Lab Results   Component Value Date/Time    WBC 14.9 05/20/2025 04:15 AM    HGB 6.5 05/20/2025 10:00 AM    HGB 12.0 05/18/2012 01:10 PM    HCT 21.4 05/20/2025 10:00 AM    MCV 96.3

## 2025-05-20 NOTE — PLAN OF CARE
Problem: Discharge Planning  Goal: Discharge to home or other facility with appropriate resources  5/20/2025 1826 by Cornelio Echols RN  Outcome: Progressing  Flowsheets (Taken 5/19/2025 1829)  Discharge to home or other facility with appropriate resources:   Identify barriers to discharge with patient and caregiver   Identify discharge learning needs (meds, wound care, etc)     Problem: ABCDS Injury Assessment  Goal: Absence of physical injury  5/20/2025 1826 by Cornelio Echols RN  Outcome: Progressing  Flowsheets (Taken 5/19/2025 1829)  Absence of Physical Injury: Implement safety measures based on patient assessment     Problem: Safety - Adult  Goal: Free from fall injury  5/20/2025 1826 by Cornelio Echols RN  Outcome: Progressing  Flowsheets (Taken 5/19/2025 1829)  Free From Fall Injury:   Instruct family/caregiver on patient safety   Based on caregiver fall risk screen, instruct family/caregiver to ask for assistance with transferring infant if caregiver noted to have fall risk factors     Problem: Pain  Goal: Verbalizes/displays adequate comfort level or baseline comfort level  5/20/2025 1826 by Cornelio Echols RN  Outcome: Progressing  Flowsheets (Taken 5/19/2025 1829)  Verbalizes/displays adequate comfort level or baseline comfort level:   Encourage patient to monitor pain and request assistance   Assess pain using appropriate pain scale   Implement non-pharmacological measures as appropriate and evaluate response     Problem: Respiratory - Adult  Goal: Achieves optimal ventilation and oxygenation  5/20/2025 1826 by Cornelio Echols RN  Outcome: Progressing  Flowsheets  Taken 5/20/2025 1826 by Cornelio Echols RN  Achieves optimal ventilation and oxygenation: Assess for changes in respiratory status  Taken 5/20/2025 0833 by Rebecca Kamara RCP  Achieves optimal ventilation and oxygenation: Respiratory therapy support as indicated     Problem: Cardiovascular - Adult  Goal: Maintains optimal cardiac

## 2025-05-20 NOTE — PROGRESS NOTES
[facility]   Nephrology progress note  5/20/2025, 8:15 AM      Pt Name:    Alberto Gilliland  MRN:     [id number]     YOB: 1965  Admit Date:    4/30/2025 12:24 PM    Chief Complaint: Nephrology following for TC and fluid overload    Subjective:  Patient was seen and examined this morning.  Extubated on 5/19. Remains on epinephrine drip.   Pt was dialyzed on 5/19.  Urine output 1142 mL/24h.  CVP 8-10.     Objective:  24HR INTAKE/OUTPUT:    Intake/Output Summary (Last 24 hours) at 5/20/2025 0815  Last data filed at 5/20/2025 0714  Gross per 24 hour   Intake 1176.96 ml   Output 5570 ml   Net -4393.04 ml       I/O last 3 completed shifts:  In: 1739.9 [I.V.:966.8; NG/GT:263; IV Piggyback:110.1]  Out: 6795 [Urine:2295; Emesis/NG output:100]  I/O this shift:  In: 56.7 [I.V.:56.7]  Out: -    Admission weight: 87.5 kg (193 lb)  Wt Readings from Last 3 Encounters:   05/19/25 85.8 kg (189 lb 2.5 oz)   04/30/25 87.7 kg (193 lb 6.4 oz)   04/25/25 89.2 kg (196 lb 9.6 oz)        Vitals :   Vitals:    05/20/25 0630 05/20/25 0645 05/20/25 0700 05/20/25 0715   BP:       Pulse: 79 81 81 78   Resp: 15 14 14 13   Temp:       TempSrc:       SpO2: 100% 100% 100%    Weight:       Height:           Physical examination  General Appearance: ill appearing  Mouth/Throat: Oral mucosa moist  Neck: No JVD  Lungs: clear to auscultation, no use of accessory muscles  Heart:  S1, S2 heard  GI: soft, non-tender, no guarding  Extremities: 1+pitting b/l LE edema    Medications:  Infusion:    VASOpressin 20 Units in dextrose 5 % 100 mL infusion      sodium chloride      sodium chloride 20 mL/hr at 05/20/25 0714    EPINEPHrine 2 mcg/min (05/19/25 0757)    sodium chloride       Meds:    bumetanide  2 mg IntraVENous Q8H    clopidogrel  75 mg Oral Daily    heparin (porcine)  5,000 Units SubCUTAneous 3 times per day    pantoprazole  40 mg IntraVENous BID    diphenhydrAMINE  50 mg IntraVENous Once    hydrocortisone sodium

## 2025-05-20 NOTE — PROGRESS NOTES
gabapentin  300 mg Oral BID    nitroglycerin  1 inch Topical 4 times per day    insulin lispro  0-4 Units SubCUTAneous 4x Daily AC & HS    senna  2 tablet Oral BID    magnesium oxide  400 mg Oral Daily    [Held by provider] metoprolol succinate  12.5 mg Oral Daily    sodium chloride flush  5-40 mL IntraVENous 2 times per day    aspirin  81 mg Oral Daily    [Held by provider] atorvastatin  20 mg Oral Daily    Vitamin D  1,000 Units Oral Daily    escitalopram  10 mg Oral Daily    folic acid  1,000 mcg Oral Daily    levothyroxine  75 mcg Oral Daily    tiotropium-olodaterol  2 puff Inhalation Daily RT    vitamin C  500 mg Oral Daily     Continuous Infusions:   VASOpressin 20 Units in dextrose 5 % 100 mL infusion      sodium chloride      sodium chloride 20 mL/hr at 05/20/25 0714    EPINEPHrine 2 mcg/min (05/19/25 0757)    sodium chloride         PHYSICAL EXAMINATION:  T: 97.7.  P: 78. RR: 13. B/P: 117/47.  O2 Sat: 94% on RA.  I/O:  1,142/4,487  Body mass index is 29.63 kg/m².   GCS:   14    General:   Acutely ill-appearing, moderate acute distress, interactive and cooperative  HEENT:  normocephalic and atraumatic.  No scleral icterus. PERR.  Dry oral mucosa  Neck: supple.  No Thyromegaly.  Lungs: clear to auscultation.  No retractions  Cardiac: RRR.  No JVD.  Abdomen: soft.  Nontender.  Extremities:  No clubbing, cyanosis, or edema x 4.    Vasculature: capillary refill < 3 seconds. Palpable dorsalis pedis pulses.  Skin:  warm and dry.  Psych:  Alert and oriented x3. Visual hallucinations,   Lymph:  No supraclavicular adenopathy.  Neurologic:  No focal deficit. No seizures.    Data: (All radiographs, tracings, PFTs, and imaging are personally viewed and interpreted unless otherwise noted).   Na: 147, K: 3.7, Cl: 110, HCO3: 23, BUN: 77, Cr: 3.3, Glucose: 90   WBC: 14.9, Hb: 7.1   CXR: Right basilar infiltrate mildly improved compared to prior, small right pleural effusion  EKG: Sinus rhythm at a rate of 78, T wave  5/20/2025 0714  Gross per 24 hour   Intake 1176.96 ml   Output 5230 ml   Net -4053.04 ml       Chest x-ray performed on: 5/20/25  IMPRESSION:  1. Moderate cardiomegaly. Metallic sternotomy sutures. An aortic stent/valve is  present.  2. Left jugular dialysis catheter with tip in right atrium. Prior anterior  cervical fusion. Right subclavian sheath with [place]-Dereje catheter in place.  [place]Dere[place] catheter tip is in the right lower lobe pulmonary artery  3. Mild residual pneumonia/edema both mid and lower lung fields. No effusion.  Slight improvement from earlier study      PUD and DVT prophylaxis reviewed.  Protonix 40 mg IV  Patient being treated for acute blood loss anemia with a drop in hemoglobin and hematocrit    Electronically signed by   Marielos Templeton MD on 5/20/2025 at 12:08 PM

## 2025-05-20 NOTE — FLOWSHEET NOTE
05/19/25 2112   Vital Signs   BP (!) 123/53   Temp 97.7 °F (36.5 °C)   Pulse 78   Respirations 14   SpO2 100 %   Weight - Scale 85.8 kg (189 lb 2.5 oz)   Weight Method Bed scale   Percent Weight Change -4.45   Post-Hemodialysis Assessment   Post-Treatment Procedures Blood returned;Catheter Capped, clamped with Saline x2 ports   Machine Disinfection Process Acid/Vinegar Clean;Heat Disinfect;Exterior Machine Disinfection   Rinseback Volume (ml) 400 ml   Blood Volume Processed (Liters) 34.7 L   Dialyzer Clearance Moderately streaked   Duration of Treatment (minutes) 240 minutes   Hemodialysis Intake (ml) 400 ml   Hemodialysis Output (ml) 4400 ml   NET Removed (ml) 4000   Tolerated Treatment Good     completed 4 hour Dialysis TX, (first 2 hours HD, then, last 2 hours UF),and removed 4 Liters of fluid. pt tolerated Dialysis TX well. Dialysis TX ended, all blood returned with 400 mls rinse back. CVC dressing changed and is clean, dry and intact. report given to primary nurse. record completed and printed to be scanned into pt's EMR.

## 2025-05-20 NOTE — PLAN OF CARE
Problem: Nutrition Deficit:  Goal: Optimize nutritional status  5/20/2025 0504 by Kylie Zavaleta RN  Outcome: Not Progressing     Problem: Discharge Planning  Goal: Discharge to home or other facility with appropriate resources  5/20/2025 0504 by Kylie Zavaleta RN  Outcome: Progressing     Problem: ABCDS Injury Assessment  Goal: Absence of physical injury  5/20/2025 0504 by Kylie Zavaleta RN  Outcome: Progressing     Problem: Safety - Adult  Goal: Free from fall injury  5/20/2025 0504 by Kylie Zavaleta RN  Outcome: Progressing     Problem: Pain  Goal: Verbalizes/displays adequate comfort level or baseline comfort level  5/20/2025 0504 by Kylie Zavaleta RN  Outcome: Progressing     Problem: Respiratory - Adult  Goal: Achieves optimal ventilation and oxygenation  5/20/2025 0504 by Kylie Zavaleta RN  Outcome: Progressing     Problem: Chronic Conditions and Co-morbidities  Goal: Patient's chronic conditions and co-morbidity symptoms are monitored and maintained or improved  5/20/2025 0504 by Kylie Zavaleta RN  Outcome: Progressing     Problem: Cardiovascular - Adult  Goal: Maintains optimal cardiac output and hemodynamic stability  5/20/2025 0504 by Kylie Zavaleta RN  Outcome: Progressing     Problem: Hematologic - Adult  Goal: Maintains hematologic stability  5/20/2025 0504 by Kylie Zavaleta RN  Outcome: Progressing     Problem: Skin/Tissue Integrity  Goal: Skin integrity remains intact  Description: 1.  Monitor for areas of redness and/or skin breakdown2.  Assess vascular access sites hourly3.  Every 4-6 hours minimum:  Change oxygen saturation probe site4.  Every 4-6 hours:  If on nasal continuous positive airway pressure, respiratory therapy assess nares and determine need for appliance change or resting period  5/20/2025 0504 by Klyie Zavaleta RN  Outcome: Progressing     Problem: Nutrition Deficit:  Goal: Optimize nutritional status  5/20/2025 0504 by Kylie Zavaleta

## 2025-05-20 NOTE — SIGNIFICANT EVENT
ECMO EXPLANT SUMMARY    Off ECMO time: 1337  Date off ECMO: 5/17/2025  Post ECMO ABG:   Latest Reference Range & Units 05/17/25 14:01   Source:  L Radial   pH, Blood Gas 7.35 - 7.45  7.36   PCO2 35 - 45 mmhg 55 (H)   pO2 71 - 104 mmhg 276 (H)   HCO3 23 - 28 mmol/l 31 (H)   Base Excess (Calculated) -2.5 - 2.5 mmol/l 3.6 (H)   O2 Sat % 100       Post ECMO VBG:    Latest Reference Range & Units 05/17/25 14:14   PCO2, MIXED VENOUS 41 - 51 mmhg 62 (H)   PO2, Mixed 25 - 40 mmhg 51 (H)   HCO3, Mixed 23 - 28 mmol/l 30 (H)   Base Exc, Mixed -2.0 - 3.0 mmol/l 1.8   O2 Sat, Mixed % 80   PH MIXED 7.31 - 7.41  7.29 (L)     Family updated: Dr. Rivera      Total ECMO hours: 99 hours     Duplex ordered: Yes negative     Total Blood products on ECMO:     FFP: 0  PRBC: 0  Cryo: 0    Electronically signed by JASWINDER Garcia - CNP on 5/20/2025 at 12:54 PM

## 2025-05-20 NOTE — PROCEDURES
PROCEDURE NOTE  Date: 5/20/2025   Name: Alberto Gilliland  YOB: 1965    Procedures        Pt Refused EKG for 0600. Sheri Espinal is aware.

## 2025-05-20 NOTE — FLOWSHEET NOTE
0800 Mr Gilliland rests in the bed without complaints this AM. He has a swan yury catheter right SC site clear which has been replaced. He has an art line left radial site clear with good wave forms. He has a temp sensing wong paatent to gravity draining dark, clear yellow urine. Thermodilution CO was completed. (See flow sheet) Dr Jenkins has come to see. Plan to get to chair today and monitor bleeding as he has had a maroon BM during the night. His mentation seems much more clear today.

## 2025-05-20 NOTE — PROGRESS NOTES
[facility]  INPATIENT PHYSICAL THERAPY  EVALUATION  [facility] ICU 4D - 4D-03/003-A    Discharge Recommendations: Continue to assess pending progress, 24 hour supervision or assist, Therapy recommended at discharge  Equipment Recommendations:    monitor for needs          Time In: 1030  Time Out: 1059  Timed Code Treatment Minutes: 14 Minutes  Minutes: 29          Date: 2025  Patient Name: Alberto Gilliland,  Gender:  male        MRN: [id number]  : 1965  (60 y.o.)      Referring Practitioner: Haseeb Espinoza DO  Diagnosis: Low output heart failure (HCC)  Additional Pertinent Hx: Alberto Gilliland a 60 year old male presented due to fatigue and hypotension. PMHx severe AS, WISE, HLD, HTN, COPD, HFrEF. Notes over the last year or so has been having worsening SOB and exertional chest pain. Over the last few weeks has had a dry cough. Denies any chest pain. Denies any SOB. Noted to have DIONISIO and had colonoscopy done on  which showed showed large cecal angiectasia likely the cause of DIONISIO. Given AS there was concern for low cardiac output. CTS seen and recommended TAVR and PCI. Had right and left heart cath on  and was non-obstructive. Patient transferred to ICU for possible need for [place] guided diuresis and phenylephrine. : Patient was placed on ECMO  Patient on CRRT.  5/15: TAVR successful with no immediate complications. Patient intubated. CRRT stopped. Weaning ECMO.  : Patient off ECMO.  Extubated.     Restrictions/Precautions:  Restrictions/Precautions: Fall Risk, General Precautions       Subjective:  Chart Reviewed: Yes  Patient assessed for rehabilitation services?: Yes  Subjective: Pt resting in bed and RN present. Pt agrees to trying to get up to chair.    General:     Vision: Within Functional Limits  Hearing: Within functional limits       Pain: did not rate      Vitals:  ICU monitoring stable throughout    Social/Functional History:    Lives With: Alone  Type of Home:

## 2025-05-20 NOTE — PROCEDURES
PROCEDURE NOTE  Date: 5/19/2025   Name: Alberto Gilliland  YOB: 1965    Procedures        EKG was completed and handed to RN

## 2025-05-20 NOTE — PROGRESS NOTES
[facility]  OCCUPATIONAL THERAPY MISSED TREATMENT NOTE  [facility] ICU 4D  4D-003-A      Date: 2025  Patient Name: Alberto Gilliland        CSN: [id number]   : 1965  (60 y.o.)  Gender: male                REASON FOR MISSED TREATMENT:  OT attempted X2 this date. At 930, pt just had line removed, needed to remain in bed X1 hour. Checked back at 1245 and pt had just recently returned to bed after being up in chair, too fatigued to participate, and receiving blood soon per RN. Will check back as able

## 2025-05-20 NOTE — PROGRESS NOTES
2120- shortly after giving senna and gabapentin with applesauce, 1 emesis occurrence. Compazine given.

## 2025-05-20 NOTE — PROCEDURES
PROCEDURE NOTE  Date: 5/20/2025   Name: Alberto Gilliland  YOB: 1965    Procedures        SWAN DEREJE CATHETER    Indication: Elevated PA pressures, cardiogenic shock, severe aortic stenosis    Complications: None    Procedure:  After timeout was taken and informed consent was obtained, and after Cordis catheter was already in place.  Patient was prepped and utilizing sterile gloves, sterile gown, sterile towels, in addition to mask and hair net.  Cordis sheath was attached.  [place]re[place] was flushed through all 3 ports and verification of transducer occurred.  [place] with placed in the sterile sheath.  [place]De[place] catheter was advanced to 20 cm.  Balloon was inflated.  [place]De[place] catheter was advanced watching waveforms from the right atrium to the right ventricle to the pulmonary artery.  Pressures were recorded by the nurse.  Catheter was placed in the pulmonary artery by waveforms and secured at 53 centimeters.  There were no complications.    Electronically signed by JASWINDER Garcia CNP on 5/20/2025 at 10:09 AM

## 2025-05-20 NOTE — PROCEDURES
PROCEDURE NOTE  Date: 5/20/2025   Name: Alberto Gilliland  YOB: 1965    Dialysis Cath     CENTRAL LINE    Date/Time: 5/20/2025 10:02 AM    Performed by: Marian Vincent APRN - CNP  Authorized by: Marian Vincent APRN - CNP  Consent: Verbal consent obtained. Written consent obtained.  Consent given by: patient  Patient understanding: patient states understanding of the procedure being performed  Patient consent: the patient's understanding of the procedure matches consent given  Procedure consent: procedure consent matches procedure scheduled  Relevant documents: relevant documents present and verified  Test results: test results available and properly labeled  Site marked: the operative site was marked  Imaging studies: imaging studies available  Required items: required blood products, implants, devices, and special equipment available  Patient identity confirmed: verbally with patient and arm band  Time out: Immediately prior to procedure a \"time out\" was called to verify the correct patient, procedure, equipment, support staff and site/side marked as required.  Indications: dialysis.  Anesthesia: local infiltration    Anesthesia:  Local Anesthetic: lidocaine 1% without epinephrine  Anesthetic total: 3 mL    Sedation:  Patient sedated: yes  Sedatives: see MAR for details  Analgesia: see MAR for details    Preparation: skin prepped with 2% chlorhexidine  Skin prep agent dried: skin prep agent completely dried prior to procedure  Sterile barriers: all five maximum sterile barriers used - cap, mask, sterile gown, sterile gloves, and large sterile sheet  Hand hygiene: hand hygiene performed prior to central venous catheter insertion  Location details: left internal jugular  Patient position: Trendelenburg  Catheter type: double lumen  Catheter size: 14 Fr  Pre-procedure: landmarks identified  Ultrasound guidance: no  Number of attempts: 1  Successful placement: yes  Post-procedure: line sutured and  dressing applied  Assessment: blood return through all ports, free fluid flow and placement verified by x-ray  Patient tolerance: patient tolerated the procedure well with no immediate complications        Central line was exchanged for dialysis catheter.  Using sterile technique guidewire was placed in the round port of central line.  Progressive dilation to 14 [place] dialysis catheter placement.  Area and line were cleaned vigorously with chlorhexidine.  Dialysis catheter was sutured in place and Tegaderm was applied.  Patient tolerated procedure well.    Electronically signed by JASWINDER Garcia CNP on 5/20/2025 at 10:07 AM

## 2025-05-20 NOTE — PROGRESS NOTES
Cardiology Progress Note      Patient:  Alberto Gilliland  YOB: 1965  MRN: [id number]   Acct: [id number]  Admit Date:  4/30/2025  Primary Cardiologist:  Rivas    Chief Complaint: CV shock    Subjective (Events in last 24 hours):     5/20  Stable over night  S/p HD yesterday evening (-4L)  PA pressures improved from 62/19 to 44/11, CVP from 19 to 5  New SGC placed  Hemodynamics @ 0741: CO 9.18 CI 4.68 PAP 39/17 CVP 10   BP is good w/ MAP in the 80s  UOP after HD was 80cc  Na 147 this am   Hgb dropped from 7.9-7.1 - nurse reported that he had a BM that was dark   Nephrology is holding on HD today - to call if decline in respiratory status/worsening hemodynamics   Pt denies SOB/chest pressure  Remains on 4L NC  Alert and oriented x 4    5/19  Cr up even further this morning  Na 149  Pt remains intubated  Epi at 2  Hgb remains stable  Hemodynamics @ 0338 CO 8.77 CI 4.47 MAP 79 PAP 70/20 CVP 16  UOP 1.3L/24hrs [- 10.9L]          5/18  Cr continues to climb  Making urine  No diuretics  BP stable  CVP and PA pressures elevated but slowly coming down  Follows all commands  Hb 7-8  Na 150  He is trying to pull out tube        Objective:   BP (!) 117/47   Pulse 78   Temp 97.7 °F (36.5 °C)   Resp 13   Ht 1.702 m (5' 7\")   Wt 85.8 kg (189 lb 2.5 oz)   SpO2 100%   BMI 29.63 kg/m²        TELEMETRY: NSR    Physical Exam:  General Appearance: resting in bed, no acute distress, wanting to talk to his brother on the phone  Cardiovascular: normal rate, regular rhythm, normal S1 and S2, 2/6 JESUS  Pulmonary/Chest: diminished bilateral lower lobes, mild crackles on bases   Abdomen: soft, non-tender, non-distended, normal bowel sounds, no masses   Extremities: no cyanosis, +1 pedal edema, pedal pulse 2+  Skin: warm and dry, no rash or erythema  Head: normocephalic and atraumatic  Eyes: pupils equal, round, and reactive to light  Neck: supple and non-tender without mass, no thyromegaly   Musculoskeletal: normal range  28-Oct-2024 08:46

## 2025-05-20 NOTE — ACP (ADVANCE CARE PLANNING)
Advance Care Planning     Advance Care Planning Inpatient Note  Spiritual Care Department    Today's Date: 5/20/2025  Unit: [facility] ICU 4D    Received request from IDT Member.  Upon review of chart and communication with care team, patient's decision making abilities are not in question.. Patient and pt brother  was/were present in the room during visit.    Goals of ACP Conversation:  Discuss advance care planning documents  Provide contact information for pt daughter who has questions regarding existent AD's.    Health Care Decision Makers:       Primary Decision Maker: Lesa Mullen - Child - [phone]  Summary:  Verified Documents    Advance Care Planning Documents (Patient Wishes):  None     Assessment:  Pt is a 60y.o. male, resting in his easychair visiting with his brother, in 4D-03. Pt mentioned his daughter wanted to ask some questions regarding existent AD's, with  providing contact information for this purpose.    Interventions:  Provided contact information for patient daughter to discuss AD 's    Care Preferences Communicated:   No    Outcomes/Plan:  Pt and/or pt daughter will notify spiritual health when ready to discuss documents and/or address questions.    Electronically signed by Chaplain Adán on 5/20/2025 at 4:20 PM

## 2025-05-20 NOTE — PROGRESS NOTES
Spiritual Health History and Assessment/Progress Note  [facility]    (P) Advance Care Planning,  ,  ,      Name: Alberto Gilliland MRN: [id number]    Age: 60 y.o.     Sex: male   Language: English   Jain: None   Low output heart failure (HCC)     Date: 5/20/2025            Total Time Calculated: (P) 10 min              Spiritual Assessment continued in [facility] ICU 4D        Referral/Consult From: (P) Multi-disciplinary team   Encounter Overview/Reason: (P) Advance Care Planning  Service Provided For: (P) Patient and family together    Shae, Belief, Meaning:   Patient has beliefs or practices that help with coping during difficult times  Family/Friends Other: Unable to assess      Importance and Influence:  Patient has spiritual/personal beliefs that influence decisions regarding their health  Family/Friends Other: Unable to assess    Community:  Patient feels well-supported. Support system includes: Extended family  Family/Friends feel well-supported. Support system includes: Extended family    Assessment and Plan of Care:     Patient Interventions include: Facilitated expression of thoughts and feelings, Assisted in Advance Care Planning conversation, and Other:  prayed for patient as he rested in the easychair  Family/Friends Interventions include: Facilitated expression of thoughts and feelings, Explored spiritual coping/struggle/distress, Assisted in Advance Care Planning conversation, and Other:  prayed for patient as he rested in the easychair with pt brother present.    Patient Plan of Care: Spiritual Care available upon further referral  Family/Friends Plan of Care: Spiritual Care available upon further referral    Electronically signed by Chaplain Adán on 5/20/2025 at 4:18 PM

## 2025-05-21 ENCOUNTER — APPOINTMENT (OUTPATIENT)
Dept: GENERAL RADIOLOGY | Age: 60
DRG: 003 | End: 2025-05-21
Payer: COMMERCIAL

## 2025-05-21 ENCOUNTER — APPOINTMENT (OUTPATIENT)
Dept: NUCLEAR MEDICINE | Age: 60
DRG: 003 | End: 2025-05-21
Payer: COMMERCIAL

## 2025-05-21 ENCOUNTER — APPOINTMENT (OUTPATIENT)
Dept: INTERVENTIONAL RADIOLOGY/VASCULAR | Age: 60
DRG: 003 | End: 2025-05-21
Payer: COMMERCIAL

## 2025-05-21 ENCOUNTER — APPOINTMENT (OUTPATIENT)
Dept: CT IMAGING | Age: 60
DRG: 003 | End: 2025-05-21
Payer: COMMERCIAL

## 2025-05-21 PROBLEM — I50.21 ACUTE SYSTOLIC CONGESTIVE HEART FAILURE (HCC): Status: ACTIVE | Noted: 2025-05-21

## 2025-05-21 LAB
ALBUMIN SERPL BCG-MCNC: 2.6 G/DL (ref 3.4–4.9)
ALP SERPL-CCNC: 131 U/L (ref 40–129)
ALT SERPL W/O P-5'-P-CCNC: 127 U/L (ref 10–50)
ANION GAP SERPL CALC-SCNC: 17 MEQ/L (ref 8–16)
AST SERPL-CCNC: 133 U/L (ref 10–50)
BASOPHILS ABSOLUTE: 0 THOU/MM3 (ref 0–0.1)
BASOPHILS NFR BLD AUTO: 0.2 %
BILIRUB SERPL-MCNC: 2.6 MG/DL (ref 0.3–1.2)
BUN SERPL-MCNC: 95 MG/DL (ref 8–23)
CALCIUM SERPL-MCNC: 8.8 MG/DL (ref 8.8–10.2)
CHLORIDE SERPL-SCNC: 110 MEQ/L (ref 98–111)
CO2 SERPL-SCNC: 20 MEQ/L (ref 22–29)
CREAT SERPL-MCNC: 4.5 MG/DL (ref 0.7–1.2)
DEPRECATED RDW RBC AUTO: 59.9 FL (ref 35–45)
EKG ATRIAL RATE: 79 BPM
EKG P AXIS: 68 DEGREES
EKG P-R INTERVAL: 182 MS
EKG Q-T INTERVAL: 400 MS
EKG QRS DURATION: 128 MS
EKG QTC CALCULATION (BAZETT): 458 MS
EKG R AXIS: 1 DEGREES
EKG T AXIS: 107 DEGREES
EKG VENTRICULAR RATE: 79 BPM
EOSINOPHIL NFR BLD AUTO: 1.5 %
EOSINOPHILS ABSOLUTE: 0.2 THOU/MM3 (ref 0–0.4)
ERYTHROCYTE [DISTWIDTH] IN BLOOD BY AUTOMATED COUNT: 18.4 % (ref 11.5–14.5)
GFR SERPL CREATININE-BSD FRML MDRD: 14 ML/MIN/1.73M2
GLUCOSE BLD STRIP.AUTO-MCNC: 97 MG/DL (ref 70–108)
GLUCOSE BLD STRIP.AUTO-MCNC: 97 MG/DL (ref 70–108)
GLUCOSE BLD STRIP.AUTO-MCNC: 99 MG/DL (ref 70–108)
GLUCOSE SERPL-MCNC: 90 MG/DL (ref 74–109)
HCT VFR BLD AUTO: 21.3 % (ref 42–52)
HCT VFR BLD AUTO: 21.4 % (ref 42–52)
HCT VFR BLD AUTO: 25.8 % (ref 42–52)
HCT VFR BLD AUTO: 27.7 % (ref 42–52)
HCT VFR BLD AUTO: 29.4 % (ref 42–52)
HGB BLD-MCNC: 6.6 GM/DL (ref 14–18)
HGB BLD-MCNC: 6.7 GM/DL (ref 14–18)
HGB BLD-MCNC: 8.4 GM/DL (ref 14–18)
HGB BLD-MCNC: 9 GM/DL (ref 14–18)
HGB BLD-MCNC: 9.6 GM/DL (ref 14–18)
IMM GRANULOCYTES # BLD AUTO: 0.1 THOU/MM3 (ref 0–0.07)
IMM GRANULOCYTES NFR BLD AUTO: 0.8 %
INR PPP: 1.29 (ref 0.85–1.13)
LYMPHOCYTES ABSOLUTE: 0.7 THOU/MM3 (ref 1–4.8)
LYMPHOCYTES NFR BLD AUTO: 5.5 %
MA AA BLD RES TEG: 41.3 MM (ref 51–71)
MA ACTF BLD RES TEG: 28.8 MM (ref 2–19)
MA ADP BLD RES TEG: 44.6 MM (ref 45–69)
MA KAOLIN P HPASE BLD RES TEG: > 71 MM (ref 53–68)
MCH RBC QN AUTO: 28.6 PG (ref 26–33)
MCHC RBC AUTO-ENTMCNC: 30.8 GM/DL (ref 32.2–35.5)
MCV RBC AUTO: 92.6 FL (ref 80–94)
MONOCYTES ABSOLUTE: 0.9 THOU/MM3 (ref 0.4–1.3)
MONOCYTES NFR BLD AUTO: 7.3 %
NEUTROPHILS ABSOLUTE: 10.2 THOU/MM3 (ref 1.8–7.7)
NEUTROPHILS NFR BLD AUTO: 84.7 %
NRBC BLD AUTO-RTO: 0 /100 WBC
P2Y12 ASSAY: 162 PRU (ref 180–376)
PLATELET # BLD AUTO: 184 THOU/MM3 (ref 130–400)
PMV BLD AUTO: 11.7 FL (ref 9.4–12.4)
POTASSIUM SERPL-SCNC: 3.6 MEQ/L (ref 3.5–5.2)
PROT SERPL-MCNC: 4.8 G/DL (ref 6.4–8.3)
RBC # BLD AUTO: 2.31 MILL/MM3 (ref 4.7–6.1)
SODIUM SERPL-SCNC: 147 MEQ/L (ref 135–145)
WBC # BLD AUTO: 12 THOU/MM3 (ref 4.8–10.8)

## 2025-05-21 PROCEDURE — 36591 DRAW BLOOD OFF VENOUS DEVICE: CPT

## 2025-05-21 PROCEDURE — A9560 TC99M LABELED RBC: HCPCS

## 2025-05-21 PROCEDURE — 85018 HEMOGLOBIN: CPT

## 2025-05-21 PROCEDURE — 85576 BLOOD PLATELET AGGREGATION: CPT

## 2025-05-21 PROCEDURE — P9047 ALBUMIN (HUMAN), 25%, 50ML: HCPCS | Performed by: INTERNAL MEDICINE

## 2025-05-21 PROCEDURE — 78278 ACUTE GI BLOOD LOSS IMAGING: CPT

## 2025-05-21 PROCEDURE — 85610 PROTHROMBIN TIME: CPT

## 2025-05-21 PROCEDURE — 99222 1ST HOSP IP/OBS MODERATE 55: CPT | Performed by: NURSE PRACTITIONER

## 2025-05-21 PROCEDURE — 6360000002 HC RX W HCPCS

## 2025-05-21 PROCEDURE — 99233 SBSQ HOSP IP/OBS HIGH 50: CPT | Performed by: INTERNAL MEDICINE

## 2025-05-21 PROCEDURE — 37244 VASC EMBOLIZE/OCCLUDE BLEED: CPT

## 2025-05-21 PROCEDURE — 75726 ARTERY X-RAYS ABDOMEN: CPT

## 2025-05-21 PROCEDURE — 90935 HEMODIALYSIS ONE EVALUATION: CPT

## 2025-05-21 PROCEDURE — 71045 X-RAY EXAM CHEST 1 VIEW: CPT

## 2025-05-21 PROCEDURE — 93005 ELECTROCARDIOGRAM TRACING: CPT | Performed by: INTERNAL MEDICINE

## 2025-05-21 PROCEDURE — 6370000000 HC RX 637 (ALT 250 FOR IP): Performed by: INTERNAL MEDICINE

## 2025-05-21 PROCEDURE — 2000000000 HC ICU R&B

## 2025-05-21 PROCEDURE — 6360000004 HC RX CONTRAST MEDICATION: Performed by: RADIOLOGY

## 2025-05-21 PROCEDURE — 74174 CTA ABD&PLVS W/CONTRAST: CPT

## 2025-05-21 PROCEDURE — 75774 ARTERY X-RAY EACH VESSEL: CPT

## 2025-05-21 PROCEDURE — 36430 TRANSFUSION BLD/BLD COMPNT: CPT

## 2025-05-21 PROCEDURE — 6360000004 HC RX CONTRAST MEDICATION: Performed by: NUCLEAR MEDICINE

## 2025-05-21 PROCEDURE — 85025 COMPLETE CBC W/AUTO DIFF WBC: CPT

## 2025-05-21 PROCEDURE — 6360000002 HC RX W HCPCS: Performed by: INTERNAL MEDICINE

## 2025-05-21 PROCEDURE — 82948 REAGENT STRIP/BLOOD GLUCOSE: CPT

## 2025-05-21 PROCEDURE — 85014 HEMATOCRIT: CPT

## 2025-05-21 PROCEDURE — C1889 IMPLANT/INSERT DEVICE, NOC: HCPCS

## 2025-05-21 PROCEDURE — 2580000003 HC RX 258

## 2025-05-21 PROCEDURE — 93010 ELECTROCARDIOGRAM REPORT: CPT | Performed by: INTERNAL MEDICINE

## 2025-05-21 PROCEDURE — 36247 INS CATH ABD/L-EXT ART 3RD: CPT

## 2025-05-21 PROCEDURE — 6360000002 HC RX W HCPCS: Performed by: RADIOLOGY

## 2025-05-21 PROCEDURE — 99232 SBSQ HOSP IP/OBS MODERATE 35: CPT | Performed by: NURSE PRACTITIONER

## 2025-05-21 PROCEDURE — C1769 GUIDE WIRE: HCPCS

## 2025-05-21 PROCEDURE — 80053 COMPREHEN METABOLIC PANEL: CPT

## 2025-05-21 PROCEDURE — 3430000000 HC RX DIAGNOSTIC RADIOPHARMACEUTICAL

## 2025-05-21 RX ORDER — FENTANYL CITRATE 50 UG/ML
INJECTION, SOLUTION INTRAMUSCULAR; INTRAVENOUS PRN
Status: COMPLETED | OUTPATIENT
Start: 2025-05-21 | End: 2025-05-21

## 2025-05-21 RX ORDER — IOPAMIDOL 755 MG/ML
80 INJECTION, SOLUTION INTRAVASCULAR
Status: COMPLETED | OUTPATIENT
Start: 2025-05-21 | End: 2025-05-21

## 2025-05-21 RX ORDER — SODIUM CHLORIDE 9 MG/ML
INJECTION, SOLUTION INTRAVENOUS PRN
Status: DISCONTINUED | OUTPATIENT
Start: 2025-05-21 | End: 2025-05-28 | Stop reason: HOSPADM

## 2025-05-21 RX ORDER — ALBUMIN (HUMAN) 12.5 G/50ML
25 SOLUTION INTRAVENOUS ONCE
Status: COMPLETED | OUTPATIENT
Start: 2025-05-21 | End: 2025-05-21

## 2025-05-21 RX ORDER — IOPAMIDOL 612 MG/ML
INJECTION, SOLUTION INTRAVASCULAR PRN
Status: COMPLETED | OUTPATIENT
Start: 2025-05-21 | End: 2025-05-21

## 2025-05-21 RX ORDER — LIDOCAINE HYDROCHLORIDE 20 MG/ML
INJECTION, SOLUTION EPIDURAL; INFILTRATION; INTRACAUDAL; PERINEURAL PRN
Status: COMPLETED | OUTPATIENT
Start: 2025-05-21 | End: 2025-05-21

## 2025-05-21 RX ORDER — MIDAZOLAM HYDROCHLORIDE 1 MG/ML
INJECTION, SOLUTION INTRAMUSCULAR; INTRAVENOUS PRN
Status: COMPLETED | OUTPATIENT
Start: 2025-05-21 | End: 2025-05-21

## 2025-05-21 RX ADMIN — IOPAMIDOL 80 ML: 755 INJECTION, SOLUTION INTRAVENOUS at 01:32

## 2025-05-21 RX ADMIN — IOPAMIDOL 138 ML: 612 INJECTION, SOLUTION INTRAVENOUS at 14:39

## 2025-05-21 RX ADMIN — BUMETANIDE 2 MG: 0.25 INJECTION INTRAMUSCULAR; INTRAVENOUS at 01:10

## 2025-05-21 RX ADMIN — LIDOCAINE HYDROCHLORIDE 8 ML: 20 INJECTION, SOLUTION EPIDURAL; INFILTRATION; INTRACAUDAL; PERINEURAL at 14:39

## 2025-05-21 RX ADMIN — MIDAZOLAM 0.5 MG: 1 INJECTION INTRAMUSCULAR; INTRAVENOUS at 13:50

## 2025-05-21 RX ADMIN — NITROGLYCERIN 1 INCH: 20 OINTMENT TOPICAL at 12:11

## 2025-05-21 RX ADMIN — Medication 8 MG/HR: at 08:19

## 2025-05-21 RX ADMIN — Medication 20.6 MILLICURIE: at 09:30

## 2025-05-21 RX ADMIN — FENTANYL CITRATE 25 MCG: 50 INJECTION, SOLUTION INTRAMUSCULAR; INTRAVENOUS at 13:50

## 2025-05-21 RX ADMIN — MIDAZOLAM 1 MG: 1 INJECTION INTRAMUSCULAR; INTRAVENOUS at 13:36

## 2025-05-21 RX ADMIN — LEVOTHYROXINE SODIUM 75 MCG: 0.07 TABLET ORAL at 06:00

## 2025-05-21 RX ADMIN — FENTANYL CITRATE 50 MCG: 50 INJECTION, SOLUTION INTRAMUSCULAR; INTRAVENOUS at 13:36

## 2025-05-21 RX ADMIN — NITROGLYCERIN 1 INCH: 20 OINTMENT TOPICAL at 19:17

## 2025-05-21 RX ADMIN — BUMETANIDE 2 MG: 0.25 INJECTION INTRAMUSCULAR; INTRAVENOUS at 11:15

## 2025-05-21 RX ADMIN — Medication 8 MG/HR: at 19:34

## 2025-05-21 RX ADMIN — ALBUMIN (HUMAN) 25 G: 0.25 INJECTION, SOLUTION INTRAVENOUS at 16:09

## 2025-05-21 NOTE — H&P
Formulation and discussion of sedation / procedure plans, risks, benefits, side effects and alternatives with patient and/or responsible adult completed.    History and Physical reviewed and unchanged.    Electronically signed by Kennedy Louis MD on 5/21/25 at 2:39 PM EDT

## 2025-05-21 NOTE — PROGRESS NOTES
Met with patient, introduced self, explained that IPR will follow patient clinical course and therapy progress for rehab referral.  Primary RN present at time of meeting with patient.

## 2025-05-21 NOTE — PROGRESS NOTES
Order received for temporary to tunneled dialysis catheter exchange. Images, medications, labs reviewed. Pt will be scheduled for tunneled dialysis catheter on 5/23/25 at 0930. Pt is to be NPO 4 hours prior to procedure. Please hold blood thinners according to radiology protocol. Consent for procedure will be obtained by the IR staff upon pt's arrival to the department. If the pt is unable to sign consent, please obtain consent ahead of time or have family present day of procedure to give consent. These and any additional orders will be placed as SIGNED and HELD ORDERS by the radiologist the day/evening prior to procedure. They will need to be released and initiated by the NIGHT SHIFT staff after midnight. Pt's nurse Gregg was notified of the above and verbalized understanding. The ordering provider was notified as well.

## 2025-05-21 NOTE — CARE COORDINATION
5/21/25, 4:17 PM EDT    DISCHARGE ON GOING EVALUATION    [facility] day: 21  Location: -03/003-A Reason for admit: Chronic anemia [D64.9]  Troponin level elevated [R79.89]  S/P AVR [Z95.2]  Hypotension, unspecified hypotension type [I95.9]  Acute on chronic congestive heart failure, unspecified heart failure type (HCC) [I50.9]  Acute exacerbation of chronic heart failure (HCC) [I50.9]     Procedures:   5/5 EGD: normal, bx taken  5/5 colonoscopy:  Large cecal angiectasia destroyed with APC   5/7 Cath: Mild nonobstructive coronary; Decompensated CHF, elevated right and left filling pressures, CI 2.2  5/7 RODRÍGUEZ: EF 30-35%; mod mitral regurg; severe AS, mild aortic regurg; tricupid with mild to mod regrug; findings consistent w/mild pulmonary hypertension  5/8 Colonoscopy: Unable to find source of bleeding; bright red blood and some clots seen in splenic flexure and descending colon  5/8 SGC RIJ  5/8 CVC IJ - 5/20 removed  5/13 Intubated  5/13 LAVA ECMO initiated  5/13 Code Blue - PEA during cannulation; 1-2 min CPR to ROSC  5/13 TESSIO left femoral - 5/20 removed  5/13 CRRT initiated  5/14 CRRT stopped  5/15 TAVR  5/16 ECHO with EF 25-30%; severe global hypokinesis; mild mitral regurg; TAVR well-seated; LA moderately dilated  5/17 Explant ECMO  5/17 RODRÍGUEZ: EF 35-40%; TAVR well-seated  5/19 Extubated  5/20 TESSIO LIJ  5/21 IR: Embolization branch of ileocecal artery    Imaging since last note:   5/19 BLE Arterial doppler: Unremarkable  5/19 BLE Venous doppler: Neg for DVT  5/21 CTA Abd/pelvis: 1. Small hyperdense luminal material in the right hemicolon. Differential   diagnosis include ingested medication, active GI bleeding. Recommend   clinical correlation.  2. Small bilateral pleural effusion and adjacent atelectasis.  5/21 CXR: 1. The tip of the [place]-Dereje catheter terminates 1.8 cm beyond the mediastinum in  the right pulmonary artery. It can be withdrawn about 1 cm. The loop of the  [place]-Dereje

## 2025-05-21 NOTE — CONSENT
Informed Consent for Blood Component Transfusion Note    I have discussed with the patient the rationale for blood component transfusion; its benefits in treating or preventing fatigue, organ damage, or death; and its risk which includes mild transfusion reactions, rare risk of blood borne infection, or more serious but rare reactions. I have discussed the alternatives to transfusion, including the risk and consequences of not receiving transfusion. The patient had an opportunity to ask questions and had agreed to proceed with transfusion of blood components.    Electronically signed by Frank Lee DO on 5/21/25 at 1:07 AM EDT

## 2025-05-21 NOTE — PLAN OF CARE
Internal medicine resident plan of care:    Patient with 3 bloody bowel movements.  ESRD with dialysis intermittently, nephrology follows.  5/21 0100 Hgb 6.7.  Transfuse 3 units for active hemorrhage for target Hgb > 8.0.  Patient is on aspirin, Plavix-both held 5/20 0900.  /57, HR 83, RR 16, SpO2 90% on 4L NC.  Blood transfusion consent obtained from the patient verbally.  Nursing to document stools for color and consistency.  Diet continues as n.p.o.  Except ice chips and medications-addended to exclude any red dye.  Stat CTA A/P ordered in accordance with gastroenterology recommendations.    Electronically signed by Frank Lee DO IM PGY-2 on 5/21/2025.

## 2025-05-21 NOTE — PROGRESS NOTES
CRITICAL CARE PROGRESS NOTE      Patient:  Alberto Gilliland    Unit/Bed:4D-03/003-A  YOB: 1965  MRN: [id number]   PCP: Juan Christiansen DO  Date of Admission: 4/30/2025  Chief Complaint:-Hypotension and fatigue    Assessment and Plan:    Acute decompensated HFrEF: In the setting of severe AS with concomitant cardiogenic shock.  Presented fatigue/hypotension with BNP 16 426.  RODRÍGUEZ showed EF 30-35% with moderate global hypokinesis, severe AS with mean AVG 42 mmHg 5/6/2025.  Subsequent BMP elevated, SGC with high PA pressures.  Refractory to vasopressors and inotropes.  Placed on LAVA ECMO and bridge to TAVR 5/15.  Explanted on 5/17.  Cardiology following  SGC in place  Nephrology following, dialyzed 5/19 pulling off 4 L  Plan for dialysis today   PAP improved   Severe aortic stenosis: S/p TAVR 5/15.  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  Underwent TAVR 5/15 with subsequent RODRÍGUEZ with appropriately positioned transcatheter bioprosthetic valve, mean AVG 24 mmHg 5/17/2025  GI Bleed: Status post massive transfusion x 2 and colonoscopy.  No active bleeding at that time.  520/25: Overnight patient had episode of dark stool  On Protonix drip  Holding antiplatelets and subcutaneous heparin   5/20: Hb: 6.5, transfused 1u PRBc --> 7.6   3 bloody bowel movements overnight   Hb 7.2 --> 6.7 --> 6.6,   3 units PRBC ordered STAT   Transfuse to keep hemoglobin above 7  5/21: CTA Abdomen pelvis: small hyperdense luminal material in right hemicolon, differential include active GI bleed or ingested medication   5/21: Contacted GI on call, Dr. Allen who recommend IR consult   5/21: Called Dr. Louis, IR on call and discussed the case. Patients kidney function has worsened and with the procedure there is a chance that his kidneys would not recover.   Nephrology stated

## 2025-05-21 NOTE — PROGRESS NOTES
[facility]   Nephrology progress note  5/21/2025, 12:54 PM      Pt Name:    Alberto Gilliland  MRN:     [id number]     YOB: 1965  Admit Date:    4/30/2025 12:24 PM    Chief Complaint: Nephrology following for acute kidney injury needing hemodialysis.    Subjective:  Patient seen and examined  Awake and alert no distress  Marginal urine output  However patient apparently started to have some abdominal bleeding  Went for a nuclear scan and going for IR to have embolization done    Objective:  24HR INTAKE/OUTPUT:    Intake/Output Summary (Last 24 hours) at 5/21/2025 1254  Last data filed at 5/21/2025 1200  Gross per 24 hour   Intake 1920 ml   Output 2150 ml   Net -230 ml      Admission weight: 87.5 kg (193 lb)  Wt Readings from Last 3 Encounters:   05/19/25 85.8 kg (189 lb 2.5 oz)   04/30/25 87.7 kg (193 lb 6.4 oz)   04/25/25 89.2 kg (196 lb 9.6 oz)        Vitals :   Vitals:    05/21/25 1200 05/21/25 1211 05/21/25 1217 05/21/25 1230   BP:       Pulse: 79  79 79   Resp: 13  13 15   Temp:   98.4 °F (36.9 °C)    TempSrc:       SpO2: 100% 100% 100% 100%   Weight:       Height:           Physical examination  General Appearance:  Well developed. No distress  Mouth/Throat:  Oral mucosa moist  Neck:  Supple, no JVD  Lungs:  Breath sounds: clear  Heart::  S1,S2 heard  Abdomen:  Soft, non - tender  Musculoskeletal:  Edema -mild edema    Medications:  Infusion:    sodium chloride      sodium chloride      pantoprazole 8 mg/hr (05/21/25 0819)    sodium chloride      sodium chloride 20 mL/hr at 05/20/25 0714    sodium chloride       Meds:    ferrous sulfate  325 mg Oral BID WC    bumetanide  2 mg IntraVENous Q8H    [Held by provider] clopidogrel  75 mg Oral Daily    [Held by provider] heparin (porcine)  5,000 Units SubCUTAneous 3 times per day    diphenhydrAMINE  50 mg IntraVENous Once    hydrocortisone sodium succinate PF  200 mg IntraVENous Once    gabapentin  300 mg Oral BID    nitroglycerin  1

## 2025-05-21 NOTE — PROGRESS NOTES
[facility]  INPATIENT REHABILITATION  ADMISSIONS COORDINATOR CONSULT    Referral Type: internal    Patient Name: Alberto Gilliland      MRN: [id number]    : 1965  (60 y.o.)  Gender: male   Race:White (non-)     Payor Source: [place]: [place] / Plan: [place] - [place] P[place] / Product Type: *No Product type* /   Secondary Payor Source:      Isolation Status: Contact    Lives With: Alone  Type of Home: Apartment  Home Layout: One level  Home Access: Stairs to enter without rails  Entrance Stairs - Number of Steps: 1 step     Occupation: Full time employment  Type of Occupation: [employer]  Additional Comments: Pt reports that he was trying to see about geting on disability.  Disciplines Required upon Admission to Inpatient Rehabilitation: Physical Therapy, Occupational Therapy, and Speech Therapy  Post operative: Yes  Fall: No  Dialysis: Yes  Diet: Diet NPO Exceptions are: Ice Chips  Discussed patient with  and PM&R provider: Await medical work up completion.

## 2025-05-21 NOTE — PROGRESS NOTES
[facility]  SPEECH THERAPY MISSED TREATMENT NOTE  [facility]Z ICU 4D      Date: 2025  Patient Name: Alberto Gilliland        MRN: [id number]    : 1965  (60 y.o.)    REASON FOR MISSED TREATMENT:  POC consistent at date for completion of instrumental evaluation via MBS. LUKE Escobedo reporting needs for nuclear medicine with hematochezia to prevent progression for formal instrumentation. Will complete further diagnostic assessment of oropharyngeal mechanisms as medical status permits.       Naina Manning M.A., CCC-SLP [id number]

## 2025-05-21 NOTE — PROGRESS NOTES
Cardiology Progress Note      Patient:  Alberto Gilliland  YOB: 1965  MRN: [id number]   Acct: [id number]  Admit Date:  4/30/2025  Primary Cardiologist:  Rivas    Chief Complaint: CV shock    Subjective (Events in last 24 hours):     5/21  Bloody stools OVN with Hgb to 6.6; transfused with 3 units PRBC  Plavix, ASA and SQ Heparin on hold  CTA abdomen inconclusive  NM bleeding scan notes activity in the right abdomen, likely in the cecum and ascending colon,  consistent with active gastrointestinal bleeding.  Endoscopic evaluation and hemospray to attempt to stop bleeding preferred over IR to avoid additional dye load and threat to renal function  IR embolization of bleeding ileocecal artery  For HD today  HDS      5/20  Stable over night  S/p HD yesterday evening (-4L)  PA pressures improved from 62/19 to 44/11, CVP from 19 to 5  New SGC placed  Hemodynamics @ 0741: CO 9.18 CI 4.68 PAP 39/17 CVP 10   BP is good w/ MAP in the 80s  UOP after HD was 80cc  Na 147 this am   Hgb dropped from 7.9-7.1 - nurse reported that he had a BM that was dark   Nephrology is holding on HD today - to call if decline in respiratory status/worsening hemodynamics   Pt denies SOB/chest pressure  Remains on 4L NC  Alert and oriented x 4    5/19  Cr up even further this morning  Na 149  Pt remains intubated  Epi at 2  Hgb remains stable  Hemodynamics @ 0338 CO 8.77 CI 4.47 MAP 79 PAP 70/20 CVP 16  UOP 1.3L/24hrs [- 10.9L]          5/18  Cr continues to climb  Making urine  No diuretics  BP stable  CVP and PA pressures elevated but slowly coming down  Follows all commands  Hb 7-8  Na 150  He is trying to pull out tube        Objective:   BP (!) 145/59   Pulse 73   Temp 98.4 °F (36.9 °C)   Resp 17   Ht 1.702 m (5' 7\")   Wt 85.8 kg (189 lb 2.5 oz)   SpO2 100%   BMI 29.63 kg/m²        TELEMETRY: NSR    Physical Exam:  General Appearance: resting in bed, no acute distress, brother at bedside  Cardiovascular: normal rate,

## 2025-05-21 NOTE — PROGRESS NOTES
[facility]  OCCUPATIONAL THERAPY MISSED TREATMENT NOTE  STRZ ICU 4D  4D-03/003-A      Date: 2025  Patient Name: Alberto Gilliland        CSN: [id number]   : 1965  (60 y.o.)  Gender: male   Referring Practitioner: Dr. MIKE Espinoza DO  Diagnosis: Chronic Anemia         REASON FOR MISSED TREATMENT:  Pt was having a tunneled catheter placed and receiving dialysis.  Will retry tomorrow.

## 2025-05-21 NOTE — PROCEDURES
PROCEDURE NOTE  Date: 5/21/2025   Name: Alberto Gilliland  YOB: 1965    Procedures      EKG Completed. Handed to RN.

## 2025-05-21 NOTE — PROGRESS NOTES
1305 Patient received in IR for arteriogram procedure. No family present with patient.   1315 This procedure has been fully reviewed with the patient and written informed consent has been obtained.  1335 Patient prepped for procedure.  1339 Procedure started with Dr. Louis.  1340 Access to right femoral artery with use of sonosite.   1430 Embpsphere 300-500um deployed to iliocecal branch.   1436 Procedure completed; patient tolerated well. Angio seal device deployed to right femoral artery. Manual pressure held to groin site.   1442 Manual pressure discontinued; no bleeding noted. Quick clot, gauze, and op site to right femoral site; area soft to touch with no bleeding noted.   1445 Report called to Gregg BUTLER on 4D.   1455 Patient on bed; comfort ensured. Right femoral dressing remains dry and intact with area soft.   1458 Patient taken to 4D via bed. Right femoral dressing remains dry and intact with area soft. Pt alert and oriented x3; follows commands. Skin pink, warm, and dry. Respirations easy, regular, and nonlabored.

## 2025-05-21 NOTE — H&P
[facility]  Sedation/Analgesia History & Physical    Pt Name: Alberto Gilliland  MRN: [id number]  YOB: 1965  Provider Performing Procedure: Kennedy Louis MD, MD  Primary Care Physician: Juan Christiansen DO    Formulation and discussion of sedation / procedure plans, risks, benefits, side effects and alternatives with patient and/or responsible adult completed.    PRE-PROCEDURE   DNR-CCA/DNR-CC []Yes [x]No  Brief History/Pre-Procedure Diagnosis: GI bleed           MEDICAL HISTORY  []CAD/Valve  []Liver Disease  []Lung Disease []Diabetes  []Hypertension []Renal Disease  []Additional information:       has a past medical history of Acute exacerbation of chronic heart failure (HCC), Arthritis, Bilateral sciatica, Bleeding, CAD (coronary artery disease), Carpal tunnel syndrome, Cervicalgia, C5-7, Chronic anxiety, COPD, mild (HCC), Dysthymia (or depressive neurosis), ED (erectile dysfunction), ED (erectile dysfunction), Fatty liver, GERD (gastroesophageal reflux disease), Headache(784.0), History of blood transfusion, HTN (hypertension), Hyperlipidemia, Hypothyroidism, Iron deficiency anemia, blood loss, Lumbago, Lumbar radiculopathy, Lymphomatoid papulosis (HCC), Osteoarthritis (arthritis due to wear and tear of joints), S/P AVR, coumadin Tx, and Spondylosis of thoracic region without myelopathy or radiculopathy.    SURGICAL HISTORY   has a past surgical history that includes cardiovascular stress test (04/15/2010); Appendectomy (1980); Colonoscopy (2010); Small intestine surgery (2010); Upper gastrointestinal endoscopy (2010); Aortic valve replacement (2007); Endoscopy, colon, diagnostic; cervical fusion (2010); cervical fusion (03/09/2016); Cardiac catheterization (05/20/2010); Cardiac catheterization (2012, 6/9/16); Dilatation, esophagus; Aortic valve replacement (07/20/2016); other surgical history (05/02/2017); Nerve Surgery (Bilateral, 07/25/2017); pr inj,paravertebral l/s,1 level

## 2025-05-21 NOTE — FLOWSHEET NOTE
05/21/25 1745   Vital Signs   BP (!) 116/50   Temp 98 °F (36.7 °C)   Post-Hemodialysis Assessment   Post-Treatment Procedures Blood returned;Catheter Capped, clamped with Saline x2 ports   Machine Disinfection Process Acid/Vinegar Clean;Heat Disinfect;Exterior Machine Disinfection   Blood Volume Processed (Liters) 34.7 L   Dialyzer Clearance Lightly streaked   Duration of Treatment (minutes) 128 minutes   Hemodialysis Intake (ml) 700 ml   Hemodialysis Output (ml) 2000 ml   NET Removed (ml) 1300   Tolerated Treatment Fair   Interventions Taken Fluid bolus;Medication     2 hour 8 minute treatment complete.  1300 mL net UF removed.   Pt. tolerated fair with some symptomatic hypotension requiring treatment to be stopped early.300 mL saline bolus and Albumin 25 g given during treatment per Dr. order to support B/p.  Blood returned following treatment.  Catheter lines saline locked.  Report provided to primary RN.  Paperwork printed and placed in chart.

## 2025-05-21 NOTE — PLAN OF CARE
Problem: Discharge Planning  Goal: Discharge to home or other facility with appropriate resources  5/21/2025 0423 by Julia Rubin, RN  Outcome: Progressing  Flowsheets (Taken 5/20/2025 2000)  Discharge to home or other facility with appropriate resources:   Identify barriers to discharge with patient and caregiver   Arrange for needed discharge resources and transportation as appropriate   Identify discharge learning needs (meds, wound care, etc)  5/20/2025 1826   Outcome: Progressing  Flowsheets (Taken 5/19/2025 1829)  Discharge to home or other facility with appropriate resources:   Identify barriers to discharge with patient and caregiver   Identify discharge learning needs (meds, wound care, etc)     Problem: ABCDS Injury Assessment  Goal: Absence of physical injury  5/21/2025 0423 by Julia Rubin, RN  Outcome: Progressing  Flowsheets (Taken 5/19/2025 1829  Absence of Physical Injury: Implement safety measures based on patient assessment  5/20/2025 1826  Outcome: Progressing  Flowsheets (Taken 5/19/2025 1829)  Absence of Physical Injury: Implement safety measures based on patient assessment     Problem: Safety - Adult  Goal: Free from fall injury  5/21/2025 0423 by Julia Rubin, RN  Outcome: Progressing  Flowsheets (Taken 5/19/2025 1829   Free From Fall Injury:   Instruct family/caregiver on patient safety   Based on caregiver fall risk screen, instruct family/caregiver to ask for assistance with transferring infant if caregiver noted to have fall risk factors  5/20/2025 1826 by Cornelio Echols, RN  Outcome: Progressing  Flowsheets (Taken 5/19/2025 1829)  Free From Fall Injury:   Instruct family/caregiver on patient safety   Based on caregiver fall risk screen, instruct family/caregiver to ask for assistance with transferring infant if caregiver noted to have fall risk factors     Problem: Pain  Goal: Verbalizes/displays adequate comfort level or baseline comfort level  5/21/2025 0423 by  Independent Practitioner for values outside of normal range     Problem: Hematologic - Adult  Goal: Maintains hematologic stability  5/21/2025 0423 by Julia Rubin, RN  Outcome: Progressing  Flowsheets (Taken 5/20/2025 2000)  Maintains hematologic stability:   Assess for signs and symptoms of bleeding or hemorrhage   Monitor labs for bleeding or clotting disorders   Administer blood products/factors as ordered  5/20/2025   Outcome: Progressing  Flowsheets (Taken 5/19/2025 1831)  Maintains hematologic stability:   Assess for signs and symptoms of bleeding or hemorrhage   Monitor labs for bleeding or clotting disorders     Problem: Skin/Tissue Integrity  Goal: Skin integrity remains intact  Description: 1.  Monitor for areas of redness and/or skin breakdown2.  Assess vascular access sites hourly3.  Every 4-6 hours minimum:  Change oxygen saturation probe site4.  Every 4-6 hours:  If on nasal continuous positive airway pressure, respiratory therapy assess nares and determine need for appliance change or resting period  5/21/2025 0423 by Julia Rubin, RN  Outcome: Progressing  Flowsheets (Taken 5/20/2025 2000)  Skin Integrity Remains Intact: Monitor for areas of redness and/or skin breakdown  5/20/2025   Outcome: Progressing  Flowsheets (Taken 5/19/2025 1829)  Skin Integrity Remains Intact:   Monitor for areas of redness and/or skin breakdown   Assess vascular access sites hourly   Every 4-6 hours minimum:  Change oxygen saturation probe site

## 2025-05-21 NOTE — OP NOTE
Department of Radiology  Post Procedure Progress Note      Pre-Procedure Diagnosis:  SMA angiogram     Procedure Performed:  embolization branch of Ileocecal artery     Anesthesia: local / versed and fentanyl    Findings: successful    Immediate Complications:  None    Estimated Blood Loss: minimal    SEE DICTATED PROCEDURE NOTE FOR COMPLETE DETAILS.    Kennedy Louis MD   5/21/2025 2:40 PM

## 2025-05-21 NOTE — CONSULTS
Physical Medicine & Rehabilitation   Consult Note      Admitting Physician: Marielos Templeton,*    Primary Care Provider: Juan Christiansen DO     Reason for Consult:  TAVR. GIB. Rehab needs    History of Present Illness:  Alberto Gilliland is a 60 y.o. male admitted to [facility] on 4/30/2025. Patient  has a past medical history of Acute exacerbation of chronic heart failure (HCC), Arthritis, Bilateral sciatica, Bleeding, CAD (coronary artery disease), Carpal tunnel syndrome, Cervicalgia, C5-7, Chronic anxiety, COPD, mild (HCC), Dysthymia (or depressive neurosis), ED (erectile dysfunction), ED (erectile dysfunction), Fatty liver, GERD (gastroesophageal reflux disease), Headache(784.0), History of blood transfusion, HTN (hypertension), Hyperlipidemia, Hypothyroidism, Iron deficiency anemia, blood loss, Lumbago, Lumbar radiculopathy, Lymphomatoid papulosis (HCC), Osteoarthritis (arthritis due to wear and tear of joints), S/P AVR, coumadin Tx, and Spondylosis of thoracic region without myelopathy or radiculopathy. Patient presented to [facility] due to fatigue, increasing weakness, and iron deficiency. Patient with progressively worsening shortness of breath, exertional chest pain and as of the last few weeks prior to arrival dry cough. He does have a history of DIONISIO and underwent a colonoscopy that showed a large cecal angiectasia on 5/5/2025. Upon arrival given his history of severe aortic stenosis with prior mechanical valve followed by repeat bioprosthetic valve due to inability to tolerate anticoagulation there was concerns for low cardiac output. A RODRÍGUEZ has showed an EF of 30-35%, moderate global hypokinesis with severe aortic stenosis mean AVG 42 mmHg, mild-moderate TR with mildly elevated RVSP 5/7/2025. CTS evaluated and advised TAVR with PCI. Patient underwent a right and left heart cath that was nonobstructive 5/7. He was transferred to the ICU for SGC guided diuresis and  chloride flush 0.9 % injection 5-40 mL, 5-40 mL, IntraVENous, 2 times per day  sodium chloride flush 0.9 % injection 5-40 mL, 5-40 mL, IntraVENous, PRN  0.9 % sodium chloride infusion, , IntraVENous, PRN  polyethylene glycol (GLYCOLAX) packet 17 g, 17 g, Oral, Daily PRN  [Held by provider] aspirin EC tablet 81 mg, 81 mg, Oral, Daily  ALPRAZolam (XANAX) tablet 0.5 mg, 0.5 mg, Oral, BID PRN  [Held by provider] atorvastatin (LIPITOR) tablet 20 mg, 20 mg, Oral, Daily  Vitamin D (CHOLECALCIFEROL) tablet 1,000 Units, 1,000 Units, Oral, Daily  escitalopram (LEXAPRO) tablet 10 mg, 10 mg, Oral, Daily  folic acid (FOLVITE) tablet 1,000 mcg, 1,000 mcg, Oral, Daily  levothyroxine (SYNTHROID) tablet 75 mcg, 75 mcg, Oral, Daily  tiotropium-olodaterol (STIOLTO) 2.5-2.5 MCG/ACT inhaler 2 puff, 2 puff, Inhalation, Daily RT  ascorbic acid (VITAMIN C) tablet 500 mg, 500 mg, Oral, Daily    Social History:  Social History     Socioeconomic History    Marital status:      Spouse name: Not on file    Number of children: 2    Years of education: 12    Highest education level: High school graduate   Occupational History    Not on file   Tobacco Use    Smoking status: Some Days     Current packs/day: 0.50     Average packs/day: 1 pack/day for 46.9 years (46.4 ttl pk-yrs)     Types: Cigarettes     Start date: 6/9/1978     Passive exposure: Current    Smokeless tobacco: Never    Tobacco comments:     10/21/2024 0.5 ppd-less than 1/2 pack 4/10/25   Vaping Use    Vaping status: Never Used   Substance and Sexual Activity    Alcohol use: Not Currently     Comment: not for 3 mo    Drug use: Yes     Types: Marijuana (Weed)    Sexual activity: Never   Other Topics Concern    Not on file   Social History Narrative    Not on file     Social Drivers of Health     Financial Resource Strain: Low Risk  (9/3/2024)    Overall Financial Resource Strain (CARDIA)     Difficulty of Paying Living Expenses: Not very hard   Food Insecurity: No Food

## 2025-05-21 NOTE — PROGRESS NOTES
[facility]  PHYSICAL THERAPY MISSED TREATMENT NOTE  STRZ ICU 4D    Date: 2025  Patient Name: Alberto Gilliland        MRN: [id number]   : 1965  (60 y.o.)  Gender: male   Referring Practitioner: Haseeb Espinoza DO            REASON FOR MISSED TREATMENT:  Hold treatment per nursing request.  Pt off the floor for Nuclear Med testing and to have dialysis after return. RN reports pt had multiple bloody stools during the night as well. RN recommended to hold on therapy today. Will check back tomorrow.    Mandi Payne, MPT [id number]

## 2025-05-21 NOTE — PROGRESS NOTES
Gastroenterology Progress Note:     Patient Name:  Alberto Gilliland   MRN: [id number]  [id number]  YOB: 1965  Admit Date: 4/30/2025 12:24 PM  Primary Care Physician: Juan Christiansen DO   4D-03/003-A     Patient seen and examined.  24 hours events and chart reviewed.    Subjective: Patient with continued bloody bowel movements overnight again.  Hgb dropped again down to 6.6 this morning.  CTA Abd Pelvis was obtained at 0243.  Per Dr. Louis NM Bleeding scan is needed to find source of bleeding.  Case reviewed with Dr. Rivera at bedside, he would like to have an attempt with endoscopic evaluation and hemospray to attempt to stop bleeding vs IR in an attempt to preserve kidney function.      Objective:  BP (!) 145/59   Pulse 78   Temp 98.4 °F (36.9 °C)   Resp 18   Ht 1.702 m (5' 7\")   Wt 85.8 kg (189 lb 2.5 oz)   SpO2 100%   BMI 29.63 kg/m²     Physical Exam  Vitals and nursing note reviewed.   Constitutional:       General: He is not in acute distress.     Appearance: He is normal weight. He is ill-appearing.   HENT:      Mouth/Throat:      Mouth: Mucous membranes are dry.      Pharynx: Oropharynx is clear.   Eyes:      General: No scleral icterus.     Pupils: Pupils are equal, round, and reactive to light.   Cardiovascular:      Rate and Rhythm: Normal rate and regular rhythm.      Heart sounds: Normal heart sounds. No murmur heard.  Pulmonary:      Effort: Pulmonary effort is normal. No respiratory distress.      Breath sounds: Normal breath sounds. No stridor. No wheezing, rhonchi or rales.   Chest:      Chest wall: No tenderness.   Abdominal:      General: Abdomen is flat. Bowel sounds are normal. There is no distension.      Palpations: Abdomen is soft. There is no mass.      Tenderness: There is no abdominal tenderness. There is no guarding or rebound.      Hernia: No hernia is present.   Skin:     General: Skin is warm and dry.      Capillary Refill: Capillary refill takes less than 2

## 2025-05-22 ENCOUNTER — APPOINTMENT (OUTPATIENT)
Dept: GENERAL RADIOLOGY | Age: 60
DRG: 003 | End: 2025-05-22
Payer: COMMERCIAL

## 2025-05-22 LAB
ALBUMIN SERPL BCG-MCNC: 3.3 G/DL (ref 3.4–4.9)
ALP SERPL-CCNC: 114 U/L (ref 40–129)
ALT SERPL W/O P-5'-P-CCNC: 150 U/L (ref 10–50)
ANION GAP SERPL CALC-SCNC: 17 MEQ/L (ref 8–16)
AST SERPL-CCNC: 163 U/L (ref 10–50)
BASOPHILS ABSOLUTE: 0 THOU/MM3 (ref 0–0.1)
BASOPHILS NFR BLD AUTO: 0.3 %
BILIRUB SERPL-MCNC: 3 MG/DL (ref 0.3–1.2)
BUN SERPL-MCNC: 65 MG/DL (ref 8–23)
CALCIUM SERPL-MCNC: 8.8 MG/DL (ref 8.8–10.2)
CHLORIDE SERPL-SCNC: 109 MEQ/L (ref 98–111)
CO2 SERPL-SCNC: 21 MEQ/L (ref 22–29)
CREAT SERPL-MCNC: 3.1 MG/DL (ref 0.7–1.2)
DEPRECATED RDW RBC AUTO: 54.4 FL (ref 35–45)
EKG ATRIAL RATE: 92 BPM
EKG P AXIS: 71 DEGREES
EKG P-R INTERVAL: 176 MS
EKG Q-T INTERVAL: 384 MS
EKG QRS DURATION: 128 MS
EKG QTC CALCULATION (BAZETT): 474 MS
EKG R AXIS: 60 DEGREES
EKG T AXIS: 94 DEGREES
EKG VENTRICULAR RATE: 92 BPM
EOSINOPHIL NFR BLD AUTO: 0.8 %
EOSINOPHILS ABSOLUTE: 0.1 THOU/MM3 (ref 0–0.4)
ERYTHROCYTE [DISTWIDTH] IN BLOOD BY AUTOMATED COUNT: 17.9 % (ref 11.5–14.5)
GFR SERPL CREATININE-BSD FRML MDRD: 22 ML/MIN/1.73M2
GLUCOSE BLD STRIP.AUTO-MCNC: 142 MG/DL (ref 70–108)
GLUCOSE BLD STRIP.AUTO-MCNC: 180 MG/DL (ref 70–108)
GLUCOSE BLD STRIP.AUTO-MCNC: 226 MG/DL (ref 70–108)
GLUCOSE BLD STRIP.AUTO-MCNC: 95 MG/DL (ref 70–108)
GLUCOSE SERPL-MCNC: 86 MG/DL (ref 74–109)
HCT VFR BLD AUTO: 22.1 % (ref 42–52)
HCT VFR BLD AUTO: 23.4 % (ref 42–52)
HCT VFR BLD AUTO: 25.6 % (ref 42–52)
HCT VFR BLD AUTO: 25.8 % (ref 42–52)
HCT VFR BLD AUTO: 25.9 % (ref 42–52)
HGB BLD-MCNC: 7.1 GM/DL (ref 14–18)
HGB BLD-MCNC: 7.5 GM/DL (ref 14–18)
HGB BLD-MCNC: 8.1 GM/DL (ref 14–18)
HGB BLD-MCNC: 8.3 GM/DL (ref 14–18)
HGB BLD-MCNC: 8.3 GM/DL (ref 14–18)
IMM GRANULOCYTES # BLD AUTO: 0.08 THOU/MM3 (ref 0–0.07)
IMM GRANULOCYTES NFR BLD AUTO: 0.6 %
INR PPP: 1.26 (ref 0.85–1.13)
LYMPHOCYTES ABSOLUTE: 0.6 THOU/MM3 (ref 1–4.8)
LYMPHOCYTES NFR BLD AUTO: 4.7 %
MCH RBC QN AUTO: 27.6 PG (ref 26–33)
MCHC RBC AUTO-ENTMCNC: 31.6 GM/DL (ref 32.2–35.5)
MCV RBC AUTO: 87.4 FL (ref 80–94)
MONOCYTES ABSOLUTE: 0.9 THOU/MM3 (ref 0.4–1.3)
MONOCYTES NFR BLD AUTO: 7 %
NEUTROPHILS ABSOLUTE: 11.6 THOU/MM3 (ref 1.8–7.7)
NEUTROPHILS NFR BLD AUTO: 86.6 %
NRBC BLD AUTO-RTO: 0 /100 WBC
PLATELET # BLD AUTO: 191 THOU/MM3 (ref 130–400)
PMV BLD AUTO: 12.1 FL (ref 9.4–12.4)
POTASSIUM SERPL-SCNC: 3.6 MEQ/L (ref 3.5–5.2)
PROT SERPL-MCNC: 5.4 G/DL (ref 6.4–8.3)
RBC # BLD AUTO: 2.93 MILL/MM3 (ref 4.7–6.1)
SODIUM SERPL-SCNC: 147 MEQ/L (ref 135–145)
WBC # BLD AUTO: 13.4 THOU/MM3 (ref 4.8–10.8)

## 2025-05-22 PROCEDURE — 2580000003 HC RX 258

## 2025-05-22 PROCEDURE — 74230 X-RAY XM SWLNG FUNCJ C+: CPT

## 2025-05-22 PROCEDURE — 94761 N-INVAS EAR/PLS OXIMETRY MLT: CPT

## 2025-05-22 PROCEDURE — 99232 SBSQ HOSP IP/OBS MODERATE 35: CPT | Performed by: NURSE PRACTITIONER

## 2025-05-22 PROCEDURE — 94640 AIRWAY INHALATION TREATMENT: CPT

## 2025-05-22 PROCEDURE — 6370000000 HC RX 637 (ALT 250 FOR IP): Performed by: INTERNAL MEDICINE

## 2025-05-22 PROCEDURE — 92526 ORAL FUNCTION THERAPY: CPT

## 2025-05-22 PROCEDURE — 6360000002 HC RX W HCPCS

## 2025-05-22 PROCEDURE — 97530 THERAPEUTIC ACTIVITIES: CPT

## 2025-05-22 PROCEDURE — 93005 ELECTROCARDIOGRAM TRACING: CPT | Performed by: INTERNAL MEDICINE

## 2025-05-22 PROCEDURE — 6370000000 HC RX 637 (ALT 250 FOR IP)

## 2025-05-22 PROCEDURE — 6370000000 HC RX 637 (ALT 250 FOR IP): Performed by: NURSE PRACTITIONER

## 2025-05-22 PROCEDURE — 97167 OT EVAL HIGH COMPLEX 60 MIN: CPT

## 2025-05-22 PROCEDURE — 85610 PROTHROMBIN TIME: CPT

## 2025-05-22 PROCEDURE — 85014 HEMATOCRIT: CPT

## 2025-05-22 PROCEDURE — 2500000003 HC RX 250 WO HCPCS: Performed by: INTERNAL MEDICINE

## 2025-05-22 PROCEDURE — 97535 SELF CARE MNGMENT TRAINING: CPT

## 2025-05-22 PROCEDURE — 80053 COMPREHEN METABOLIC PANEL: CPT

## 2025-05-22 PROCEDURE — 85018 HEMOGLOBIN: CPT

## 2025-05-22 PROCEDURE — 92611 MOTION FLUOROSCOPY/SWALLOW: CPT

## 2025-05-22 PROCEDURE — 99231 SBSQ HOSP IP/OBS SF/LOW 25: CPT | Performed by: NURSE PRACTITIONER

## 2025-05-22 PROCEDURE — 82948 REAGENT STRIP/BLOOD GLUCOSE: CPT

## 2025-05-22 PROCEDURE — 2700000000 HC OXYGEN THERAPY PER DAY

## 2025-05-22 PROCEDURE — 85025 COMPLETE CBC W/AUTO DIFF WBC: CPT

## 2025-05-22 PROCEDURE — 99233 SBSQ HOSP IP/OBS HIGH 50: CPT | Performed by: INTERNAL MEDICINE

## 2025-05-22 PROCEDURE — 2580000003 HC RX 258: Performed by: INTERNAL MEDICINE

## 2025-05-22 PROCEDURE — 93010 ELECTROCARDIOGRAM REPORT: CPT | Performed by: INTERNAL MEDICINE

## 2025-05-22 PROCEDURE — 2000000000 HC ICU R&B

## 2025-05-22 PROCEDURE — 99232 SBSQ HOSP IP/OBS MODERATE 35: CPT | Performed by: INTERNAL MEDICINE

## 2025-05-22 PROCEDURE — 2500000003 HC RX 250 WO HCPCS: Performed by: STUDENT IN AN ORGANIZED HEALTH CARE EDUCATION/TRAINING PROGRAM

## 2025-05-22 RX ORDER — METOPROLOL SUCCINATE 25 MG/1
12.5 TABLET, EXTENDED RELEASE ORAL DAILY
Status: DISCONTINUED | OUTPATIENT
Start: 2025-05-22 | End: 2025-05-28 | Stop reason: HOSPADM

## 2025-05-22 RX ORDER — SODIUM CHLORIDE, SODIUM LACTATE, POTASSIUM CHLORIDE, AND CALCIUM CHLORIDE .6; .31; .03; .02 G/100ML; G/100ML; G/100ML; G/100ML
500 INJECTION, SOLUTION INTRAVENOUS ONCE
Status: COMPLETED | OUTPATIENT
Start: 2025-05-23 | End: 2025-05-23

## 2025-05-22 RX ORDER — PANTOPRAZOLE SODIUM 40 MG/10ML
40 INJECTION, POWDER, LYOPHILIZED, FOR SOLUTION INTRAVENOUS 2 TIMES DAILY
Status: DISCONTINUED | OUTPATIENT
Start: 2025-05-22 | End: 2025-05-24

## 2025-05-22 RX ORDER — DEXTROSE MONOHYDRATE 50 MG/ML
INJECTION, SOLUTION INTRAVENOUS CONTINUOUS
Status: ACTIVE | OUTPATIENT
Start: 2025-05-22 | End: 2025-05-23

## 2025-05-22 RX ADMIN — INSULIN LISPRO 1 UNITS: 100 INJECTION, SOLUTION INTRAVENOUS; SUBCUTANEOUS at 16:36

## 2025-05-22 RX ADMIN — Medication 325 MG: at 09:06

## 2025-05-22 RX ADMIN — SODIUM CHLORIDE, PRESERVATIVE FREE 10 ML: 5 INJECTION INTRAVENOUS at 09:06

## 2025-05-22 RX ADMIN — BARIUM SULFATE 50 ML: 0.81 POWDER, FOR SUSPENSION ORAL at 10:24

## 2025-05-22 RX ADMIN — Medication 1000 UNITS: at 09:06

## 2025-05-22 RX ADMIN — NITROGLYCERIN 1 INCH: 20 OINTMENT TOPICAL at 16:35

## 2025-05-22 RX ADMIN — PANTOPRAZOLE SODIUM 40 MG: 40 INJECTION, POWDER, FOR SOLUTION INTRAVENOUS at 20:32

## 2025-05-22 RX ADMIN — Medication 8 MG/HR: at 06:15

## 2025-05-22 RX ADMIN — DEXTROSE: 5 SOLUTION INTRAVENOUS at 10:45

## 2025-05-22 RX ADMIN — LEVOTHYROXINE SODIUM 75 MCG: 0.07 TABLET ORAL at 05:07

## 2025-05-22 RX ADMIN — PANTOPRAZOLE SODIUM 40 MG: 40 INJECTION, POWDER, FOR SOLUTION INTRAVENOUS at 10:38

## 2025-05-22 RX ADMIN — TIOTROPIUM BROMIDE AND OLODATEROL 2 PUFF: 3.124; 2.736 SPRAY, METERED RESPIRATORY (INHALATION) at 09:11

## 2025-05-22 RX ADMIN — FOLIC ACID 1000 MCG: 1 TABLET ORAL at 09:06

## 2025-05-22 RX ADMIN — NITROGLYCERIN 1 INCH: 20 OINTMENT TOPICAL at 11:54

## 2025-05-22 RX ADMIN — METOPROLOL SUCCINATE 12.5 MG: 25 TABLET, EXTENDED RELEASE ORAL at 16:34

## 2025-05-22 RX ADMIN — SODIUM CHLORIDE, PRESERVATIVE FREE 10 ML: 5 INJECTION INTRAVENOUS at 20:26

## 2025-05-22 RX ADMIN — SODIUM CHLORIDE, SODIUM LACTATE, POTASSIUM CHLORIDE, AND CALCIUM CHLORIDE 500 ML: .6; .31; .03; .02 INJECTION, SOLUTION INTRAVENOUS at 23:52

## 2025-05-22 RX ADMIN — INSULIN LISPRO 1 UNITS: 100 INJECTION, SOLUTION INTRAVENOUS; SUBCUTANEOUS at 20:32

## 2025-05-22 RX ADMIN — OXYCODONE HYDROCHLORIDE AND ACETAMINOPHEN 500 MG: 500 TABLET ORAL at 09:06

## 2025-05-22 RX ADMIN — Medication 325 MG: at 16:34

## 2025-05-22 RX ADMIN — ESCITALOPRAM OXALATE 10 MG: 10 TABLET ORAL at 09:06

## 2025-05-22 RX ADMIN — GABAPENTIN 300 MG: 300 CAPSULE ORAL at 09:06

## 2025-05-22 RX ADMIN — Medication 400 MG: at 09:06

## 2025-05-22 RX ADMIN — GABAPENTIN 300 MG: 300 CAPSULE ORAL at 20:32

## 2025-05-22 RX ADMIN — BARIUM SULFATE 15 ML: 400 PASTE ORAL at 10:24

## 2025-05-22 ASSESSMENT — PAIN SCALES - GENERAL: PAINLEVEL_OUTOF10: 0

## 2025-05-22 NOTE — PROGRESS NOTES
Cardiology Progress Note      Patient:  Alberto Gilliland  YOB: 1965  MRN: [id number]   Acct: [id number]  Admit Date:  4/30/2025  Primary Cardiologist:  Rivas    Chief Complaint: CV shock    Subjective (Events in last 24 hours):     5/22  Continues in ICU  Stable OVN; MAP 80s  SR 80 - 90s  I/O: 2390/UOP 3300 + HD 2000  NET: -18.6L  Wt: 167 (174, 189)  Creat 3.1 (4.5) post HD - 3rd dose Bumex held 5/22  Nephrology asks that Bumex be held at this time - will reconsider 5/23  No evidence of on-going bleeding; Hgb stable at 8.1; Plavix and ASA on hold  For mod barium swallow today  Tired - no chest discomfort; breathing stable with HOB nearly flat      5/21  Bloody stools OVN with Hgb to 6.6; transfused with 3 units PRBC  Plavix, ASA and SQ Heparin on hold  CTA abdomen inconclusive  NM bleeding scan notes activity in the right abdomen, likely in the cecum and ascending colon,  consistent with active gastrointestinal bleeding.  Endoscopic evaluation and hemospray to attempt to stop bleeding preferred over IR to avoid additional dye load and threat to renal function  IR embolization of bleeding ileocecal artery  For HD today  HDS      5/20  Stable over night  S/p HD yesterday evening (-4L)  PA pressures improved from 62/19 to 44/11, CVP from 19 to 5  New SGC placed  Hemodynamics @ 0741: CO 9.18 CI 4.68 PAP 39/17 CVP 10   BP is good w/ MAP in the 80s  UOP after HD was 80cc  Na 147 this am   Hgb dropped from 7.9-7.1 - nurse reported that he had a BM that was dark   Nephrology is holding on HD today - to call if decline in respiratory status/worsening hemodynamics   Pt denies SOB/chest pressure  Remains on 4L NC  Alert and oriented x 4    5/19  Cr up even further this morning  Na 149  Pt remains intubated  Epi at 2  Hgb remains stable  Hemodynamics @ 0338 CO 8.77 CI 4.47 MAP 79 PAP 70/20 CVP 16  UOP 1.3L/24hrs [- 10.9L]      5/18  Cr continues to climb  Making urine  No diuretics  BP stable  CVP and PA

## 2025-05-22 NOTE — PROGRESS NOTES
Confirmed with GI service that patient is ok for Modified Barium Swallow study with Speech Therapy when they are ready to proceed.

## 2025-05-22 NOTE — PROGRESS NOTES
CRITICAL CARE PROGRESS NOTE      Patient:  Alberto Gilliland    Unit/Bed:4D-03/003-A  YOB: 1965  MRN: [id number]   PCP: Juan Christiansen DO  Date of Admission: 4/30/2025  Chief Complaint:-Hypotension and fatigue    Assessment and Plan:    Acute decompensated HFrEF: In the setting of severe AS with concomitant cardiogenic shock.  Presented fatigue/hypotension with BNP 16 426.  RODRÍGUEZ showed EF 30-35% with moderate global hypokinesis, severe AS with mean AVG 42 mmHg 5/6/2025.  Subsequent BMP elevated, SGC with high PA pressures.  Refractory to vasopressors and inotropes.  Placed on LAVA ECMO and bridge to TAVR 5/15.  Explanted on 5/17.  Cardiology following  Nephrology following, dialyzed 5/19 pulled off 4L   PAP improved, SGC removed 5/21   Severe aortic stenosis: S/p TAVR 5/15.  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  Underwent TAVR 5/15 with subsequent RODRÍGUEZ with appropriately positioned transcatheter bioprosthetic valve, mean AVG 24 mmHg 5/17/2025  GI Bleed: Status post massive transfusion x 2 and colonoscopy. 5/20/25: Overnight patient had episode of dark stool, 5/21: 3 bloody stools   On Protonix drip  Holding antiplatelets and subcutaneous heparin   5/20: Hb: 6.5, transfused 1u PRBc --> 7.6   5/21: bloody bowel movements overnight   3u PRBC  Hb 6.6 --> 9.0 --> 9.6 --> 8.4 --> 8.3  5/21: IR for embolization of branch of ileocecal artery   Transfuse to keep hemoglobin above 7   H&H Q6, monitor bowel movements,  TC: Likely multifactorial, likely acutely cardiorenal with hypoperfusion hypoperfusion, aggressive diuretics and concomitant contrast-induced nephropathy.  Baseline CR 0.9 with rapid decline.  FEUrea 25.2%.  Monitor UOP, strict I&O's.  Limit nephrotoxic agent.  Monitor renal function with daily BMP.  Previously required CRRT for renal function  IMPRESSION:  1. Active bleeding demonstrated from a distal branch of the ileocecal artery..  2. Status post successful embolization of the site of active bleeding.    Electronically signed by   Marielos Templeton MD on 5/22/2025 at 10:34 AM

## 2025-05-22 NOTE — PROGRESS NOTES
[facility]   Nephrology progress note  5/22/2025, 9:09 AM      Pt Name:    Alberto Gilliland  MRN:     [id number]     YOB: 1965  Admit Date:    4/30/2025 12:24 PM    Chief Complaint: Nephrology following for acute kidney injury needing hemodialysis.    Subjective:  Patient seen and examined  Awake and alert no distress  Making good urine output    Objective:  24HR INTAKE/OUTPUT:    Intake/Output Summary (Last 24 hours) at 5/22/2025 0909  Last data filed at 5/22/2025 0741  Gross per 24 hour   Intake 2093.33 ml   Output 4300 ml   Net -2206.67 ml      Admission weight: 87.5 kg (193 lb)  Wt Readings from Last 3 Encounters:   05/22/25 76.2 kg (167 lb 15.9 oz)   04/30/25 87.7 kg (193 lb 6.4 oz)   04/25/25 89.2 kg (196 lb 9.6 oz)        Vitals :   Vitals:    05/22/25 0630 05/22/25 0645 05/22/25 0700 05/22/25 0715   BP:       Pulse: 89 89 90 90   Resp: 16 20 21 26   Temp:       TempSrc:       SpO2: 100% 100% 100% 100%   Weight:       Height:           Physical examination  General Appearance:  Well developed. No distress  Mouth/Throat:  Oral mucosa moist  Neck:  Supple, no JVD  Lungs:  Breath sounds: clear  Heart::  S1,S2 heard  Abdomen:  Soft, non - tender  Musculoskeletal:  Edema -mild edema    Medications:  Infusion:    sodium chloride      sodium chloride      pantoprazole 8 mg/hr (05/22/25 0741)    sodium chloride      sodium chloride 20 mL/hr at 05/20/25 0714    sodium chloride       Meds:    ferrous sulfate  325 mg Oral BID WC    bumetanide  2 mg IntraVENous Q8H    [Held by provider] clopidogrel  75 mg Oral Daily    [Held by provider] heparin (porcine)  5,000 Units SubCUTAneous 3 times per day    diphenhydrAMINE  50 mg IntraVENous Once    hydrocortisone sodium succinate PF  200 mg IntraVENous Once    gabapentin  300 mg Oral BID    nitroglycerin  1 inch Topical 4 times per day    insulin lispro  0-4 Units SubCUTAneous 4x Daily AC & HS    senna  2 tablet Oral BID    magnesium oxide  400

## 2025-05-22 NOTE — PROCEDURES
PROCEDURE NOTE  Date: 5/22/2025   Name: Albreto Gilliland  YOB: 1965    Procedures    EKG Completed. Handed to RN.

## 2025-05-22 NOTE — PROGRESS NOTES
Physical Medicine & Rehabilitation   Progress Note    Chief Complaint:  TAVR. GIB. Rehab needs    History of Present Illness:  Alberto Gilliland is a 60 y.o. male admitted to [facility] on 4/30/2025. Patient  has a past medical history of Acute exacerbation of chronic heart failure (HCC), Arthritis, Bilateral sciatica, Bleeding, CAD (coronary artery disease), Carpal tunnel syndrome, Cervicalgia, C5-7, Chronic anxiety, COPD, mild (HCC), Dysthymia (or depressive neurosis), ED (erectile dysfunction), ED (erectile dysfunction), Fatty liver, GERD (gastroesophageal reflux disease), Headache(784.0), History of blood transfusion, HTN (hypertension), Hyperlipidemia, Hypothyroidism, Iron deficiency anemia, blood loss, Lumbago, Lumbar radiculopathy, Lymphomatoid papulosis (HCC), Osteoarthritis (arthritis due to wear and tear of joints), S/P AVR, coumadin Tx, and Spondylosis of thoracic region without myelopathy or radiculopathy. Patient presented to [facility] due to fatigue, increasing weakness, and iron deficiency. Patient with progressively worsening shortness of breath, exertional chest pain and as of the last few weeks prior to arrival dry cough. He does have a history of DIONISIO and underwent a colonoscopy that showed a large cecal angiectasia on 5/5/2025. Upon arrival given his history of severe aortic stenosis with prior mechanical valve followed by repeat bioprosthetic valve due to inability to tolerate anticoagulation there was concerns for low cardiac output. A RODRÍGUEZ has showed an EF of 30-35%, moderate global hypokinesis with severe aortic stenosis mean AVG 42 mmHg, mild-moderate TR with mildly elevated RVSP 5/7/2025. CTS evaluated and advised TAVR with PCI. Patient underwent a right and left heart cath that was nonobstructive 5/7. He was transferred to the ICU for SGC guided diuresis and vasopressor/inotropic support. During that night he had a GI bleed with bloody stools requiring 2 massive transfusion  (Oral)   Resp 20   Ht 1.702 m (5' 7\")   Wt 76.2 kg (167 lb 15.9 oz)   SpO2 100%   BMI 26.31 kg/m²   awake  Orientation:   person, place, time  Mood: within normal limits  Affect: calm  General appearance: Patient is well nourished, well developed, well groomed and in no acute distress    Memory:   normal within conversation, not formally tested  Attention/Concentration: normal  Language:  normal    Cranial Nerves:  no obvious deficits noted  ROM:  normal  Motor Exam:  Motor exam is symmetrical 4- out of 5 all extremities bilaterally  Tone:  normal  Muscle bulk: within normal limits  Sensory:  Sensory intact    Heart: normal rate, regular rhythm, + murmur, no shortness of breath, extremities well perfused  Lungs: on O2 NC, no audible wheezing or crackles, no increased WOB  Abdomen: soft, non-tender, non-distended, normal bowel sounds, no masses or organomegaly    Skin: warm and dry, no rash or erythema  Peripheral vascular: Pulses: Normal upper and lower extremity pulses; Edema: trace      Diagnostics:  Recent Results (from the past 24 hours)   Hemoglobin and Hematocrit    Collection Time: 05/21/25 11:00 AM   Result Value Ref Range    Hemoglobin 9.0 (L) 14.0 - 18.0 gm/dl    Hematocrit 27.7 (L) 42.0 - 52.0 %   POCT Glucose    Collection Time: 05/21/25 11:08 AM   Result Value Ref Range    POC Glucose 97 70 - 108 mg/dl   Hemoglobin and Hematocrit    Collection Time: 05/21/25  3:50 PM   Result Value Ref Range    Hemoglobin 9.6 (L) 14.0 - 18.0 gm/dl    Hematocrit 29.4 (L) 42.0 - 52.0 %   POCT Glucose    Collection Time: 05/21/25 10:12 PM   Result Value Ref Range    POC Glucose 99 70 - 108 mg/dl   Hemoglobin and Hematocrit    Collection Time: 05/21/25 10:15 PM   Result Value Ref Range    Hemoglobin 8.4 (L) 14.0 - 18.0 gm/dl    Hematocrit 25.8 (L) 42.0 - 52.0 %   Hemoglobin and Hematocrit    Collection Time: 05/22/25  2:45 AM   Result Value Ref Range    Hemoglobin 8.3 (L) 14.0 - 18.0 gm/dl    Hematocrit 25.8 (L) 42.0 -

## 2025-05-22 NOTE — PROGRESS NOTES
Gastroenterology Progress Note:     Patient Name:  Alberto Gilliland   MRN: [id number]  [id number]  YOB: 1965  Admit Date: 4/30/2025 12:24 PM  Primary Care Physician: Juan Christiansen DO   4D-03/003-A     Patient seen and examined.  24 hours events and chart reviewed.    Subjective: Patient had successful embolization of the ileocecal artery yesterday 5/21/25 in interventional radiology.  No further overt s/s of GI bleeding.  Hemoglobin stable today.  Patient is alert but confused during conversation as well.      Objective:  BP (!) 116/50   Pulse 90   Temp 98.2 °F (36.8 °C) (Oral)   Resp 26   Ht 1.702 m (5' 7\")   Wt 76.2 kg (167 lb 15.9 oz)   SpO2 100%   BMI 26.31 kg/m²     Physical Exam  Vitals and nursing note reviewed.   Constitutional:       General: He is not in acute distress.     Appearance: Normal appearance. He is normal weight. He is not ill-appearing.   HENT:      Mouth/Throat:      Mouth: Mucous membranes are moist.      Pharynx: Oropharynx is clear.   Eyes:      Pupils: Pupils are equal, round, and reactive to light.   Cardiovascular:      Rate and Rhythm: Normal rate and regular rhythm.      Heart sounds: Normal heart sounds.   Pulmonary:      Effort: Pulmonary effort is normal. No respiratory distress.      Breath sounds: Normal breath sounds. No stridor. No wheezing, rhonchi or rales.   Chest:      Chest wall: No tenderness.   Abdominal:      General: Abdomen is flat. Bowel sounds are normal. There is no distension.      Palpations: Abdomen is soft. There is no mass.      Tenderness: There is no abdominal tenderness. There is no guarding or rebound.      Hernia: No hernia is present.   Skin:     General: Skin is warm and dry.      Capillary Refill: Capillary refill takes less than 2 seconds.   Neurological:      Mental Status: He is alert.           Labs:   CBC:   Lab Results   Component Value Date/Time    WBC 13.4 05/22/2025 02:45 AM    HGB 8.3 05/22/2025 02:45 AM    HGB 8.1

## 2025-05-22 NOTE — PLAN OF CARE
Problem: Discharge Planning  Goal: Discharge to home or other facility with appropriate resources  Outcome: Progressing  Flowsheets (Taken 5/21/2025 2000)  Discharge to home or other facility with appropriate resources:   Identify barriers to discharge with patient and caregiver   Arrange for needed discharge resources and transportation as appropriate   Identify discharge learning needs (meds, wound care, etc)     Problem: ABCDS Injury Assessment  Goal: Absence of physical injury  Outcome: Progressing  Flowsheets (Taken 5/19/2025 1829   Absence of Physical Injury: Implement safety measures based on patient assessment     Problem: Safety - Adult  Goal: Free from fall injury  Outcome: Progressing  Flowsheets (Taken 5/19/2025   Free From Fall Injury:   Instruct family/caregiver on patient safety   Based on caregiver fall risk screen, instruct family/caregiver to ask for assistance with transferring infant if caregiver noted to have fall risk factors     Problem: Pain  Goal: Verbalizes/displays adequate comfort level or baseline comfort level  Outcome: Progressing  Flowsheets (Taken 5/19/2025 1829  Verbalizes/displays adequate comfort level or baseline comfort level:   Encourage patient to monitor pain and request assistance   Assess pain using appropriate pain scale   Implement non-pharmacological measures as appropriate and evaluate response     Problem: Respiratory - Adult  Goal: Achieves optimal ventilation and oxygenation  Outcome: Progressing  Flowsheets (Taken 5/21/2025   Achieves optimal ventilation and oxygenation:   Assess for changes in respiratory status   Assess for changes in mentation and behavior   Position to facilitate oxygenation and minimize respiratory effort   Oxygen supplementation based on oxygen saturation or arterial blood gases   Encourage broncho-pulmonary hygiene including cough, deep breathe, incentive spirometry   Assess the need for suctioning and aspirate as needed   Assess and

## 2025-05-22 NOTE — PROGRESS NOTES
[facility]  SPEECH THERAPY  STRZ ICU 4D  Modified Barium Swallow    Discharge Recommendations: Inpatient Rehabilitation  DIET ORDER RECOMMENDATIONS AFTER EVALUATION: Minced and moist, thin liquids   Strategies:  Full Upright Position, Small Bite/Sip, Pulmonary Monitoring, Alternate Solids and Liquids, Limit Distractions, and Monitor for Fatigue     SLP Individual Minutes  Time In: 1007  Time Out: 1024  Minutes: 17  Timed Code Treatment Minutes: 0 Minutes       Date: 2025  Patient Name: Alberto Gilliland      CSN: [id number]   : 1965  (60 y.o.)  Gender: male   Referring Physician:  Haseeb Espinoza DO  Diagnosis: Chronic anemia [D64.9]  Troponin level elevated [R79.89]  S/P AVR [Z95.2]  Hypotension, unspecified hypotension type [I95.9]  Acute on chronic congestive heart failure, unspecified heart failure type (HCC) [I50.9]  Acute exacerbation of chronic heart failure (HCC) [I50.9]    Precautions: Fall risk   History of Present Illness/Injury: Patient admitted to [facility] for above dx. Per chart review, \"The patient is a 60-year-old male with a past medical history of HFrEF, AS s/p AVR, CAD, HTN, HLD, hypothyroidism, DIONISIO, GERD, COPD and fatty liver disease, who presented for hypotension.  Per report, the patient's has had progressively worsening shortness of breath, exertional chest pain and as of the last few weeks prior to arrival dry cough.  He does have a history of DIONISIO and underwent a colonoscopy that showed a large cecal angiectasia on 2025.  Upon arrival given his history of severe aortic stenosis with prior mechanical valve followed by repeat bioprosthetic valve due to inability to tolerate anticoagulation there was concerns for low cardiac output.  A RODRÍGUEZ has showed an EF of 30-35%, moderate global hypokinesis with severe aortic stenosis mean AVG 42 mmHg, mild-moderate TR with mildly elevated RVSP 2025.  CTS evaluated and advised TAVR with PCI.  Patient underwent a right and left heart  cath that was nonobstructive 5/7.  He was transferred to the ICU for SGC guided diuresis and vasopressor/inotropic support.  During that night he had a GI bleed with bloody stools requiring 2 massive transfusion protocols.  He underwent emergent colonoscopy that did not visualize any active bleeding.  CTA A/P was also negative for active bleeding or extravasation.  On 5/13 he had a increase in respiratory distress with an episode of dark vomit and concerns for aspiration.  The patient was intubated.  He underwent ECMO placement 5/13 and was initiated on CRRT.  TAVR was successfully completed with weaning of ECMO and discontinuation of CRRT 5/15.  Echo was explanted 5/17.     5/19/2025 no significant events overnight.  This morning patient's sodium continues to trend upwards with evidence of some volume overload on imaging.  PA pressures are elevated.  Case discussed with nephrology, plans for aggressive diuresis with Bumex 2 mg every 8 hours and D5W.  Pending results and monitor. The patient was extubated 5/19.  Otherwise hemodynamically stable.     5/20/25: No significant events overnight.  Patient was started on dialysis yesterday afternoon and 4 L were pulled off.  It was reported that the patient did have episode of hypotension after the dialysis however has not required any pressor support.  [place] cath was replaced at bedside.  Pulmonary artery pressures in the 30s.  He is on room air saturating well.  Patient did have episode of dark stool overnight.     5/21/25: Patient with 3 bloody stools overnight.  Repeat hemoglobin down to 6.7 overnight.  3 units PRBC were ordered as well as a CTA abdomen pelvis.  CTA abdomen pelvis showed small hyperdense luminal material in the right hemicolon differentials including medication versus active GI bleeding.  Repeat creatinine came back worsened at 4.5 with GFR of 14.  BUN elevated at 95.  Patient is alert and oriented however his speech is confused.  I reached out to

## 2025-05-22 NOTE — PROGRESS NOTES
[facility]  INPATIENT OCCUPATIONAL THERAPY  STRZ ICU 4D  EVALUATION      Discharge Recommendations: Patient would benefit from continued therapy after discharge, Patient able to tolerate 3 hours of therapy per day  Equipment Recommendations: No continue to monitor progress      Time In: 1114  Time Out: 1151  Timed Code Treatment Minutes: 27 Minutes  Minutes: 37          Date: 2025  Patient Name: Alberto Gilliland,   Gender: male      MRN: [id number]  : 1965  (60 y.o.)  Referring Practitioner: Haseeb Espinoza DO  Diagnosis: Low output heart failure (HCC)  Additional Pertinent Hx: Pt presented due to fatigue and hypotension. PMHx severe AS, WISE, HLD, HTN, COPD, HFrEF. Notes over the last year or so has been having worsening SOB and exertional chest pain. Over the last few weeks has had a dry cough. Denies any chest pain. Denies any SOB. Noted to have DIONISIO and had colonoscopy done on  which showed showed large cecal angiectasia likely the cause of DIONISIO. Given AS there was concern for low cardiac output. CTS seen and recommended TAVR and PCI. Had right and left heart cath on  and was non-obstructive. Patient transferred to ICU for possible need for [place] guided diuresis and phenylephrine. : Patient was placed on ECMO  Patient on CRRT.  5/15: TAVR successful with no immediate complications. Patient intubated. CRRT stopped. Weaning ECMO.  : Patient off ECMO.  Extubated.    Restrictions/Precautions:  Restrictions/Precautions: Fall Risk, General Precautions  Position Activity Restriction  Other Position/Activity Restrictions: hx of fem ECMO site    Subjective  Chart Reviewed: Orders, Yes, Progress Notes, History and Physical, Imaging  Patient assessed for rehabilitation services?: Yes  Family / Caregiver Present: Yes    Subjective: RN approved OT session, resting in bed with several family members present. patient slow in responding, easily distracted and difficult to remain on topic

## 2025-05-23 ENCOUNTER — APPOINTMENT (OUTPATIENT)
Dept: GENERAL RADIOLOGY | Age: 60
DRG: 003 | End: 2025-05-23
Payer: COMMERCIAL

## 2025-05-23 ENCOUNTER — APPOINTMENT (OUTPATIENT)
Dept: INTERVENTIONAL RADIOLOGY/VASCULAR | Age: 60
DRG: 003 | End: 2025-05-23
Payer: COMMERCIAL

## 2025-05-23 LAB
ALBUMIN SERPL BCG-MCNC: 3 G/DL (ref 3.4–4.9)
ALBUMIN SERPL BCG-MCNC: 3.3 G/DL (ref 3.4–4.9)
ALP SERPL-CCNC: 506 U/L (ref 40–129)
ALP SERPL-CCNC: 540 U/L (ref 40–129)
ALT SERPL W/O P-5'-P-CCNC: 253 U/L (ref 10–50)
ALT SERPL W/O P-5'-P-CCNC: 319 U/L (ref 10–50)
ANION GAP SERPL CALC-SCNC: 13 MEQ/L (ref 8–16)
ANION GAP SERPL CALC-SCNC: 14 MEQ/L (ref 8–16)
AST SERPL-CCNC: 397 U/L (ref 10–50)
AST SERPL-CCNC: 466 U/L (ref 10–50)
BASOPHILS ABSOLUTE: 0 THOU/MM3 (ref 0–0.1)
BASOPHILS ABSOLUTE: 0 THOU/MM3 (ref 0–0.1)
BASOPHILS NFR BLD AUTO: 0.2 %
BASOPHILS NFR BLD AUTO: 0.2 %
BILIRUB SERPL-MCNC: 5.2 MG/DL (ref 0.3–1.2)
BILIRUB SERPL-MCNC: 6.1 MG/DL (ref 0.3–1.2)
BUN SERPL-MCNC: 69 MG/DL (ref 8–23)
BUN SERPL-MCNC: 71 MG/DL (ref 8–23)
CALCIUM SERPL-MCNC: 8.8 MG/DL (ref 8.8–10.2)
CALCIUM SERPL-MCNC: 9.1 MG/DL (ref 8.8–10.2)
CHLORIDE SERPL-SCNC: 106 MEQ/L (ref 98–111)
CHLORIDE SERPL-SCNC: 108 MEQ/L (ref 98–111)
CO2 SERPL-SCNC: 24 MEQ/L (ref 22–29)
CO2 SERPL-SCNC: 25 MEQ/L (ref 22–29)
CREAT SERPL-MCNC: 2.9 MG/DL (ref 0.7–1.2)
CREAT SERPL-MCNC: 3 MG/DL (ref 0.7–1.2)
DEPRECATED RDW RBC AUTO: 52 FL (ref 35–45)
DEPRECATED RDW RBC AUTO: 52 FL (ref 35–45)
EKG ATRIAL RATE: 81 BPM
EKG ATRIAL RATE: 81 BPM
EKG P AXIS: 58 DEGREES
EKG P AXIS: 60 DEGREES
EKG P-R INTERVAL: 142 MS
EKG P-R INTERVAL: 150 MS
EKG Q-T INTERVAL: 404 MS
EKG Q-T INTERVAL: 454 MS
EKG QRS DURATION: 142 MS
EKG QRS DURATION: 146 MS
EKG QTC CALCULATION (BAZETT): 469 MS
EKG QTC CALCULATION (BAZETT): 527 MS
EKG R AXIS: -8 DEGREES
EKG R AXIS: 36 DEGREES
EKG T AXIS: -75 DEGREES
EKG T AXIS: 40 DEGREES
EKG VENTRICULAR RATE: 81 BPM
EKG VENTRICULAR RATE: 81 BPM
EOSINOPHIL NFR BLD AUTO: 1.2 %
EOSINOPHIL NFR BLD AUTO: 1.3 %
EOSINOPHILS ABSOLUTE: 0.3 THOU/MM3 (ref 0–0.4)
EOSINOPHILS ABSOLUTE: 0.3 THOU/MM3 (ref 0–0.4)
ERYTHROCYTE [DISTWIDTH] IN BLOOD BY AUTOMATED COUNT: 17.2 % (ref 11.5–14.5)
ERYTHROCYTE [DISTWIDTH] IN BLOOD BY AUTOMATED COUNT: 17.6 % (ref 11.5–14.5)
GFR SERPL CREATININE-BSD FRML MDRD: 23 ML/MIN/1.73M2
GFR SERPL CREATININE-BSD FRML MDRD: 24 ML/MIN/1.73M2
GLUCOSE BLD STRIP.AUTO-MCNC: 103 MG/DL (ref 70–108)
GLUCOSE BLD STRIP.AUTO-MCNC: 162 MG/DL (ref 70–108)
GLUCOSE BLD STRIP.AUTO-MCNC: 181 MG/DL (ref 70–108)
GLUCOSE BLD STRIP.AUTO-MCNC: 191 MG/DL (ref 70–108)
GLUCOSE SERPL-MCNC: 153 MG/DL (ref 74–109)
GLUCOSE SERPL-MCNC: 157 MG/DL (ref 74–109)
HCT VFR BLD AUTO: 21.7 % (ref 42–52)
HCT VFR BLD AUTO: 24.8 % (ref 42–52)
HGB BLD-MCNC: 7.1 GM/DL (ref 14–18)
HGB BLD-MCNC: 8.1 GM/DL (ref 14–18)
IMM GRANULOCYTES # BLD AUTO: 0.2 THOU/MM3 (ref 0–0.07)
IMM GRANULOCYTES # BLD AUTO: 0.22 THOU/MM3 (ref 0–0.07)
IMM GRANULOCYTES NFR BLD AUTO: 0.8 %
IMM GRANULOCYTES NFR BLD AUTO: 0.9 %
INR PPP: 1.24 (ref 0.85–1.13)
LYMPHOCYTES ABSOLUTE: 0.5 THOU/MM3 (ref 1–4.8)
LYMPHOCYTES ABSOLUTE: 0.7 THOU/MM3 (ref 1–4.8)
LYMPHOCYTES NFR BLD AUTO: 2.3 %
LYMPHOCYTES NFR BLD AUTO: 2.9 %
MAGNESIUM SERPL-MCNC: 2 MG/DL (ref 1.6–2.6)
MCH RBC QN AUTO: 28 PG (ref 26–33)
MCH RBC QN AUTO: 28.2 PG (ref 26–33)
MCHC RBC AUTO-ENTMCNC: 32.7 GM/DL (ref 32.2–35.5)
MCHC RBC AUTO-ENTMCNC: 32.7 GM/DL (ref 32.2–35.5)
MCV RBC AUTO: 85.8 FL (ref 80–94)
MCV RBC AUTO: 86.1 FL (ref 80–94)
MONOCYTES ABSOLUTE: 0.8 THOU/MM3 (ref 0.4–1.3)
MONOCYTES ABSOLUTE: 1.2 THOU/MM3 (ref 0.4–1.3)
MONOCYTES NFR BLD AUTO: 3.4 %
MONOCYTES NFR BLD AUTO: 5 %
NEUTROPHILS ABSOLUTE: 21.3 THOU/MM3 (ref 1.8–7.7)
NEUTROPHILS ABSOLUTE: 21.7 THOU/MM3 (ref 1.8–7.7)
NEUTROPHILS NFR BLD AUTO: 89.7 %
NEUTROPHILS NFR BLD AUTO: 92.1 %
NRBC BLD AUTO-RTO: 0 /100 WBC
NRBC BLD AUTO-RTO: 0 /100 WBC
PLATELET # BLD AUTO: 209 THOU/MM3 (ref 130–400)
PLATELET # BLD AUTO: 235 THOU/MM3 (ref 130–400)
PMV BLD AUTO: 11.6 FL (ref 9.4–12.4)
PMV BLD AUTO: 11.6 FL (ref 9.4–12.4)
POTASSIUM SERPL-SCNC: 2.8 MEQ/L (ref 3.5–5.2)
POTASSIUM SERPL-SCNC: 4.6 MEQ/L (ref 3.5–5.2)
PROT SERPL-MCNC: 5.1 G/DL (ref 6.4–8.3)
PROT SERPL-MCNC: 5.7 G/DL (ref 6.4–8.3)
RBC # BLD AUTO: 2.52 MILL/MM3 (ref 4.7–6.1)
RBC # BLD AUTO: 2.89 MILL/MM3 (ref 4.7–6.1)
SCAN OF BLOOD SMEAR: NORMAL
SODIUM SERPL-SCNC: 145 MEQ/L (ref 135–145)
SODIUM SERPL-SCNC: 145 MEQ/L (ref 135–145)
TOXIC GRANULES BLD QL SMEAR: PRESENT
WBC # BLD AUTO: 23.6 THOU/MM3 (ref 4.8–10.8)
WBC # BLD AUTO: 23.8 THOU/MM3 (ref 4.8–10.8)

## 2025-05-23 PROCEDURE — 97535 SELF CARE MNGMENT TRAINING: CPT

## 2025-05-23 PROCEDURE — 6370000000 HC RX 637 (ALT 250 FOR IP): Performed by: NURSE PRACTITIONER

## 2025-05-23 PROCEDURE — 99233 SBSQ HOSP IP/OBS HIGH 50: CPT | Performed by: INTERNAL MEDICINE

## 2025-05-23 PROCEDURE — 6370000000 HC RX 637 (ALT 250 FOR IP): Performed by: INTERNAL MEDICINE

## 2025-05-23 PROCEDURE — 87040 BLOOD CULTURE FOR BACTERIA: CPT

## 2025-05-23 PROCEDURE — 6360000002 HC RX W HCPCS

## 2025-05-23 PROCEDURE — 93010 ELECTROCARDIOGRAM REPORT: CPT | Performed by: INTERNAL MEDICINE

## 2025-05-23 PROCEDURE — 99232 SBSQ HOSP IP/OBS MODERATE 35: CPT | Performed by: NURSE PRACTITIONER

## 2025-05-23 PROCEDURE — 2500000003 HC RX 250 WO HCPCS: Performed by: INTERNAL MEDICINE

## 2025-05-23 PROCEDURE — 92523 SPEECH SOUND LANG COMPREHEN: CPT

## 2025-05-23 PROCEDURE — 94640 AIRWAY INHALATION TREATMENT: CPT

## 2025-05-23 PROCEDURE — 83735 ASSAY OF MAGNESIUM: CPT

## 2025-05-23 PROCEDURE — 6370000000 HC RX 637 (ALT 250 FOR IP)

## 2025-05-23 PROCEDURE — 99232 SBSQ HOSP IP/OBS MODERATE 35: CPT | Performed by: INTERNAL MEDICINE

## 2025-05-23 PROCEDURE — 93005 ELECTROCARDIOGRAM TRACING: CPT

## 2025-05-23 PROCEDURE — 2500000003 HC RX 250 WO HCPCS

## 2025-05-23 PROCEDURE — 97129 THER IVNTJ 1ST 15 MIN: CPT

## 2025-05-23 PROCEDURE — 92526 ORAL FUNCTION THERAPY: CPT

## 2025-05-23 PROCEDURE — 2000000000 HC ICU R&B

## 2025-05-23 PROCEDURE — 97110 THERAPEUTIC EXERCISES: CPT

## 2025-05-23 PROCEDURE — 71045 X-RAY EXAM CHEST 1 VIEW: CPT

## 2025-05-23 PROCEDURE — 82948 REAGENT STRIP/BLOOD GLUCOSE: CPT

## 2025-05-23 PROCEDURE — 94761 N-INVAS EAR/PLS OXIMETRY MLT: CPT

## 2025-05-23 PROCEDURE — 85610 PROTHROMBIN TIME: CPT

## 2025-05-23 PROCEDURE — 97530 THERAPEUTIC ACTIVITIES: CPT

## 2025-05-23 PROCEDURE — 85025 COMPLETE CBC W/AUTO DIFF WBC: CPT

## 2025-05-23 PROCEDURE — 80053 COMPREHEN METABOLIC PANEL: CPT

## 2025-05-23 PROCEDURE — 93005 ELECTROCARDIOGRAM TRACING: CPT | Performed by: INTERNAL MEDICINE

## 2025-05-23 RX ORDER — ONDANSETRON 2 MG/ML
4 INJECTION INTRAMUSCULAR; INTRAVENOUS EVERY 6 HOURS PRN
Status: DISCONTINUED | OUTPATIENT
Start: 2025-05-23 | End: 2025-05-28 | Stop reason: HOSPADM

## 2025-05-23 RX ORDER — MIDODRINE HYDROCHLORIDE 5 MG/1
5 TABLET ORAL
Status: DISCONTINUED | OUTPATIENT
Start: 2025-05-23 | End: 2025-05-28 | Stop reason: HOSPADM

## 2025-05-23 RX ORDER — NOREPINEPHRINE BITARTRATE 0.06 MG/ML
1-100 INJECTION, SOLUTION INTRAVENOUS CONTINUOUS
Status: DISCONTINUED | OUTPATIENT
Start: 2025-05-23 | End: 2025-05-24

## 2025-05-23 RX ORDER — POTASSIUM CHLORIDE 29.8 MG/ML
20 INJECTION INTRAVENOUS
Status: COMPLETED | OUTPATIENT
Start: 2025-05-23 | End: 2025-05-23

## 2025-05-23 RX ADMIN — INSULIN LISPRO 1 UNITS: 100 INJECTION, SOLUTION INTRAVENOUS; SUBCUTANEOUS at 20:16

## 2025-05-23 RX ADMIN — Medication 1000 UNITS: at 09:00

## 2025-05-23 RX ADMIN — SODIUM CHLORIDE, PRESERVATIVE FREE 10 ML: 5 INJECTION INTRAVENOUS at 20:16

## 2025-05-23 RX ADMIN — Medication 5 MCG/MIN: at 02:16

## 2025-05-23 RX ADMIN — GABAPENTIN 300 MG: 300 CAPSULE ORAL at 09:00

## 2025-05-23 RX ADMIN — Medication 325 MG: at 17:00

## 2025-05-23 RX ADMIN — GABAPENTIN 300 MG: 300 CAPSULE ORAL at 20:15

## 2025-05-23 RX ADMIN — POTASSIUM CHLORIDE 20 MEQ: 29.8 INJECTION INTRAVENOUS at 08:54

## 2025-05-23 RX ADMIN — Medication 325 MG: at 09:01

## 2025-05-23 RX ADMIN — POTASSIUM CHLORIDE 20 MEQ: 29.8 INJECTION INTRAVENOUS at 07:45

## 2025-05-23 RX ADMIN — PANTOPRAZOLE SODIUM 40 MG: 40 INJECTION, POWDER, FOR SOLUTION INTRAVENOUS at 20:15

## 2025-05-23 RX ADMIN — MIDODRINE HYDROCHLORIDE 5 MG: 5 TABLET ORAL at 17:00

## 2025-05-23 RX ADMIN — Medication 400 MG: at 09:01

## 2025-05-23 RX ADMIN — FOLIC ACID 1000 MCG: 1 TABLET ORAL at 09:01

## 2025-05-23 RX ADMIN — SODIUM CHLORIDE, PRESERVATIVE FREE 10 ML: 5 INJECTION INTRAVENOUS at 08:59

## 2025-05-23 RX ADMIN — TIOTROPIUM BROMIDE AND OLODATEROL 2 PUFF: 3.124; 2.736 SPRAY, METERED RESPIRATORY (INHALATION) at 08:21

## 2025-05-23 RX ADMIN — METOPROLOL SUCCINATE 12.5 MG: 25 TABLET, EXTENDED RELEASE ORAL at 09:02

## 2025-05-23 RX ADMIN — POTASSIUM CHLORIDE 20 MEQ: 29.8 INJECTION INTRAVENOUS at 07:07

## 2025-05-23 RX ADMIN — NITROGLYCERIN 1 INCH: 20 OINTMENT TOPICAL at 04:49

## 2025-05-23 RX ADMIN — INSULIN LISPRO 1 UNITS: 100 INJECTION, SOLUTION INTRAVENOUS; SUBCUTANEOUS at 17:04

## 2025-05-23 RX ADMIN — ONDANSETRON 4 MG: 2 INJECTION, SOLUTION INTRAMUSCULAR; INTRAVENOUS at 05:10

## 2025-05-23 RX ADMIN — LEVOTHYROXINE SODIUM 75 MCG: 0.07 TABLET ORAL at 05:45

## 2025-05-23 RX ADMIN — NITROGLYCERIN 1 INCH: 20 OINTMENT TOPICAL at 00:27

## 2025-05-23 RX ADMIN — PANTOPRAZOLE SODIUM 40 MG: 40 INJECTION, POWDER, FOR SOLUTION INTRAVENOUS at 08:59

## 2025-05-23 RX ADMIN — OXYCODONE HYDROCHLORIDE AND ACETAMINOPHEN 500 MG: 500 TABLET ORAL at 09:00

## 2025-05-23 RX ADMIN — ESCITALOPRAM OXALATE 10 MG: 10 TABLET ORAL at 09:00

## 2025-05-23 ASSESSMENT — PAIN SCALES - GENERAL
PAINLEVEL_OUTOF10: 0

## 2025-05-23 NOTE — PROGRESS NOTES
CRITICAL CARE PROGRESS NOTE      Patient:  Alberto Gilliland    Unit/Bed:4D-03/003-A  YOB: 1965  MRN: [id number]   PCP: Juan Christiansen DO  Date of Admission: 4/30/2025  Chief Complaint:-Hypotension and fatigue    Assessment and Plan:    Acute decompensated HFrEF: In the setting of severe AS with concomitant cardiogenic shock.  Presented fatigue/hypotension with BNP 16 426.  RODRÍGUEZ showed EF 30-35% with moderate global hypokinesis, severe AS with mean AVG 42 mmHg 5/6/2025.  Subsequent BMP elevated, SGC with high PA pressures.  Refractory to vasopressors and inotropes.  Placed on LAVA ECMO and bridge to TAVR 5/15.  Explanted on 5/17.  Cardiology following  Nephrology following, dialyzed 5/19 and 5/21   PAP improved, SGC removed 5/21   Severe aortic stenosis: S/p TAVR 5/15.  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  Underwent TAVR 5/15 with subsequent RODRÍGUEZ with appropriately positioned transcatheter bioprosthetic valve, mean AVG 24 mmHg 5/17/2025  GI Bleed: Status post massive transfusion x 2 and colonoscopy. 5/20/25: Overnight patient had episode of dark stool, 5/21: 3 bloody stools   On Protonix drip, transitioned to Protonix 40 mg IV twice daily on 5/22  Holding antiplatelets and subcutaneous heparin   5/20: Hb: 6.5, transfused 1u PRBc --> 7.6   5/21: bloody bowel movements overnight   3u PRBC  Hb 6.6 --> 9.0 --> 9.6 --> 8.4 --> 8.3  5/21: IR for embolization of branch of ileocecal artery   5/23: Hgb stable ~8  Transfuse to keep hemoglobin above 7   H&H Q8, monitor bowel movements  TC: Likely multifactorial, likely acutely cardiorenal with hypoperfusion hypoperfusion, aggressive diuretics and concomitant contrast-induced nephropathy.  Baseline CR 0.9 with rapid decline.  FEUrea 25.2%.  Monitor UOP, strict I&O's.  Limit nephrotoxic agent.  Monitor renal

## 2025-05-23 NOTE — PROCEDURES
PROCEDURE NOTE  Date: 5/23/2025   Name: Alberto Gilliland  YOB: 1965    Procedures    12 lead EKG completed. Results handed to Lázaro BUTLER.

## 2025-05-23 NOTE — PROGRESS NOTES
Continue to follow clinical course and therapy progression for IPR referral.     Patient requires precert for post acute care.

## 2025-05-23 NOTE — PROGRESS NOTES
Comprehensive Nutrition Assessment    Type and Reason for Visit:  Reassess    Nutrition Recommendations/Plan:   Begin Soft and Bite Sized diet with extra gravy per SLP recommendations  Continue GI bland/GERD/Peptic Ulcer diet as per GI - reinforced with pt.  Continue Nepro BID  Monitoring need for nutrition education on HD   Monitoring wts. - major fluctuations with HD and ? Bedscale accuracy      Malnutrition Assessment:  Malnutrition Status:  Moderate malnutrition (05/09/25 1626)    Context:  Acute Illness     Findings of the 6 clinical characteristics of malnutrition:  Energy Intake:  50% or less of estimated energy requirements for 5 or more days (NPO/CLD this admit)  Weight Loss:  Unable to assess (CHF; unable to accurately assess)     Body Fat Loss:  Mild body fat loss Orbital   Muscle Mass Loss:  Mild muscle mass loss Temples (temporalis), Clavicles (pectoralis & deltoids)  Fluid Accumulation:  Mild Generalized, Extremities   Strength:  Not Performed    Nutrition Assessment:     Pt. Nutritionally compromised AEB intubated 5/13-5/19, tolerated only trophic TF while intubated. At risk for further nutrition compromise r/t admit with CHF, RODRÍGUEZ 5/6, RHC 5/9, hemorrhagic shock 2/2 GIB s/p EGD 5/5 no active bleeding noted - bx duodenum taken and colonoscopy 5/8 with inability to fully visualize active bleeding; ileocecal artery embolization done 5/21; ECMO 5/13-5/17, shock liver, TC, moderate malnutrition, CRRT 5/13-14;TAVR 5/15, HD started 5/21; dysphagia; LOS day 23 and underlying medical condition (PMHx: mechanical AVR/resection of ascending aorta 2007 - switched to bioprosthetic 2016 2/2 warfarin intolerance and GIB, CHF, CAD COPD, anxiety, WISE, lymphatoid papulosis, GERD, HTN, HLD, hypothyroidism, OA, hx/o small bowel resection 2008 - 8 inches removed, s/p robotic laparoscopy lysis of adhesions/ventral hernia repair/evacuation of abdominal hematoma with debridement of hematoma capsule 2/23/24, current  smoker, marijuana use)     Nutrition Related Findings:    Pt. Report/Treatments/Miscellaneous: pt. Seen this am; likes the ONS  and thrilled with texture advancement; drank Ensure pta but likes the Nepro which started 5/22 better; HD last done 5/21 - not planned longterm; ; UO 1600ml; had ileocecal artery embolization 5/21 reinforced the bland modifications for now as pt. Really wants Pepsi and orange juice  GI Status: BM x1 5/22  Pertinent Labs: glucose 157  BUN 69  creatinine 2.9  Na 145  K+ 4.6  Mg 2  Pertinent Meds: vitamin C, FeSo4, folic acid, vitamin D, synthroid, protonix     Wound Type:  (femoral puncture d/t ECMO 5/13)       Current Nutrition Intake & Therapies:    Average Meal Intake: 1-25%  Average Supplements Intake: 100%  ADULT DIET; Dysphagia - Soft and Bite Sized; GI [place] (GERD/Peptic Ulcer)  ADULT ORAL NUTRITION SUPPLEMENT; Breakfast, Dinner; Renal Oral Supplement    Anthropometric Measures:  Height: 170.2 cm (5' 7\")  Ideal Body Weight (IBW): 148 lbs (67 kg)    Admission Body Weight: 87.4 kg (192 lb 11 oz) (4/30: facial non-pitting edema)  Current Body Weight: 63.5 kg (140 lb) (5/23 with trace edema; 167# 5/22; wts. fluctuate with HD and ? bedscale accuracy),       Current BMI (kg/m2): 21.9  Usual Body Weight:  (Per EMR: 12/7/23: 185# 6 oz, 5/3/24: 199# 10 oz, 8/23/24: 202# 10 oz, 12/30/24: 205# 11 oz, 4/10/25: 205# 10 oz)   Weight Adjustment For: No Adjustment   BMI Categories: Overweight (BMI 25.0-29.9)    Estimated Daily Nutrient Needs:  Energy Requirements Based On: Kcal/kg  Weight Used for Energy Requirements:  (85kgm on 5/16)  Energy (kcal/day): 9377-2145 kcals (20-25)  Weight Used for Protein Requirements: Current (63.8kgm)  Protein (g/day): 77-89 gms (1.2-1.4/kgm) wt. fluctuating per EMR - monitoring for reassessment  Fluid (ml/day): per Physician    Nutrition Diagnosis:   Moderate malnutrition, in context of acute illness or injury related to altered GI function, inadequate protein-energy

## 2025-05-23 NOTE — PROGRESS NOTES
[facility]  SPEECH THERAPY  STRZ ICU 4D  Speech - Language - Cognitive Evaluation + Cognitive Therapy + Dysphagia Therapy    Discharge Recommendations: Inpatient Rehabilitation    SLP Individual Minutes  Time In: 920  Time Out: 959  Minutes: 39  Timed Code Treatment Minutes: 8 Minutes   Cognitive Evaluation: 20 minutes  Cognitive Therapy: 8 minutes  Dysphagia Therapy: 11 minutes    Date: 2025  Patient Name: Alberto Gilliland      CSN: [id number]   : 1965  (60 y.o.)  Gender: male   Referring Physician:  Eduard Urrutia MD  Diagnosis: Low output heart failure (HCC)  Precautions: Fall risk  History of Present Illness/Injury: Patient admitted to [facility] for above dx. Per chart review, \"The patient is a 60-year-old male with a past medical history of HFrEF, AS s/p AVR, CAD, HTN, HLD, hypothyroidism, DIONISIO, GERD, COPD and fatty liver disease, who presented for hypotension.  Per report, the patient's has had progressively worsening shortness of breath, exertional chest pain and as of the last few weeks prior to arrival dry cough.  He does have a history of DIONISIO and underwent a colonoscopy that showed a large cecal angiectasia on 2025.  Upon arrival given his history of severe aortic stenosis with prior mechanical valve followed by repeat bioprosthetic valve due to inability to tolerate anticoagulation there was concerns for low cardiac output.  A RODRÍGUEZ has showed an EF of 30-35%, moderate global hypokinesis with severe aortic stenosis mean AVG 42 mmHg, mild-moderate TR with mildly elevated RVSP 2025.  CTS evaluated and advised TAVR with PCI.  Patient underwent a right and left heart cath that was nonobstructive .  He was transferred to the ICU for SGC guided diuresis and vasopressor/inotropic support.  During that night he had a GI bleed with bloody stools requiring 2 massive transfusion protocols.  He underwent emergent colonoscopy that did not visualize any active bleeding.  CTA A/P was also

## 2025-05-23 NOTE — PROGRESS NOTES
[facility]  INPATIENT PHYSICAL THERAPY  DAILY NOTE  [facility] ICU 4D - 4D-03/003-A      Discharge Recommendations: Patient would benefit from continued PT at discharge and continue to assess pending progress, patient able to tolerate therapy 3 hours/day  Equipment Recommendations:    monitor for needs            Time In: 1114  Time Out: 1142  Timed Code Treatment Minutes: 28 Minutes  Minutes: 28          Date: 2025  Patient Name: Alberto Gilliland,  Gender:  male        MRN: [id number]  : 1965  (60 y.o.)     Referring Practitioner: Haseeb Espinoza DO  Diagnosis: Low output heart failure (HCC)  Additional Pertinent Hx: Alberto Gilliland a 60 year old male presented due to fatigue and hypotension. PMHx severe AS, WISE, HLD, HTN, COPD, HFrEF. Notes over the last year or so has been having worsening SOB and exertional chest pain. Over the last few weeks has had a dry cough. Denies any chest pain. Denies any SOB. Noted to have DIONISIO and had colonoscopy done on  which showed showed large cecal angiectasia likely the cause of DIONISIO. Given AS there was concern for low cardiac output. CTS seen and recommended TAVR and PCI. Had right and left heart cath on  and was non-obstructive. Patient transferred to ICU for possible need for [place] guided diuresis and phenylephrine. : Patient was placed on ECMO  Patient on CRRT.  5/15: TAVR successful with no immediate complications. Patient intubated. CRRT stopped. Weaning ECMO.  : Patient off ECMO.  Extubated.     Prior Level of Function:  Lives With: Alone  Type of Home: Apartment  Home Layout: One level  Home Access: Stairs to enter without rails  Entrance Stairs - Number of Steps: 1 step  Home Equipment: None   Bathroom Shower/Tub: Tub/Shower unit  Bathroom Toilet: Standard  Bathroom Equipment: Grab bars in shower    Prior Level of Assist for ADLs: Independent  Prior Level of Assist for Homemaking: Independent  Homemaking Responsibilities: Yes  Prior Level

## 2025-05-23 NOTE — PLAN OF CARE
Problem: Respiratory - Adult  Goal: Achieves optimal ventilation and oxygenation  Outcome: Progressing  Goal: Lung sounds clear or within normal limits for patient  Outcome: Progressing

## 2025-05-23 NOTE — PROGRESS NOTES
Spiritual Health History and Assessment/Progress Note  [facility]    (P) Spiritual/Emotional Needs,  ,  ,      Name: Alberto Gilliland MRN: [id number]    Age: 60 y.o.     Sex: male   Language: English   Holiness: None   Low output heart failure (HCC)     Date: 5/23/2025            Total Time Calculated: (P) 10 min              Spiritual Assessment continued in STRZ ICU 4D        Referral/Consult From: (P) Rounding   Encounter Overview/Reason: (P) Spiritual/Emotional Needs  Service Provided For: (P) Patient and family together    Shae, Belief, Meaning:   Patient has beliefs or practices that help with coping during difficult times  Family/Friends have beliefs or practices that help with coping during difficult times      Importance and Influence:  Patient has spiritual/personal beliefs that influence decisions regarding their health  Family/Friends have spiritual/personal beliefs that influence decisions regarding the patient's health    Community:  Patient feels well-supported. Support system includes: Extended family  Family/Friends feel well-supported. Support system includes: Extended family    Assessment and Plan of Care:     Patient Interventions include: Facilitated expression of thoughts and feelings, Explored spiritual coping/struggle/distress, and Other:  prayed bedside with pt nephew present.  Family/Friends Interventions include: Facilitated expression of thoughts and feelings, Explored spiritual coping/struggle/distress, and Other:  prayed for patient bedside, with pt nephew present.    Patient Plan of Care: Spiritual Care available upon further referral  Family/Friends Plan of Care: Spiritual Care available upon further referral    Electronically signed by Chaplain Adán on 5/23/2025 at 2:51 PM

## 2025-05-23 NOTE — PROGRESS NOTES
Inpatient Cardiac Rehabilitation Consult    Received consult for Phase II Cardiac Rehabilitation.  Patient needs cardiac rehab due to TAVR on 5/15/25.  Importance of Cardiac Rehab discussed with patient.  Reviewed cardiac rehab class times.  Patient questions answered.  We will contact patient at home to schedule evaluation appointment.  Cardiac Rehab brochure given.

## 2025-05-23 NOTE — PROGRESS NOTES
Cardiology Progress Note      Patient:  Alberto Gilliland  YOB: 1965  MRN: [id number]   Acct: [id number]  Admit Date:  4/30/2025  Primary Cardiologist:  Rivas    Chief Complaint: CV shock    Subjective (Events in last 24 hours):     5/23  Remains in ICU  SR 70's  's - 130's with MAP 60 - 80s ? 5 Levo briefly OVN - now off  I/O: 720/1100   NET: -17L  Wt: 140 (167, 174, 189, 197)  Creat 3.0 today (3.1, 4.5) - bumex on hold per Nephrology request  Leukocytosis with WBC to 23.8 this AM  (13.4, 12, 14.9)  Afebrile  S/p mod barium swallow 5/22; started moist & minced diet; no coughing or obvious aspiration  Hgb 7.1 (8.3)  Plavix and ASA on hold  Feels well - appears well  No chest discomfort; breathing stable with HOB nearly flat      5/22  Continues in ICU  Stable OVN; MAP 80s  SR 80 - 90s  I/O: 2390/UOP 3300 + HD 2000  NET: -18.6L  Wt: 167 (174, 189)  Creat 3.1 (4.5) post HD - 3rd dose Bumex held 5/22  Nephrology asks that Bumex be held at this time - will reconsider 5/23  No evidence of on-going bleeding; Hgb stable at 8.1; Plavix and ASA on hold  For mod barium swallow today  Tired - no chest discomfort; breathing stable with HOB nearly flat      5/21  Bloody stools OVN with Hgb to 6.6; transfused with 3 units PRBC  Plavix, ASA and SQ Heparin on hold  CTA abdomen inconclusive  NM bleeding scan notes activity in the right abdomen, likely in the cecum and ascending colon,  consistent with active gastrointestinal bleeding.  Endoscopic evaluation and hemospray to attempt to stop bleeding preferred over IR to avoid additional dye load and threat to renal function  IR embolization of bleeding ileocecal artery  For HD today  HDS      5/20  Stable over night  S/p HD yesterday evening (-4L)  PA pressures improved from 62/19 to 44/11, CVP from 19 to 5  New SGC placed  Hemodynamics @ 0741: CO 9.18 CI 4.68 PAP 39/17 CVP 10   BP is good w/ MAP in the 80s  UOP after HD was 80cc  Na 147 this am   Hgb dropped

## 2025-05-23 NOTE — PROGRESS NOTES
[facility]   Nephrology progress note  5/23/2025, 9:46 AM      Pt Name:    Alberto Gilliland  MRN:     [id number]     YOB: 1965  Admit Date:    4/30/2025 12:24 PM    Chief Complaint: Nephrology following for acute kidney injury needing hemodialysis.    Subjective:  Patient seen and examined  Awake and alert no distress  Making decent urine output    Objective:  24HR INTAKE/OUTPUT:    Intake/Output Summary (Last 24 hours) at 5/23/2025 0946  Last data filed at 5/23/2025 0902  Gross per 24 hour   Intake 2299.21 ml   Output 1100 ml   Net 1199.21 ml      Admission weight: 87.5 kg (193 lb)  Wt Readings from Last 3 Encounters:   05/23/25 63.8 kg (140 lb 10.5 oz)   04/30/25 87.7 kg (193 lb 6.4 oz)   04/25/25 89.2 kg (196 lb 9.6 oz)        Vitals :   Vitals:    05/23/25 0745 05/23/25 0821 05/23/25 0900 05/23/25 0902   BP: (!) 131/50  (!) 155/47 (!) 155/47   Pulse: 79 78 81 83   Resp: 18 20 16    Temp: 97.7 °F (36.5 °C)      TempSrc: Oral      SpO2: 97% 99% 98%    Weight:       Height:           Physical examination  General Appearance:  Well developed. No distress  Mouth/Throat:  Oral mucosa moist  Neck:  Supple, no JVD  Lungs:  Breath sounds: clear  Heart::  S1,S2 heard  Abdomen:  Soft, non - tender  Musculoskeletal:  Edema -no significant edema send    Medications:  Infusion:    norepinephrine Stopped (05/23/25 0847)    sodium chloride      sodium chloride      sodium chloride      sodium chloride 20 mL/hr at 05/20/25 0714    sodium chloride       Meds:    pantoprazole  40 mg IntraVENous BID    metoprolol succinate  12.5 mg Oral Daily    ferrous sulfate  325 mg Oral BID WC    [Held by provider] bumetanide  2 mg IntraVENous Q8H    [Held by provider] clopidogrel  75 mg Oral Daily    [Held by provider] heparin (porcine)  5,000 Units SubCUTAneous 3 times per day    diphenhydrAMINE  50 mg IntraVENous Once    gabapentin  300 mg Oral BID    nitroglycerin  1 inch Topical 4 times per day    insulin

## 2025-05-23 NOTE — DISCHARGE INSTRUCTIONS
Cardiac Rehab:  Cardiac Rehab is recommended for you. It is an outpatient program of support, exercise, and education led by health professionals and personalized to strengthen your heart. It helps you make long-term lifestyle changes so you can live a healthier life. The program has shown to reduce hospital visits and reduce your risk of death from heart conditions. The Cardiac Rehab staff will follow-up with you and provide further information regarding the program and hours of operation. For questions or more information, call Cardiac Rehab at [phone].        Learning About Transcatheter Aortic Valve Implantation (ERICK)  What is ERICK?     Transcatheter aortic valve implantation (ERICK) is a procedure to implant a replacement aortic valve in the heart. It is also called transcatheter aortic valve replacement (TAVR). It is done to treat aortic valve stenosis. In aortic valve stenosis, the valve between your heart and the large blood vessel that carries blood to the body (aorta) has narrowed. That forces the heart to pump harder to get enough blood through the valve. ERICK can help some people feel better and live longer.  In ERICK, the doctor uses a thin, flexible tube called a catheter to put in the new heart valve. ERICK is not an open-heart surgery.  How is ERICK done?  ERICK is typically done through an incision (cut) in the groin. But sometimes a small cut is made in the chest. The doctor uses a tube called a catheter and special tools that fit inside the catheter. The doctor puts the catheter into a blood vessel and moves it through the blood vessel and into the heart. A specially designed replacement valve fits inside the catheter. This valve is made of tissue and metal. The doctor then moves the new valve into the damaged aortic valve. The new valve expands and works as your new aortic valve.  You may be asleep for the procedure. Or you may get medicine that relaxes you or puts you in a light sleep. You

## 2025-05-23 NOTE — PROGRESS NOTES
[facility]  STRZ ICU 4D  Occupational Therapy  Daily Note    Discharge Recommendations: Inpatient Rehabilitation  Patient is exhibiting above listed deficits and requiring continued therapy. Patient would benefit from continued therapy on an inpatient rehab unit. Patient is able to tolerate 3 hours of intensive therapy 5-6 days/week. Without continued therapies pt at risk for functional decline, increased falls, and readmission to hospital.     Equipment Recommendations: No continue to monitor progress    Time In: 959  Time Out: 1058  Timed Code Treatment Minutes: 59 Minutes  Minutes: 59          Date: 2025  Patient Name: Alberto Gilliland,   Gender: male      Room: [facility]003-A  MRN: [id number]  : 1965  (60 y.o.)  Referring Practitioner: Haseeb Espinoza DO  Diagnosis: Low output heart failure (HCC)  Additional Pertinent Hx: Pt presented due to fatigue and hypotension. PMHx severe AS, WISE, HLD, HTN, COPD, HFrEF. Notes over the last year or so has been having worsening SOB and exertional chest pain. Over the last few weeks has had a dry cough. Denies any chest pain. Denies any SOB. Noted to have DIONISIO and had colonoscopy done on  which showed showed large cecal angiectasia likely the cause of DIONISIO. Given AS there was concern for low cardiac output. CTS seen and recommended TAVR and PCI. Had right and left heart cath on  and was non-obstructive. Patient transferred to ICU for possible need for [place] guided diuresis and phenylephrine. : Patient was placed on ECMO  Patient on CRRT.  5/15: TAVR successful with no immediate complications. Patient intubated. CRRT stopped. Weaning ECMO.  : Patient off ECMO.  Extubated.  embolization branch of Ileocecal artery    Restrictions/Precautions:  Restrictions/Precautions: Fall Risk, General Precautions  Position Activity Restriction  Other Position/Activity Restrictions: hx of fem ECMO site     Social/Functional History:  Lives With:

## 2025-05-24 LAB
ALBUMIN SERPL BCG-MCNC: 3 G/DL (ref 3.4–4.9)
ALP SERPL-CCNC: 527 U/L (ref 40–129)
ALT SERPL W/O P-5'-P-CCNC: 349 U/L (ref 10–50)
ANION GAP SERPL CALC-SCNC: 13 MEQ/L (ref 8–16)
AST SERPL-CCNC: 500 U/L (ref 10–50)
BASOPHILS ABSOLUTE: 0 THOU/MM3 (ref 0–0.1)
BASOPHILS NFR BLD AUTO: 0.2 %
BILIRUB SERPL-MCNC: 5.5 MG/DL (ref 0.3–1.2)
BUN SERPL-MCNC: 79 MG/DL (ref 8–23)
CALCIUM SERPL-MCNC: 9 MG/DL (ref 8.8–10.2)
CHLORIDE SERPL-SCNC: 104 MEQ/L (ref 98–111)
CO2 SERPL-SCNC: 25 MEQ/L (ref 22–29)
CREAT SERPL-MCNC: 3.3 MG/DL (ref 0.7–1.2)
DEPRECATED RDW RBC AUTO: 50.5 FL (ref 35–45)
EKG ATRIAL RATE: 77 BPM
EKG P AXIS: 63 DEGREES
EKG P-R INTERVAL: 182 MS
EKG Q-T INTERVAL: 398 MS
EKG QRS DURATION: 138 MS
EKG QTC CALCULATION (BAZETT): 450 MS
EKG R AXIS: -4 DEGREES
EKG T AXIS: 70 DEGREES
EKG VENTRICULAR RATE: 77 BPM
EOSINOPHIL NFR BLD AUTO: 1.9 %
EOSINOPHILS ABSOLUTE: 0.4 THOU/MM3 (ref 0–0.4)
ERYTHROCYTE [DISTWIDTH] IN BLOOD BY AUTOMATED COUNT: 17.2 % (ref 11.5–14.5)
GFR SERPL CREATININE-BSD FRML MDRD: 20 ML/MIN/1.73M2
GLUCOSE BLD STRIP.AUTO-MCNC: 148 MG/DL (ref 70–108)
GLUCOSE SERPL-MCNC: 125 MG/DL (ref 74–109)
HCT VFR BLD AUTO: 22.4 % (ref 42–52)
HCT VFR BLD AUTO: 23.6 % (ref 42–52)
HGB BLD-MCNC: 7.3 GM/DL (ref 14–18)
HGB BLD-MCNC: 7.6 GM/DL (ref 14–18)
IMM GRANULOCYTES # BLD AUTO: 0.22 THOU/MM3 (ref 0–0.07)
IMM GRANULOCYTES NFR BLD AUTO: 1 %
INR PPP: 1.24 (ref 0.85–1.13)
LYMPHOCYTES ABSOLUTE: 0.6 THOU/MM3 (ref 1–4.8)
LYMPHOCYTES NFR BLD AUTO: 2.8 %
MCH RBC QN AUTO: 27.9 PG (ref 26–33)
MCHC RBC AUTO-ENTMCNC: 32.6 GM/DL (ref 32.2–35.5)
MCV RBC AUTO: 85.5 FL (ref 80–94)
MONOCYTES ABSOLUTE: 1 THOU/MM3 (ref 0.4–1.3)
MONOCYTES NFR BLD AUTO: 4.3 %
NEUTROPHILS ABSOLUTE: 19.8 THOU/MM3 (ref 1.8–7.7)
NEUTROPHILS NFR BLD AUTO: 89.8 %
NRBC BLD AUTO-RTO: 0 /100 WBC
PLATELET # BLD AUTO: 236 THOU/MM3 (ref 130–400)
PMV BLD AUTO: 11.8 FL (ref 9.4–12.4)
POTASSIUM SERPL-SCNC: 3 MEQ/L (ref 3.5–5.2)
PROT SERPL-MCNC: 5.3 G/DL (ref 6.4–8.3)
RBC # BLD AUTO: 2.62 MILL/MM3 (ref 4.7–6.1)
SODIUM SERPL-SCNC: 142 MEQ/L (ref 135–145)
WBC # BLD AUTO: 22.1 THOU/MM3 (ref 4.8–10.8)

## 2025-05-24 PROCEDURE — 6370000000 HC RX 637 (ALT 250 FOR IP): Performed by: INTERNAL MEDICINE

## 2025-05-24 PROCEDURE — 93010 ELECTROCARDIOGRAM REPORT: CPT | Performed by: INTERNAL MEDICINE

## 2025-05-24 PROCEDURE — 6370000000 HC RX 637 (ALT 250 FOR IP)

## 2025-05-24 PROCEDURE — 99233 SBSQ HOSP IP/OBS HIGH 50: CPT | Performed by: INTERNAL MEDICINE

## 2025-05-24 PROCEDURE — 6370000000 HC RX 637 (ALT 250 FOR IP): Performed by: NURSE PRACTITIONER

## 2025-05-24 PROCEDURE — 80053 COMPREHEN METABOLIC PANEL: CPT

## 2025-05-24 PROCEDURE — 85018 HEMOGLOBIN: CPT

## 2025-05-24 PROCEDURE — 97530 THERAPEUTIC ACTIVITIES: CPT

## 2025-05-24 PROCEDURE — 85014 HEMATOCRIT: CPT

## 2025-05-24 PROCEDURE — 93005 ELECTROCARDIOGRAM TRACING: CPT | Performed by: INTERNAL MEDICINE

## 2025-05-24 PROCEDURE — 99232 SBSQ HOSP IP/OBS MODERATE 35: CPT | Performed by: NURSE PRACTITIONER

## 2025-05-24 PROCEDURE — 82948 REAGENT STRIP/BLOOD GLUCOSE: CPT

## 2025-05-24 PROCEDURE — 94640 AIRWAY INHALATION TREATMENT: CPT

## 2025-05-24 PROCEDURE — 2060000000 HC ICU INTERMEDIATE R&B

## 2025-05-24 PROCEDURE — 85610 PROTHROMBIN TIME: CPT

## 2025-05-24 PROCEDURE — 99232 SBSQ HOSP IP/OBS MODERATE 35: CPT | Performed by: INTERNAL MEDICINE

## 2025-05-24 PROCEDURE — 2500000003 HC RX 250 WO HCPCS: Performed by: INTERNAL MEDICINE

## 2025-05-24 PROCEDURE — 97535 SELF CARE MNGMENT TRAINING: CPT

## 2025-05-24 PROCEDURE — 85025 COMPLETE CBC W/AUTO DIFF WBC: CPT

## 2025-05-24 RX ORDER — PANTOPRAZOLE SODIUM 40 MG/1
40 TABLET, DELAYED RELEASE ORAL
Status: DISCONTINUED | OUTPATIENT
Start: 2025-05-24 | End: 2025-05-28 | Stop reason: HOSPADM

## 2025-05-24 RX ADMIN — OXYCODONE HYDROCHLORIDE AND ACETAMINOPHEN 500 MG: 500 TABLET ORAL at 08:26

## 2025-05-24 RX ADMIN — SENNOSIDES 17.2 MG: 8.6 TABLET, FILM COATED ORAL at 20:40

## 2025-05-24 RX ADMIN — PANTOPRAZOLE SODIUM 40 MG: 40 TABLET, DELAYED RELEASE ORAL at 18:40

## 2025-05-24 RX ADMIN — METOPROLOL SUCCINATE 12.5 MG: 25 TABLET, EXTENDED RELEASE ORAL at 08:23

## 2025-05-24 RX ADMIN — Medication 325 MG: at 18:39

## 2025-05-24 RX ADMIN — ESCITALOPRAM OXALATE 10 MG: 10 TABLET ORAL at 08:26

## 2025-05-24 RX ADMIN — Medication 400 MG: at 08:25

## 2025-05-24 RX ADMIN — Medication 325 MG: at 08:26

## 2025-05-24 RX ADMIN — GABAPENTIN 300 MG: 300 CAPSULE ORAL at 08:26

## 2025-05-24 RX ADMIN — FOLIC ACID 1000 MCG: 1 TABLET ORAL at 08:26

## 2025-05-24 RX ADMIN — POTASSIUM BICARBONATE 60 MEQ: 782 TABLET, EFFERVESCENT ORAL at 06:22

## 2025-05-24 RX ADMIN — MIDODRINE HYDROCHLORIDE 5 MG: 5 TABLET ORAL at 12:00

## 2025-05-24 RX ADMIN — SODIUM CHLORIDE, PRESERVATIVE FREE 10 ML: 5 INJECTION INTRAVENOUS at 20:41

## 2025-05-24 RX ADMIN — TIOTROPIUM BROMIDE AND OLODATEROL 2 PUFF: 3.124; 2.736 SPRAY, METERED RESPIRATORY (INHALATION) at 09:33

## 2025-05-24 RX ADMIN — Medication 1000 UNITS: at 08:23

## 2025-05-24 RX ADMIN — SODIUM CHLORIDE, PRESERVATIVE FREE 10 ML: 5 INJECTION INTRAVENOUS at 08:29

## 2025-05-24 RX ADMIN — GABAPENTIN 300 MG: 300 CAPSULE ORAL at 20:40

## 2025-05-24 RX ADMIN — MIDODRINE HYDROCHLORIDE 5 MG: 5 TABLET ORAL at 18:38

## 2025-05-24 RX ADMIN — MIDODRINE HYDROCHLORIDE 5 MG: 5 TABLET ORAL at 08:25

## 2025-05-24 RX ADMIN — PANTOPRAZOLE SODIUM 40 MG: 40 TABLET, DELAYED RELEASE ORAL at 11:00

## 2025-05-24 RX ADMIN — LEVOTHYROXINE SODIUM 75 MCG: 0.07 TABLET ORAL at 06:21

## 2025-05-24 NOTE — PROGRESS NOTES
Cardiology Progress Note      Patient:  Alberto Gilliland  YOB: 1965  MRN: [id number]   Acct: [id number]  Admit Date:  4/30/2025  Primary Cardiologist:  Rivas    Chief Complaint: CV shock    Subjective (Events in last 24 hours):     5/24  Remains in ICU  Continues SR 70 - 80's  BP stable with MAP 70 - 80s; 110 - 120/  I/O: 2079/1700 (+379)  NET: -15.9L  K 3.0 - replacing - 60 po this AM  Creat 3.3 (2.9, 3.0, 3.1, 4.5, 3.3)  Bumex remains on hold per Nephro recs  Hgb 7.3 (8.1, 7.1)  Up in chair - no complaints - looks good  PT/OT  Leukocytosis continues with WBC 22 - afebrile; all lines out 5/23; BC x 2 pending      5/23  Remains in ICU  SR 70's  's - 130's with MAP 60 - 80s ? 5 Levo briefly OVN - now off  I/O: 720/1100   NET: -17L  Wt: 140 (167, 174, 189, 197)  Creat 3.0 today (3.1, 4.5) - bumex on hold per Nephrology request  Leukocytosis with WBC to 23.8 this AM  (13.4, 12, 14.9)  Afebrile  S/p mod barium swallow 5/22; started moist & minced diet; no coughing or obvious aspiration  Hgb 7.1 (8.3)  Plavix and ASA on hold  Feels well - appears well  No chest discomfort; breathing stable with HOB nearly flat      5/22  Continues in ICU  Stable OVN; MAP 80s  SR 80 - 90s  I/O: 2390/UOP 3300 + HD 2000  NET: -18.6L  Wt: 167 (174, 189)  Creat 3.1 (4.5) post HD - 3rd dose Bumex held 5/22  Nephrology asks that Bumex be held at this time - will reconsider 5/23  No evidence of on-going bleeding; Hgb stable at 8.1; Plavix and ASA on hold  For mod barium swallow today  Tired - no chest discomfort; breathing stable with HOB nearly flat      5/21  Bloody stools OVN with Hgb to 6.6; transfused with 3 units PRBC  Plavix, ASA and SQ Heparin on hold  CTA abdomen inconclusive  NM bleeding scan notes activity in the right abdomen, likely in the cecum and ascending colon,  consistent with active gastrointestinal bleeding.  Endoscopic evaluation and hemospray to attempt to stop bleeding preferred over IR to avoid  currently on hold 2/2 GIB    - Hgb 7.1 (7.5, 8.1/8.3)     Mod to Severe MR              - improved to mild to mod post TAVR     Non-obstructive CAD              - s/p cath 5/7 - no intervention warranted              - no anginal sx     Acute GIB  - several bloody stools with clots  OVN 5/8 with hypotension                           - no anticoagulation at time of bleed              - s/p MTP: 9 U PRBCs, 8 U FFP, 2 PLT              - s/p emergent colonoscopy and EGD  - no evidence of active bleeding - known AVM cecum - not bleeding. EGD performed with 1 cm submucosal lesion noted in duodenum - not bleeding              - s/p CTA abdomen and pelvis                          - no bleeding detected; no intervention   - 5/20 - hgb dropped from 7.9 to 7.1 w/ reported bloody stool this am    - blood stools OVN into 5/21 with Hgb to 6.6    - 3 units PRBC transfused with Hgb improved to 9.6  - CTA abdomen with no specific evidence of bleeding  - NM bleeding scan with suspicion for bleeding in cecum  - IR  [employer]/and embolization:  Active bleeding demonstrated from a distal branch of the ileocecal artery. Status post successful embolization of the site of active bleeding   - ASA and Plavix on hold   - no evidence of further bleeding; Hgb stable at 7.3 (8.1, 7.1,7.5, 8.1/8.3)    Respiratory failure   - extubated 5/20   - 4L NC - now RA    TC   - nephrology on case   - s/p HD on 5/19 - removed 4.4L; HD 5/21 with 2L removal   - creat 3.1 (4.5); continues to make urine    Hypernatremia    - resolved    Pneumonia               - leukocytosis - stable              - afebrile               - started on cefepime              - respiratory cultures negative              - pneumonia panel insignificant 5/11              - urine strep sent               - concern for aspiration pneumonia - 5/13   - mod barium swallow 5/22 - cleared for moist/minced diet   - marked leukocytosis this AM - no evidence over

## 2025-05-24 NOTE — PROGRESS NOTES
[facility]  STRZ ICU 4D  Occupational Therapy  Daily Note    Discharge Recommendations: Inpatient Rehabilitation  Patient is exhibiting above listed deficits and requiring continued therapy. Patient would benefit from continued therapy on an inpatient rehab unit. Patient is able to tolerate 3 hours of intensive therapy 5-6 days/week. Without continued therapies pt at risk for functional decline, increased falls, and readmission to hospital.     Equipment Recommendations: No continue to monitor progress    Time In: 1009  Time Out: 1102  Timed Code Treatment Minutes: 53 Minutes (+ additional 10 minutes during first session before breakfast arrived)  Minutes: 53      In from 0634-4944 to initiate session/start ADL, although breakfast then arrived and pt was requesting to eat first. OTR then returned at above listed time.    Date: 2025  Patient Name: Alberto Gilliland,   Gender: male      Room: [facility]003-A  MRN: [id number]  : 1965  (60 y.o.)  Referring Practitioner: Haseeb Espinoza DO  Diagnosis: Low output heart failure (HCC)  Additional Pertinent Hx: Pt presented due to fatigue and hypotension. PMHx severe AS, WISE, HLD, HTN, COPD, HFrEF. Notes over the last year or so has been having worsening SOB and exertional chest pain. Over the last few weeks has had a dry cough. Denies any chest pain. Denies any SOB. Noted to have DIONISIO and had colonoscopy done on  which showed showed large cecal angiectasia likely the cause of DIONISIO. Given AS there was concern for low cardiac output. CTS seen and recommended TAVR and PCI. Had right and left heart cath on  and was non-obstructive. Patient transferred to ICU for possible need for Immaculata guided diuresis and phenylephrine. : Patient was placed on ECMO  Patient on CRRT.  5/15: TAVR successful with no immediate complications. Patient intubated. CRRT stopped. Weaning ECMO.  : Patient off ECMO.  Extubated.  embolization branch of Ileocecal

## 2025-05-24 NOTE — PLAN OF CARE
Problem: Discharge Planning  Goal: Discharge to home or other facility with appropriate resources  Outcome: Progressing  Flowsheets (Taken 5/24/2025 1850)  Discharge to home or other facility with appropriate resources:   Identify barriers to discharge with patient and caregiver   Identify discharge learning needs (meds, wound care, etc)     Problem: ABCDS Injury Assessment  Goal: Absence of physical injury  Outcome: Progressing  Flowsheets (Taken 5/19/2025 1829)  Absence of Physical Injury: Implement safety measures based on patient assessment     Problem: Safety - Adult  Goal: Free from fall injury  Outcome: Progressing  Flowsheets (Taken 5/24/2025 1850)  Free From Fall Injury: Instruct family/caregiver on patient safety     Problem: Pain  Goal: Verbalizes/displays adequate comfort level or baseline comfort level  Outcome: Progressing  Flowsheets (Taken 5/24/2025 1850)  Verbalizes/displays adequate comfort level or baseline comfort level:   Encourage patient to monitor pain and request assistance   Assess pain using appropriate pain scale   Administer analgesics based on type and severity of pain and evaluate response     Problem: Respiratory - Adult  Goal: Achieves optimal ventilation and oxygenation  Outcome: Progressing  Flowsheets (Taken 5/24/2025 1850)  Achieves optimal ventilation and oxygenation:   Assess for changes in respiratory status   Position to facilitate oxygenation and minimize respiratory effort   Initiate smoking cessation protocol as indicated     Problem: Cardiovascular - Adult  Goal: Maintains optimal cardiac output and hemodynamic stability  Outcome: Progressing  Flowsheets (Taken 5/24/2025 1850)  Maintains optimal cardiac output and hemodynamic stability:   Monitor blood pressure and heart rate   Assess for signs of decreased cardiac output     Problem: Hematologic - Adult  Goal: Maintains hematologic stability  Outcome: Progressing  Flowsheets (Taken 5/24/2025 1850)  Maintains

## 2025-05-24 NOTE — PROCEDURES
PROCEDURE NOTE  Date: 5/24/2025   Name: Alberto Gilliland  YOB: 1965    Procedures  12 lead EKG completed. Results handed to Zakia BUTLER.

## 2025-05-24 NOTE — PLAN OF CARE
Problem: Discharge Planning  Goal: Discharge to home or other facility with appropriate resources  5/24/2025 0449 by Zakia Beckham RN  Outcome: Progressing  Flowsheets (Taken 5/23/2025 2000)  Discharge to home or other facility with appropriate resources:   Identify barriers to discharge with patient and caregiver   Arrange for needed discharge resources and transportation as appropriate   Identify discharge learning needs (meds, wound care, etc)   Arrange for interpreters to assist at discharge as needed   Refer to discharge planning if patient needs post-hospital services based on physician order or complex needs related to functional status, cognitive ability or social support system  5/23/2025 1529 by Vineet Alcantara RN  Outcome: Not Progressing     Problem: ABCDS Injury Assessment  Goal: Absence of physical injury  5/24/2025 0449 by Zakia Beckham RN  Outcome: Progressing  Flowsheets (Taken 5/19/2025 1829 by Cornelio Echols, RN)  Absence of Physical Injury: Implement safety measures based on patient assessment  5/23/2025 1529 by Vineet Alcantara RN  Outcome: Progressing     Problem: Safety - Adult  Goal: Free from fall injury  5/24/2025 0449 by Zakia Beckham RN  Outcome: Progressing  Flowsheets (Taken 5/19/2025 1829 by Cornelio Echols, RN)  Free From Fall Injury:   Instruct family/caregiver on patient safety   Based on caregiver fall risk screen, instruct family/caregiver to ask for assistance with transferring infant if caregiver noted to have fall risk factors  5/23/2025 1529 by Vineet Alcantara RN  Outcome: Progressing     Problem: Pain  Goal: Verbalizes/displays adequate comfort level or baseline comfort level  5/24/2025 0449 by Zakia Beckham RN  Outcome: Progressing  Flowsheets (Taken 5/23/2025 2000)  Verbalizes/displays adequate comfort level or baseline comfort level:   Encourage patient to monitor pain and request assistance   Assess pain using appropriate pain scale   Administer analgesics based on  type and severity of pain and evaluate response   Implement non-pharmacological measures as appropriate and evaluate response   Consider cultural and social influences on pain and pain management  5/23/2025 1529 by Vineet Alcantara RN  Outcome: Progressing     Problem: Respiratory - Adult  Goal: Achieves optimal ventilation and oxygenation  5/24/2025 0449 by Zakia Beckham RN  Outcome: Progressing  Flowsheets (Taken 5/22/2025 0800 by Juan Painter, RN)  Achieves optimal ventilation and oxygenation:   Assess for changes in respiratory status   Assess for changes in mentation and behavior   Position to facilitate oxygenation and minimize respiratory effort   Oxygen supplementation based on oxygen saturation or arterial blood gases   Encourage broncho-pulmonary hygiene including cough, deep breathe, incentive spirometry   Assess the need for suctioning and aspirate as needed   Assess and instruct to report shortness of breath or any respiratory difficulty   Respiratory therapy support as indicated  5/23/2025 1529 by Vineet Alcantara RN  Outcome: Progressing     Problem: Chronic Conditions and Co-morbidities  Goal: Patient's chronic conditions and co-morbidity symptoms are monitored and maintained or improved  5/24/2025 0449 by Zakia Beckham RN  Outcome: Progressing  Flowsheets (Taken 5/23/2025 2000)  Care Plan - Patient's Chronic Conditions and Co-Morbidity Symptoms are Monitored and Maintained or Improved:   Monitor and assess patient's chronic conditions and comorbid symptoms for stability, deterioration, or improvement   Collaborate with multidisciplinary team to address chronic and comorbid conditions and prevent exacerbation or deterioration   Update acute care plan with appropriate goals if chronic or comorbid symptoms are exacerbated and prevent overall improvement and discharge  5/23/2025 1529 by Vineet Alcantara RN  Outcome: Progressing     Problem: Nutrition Deficit:  Goal: Optimize nutritional

## 2025-05-24 NOTE — PROGRESS NOTES
I have seen and examined this patient along with the resident/PATRICA and multidisciplinary team, including nursing, respiratory therapy and pharmacy, when available.  I have personally reviewed radiology images and reports, laboratory results, and other services recommendations.  I have reviewed the attached note, I agree with the documented findings that have edited the plan of care.  I also the following additions/highlights/modifications:    Doing well today, mobilizing out of chair, excited to work with rehab.  Tolerating diet.  Labs reviewed, all within acceptable limits.  Bed status changed to stepdown, appreciate IPR recommendations.    Creatinine still elevated, but having good urine output.  No need for dialysis today.  Continue to monitor electrolytes and metabolic panel.  Other details noted below.    This patient is: High risk due to acute illness threatening life or bodily function      Electronically signed by:  Scottie Weller M.D.  Critical care medicine  5/24/2025 1:42 PM       CRITICAL CARE PROGRESS NOTE        Patient:  Alberto Gilliland    Unit/Bed:[facility]03/003-A  YOB: 1965  MRN: [id number]   PCP: Juan Christiansen DO  Date of Admission: 4/30/2025  Chief Complaint:-Hypotension and fatigue    Assessment and Plan:    Acute decompensated HFrEF: In the setting of severe AS with concomitant cardiogenic shock.  Presented fatigue/hypotension with BNP 16 426.  RODRÍGUEZ showed EF 30-35% with moderate global hypokinesis, severe AS with mean AVG 42 mmHg 5/6/2025.  Subsequent BMP elevated, SGC with high PA pressures.  Refractory to vasopressors and inotropes.  Placed on LAVA ECMO and bridge to TAVR 5/15.  Explanted on 5/17.  Cardiology following  Nephrology following, dialyzed 5/19 and 5/21   PAP improved, SGC removed 5/21   Severe aortic stenosis: S/p TAVR 5/15.  Previously had a mechanical valve AVR in 2007, transition to bioprosthetic due to warfarin intolerance from GIB in setting of small bowel  In the setting of acute decompensated heart failure. S/p IVF and MCS.  Hemorrhagic shock, resolved: Secondary to GIB.  Multiple bloody BMs with Hgb drop from 9.2 to 6.3 acutely.  Received 1 unit PRBC, and 2 massive transfusion protocols.  Colonoscopy with large cecal angiectasia likely contributing to DIONISIO 5/5.  Urgent colonoscopy with no acute bleeding seen, or AVMs appreciated, however bright red blood and some clots seen in splenic flexure and descending colon.  CTA A/P no active bleeding or extravasation.  Subsequent H&H stable.  Monitor Hb with daily CBC.  Transfuse PRBC if Hgb <7.0 or hemodynamically unstable.      INITIAL H AND P AND ICU COURSE:  The patient is a 60-year-old male with a past medical history of HFrEF, AS s/p AVR, CAD, HTN, HLD, hypothyroidism, DIONISIO, GERD, COPD and fatty liver disease, who presented for hypotension.  Per report, the patient's has had progressively worsening shortness of breath, exertional chest pain and as of the last few weeks prior to arrival dry cough.  He does have a history of DIONISIO and underwent a colonoscopy that showed a large cecal angiectasia on 5/5/2025.  Upon arrival given his history of severe aortic stenosis with prior mechanical valve followed by repeat bioprosthetic valve due to inability to tolerate anticoagulation there was concerns for low cardiac output.  A RODRÍGUEZ has showed an EF of 30-35%, moderate global hypokinesis with severe aortic stenosis mean AVG 42 mmHg, mild-moderate TR with mildly elevated RVSP 5/7/2025.  CTS evaluated and advised TAVR with PCI.  Patient underwent a right and left heart cath that was nonobstructive 5/7.  He was transferred to the ICU for SGC guided diuresis and vasopressor/inotropic support.  During that night he had a GI bleed with bloody stools requiring 2 massive transfusion protocols.  He underwent emergent colonoscopy that did not visualize any active bleeding.  CTA A/P was also negative for active bleeding or extravasation.  On

## 2025-05-24 NOTE — PROGRESS NOTES
[facility]   Nephrology progress note  5/24/2025, 9:36 AM      Pt Name:    Alberto Gilliland  MRN:     [id number]     YOB: 1965  Admit Date:    4/30/2025 12:24 PM    Chief Complaint: Nephrology following for acute kidney injury needing hemodialysis.    Subjective:  Patient seen and examined  Awake and alert no distress  Making decent urine output  No chest pain or shortness of breath    Objective:  24HR INTAKE/OUTPUT:    Intake/Output Summary (Last 24 hours) at 5/24/2025 0936  Last data filed at 5/24/2025 0630  Gross per 24 hour   Intake 480 ml   Output 1700 ml   Net -1220 ml      Admission weight: 87.5 kg (193 lb)  Wt Readings from Last 3 Encounters:   05/23/25 63.8 kg (140 lb 10.5 oz)   04/30/25 87.7 kg (193 lb 6.4 oz)   04/25/25 89.2 kg (196 lb 9.6 oz)        Vitals :   Vitals:    05/24/25 0530 05/24/25 0600 05/24/25 0630 05/24/25 0700   BP: (!) 109/52 127/71 117/60 114/62   Pulse: 75 79 83 87   Resp: 17 15 21 19   Temp:       TempSrc:       SpO2: 98% 97% 96% 97%   Weight:       Height:           Physical examination  General Appearance:  Well developed. No distress  Mouth/Throat:  Oral mucosa moist  Neck:  Supple, no JVD  Lungs:  Breath sounds: clear  Heart::  S1,S2 heard  Abdomen:  Soft, non - tender  Musculoskeletal:  Edema -no significant edema noted    Medications:  Infusion:    sodium chloride      sodium chloride      sodium chloride      sodium chloride       Meds:    pantoprazole  40 mg Oral BID AC    midodrine  5 mg Oral TID WC    metoprolol succinate  12.5 mg Oral Daily    ferrous sulfate  325 mg Oral BID WC    [Held by provider] bumetanide  2 mg IntraVENous Q8H    [Held by provider] clopidogrel  75 mg Oral Daily    [Held by provider] heparin (porcine)  5,000 Units SubCUTAneous 3 times per day    gabapentin  300 mg Oral BID    insulin lispro  0-4 Units SubCUTAneous 4x Daily AC & HS    senna  2 tablet Oral BID    magnesium oxide  400 mg Oral Daily    sodium chloride flush

## 2025-05-25 LAB
ABO GROUP BLD: NORMAL
ALBUMIN SERPL BCG-MCNC: 2.9 G/DL (ref 3.4–4.9)
ALP SERPL-CCNC: 581 U/L (ref 40–129)
ALT SERPL W/O P-5'-P-CCNC: 342 U/L (ref 10–50)
ANION GAP SERPL CALC-SCNC: 13 MEQ/L (ref 8–16)
AST SERPL-CCNC: 411 U/L (ref 10–50)
BASOPHILS ABSOLUTE: 0.1 THOU/MM3 (ref 0–0.1)
BASOPHILS NFR BLD AUTO: 0.3 %
BILIRUB SERPL-MCNC: 3.9 MG/DL (ref 0.3–1.2)
BUN SERPL-MCNC: 81 MG/DL (ref 8–23)
CALCIUM SERPL-MCNC: 8.9 MG/DL (ref 8.8–10.2)
CHLORIDE SERPL-SCNC: 102 MEQ/L (ref 98–111)
CO2 SERPL-SCNC: 26 MEQ/L (ref 22–29)
CREAT SERPL-MCNC: 2.9 MG/DL (ref 0.7–1.2)
DEPRECATED RDW RBC AUTO: 52.1 FL (ref 35–45)
EKG ATRIAL RATE: 76 BPM
EKG P AXIS: 50 DEGREES
EKG P-R INTERVAL: 154 MS
EKG Q-T INTERVAL: 372 MS
EKG QRS DURATION: 138 MS
EKG QTC CALCULATION (BAZETT): 418 MS
EKG R AXIS: 4 DEGREES
EKG T AXIS: 72 DEGREES
EKG VENTRICULAR RATE: 76 BPM
EOSINOPHIL NFR BLD AUTO: 3.3 %
EOSINOPHILS ABSOLUTE: 0.6 THOU/MM3 (ref 0–0.4)
ERYTHROCYTE [DISTWIDTH] IN BLOOD BY AUTOMATED COUNT: 18.2 % (ref 11.5–14.5)
GFR SERPL CREATININE-BSD FRML MDRD: 24 ML/MIN/1.73M2
GLUCOSE BLD STRIP.AUTO-MCNC: 130 MG/DL (ref 70–108)
GLUCOSE SERPL-MCNC: 119 MG/DL (ref 74–109)
HCT VFR BLD AUTO: 21.4 % (ref 42–52)
HCT VFR BLD AUTO: 22.9 % (ref 42–52)
HGB BLD-MCNC: 6.9 GM/DL (ref 14–18)
HGB BLD-MCNC: 7.2 GM/DL (ref 14–18)
IAT IGG-SP REAG SERPL QL: NORMAL
IMM GRANULOCYTES # BLD AUTO: 0.17 THOU/MM3 (ref 0–0.07)
IMM GRANULOCYTES NFR BLD AUTO: 0.9 %
INR PPP: 1.17 (ref 0.85–1.13)
LYMPHOCYTES ABSOLUTE: 0.8 THOU/MM3 (ref 1–4.8)
LYMPHOCYTES NFR BLD AUTO: 4.1 %
MAGNESIUM SERPL-MCNC: 1.8 MG/DL (ref 1.6–2.6)
MCH RBC QN AUTO: 27.8 PG (ref 26–33)
MCHC RBC AUTO-ENTMCNC: 31.4 GM/DL (ref 32.2–35.5)
MCV RBC AUTO: 88.4 FL (ref 80–94)
MONOCYTES ABSOLUTE: 0.8 THOU/MM3 (ref 0.4–1.3)
MONOCYTES NFR BLD AUTO: 4.2 %
NEUTROPHILS ABSOLUTE: 16.7 THOU/MM3 (ref 1.8–7.7)
NEUTROPHILS NFR BLD AUTO: 87.2 %
NRBC BLD AUTO-RTO: 0 /100 WBC
PLATELET # BLD AUTO: 270 THOU/MM3 (ref 130–400)
PMV BLD AUTO: 12.2 FL (ref 9.4–12.4)
POTASSIUM SERPL-SCNC: 3.3 MEQ/L (ref 3.5–5.2)
POTASSIUM SERPL-SCNC: 3.9 MEQ/L (ref 3.5–5.2)
PROT SERPL-MCNC: 5.4 G/DL (ref 6.4–8.3)
RBC # BLD AUTO: 2.59 MILL/MM3 (ref 4.7–6.1)
RH BLD: NORMAL
SODIUM SERPL-SCNC: 141 MEQ/L (ref 135–145)
WBC # BLD AUTO: 19.1 THOU/MM3 (ref 4.8–10.8)

## 2025-05-25 PROCEDURE — P9016 RBC LEUKOCYTES REDUCED: HCPCS

## 2025-05-25 PROCEDURE — 6370000000 HC RX 637 (ALT 250 FOR IP): Performed by: NURSE PRACTITIONER

## 2025-05-25 PROCEDURE — 82948 REAGENT STRIP/BLOOD GLUCOSE: CPT

## 2025-05-25 PROCEDURE — 6370000000 HC RX 637 (ALT 250 FOR IP): Performed by: INTERNAL MEDICINE

## 2025-05-25 PROCEDURE — 85610 PROTHROMBIN TIME: CPT

## 2025-05-25 PROCEDURE — 86900 BLOOD TYPING SEROLOGIC ABO: CPT

## 2025-05-25 PROCEDURE — 2500000003 HC RX 250 WO HCPCS: Performed by: INTERNAL MEDICINE

## 2025-05-25 PROCEDURE — 2060000000 HC ICU INTERMEDIATE R&B

## 2025-05-25 PROCEDURE — 94761 N-INVAS EAR/PLS OXIMETRY MLT: CPT

## 2025-05-25 PROCEDURE — 85018 HEMOGLOBIN: CPT

## 2025-05-25 PROCEDURE — 6370000000 HC RX 637 (ALT 250 FOR IP)

## 2025-05-25 PROCEDURE — 80053 COMPREHEN METABOLIC PANEL: CPT

## 2025-05-25 PROCEDURE — 93005 ELECTROCARDIOGRAM TRACING: CPT | Performed by: INTERNAL MEDICINE

## 2025-05-25 PROCEDURE — 99232 SBSQ HOSP IP/OBS MODERATE 35: CPT | Performed by: INTERNAL MEDICINE

## 2025-05-25 PROCEDURE — 86923 COMPATIBILITY TEST ELECTRIC: CPT

## 2025-05-25 PROCEDURE — 86901 BLOOD TYPING SEROLOGIC RH(D): CPT

## 2025-05-25 PROCEDURE — 99232 SBSQ HOSP IP/OBS MODERATE 35: CPT | Performed by: NURSE PRACTITIONER

## 2025-05-25 PROCEDURE — 93010 ELECTROCARDIOGRAM REPORT: CPT | Performed by: INTERNAL MEDICINE

## 2025-05-25 PROCEDURE — 84132 ASSAY OF SERUM POTASSIUM: CPT

## 2025-05-25 PROCEDURE — 36430 TRANSFUSION BLD/BLD COMPNT: CPT

## 2025-05-25 PROCEDURE — 94640 AIRWAY INHALATION TREATMENT: CPT

## 2025-05-25 PROCEDURE — 83735 ASSAY OF MAGNESIUM: CPT

## 2025-05-25 PROCEDURE — 86885 COOMBS TEST INDIRECT QUAL: CPT

## 2025-05-25 PROCEDURE — 85025 COMPLETE CBC W/AUTO DIFF WBC: CPT

## 2025-05-25 PROCEDURE — 85014 HEMATOCRIT: CPT

## 2025-05-25 RX ORDER — POTASSIUM CHLORIDE 1500 MG/1
20 TABLET, EXTENDED RELEASE ORAL 2 TIMES DAILY WITH MEALS
Status: DISCONTINUED | OUTPATIENT
Start: 2025-05-25 | End: 2025-05-27

## 2025-05-25 RX ORDER — SODIUM CHLORIDE 9 MG/ML
INJECTION, SOLUTION INTRAVENOUS PRN
Status: DISCONTINUED | OUTPATIENT
Start: 2025-05-25 | End: 2025-05-28 | Stop reason: HOSPADM

## 2025-05-25 RX ADMIN — SODIUM CHLORIDE, PRESERVATIVE FREE 10 ML: 5 INJECTION INTRAVENOUS at 20:58

## 2025-05-25 RX ADMIN — POTASSIUM CHLORIDE 20 MEQ: 1500 TABLET, EXTENDED RELEASE ORAL at 18:00

## 2025-05-25 RX ADMIN — Medication 325 MG: at 07:41

## 2025-05-25 RX ADMIN — MIDODRINE HYDROCHLORIDE 5 MG: 5 TABLET ORAL at 07:42

## 2025-05-25 RX ADMIN — MIDODRINE HYDROCHLORIDE 5 MG: 5 TABLET ORAL at 11:57

## 2025-05-25 RX ADMIN — POTASSIUM CHLORIDE 20 MEQ: 1500 TABLET, EXTENDED RELEASE ORAL at 11:59

## 2025-05-25 RX ADMIN — SODIUM CHLORIDE, PRESERVATIVE FREE 10 ML: 5 INJECTION INTRAVENOUS at 09:30

## 2025-05-25 RX ADMIN — GABAPENTIN 300 MG: 300 CAPSULE ORAL at 07:43

## 2025-05-25 RX ADMIN — MIDODRINE HYDROCHLORIDE 5 MG: 5 TABLET ORAL at 18:00

## 2025-05-25 RX ADMIN — ESCITALOPRAM OXALATE 10 MG: 10 TABLET ORAL at 07:43

## 2025-05-25 RX ADMIN — FOLIC ACID 1000 MCG: 1 TABLET ORAL at 07:41

## 2025-05-25 RX ADMIN — Medication 325 MG: at 18:00

## 2025-05-25 RX ADMIN — BISACODYL 5 MG: 5 TABLET, COATED ORAL at 07:42

## 2025-05-25 RX ADMIN — Medication 1000 UNITS: at 07:41

## 2025-05-25 RX ADMIN — POTASSIUM BICARBONATE 40 MEQ: 782 TABLET, EFFERVESCENT ORAL at 11:59

## 2025-05-25 RX ADMIN — Medication 400 MG: at 07:41

## 2025-05-25 RX ADMIN — OXYCODONE HYDROCHLORIDE AND ACETAMINOPHEN 500 MG: 500 TABLET ORAL at 07:42

## 2025-05-25 RX ADMIN — SENNOSIDES 17.2 MG: 8.6 TABLET, FILM COATED ORAL at 07:51

## 2025-05-25 RX ADMIN — LEVOTHYROXINE SODIUM 75 MCG: 0.07 TABLET ORAL at 05:56

## 2025-05-25 RX ADMIN — METOPROLOL SUCCINATE 12.5 MG: 25 TABLET, EXTENDED RELEASE ORAL at 07:42

## 2025-05-25 RX ADMIN — PANTOPRAZOLE SODIUM 40 MG: 40 TABLET, DELAYED RELEASE ORAL at 18:00

## 2025-05-25 RX ADMIN — GABAPENTIN 300 MG: 300 CAPSULE ORAL at 20:58

## 2025-05-25 RX ADMIN — POTASSIUM BICARBONATE 40 MEQ: 782 TABLET, EFFERVESCENT ORAL at 05:56

## 2025-05-25 RX ADMIN — PANTOPRAZOLE SODIUM 40 MG: 40 TABLET, DELAYED RELEASE ORAL at 05:56

## 2025-05-25 RX ADMIN — TIOTROPIUM BROMIDE AND OLODATEROL 2 PUFF: 3.124; 2.736 SPRAY, METERED RESPIRATORY (INHALATION) at 09:06

## 2025-05-25 NOTE — PLAN OF CARE
Problem: Discharge Planning  Goal: Discharge to home or other facility with appropriate resources  Outcome: Progressing  Flowsheets (Taken 5/25/2025 1937)  Discharge to home or other facility with appropriate resources:   Identify barriers to discharge with patient and caregiver   Identify discharge learning needs (meds, wound care, etc)     Problem: ABCDS Injury Assessment  Goal: Absence of physical injury  Outcome: Progressing  Flowsheets (Taken 5/25/2025 1937)  Absence of Physical Injury: Implement safety measures based on patient assessment     Problem: Safety - Adult  Goal: Free from fall injury  Outcome: Progressing  Flowsheets (Taken 5/25/2025 1937)  Free From Fall Injury: Instruct family/caregiver on patient safety     Problem: Pain  Goal: Verbalizes/displays adequate comfort level or baseline comfort level  Outcome: Progressing  Flowsheets (Taken 5/25/2025 1937)  Verbalizes/displays adequate comfort level or baseline comfort level:   Encourage patient to monitor pain and request assistance   Administer analgesics based on type and severity of pain and evaluate response     Problem: Respiratory - Adult  Goal: Achieves optimal ventilation and oxygenation  5/25/2025 1937 by Cornelio Echols, RN  Outcome: Progressing  Flowsheets (Taken 5/25/2025 1937)  Achieves optimal ventilation and oxygenation:   Assess for changes in respiratory status   Assess for changes in mentation and behavior     Problem: Respiratory - Adult  Goal: Lung sounds clear or within normal limits for patient  5/25/2025 0908 by Marian Wooten RCP  Outcome: Progressing     Problem: Cardiovascular - Adult  Goal: Maintains optimal cardiac output and hemodynamic stability  Outcome: Progressing  Flowsheets (Taken 5/25/2025 1937)  Maintains optimal cardiac output and hemodynamic stability:   Monitor blood pressure and heart rate   Assess for signs of decreased cardiac output     Problem: Hematologic - Adult  Goal: Maintains hematologic

## 2025-05-25 NOTE — PLAN OF CARE
Problem: Discharge Planning  Goal: Discharge to home or other facility with appropriate resources  5/25/2025 0014 by Lois Flores RN  Flowsheets (Taken 5/24/2025 2024)  Discharge to home or other facility with appropriate resources:   Identify barriers to discharge with patient and caregiver   Arrange for needed discharge resources and transportation as appropriate   Identify discharge learning needs (meds, wound care, etc)  5/24/2025 1850 by Cornelio Echols RN  Outcome: Progressing  Flowsheets (Taken 5/24/2025 1850)  Discharge to home or other facility with appropriate resources:   Identify barriers to discharge with patient and caregiver   Identify discharge learning needs (meds, wound care, etc)     Problem: ABCDS Injury Assessment  Goal: Absence of physical injury  5/25/2025 0014 by Lois Flores RN  Flowsheets (Taken 5/19/2025 1829 by Cornelio Echols RN)  Absence of Physical Injury: Implement safety measures based on patient assessment  5/24/2025 1850 by Cornelio Echols RN  Outcome: Progressing  Flowsheets (Taken 5/19/2025 1829)  Absence of Physical Injury: Implement safety measures based on patient assessment     Problem: Safety - Adult  Goal: Free from fall injury  5/25/2025 0014 by Lois Flores RN  Flowsheets (Taken 5/24/2025 1850 by Cornelio Echols RN)  Free From Fall Injury: Instruct family/caregiver on patient safety  5/24/2025 1850 by Cornelio Echols RN  Outcome: Progressing  Flowsheets (Taken 5/24/2025 1850)  Free From Fall Injury: Instruct family/caregiver on patient safety     Problem: Pain  Goal: Verbalizes/displays adequate comfort level or baseline comfort level  5/25/2025 0014 by Lois Flores RN  Flowsheets (Taken 5/24/2025 1850 by Cornelio Echols RN)  Verbalizes/displays adequate comfort level or baseline comfort level:   Encourage patient to monitor pain and request assistance   Assess pain using appropriate pain scale   Administer analgesics based on type and severity of

## 2025-05-25 NOTE — PROGRESS NOTES
Cardiology Progress Note      Patient:  Alberto Gilliland  YOB: 1965  MRN: [id number]   Acct: [id number]  Admit Date:  4/30/2025  Primary Cardiologist:  Rivas    Chief Complaint: CV shock    Subjective (Events in last 24 hours):     5/25  SD status as of 5/24 - start precert for IPR  SR 70 - 80s, MAP 60s -80s  Creat 2.9 (3.3, 2.9. . . . 4.5)  GFR 24 (14)  K 3.3 - replace  Leukocytosis improving 19 (22.1,  . . 23.8); afebrile  Hgb 7.2 (7.6, 7.3, 8.1) - remains stable despite multiple lab draws; no evidence GI bleed  Had BM - black - no arsalan blood - no evidence of bleeding  Feeling well - walked in jimenez with therapies yesterday - tolerated well  No chest discomfort - states breathing easily - minimal MA with ambulation  No accurate I/O - reports voiding without issue    5/24  Remains in ICU  Continues SR 70 - 80's  BP stable with MAP 70 - 80s; 110 - 120/  I/O: 2079/1700 (+379)  NET: -15.9L  K 3.0 - replacing - 60 po this AM  Creat 3.3 (2.9, 3.0, 3.1, 4.5, 3.3)  Bumex remains on hold per Nephro recs  Hgb 7.3 (8.1, 7.1)  Up in chair - no complaints - looks good  PT/OT  Leukocytosis continues with WBC 22 - afebrile; all lines out 5/23; BC x 2 pending      5/23  Remains in ICU  SR 70's  's - 130's with MAP 60 - 80s ? 5 Levo briefly OVN - now off  I/O: 720/1100   NET: -17L  Wt: 140 (167, 174, 189, 197)  Creat 3.0 today (3.1, 4.5) - bumex on hold per Nephrology request  Leukocytosis with WBC to 23.8 this AM  (13.4, 12, 14.9)  Afebrile  S/p mod barium swallow 5/22; started moist & minced diet; no coughing or obvious aspiration  Hgb 7.1 (8.3)  Plavix and ASA on hold  Feels well - appears well  No chest discomfort; breathing stable with HOB nearly flat      5/22  Continues in ICU  Stable OVN; MAP 80s  SR 80 - 90s  I/O: 2390/UOP 3300 + HD 2000  NET: -18.6L  Wt: 167 (174, 189)  Creat 3.1 (4.5) post HD - 3rd dose Bumex held 5/22  Nephrology asks that Bumex be held at this time - will reconsider 5/23  No

## 2025-05-25 NOTE — PLAN OF CARE
Problem: Respiratory - Adult  Goal: Achieves optimal ventilation and oxygenation  5/25/2025 0908 by Marian Wooten RCP  Outcome: Progressing  5/25/2025 0014 by Lois Flores RN  Flowsheets (Taken 5/24/2025 2024)  Achieves optimal ventilation and oxygenation:   Assess for changes in respiratory status   Assess for changes in mentation and behavior   Encourage broncho-pulmonary hygiene including cough, deep breathe, incentive spirometry   Assess the need for suctioning and aspirate as needed   Assess and instruct to report shortness of breath or any respiratory difficulty  Goal: Lung sounds clear or within normal limits for patient  Outcome: Progressing   Patient lung sounds are considered normal for their current lung condition. No signs of distress noted. Current treatment regimen appropriate

## 2025-05-25 NOTE — PROGRESS NOTES
I have seen and examined this patient along with the resident/PATRICA and multidisciplinary team, including nursing, respiratory therapy and pharmacy, when available.  I have personally reviewed radiology images and reports, laboratory results, and other services recommendations.  I have reviewed the attached note, I agree with the documented findings that have edited the plan of care.  I also the following additions/highlights/modifications:    Continues to be in good spirits today.  Hemoglobin low this morning, ordered 1 unit packed red blood cells.  Otherwise hemodynamics acceptable.  Hypokalemia persistent, additional replacement ordered.  Following up with afternoon chemistry.    Encouraging oral intake, mobilization is much as possible.  Continuing plans for IPR when available.  Other details noted below.    Electronically signed by:  Scottie Weller M.D.  Critical care medicine  5/25/2025 12:36 PM        CRITICAL CARE PROGRESS NOTE        Patient:  Alberto Gilliland    Unit/Bed:4D-03/003-A  YOB: 1965  MRN: [id number]   PCP: Juan Christiansen DO  Date of Admission: 4/30/2025  Chief Complaint:-Hypotension and fatigue    Assessment and Plan:    Acute decompensated HFrEF: In the setting of severe AS with concomitant cardiogenic shock.  Presented fatigue/hypotension with BNP 16 426.  RODRÍGUEZ showed EF 30-35% with moderate global hypokinesis, severe AS with mean AVG 42 mmHg 5/6/2025.  Subsequent BMP elevated, SGC with high PA pressures.  Refractory to vasopressors and inotropes.  Placed on LAVA ECMO and bridge to TAVR 5/15.  Explanted on 5/17.  Cardiology following  Nephrology following, dialyzed 5/19 and 5/21   PAP improved, SGC removed 5/21   Severe aortic stenosis: S/p TAVR 5/15.  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  Underwent TAVR 5/15 with  bumetanide  2 mg IntraVENous Q8H    [Held by provider] clopidogrel  75 mg Oral Daily    [Held by provider] heparin (porcine)  5,000 Units SubCUTAneous 3 times per day    gabapentin  300 mg Oral BID    insulin lispro  0-4 Units SubCUTAneous 4x Daily AC & HS    senna  2 tablet Oral BID    magnesium oxide  400 mg Oral Daily    sodium chloride flush  5-40 mL IntraVENous 2 times per day    [Held by provider] aspirin  81 mg Oral Daily    [Held by provider] atorvastatin  20 mg Oral Daily    Vitamin D  1,000 Units Oral Daily    escitalopram  10 mg Oral Daily    folic acid  1,000 mcg Oral Daily    levothyroxine  75 mcg Oral Daily    tiotropium-olodaterol  2 puff Inhalation Daily RT    vitamin C  500 mg Oral Daily     Continuous Infusions:   sodium chloride      sodium chloride      sodium chloride      sodium chloride      sodium chloride         PHYSICAL EXAMINATION:  T: 98.4.  P: 78. RR: 15. B/P: 113/7.  O2 Sat: 97% on room air.  I/O: -350   Body mass index is 22.03 kg/m².   GCS:   15    General:   Acutely on chronically ill-appearing, improved, interactive and cooperative  HEENT:  normocephalic and atraumatic.  No scleral icterus. PERR.  Dry oral mucosa  Neck: supple.  No Thyromegaly.   Lungs: clear to auscultation.  No retractions   Cardiac: RRR.  No JVD.   Abdomen: soft.  Nontender.   Extremities:  No clubbing, cyanosis, or edema x 4.    Vasculature: capillary refill < 3 seconds. Palpable dorsalis pedis pulses.  Skin:  warm and dry.  Psych:  Alert and oriented x3.   Lymph:  No supraclavicular adenopathy.  Neurologic:  No focal deficit. No seizures.    Data: (All radiographs, tracings, PFTs, and imaging are personally viewed and interpreted unless otherwise noted).   Na: 141, K: 3.3 (replaced), Cl: 102, HCO3: 26, BUN: 81, Cr: 2.9, Glucose: 119  Albumin 2.9, alk phos 581, , , total bilirubin 3.9, total protein 5.4  WBC 19.1, Hb 7.2, platelets 270         Seen with multidisciplinary ICU team No.  Meets

## 2025-05-25 NOTE — PROGRESS NOTES
[facility]   Nephrology progress note  5/25/2025, 9:34 AM      Pt Name:    Alberto Gilliland  MRN:     [id number]     YOB: 1965  Admit Date:    4/30/2025 12:24 PM    Chief Complaint: Nephrology following for acute kidney injury needing hemodialysis.    Subjective:  Patient seen and examined  Awake and alert no distress  Making decent urine output  No chest pain or shortness of breath    Objective:  24HR INTAKE/OUTPUT:    Intake/Output Summary (Last 24 hours) at 5/25/2025 0934  Last data filed at 5/25/2025 0336  Gross per 24 hour   Intake --   Output 350 ml   Net -350 ml      Admission weight: 87.5 kg (193 lb)  Wt Readings from Last 3 Encounters:   05/23/25 63.8 kg (140 lb 10.5 oz)   04/30/25 87.7 kg (193 lb 6.4 oz)   04/25/25 89.2 kg (196 lb 9.6 oz)        Vitals :   Vitals:    05/25/25 0600 05/25/25 0732 05/25/25 0800 05/25/25 0907   BP: 113/76 113/76 (!) 127/48    Pulse: 78 80 74    Resp: 15 16 17 18   Temp:  97.8 °F (36.6 °C)     TempSrc:       SpO2:  96%  97%   Weight:       Height:           Physical examination  General Appearance:  Well developed. No distress  Mouth/Throat:  Oral mucosa moist  Neck:  Supple, no JVD  Lungs:  Breath sounds: clear  Heart::  S1,S2 heard  Abdomen:  Soft, non - tender  Musculoskeletal:  Edema -no significant edema noted    Medications:  Infusion:    sodium chloride      sodium chloride      sodium chloride      sodium chloride      sodium chloride       Meds:    potassium bicarb-citric acid  40 mEq Oral Once    pantoprazole  40 mg Oral BID AC    midodrine  5 mg Oral TID WC    metoprolol succinate  12.5 mg Oral Daily    ferrous sulfate  325 mg Oral BID WC    [Held by provider] bumetanide  2 mg IntraVENous Q8H    [Held by provider] clopidogrel  75 mg Oral Daily    [Held by provider] heparin (porcine)  5,000 Units SubCUTAneous 3 times per day    gabapentin  300 mg Oral BID    insulin lispro  0-4 Units SubCUTAneous 4x Daily AC & HS    senna  2 tablet

## 2025-05-25 NOTE — PROCEDURES
PROCEDURE NOTE  Date: 5/25/2025   Name: Alberto Gilliland  YOB: 1965    Procedures        EKG was completed and handed to RN

## 2025-05-26 LAB
ALBUMIN SERPL BCG-MCNC: 3 G/DL (ref 3.4–4.9)
ALP SERPL-CCNC: 567 U/L (ref 40–129)
ALT SERPL W/O P-5'-P-CCNC: 274 U/L (ref 10–50)
ANION GAP SERPL CALC-SCNC: 12 MEQ/L (ref 8–16)
AST SERPL-CCNC: 244 U/L (ref 10–50)
BASOPHILS ABSOLUTE: 0.1 THOU/MM3 (ref 0–0.1)
BASOPHILS NFR BLD AUTO: 0.4 %
BILIRUB SERPL-MCNC: 2.8 MG/DL (ref 0.3–1.2)
BUN SERPL-MCNC: 72 MG/DL (ref 8–23)
CALCIUM SERPL-MCNC: 8.9 MG/DL (ref 8.8–10.2)
CHLORIDE SERPL-SCNC: 105 MEQ/L (ref 98–111)
CO2 SERPL-SCNC: 26 MEQ/L (ref 22–29)
CREAT SERPL-MCNC: 1.9 MG/DL (ref 0.7–1.2)
DEPRECATED RDW RBC AUTO: 48.3 FL (ref 35–45)
EKG ATRIAL RATE: 75 BPM
EKG P AXIS: 59 DEGREES
EKG P-R INTERVAL: 198 MS
EKG Q-T INTERVAL: 412 MS
EKG QRS DURATION: 126 MS
EKG QTC CALCULATION (BAZETT): 460 MS
EKG R AXIS: -34 DEGREES
EKG T AXIS: 64 DEGREES
EKG VENTRICULAR RATE: 75 BPM
EOSINOPHIL NFR BLD AUTO: 3.7 %
EOSINOPHILS ABSOLUTE: 0.5 THOU/MM3 (ref 0–0.4)
ERYTHROCYTE [DISTWIDTH] IN BLOOD BY AUTOMATED COUNT: 17.5 % (ref 11.5–14.5)
GFR SERPL CREATININE-BSD FRML MDRD: 39 ML/MIN/1.73M2
GLUCOSE BLD STRIP.AUTO-MCNC: 105 MG/DL (ref 70–108)
GLUCOSE SERPL-MCNC: 90 MG/DL (ref 74–109)
HCT VFR BLD AUTO: 24.6 % (ref 42–52)
HGB BLD-MCNC: 8.1 GM/DL (ref 14–18)
IMM GRANULOCYTES # BLD AUTO: 0.1 THOU/MM3 (ref 0–0.07)
IMM GRANULOCYTES NFR BLD AUTO: 0.7 %
LYMPHOCYTES ABSOLUTE: 0.8 THOU/MM3 (ref 1–4.8)
LYMPHOCYTES NFR BLD AUTO: 6 %
MCH RBC QN AUTO: 28.2 PG (ref 26–33)
MCHC RBC AUTO-ENTMCNC: 32.9 GM/DL (ref 32.2–35.5)
MCV RBC AUTO: 85.7 FL (ref 80–94)
MONOCYTES ABSOLUTE: 0.7 THOU/MM3 (ref 0.4–1.3)
MONOCYTES NFR BLD AUTO: 5.4 %
NEUTROPHILS ABSOLUTE: 11.2 THOU/MM3 (ref 1.8–7.7)
NEUTROPHILS NFR BLD AUTO: 83.8 %
NRBC BLD AUTO-RTO: 0 /100 WBC
PLATELET # BLD AUTO: 265 THOU/MM3 (ref 130–400)
PMV BLD AUTO: 12.4 FL (ref 9.4–12.4)
POTASSIUM SERPL-SCNC: 3.7 MEQ/L (ref 3.5–5.2)
PROT SERPL-MCNC: 5.4 G/DL (ref 6.4–8.3)
RBC # BLD AUTO: 2.87 MILL/MM3 (ref 4.7–6.1)
SODIUM SERPL-SCNC: 143 MEQ/L (ref 135–145)
WBC # BLD AUTO: 13.4 THOU/MM3 (ref 4.8–10.8)

## 2025-05-26 PROCEDURE — 94640 AIRWAY INHALATION TREATMENT: CPT

## 2025-05-26 PROCEDURE — 94761 N-INVAS EAR/PLS OXIMETRY MLT: CPT

## 2025-05-26 PROCEDURE — 6370000000 HC RX 637 (ALT 250 FOR IP): Performed by: NURSE PRACTITIONER

## 2025-05-26 PROCEDURE — 80053 COMPREHEN METABOLIC PANEL: CPT

## 2025-05-26 PROCEDURE — 99232 SBSQ HOSP IP/OBS MODERATE 35: CPT | Performed by: NURSE PRACTITIONER

## 2025-05-26 PROCEDURE — 82948 REAGENT STRIP/BLOOD GLUCOSE: CPT

## 2025-05-26 PROCEDURE — 85025 COMPLETE CBC W/AUTO DIFF WBC: CPT

## 2025-05-26 PROCEDURE — 99232 SBSQ HOSP IP/OBS MODERATE 35: CPT | Performed by: INTERNAL MEDICINE

## 2025-05-26 PROCEDURE — 93005 ELECTROCARDIOGRAM TRACING: CPT | Performed by: INTERNAL MEDICINE

## 2025-05-26 PROCEDURE — 99222 1ST HOSP IP/OBS MODERATE 55: CPT | Performed by: NURSE PRACTITIONER

## 2025-05-26 PROCEDURE — 6370000000 HC RX 637 (ALT 250 FOR IP): Performed by: INTERNAL MEDICINE

## 2025-05-26 PROCEDURE — 2500000003 HC RX 250 WO HCPCS: Performed by: INTERNAL MEDICINE

## 2025-05-26 PROCEDURE — 6370000000 HC RX 637 (ALT 250 FOR IP)

## 2025-05-26 PROCEDURE — 2060000000 HC ICU INTERMEDIATE R&B

## 2025-05-26 RX ORDER — POTASSIUM CHLORIDE 1500 MG/1
40 TABLET, EXTENDED RELEASE ORAL ONCE
Status: COMPLETED | OUTPATIENT
Start: 2025-05-26 | End: 2025-05-26

## 2025-05-26 RX ADMIN — ESCITALOPRAM OXALATE 10 MG: 10 TABLET ORAL at 07:52

## 2025-05-26 RX ADMIN — POTASSIUM CHLORIDE 20 MEQ: 1500 TABLET, EXTENDED RELEASE ORAL at 17:25

## 2025-05-26 RX ADMIN — POTASSIUM CHLORIDE 20 MEQ: 1500 TABLET, EXTENDED RELEASE ORAL at 07:53

## 2025-05-26 RX ADMIN — PANTOPRAZOLE SODIUM 40 MG: 40 TABLET, DELAYED RELEASE ORAL at 06:02

## 2025-05-26 RX ADMIN — Medication 325 MG: at 17:25

## 2025-05-26 RX ADMIN — Medication 400 MG: at 07:52

## 2025-05-26 RX ADMIN — POTASSIUM CHLORIDE 40 MEQ: 1500 TABLET, EXTENDED RELEASE ORAL at 07:53

## 2025-05-26 RX ADMIN — Medication 325 MG: at 07:52

## 2025-05-26 RX ADMIN — METOPROLOL SUCCINATE 12.5 MG: 25 TABLET, EXTENDED RELEASE ORAL at 07:55

## 2025-05-26 RX ADMIN — GABAPENTIN 300 MG: 300 CAPSULE ORAL at 21:25

## 2025-05-26 RX ADMIN — SODIUM CHLORIDE, PRESERVATIVE FREE 10 ML: 5 INJECTION INTRAVENOUS at 21:25

## 2025-05-26 RX ADMIN — TIOTROPIUM BROMIDE AND OLODATEROL 2 PUFF: 3.124; 2.736 SPRAY, METERED RESPIRATORY (INHALATION) at 09:57

## 2025-05-26 RX ADMIN — MIDODRINE HYDROCHLORIDE 5 MG: 5 TABLET ORAL at 07:52

## 2025-05-26 RX ADMIN — SODIUM CHLORIDE, PRESERVATIVE FREE 10 ML: 5 INJECTION INTRAVENOUS at 08:00

## 2025-05-26 RX ADMIN — OXYCODONE HYDROCHLORIDE AND ACETAMINOPHEN 500 MG: 500 TABLET ORAL at 07:52

## 2025-05-26 RX ADMIN — GABAPENTIN 300 MG: 300 CAPSULE ORAL at 07:52

## 2025-05-26 RX ADMIN — PANTOPRAZOLE SODIUM 40 MG: 40 TABLET, DELAYED RELEASE ORAL at 17:25

## 2025-05-26 RX ADMIN — FOLIC ACID 1000 MCG: 1 TABLET ORAL at 07:53

## 2025-05-26 RX ADMIN — LEVOTHYROXINE SODIUM 75 MCG: 0.07 TABLET ORAL at 06:02

## 2025-05-26 RX ADMIN — Medication 1000 UNITS: at 07:52

## 2025-05-26 ASSESSMENT — PAIN SCALES - GENERAL: PAINLEVEL_OUTOF10: 0

## 2025-05-26 NOTE — PLAN OF CARE
Problem: Discharge Planning  Goal: Discharge to home or other facility with appropriate resources  5/26/2025 0135 by Lois Flores RN  Flowsheets (Taken 5/25/2025 2058)  Discharge to home or other facility with appropriate resources:   Identify barriers to discharge with patient and caregiver   Arrange for needed discharge resources and transportation as appropriate   Identify discharge learning needs (meds, wound care, etc)  5/25/2025 1937 by Cornelio Echols RN  Outcome: Progressing  Flowsheets (Taken 5/25/2025 1937)  Discharge to home or other facility with appropriate resources:   Identify barriers to discharge with patient and caregiver   Identify discharge learning needs (meds, wound care, etc)     Problem: ABCDS Injury Assessment  Goal: Absence of physical injury  5/26/2025 0135 by Lois Flores RN  Flowsheets (Taken 5/25/2025 1937 by Cornelio Echols RN)  Absence of Physical Injury: Implement safety measures based on patient assessment  5/25/2025 1937 by Cornelio Echols RN  Outcome: Progressing  Flowsheets (Taken 5/25/2025 1937)  Absence of Physical Injury: Implement safety measures based on patient assessment     Problem: Safety - Adult  Goal: Free from fall injury  5/26/2025 0135 by Lois Flores RN  Flowsheets (Taken 5/26/2025 0135)  Free From Fall Injury:   Instruct family/caregiver on patient safety   Based on caregiver fall risk screen, instruct family/caregiver to ask for assistance with transferring infant if caregiver noted to have fall risk factors  5/25/2025 1937 by Cornelio Echols RN  Outcome: Progressing  Flowsheets (Taken 5/25/2025 1937)  Free From Fall Injury: Instruct family/caregiver on patient safety     Problem: Pain  Goal: Verbalizes/displays adequate comfort level or baseline comfort level  5/26/2025 0135 by Lois Flores RN  Flowsheets (Taken 5/26/2025 0135)  Verbalizes/displays adequate comfort level or baseline comfort level:   Encourage patient to monitor pain and

## 2025-05-26 NOTE — PROGRESS NOTES
[facility]   Nephrology progress note  5/26/2025, 10:21 AM      Pt Name:    Alberto Gilliland  MRN:     [id number]     YOB: 1965  Admit Date:    4/30/2025 12:24 PM    Chief Complaint: Nephrology following for acute kidney injury needing hemodialysis.    Subjective:  Patient seen and examined  Awake and alert no distress  Making decent urine output  No chest pain or shortness of breath    Objective:  24HR INTAKE/OUTPUT:  No intake or output data in the 24 hours ending 05/26/25 1021     Admission weight: 87.5 kg (193 lb)  Wt Readings from Last 3 Encounters:   05/23/25 63.8 kg (140 lb 10.5 oz)   04/30/25 87.7 kg (193 lb 6.4 oz)   04/25/25 89.2 kg (196 lb 9.6 oz)        Vitals :   Vitals:    05/26/25 0700 05/26/25 0753 05/26/25 0800 05/26/25 0957   BP:  (!) 128/56 132/64    Pulse: 72 71 73 74   Resp: 15  16 16   Temp:   98.1 °F (36.7 °C)    TempSrc:   Oral    SpO2:   98% 97%   Weight:       Height:           Physical examination  General Appearance:  Well developed. No distress  Mouth/Throat:  Oral mucosa moist  Neck:  Supple, no JVD  Lungs:  Breath sounds: clear  Heart::  S1,S2 heard  Abdomen:  Soft, non - tender  Musculoskeletal:  Edema -no significant edema noted    Medications:  Infusion:    sodium chloride      sodium chloride      sodium chloride      sodium chloride      sodium chloride       Meds:    potassium chloride  20 mEq Oral BID WC    pantoprazole  40 mg Oral BID AC    midodrine  5 mg Oral TID WC    metoprolol succinate  12.5 mg Oral Daily    ferrous sulfate  325 mg Oral BID WC    [Held by provider] bumetanide  2 mg IntraVENous Q8H    [Held by provider] clopidogrel  75 mg Oral Daily    [Held by provider] heparin (porcine)  5,000 Units SubCUTAneous 3 times per day    gabapentin  300 mg Oral BID    insulin lispro  0-4 Units SubCUTAneous 4x Daily AC & HS    senna  2 tablet Oral BID    magnesium oxide  400 mg Oral Daily    sodium chloride flush  5-40 mL IntraVENous 2 times per

## 2025-05-26 NOTE — PROGRESS NOTES
Cardiology Progress Note      Patient:  Alberto Gilliland  YOB: 1965  MRN: [id number]   Acct: [id number]  Admit Date:  4/30/2025  Primary Cardiologist:  Rivas    Chief Complaint: CV shock    Subjective (Events in last 24 hours):     5/26  Stable OVN  VSS; afebrile, SR 60 - 80;  - 120/  I/O incomplete  S/p 1 U PRBCs 5/25: Hgb 8.1  Daily lab draws only to prevent iatrogenic anemia  Leukocytosis normalizing - BC done 5/23 neg for growth x 2  Working with PT/OT - plan for IPR - initial eval 5/21 - much improvement overall since - will need re-eval today or tomorrow  Creat continues to improve 1.9 today (2.9, 3.3, 2.9. . . . 4.5)  Feels great - ambulated in jimenez  No c/o    5/25  SD status as of 5/24 - start precert for IPR  SR 70 - 80s, MAP 60s -80s  Creat 2.9 (3.3, 2.9. . . . 4.5)  GFR 24 (14)  K 3.3 - replace  Leukocytosis improving 19 (22.1,  . . 23.8); afebrile  Hgb 7.2 (7.6, 7.3, 8.1) - remains stable despite multiple lab draws; no evidence GI bleed  Had BM - black - no arsalan blood - no evidence of bleeding  Feeling well - walked in jimenez with therapies yesterday - tolerated well  No chest discomfort - states breathing easily - minimal MA with ambulation  No accurate I/O - reports voiding without issue    5/24  Remains in ICU  Continues SR 70 - 80's  BP stable with MAP 70 - 80s; 110 - 120/  I/O: 2079/1700 (+379)  NET: -15.9L  K 3.0 - replacing - 60 po this AM  Creat 3.3 (2.9, 3.0, 3.1, 4.5, 3.3)  Bumex remains on hold per Nephro recs  Hgb 7.3 (8.1, 7.1)  Up in chair - no complaints - looks good  PT/OT  Leukocytosis continues with WBC 22 - afebrile; all lines out 5/23; BC x 2 pending      5/23  Remains in ICU  SR 70's  's - 130's with MAP 60 - 80s ? 5 Levo briefly OVN - now off  I/O: 720/1100   NET: -17L  Wt: 140 (167, 174, 189, 197)  Creat 3.0 today (3.1, 4.5) - bumex on hold per Nephrology request  Leukocytosis with WBC to 23.8 this AM  (13.4, 12, 14.9)  Afebrile  S/p mod barium swallow

## 2025-05-26 NOTE — PROGRESS NOTES
I have seen and examined this patient along with the resident/PATRICA and multidisciplinary team, including nursing, respiratory therapy and pharmacy, when available.  I have personally reviewed radiology images and reports, laboratory results, and other services recommendations.  I have reviewed the attached note, I agree with the documented findings that have edited the plan of care.  I also the following additions/highlights/modifications:    Remains in good spirits.  Ambulating with assistance.  Tolerating oral intake.  No acute concerns.  Remain in ICU awaiting inpatient rehab.  Other details noted below    Electronically signed by:  Scottie Weller M.D.  Critical care medicine  5/26/2025 1:19 PM        CRITICAL CARE PROGRESS NOTE        Patient:  Alberto Gilliland    Unit/Bed:4D-03/003-A  YOB: 1965  MRN: [id number]   PCP: Juan Christiansen DO  Date of Admission: 4/30/2025  Chief Complaint:-Hypotension and fatigue    Assessment and Plan:    Acute decompensated HFrEF: In the setting of severe AS with concomitant cardiogenic shock.  Presented fatigue/hypotension with BNP 16 426.  RODRÍGUEZ showed EF 30-35% with moderate global hypokinesis, severe AS with mean AVG 42 mmHg 5/6/2025.  Subsequent BMP elevated, SGC with high PA pressures.  Refractory to vasopressors and inotropes.  Placed on LAVA ECMO and bridge to TAVR 5/15.  Explanted on 5/17.  Cardiology following   Nephrology following, dialyzed 5/19 and 5/21   PAP improved, SGC removed 5/21   Severe aortic stenosis: S/p TAVR 5/15.  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  Underwent TAVR 5/15 with subsequent RODRÍGUEZ with appropriately positioned transcatheter bioprosthetic valve, mean AVG 24 mmHg 5/17/2025  GI Bleed: Status post massive transfusion x 2 and colonoscopy. 5/20/25: Overnight patient had episode of  bright red blood and some clots seen in splenic flexure and descending colon.  CTA A/P no active bleeding or extravasation.  Subsequent H&H stable.  Monitor Hb with daily CBC.  Transfuse PRBC if Hgb <7.0 or hemodynamically unstable.      INITIAL H AND P AND ICU COURSE:  The patient is a 60-year-old male with a past medical history of HFrEF, AS s/p AVR, CAD, HTN, HLD, hypothyroidism, DIONISIO, GERD, COPD and fatty liver disease, who presented for hypotension.  Per report, the patient's has had progressively worsening shortness of breath, exertional chest pain and as of the last few weeks prior to arrival dry cough.  He does have a history of DIONISIO and underwent a colonoscopy that showed a large cecal angiectasia on 5/5/2025.  Upon arrival given his history of severe aortic stenosis with prior mechanical valve followed by repeat bioprosthetic valve due to inability to tolerate anticoagulation there was concerns for low cardiac output.  A RODRÍGUEZ has showed an EF of 30-35%, moderate global hypokinesis with severe aortic stenosis mean AVG 42 mmHg, mild-moderate TR with mildly elevated RVSP 5/7/2025.  CTS evaluated and advised TAVR with PCI.  Patient underwent a right and left heart cath that was nonobstructive 5/7.  He was transferred to the ICU for SGC guided diuresis and vasopressor/inotropic support.  During that night he had a GI bleed with bloody stools requiring 2 massive transfusion protocols.  He underwent emergent colonoscopy that did not visualize any active bleeding.  CTA A/P was also negative for active bleeding or extravasation.  On 5/13 he had a increase in respiratory distress with an episode of dark vomit and concerns for aspiration.  The patient was intubated.  He underwent ECMO placement 5/13 and was initiated on CRRT.  TAVR was successfully completed with weaning of ECMO and discontinuation of CRRT 5/15.  Echo was explanted 5/17.    5/19/2025 no significant events overnight.  This morning patient's sodium

## 2025-05-26 NOTE — PROCEDURES
PROCEDURE NOTE  Date: 5/26/2025   Name: Alberto Gilliland  YOB: 1965    Procedures  EKG Completed. Handed to RN.

## 2025-05-26 NOTE — PROGRESS NOTES
0950 Ok to remove Temp dialysis catheter per Alice ANDERSON. Patient placed into the supine position. Line pulled intact no complications.  Patient instructed must lay flat for 1 hr after removal.  Educated patient to notify RN of any SOB/bleeding. Patient tolerated well.

## 2025-05-27 ENCOUNTER — APPOINTMENT (OUTPATIENT)
Age: 60
DRG: 003 | End: 2025-05-27
Attending: NURSE PRACTITIONER
Payer: COMMERCIAL

## 2025-05-27 PROBLEM — R53.81 DEBILITY: Status: ACTIVE | Noted: 2025-05-27

## 2025-05-27 LAB
ALBUMIN SERPL BCG-MCNC: 2.9 G/DL (ref 3.4–4.9)
ALP SERPL-CCNC: 491 U/L (ref 40–129)
ALT SERPL W/O P-5'-P-CCNC: 219 U/L (ref 10–50)
ANION GAP SERPL CALC-SCNC: 11 MEQ/L (ref 8–16)
AST SERPL-CCNC: 151 U/L (ref 10–50)
BASOPHILS ABSOLUTE: 0 THOU/MM3 (ref 0–0.1)
BASOPHILS NFR BLD AUTO: 0.4 %
BILIRUB SERPL-MCNC: 2.3 MG/DL (ref 0.3–1.2)
BUN SERPL-MCNC: 55 MG/DL (ref 8–23)
CALCIUM SERPL-MCNC: 8.5 MG/DL (ref 8.8–10.2)
CHLORIDE SERPL-SCNC: 104 MEQ/L (ref 98–111)
CO2 SERPL-SCNC: 26 MEQ/L (ref 22–29)
CREAT SERPL-MCNC: 1.3 MG/DL (ref 0.7–1.2)
DEPRECATED RDW RBC AUTO: 50.7 FL (ref 35–45)
ECHO AV ACCELERATION TIME: 74 MS
ECHO AV AREA PEAK VELOCITY: 1.4 CM2
ECHO AV AREA VTI: 1.3 CM2
ECHO AV AREA/BSA PEAK VELOCITY: 0.7 CM2/M2
ECHO AV AREA/BSA VTI: 0.7 CM2/M2
ECHO AV MEAN GRADIENT: 11 MMHG
ECHO AV MEAN VELOCITY: 1.5 M/S
ECHO AV PEAK GRADIENT: 21 MMHG
ECHO AV PEAK VELOCITY: 2.3 M/S
ECHO AV VELOCITY RATIO: 0.57
ECHO AV VTI: 41.2 CM
ECHO BSA: 2.03 M2
ECHO EST RA PRESSURE: 8 MMHG
ECHO LA AREA 2C: 22.6 CM2
ECHO LA AREA 4C: 20.5 CM2
ECHO LA DIAMETER INDEX: 2.43 CM/M2
ECHO LA DIAMETER: 4.6 CM
ECHO LA MAJOR AXIS: 5.6 CM
ECHO LA MINOR AXIS: 6.3 CM
ECHO LA VOL BP: 67 ML (ref 18–58)
ECHO LA VOL MOD A2C: 69 ML (ref 18–58)
ECHO LA VOL MOD A4C: 60 ML (ref 18–58)
ECHO LA VOL/BSA BIPLANE: 35 ML/M2 (ref 16–34)
ECHO LA VOLUME INDEX MOD A2C: 37 ML/M2 (ref 16–34)
ECHO LA VOLUME INDEX MOD A4C: 32 ML/M2 (ref 16–34)
ECHO LV EDV A2C: 208 ML
ECHO LV EDV A4C: 127 ML
ECHO LV EDV INDEX A4C: 67 ML/M2
ECHO LV EDV NDEX A2C: 110 ML/M2
ECHO LV EF PHYSICIAN: 40 %
ECHO LV EJECTION FRACTION A2C: 47 %
ECHO LV EJECTION FRACTION A4C: 41 %
ECHO LV EJECTION FRACTION BIPLANE: 42 % (ref 55–100)
ECHO LV ESV A2C: 111 ML
ECHO LV ESV A4C: 75 ML
ECHO LV ESV INDEX A2C: 59 ML/M2
ECHO LV ESV INDEX A4C: 40 ML/M2
ECHO LV FRACTIONAL SHORTENING: 22 % (ref 28–44)
ECHO LV INTERNAL DIMENSION DIASTOLE INDEX: 2.86 CM/M2
ECHO LV INTERNAL DIMENSION DIASTOLIC: 5.4 CM (ref 4.2–5.9)
ECHO LV INTERNAL DIMENSION SYSTOLIC INDEX: 2.22 CM/M2
ECHO LV INTERNAL DIMENSION SYSTOLIC: 4.2 CM
ECHO LV ISOVOLUMETRIC RELAXATION TIME (IVRT): 63 MS
ECHO LV IVSD: 0.9 CM (ref 0.6–1)
ECHO LV MASS 2D: 264.4 G (ref 88–224)
ECHO LV MASS INDEX 2D: 139.9 G/M2 (ref 49–115)
ECHO LV POSTERIOR WALL DIASTOLIC: 1.5 CM (ref 0.6–1)
ECHO LV RELATIVE WALL THICKNESS RATIO: 0.56
ECHO LVOT AREA: 2.5 CM2
ECHO LVOT AV VTI INDEX: 0.51
ECHO LVOT DIAM: 1.8 CM
ECHO LVOT MEAN GRADIENT: 3 MMHG
ECHO LVOT PEAK GRADIENT: 6 MMHG
ECHO LVOT PEAK VELOCITY: 1.3 M/S
ECHO LVOT STROKE VOLUME INDEX: 28.3 ML/M2
ECHO LVOT SV: 53.4 ML
ECHO LVOT VTI: 21 CM
ECHO MV A VELOCITY: 0.83 M/S
ECHO MV E DECELERATION TIME (DT): 208 MS
ECHO MV E VELOCITY: 1.5 M/S
ECHO MV E/A RATIO: 1.81
ECHO MV REGURGITANT PEAK GRADIENT: 125 MMHG
ECHO MV REGURGITANT PEAK VELOCITY: 5.6 M/S
ECHO PV MAX VELOCITY: 0.8 M/S
ECHO PV PEAK GRADIENT: 2 MMHG
ECHO RIGHT VENTRICULAR SYSTOLIC PRESSURE (RVSP): 51 MMHG
ECHO RV INTERNAL DIMENSION: 2.7 CM
ECHO TV E WAVE: 0.7 M/S
ECHO TV REGURGITANT MAX VELOCITY: 3.27 M/S
ECHO TV REGURGITANT PEAK GRADIENT: 43 MMHG
EKG ATRIAL RATE: 88 BPM
EKG P AXIS: 58 DEGREES
EKG P-R INTERVAL: 230 MS
EKG Q-T INTERVAL: 392 MS
EKG QRS DURATION: 124 MS
EKG QTC CALCULATION (BAZETT): 474 MS
EKG R AXIS: -26 DEGREES
EKG T AXIS: 48 DEGREES
EKG VENTRICULAR RATE: 88 BPM
EOSINOPHIL NFR BLD AUTO: 4.4 %
EOSINOPHILS ABSOLUTE: 0.5 THOU/MM3 (ref 0–0.4)
ERYTHROCYTE [DISTWIDTH] IN BLOOD BY AUTOMATED COUNT: 17.9 % (ref 11.5–14.5)
GFR SERPL CREATININE-BSD FRML MDRD: 62 ML/MIN/1.73M2
GLUCOSE SERPL-MCNC: 93 MG/DL (ref 74–109)
HCT VFR BLD AUTO: 23.6 % (ref 42–52)
HGB BLD-MCNC: 7.6 GM/DL (ref 14–18)
IMM GRANULOCYTES # BLD AUTO: 0.08 THOU/MM3 (ref 0–0.07)
IMM GRANULOCYTES NFR BLD AUTO: 0.7 %
LYMPHOCYTES ABSOLUTE: 0.9 THOU/MM3 (ref 1–4.8)
LYMPHOCYTES NFR BLD AUTO: 7.7 %
MCH RBC QN AUTO: 28.1 PG (ref 26–33)
MCHC RBC AUTO-ENTMCNC: 32.2 GM/DL (ref 32.2–35.5)
MCV RBC AUTO: 87.4 FL (ref 80–94)
MONOCYTES ABSOLUTE: 0.9 THOU/MM3 (ref 0.4–1.3)
MONOCYTES NFR BLD AUTO: 8.1 %
NEUTROPHILS ABSOLUTE: 9 THOU/MM3 (ref 1.8–7.7)
NEUTROPHILS NFR BLD AUTO: 78.7 %
NRBC BLD AUTO-RTO: 0 /100 WBC
PLATELET # BLD AUTO: 273 THOU/MM3 (ref 130–400)
PMV BLD AUTO: 12.1 FL (ref 9.4–12.4)
POTASSIUM SERPL-SCNC: 4.7 MEQ/L (ref 3.5–5.2)
PROT SERPL-MCNC: 5.5 G/DL (ref 6.4–8.3)
RBC # BLD AUTO: 2.7 MILL/MM3 (ref 4.7–6.1)
SODIUM SERPL-SCNC: 141 MEQ/L (ref 135–145)
WBC # BLD AUTO: 11.4 THOU/MM3 (ref 4.8–10.8)

## 2025-05-27 PROCEDURE — 93306 TTE W/DOPPLER COMPLETE: CPT | Performed by: INTERNAL MEDICINE

## 2025-05-27 PROCEDURE — 6370000000 HC RX 637 (ALT 250 FOR IP): Performed by: NURSE PRACTITIONER

## 2025-05-27 PROCEDURE — 80053 COMPREHEN METABOLIC PANEL: CPT

## 2025-05-27 PROCEDURE — 6370000000 HC RX 637 (ALT 250 FOR IP)

## 2025-05-27 PROCEDURE — 93005 ELECTROCARDIOGRAM TRACING: CPT | Performed by: INTERNAL MEDICINE

## 2025-05-27 PROCEDURE — 99233 SBSQ HOSP IP/OBS HIGH 50: CPT | Performed by: INTERNAL MEDICINE

## 2025-05-27 PROCEDURE — 2500000003 HC RX 250 WO HCPCS: Performed by: INTERNAL MEDICINE

## 2025-05-27 PROCEDURE — 99232 SBSQ HOSP IP/OBS MODERATE 35: CPT | Performed by: STUDENT IN AN ORGANIZED HEALTH CARE EDUCATION/TRAINING PROGRAM

## 2025-05-27 PROCEDURE — 93306 TTE W/DOPPLER COMPLETE: CPT

## 2025-05-27 PROCEDURE — 99233 SBSQ HOSP IP/OBS HIGH 50: CPT | Performed by: NURSE PRACTITIONER

## 2025-05-27 PROCEDURE — 85025 COMPLETE CBC W/AUTO DIFF WBC: CPT

## 2025-05-27 PROCEDURE — 6370000000 HC RX 637 (ALT 250 FOR IP): Performed by: INTERNAL MEDICINE

## 2025-05-27 PROCEDURE — 97530 THERAPEUTIC ACTIVITIES: CPT

## 2025-05-27 PROCEDURE — 2060000000 HC ICU INTERMEDIATE R&B

## 2025-05-27 PROCEDURE — 97535 SELF CARE MNGMENT TRAINING: CPT

## 2025-05-27 PROCEDURE — 97116 GAIT TRAINING THERAPY: CPT

## 2025-05-27 PROCEDURE — 94640 AIRWAY INHALATION TREATMENT: CPT

## 2025-05-27 PROCEDURE — 36415 COLL VENOUS BLD VENIPUNCTURE: CPT

## 2025-05-27 PROCEDURE — 99232 SBSQ HOSP IP/OBS MODERATE 35: CPT | Performed by: INTERNAL MEDICINE

## 2025-05-27 RX ADMIN — Medication 400 MG: at 08:32

## 2025-05-27 RX ADMIN — GABAPENTIN 300 MG: 300 CAPSULE ORAL at 20:01

## 2025-05-27 RX ADMIN — MIDODRINE HYDROCHLORIDE 5 MG: 5 TABLET ORAL at 16:06

## 2025-05-27 RX ADMIN — Medication 325 MG: at 16:06

## 2025-05-27 RX ADMIN — PANTOPRAZOLE SODIUM 40 MG: 40 TABLET, DELAYED RELEASE ORAL at 05:09

## 2025-05-27 RX ADMIN — MIDODRINE HYDROCHLORIDE 5 MG: 5 TABLET ORAL at 11:48

## 2025-05-27 RX ADMIN — OXYCODONE HYDROCHLORIDE AND ACETAMINOPHEN 500 MG: 500 TABLET ORAL at 08:32

## 2025-05-27 RX ADMIN — GABAPENTIN 300 MG: 300 CAPSULE ORAL at 08:33

## 2025-05-27 RX ADMIN — PANTOPRAZOLE SODIUM 40 MG: 40 TABLET, DELAYED RELEASE ORAL at 16:06

## 2025-05-27 RX ADMIN — LEVOTHYROXINE SODIUM 75 MCG: 0.07 TABLET ORAL at 05:09

## 2025-05-27 RX ADMIN — FOLIC ACID 1000 MCG: 1 TABLET ORAL at 08:32

## 2025-05-27 RX ADMIN — TIOTROPIUM BROMIDE AND OLODATEROL 2 PUFF: 3.124; 2.736 SPRAY, METERED RESPIRATORY (INHALATION) at 12:33

## 2025-05-27 RX ADMIN — Medication 1000 UNITS: at 08:34

## 2025-05-27 RX ADMIN — SODIUM CHLORIDE, PRESERVATIVE FREE 10 ML: 5 INJECTION INTRAVENOUS at 08:36

## 2025-05-27 RX ADMIN — ESCITALOPRAM OXALATE 10 MG: 10 TABLET ORAL at 08:33

## 2025-05-27 RX ADMIN — SODIUM CHLORIDE, PRESERVATIVE FREE 10 ML: 5 INJECTION INTRAVENOUS at 20:02

## 2025-05-27 RX ADMIN — METOPROLOL SUCCINATE 12.5 MG: 25 TABLET, EXTENDED RELEASE ORAL at 08:31

## 2025-05-27 RX ADMIN — Medication 325 MG: at 08:32

## 2025-05-27 NOTE — PLAN OF CARE
Problem: Discharge Planning  Goal: Discharge to home or other facility with appropriate resources  Outcome: Progressing     Problem: ABCDS Injury Assessment  Goal: Absence of physical injury  Outcome: Progressing     Problem: Safety - Adult  Goal: Free from fall injury  Outcome: Progressing     Problem: Pain  Goal: Verbalizes/displays adequate comfort level or baseline comfort level  Outcome: Progressing     Problem: Respiratory - Adult  Goal: Achieves optimal ventilation and oxygenation  Outcome: Progressing     Problem: Chronic Conditions and Co-morbidities  Goal: Patient's chronic conditions and co-morbidity symptoms are monitored and maintained or improved  Outcome: Progressing     Problem: Nutrition Deficit:  Goal: Optimize nutritional status  Outcome: Progressing     Problem: Cardiovascular - Adult  Goal: Maintains optimal cardiac output and hemodynamic stability  Outcome: Progressing     Problem: Hematologic - Adult  Goal: Maintains hematologic stability  Outcome: Progressing     Problem: Skin/Tissue Integrity  Goal: Skin integrity remains intact  Description: 1.  Monitor for areas of redness and/or skin breakdown2.  Assess vascular access sites hourly3.  Every 4-6 hours minimum:  Change oxygen saturation probe site4.  Every 4-6 hours:  If on nasal continuous positive airway pressure, respiratory therapy assess nares and determine need for appliance change or resting period  Outcome: Progressing

## 2025-05-27 NOTE — PROGRESS NOTES
[facility]   Nephrology progress note  5/27/2025, 10:03 AM      Pt Name:    Alberto Gilliland  MRN:     [id number]     YOB: 1965  Admit Date:    4/30/2025 12:24 PM    Chief Complaint: Nephrology following for acute kidney injury needing hemodialysis.    Subjective:  Patient seen and examined  Awake and alert no distress  Making decent urine output  No chest pain or shortness of breath    Objective:  24HR INTAKE/OUTPUT:    Intake/Output Summary (Last 24 hours) at 5/27/2025 1003  Last data filed at 5/27/2025 0900  Gross per 24 hour   Intake 240 ml   Output 1750 ml   Net -1510 ml        Admission weight: 87.5 kg (193 lb)  Wt Readings from Last 3 Encounters:   05/27/25 77.1 kg (170 lb)   04/30/25 87.7 kg (193 lb 6.4 oz)   04/25/25 89.2 kg (196 lb 9.6 oz)        Vitals :   Vitals:    05/27/25 0630 05/27/25 0800 05/27/25 0829 05/27/25 0920   BP:  (!) 122/56  (!) 117/58   Pulse:  75  80   Resp:  23  20   Temp:   98.2 °F (36.8 °C) 97.8 °F (36.6 °C)   TempSrc:   Oral Oral   SpO2:    98%   Weight: 77.1 kg (170 lb)      Height:           Physical examination  General Appearance:  Well developed. No distress  Mouth/Throat:  Oral mucosa moist  Neck:  Supple, no JVD  Lungs:  Breath sounds: clear  Heart::  S1,S2 heard  Abdomen:  Soft, non - tender  Musculoskeletal:  Edema -no significant edema noted    Medications:  Infusion:    sodium chloride      sodium chloride      sodium chloride      sodium chloride      sodium chloride       Meds:    [Held by provider] potassium chloride  20 mEq Oral BID WC    pantoprazole  40 mg Oral BID AC    midodrine  5 mg Oral TID WC    metoprolol succinate  12.5 mg Oral Daily    ferrous sulfate  325 mg Oral BID WC    [Held by provider] bumetanide  2 mg IntraVENous Q8H    [Held by provider] clopidogrel  75 mg Oral Daily    [Held by provider] heparin (porcine)  5,000 Units SubCUTAneous 3 times per day    gabapentin  300 mg Oral BID    senna  2 tablet Oral BID

## 2025-05-27 NOTE — PROGRESS NOTES
Cardiology Progress Note      Patient:  Alberto Gilliland  YOB: 1965  MRN: [id number]   Acct: [id number]  Admit Date:  4/30/2025  Primary Cardiologist:  Rivas    Chief Complaint: CV shock    Subjective (Events in last 24 hours):       5/27  Stable overnight  Remains afebrile  Vitals stable  Getting ready to work with occupational therapy  Hgb at 7.36 this morning  WBC trending down  Cr down to 1.3 from 1.9  UOP  2L/24hrs [-18L]  Appetite is improving - having BM w/o evidence of bleeding  IPR evaluation today     5/26  Stable OVN  VSS; afebrile, SR 60 - 80;  - 120/  I/O incomplete  S/p 1 U PRBCs 5/25: Hgb 8.1  Daily lab draws only to prevent iatrogenic anemia  Leukocytosis normalizing - BC done 5/23 neg for growth x 2  Working with PT/OT - plan for IPR - initial eval 5/21 - much improvement overall since - will need re-eval today or tomorrow  Creat continues to improve 1.9 today (2.9, 3.3, 2.9. . . . 4.5)  Feels great - ambulated in jimenez  No c/o    5/25  SD status as of 5/24 - start precert for IPR  SR 70 - 80s, MAP 60s -80s  Creat 2.9 (3.3, 2.9. . . . 4.5)  GFR 24 (14)  K 3.3 - replace  Leukocytosis improving 19 (22.1,  . . 23.8); afebrile  Hgb 7.2 (7.6, 7.3, 8.1) - remains stable despite multiple lab draws; no evidence GI bleed  Had BM - black - no arsalan blood - no evidence of bleeding  Feeling well - walked in jimenez with therapies yesterday - tolerated well  No chest discomfort - states breathing easily - minimal MA with ambulation  No accurate I/O - reports voiding without issue    5/24  Remains in ICU  Continues SR 70 - 80's  BP stable with MAP 70 - 80s; 110 - 120/  I/O: 2079/1700 (+379)  NET: -15.9L  K 3.0 - replacing - 60 po this AM  Creat 3.3 (2.9, 3.0, 3.1, 4.5, 3.3)  Bumex remains on hold per Nephro recs  Hgb 7.3 (8.1, 7.1)  Up in chair - no complaints - looks good  PT/OT  Leukocytosis continues with WBC 22 - afebrile; all lines out 5/23; BC x 2 pending      5/23  Remains in ICU  SR  Valve Valve structure is normal. Trace regurgitation. No stenosis noted.   Pulmonic Valve The pulmonic valve visualization is suboptimal but appears to be functioning normally. No regurgitation. No stenosis noted.   Pericardium The pericardium is normal. No pericardial effusion.       Assessment:    Biventricular Valvular Shock, SCAI D --> E s/p LAVA ECMO, explanted  5/17/2025 (HFrEF 30 -35% per echo 4/21/2025 and RODRÍGUEZ 5/7/25) - resolved  - SGC placement 5/8 with initial HD:                 - CO/CI: 2.93/1.49; PAP 75/38, CVP 18, SVRI 3691, SV 34.5    - HR 85, MAP 87; SVO2 56 on 6L/NC; Michael at 100 mcg,      -HD @ 0829 5/21: CO 9.21 CI 4.7 PAP 28/14 CVP 8, SVRI 1395,    SGC removed 5/21    I/O: not kept / recorded fully last 24 hours  NET: -18L +/-  Creat 1.3 (3.3, 2.9, 3.0, 3.1, 4.5, 3.3)  Bumex remains on hold per Nephro recs  - GDMT:    - Toprol XL 12.5 QD   - BP stable, HR 80 -90 SR; BP stable with MAP 80s   - other GDMT limited by TC     Severe AS              - previous SAVR with severe AS; mean gradient 42 mmHg              - CTA 5/13              - s/p Rishi TAVR EFX+ 26 5/15/2025    - Plavix and ASA currently on hold 2/2 GIB    - Hgb 7.9      Mod to Severe MR              - improved to mild to mod post TAVR     Non-obstructive CAD              - s/p cath 5/7 - no intervention warranted              - no anginal sx     Acute GIB  - several bloody stools with clots  OVN 5/8 with hypotension                           - no anticoagulation at time of bleed              - s/p MTP: 9 U PRBCs, 8 U FFP, 2 PLT              - s/p emergent colonoscopy and EGD  - no evidence of active bleeding - known AVM cecum - not bleeding. EGD performed with 1 cm submucosal lesion noted in duodenum - not bleeding              - s/p CTA abdomen and pelvis                          - no bleeding detected; no intervention   - 5/20 - hgb dropped from 7.9 to 7.1 w/ reported bloody stool this am    - blood stools OVN into 5/21 with Hgb to

## 2025-05-27 NOTE — PROGRESS NOTES
Await updated therapy notes for further recommendations.    UPDATE:  2:32 patient ambulating 120 feet x2 with PT stand by assistance today.      Sara, CM updated explained that patient does not meet criteria for IPR at this point.

## 2025-05-27 NOTE — PROGRESS NOTES
[facility]  STR ICU 4D  Occupational Therapy  Daily Note    Discharge Recommendations: Inpatient Rehabilitation, Patient would benefit from continued OT to increase endurance, stability and safety before returning home.   Equipment Recommendations: No continue to monitor progress    Time In: 075  Time Out: 830  Timed Code Treatment Minutes: 38 Minutes  Minutes: 38          Date: 2025  Patient Name: Alberto Gilliland,   Gender: male      Room: [facility]/003-A  MRN: [id number]  : 1965  (60 y.o.)  Referring Practitioner: Haseeb Espinoza DO  Diagnosis: Low output heart failure (HCC)  Additional Pertinent Hx: Pt presented due to fatigue and hypotension. PMHx severe AS, WISE, HLD, HTN, COPD, HFrEF. Notes over the last year or so has been having worsening SOB and exertional chest pain. Over the last few weeks has had a dry cough. Denies any chest pain. Denies any SOB. Noted to have DIONISIO and had colonoscopy done on  which showed showed large cecal angiectasia likely the cause of DIONISIO. Given AS there was concern for low cardiac output. CTS seen and recommended TAVR and PCI. Had right and left heart cath on  and was non-obstructive. Patient transferred to ICU for possible need for [place] guided diuresis and phenylephrine. : Patient was placed on ECMO  Patient on CRRT.  5/15: TAVR successful with no immediate complications. Patient intubated. CRRT stopped. Weaning ECMO.  : Patient off ECMO.  Extubated.  embolization branch of Ileocecal artery    Restrictions/Precautions:  Restrictions/Precautions: Fall Risk, General Precautions  Position Activity Restriction  Other Position/Activity Restrictions: hx of fem ECMO site     Social/Functional History:  Lives With: Alone  Type of Home: Apartment  Home Layout: One level  Home Access: Stairs to enter without rails  Entrance Stairs - Number of Steps: 1 step  Home Equipment: None   Bathroom Shower/Tub: Tub/Shower unit  Bathroom Toilet:

## 2025-05-27 NOTE — PLAN OF CARE
Problem: Discharge Planning  Goal: Discharge to home or other facility with appropriate resources  Outcome: Progressing  Flowsheets (Taken 5/25/2025 2058 by Lois Flores, RN)  Discharge to home or other facility with appropriate resources:   Identify barriers to discharge with patient and caregiver   Arrange for needed discharge resources and transportation as appropriate   Identify discharge learning needs (meds, wound care, etc)     Problem: ABCDS Injury Assessment  Goal: Absence of physical injury  Outcome: Progressing  Flowsheets (Taken 5/25/2025 1937 by Cornelio Echols, RN)  Absence of Physical Injury: Implement safety measures based on patient assessment     Problem: Safety - Adult  Goal: Free from fall injury  Outcome: Progressing  Flowsheets (Taken 5/26/2025 0135 by Lois Flores, RN)  Free From Fall Injury:   Instruct family/caregiver on patient safety   Based on caregiver fall risk screen, instruct family/caregiver to ask for assistance with transferring infant if caregiver noted to have fall risk factors     Problem: Pain  Goal: Verbalizes/displays adequate comfort level or baseline comfort level  Outcome: Progressing  Flowsheets (Taken 5/26/2025 0135 by Lois Flores, RN)  Verbalizes/displays adequate comfort level or baseline comfort level:   Encourage patient to monitor pain and request assistance   Assess pain using appropriate pain scale   Administer analgesics based on type and severity of pain and evaluate response   Implement non-pharmacological measures as appropriate and evaluate response   Consider cultural and social influences on pain and pain management     Problem: Respiratory - Adult  Goal: Achieves optimal ventilation and oxygenation  Outcome: Progressing  Flowsheets (Taken 5/25/2025 2058 by Lois Flores, RN)  Achieves optimal ventilation and oxygenation:   Assess for changes in respiratory status   Assess for changes in mentation and behavior   Position to facilitate  stability:   Monitor blood pressure and heart rate   Monitor urine output and notify Licensed Independent Practitioner for values outside of normal range   Assess for signs of decreased cardiac output   Administer fluid and/or volume expanders as ordered     Problem: Hematologic - Adult  Goal: Maintains hematologic stability  Outcome: Progressing  Flowsheets (Taken 5/25/2025 2058 by Lois Flores, RN)  Maintains hematologic stability:   Assess for signs and symptoms of bleeding or hemorrhage   Monitor labs for bleeding or clotting disorders   Administer blood products/factors as ordered     Problem: Skin/Tissue Integrity  Goal: Skin integrity remains intact  Description: 1.  Monitor for areas of redness and/or skin breakdown2.  Assess vascular access sites hourly3.  Every 4-6 hours minimum:  Change oxygen saturation probe site4.  Every 4-6 hours:  If on nasal continuous positive airway pressure, respiratory therapy assess nares and determine need for appliance change or resting period  Outcome: Progressing  Flowsheets (Taken 5/25/2025 2058 by Lois Flores, RN)  Skin Integrity Remains Intact:   Monitor for areas of redness and/or skin breakdown   Assess vascular access sites hourly   Turn and reposition as indicated   Assess need for specialty bed   Positioning devices   Check visual cues for pain

## 2025-05-27 NOTE — PLAN OF CARE
Problem: Respiratory - Adult  Goal: Lung sounds clear or within normal limits for patient  Outcome: Progressing  Note: Mdi to maintain open airways. Patient mutually agreed on goals.

## 2025-05-27 NOTE — PROGRESS NOTES
[facility]  INPATIENT PHYSICAL THERAPY  DAILY NOTE  [facility] CVICU 4B - 4B-08/008-A      Discharge Recommendations: Patient would benefit from continued PT at discharge  Equipment Recommendations:    monitor for needs            Time In: 1216  Time Out: 1234  Timed Code Treatment Minutes: 18 Minutes  Minutes: 18          Date: 2025  Patient Name: Alberto Gilliland,  Gender:  male        MRN: [id number]  : 1965  (60 y.o.)     Referring Practitioner: Haseeb Espinoza DO  Diagnosis: Low output heart failure (HCC)  Additional Pertinent Hx: Alberto Gilliland a 60 year old male presented due to fatigue and hypotension. PMHx severe AS, WISE, HLD, HTN, COPD, HFrEF. Notes over the last year or so has been having worsening SOB and exertional chest pain. Over the last few weeks has had a dry cough. Denies any chest pain. Denies any SOB. Noted to have DIONISIO and had colonoscopy done on  which showed showed large cecal angiectasia likely the cause of DIONISIO. Given AS there was concern for low cardiac output. CTS seen and recommended TAVR and PCI. Had right and left heart cath on  and was non-obstructive. Patient transferred to ICU for possible need for [place] guided diuresis and phenylephrine. : Patient was placed on ECMO  Patient on CRRT.  5/15: TAVR successful with no immediate complications. Patient intubated. CRRT stopped. Weaning ECMO.  : Patient off ECMO.  Extubated.     Prior Level of Function:  Lives With: Alone  Type of Home: Apartment  Home Layout: One level  Home Access: Stairs to enter without rails  Entrance Stairs - Number of Steps: 1 step  Home Equipment: None   Bathroom Shower/Tub: Tub/Shower unit  Bathroom Toilet: Standard  Bathroom Equipment: Grab bars in shower    Prior Level of Assist for ADLs: Independent  Prior Level of Assist for Homemaking: Independent  Homemaking Responsibilities: Yes  Prior Level of Assist for Transfers: Independent  Active : Yes  Additional Comments: Pt

## 2025-05-27 NOTE — PROGRESS NOTES
CRITICAL CARE PROGRESS NOTE        Patient:  Alberto Gilliland    Unit/Bed:4D-03/003-A  YOB: 1965  MRN: [id number]   PCP: Juan Christiansen DO  Date of Admission: 4/30/2025  Chief Complaint:-Hypotension and fatigue    Assessment and Plan:    Acute decompensated HFrEF: In the setting of severe AS with concomitant cardiogenic shock.  Presented fatigue/hypotension with BNP 16 426.  RODRÍGUEZ showed EF 30-35% with moderate global hypokinesis, severe AS with mean AVG 42 mmHg 5/6/2025.  Subsequent BMP elevated, SGC with high PA pressures.  Refractory to vasopressors and inotropes.  Placed on LAVA ECMO and bridge to TAVR 5/15.  Explanted on 5/17.  Cardiology following   Nephrology following, dialyzed 5/19 and 5/21   PAP improved, SGC removed 5/21   Severe aortic stenosis: S/p TAVR 5/15.  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  Underwent TAVR 5/15 with subsequent RODRÍGUEZ with appropriately positioned transcatheter bioprosthetic valve, mean AVG 24 mmHg 5/17/2025  GI Bleed: Status post massive transfusion x 2 and colonoscopy. 5/20/25: Overnight patient had episode of dark stool, 5/21: 3 bloody stools   On Protonix drip, transitioned to Protonix 40 mg IV twice daily on 5/22  Holding antiplatelets and subcutaneous heparin   5/20: Hb: 6.5, transfused 1u PRBc --> 7.6   5/21: bloody bowel movements overnight   3u PRBC  Hb 7.6 --> 6.9 --> 7.2 --> 8.1 --> 7.6  5/21: IR for embolization of branch of ileocecal artery   5/23: Hgb stable ~8  5/25: 1u PRBC  TC: Likely multifactorial, likely acutely cardiorenal with hypoperfusion hypoperfusion, aggressive diuretics and concomitant contrast-induced nephropathy.  Baseline CR 0.9 with rapid decline.  FEUrea 25.2%.  Monitor UOP, strict I&O's.  Limit nephrotoxic agent.  Monitor renal function with daily BMP.  Previously required CRRT  pulmonology follow-up.  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  Per Dr. Fletcher needs to continue medication.  This held in setting of acute infection.  Will resume when appropriate.  Pulmonary nodules: Noted.  Previously plan for biopsy with prior biopsies negative for malignancy.  MDD/ROMAN: Continue Lexapro 10 mg p.o. daily  Hypernatremia, improving Resolved: Elevated, and refractory to free water flushes.  PA pressures elevated, CVP and UOP modest.  CXR concerning for subsequent volume overload in the setting of renal dysfunction.  Monitor sodium levels with daily BMP.  Nephrology following.   Lactic acidosis, resolved:   In the setting of acute decompensated heart failure. S/p IVF and MCS.  Hemorrhagic shock, resolved: Secondary to GIB.  Multiple bloody BMs with Hgb drop from 9.2 to 6.3 acutely.  Received 1 unit PRBC, and 2 massive transfusion protocols.  Colonoscopy with large cecal angiectasia likely contributing to DIONISIO 5/5.  Urgent colonoscopy with no acute bleeding seen, or AVMs appreciated, however bright red blood and some clots seen in splenic flexure and descending colon.  CTA A/P no active bleeding or extravasation.  Subsequent H&H stable.  Monitor Hb with daily CBC.  Transfuse PRBC if Hgb <7.0 or hemodynamically unstable.      INITIAL H AND P AND ICU COURSE:  The patient is a 60-year-old male with a past medical history of HFrEF, AS s/p AVR, CAD, HTN, HLD, hypothyroidism, DIONISIO, GERD, COPD and fatty liver disease, who presented for hypotension.  Per report, the patient's has had progressively worsening shortness of breath, exertional chest pain and as of the last few weeks prior to arrival dry cough.  He does have a history of DIONISIO and underwent a colonoscopy that showed a large cecal angiectasia on 5/5/2025.  Upon arrival given his history of severe aortic stenosis with prior mechanical valve followed by repeat bioprosthetic valve due to inability to tolerate anticoagulation there was

## 2025-05-27 NOTE — PROGRESS NOTES
Spiritual Health History and Assessment/Progress Note  [facility]    (P) Spiritual/Emotional Needs,  ,  ,      Name: Alberto Gilliland MRN: [id number]    Age: 60 y.o.     Sex: male   Language: English   Religious: None   Low output heart failure (HCC)     Date: 5/27/2025            Total Time Calculated: (P) 5 min              Spiritual Assessment continued in [facility] CVICU 4B        Referral/Consult From: (P) Rounding   Encounter Overview/Reason: (P) Spiritual/Emotional Needs  Service Provided For: (P) Patient    Shae, Belief, Meaning:   Patient identifies as spiritual  Family/Friends identify as spiritual      Importance and Influence:  Patient has spiritual/personal beliefs that influence decisions regarding their health  Family/Friends No family/friends present    Community:  Patient feels well-supported. Support system includes: Extended family  Family/Friends feel well-supported. Support system includes: Extended family    Assessment and Plan of Care:   In my encounter with the 60 yr old patient, while rounding  the unit 4B,  I provided spiritual care to patient through conversation, I also came to assess the patient's spiritual needs present.       Patient Interventions include: Facilitated expression of thoughts and feelings  Family/Friends Interventions include: Facilitated expression of thoughts and feelings    Patient Plan of Care: Spiritual Care available upon further referral  Family/Friends Plan of Care: Spiritual Care available upon further referral    Electronically signed by RICARDA Hope on 5/27/2025 at 3:54 PM

## 2025-05-27 NOTE — PROGRESS NOTES
Physical Medicine & Rehabilitation   Progress Note    Chief Complaint:  TAVR. GIB. Rehab needs    History of Present Illness:  Alberto Gilliland is a 60 y.o. male admitted to [facility] on 4/30/2025. Patient  has a past medical history of Acute exacerbation of chronic heart failure (HCC), Arthritis, Bilateral sciatica, Bleeding, CAD (coronary artery disease), Carpal tunnel syndrome, Cervicalgia, C5-7, Chronic anxiety, COPD, mild (HCC), Dysthymia (or depressive neurosis), ED (erectile dysfunction), ED (erectile dysfunction), Fatty liver, GERD (gastroesophageal reflux disease), Headache(784.0), History of blood transfusion, HTN (hypertension), Hyperlipidemia, Hypothyroidism, Iron deficiency anemia, blood loss, Lumbago, Lumbar radiculopathy, Lymphomatoid papulosis (HCC), Osteoarthritis (arthritis due to wear and tear of joints), S/P AVR, coumadin Tx, and Spondylosis of thoracic region without myelopathy or radiculopathy. Patient presented to [facility] due to fatigue, increasing weakness, and iron deficiency. Patient with progressively worsening shortness of breath, exertional chest pain and as of the last few weeks prior to arrival dry cough. He does have a history of DIONISIO and underwent a colonoscopy that showed a large cecal angiectasia on 5/5/2025. Upon arrival given his history of severe aortic stenosis with prior mechanical valve followed by repeat bioprosthetic valve due to inability to tolerate anticoagulation there was concerns for low cardiac output. A RODRÍGUEZ has showed an EF of 30-35%, moderate global hypokinesis with severe aortic stenosis mean AVG 42 mmHg, mild-moderate TR with mildly elevated RVSP 5/7/2025. CTS evaluated and advised TAVR with PCI. Patient underwent a right and left heart cath that was nonobstructive 5/7. He was transferred to the ICU for SGC guided diuresis and vasopressor/inotropic support. During that night he had a GI bleed with bloody stools requiring 2 massive transfusion  protocols. He underwent emergent colonoscopy that did not visualize any active bleeding. CTA A/P was also negative for active bleeding or extravasation. On 5/13 he had a increase in respiratory distress with an episode of dark vomit and concerns for aspiration. The patient was intubated. He underwent ECMO placement 5/13 and was initiated on CRRT. TAVR was successfully completed with weaning of ECMO and discontinuation of CRRT 5/15. Echo was explanted 5/17.  Patient was restarted on aspirin, continue to have dark stool.  Protonix drip started.  GI consulted.  Nuclear medicine scan ordered for GI bleed 5/21/25, showing activity in the right abdomen, likely in the cecum and ascending colon, consistent with active gastrointestinal bleeding.     5/21/25: Patient seen today in consult. Patient resting in bed. Undergoing dialysis bedside. Patient alert and oriented. Friend present. Discussed with patient about ongoing medical needs and future rehab needs. Discussed IPR, requirements, and insurance precert. Patient and friend understanding. PM&R will follow for medical stability and therapy appropriateness.     5/22/25: patient seen today in follow up.  Reports feeling better today than yesterday.  Patient was taken to IR yesterday for coiling of his GI bleed.  He was cleared for a diet today with MBS.  However he is on a GI bland diet as well.  Discussed with patient about having speech see him in regards to cognition, reasoning, processing.  Patient verbalizes understanding.  Patient was started on D5 due to hypernatremia.  Discussed with patient about IPR and starting pre-CERT when medically ready.  Patient verbalized understanding    5/27/2025: Patient seen and examined this evening. Patient with significant functional improvement since he was seen last week.  Today, he ambulated 120 feet x 2 standby assist with PT. Patient seen resting in bed. He reports that overall he is doing well. We discussed his current

## 2025-05-27 NOTE — PROCEDURES
PROCEDURE NOTE  Date: 5/27/2025   Name: Alberto Gilliland  YOB: 1965    Procedures  12 lead EKG completed. Results handed to Kylie BUTLER.

## 2025-05-28 ENCOUNTER — CARE COORDINATION (OUTPATIENT)
Dept: CARE COORDINATION | Age: 60
End: 2025-05-28

## 2025-05-28 ENCOUNTER — TELEPHONE (OUTPATIENT)
Dept: FAMILY MEDICINE CLINIC | Age: 60
End: 2025-05-28

## 2025-05-28 ENCOUNTER — TELEPHONE (OUTPATIENT)
Dept: CARDIOLOGY CLINIC | Age: 60
End: 2025-05-28

## 2025-05-28 VITALS
SYSTOLIC BLOOD PRESSURE: 122 MMHG | WEIGHT: 175.27 LBS | DIASTOLIC BLOOD PRESSURE: 57 MMHG | HEIGHT: 67 IN | TEMPERATURE: 98.8 F | OXYGEN SATURATION: 97 % | RESPIRATION RATE: 18 BRPM | HEART RATE: 77 BPM | BODY MASS INDEX: 27.51 KG/M2

## 2025-05-28 DIAGNOSIS — Z95.2 S/P TAVR (TRANSCATHETER AORTIC VALVE REPLACEMENT): Primary | ICD-10-CM

## 2025-05-28 LAB
ALBUMIN SERPL BCG-MCNC: 2.8 G/DL (ref 3.4–4.9)
ALP SERPL-CCNC: 418 U/L (ref 40–129)
ALT SERPL W/O P-5'-P-CCNC: 177 U/L (ref 10–50)
ANION GAP SERPL CALC-SCNC: 12 MEQ/L (ref 8–16)
AST SERPL-CCNC: 119 U/L (ref 10–50)
BACTERIA BLD AEROBE CULT: NORMAL
BACTERIA BLD AEROBE CULT: NORMAL
BASOPHILS ABSOLUTE: 0 THOU/MM3 (ref 0–0.1)
BASOPHILS NFR BLD AUTO: 0.3 %
BILIRUB SERPL-MCNC: 1.9 MG/DL (ref 0.3–1.2)
BUN SERPL-MCNC: 36 MG/DL (ref 8–23)
CALCIUM SERPL-MCNC: 8.3 MG/DL (ref 8.8–10.2)
CHLORIDE SERPL-SCNC: 104 MEQ/L (ref 98–111)
CO2 SERPL-SCNC: 24 MEQ/L (ref 22–29)
CREAT SERPL-MCNC: 0.9 MG/DL (ref 0.7–1.2)
DEPRECATED RDW RBC AUTO: 52.2 FL (ref 35–45)
EKG ATRIAL RATE: 73 BPM
EKG P AXIS: 84 DEGREES
EKG P-R INTERVAL: 190 MS
EKG Q-T INTERVAL: 404 MS
EKG QRS DURATION: 128 MS
EKG QTC CALCULATION (BAZETT): 445 MS
EKG R AXIS: 101 DEGREES
EKG T AXIS: 65 DEGREES
EKG VENTRICULAR RATE: 73 BPM
EOSINOPHIL NFR BLD AUTO: 5 %
EOSINOPHILS ABSOLUTE: 0.5 THOU/MM3 (ref 0–0.4)
ERYTHROCYTE [DISTWIDTH] IN BLOOD BY AUTOMATED COUNT: 18.5 % (ref 11.5–14.5)
GFR SERPL CREATININE-BSD FRML MDRD: > 90 ML/MIN/1.73M2
GLUCOSE SERPL-MCNC: 90 MG/DL (ref 74–109)
HCT VFR BLD AUTO: 22.6 % (ref 42–52)
HGB BLD-MCNC: 7.3 GM/DL (ref 14–18)
IMM GRANULOCYTES # BLD AUTO: 0.05 THOU/MM3 (ref 0–0.07)
IMM GRANULOCYTES NFR BLD AUTO: 0.5 %
LYMPHOCYTES ABSOLUTE: 0.9 THOU/MM3 (ref 1–4.8)
LYMPHOCYTES NFR BLD AUTO: 9.2 %
MCH RBC QN AUTO: 28.7 PG (ref 26–33)
MCHC RBC AUTO-ENTMCNC: 32.3 GM/DL (ref 32.2–35.5)
MCV RBC AUTO: 89 FL (ref 80–94)
MONOCYTES ABSOLUTE: 0.9 THOU/MM3 (ref 0.4–1.3)
MONOCYTES NFR BLD AUTO: 8.8 %
NEUTROPHILS ABSOLUTE: 7.4 THOU/MM3 (ref 1.8–7.7)
NEUTROPHILS NFR BLD AUTO: 76.2 %
NRBC BLD AUTO-RTO: 0 /100 WBC
NT-PROBNP SERPL IA-MCNC: 5989 PG/ML (ref 0–124)
PLATELET # BLD AUTO: 263 THOU/MM3 (ref 130–400)
PMV BLD AUTO: 12 FL (ref 9.4–12.4)
POTASSIUM SERPL-SCNC: 3.9 MEQ/L (ref 3.5–5.2)
PROT SERPL-MCNC: 5.5 G/DL (ref 6.4–8.3)
RBC # BLD AUTO: 2.54 MILL/MM3 (ref 4.7–6.1)
SODIUM SERPL-SCNC: 140 MEQ/L (ref 135–145)
WBC # BLD AUTO: 9.7 THOU/MM3 (ref 4.8–10.8)

## 2025-05-28 PROCEDURE — 6370000000 HC RX 637 (ALT 250 FOR IP): Performed by: INTERNAL MEDICINE

## 2025-05-28 PROCEDURE — 94640 AIRWAY INHALATION TREATMENT: CPT

## 2025-05-28 PROCEDURE — 94761 N-INVAS EAR/PLS OXIMETRY MLT: CPT

## 2025-05-28 PROCEDURE — 36415 COLL VENOUS BLD VENIPUNCTURE: CPT

## 2025-05-28 PROCEDURE — 85025 COMPLETE CBC W/AUTO DIFF WBC: CPT

## 2025-05-28 PROCEDURE — 2500000003 HC RX 250 WO HCPCS: Performed by: INTERNAL MEDICINE

## 2025-05-28 PROCEDURE — 80053 COMPREHEN METABOLIC PANEL: CPT

## 2025-05-28 PROCEDURE — 99232 SBSQ HOSP IP/OBS MODERATE 35: CPT | Performed by: INTERNAL MEDICINE

## 2025-05-28 PROCEDURE — 83880 ASSAY OF NATRIURETIC PEPTIDE: CPT

## 2025-05-28 PROCEDURE — 97530 THERAPEUTIC ACTIVITIES: CPT

## 2025-05-28 PROCEDURE — 6370000000 HC RX 637 (ALT 250 FOR IP): Performed by: NURSE PRACTITIONER

## 2025-05-28 PROCEDURE — 6370000000 HC RX 637 (ALT 250 FOR IP)

## 2025-05-28 PROCEDURE — 93005 ELECTROCARDIOGRAM TRACING: CPT | Performed by: INTERNAL MEDICINE

## 2025-05-28 PROCEDURE — 97535 SELF CARE MNGMENT TRAINING: CPT

## 2025-05-28 PROCEDURE — 99233 SBSQ HOSP IP/OBS HIGH 50: CPT | Performed by: NURSE PRACTITIONER

## 2025-05-28 RX ORDER — MIDODRINE HYDROCHLORIDE 5 MG/1
5 TABLET ORAL
Qty: 90 TABLET | Refills: 3 | Status: SHIPPED | OUTPATIENT
Start: 2025-05-28

## 2025-05-28 RX ORDER — FERROUS SULFATE 325(65) MG
325 TABLET ORAL 2 TIMES DAILY WITH MEALS
Qty: 30 TABLET | Refills: 3 | Status: SHIPPED | OUTPATIENT
Start: 2025-05-28

## 2025-05-28 RX ORDER — PANTOPRAZOLE SODIUM 40 MG/1
40 TABLET, DELAYED RELEASE ORAL
Qty: 30 TABLET | Refills: 3 | Status: SHIPPED | OUTPATIENT
Start: 2025-05-28

## 2025-05-28 RX ORDER — LEVOTHYROXINE SODIUM 75 UG/1
75 TABLET ORAL DAILY
Qty: 30 TABLET | Refills: 3 | Status: SHIPPED | OUTPATIENT
Start: 2025-05-29

## 2025-05-28 RX ORDER — METOPROLOL SUCCINATE 25 MG/1
12.5 TABLET, EXTENDED RELEASE ORAL DAILY
Qty: 30 TABLET | Refills: 3 | Status: SHIPPED | OUTPATIENT
Start: 2025-05-29

## 2025-05-28 RX ORDER — NITROGLYCERIN 0.4 MG/1
0.4 TABLET SUBLINGUAL EVERY 5 MIN PRN
Qty: 25 TABLET | Refills: 3 | Status: SHIPPED | OUTPATIENT
Start: 2025-05-28

## 2025-05-28 RX ADMIN — TIOTROPIUM BROMIDE AND OLODATEROL 2 PUFF: 3.124; 2.736 SPRAY, METERED RESPIRATORY (INHALATION) at 09:35

## 2025-05-28 RX ADMIN — MIDODRINE HYDROCHLORIDE 5 MG: 5 TABLET ORAL at 08:48

## 2025-05-28 RX ADMIN — LEVOTHYROXINE SODIUM 75 MCG: 0.07 TABLET ORAL at 05:47

## 2025-05-28 RX ADMIN — METOPROLOL SUCCINATE 12.5 MG: 25 TABLET, EXTENDED RELEASE ORAL at 08:48

## 2025-05-28 RX ADMIN — FOLIC ACID 1000 MCG: 1 TABLET ORAL at 08:47

## 2025-05-28 RX ADMIN — Medication 400 MG: at 08:47

## 2025-05-28 RX ADMIN — Medication 1000 UNITS: at 08:47

## 2025-05-28 RX ADMIN — MIDODRINE HYDROCHLORIDE 5 MG: 5 TABLET ORAL at 12:36

## 2025-05-28 RX ADMIN — OXYCODONE HYDROCHLORIDE AND ACETAMINOPHEN 500 MG: 500 TABLET ORAL at 08:47

## 2025-05-28 RX ADMIN — GABAPENTIN 300 MG: 300 CAPSULE ORAL at 08:48

## 2025-05-28 RX ADMIN — PANTOPRAZOLE SODIUM 40 MG: 40 TABLET, DELAYED RELEASE ORAL at 05:47

## 2025-05-28 RX ADMIN — SODIUM CHLORIDE, PRESERVATIVE FREE 10 ML: 5 INJECTION INTRAVENOUS at 08:50

## 2025-05-28 RX ADMIN — Medication 325 MG: at 08:47

## 2025-05-28 RX ADMIN — ESCITALOPRAM OXALATE 10 MG: 10 TABLET ORAL at 08:47

## 2025-05-28 ASSESSMENT — PAIN SCALES - GENERAL: PAINLEVEL_OUTOF10: 0

## 2025-05-28 NOTE — CARE COORDINATION
5/28/25, 8:51 AM EDT    DISCHARGE ON GOING EVALUATION    Alberto CORNELIUS [facility] day: 28  Location: -08/008-A Reason for admit: Chronic anemia [D64.9]  Troponin level elevated [R79.89]  S/P AVR [Z95.2]  Hypotension, unspecified hypotension type [I95.9]  Acute on chronic congestive heart failure, unspecified heart failure type (HCC) [I50.9]  Acute exacerbation of chronic heart failure (HCC) [I50.9]     Procedures:   5/5 EGD: normal, bx taken  5/5 colonoscopy:  Large cecal angiectasia destroyed with APC   5/7 Cath: Mild nonobstructive coronary; Decompensated CHF, elevated right and left filling pressures, CI 2.2  5/7 RODRÍGUEZ: EF 30-35%; mod mitral regurg; severe AS, mild aortic regurg; tricupid with mild to mod regrug; findings consistent w/mild pulmonary hypertension  5/8 Colonoscopy: Unable to find source of bleeding; bright red blood and some clots seen in splenic flexure and descending colon  5/8 SGC RIJ  5/8 CVC IJ - 5/20 removed  5/13 Intubated  5/13 LAVA ECMO initiated  5/13 Code Blue - PEA during cannulation; 1-2 min CPR to ROSC  5/13 TESSIO left femoral - 5/20 removed  5/13 CRRT initiated  5/14 CRRT stopped  5/15 TAVR  5/16 ECHO with EF 25-30%; severe global hypokinesis; mild mitral regurg; TAVR well-seated; LA moderately dilated  5/17 Explant ECMO  5/17 RODRÍGUEZ: EF 35-40%; TAVR well-seated  5/19 Extubated  5/20 TESSIO [facility]  5/21 IR: Embolization branch of ileocecal artery  5/28 ECHO     Imaging since last note:   5/23 CXR No acute process. Slightly improved appearance of the lungs when compared to the   prior study     Barriers to Discharge: Nephrology, Cardiology following, PT/OT, IV Diamox, Midodrine, electrolyte replacement protocols, Protonix.    PCP: Juan Christiansen,   Readmission Risk Score: 20.6    Patient Goals/Plan/Treatment Preferences: From home alone. Not a rehab candidate. SW consult for HH.

## 2025-05-28 NOTE — PROGRESS NOTES
[Held by provider] atorvastatin  20 mg Oral Daily    Vitamin D  1,000 Units Oral Daily    escitalopram  10 mg Oral Daily    folic acid  1,000 mcg Oral Daily    levothyroxine  75 mcg Oral Daily    tiotropium-olodaterol  2 puff Inhalation Daily RT    vitamin C  500 mg Oral Daily     Continuous Infusions:   sodium chloride      sodium chloride      sodium chloride      sodium chloride      sodium chloride         PHYSICAL EXAMINATION:  T: 98.3.  P: 71. RR: 16. B/P: 119/59.  O2 Sat: 97% on room air.  I/O: 660/150  Body mass index is 27.45 kg/m².   GCS:   15    General:   Acutely on chronically ill-appearing, improved, interactive and cooperative  HEENT:  normocephalic and atraumatic.  No scleral icterus. PERR.  Neck: supple.  No Thyromegaly.   Lungs: clear to auscultation.  No retractions   Cardiac: RRR.  No JVD.   Abdomen: soft.  Nontender.   Extremities:  No clubbing, cyanosis, or edema x 4.    Vasculature: capillary refill < 3 seconds. Palpable dorsalis pedis pulses.  Skin:  warm and dry.  Psych:  Alert and oriented x3.   Lymph:  No supraclavicular adenopathy.  Neurologic:  No focal deficit. No seizures.    Data: (All radiographs, tracings, PFTs, and imaging are personally viewed and interpreted unless otherwise noted).   Na: 140, K: 3.9, Cl: 104, HCO3: 24, BUN: 36, Cr: 0.9, Glucose: 90  Albumin 2.8, alk phos 418, , , total bilirubin 1.9, total protein 5.5  WBC 9.7, Hb 7.3, platelets 263         Seen with multidisciplinary ICU team No.  Meets Continued ICU Level Care Criteria:    [] Yes   [] No - Transfer Planned to listed location:  [] HOSPITALIST CONTACTED-        Case discussed with Dr. Cheung.   Electronically signed by Thomas Black MD  CRITICAL CARE SPECIALIST   Patient seen by me including key components of medical care.  Case discussed with resident physician.  Ambulating with assistance in jimenez.  Good spirits.  IPR declined.  Will discharge home with family.  Italicized bold font, if

## 2025-05-28 NOTE — DISCHARGE INSTR - DIET
Good nutrition is important when healing from an illness, injury, or surgery.  Follow any nutrition recommendations given to you during your hospital stay.   If you were given an oral nutrition supplement while in the hospital, continue to take this supplement at home.  You can take it with meals, in-between meals, and/or before bedtime. These supplements can be purchased at most local grocery stores, pharmacies, and chain [employer]-stores.   If you have any questions about your diet or nutrition, call the hospital and ask for the dietitian.       Learning About the Diet for Swallowing Problems  What are swallowing problems?  Difficulty swallowing is also called dysphagia (say \"pko-KKO-pxd-uh\"). It is most often a sign of a problem with your throat or esophagus. This is the tube that moves food and liquids from the back of your mouth to your stomach.  Trouble swallowing can occur when the muscles and nerves that move food through the throat and esophagus are not working right. To help you swallow food, your doctor or speech therapist may advise a special dysphagia diet for you.  Why is a special diet important?  A dysphagia diet can help you handle some problems that can occur when it's hard to swallow food and liquids easily. These problems can include:  Malnutrition. This means you aren't getting enough healthy foods to keep your body working well.  Dehydration. This means you aren't getting enough liquids to keep your body healthy.  Aspiration. This means that food, liquid, or saliva goes down your windpipe (trachea) into your lungs, instead of down your esophagus to your stomach. This can lead to aspiration pneumonia, which is an inflammation of the lungs.  What is the dysphagia diet?  In the dysphagia diet, you change the foods you eat and the liquids you drink to make it easier to swallow them. You may:  Change the texture of the foods you eat. Your doctor or speech therapist may advise you to eat one of these  types of foods:  Easy-to-chew foods. These are foods that are soft or tender.  Soft and bite-sized foods. These are soft foods that have been cut into small pieces.  Minced and moist foods. These are very soft, small, and moist lumps of food that need very little chewing.  Pureed foods. These are foods that have been blended smooth. The puree must be thick enough to hold its shape on a spoon. These foods don't need to be chewed.  Liquidized foods. These are foods that have been blended smooth but are not as thick as pureed foods. You can drink them from a cup.  Thicken the liquids you drink. Your doctor or speech therapist will tell you what kind of thickener to use and how thick to make the liquids.  Slightly thick liquids. These are thicker than water but can flow through a straw.  Mildly thick liquids. These can be sipped from a cup but take some effort to drink with a straw.  Moderately thick liquids. These liquids are thick enough to drink from a cup or from a spoon. But they are hard to drink through a straw.  Extremely thick liquids. These are thick enough to hold their shape on a spoon. They are too thick to drink from a cup or suck through a straw.  Your speech therapist will help you learn exercises to train your muscles to work together so you can swallow. You may also need to learn how to position your body or how to put food in your mouth to be able to swallow better.  Follow-up care is a key part of your treatment and safety. Be sure to make and go to all appointments, and call your doctor if you are having problems. It's also a good idea to know your test results and keep a list of the medicines you take.  Current as of: October 27, 2024  Content Version: 14.4  © 2711-1978 [employer].   Care instructions adapted under license by [employer]. If you have questions about a medical condition or this instruction, always ask your healthcare professional. [employer] disclaims any

## 2025-05-28 NOTE — PROGRESS NOTES
Discharge teaching and instructions completed with patient using teachback method. AVS reviewed.  Patient voiced understanding regarding prescriptions, follow up appointments, and care of self at home. Discharged in a wheelchair to home with support per family

## 2025-05-28 NOTE — PROGRESS NOTES
Cardiology Progress Note      Patient:  Alberto Gilliland  YOB: 1965  MRN: [id number]   Acct: [id number]  Admit Date:  4/30/2025  Primary Cardiologist:  Rivas    Chief Complaint: CV shock    Subjective (Events in last 24 hours):       5/28  Stable over night  Transferred over to   Does not qualify for IPR so will go home with his sister  Pt up getting self cleaned up this morning  Denies CP, SOB or feeling lightheaded/dizzy  Renal function remains stable.   ECHO reviewed - EF improved to 40-45% will not need life vest at discharge  Aortic valve positioned appropriately   BNP down from admission       5/27  Stable overnight  Remains afebrile  Vitals stable  Getting ready to work with occupational therapy  Hgb at 7.36 this morning  WBC trending down  Cr down to 1.3 from 1.9  UOP  2L/24hrs [-18L]  Appetite is improving - having BM w/o evidence of bleeding  IPR evaluation today     5/26  Stable OVN  VSS; afebrile, SR 60 - 80;  - 120/  I/O incomplete  S/p 1 U PRBCs 5/25: Hgb 8.1  Daily lab draws only to prevent iatrogenic anemia  Leukocytosis normalizing - BC done 5/23 neg for growth x 2  Working with PT/OT - plan for IPR - initial eval 5/21 - much improvement overall since - will need re-eval today or tomorrow  Creat continues to improve 1.9 today (2.9, 3.3, 2.9. . . . 4.5)  Feels great - ambulated in jimenez  No c/o    5/25  SD status as of 5/24 - start precert for IPR  SR 70 - 80s, MAP 60s -80s  Creat 2.9 (3.3, 2.9. . . . 4.5)  GFR 24 (14)  K 3.3 - replace  Leukocytosis improving 19 (22.1,  . . 23.8); afebrile  Hgb 7.2 (7.6, 7.3, 8.1) - remains stable despite multiple lab draws; no evidence GI bleed  Had BM - black - no arsalan blood - no evidence of bleeding  Feeling well - walked in jimenez with therapies yesterday - tolerated well  No chest discomfort - states breathing easily - minimal MA with ambulation  No accurate I/O - reports voiding without issue    5/24  Remains in ICU  Continues SR 70 -  remains on hold per Nephro recs  - GDMT:    - Toprol XL 12.5 QD   - BP stable, HR 80 -90 SR; BP stable with MAP 80s   - other GDMT by hypotension - will add slowly out pt once off milrinone      Severe AS              - previous SAVR with severe AS; mean gradient 42 mmHg              - CTA 5/13              - s/p Rishi TAVR EFX+ 26 5/15/2025    - Plavix and ASA currently on hold 2/2 GIB    - Hgb 7.3     Mod to Severe MR              - improved to mild to mod post TAVR     Non-obstructive CAD              - s/p cath 5/7 - no intervention warranted              - no anginal sx     Acute GIB  - several bloody stools with clots  OVN 5/8 with hypotension                           - no anticoagulation at time of bleed              - s/p MTP: 9 U PRBCs, 8 U FFP, 2 PLT              - s/p emergent colonoscopy and EGD  - no evidence of active bleeding - known AVM cecum - not bleeding. EGD performed with 1 cm submucosal lesion noted in duodenum - not bleeding              - s/p CTA abdomen and pelvis                          - no bleeding detected; no intervention   - 5/20 - hgb dropped from 7.9 to 7.1 w/ reported bloody stool this am    - blood stools OVN into 5/21 with Hgb to 6.6    - 3 units PRBC transfused with Hgb improved to 9.6  - CTA abdomen with no specific evidence of bleeding  - NM bleeding scan with suspicion for bleeding in cecum  - IR  SU[employer]GIOGRAPHY/and embolization:  Active bleeding demonstrated from a distal branch of the ileocecal artery. Status post successful embolization of the site of active bleeding   - ASA and Plavix on hold   - no evidence of further bleeding; Hgb stable at  8.1 (post 1 U pRBC 5/25) (7.2, 7.6, 7.3 ,8.1, 7.1,7.5, 8.1/8.3)    - black stool 5/25 - no arsalan blood    - continued serial labs resulting in drop/lack of improvement in Hgb    Respiratory failure   - resolved    TC - resolved     Hypernatremia    - resolved    Pneumonia              - resolved     Plan:  Continue

## 2025-05-28 NOTE — TELEPHONE ENCOUNTER
Pt to be discharged from ICU to home with sister.  Orders received from Dr. Rivera for 7 day f/up and 30-day ECHO, BMP, CBC and f/up. Pt is Rishi TAVR.  Orders received from Dr. Rivera for 3 month ECHO and f/up.     Orders received from Dr. Rivera to schedule the patient for a 1 year post TAVR follow up appointment with an ECHO, CBC, and BMP.    ECHO scheduled for 5/15/26 at ____.  Appointment is scheduled with Rosa for 5/15/26 at 1130.      Dr. Rivera, please agree.    Alice, can you please schedule ECHOs?

## 2025-05-28 NOTE — CARE COORDINATION
5/28/25, 10:39 AM EDT    DISCHARGE PLANNING EVALUATION     Order received to arrange for HH.   Sw spoke with Sara LEAL, she arranged for [facility]. No need for SW intervention at this time.

## 2025-05-28 NOTE — CARE COORDINATION
Per chart review, patient remains admitted at [facility]. RD will follow up as appropriate upon discharge.       Margarette Khanna RDN, LD  [phone]

## 2025-05-28 NOTE — PROGRESS NOTES
Comprehensive Nutrition Assessment    Type and Reason for Visit:  Reassess    Nutrition Recommendations/Plan:   Diet as per Speech Therapy: soft and bite sized diet with extra sauce and continue GI bland diet as per GI - reinforced diet guidelines with patient   Encouraged home use of ONS   Note documentation of outpatient RD will follow upon discharge      Malnutrition Assessment:  Malnutrition Status:  Moderate malnutrition (05/09/25 3439)    Context:  Acute Illness     Findings of the 6 clinical characteristics of malnutrition:  Energy Intake:  50% or less of estimated energy requirements for 5 or more days (NPO/CLD this admit)  Weight Loss:  Unable to assess (CHF; unable to accurately assess)     Body Fat Loss:  Mild body fat loss Orbital   Muscle Mass Loss:  Mild muscle mass loss Temples (temporalis), Clavicles (pectoralis & deltoids)  Fluid Accumulation:  Mild Generalized, Extremities   Strength:  Not Performed    Nutrition Assessment:     Pt. Nutritionally compromised AEB intubated 5/13-5/19, tolerated only trophic TF while intubated. At risk for further nutrition compromise r/t admit with CHF, RODRÍGUEZ 5/6, RHC 5/9, hemorrhagic shock 2/2 GIB s/p EGD 5/5 no active bleeding noted - bx duodenum taken and colonoscopy 5/8 with inability to fully visualize active bleeding; ileocecal artery embolization done 5/21; ECMO 5/13-5/17, shock liver, TC, moderate malnutrition, CRRT 5/13-14;TAVR 5/15, received hemodialysis during LOS; dysphagia; LOS day 27 and underlying medical condition (PMHx: mechanical AVR/resection of ascending aorta 2007 - switched to bioprosthetic 2016 2/2 warfarin intolerance and GIB, CHF, CAD COPD, anxiety, WISE, lymphatoid papulosis, GERD, HTN, HLD, hypothyroidism, OA, hx/o small bowel resection 2008 - 8 inches removed, s/p robotic laparoscopy lysis of adhesions/ventral hernia repair/evacuation of abdominal hematoma with debridement of hematoma capsule 2/23/24, current smoker, marijuana use)

## 2025-05-28 NOTE — PROGRESS NOTES
[facility]   Nephrology progress note  5/28/2025, 9:27 AM      Pt Name:    Alberto Gilliland  MRN:     [id number]     YOB: 1965  Admit Date:    4/30/2025 12:24 PM    Chief Complaint: Nephrology following for acute kidney injury needing hemodialysis.    Subjective:  Patient seen and examined  Awake and alert no distress  Making decent urine output  No chest pain or shortness of breath    Objective:  24HR INTAKE/OUTPUT:    Intake/Output Summary (Last 24 hours) at 5/28/2025 0927  Last data filed at 5/28/2025 0800  Gross per 24 hour   Intake 780 ml   Output 150 ml   Net 630 ml        Admission weight: 87.5 kg (193 lb)  Wt Readings from Last 3 Encounters:   05/28/25 79.5 kg (175 lb 4.3 oz)   04/30/25 87.7 kg (193 lb 6.4 oz)   04/25/25 89.2 kg (196 lb 9.6 oz)        Vitals :   Vitals:    05/27/25 2319 05/28/25 0319 05/28/25 0546 05/28/25 0840   BP: 111/68 (!) 119/59  (!) 121/58   Pulse: 78 71  78   Resp: 16 16  18   Temp: 98.6 °F (37 °C) 98.3 °F (36.8 °C)  98.7 °F (37.1 °C)   TempSrc: Oral Oral  Oral   SpO2: 97% 97%  94%   Weight:   79.5 kg (175 lb 4.3 oz)    Height:           Physical examination  General Appearance:  Well developed. No distress  Mouth/Throat:  Oral mucosa moist  Neck:  Supple, no JVD  Lungs:  Breath sounds: clear  Heart::  S1,S2 heard  Abdomen:  Soft, non - tender  Musculoskeletal:  Edema -no significant edema noted    Medications:  Infusion:    sodium chloride      sodium chloride      sodium chloride      sodium chloride      sodium chloride       Meds:    pantoprazole  40 mg Oral BID AC    midodrine  5 mg Oral TID WC    metoprolol succinate  12.5 mg Oral Daily    ferrous sulfate  325 mg Oral BID WC    gabapentin  300 mg Oral BID    senna  2 tablet Oral BID    magnesium oxide  400 mg Oral Daily    sodium chloride flush  5-40 mL IntraVENous 2 times per day    [Held by provider] atorvastatin  20 mg Oral Daily    Vitamin D  1,000 Units Oral Daily    escitalopram  10 mg

## 2025-05-28 NOTE — PROGRESS NOTES
[facility]  STRZ CVICU 4B  Occupational Therapy  Daily Note    Discharge Recommendations: Home with Home Health OT  Equipment Recommendations: No continue to monitor progress       Time In: 1138  Time Out: 1204  Timed Code Treatment Minutes: 26 Minutes  Minutes: 26          Date: 2025  Patient Name: Alberto Gilliland,   Gender: male      Room: [address] [place] [address]  MRN: [id number]  : 1965  (60 y.o.)  Referring Practitioner: Haseeb Espinoza DO  Diagnosis: Low output heart failure (HCC)  Additional Pertinent Hx: Pt presented due to fatigue and hypotension. PMHx severe AS, WISE, HLD, HTN, COPD, HFrEF. Notes over the last year or so has been having worsening SOB and exertional chest pain. Over the last few weeks has had a dry cough. Denies any chest pain. Denies any SOB. Noted to have DIONISIO and had colonoscopy done on  which showed showed large cecal angiectasia likely the cause of DIONISIO. Given AS there was concern for low cardiac output. CTS seen and recommended TAVR and PCI. Had right and left heart cath on  and was non-obstructive. Patient transferred to ICU for possible need for [place] guided diuresis and phenylephrine. : Patient was placed on ECMO  Patient on CRRT.  5/15: TAVR successful with no immediate complications. Patient intubated. CRRT stopped. Weaning ECMO.  : Patient off ECMO.  Extubated.  embolization branch of Ileocecal artery    Restrictions/Precautions:  Restrictions/Precautions: Fall Risk, General Precautions  Position Activity Restriction  Other Position/Activity Restrictions: hx of fem ECMO site      Social/Functional History:  Lives With: Alone  Type of Home: Apartment  Home Layout: One level  Home Access: Stairs to enter without rails  Entrance Stairs - Number of Steps: 1 step  Home Equipment: None   Bathroom Shower/Tub: Tub/Shower unit  Bathroom Toilet: Standard  Bathroom Equipment: Grab bars in shower       Prior Level of Assist for ADLs: Independent  Prior Level  treatment tolerance       Plan: Times Per Week: 5-6x  Current Treatment Recommendations: Strengthening, Balance training, Functional mobility training, Endurance training, Cognitive reorientation, Pain management, Safety education & training, Patient/Caregiver education & training, Equipment evaluation, education, & procurement, Self-Care / ADL    Goals  Short Term Goals  Time Frame for Short Term Goals: until discharge  Short Term Goal 1: Patient will sequence full body bathing with CGA and 0-2 cues for attention or problem solving  Short Term Goal 2: Patient will complete LB dressing with CGA and use of LHAE prn to improve ease in dressing  Short Term Goal 3: Patient will tolerate 7-9 minutes standing with unilateral UE release with CGA to complete sink side grooming  Short Term Goal 4: Patient will improve functional ambulation to household distances with LRAD and CGA and 0-2 vcs for visually scanning of environment for potential fall hazards  Long Term Goals  Time Frame for Long Term Goals : None due to ELOS    Following session, patient left in safe position in bed, with alarm, and call light within reach

## 2025-05-28 NOTE — DISCHARGE SUMMARY
CRITICAL CARE DISCHARGE SUMMARY      Patient:  Alberto Gilliland    Unit/Bed:4B-08/008-A  YOB: 1965  MRN: [id number]   PCP: Juan Christiansen DO  Date of Admission: 4/30/2025  Chief Complaint:- Hypotension and Fatigue     Assessment and Plan:    Acute decompensated HFrEF  Severe aortic stenosis  GI Bleed  TC  Suspected sepsis/PNA  Acute hypoxic respiratory failure, resolved  Shock liver  Moderate to severe MR  HTN  HLD:   Hypothyroidism  COPD  Lymphatoid papulosis  Pulmonary nodules  MDD/ROMAN  Hypernatremia, improving Resolved  Lactic acidosis, resolved  Hemorrhagic shock, resolved    INITIAL H AND P AND ICU COURSE:  The patient is a 60-year-old male with a past medical history of HFrEF, AS s/p AVR, CAD, HTN, HLD, hypothyroidism, DIONISIO, GERD, COPD and fatty liver disease, who presented for hypotension. Per report, the patient had progressively worsening shortness of breath, exertional chest pain and as of the last few weeks prior to arrival dry cough. He had history of DIONISIO and underwent a colonoscopy that showed a large cecal angiectasia on 5/5/2025. Upon arrival, given his history of severe aortic stenosis with prior mechanical valve followed by repeat bioprosthetic valve due to inability to tolerate anticoagulation there were concerns for low cardiac output. RODRÍGUEZ showed an EF of 30-35%, moderate global hypokinesis with severe aortic stenosis mean AVG 42 mmHg, mild-moderate TR with mildly elevated RVSP 5/7/2025. CTS evaluated and advised TAVR with PCI.  Patient underwent a right and left heart cath that was nonobstructive 5/7.  He was transferred to the ICU for SGC guided diuresis and vasopressor/inotropic support.  During that night he had a GI bleed with bloody stools requiring 2 massive transfusion protocols.  He underwent emergent colonoscopy that did not visualize any active bleeding.  CTA A/P was also negative for active bleeding or extravasation.  On 5/13 he had a increase in respiratory

## 2025-05-28 NOTE — PROGRESS NOTES
After speaking with Amanda, NP with Cardiology - [employer] order is discontinued. [employer] is called and updated - they will send a rep to  the wearable defibrillator - if possible before pt. Is discharged; if not, someone will contact pt. To make arrangements for home . Pt. And Primary RN are updated.

## 2025-05-28 NOTE — PLAN OF CARE
Problem: Discharge Planning  Goal: Discharge to home or other facility with appropriate resources  Outcome: Progressing     Problem: ABCDS Injury Assessment  Goal: Absence of physical injury  Outcome: Progressing     Problem: Safety - Adult  Goal: Free from fall injury  Outcome: Progressing     Problem: Pain  Goal: Verbalizes/displays adequate comfort level or baseline comfort level  Outcome: Progressing     Problem: Respiratory - Adult  Goal: Achieves optimal ventilation and oxygenation  Outcome: Progressing  Flowsheets (Taken 5/28/2025 0938 by Edward Lynne RCP)  Achieves optimal ventilation and oxygenation: Assess for changes in respiratory status     Problem: Cardiovascular - Adult  Goal: Maintains optimal cardiac output and hemodynamic stability  Outcome: Progressing     Problem: Hematologic - Adult  Goal: Maintains hematologic stability  Outcome: Progressing     Problem: Chronic Conditions and Co-morbidities  Goal: Patient's chronic conditions and co-morbidity symptoms are monitored and maintained or improved  Outcome: Progressing     Problem: Nutrition Deficit:  Goal: Optimize nutritional status  Outcome: Progressing     Problem: Skin/Tissue Integrity  Goal: Skin integrity remains intact  Description: 1.  Monitor for areas of redness and/or skin breakdown2.  Assess vascular access sites hourly3.  Every 4-6 hours minimum:  Change oxygen saturation probe site4.  Every 4-6 hours:  If on nasal continuous positive airway pressure, respiratory therapy assess nares and determine need for appliance change or resting period  Outcome: Progressing     Care plan reviewed with patient.  Patient verbalizes understanding of the plan of care and contributes to goal setting.

## 2025-05-29 ENCOUNTER — CARE COORDINATION (OUTPATIENT)
Dept: CARE COORDINATION | Age: 60
End: 2025-05-29

## 2025-05-29 ENCOUNTER — TELEPHONE (OUTPATIENT)
Dept: FAMILY MEDICINE CLINIC | Age: 60
End: 2025-05-29

## 2025-05-29 DIAGNOSIS — I50.9 LOW OUTPUT HEART FAILURE (HCC): Primary | ICD-10-CM

## 2025-05-29 NOTE — TELEPHONE ENCOUNTER
[facility] stating the initiated care with pt today. They will see him once a week for atleast 5wks. Will get Nursing, OT and PT.     Tory says pt had many questions about his medication but got him all situated today and med boxes set up. Although she says pt does NOT have any of his new medications yet. They need to be picked up at the pharmacy.     Any questions call Tory back at [phone].

## 2025-05-29 NOTE — CARE COORDINATION
Ambulatory Care Coordination Note     2025 12:50 PM     Patient Current Location:  [place]     ACM contacted the patient by telephone. Verified name and  with patient as identifiers.         ACM: Tasha Wesley RN     Challenges to be reviewed by the provider   Additional needs identified to be addressed with provider No  none               Method of communication with provider: none.    Utilization: Has the patient been discharged from the hospital since your last call? yes -   Call within 2 business days of discharge: Yes    Patient: Alberto Gilliland    Patient : 1965   MRN: [id number]    Reason for Admission: low output heart failure  Discharge Date: 25  RURS: Readmission Risk Score: 20.6      Last Discharge Facility       Date Complaint Diagnosis Description Type Department Provider    25 Fatigue; Hypotension Acute on chronic congestive heart failure, unspecified heart failure type (HCC) ... ED to Hosp-Admission (Discharged) (ADMITTED) Eduard Shukla MD; Elver Diaz MD;...            Was this an external facility discharge? No    Ambulatory Care Manager reviewed discharge instructions, medical action plan, and red flags with patient. The patient was given an opportunity to ask questions; no further questions or concerns at this time.. The patient verbalized understanding. , demonstrated understanding using teach back method. , and needs reinforcement of information discussed.   Were discharge instructions available to patient? Yes.   Reviewed appropriate site of care based on symptoms and resources available to patient including: PCP  Specialist  Urgent care clinics  Home health  [facility]. The patient agrees to contact the primary care provider and/or specialist office for questions related to their healthcare.     Patients top risk factors for readmission: lack of knowledge about disease, medical condition-CHF, COPD, HTN, hypothyroidism, anxiety, lymphomatoid papulosis,

## 2025-05-30 ENCOUNTER — CARE COORDINATION (OUTPATIENT)
Dept: CARE COORDINATION | Facility: CLINIC | Age: 60
End: 2025-05-30

## 2025-05-30 NOTE — CARE COORDINATION
RPM Verification and Welcome Call Successful? Correct pt verified. Awaiting new consent form to be signed      Remote Patient Monitoring Welcome Note  Date/Time:  2025 1:50 PM  Patient Current Location: [place]  Verified patients name and  as identifiers.       Completed and confirmed the following:    [x] Patient received all RPM equipment (tablet, scale, blood pressure device and cuff, and pulse oximeter)  Cuff Size: large (13.8\"-19.68\")    Weight Scale:  regular (<330lbs)                    [x] Instructed patient keep box for use when returning equipment                                                          [x] Reviewed Patient Welcome Letter with patient    []  Reviewed Consent Form   Copy of consent form in chart (previous consent form singed on 24 and uploaded to Epic on 24)                 [x] Reviewed expectations for patient and care team  Monitoring hours M-F 9-4pm  It is important to take your vitals every day, even on the weekends,to keep your care team aware of how you are doing every day of the week.  Completing monitoring by 12pm on  so that alerts can be responded to in the same day  Patient weighs self at same time every day (or after urinating and waking up)  Take blood pressure 1-2 hrs after medications   RPM team may have different phone area code (including [place], [place], [place] or [place])                              [x] Instructed patient to keep scale on flat surface                                                         [x] Instructed patient to keep tablet plugged in at all times                         [x] Instructed how to contact IT support  ([phone])  [x] Provided Remote Patient Monitoring care  information     Emergency Contact Verified: Lesa Mullen [phone]               All questions answered at this time. Will route to ACM and CCSS with request for RPM Program to be created for linking RPM encounters.            2025 2:57

## 2025-06-02 ENCOUNTER — CARE COORDINATION (OUTPATIENT)
Dept: CARE COORDINATION | Age: 60
End: 2025-06-02

## 2025-06-02 NOTE — CARE COORDINATION
Alberto Gilliland  June 2, 2025    Initial Referral Reason: CHF     Patient Care Team:  Juan Christiansen DO as PCP - General (Family Medicine)  [facility] Christians[facility] DO [facility]  Gildardo Liang MD as Surgeon (Cardiothoracic Surgery)  Jay Pollock MD as Physician (Gastroenterology)  Tanisha Hathaway MD as Cardiologist (Cardiology)  Juan Fletcher MD (Dermatology)  Imtiaz Nogueira DPM as Consulting Physician (Podiatry)  Ramon Neri MD as Consulting Physician (Gastroenterology)  Kendy Garibay, RN as Nurse Navigator (Oncology)  Tasha Wesley RN as Ambulatory Care Manager  Margarette Khanna RD, LD as Dietitian (Dietitian Registered)    Past Medical History:    Current Outpatient Medications   Medication Sig Dispense Refill    metoprolol succinate (TOPROL XL) 25 MG extended release tablet Take 0.5 tablets by mouth daily 30 tablet 3    ferrous sulfate (IRON 325) 325 (65 Fe) MG tablet Take 1 tablet by mouth 2 times daily (with meals) 30 tablet 3    midodrine (PROAMATINE) 5 MG tablet Take 1 tablet by mouth 3 times daily (with meals) 90 tablet 3    levothyroxine (SYNTHROID) 75 MCG tablet Take 1 tablet by mouth Daily 30 tablet 3    pantoprazole (PROTONIX) 40 MG tablet Take 1 tablet by mouth 2 times daily (before meals) 30 tablet 3    nitroGLYCERIN (NITROSTAT) 0.4 MG SL tablet Place 1 tablet under the tongue every 5 minutes as needed for Chest pain up to max of 3 total doses. If no relief after 1 dose, call 911. 25 tablet 3    potassium chloride (KLOR-CON M) 20 MEQ extended release tablet Take 1 tablet by mouth daily 90 tablet 1    vitamin C (ASCORBIC ACID) 500 MG tablet Take 1 tablet by mouth daily      gabapentin (NEURONTIN) 400 MG capsule TAKE 1 CAPSULE BY MOUTH THREE TIMES A  capsule 1    [Paused] ASPIRIN LOW DOSE 81 MG EC tablet TAKE 1 TABLET BY MOUTH EVERY DAY 90 tablet 3    valsartan (DIOVAN) 80 MG tablet Take 1 tablet by mouth daily 90 tablet 3    atorvastatin

## 2025-06-03 ENCOUNTER — HOSPITAL ENCOUNTER (OUTPATIENT)
Age: 60
Discharge: HOME | End: 2025-06-03
Payer: COMMERCIAL

## 2025-06-03 ENCOUNTER — CARE COORDINATION (OUTPATIENT)
Dept: CARE COORDINATION | Age: 60
End: 2025-06-03

## 2025-06-03 ENCOUNTER — RESULTS FOLLOW-UP (OUTPATIENT)
Dept: CARDIOLOGY CLINIC | Age: 60
End: 2025-06-03

## 2025-06-03 ENCOUNTER — OFFICE VISIT (OUTPATIENT)
Dept: CARDIOLOGY CLINIC | Age: 60
End: 2025-06-03
Payer: COMMERCIAL

## 2025-06-03 VITALS
BODY MASS INDEX: 27.06 KG/M2 | DIASTOLIC BLOOD PRESSURE: 76 MMHG | OXYGEN SATURATION: 99 % | HEART RATE: 78 BPM | SYSTOLIC BLOOD PRESSURE: 132 MMHG | WEIGHT: 172.4 LBS | HEIGHT: 67 IN

## 2025-06-03 DIAGNOSIS — I50.22 CHRONIC SYSTOLIC CONGESTIVE HEART FAILURE, NYHA CLASS 2 (HCC): Primary | ICD-10-CM

## 2025-06-03 DIAGNOSIS — Z95.2 S/P TAVR (TRANSCATHETER AORTIC VALVE REPLACEMENT): Primary | ICD-10-CM

## 2025-06-03 DIAGNOSIS — R60.0 EDEMA OF BOTH LOWER LEGS: ICD-10-CM

## 2025-06-03 DIAGNOSIS — Z95.2 S/P TAVR (TRANSCATHETER AORTIC VALVE REPLACEMENT): ICD-10-CM

## 2025-06-03 DIAGNOSIS — I50.22 CHRONIC SYSTOLIC CONGESTIVE HEART FAILURE, NYHA CLASS 2 (HCC): ICD-10-CM

## 2025-06-03 LAB
ANION GAP SERPL CALC-SCNC: 10 MEQ/L (ref 8–16)
BUN SERPL-MCNC: 10 MG/DL (ref 8–23)
CALCIUM SERPL-MCNC: 9 MG/DL (ref 8.8–10.2)
CHLORIDE SERPL-SCNC: 104 MEQ/L (ref 98–111)
CO2 SERPL-SCNC: 24 MEQ/L (ref 22–29)
CREAT SERPL-MCNC: 0.7 MG/DL (ref 0.7–1.2)
DEPRECATED RDW RBC AUTO: 65.3 FL (ref 35–45)
ERYTHROCYTE [DISTWIDTH] IN BLOOD BY AUTOMATED COUNT: 18.9 % (ref 11.5–14.5)
GFR SERPL CREATININE-BSD FRML MDRD: > 90 ML/MIN/1.73M2
GLUCOSE SERPL-MCNC: 98 MG/DL (ref 74–109)
HCT VFR BLD AUTO: 31.1 % (ref 42–52)
HGB BLD-MCNC: 9.3 GM/DL (ref 14–18)
MAGNESIUM SERPL-MCNC: 1.5 MG/DL (ref 1.6–2.6)
MCH RBC QN AUTO: 28.4 PG (ref 26–33)
MCHC RBC AUTO-ENTMCNC: 29.9 GM/DL (ref 32.2–35.5)
MCV RBC AUTO: 95.1 FL (ref 80–94)
NT-PROBNP SERPL IA-MCNC: 4358 PG/ML (ref 0–124)
PLATELET # BLD AUTO: 257 THOU/MM3 (ref 130–400)
PMV BLD AUTO: 11.2 FL (ref 9.4–12.4)
POTASSIUM SERPL-SCNC: 4.8 MEQ/L (ref 3.5–5.2)
RBC # BLD AUTO: 3.27 MILL/MM3 (ref 4.7–6.1)
SODIUM SERPL-SCNC: 138 MEQ/L (ref 135–145)
WBC # BLD AUTO: 6.7 THOU/MM3 (ref 4.8–10.8)

## 2025-06-03 PROCEDURE — 85027 COMPLETE CBC AUTOMATED: CPT

## 2025-06-03 PROCEDURE — 99214 OFFICE O/P EST MOD 30 MIN: CPT | Performed by: NURSE PRACTITIONER

## 2025-06-03 PROCEDURE — 93000 ELECTROCARDIOGRAM COMPLETE: CPT | Performed by: NURSE PRACTITIONER

## 2025-06-03 PROCEDURE — 80048 BASIC METABOLIC PNL TOTAL CA: CPT

## 2025-06-03 PROCEDURE — 36415 COLL VENOUS BLD VENIPUNCTURE: CPT

## 2025-06-03 PROCEDURE — 3078F DIAST BP <80 MM HG: CPT | Performed by: NURSE PRACTITIONER

## 2025-06-03 PROCEDURE — 83880 ASSAY OF NATRIURETIC PEPTIDE: CPT

## 2025-06-03 PROCEDURE — 3075F SYST BP GE 130 - 139MM HG: CPT | Performed by: NURSE PRACTITIONER

## 2025-06-03 PROCEDURE — 83735 ASSAY OF MAGNESIUM: CPT

## 2025-06-03 RX ORDER — CLOPIDOGREL BISULFATE 75 MG/1
75 TABLET ORAL DAILY
Qty: 90 TABLET | Refills: 3 | Status: SHIPPED | OUTPATIENT
Start: 2025-06-03 | End: 2025-06-04

## 2025-06-03 RX ORDER — BUMETANIDE 0.5 MG/1
0.5 TABLET ORAL DAILY
Qty: 90 TABLET | Refills: 3 | Status: SHIPPED | OUTPATIENT
Start: 2025-06-03

## 2025-06-03 RX ORDER — MIDODRINE HYDROCHLORIDE 5 MG/1
2.5 TABLET ORAL
Qty: 45 TABLET | Refills: 3
Start: 2025-06-03

## 2025-06-03 ASSESSMENT — ENCOUNTER SYMPTOMS
NAUSEA: 0
SHORTNESS OF BREATH: 0
VOMITING: 0
ABDOMINAL DISTENTION: 0
COUGH: 0

## 2025-06-03 NOTE — CARE COORDINATION
RD called pt, explained reason for call and role in care. RD sent a message to patient's PCP regarding order for Nepro. PCP sent RX to [facility]. RD informed patient of order for Nepro TID at Pharmacy. Patient is very appreciative. RD will follow up as scheduled.       Margarette Khanna RDN, LD  [phone]

## 2025-06-03 NOTE — PROGRESS NOTES
Heart Failure Clinic       Visit Date: 6/3/2025  Cardiologist:  Dr. Luu  Primary Care Physician: Dr. Christiansen, Juan Rivas,     Alberto Gilliland is a 60 y.o. male who presents today for:  Chief Complaint   Patient presents with    Follow-up       HPI:     TYPE HF: HFrEF 40-45% 2025    Cause: structural - severe AS  Device:   HX: Severe AS s/p TAVR 5/2025, HTN  Dry Wt:  172 on 6/3/25      Alberto Gilliland is a 60 y.o. male who presents to the office for a new patient visit in the heart failure clinic.    Concerns today:here today post hospitalizations for cardiogenic shock, LAVA ECMO. Pt doing well. No longer needing PT/OT at home, going to start cardiac rehab once they reach out. Feels better than he did 6 months ago     Patient has:  Chest Pain: no  SOB: no  Orthopnea/PND: no  AVEL: no  Edema: some - gets worse as the day goes on  Fatigue: no  Abdominal bloating: no  Cough: no  Appetite: good    Activity: ADLs performed w/o MA, can walk distances w/o MA  Diet: Educated following           Past Medical History:   Diagnosis Date    Acute exacerbation of chronic heart failure (HCC) 4/30/2025    Arthritis     general    Bilateral sciatica     Bleeding     with coumadin    CAD (coronary artery disease)     Carpal tunnel syndrome     RIGHT    Cervicalgia, C5-7     Chronic anxiety     COPD, mild (HCC) 04/15/2023    Dysthymia (or depressive neurosis) 02/01/2016    ED (erectile dysfunction)     ED (erectile dysfunction)     Fatty liver 3/5/2024    GERD (gastroesophageal reflux disease)     Headache(784.0)     History of blood transfusion 03/2016    because of taking coumadin    HTN (hypertension)     Hyperlipidemia     Hypothyroidism     Iron deficiency anemia, blood loss     Lumbago     Lumbar radiculopathy     Lymphomatoid papulosis (HCC)     sores on skin    Osteoarthritis (arthritis due to wear and tear of joints)     S/P AVR, coumadin Tx 2016    Spondylosis of thoracic region without myelopathy or radiculopathy

## 2025-06-03 NOTE — PATIENT INSTRUCTIONS
You may receive a survey regarding the care you received during your visit.  Your input is valuable to us.  We encourage you to complete and return your survey.  We hope you will choose us in the future for your healthcare needs.    Your nurses today were Roberth.  Office hours:   Mon-Thurs 8-4:30  Friday 8-12  Phone: [phone]    Continue:  Continue current medications  Daily weights and record  Fluid restriction of 2 Liters per day  Limit sodium in diet to around 8815-7676 mg/day  Monitor BP  Activity as tolerated     Call the Heart Failure Clinic for any of the following symptoms:   Weight gain of -3 pounds in 1 day or 5 pounds in 1 week  Increased shortness of breath  Shortness of breath while laying down  Cough  Chest pain  Swelling in feet, ankles or legs  Bloating in abdomen  Fatigue        Call office to let us know if you are taking Valsartan    Decrease midodrine to 2.5mg three times a day for 5 days and if blood pressure is ok stop taking all together    On Monday 6/9 start ASA 81mg daily and plavix 75mg daily     Start 0.5mg of bumex daily - may add potassium after blood work today    Blood work today     Continue diet/fluid adherence  Continue daily wts.  F/U w/ Cardiology  F/U in clinic in 3 weeks with Rosa

## 2025-06-04 ENCOUNTER — OFFICE VISIT (OUTPATIENT)
Dept: FAMILY MEDICINE CLINIC | Age: 60
End: 2025-06-04

## 2025-06-04 ENCOUNTER — TELEPHONE (OUTPATIENT)
Dept: CARDIOLOGY CLINIC | Age: 60
End: 2025-06-04

## 2025-06-04 VITALS
SYSTOLIC BLOOD PRESSURE: 116 MMHG | WEIGHT: 175.7 LBS | RESPIRATION RATE: 18 BRPM | DIASTOLIC BLOOD PRESSURE: 62 MMHG | HEIGHT: 67 IN | HEART RATE: 64 BPM | BODY MASS INDEX: 27.58 KG/M2

## 2025-06-04 DIAGNOSIS — N17.9 AKI (ACUTE KIDNEY INJURY): ICD-10-CM

## 2025-06-04 DIAGNOSIS — I50.9 ACUTE DECOMPENSATED HEART FAILURE (HCC): ICD-10-CM

## 2025-06-04 DIAGNOSIS — D64.9 CHRONIC ANEMIA: ICD-10-CM

## 2025-06-04 DIAGNOSIS — R79.89 ELEVATED BRAIN NATRIURETIC PEPTIDE (BNP) LEVEL: ICD-10-CM

## 2025-06-04 DIAGNOSIS — K92.2 GASTROINTESTINAL HEMORRHAGE, UNSPECIFIED GASTROINTESTINAL HEMORRHAGE TYPE: ICD-10-CM

## 2025-06-04 DIAGNOSIS — C86.60 LYMPHOMATOID PAPULOSIS (HCC): ICD-10-CM

## 2025-06-04 DIAGNOSIS — J96.01 ACUTE HYPOXEMIC RESPIRATORY FAILURE (HCC): ICD-10-CM

## 2025-06-04 DIAGNOSIS — J18.1 LEFT LOWER LOBE CONSOLIDATION: ICD-10-CM

## 2025-06-04 DIAGNOSIS — E03.9 ACQUIRED HYPOTHYROIDISM: ICD-10-CM

## 2025-06-04 DIAGNOSIS — I34.0 SEVERE MITRAL VALVE REGURGITATION: ICD-10-CM

## 2025-06-04 DIAGNOSIS — R63.5 UNINTENDED WEIGHT GAIN: ICD-10-CM

## 2025-06-04 DIAGNOSIS — I10 ESSENTIAL HYPERTENSION: ICD-10-CM

## 2025-06-04 DIAGNOSIS — J81.0 ACUTE PULMONARY EDEMA (HCC): Primary | ICD-10-CM

## 2025-06-04 RX ORDER — CLOPIDOGREL BISULFATE 75 MG/1
75 TABLET ORAL DAILY
Qty: 90 TABLET | Refills: 3
Start: 2025-06-04

## 2025-06-04 RX ORDER — LANOLIN ALCOHOL/MO/W.PET/CERES
400 CREAM (GRAM) TOPICAL DAILY
Qty: 30 TABLET | Refills: 3 | Status: CANCELLED | OUTPATIENT
Start: 2025-06-04

## 2025-06-04 RX ORDER — LANOLIN ALCOHOL/MO/W.PET/CERES
400 CREAM (GRAM) TOPICAL DAILY
COMMUNITY
End: 2025-06-04

## 2025-06-04 NOTE — PROGRESS NOTES
Post-Discharge Transitional Care Management Services      Alberto Gilliland   YOB: 1965    Date of Visit:  6/4/2025  30 Day Post-Discharge Date: 6/28/25  Chief Complaint   Patient presents with    Follow-Up from Hospital     [facility] D/C 5/28/2025    Medication Check     Patient not sure about metoprolol, stillto inhaler and escitalopram?     He is on pantoprazole but also has esomeprazole, nit sure which one to be on?      Date of Admission: 4/30/2025  Chief Complaint:- Hypotension and Fatigue      Assessment and Plan:    Acute decompensated HFrEF  Severe aortic stenosis  GI Bleed  TC  Suspected sepsis/PNA  Acute hypoxic respiratory failure, resolved  Shock liver  Moderate to severe MR  HTN  HLD:   Hypothyroidism  COPD  Lymphatoid papulosis  Pulmonary nodules  MDD/ROMAN  Hypernatremia, improving Resolved  Lactic acidosis, resolved  Hemorrhagic shock, resolved     INITIAL H AND P AND ICU COURSE:  The patient is a 60-year-old male with a past medical history of HFrEF, AS s/p AVR, CAD, HTN, HLD, hypothyroidism, DIONISIO, GERD, COPD and fatty liver disease, who presented for hypotension. Per report, the patient had progressively worsening shortness of breath, exertional chest pain and as of the last few weeks prior to arrival dry cough. He had history of DIONISIO and underwent a colonoscopy that showed a large cecal angiectasia on 5/5/2025. Upon arrival, given his history of severe aortic stenosis with prior mechanical valve followed by repeat bioprosthetic valve due to inability to tolerate anticoagulation there were concerns for low cardiac output. RODRÍGUEZ showed an EF of 30-35%, moderate global hypokinesis with severe aortic stenosis mean AVG 42 mmHg, mild-moderate TR with mildly elevated RVSP 5/7/2025. CTS evaluated and advised TAVR with PCI.  Patient underwent a right and left heart cath that was nonobstructive 5/7.  He was transferred to the ICU for SGC guided diuresis and vasopressor/inotropic support.  During that 
DISCHARGE

## 2025-06-05 ENCOUNTER — TELEPHONE (OUTPATIENT)
Dept: FAMILY MEDICINE CLINIC | Age: 60
End: 2025-06-05

## 2025-06-05 NOTE — TELEPHONE ENCOUNTER
Ave with [facility]  called office. Says she did an OT eval on pt today and she is NOT taking him on for any occupational therapy. Pt is doing great. Pt is waiting on Cardiac Rehab to contact him to set up that appt.    Any questions call Ave back at [phone].

## 2025-06-06 VITALS
HEART RATE: 87 BPM | WEIGHT: 170.4 LBS | DIASTOLIC BLOOD PRESSURE: 83 MMHG | OXYGEN SATURATION: 98 % | BODY MASS INDEX: 26.69 KG/M2 | SYSTOLIC BLOOD PRESSURE: 130 MMHG

## 2025-06-06 NOTE — TELEPHONE ENCOUNTER
Noted.   68 yo M presents with acute interstitial pancreatitis, likely secondary to gallstone disease. Patient with known history of gallstone pancreatitis and presents with similar symptoms, elevated lipase, and US GB with gallstones and sludge. MRCP final read pending.     - NPO/IVF  - serial abdominal exams  - f/u MRCP  - GI consulted in ED - recs pending  - possible ERCP this admission  - plan for lap rodriguez once pancreatitis clinically resolves  - f/u COVID PCR

## 2025-06-09 ENCOUNTER — FOLLOWUP TELEPHONE ENCOUNTER (OUTPATIENT)
Dept: ADMINISTRATIVE | Age: 60
End: 2025-06-09

## 2025-06-09 ENCOUNTER — TELEPHONE (OUTPATIENT)
Dept: ONCOLOGY | Age: 60
End: 2025-06-09

## 2025-06-09 NOTE — TELEPHONE ENCOUNTER
Alberto called stating he was here April 30th for iron infusions. He was sent to the er then admitted for 3 days. He would like to know if he needs any kind of follow up with Dr. De Leon? Can you please advise?

## 2025-06-09 NOTE — FLOWSHEET NOTE
[facility]  HEART FAILURE PROGRAM  TELEPHONE ENCOUNTER FORM    Alberto Gilliland 1965    ASSESSMENT:   Current weight: 170 lbs  Patient weighing daily: Yes  What are your symptoms of heart failure: None at this time   Are you having any symptoms:  No  Patient knows who to call with symptoms: Yes  Patient following diet as instructed: Yes  Medication history: taking medications as instructed Yes; medication side effects noted No  Patient is being active at home: Yes  Patient knows date and time of follow up appointment: Yes   Patient has transportation to appointments: Yes    RECOMMENDATIONS:     Still taking water pill- ankles are not swollen at this time. Home Health nurse comes once a week. Walking 2x a day.

## 2025-06-11 ENCOUNTER — HOSPITAL ENCOUNTER (OUTPATIENT)
Dept: INFUSION THERAPY | Age: 60
Discharge: HOME | End: 2025-06-11
Payer: COMMERCIAL

## 2025-06-11 ENCOUNTER — OFFICE VISIT (OUTPATIENT)
Dept: ONCOLOGY | Age: 60
End: 2025-06-11
Payer: COMMERCIAL

## 2025-06-11 VITALS
HEART RATE: 79 BPM | HEIGHT: 67 IN | SYSTOLIC BLOOD PRESSURE: 139 MMHG | OXYGEN SATURATION: 99 % | RESPIRATION RATE: 18 BRPM | WEIGHT: 172 LBS | DIASTOLIC BLOOD PRESSURE: 67 MMHG | TEMPERATURE: 97.7 F | BODY MASS INDEX: 27 KG/M2

## 2025-06-11 DIAGNOSIS — R91.1 LUNG NODULE: ICD-10-CM

## 2025-06-11 DIAGNOSIS — C86.60 LYMPHOMATOID PAPULOSIS (HCC): Primary | ICD-10-CM

## 2025-06-11 DIAGNOSIS — D50.9 IRON DEFICIENCY ANEMIA, UNSPECIFIED IRON DEFICIENCY ANEMIA TYPE: ICD-10-CM

## 2025-06-11 LAB
ALBUMIN SERPL BCG-MCNC: 3.4 G/DL (ref 3.4–4.9)
ALP SERPL-CCNC: 136 U/L (ref 40–129)
ALT SERPL W/O P-5'-P-CCNC: 25 U/L (ref 10–50)
ANTI-COMPLEMENT: NORMAL
AST SERPL-CCNC: 27 U/L (ref 10–50)
BASOPHILS ABSOLUTE: 0 THOU/MM3 (ref 0–0.1)
BASOPHILS NFR BLD AUTO: 1 % (ref 0–3)
BILIRUB CONJ SERPL-MCNC: 0.6 MG/DL (ref 0–0.2)
BILIRUB SERPL-MCNC: 0.8 MG/DL (ref 0.3–1.2)
BUN BLDP-MCNC: 10 MG/DL (ref 8–26)
CHLORIDE BLD-SCNC: 102 MEQ/L (ref 98–109)
CREAT BLD-MCNC: 0.7 MG/DL (ref 0.5–1.2)
DAT IGG: NORMAL
DAT POLY-SP REAG RBC QL: NORMAL
EOSINOPHIL NFR BLD AUTO: 3 % (ref 0–4)
EOSINOPHILS ABSOLUTE: 0.2 THOU/MM3 (ref 0–0.4)
ERYTHROCYTE [DISTWIDTH] IN BLOOD BY AUTOMATED COUNT: 18.3 % (ref 11.5–14.5)
FERRITIN SERPL IA-MCNC: 368 NG/ML (ref 30–400)
FOLATE SERPL-MCNC: > 20 NG/ML (ref 4.6–34.8)
GFR SERPL CREATININE-BSD FRML MDRD: > 90 ML/MIN/1.73M2
GLUCOSE BLD-MCNC: 220 MG/DL (ref 70–108)
HAPTOGLOB SERPL-MCNC: 243 MG/DL (ref 30–200)
HCT VFR BLD AUTO: 32 % (ref 42–52)
HGB BLD-MCNC: 9.7 GM/DL (ref 14–18)
HGB RETIC QN AUTO: 32.3 PG (ref 28.2–35.7)
IMM RETICS NFR: 17 % (ref 2.3–13.4)
IMMATURE GRANULOCYTES %: 0 %
IMMATURE GRANULOCYTES ABSOLUTE: 0.01 THOU/MM3 (ref 0–0.07)
IONIZED CALCIUM, WHOLE BLOOD: 1.18 MMOL/L (ref 1.12–1.32)
IRON SATN MFR SERPL: 33 % (ref 20–50)
IRON SERPL-MCNC: 69 UG/DL (ref 61–157)
LDH SERPL L TO P-CCNC: 179 U/L (ref 135–225)
LYMPHOCYTES ABSOLUTE: 0.8 THOU/MM3 (ref 1–4.8)
LYMPHOCYTES NFR BLD AUTO: 12 % (ref 15–47)
MCH RBC QN AUTO: 29 PG (ref 26–33)
MCHC RBC AUTO-ENTMCNC: 30.3 GM/DL (ref 32.2–35.5)
MCV RBC AUTO: 96 FL (ref 80–94)
MONOCYTES ABSOLUTE: 0.5 THOU/MM3 (ref 0.4–1.3)
MONOCYTES NFR BLD AUTO: 7 % (ref 0–12)
NEUTROPHILS ABSOLUTE: 5.2 THOU/MM3 (ref 1.8–7.7)
NEUTROPHILS NFR BLD AUTO: 77 % (ref 43–75)
PLATELET # BLD AUTO: 219 THOU/MM3 (ref 130–400)
PMV BLD AUTO: 10.1 FL (ref 9.4–12.4)
POTASSIUM BLD-SCNC: 3.9 MEQ/L (ref 3.5–4.9)
PROT SERPL-MCNC: 6.7 G/DL (ref 6.4–8.3)
RBC # BLD AUTO: 3.34 MILL/MM3 (ref 4.7–6.1)
RETICS # AUTO: 86 THOU/MM3 (ref 20–115)
RETICS/RBC NFR AUTO: 2.6 % (ref 0.5–2)
SODIUM BLD-SCNC: 141 MEQ/L (ref 138–146)
TIBC SERPL-MCNC: 210 UG/DL (ref 171–450)
TOTAL CO2, WHOLE BLOOD: 27 MEQ/L (ref 23–33)
VIT B12 SERPL-MCNC: 611 PG/ML (ref 232–1245)
WBC # BLD AUTO: 6.8 THOU/MM3 (ref 4.8–10.8)

## 2025-06-11 PROCEDURE — 86860 RBC ANTIBODY ELUTION: CPT

## 2025-06-11 PROCEDURE — 85046 RETICYTE/HGB CONCENTRATE: CPT

## 2025-06-11 PROCEDURE — 36415 COLL VENOUS BLD VENIPUNCTURE: CPT

## 2025-06-11 PROCEDURE — 82746 ASSAY OF FOLIC ACID SERUM: CPT

## 2025-06-11 PROCEDURE — 3078F DIAST BP <80 MM HG: CPT | Performed by: INTERNAL MEDICINE

## 2025-06-11 PROCEDURE — 86870 RBC ANTIBODY IDENTIFICATION: CPT

## 2025-06-11 PROCEDURE — 86880 COOMBS TEST DIRECT: CPT

## 2025-06-11 PROCEDURE — 3075F SYST BP GE 130 - 139MM HG: CPT | Performed by: INTERNAL MEDICINE

## 2025-06-11 PROCEDURE — 82607 VITAMIN B-12: CPT

## 2025-06-11 PROCEDURE — 99214 OFFICE O/P EST MOD 30 MIN: CPT | Performed by: INTERNAL MEDICINE

## 2025-06-11 PROCEDURE — 83550 IRON BINDING TEST: CPT

## 2025-06-11 PROCEDURE — 80047 BASIC METABLC PNL IONIZED CA: CPT

## 2025-06-11 PROCEDURE — 83615 LACTATE (LD) (LDH) ENZYME: CPT

## 2025-06-11 PROCEDURE — 83010 ASSAY OF HAPTOGLOBIN QUANT: CPT

## 2025-06-11 PROCEDURE — 82728 ASSAY OF FERRITIN: CPT

## 2025-06-11 PROCEDURE — 83540 ASSAY OF IRON: CPT

## 2025-06-11 PROCEDURE — 85025 COMPLETE CBC W/AUTO DIFF WBC: CPT

## 2025-06-11 PROCEDURE — 99211 OFF/OP EST MAY X REQ PHY/QHP: CPT

## 2025-06-11 PROCEDURE — 80076 HEPATIC FUNCTION PANEL: CPT

## 2025-06-11 RX ORDER — ALPRAZOLAM 0.5 MG
0.5 TABLET ORAL
COMMUNITY
Start: 2025-06-05

## 2025-06-11 NOTE — PATIENT INSTRUCTIONS
Orders Placed This Encounter   Procedures    PET CT SKULL BASE TO MID THIGH    CBC with Auto Differential    Hepatic Function Panel    POC PANEL BMP W/IOCA    External Referral To Rheumatology   The referral to rheumatogy is at [facility], please obtain release of information.

## 2025-06-11 NOTE — PROGRESS NOTES
gait-nl, speech- fluent, no ataxia.  Devices: none  Skin: Oval shaped lesion on the right palm,      CBC with Differential:      Lab Results   Component Value Date/Time    WBC 6.8 06/11/2025 09:57 AM    RBC 3.34 06/11/2025 09:57 AM    RBC 4.40 05/18/2012 01:10 PM    HGB 9.7 06/11/2025 09:57 AM    HGB 12.0 05/18/2012 01:10 PM    HCT 32.0 06/11/2025 09:57 AM     06/11/2025 09:57 AM    MCV 96 06/11/2025 09:57 AM    MCH 29.0 06/11/2025 09:57 AM    MCHC 30.3 06/11/2025 09:57 AM    RDW 18.3 06/11/2025 09:57 AM    NRBC 0 05/28/2025 03:58 AM    NRBC 0 01/06/2012 06:39 AM    MONOPCT 8.8 05/28/2025 03:58 AM    EOSPCT 3 06/11/2025 09:57 AM    MONOSABS 0.5 06/11/2025 09:57 AM    LYMPHSABS 0.8 06/11/2025 09:57 AM    EOSABS 0.2 06/11/2025 09:57 AM    BASOSABS 0.0 06/11/2025 09:57 AM    DIFFTYPE see below 04/07/2025 09:18 AM   No components found for: \"SEGSABS\"    CMP:    Lab Results   Component Value Date/Time     06/11/2025 09:57 AM     06/03/2025 03:38 PM    K 3.9 06/11/2025 09:57 AM    K 4.8 06/03/2025 03:38 PM    K 3.4 05/19/2025 03:30 PM     06/03/2025 03:38 PM    CO2 24 06/03/2025 03:38 PM    BUN 10 06/03/2025 03:38 PM    CREATININE 0.7 06/11/2025 09:57 AM    CREATININE 0.7 06/03/2025 03:38 PM    LABGLOM > 90 06/11/2025 09:57 AM    LABGLOM >90 04/16/2024 05:29 AM    GLUCOSE 98 06/03/2025 03:38 PM    CALCIUM 9.0 06/03/2025 03:38 PM    BILITOT 0.8 06/11/2025 09:57 AM    ALKPHOS 136 06/11/2025 09:57 AM    AST 27 06/11/2025 09:57 AM    ALT 25 06/11/2025 09:57 AM       BMP:    Lab Results   Component Value Date/Time     06/11/2025 09:57 AM     06/03/2025 03:38 PM    K 3.9 06/11/2025 09:57 AM    K 4.8 06/03/2025 03:38 PM    K 3.4 05/19/2025 03:30 PM     06/03/2025 03:38 PM    CO2 24 06/03/2025 03:38 PM    BUN 10 06/03/2025 03:38 PM    CREATININE 0.7 06/11/2025 09:57 AM    CREATININE 0.7 06/03/2025 03:38 PM    CALCIUM 9.0 06/03/2025 03:38 PM    LABGLOM > 90 06/11/2025 09:57 AM    LABGLOM

## 2025-06-12 ENCOUNTER — HOSPITAL ENCOUNTER (OUTPATIENT)
Age: 60
Discharge: HOME | End: 2025-06-12
Payer: COMMERCIAL

## 2025-06-12 ENCOUNTER — CARE COORDINATION (OUTPATIENT)
Dept: CARE COORDINATION | Age: 60
End: 2025-06-12

## 2025-06-12 ENCOUNTER — RESULTS FOLLOW-UP (OUTPATIENT)
Dept: CARDIOLOGY CLINIC | Age: 60
End: 2025-06-12

## 2025-06-12 DIAGNOSIS — I50.22 CHRONIC SYSTOLIC CONGESTIVE HEART FAILURE, NYHA CLASS 2 (HCC): ICD-10-CM

## 2025-06-12 DIAGNOSIS — Z95.2 S/P TAVR (TRANSCATHETER AORTIC VALVE REPLACEMENT): ICD-10-CM

## 2025-06-12 LAB
ANION GAP SERPL CALC-SCNC: 11 MEQ/L (ref 8–16)
BLOOD GROUP ANTIBODIES SERPL ELUTION: NORMAL
BUN SERPL-MCNC: 12 MG/DL (ref 8–23)
CALCIUM SERPL-MCNC: 9 MG/DL (ref 8.8–10.2)
CHLORIDE SERPL-SCNC: 101 MEQ/L (ref 98–111)
CO2 SERPL-SCNC: 25 MEQ/L (ref 22–29)
CREAT SERPL-MCNC: 0.8 MG/DL (ref 0.7–1.2)
DEPRECATED RDW RBC AUTO: 65.1 FL (ref 35–45)
ERYTHROCYTE [DISTWIDTH] IN BLOOD BY AUTOMATED COUNT: 18.7 % (ref 11.5–14.5)
GFR SERPL CREATININE-BSD FRML MDRD: > 90 ML/MIN/1.73M2
GLUCOSE SERPL-MCNC: 139 MG/DL (ref 74–109)
HCT VFR BLD AUTO: 32.4 % (ref 42–52)
HGB BLD-MCNC: 9.8 GM/DL (ref 14–18)
MAGNESIUM SERPL-MCNC: 1.6 MG/DL (ref 1.6–2.6)
MCH RBC QN AUTO: 29 PG (ref 26–33)
MCHC RBC AUTO-ENTMCNC: 30.2 GM/DL (ref 32.2–35.5)
MCV RBC AUTO: 95.9 FL (ref 80–94)
PLATELET # BLD AUTO: 222 THOU/MM3 (ref 130–400)
PMV BLD AUTO: 10 FL (ref 9.4–12.4)
POTASSIUM SERPL-SCNC: 4.1 MEQ/L (ref 3.5–5.2)
RBC # BLD AUTO: 3.38 MILL/MM3 (ref 4.7–6.1)
SODIUM SERPL-SCNC: 137 MEQ/L (ref 135–145)
WBC # BLD AUTO: 7.1 THOU/MM3 (ref 4.8–10.8)

## 2025-06-12 PROCEDURE — 36415 COLL VENOUS BLD VENIPUNCTURE: CPT

## 2025-06-12 PROCEDURE — 83735 ASSAY OF MAGNESIUM: CPT

## 2025-06-12 PROCEDURE — 80048 BASIC METABOLIC PNL TOTAL CA: CPT

## 2025-06-12 PROCEDURE — 85027 COMPLETE CBC AUTOMATED: CPT

## 2025-06-12 NOTE — CARE COORDINATION
Ambulatory Care Coordination Note  2025    Patient Current Location:  Home: [place] [address]     [place] contacted the patient by telephone. Verified name and  with patient as identifiers. Provided introduction to self, and explanation of the [place] role.     Challenges to be reviewed by the provider   Additional needs identified to be addressed with provider: No  none               Method of communication with provider: none.    I spoke with the patient for continued Care Coordination follow up and education.  Patient states overall he is doing okay.  Patient is back at home.  Patient states he does get tired and SOB on exertion.  Breathing is at baseline.  Denies increased SOB, CP or edema.  Patient remains active with HH services and RPM services.  We discussed Zone management tools for chronic disease(s) and patient denies current questions re: s/sx at this time.  Confirmed upcoming appointments.  Educated on how to identify sx's that are worse than the baseline and the importance of early symptom recognition and reporting to prevent exacerbation which may lead to ED visits and hospital admissions.  I advised patient to contact PCP office if needed.  No further needs at this time.        Lab Results       None            Care Coordination Interventions    Referral from Primary Care Provider: No  Suggested Interventions and Community Resources          Goals Addressed    None         Future Appointments   Date Time Provider Department Center   2025  8:45 AM STR ECHO 3 STRZ [facility] STR Rad/Card   2025 10:00 AM Rosa Wise APRN - CNP N SRPX CHF [facility] [place]   2025 12:45 PM STR PET IMAGING ROOM STRZ PET STR Rad/Card   2025 10:00 AM STR CARDIOPULMONARY CONFERENCE ROOM STRZ CAR [facility] [place] [facility]   2025 11:30 AM Ivy De Leon MD N Oncology [facility] [place]   2025  1:00 PM STR ECHO 3 STRZ LIZY STR Rad/Card   2025  1:30 PM HemRosa alejandra APRN - CNP N SRPX CHF [facility]

## 2025-06-13 ENCOUNTER — CARE COORDINATION (OUTPATIENT)
Dept: CARE COORDINATION | Age: 60
End: 2025-06-13

## 2025-06-13 NOTE — CARE COORDINATION
Per Margarette Khanna RDN dietician education mailed to patient.        [facility]  CC   Medication Assistance  [place] [employer]    (P) [phone]  (F) [phone]

## 2025-06-16 ENCOUNTER — CARE COORDINATION (OUTPATIENT)
Dept: CARE COORDINATION | Age: 60
End: 2025-06-16

## 2025-06-16 NOTE — CARE COORDINATION
Remote Alert Monitoring Note      Date/Time:  2025 8:16 AM  Patient Current Location: Home: [place] [address]    LPN contacted patient by telephone regarding red alert received for weight increase (174.4lb). Verified patients name and  as identifiers.    Rpm alert to be reviewed by the provider                        Background: COPD, High Blood-Pressure, CHF     Refer to 911 immediately if:  Patient unresponsive or unable to provide history  Change in cognition or sudden confusion  Patient unable to respond in complete sentences  Intense chest pain/tightness  Any concern for any clinical emergency  Red Alert: Provider response time of 1 hr required for any red alert requiring intervention  Yellow Alert: Provider response time of 3hr required for any escalated yellow alert          Have you taken your medications as instructed by your doctor today? Yes   Weight Triage  Are you weighing any different than you did yesterday? (time of day, clothes and shoes on or off, etc)? no   Do you have any shortness of breath? no   Do you have any swelling in your hands of feet? no   Are you having any other health concerns or issues? no    Instructed Patient/Caregiver on:  Patient to weigh on the same scale at the same time each day  Patient to weigh first thing in the morning, after going to the bathroom; before eating breakfast, and before having anything to drink.  Instructed on importance of not wearing shoes on the scale, and wearing the same type of clothing each day.          Clinical Interventions: Reviewed and followed up on alerts and treatments-. 5lb weight gain over the past 7 days.  Pt is asymptomatic and in no apparent distress, speaking in full sentences.  Patient denies any swelling or breathing concerns. He does report constipation and has not had a BM in 2-3 days. He did take a stool softener this am. He reports compliance with all medications including the Bumex. Hydration encouraged.   The following instructions given during Pre Admission Testing visit:    Do not eat or drink anything after MN except for sips of water with your a.m. Prescription meds unless otherwise instructed by your physician.    Glasses and jewelry may be worn, but dentures must be removed prior to cath/procedure.    Leave any items you consider valuable at home.    Family members may wait in [facility] waiting area during procedure.    Bring all medications in their original containers the day of procedure.    Bring photo ID and insurance cards on the day of procedure.    Need to make arrangements for transportation prior to discharge.    The following handouts were given:     Heart Cath pathway (if applicable)   Cardiac Cath booklet published by [facility]    OR appropriate [facility] procedure booklet    If applicable, pt instructed to bring CPAP mask and tubing the day of procedure.

## 2025-06-18 ENCOUNTER — CARE COORDINATION (OUTPATIENT)
Dept: CARE COORDINATION | Age: 60
End: 2025-06-18

## 2025-06-18 NOTE — CARE COORDINATION
Ambulatory Care Coordination Note     2025 3:20 PM     Patient Current Location:  Home: [place] [address]     ACM contacted the patient by telephone. Verified name and  with patient as identifiers.         ACM: Tasha Wesley RN     Challenges to be reviewed by the provider   Additional needs identified to be addressed with provider No  none               Method of communication with provider: none.    Utilization: Patient has not had any utilization since our last call.     Care Summary Note:  Alberto was referred to care coordination by population health report.  Pt has h/o: lymphomatoid papulosis, hypothyroidism, anxiety, COPD, HTN, CHF   Pt s/p TAVR.   Spoke with Alberto today for f/u.   Pt reports that he is doing well.   COPD: breathing at baseline.  No new cough or congestion.   CHF: following CHF clinic.  Monitoring daily wts.  Has echo and CHF clinic appt tomorrow.    Pt no longer on midodrine.  BP has been doing well without medication.  Today BP was initially 109/67.  Checked a little later and BP was 114/56.    Experiencing some right leg numbness which he plans on discussing with provider tomorrow.   Continues to receive SR  services   Pt has cardiac rehab consultation next mth.   Had recent oncology appt.  Pt will be having PET scan completed. Referred to [facility] rheumatology.  Appt scheduled for .    Constipation resolved  Pt is increasing his activity level.   No new concerns at this time.   Reports that he has not had any new skin lesions since d/c from hospital.   Offered patient enrollment in the Remote Patient Monitoring (RPM) program for in-home monitoring: Yes, patient enrolled; current status is activated and monitoring.   114/56-71-97% RA wt 173.4#  Assessments Completed:   Congestive Heart Failure Assessment    Are you currently restricting fluids?: 2000cc  Do you understand a low sodium diet?: Yes  Do you understand how to read food labels?: Yes  How many restaurant

## 2025-06-18 NOTE — CARE COORDINATION
Ambulatory Care Coordination Note     6/18/2025 2:46 PM     Patient outreach attempt by this ACM today to perform care management follow up . ACM was unable to reach the patient by telephone today;   left voice message requesting a return phone call to this ACM.     ACM: Tasha Wesley RN     Care Summary Note:  Alberto was referred to care coordination by population health report.  Pt has h/o: lymphomatoid papulosis, hypothyroidism, anxiety, COPD, HTN, CHF     PCP/Specialist follow up:   Future Appointments         Provider Specialty Dept Phone    6/19/2025 8:45 AM (Arrive by 8:30 AM) STR ECHO 3 Cardiology [phone]    6/19/2025 10:00 AM Rosa Wise APRN - CNP Cardiology [phone]    7/7/2025 12:45 PM (Arrive by 12:15 PM) STR PET IMAGING ROOM Radiology [phone]    7/8/2025 10:00 AM STR CARDIOPULMONARY CONFERENCE ROOM Cardiac Rehabilitation [phone]    7/9/2025 11:30 AM Ivy De Leon MD Oncology [phone]    8/27/2025 1:00 PM (Arrive by 12:45 PM) STR ECHO 3 Cardiology [phone]    8/27/2025 1:30 PM Rosa Wise APRN - CNP Cardiology [phone]    9/3/2025 2:00 PM Juan Christiansen,  Family Medicine [phone]    10/20/2025 11:00 AM Tanisha Hathaway MD Cardiology [phone]    5/15/2026 10:00 AM (Arrive by 9:45 AM) STR ECHO 3 Cardiology [phone]    5/15/2026 11:30 AM Rosa Wise APRN - CNP Cardiology [phone]            Follow Up:   Plan for next ACM outreach in approximately 1 week to complete:  - CC Protocol assessments  - disease specific assessments  - goal progression  - education   - RPM.

## 2025-06-18 NOTE — CARE COORDINATION
Alberto Gilliland  6/18/2025    Registered Dietitian Progress Note for Care Coordination    Assessment: Alberto is a 60 y.o. male.  RD referred for CHF. RD spoke with patient for initial nutrition assessment on 6/2/25. RD called to follow up with pt today 6/18/25. Patient states he hasn't received the handouts in the mail- per chart review, [facility] sent mail on 6/13/25. RD discussed previous goals with pt. Patient states he is eating 3 meals/day. Today for breakfast he ate a bowl of oatmeal with a glass of orange juice. RD reviewed the components of a balanced meal using MyPlate. Discussed incorporating a variety of fruits, vegetables, whole grains, lean protein, low fat dairy. Per chart review, patient had OV with PCP on 6/4/25. PCP sent RX to [facility] on 6/3/25 for Nepro- patient states he went to [facility] and they said they did not receive a script. RD sent message asking PCP to resend RX to [facility]. Per chart review, patient had OV with [facility] on 6/3/25 - FR of 2 L per day and 0669-0947 mg of sodium per day were recommended by CHF Clinic. RD reiterated the importance of following a low sodium and FR diet. Discussed avoiding the salt shaker and reading food labels very closely to help choose lower sodium options. Explained 2 L = 67 ounces = 8 1/3 cup. Reviewed a fluid restricted diet. Patient verbalizes understanding. Reiterated the importance of taking medicine as directed and monitoring daily weights- patient is enrolled in RPM. Patient has no nutrition related questions or concerns at this time. Per chart review, patient has OV with [facility]F Clinic on 6/19/25 and patient has cardiac rehab assessment scheduled for 7/8/25.     Barriers to meeting goals: overwhelmed by complexity of regimen, stress, and lack of education        Nutrition Monitoring and Evaluation  Indicator/Goal Criteria Progress   #1 Eat balanced meals consistently throughout the day. Follow diet recommendations per SLP.  #1  Focus on eating 3 meals/day and

## 2025-06-19 ENCOUNTER — TELEPHONE (OUTPATIENT)
Dept: CARDIOLOGY CLINIC | Age: 60
End: 2025-06-19

## 2025-06-19 ENCOUNTER — OFFICE VISIT (OUTPATIENT)
Dept: CARDIOLOGY CLINIC | Age: 60
End: 2025-06-19
Payer: COMMERCIAL

## 2025-06-19 ENCOUNTER — HOSPITAL ENCOUNTER (OUTPATIENT)
Age: 60
Discharge: HOME | End: 2025-06-21
Attending: INTERNAL MEDICINE
Payer: COMMERCIAL

## 2025-06-19 VITALS
SYSTOLIC BLOOD PRESSURE: 117 MMHG | DIASTOLIC BLOOD PRESSURE: 66 MMHG | BODY MASS INDEX: 27 KG/M2 | WEIGHT: 172 LBS | HEIGHT: 67 IN

## 2025-06-19 VITALS
HEART RATE: 63 BPM | OXYGEN SATURATION: 99 % | DIASTOLIC BLOOD PRESSURE: 60 MMHG | SYSTOLIC BLOOD PRESSURE: 130 MMHG | BODY MASS INDEX: 27.78 KG/M2 | WEIGHT: 177 LBS | HEIGHT: 67 IN

## 2025-06-19 DIAGNOSIS — Z95.2 S/P AVR: ICD-10-CM

## 2025-06-19 DIAGNOSIS — I50.22 HEART FAILURE WITH MID-RANGE EJECTION FRACTION (HFMEF) (HCC): ICD-10-CM

## 2025-06-19 DIAGNOSIS — I10 PRIMARY HYPERTENSION: ICD-10-CM

## 2025-06-19 DIAGNOSIS — Z95.2 S/P TAVR (TRANSCATHETER AORTIC VALVE REPLACEMENT): Primary | ICD-10-CM

## 2025-06-19 DIAGNOSIS — Z95.2 S/P TAVR (TRANSCATHETER AORTIC VALVE REPLACEMENT): ICD-10-CM

## 2025-06-19 LAB
ECHO AO ASC DIAM: 3.1 CM
ECHO AO ASCENDING AORTA INDEX: 1.63 CM/M2
ECHO AV ACCELERATION TIME: 92 MS
ECHO AV MEAN GRADIENT: 11 MMHG
ECHO AV MEAN VELOCITY: 1.6 M/S
ECHO AV PEAK GRADIENT: 22 MMHG
ECHO AV PEAK VELOCITY: 2.3 M/S
ECHO AV VELOCITY RATIO: 0.26
ECHO BSA: 1.92 M2
ECHO EST RA PRESSURE: 3 MMHG
ECHO LA AREA 2C: 17.1 CM2
ECHO LA AREA 4C: 17.4 CM2
ECHO LA DIAMETER INDEX: 2.11 CM/M2
ECHO LA DIAMETER: 4 CM
ECHO LA MAJOR AXIS: 4.9 CM
ECHO LA MINOR AXIS: 4.3 CM
ECHO LA VOL BP: 56 ML (ref 18–58)
ECHO LA VOL MOD A2C: 54 ML (ref 18–58)
ECHO LA VOL MOD A4C: 51 ML (ref 18–58)
ECHO LA VOL/BSA BIPLANE: 29 ML/M2 (ref 16–34)
ECHO LA VOLUME INDEX MOD A2C: 28 ML/M2 (ref 16–34)
ECHO LA VOLUME INDEX MOD A4C: 27 ML/M2 (ref 16–34)
ECHO LV E' LATERAL VELOCITY: 8.5 CM/S
ECHO LV E' SEPTAL VELOCITY: 5.5 CM/S
ECHO LV EDV A2C: 161 ML
ECHO LV EDV A4C: 174 ML
ECHO LV EDV INDEX A4C: 92 ML/M2
ECHO LV EDV NDEX A2C: 85 ML/M2
ECHO LV EF PHYSICIAN: 40 %
ECHO LV EJECTION FRACTION A2C: 41 %
ECHO LV EJECTION FRACTION A4C: 43 %
ECHO LV EJECTION FRACTION BIPLANE: 42 % (ref 55–100)
ECHO LV ESV A2C: 94 ML
ECHO LV ESV A4C: 100 ML
ECHO LV ESV INDEX A2C: 49 ML/M2
ECHO LV ESV INDEX A4C: 53 ML/M2
ECHO LV FRACTIONAL SHORTENING: 22 % (ref 28–44)
ECHO LV INTERNAL DIMENSION DIASTOLE INDEX: 2.84 CM/M2
ECHO LV INTERNAL DIMENSION DIASTOLIC: 5.4 CM (ref 4.2–5.9)
ECHO LV INTERNAL DIMENSION SYSTOLIC INDEX: 2.21 CM/M2
ECHO LV INTERNAL DIMENSION SYSTOLIC: 4.2 CM
ECHO LV ISOVOLUMETRIC RELAXATION TIME (IVRT): 81 MS
ECHO LV IVSD: 1.2 CM (ref 0.6–1)
ECHO LV MASS 2D: 279.8 G (ref 88–224)
ECHO LV MASS INDEX 2D: 147.3 G/M2 (ref 49–115)
ECHO LV POSTERIOR WALL DIASTOLIC: 1.3 CM (ref 0.6–1)
ECHO LV RELATIVE WALL THICKNESS RATIO: 0.48
ECHO LVOT AREA: 2.5 CM2
ECHO LVOT DIAM: 1.8 CM
ECHO LVOT MEAN GRADIENT: 1 MMHG
ECHO LVOT PEAK GRADIENT: 1 MMHG
ECHO LVOT PEAK VELOCITY: 0.6 M/S
ECHO LVOT STROKE VOLUME INDEX: 19.7 ML/M2
ECHO LVOT SV: 37.4 ML
ECHO LVOT VTI: 14.7 CM
ECHO MV A VELOCITY: 1.01 M/S
ECHO MV E DECELERATION TIME (DT): 285 MS
ECHO MV E VELOCITY: 0.97 M/S
ECHO MV E/A RATIO: 0.96
ECHO MV E/E' LATERAL: 11.41
ECHO MV E/E' RATIO (AVERAGED): 14.52
ECHO MV E/E' SEPTAL: 17.64
ECHO MV REGURGITANT PEAK GRADIENT: 92 MMHG
ECHO MV REGURGITANT PEAK VELOCITY: 4.8 M/S
ECHO PULMONARY ARTERY END DIASTOLIC PRESSURE: 4 MMHG
ECHO PV MAX VELOCITY: 0.8 M/S
ECHO PV PEAK GRADIENT: 3 MMHG
ECHO PV REGURGITANT MAX VELOCITY: 1 M/S
ECHO RIGHT VENTRICULAR SYSTOLIC PRESSURE (RVSP): 16 MMHG
ECHO RV INTERNAL DIMENSION: 3.8 CM
ECHO RV TAPSE: 1.5 CM (ref 1.7–?)
ECHO TV E WAVE: 0.4 M/S
ECHO TV REGURGITANT MAX VELOCITY: 1.79 M/S
ECHO TV REGURGITANT PEAK GRADIENT: 13 MMHG

## 2025-06-19 PROCEDURE — 93306 TTE W/DOPPLER COMPLETE: CPT | Performed by: INTERNAL MEDICINE

## 2025-06-19 PROCEDURE — 99214 OFFICE O/P EST MOD 30 MIN: CPT | Performed by: NURSE PRACTITIONER

## 2025-06-19 PROCEDURE — 93306 TTE W/DOPPLER COMPLETE: CPT

## 2025-06-19 PROCEDURE — 3078F DIAST BP <80 MM HG: CPT | Performed by: NURSE PRACTITIONER

## 2025-06-19 PROCEDURE — 93000 ELECTROCARDIOGRAM COMPLETE: CPT | Performed by: NURSE PRACTITIONER

## 2025-06-19 PROCEDURE — 3075F SYST BP GE 130 - 139MM HG: CPT | Performed by: NURSE PRACTITIONER

## 2025-06-19 RX ORDER — BUMETANIDE 0.5 MG/1
0.5 TABLET ORAL DAILY PRN
Qty: 90 TABLET | Refills: 3
Start: 2025-06-19

## 2025-06-19 NOTE — PATIENT INSTRUCTIONS
Stop the midodrine.     Start the Jardiance 10 mg daily to help keep salt and water from staying in your system.     Stop the Bumex daily - will now only take on as needed basis.  Bumex prn: only take if weight up by 3 pounds overnight or more than 5 pounds in a week. Keep a log and mao on the log the days you took the Bumex. Make sure drinking adequately to the 2L fluid restriction.     Continue other current medications as prescribed.    Stay as active as you can.   Start cardiac rehab as scheduled.     Eat heart healthy diet.     Follow-up with your PCP as scheduled.    Follow-up with JASWINDER Collisn CNP on 8/27/2025 for the echo and follow-up appointment as scheduled or sooner if need.     Office hours:   Mon-Thurs 8-4:30  Friday 8-12  Phone: [phone]    Continue:  Continue current medications  Daily weights and record  Fluid restriction of 2 Liters per day  Limit sodium in diet to around 1565-8414 mg/day  Monitor BP    Call the Heart Failure Clinic for any of the following symptoms:   Weight gain of 3 pounds in 1 day or 5 pounds in 1 week  Increased shortness of breath  Shortness of breath while laying down  Chest pain  Swelling in feet, ankles or legs  Bloating in abdomen  Fatigue

## 2025-06-19 NOTE — PROGRESS NOTES
Alberto Gilliland (:  1965) is a 60 y.o. male, Established patient, here for  30 day post Rishi TAVR evaluation.    Primary cardiologist: Dr. Luu  Structural heart: Dr. Rivera    Follow-up (30 day post TAVR) and Coronary Artery Disease    HPI:   Last seen on 6/3/2025 for hospital follow-up. Post hospitalization for cardiogenic shock, LAVA ECMO. Pt doing well. No longer needing PT/OT at home, going to start cardiac rehab once they reach out. Feels better than he did 6 months ago.  ADLs performed w/o MA, can walk distances w/o MA   Stable, pt doing well. Trace lower leg swelling on exam, will start low dose bumex until all GDMT can be added - need off midodrine. Will wear compression socks as well. Starting Plavix/ASA on . Labs today, may need to add potassium. F/U with Rosa on    Dry Wt:  172 on 6/3/25     30 day follow-up evaluation post Rishi TAVR on 2025:    Assessment & Plan  HFrEF 40-45%     Severe AS s/p  VivTAVR 5/15/2025  HTN  Neuropathic pain R LE            1. Post valve-in-valve TAVR evaluation:  - Maintain mobility in feet to prevent fluid accumulation in calves during periods of sitting.  - Discontinue midodrine and Bumex.  - Initiate Jardiance at a dosage of 10 mg daily to manage salt and water retention.  - Prescription for 30 tablets will be sent to pharmacy, and samples will be provided today.  - Take Bumex only as needed and monitor weight closely.  - If weight gain of 3 pounds overnight or 5 to 7 pounds within a week, take a dose of Bumex.  - Repeat labs in one week to ensure kidneys are tolerating the medication well.  - Ejection fraction currently between 40% to 45%.  - Kidney function has returned to normal levels.  - Hemoglobin is 9.8.  - White blood cell count is 7.1.    2. Right leg numbness:  - Likely due to compression of the nerve by the arterial line used during cardiac support.  - Should improve over time.  - Prefers to give it time rather than seeking immediate 
Detail Level: Detailed
Pt here for 30 day post TAVR    Ptj c/o right leg numbness noticed for about a week ,     EKG done today     Pt has restarted ASA, stopped midodrine    Pt continues with fatigue       
Add 86774 Cpt? (Important Note: In 2017 The Use Of 83427 Is Being Tracked By Cms To Determine Future Global Period Reimbursement For Global Periods): yes
Body Location Override (Optional - Billing Will Still Be Based On Selected Body Map Location If Applicable): right scapula

## 2025-06-26 ENCOUNTER — HOSPITAL ENCOUNTER (OUTPATIENT)
Age: 60
Discharge: HOME | End: 2025-06-26
Payer: COMMERCIAL

## 2025-06-26 ENCOUNTER — RESULTS FOLLOW-UP (OUTPATIENT)
Dept: CARDIOLOGY CLINIC | Age: 60
End: 2025-06-26

## 2025-06-26 DIAGNOSIS — I50.22 HEART FAILURE WITH MID-RANGE EJECTION FRACTION (HFMEF) (HCC): ICD-10-CM

## 2025-06-26 DIAGNOSIS — I50.22 CHRONIC SYSTOLIC (CONGESTIVE) HEART FAILURE (HCC): Primary | ICD-10-CM

## 2025-06-26 LAB
ANION GAP SERPL CALC-SCNC: 11 MEQ/L (ref 8–16)
BUN SERPL-MCNC: 13 MG/DL (ref 8–23)
CALCIUM SERPL-MCNC: 9.1 MG/DL (ref 8.8–10.2)
CHLORIDE SERPL-SCNC: 104 MEQ/L (ref 98–111)
CO2 SERPL-SCNC: 23 MEQ/L (ref 22–29)
CREAT SERPL-MCNC: 1 MG/DL (ref 0.7–1.2)
GFR SERPL CREATININE-BSD FRML MDRD: 85 ML/MIN/1.73M2
GLUCOSE SERPL-MCNC: 107 MG/DL (ref 74–109)
MAGNESIUM SERPL-MCNC: 2.7 MG/DL (ref 1.6–2.6)
NT-PROBNP SERPL IA-MCNC: 816 PG/ML (ref 0–124)
POTASSIUM SERPL-SCNC: 4.5 MEQ/L (ref 3.5–5.2)
SODIUM SERPL-SCNC: 138 MEQ/L (ref 135–145)

## 2025-06-26 PROCEDURE — 83735 ASSAY OF MAGNESIUM: CPT

## 2025-06-26 PROCEDURE — 83880 ASSAY OF NATRIURETIC PEPTIDE: CPT

## 2025-06-26 PROCEDURE — 80048 BASIC METABOLIC PNL TOTAL CA: CPT

## 2025-06-26 PROCEDURE — 36415 COLL VENOUS BLD VENIPUNCTURE: CPT

## 2025-07-01 ENCOUNTER — CARE COORDINATION (OUTPATIENT)
Dept: CARE COORDINATION | Age: 60
End: 2025-07-01

## 2025-07-01 NOTE — CARE COORDINATION
Ambulatory Care Coordination Note     7/1/2025 2:23 PM     Patient outreach attempt by this ACM today to perform care management follow up . ACM was unable to reach the patient by telephone today;   left voice message requesting a return phone call to this ACM.     ACM: Tasha Wesley RN     Care Summary Note: Alberto was referred to care coordination by population health report.  Pt has h/o: lymphomatoid papulosis, hypothyroidism, anxiety, COPD, HTN, CHF   Pt s/p TAVR.     PCP/Specialist follow up:   Future Appointments         Provider Specialty Dept Phone    7/7/2025 12:45 PM (Arrive by 12:15 PM) STR PET IMAGING ROOM Radiology [phone]    7/8/2025 10:00 AM STR CARDIOPULMONARY CONFERENCE ROOM Cardiac Rehabilitation [phone]    7/9/2025 11:30 AM Ivy eD Leon MD Oncology [phone]    8/27/2025 1:00 PM (Arrive by 12:45 PM) STR ECHO 3 Cardiology [phone]    8/27/2025 1:30 PM Rosa Wise, APRN - CNP Cardiology [phone]    9/3/2025 2:00 PM Juan Christiansen,  Family Medicine [phone]    10/20/2025 11:00 AM Tanisha Hathaway MD Cardiology [phone]    5/15/2026 10:00 AM (Arrive by 9:45 AM) STR ECHO 3 Cardiology [phone]    5/15/2026 11:30 AM Rosa Wise APRN - CNP Cardiology [phone]            Follow Up:   Plan for next ACM outreach in approximately 1 week to complete:  - CC Protocol assessments  - disease specific assessments  - goal progression  - education .

## 2025-07-07 ENCOUNTER — HOSPITAL ENCOUNTER (OUTPATIENT)
Dept: PET IMAGING | Age: 60
Discharge: HOME | End: 2025-07-07
Attending: INTERNAL MEDICINE
Payer: COMMERCIAL

## 2025-07-07 DIAGNOSIS — R91.1 LUNG NODULE: ICD-10-CM

## 2025-07-07 PROCEDURE — 78815 PET IMAGE W/CT SKULL-THIGH: CPT

## 2025-07-07 PROCEDURE — 3430000000 HC RX DIAGNOSTIC RADIOPHARMACEUTICAL: Performed by: INTERNAL MEDICINE

## 2025-07-07 PROCEDURE — A9609 HC RX DIAGNOSTIC RADIOPHARMACEUTICAL: HCPCS | Performed by: INTERNAL MEDICINE

## 2025-07-07 RX ORDER — FLUDEOXYGLUCOSE F 18 200 MCI/ML
9.8 INJECTION, SOLUTION INTRAVENOUS
Status: COMPLETED | OUTPATIENT
Start: 2025-07-07 | End: 2025-07-07

## 2025-07-07 RX ADMIN — FLUDEOXYGLUCOSE F 18 9.8 MILLICURIE: 200 INJECTION, SOLUTION INTRAVENOUS at 12:25

## 2025-07-08 ENCOUNTER — HOSPITAL ENCOUNTER (OUTPATIENT)
Dept: CARDIAC REHAB | Age: 60
Setting detail: THERAPIES SERIES
Discharge: HOME | End: 2025-07-08
Attending: INTERNAL MEDICINE
Payer: COMMERCIAL

## 2025-07-08 ENCOUNTER — CARE COORDINATION (OUTPATIENT)
Dept: CARE COORDINATION | Age: 60
End: 2025-07-08

## 2025-07-08 PROCEDURE — G0423 INTENS CARDIAC REHAB NO EXER: HCPCS

## 2025-07-08 PROCEDURE — G0422 INTENS CARDIAC REHAB W/EXERC: HCPCS

## 2025-07-08 NOTE — PLAN OF CARE
[facility] Cardiac [facility] Facility-Based Program  Individualized Cardiac Treatment Plan    [x] 72 Sessions   [] 36 Sessions  Patient Name:  Alberto Gilliland  :  1965  Age:  60 y.o.  MRN:  [id number]  Diagnosis: TAVR  Date of Event: 5/15/25   Physician:  Miguel Luu  Next Office Visit:  Oct 2025  Date Entered Program: 25  Risk Stratifications: [] Low [] Intermediate [x] High  Allergies:   Allergies   Allergen Reactions    Environmental/Seasonal      Pollen- sneezing and watery eyes       [x][facility] Resource Folder & Patient Guidebook, given and explained to Alberto.    Individual Cardiac Treatment Plan -EXERCISE  INITIAL 30 DAY 60 DAY 90  DAY FINAL DAY   EXERCISE  ASSESSMENT/PLAN EXERCISE  REASSESSMENT EXERCISE   REASSESSMENT EXERCISE   REASSESSMENT EXERCISE   REASSESSMENT EXERCISE  DISCHARGE/FOLLOW-UP   Date: 25 Date: Date: Date: Date: Date:   Session #1 Total Session #   First Exercise Session:   Total Session #  Total Session #  Total Session #  Total Session #   Last Exercise Session:     Stages of Change Stages of Change Stages of Change Stages of Change Stages of Change Stages of Change   [] Pre Contemplation  [] Contemplation  [x] Preparation  [] Action  [] Maintenance  [] Relapse [] Pre Contemplation  [] Contemplation  [] Preparation  [] Action  [] Maintenance  [] Relapse [] Pre Contemplation  [] Contemplation  [] Preparation  [] Action  [] Maintenance  [] Relapse [] Pre Contemplation  [] Contemplation  [] Preparation  [] Action  [] Maintenance  [] Relapse [] Pre Contemplation  [] Contemplation  [] Preparation  [] Action  [] Maintenance  [] Relapse [] Pre Contemplation  [] Contemplation  [] Preparation  [] Action  [] Maintenance  [] Relapse   EXERCISE ASSESSMENT EXERCISE ASSESSMENT EXERCISE ASSESSMENT EXERCISE ASSESSMENT EXERCISE ASSESSMENT EXERCISE ASSESSMENT   6 Min Walk Test  Distance walked:   0.15 miles  792 ft  2.14 METs  Max HR: 87 BPM      RPE:  13

## 2025-07-08 NOTE — CARE COORDINATION
Ambulatory Care Coordination Note     7/8/2025 1:03 PM     Patient outreach attempt by this ACM today to perform care management follow up . ACM was unable to reach the patient by telephone today;   No answer     ACM: Tasha Wesley, RN     Care Summary Note: Alberto was referred to care coordination by population health report.  Pt has h/o: lymphomatoid papulosis, hypothyroidism, anxiety, COPD, HTN, CHF   Pt s/p TAVR.     PCP/Specialist follow up:   Future Appointments         Provider Specialty Dept Phone    7/9/2025 11:30 AM STR OUT PT ONC CMA Infusion Therapy [phone]    7/9/2025 11:30 AM Ivy De Leon MD Oncology [phone]    8/27/2025 1:00 PM (Arrive by 12:45 PM) STR ECHO 3 Cardiology [phone]    8/27/2025 1:30 PM Rosa Wise, APRN - CNP Cardiology [phone]    9/3/2025 2:00 PM Juan Christiansen,  Family Medicine [phone]    10/20/2025 11:00 AM Tanisha Hathaway MD Cardiology [phone]    5/15/2026 10:00 AM (Arrive by 9:45 AM) STR ECHO 3 Cardiology [phone]    5/15/2026 11:30 AM Rosa Wise APRN - CNP Cardiology [phone]            Follow Up:   Plan for next ACM outreach in approximately 1 week to complete:  - CC Protocol assessments  - disease specific assessments  - goal progression  - education .

## 2025-07-08 NOTE — PROGRESS NOTES
Video Education Report - ICR/CR  Name:  Alberto Gilliland     Date:  7/8/2025  MRN: [id number]     Session #:  1  Session Length: 40 min    Core Videos        [x]Heart Disease Risk Reduction       []Overview of Pritikin Eating Plan      []Move it          []Calorie Density         []Healthy Minds, Bodies, Hearts        []Label Reading - Part 1       []Metabolic Syndrome and Belly Fat        []How Our Thoughts Can Heal Our Hearts   []Dining Out - Part 1      []Biomechanical Limitations  []Facts on Fat        []Hypertension & Heart Disease    []Diseases of Our Time - Focusing on Diabetes   []Body Composition  []Nutrition Action Plan    []Exercise Action Plan      Comments:  Video completed, group discussion

## 2025-07-09 ENCOUNTER — OFFICE VISIT (OUTPATIENT)
Dept: ONCOLOGY | Age: 60
End: 2025-07-09
Payer: COMMERCIAL

## 2025-07-09 ENCOUNTER — HOSPITAL ENCOUNTER (OUTPATIENT)
Dept: INFUSION THERAPY | Age: 60
Discharge: HOME | End: 2025-07-09
Payer: COMMERCIAL

## 2025-07-09 VITALS
HEART RATE: 59 BPM | OXYGEN SATURATION: 99 % | DIASTOLIC BLOOD PRESSURE: 64 MMHG | RESPIRATION RATE: 18 BRPM | WEIGHT: 182.8 LBS | TEMPERATURE: 98.2 F | SYSTOLIC BLOOD PRESSURE: 136 MMHG | HEIGHT: 67 IN | BODY MASS INDEX: 28.69 KG/M2

## 2025-07-09 VITALS — OXYGEN SATURATION: 99 % | RESPIRATION RATE: 18 BRPM | HEART RATE: 59 BPM | TEMPERATURE: 98.2 F

## 2025-07-09 DIAGNOSIS — C86.60 LYMPHOMATOID PAPULOSIS (HCC): ICD-10-CM

## 2025-07-09 DIAGNOSIS — D50.9 IRON DEFICIENCY ANEMIA, UNSPECIFIED IRON DEFICIENCY ANEMIA TYPE: Primary | ICD-10-CM

## 2025-07-09 LAB
ALBUMIN SERPL BCG-MCNC: 3.7 G/DL (ref 3.4–4.9)
ALP SERPL-CCNC: 84 U/L (ref 40–129)
ALT SERPL W/O P-5'-P-CCNC: 10 U/L (ref 10–50)
AST SERPL-CCNC: 21 U/L (ref 10–50)
BASOPHILS ABSOLUTE: 0.1 THOU/MM3 (ref 0–0.1)
BASOPHILS NFR BLD AUTO: 1 % (ref 0–3)
BILIRUB CONJ SERPL-MCNC: 0.2 MG/DL (ref 0–0.2)
BILIRUB SERPL-MCNC: 0.4 MG/DL (ref 0.3–1.2)
BUN BLDP-MCNC: 11 MG/DL (ref 8–26)
CHLORIDE BLD-SCNC: 105 MEQ/L (ref 98–109)
CREAT BLD-MCNC: 0.8 MG/DL (ref 0.5–1.2)
EOSINOPHIL NFR BLD AUTO: 3 % (ref 0–4)
EOSINOPHILS ABSOLUTE: 0.2 THOU/MM3 (ref 0–0.4)
ERYTHROCYTE [DISTWIDTH] IN BLOOD BY AUTOMATED COUNT: 18 % (ref 11.5–14.5)
GFR SERPL CREATININE-BSD FRML MDRD: > 90 ML/MIN/1.73M2
GLUCOSE BLD-MCNC: 121 MG/DL (ref 70–108)
HCT VFR BLD AUTO: 31.2 % (ref 42–52)
HGB BLD-MCNC: 9.8 GM/DL (ref 14–18)
IMMATURE GRANULOCYTES %: 0 %
IMMATURE GRANULOCYTES ABSOLUTE: 0.01 THOU/MM3 (ref 0–0.07)
IONIZED CALCIUM, WHOLE BLOOD: 1.24 MMOL/L (ref 1.12–1.32)
LYMPHOCYTES ABSOLUTE: 1.1 THOU/MM3 (ref 1–4.8)
LYMPHOCYTES NFR BLD AUTO: 18 % (ref 15–47)
MCH RBC QN AUTO: 31.5 PG (ref 26–33)
MCHC RBC AUTO-ENTMCNC: 31.4 GM/DL (ref 32.2–35.5)
MCV RBC AUTO: 100 FL (ref 80–94)
MONOCYTES ABSOLUTE: 0.6 THOU/MM3 (ref 0.4–1.3)
MONOCYTES NFR BLD AUTO: 10 % (ref 0–12)
NEUTROPHILS ABSOLUTE: 3.9 THOU/MM3 (ref 1.8–7.7)
NEUTROPHILS NFR BLD AUTO: 67 % (ref 43–75)
PLATELET # BLD AUTO: 257 THOU/MM3 (ref 130–400)
PMV BLD AUTO: 9 FL (ref 9.4–12.4)
POTASSIUM BLD-SCNC: 4 MEQ/L (ref 3.5–4.9)
PROT SERPL-MCNC: 6.7 G/DL (ref 6.4–8.3)
RBC # BLD AUTO: 3.11 MILL/MM3 (ref 4.7–6.1)
SODIUM BLD-SCNC: 141 MEQ/L (ref 138–146)
TOTAL CO2, WHOLE BLOOD: 24 MEQ/L (ref 23–33)
WBC # BLD AUTO: 5.8 THOU/MM3 (ref 4.8–10.8)

## 2025-07-09 PROCEDURE — 99211 OFF/OP EST MAY X REQ PHY/QHP: CPT

## 2025-07-09 PROCEDURE — 80076 HEPATIC FUNCTION PANEL: CPT

## 2025-07-09 PROCEDURE — 36415 COLL VENOUS BLD VENIPUNCTURE: CPT

## 2025-07-09 PROCEDURE — 3075F SYST BP GE 130 - 139MM HG: CPT | Performed by: INTERNAL MEDICINE

## 2025-07-09 PROCEDURE — 3078F DIAST BP <80 MM HG: CPT | Performed by: INTERNAL MEDICINE

## 2025-07-09 PROCEDURE — 85025 COMPLETE CBC W/AUTO DIFF WBC: CPT

## 2025-07-09 PROCEDURE — 99214 OFFICE O/P EST MOD 30 MIN: CPT | Performed by: INTERNAL MEDICINE

## 2025-07-09 PROCEDURE — 80047 BASIC METABLC PNL IONIZED CA: CPT

## 2025-07-09 NOTE — PATIENT INSTRUCTIONS
Orders Placed This Encounter   Procedures    CBC with Auto Differential    Hepatic Function Panel    POC PANEL BMP W/IOCA    Ferritin    Iron    Iron Binding Capacity    Vitamin B12 & Folate    Reticulocytes     Labs must be obtained one week prior to the scheduled clinic visit.  Return in about 3 months (around 10/16/2025).MD visit to review the labs

## 2025-07-09 NOTE — PROGRESS NOTES
one week to prior to the scheduled clinic visit.  Light therapy is considered as an alternative, which may have fewer systemic side effects but requires consistent sessions and may cause skin irritation. The goal of the treatment is to manage the lesions effectively while minimizing side effects. He will contact Dr. Vega's office.    Chronic health conditions: He will continue to follow-up with his primary care provider and specialists for the management of his chronic health conditions.  ##Severe AR s/p TAVR:  Underwent a TAVR procedure for severe aortic stenosis, which was completed successfully. Treated with ECMO and CRRT during the hospital stay. Currently not on Coumadin or any other blood thinners, only taking a  aspirin 81 mg p.o daily. Heart function and overall condition have improved significantly.  ##Gastrointestinal bleed.  Experienced significant GI bleeds during the hospital stay, requiring multiple blood transfusions and embolization of the ileocecal artery.   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    Follow Up:  Return in about 3 months (around 10/16/2025).     Orders Placed This Encounter   Procedures    CBC with Auto Differential    Hepatic Function Panel    POC PANEL BMP W/IOCA    Ferritin    Iron    Iron Binding Capacity    Vitamin B12 & Folate    Reticulocytes   Return in about 3 months (around 10/16/2025).    All patient questions answered. Pt voiced understanding. Patient agreed with treatment plan. Follow up as directed. Patient instructed to call for questions or concerns.    The patient (or guardian, if applicable) and other individuals in attendance with the patient were advised that Artificial Intelligence will be utilized during this visit to record, process the conversation to generate a clinical note, and support improvement of the AI technology. The patient (or guardian, if applicable) and other individuals in attendance at the appointment

## 2025-07-10 ENCOUNTER — CARE COORDINATION (OUTPATIENT)
Dept: CARE COORDINATION | Age: 60
End: 2025-07-10

## 2025-07-10 NOTE — CARE COORDINATION
sizes.    #2  Monitor daily sodium intake. Keep sodium from food and beverages to no more than 2000 mg/day.  #2 Avoid the salt shaker. Read food labels to help choose lower sodium options. Watch portion sizes.  #2 Patient will focus on reading food labels closely to help choose lower sodium foods. Patient is enrolled in Cardiac Rehab and starts on 7/15/25.    #3   Monitor daily weights and keep a weight log.  #3 Start weighing self daily and notify MD of sudden weight gain.  #3 Patient is enrolled in [facility] and following with CHF Clinic. Today patient's weight documented as 179 lb per chart review.          Plan of Care:  RD encouraged pt to keep working toward goals set. RD will follow up with pt to discuss any questions pt has and check the progress toward goals.      Follow Up:    RD will call pt in 3-4 weeks to follow up and answer any nutrition related questions at that time.       Margarette Khanna RDN, LD  [phone]

## 2025-07-14 ENCOUNTER — HOSPITAL ENCOUNTER (OUTPATIENT)
Age: 60
Discharge: HOME | End: 2025-07-14
Payer: COMMERCIAL

## 2025-07-14 DIAGNOSIS — I50.22 CHRONIC SYSTOLIC (CONGESTIVE) HEART FAILURE (HCC): ICD-10-CM

## 2025-07-14 LAB — MAGNESIUM SERPL-MCNC: 2.3 MG/DL (ref 1.6–2.6)

## 2025-07-14 PROCEDURE — 36415 COLL VENOUS BLD VENIPUNCTURE: CPT

## 2025-07-14 PROCEDURE — 83735 ASSAY OF MAGNESIUM: CPT

## 2025-07-15 ENCOUNTER — HOSPITAL ENCOUNTER (OUTPATIENT)
Dept: CARDIAC REHAB | Age: 60
Setting detail: THERAPIES SERIES
Discharge: HOME | End: 2025-07-15
Payer: COMMERCIAL

## 2025-07-15 PROCEDURE — G0423 INTENS CARDIAC REHAB NO EXER: HCPCS

## 2025-07-15 PROCEDURE — G0422 INTENS CARDIAC REHAB W/EXERC: HCPCS

## 2025-07-15 NOTE — PROGRESS NOTES
Alberto GARCIA.:  1965    Acct Number: [id number]   MRN:  [id number]                             [facility] HEALTHY MIND-SET WORKSHOP             Date: 7/15/2025        Session #________    Today’s class covered:    []  New Thoughts New Behaviors Workshop:  Patient will learn and practice techniques for developing effective health and lifestyle goals. Patient will be able to effectively apply the goal setting process learned to develop at least one new personal goal.     []  Managing Moods & Relationships Workshop:  Patient will learn how emotional and chronic stress factors can impact their hearts. They will learn healthy ways to handle stress and utilize positive coping mechanisms. In addition, [facility] patient will learn ways to improve communication skills.    []  Healthy Sleep for a healthy Heart:  Patients will learn the importance of sleep to overall health, well-being, and quality of life. They will understand the symptoms of, and treatments for, common sleep disorders. Patients will also be able to identify daytime and nighttime behaviors which impact sleep, and they will be able to apply these tools to help manage sleep-related challenges.     [x]  Recognizing and Reducing Stress:  Patients will learn about stress and how to recognize stress. Patients will gain insight into the toll that chronic stress takes on their health, both emotionally and physically.  Patients will learn and practice a variety of stress management techniques. Patients will be able to effectively apply coping mechanisms in perceived stressful situations.    Alberto actively participated and verbalized understanding.     Total time in the Healthy Mind-Set class was 60 minutes.    Electronically signed by HEATHER Fu on 7/15/2025 at 2:14 PM

## 2025-07-17 ENCOUNTER — HOSPITAL ENCOUNTER (OUTPATIENT)
Age: 60
Discharge: HOME | End: 2025-07-17
Payer: COMMERCIAL

## 2025-07-17 ENCOUNTER — HOSPITAL ENCOUNTER (OUTPATIENT)
Dept: CARDIAC REHAB | Age: 60
Setting detail: THERAPIES SERIES
Discharge: HOME | End: 2025-07-17
Payer: COMMERCIAL

## 2025-07-17 ENCOUNTER — CARE COORDINATION (OUTPATIENT)
Dept: CARE COORDINATION | Age: 60
End: 2025-07-17

## 2025-07-17 LAB
BASOPHILS ABSOLUTE: 0 THOU/MM3 (ref 0–0.1)
BASOPHILS NFR BLD AUTO: 0.8 %
DEPRECATED RDW RBC AUTO: 65 FL (ref 35–45)
EOSINOPHIL NFR BLD AUTO: 2.9 %
EOSINOPHILS ABSOLUTE: 0.2 THOU/MM3 (ref 0–0.4)
ERYTHROCYTE [DISTWIDTH] IN BLOOD BY AUTOMATED COUNT: 17.2 % (ref 11.5–14.5)
FERRITIN SERPL IA-MCNC: 94 NG/ML (ref 30–400)
HCT VFR BLD AUTO: 35.3 % (ref 42–52)
HGB BLD-MCNC: 10.9 GM/DL (ref 14–18)
IMM GRANULOCYTES # BLD AUTO: 0.02 THOU/MM3 (ref 0–0.07)
IMM GRANULOCYTES NFR BLD AUTO: 0.3 %
IRON SATN MFR SERPL: 23 % (ref 20–50)
IRON SERPL-MCNC: 60 UG/DL (ref 61–157)
LYMPHOCYTES ABSOLUTE: 1 THOU/MM3 (ref 1–4.8)
LYMPHOCYTES NFR BLD AUTO: 15.9 %
MCH RBC QN AUTO: 31.7 PG (ref 26–33)
MCHC RBC AUTO-ENTMCNC: 30.9 GM/DL (ref 32.2–35.5)
MCV RBC AUTO: 102.6 FL (ref 80–94)
MONOCYTES ABSOLUTE: 0.4 THOU/MM3 (ref 0.4–1.3)
MONOCYTES NFR BLD AUTO: 7.2 %
NEUTROPHILS ABSOLUTE: 4.4 THOU/MM3 (ref 1.8–7.7)
NEUTROPHILS NFR BLD AUTO: 72.9 %
NRBC BLD AUTO-RTO: 0 /100 WBC
PLATELET # BLD AUTO: 316 THOU/MM3 (ref 130–400)
PMV BLD AUTO: 9.9 FL (ref 9.4–12.4)
RBC # BLD AUTO: 3.44 MILL/MM3 (ref 4.7–6.1)
TIBC SERPL-MCNC: 260 UG/DL (ref 171–450)
WBC # BLD AUTO: 6 THOU/MM3 (ref 4.8–10.8)

## 2025-07-17 PROCEDURE — G0423 INTENS CARDIAC REHAB NO EXER: HCPCS

## 2025-07-17 PROCEDURE — 36415 COLL VENOUS BLD VENIPUNCTURE: CPT

## 2025-07-17 PROCEDURE — 82728 ASSAY OF FERRITIN: CPT

## 2025-07-17 PROCEDURE — 83550 IRON BINDING TEST: CPT

## 2025-07-17 PROCEDURE — 83540 ASSAY OF IRON: CPT

## 2025-07-17 PROCEDURE — G0422 INTENS CARDIAC REHAB W/EXERC: HCPCS

## 2025-07-17 PROCEDURE — 85025 COMPLETE CBC W/AUTO DIFF WBC: CPT

## 2025-07-17 NOTE — PROGRESS NOTES
Video Education Report - ICR/CR  Name:  Alberto Gilliland     Date:  7/17/2025  MRN: [id number]     Session #:  3  Session Length: 40 min    Core Videos        []Heart Disease Risk Reduction       [x]Overview of Pritikin Eating Plan      []Move it          []Calorie Density         []Healthy Minds, Bodies, Hearts        []Label Reading - Part 1       []Metabolic Syndrome and Belly Fat        []How Our Thoughts Can Heal Our Hearts   []Dining Out - Part 1      []Biomechanical Limitations  []Facts on Fat        []Hypertension & Heart Disease    []Diseases of Our Time - Focusing on Diabetes   []Body Composition  []Nutrition Action Plan    []Exercise Action Plan        Comments:  Video completed, group discussion

## 2025-07-17 NOTE — CARE COORDINATION
Ambulatory Care Coordination Note     7/17/2025 4:03 PM     Patient outreach attempt by this ACM today to perform care management follow up . ACM was unable to reach the patient by telephone today;   left voice message requesting a return phone call to this ACM.     ACM: Tasha Wesley RN     Care Summary Note: Alberto was referred to care coordination by population health report. Pt has h/o: lymphomatoid papulosis, hypothyroidism, anxiety, COPD, HTN, CHF     PCP/Specialist follow up:   Future Appointments         Provider Specialty Dept Phone    7/22/2025 10:00 AM STR CARDIAC EXERCISE RM Cardiac Rehabilitation [phone]    7/22/2025 11:00 AM STR EDUCATION CLASSROOM Cardiac Rehabilitation [phone]    7/24/2025 10:00 AM STR CARDIAC EXERCISE RM Cardiac Rehabilitation [phone]    7/24/2025 11:00 AM STR EDUCATION CLASSROOM Cardiac Rehabilitation [phone]    7/29/2025 10:00 AM STR CARDIAC EXERCISE RM Cardiac Rehabilitation [phone]    7/29/2025 11:00 AM STR EDUCATION CLASSROOM Cardiac Rehabilitation [phone]    7/31/2025 10:00 AM STR CARDIAC EXERCISE RM Cardiac Rehabilitation [phone]    7/31/2025 11:00 AM STR EDUCATION CLASSROOM Cardiac Rehabilitation [phone]    8/5/2025 10:00 AM STR CARDIAC EXERCISE RM Cardiac Rehabilitation [phone]    8/5/2025 11:00 AM STR EDUCATION CLASSROOM Cardiac Rehabilitation [phone]    8/7/2025 10:00 AM STR CARDIAC EXERCISE RM Cardiac Rehabilitation [phone]    8/7/2025 11:00 AM STR EDUCATION CLASSROOM Cardiac Rehabilitation [phone]    8/12/2025 10:00 AM STR CARDIAC EXERCISE RM Cardiac Rehabilitation [phone]    8/12/2025 11:00 AM STR EDUCATION CLASSROOM Cardiac Rehabilitation [phone]    8/14/2025 10:00 AM STR CARDIAC EXERCISE RM Cardiac Rehabilitation [phone]    8/14/2025 11:00 AM STR EDUCATION CLASSROOM Cardiac Rehabilitation [phone]    8/19/2025 10:00 AM STR CARDIAC EXERCISE RM Cardiac Rehabilitation [phone]

## 2025-07-18 ENCOUNTER — CARE COORDINATION (OUTPATIENT)
Dept: CASE MANAGEMENT | Age: 60
End: 2025-07-18

## 2025-07-18 NOTE — CARE COORDINATION
Date/Time:  7/18/2025 9:36 AM  LPN attempted to reach patient by telephone regarding red alert 3 lb in 1 day  in remote patient monitoring program. Left HIPAA compliant message requesting a return call. Will attempt to reach patient again.

## 2025-07-22 ENCOUNTER — HOSPITAL ENCOUNTER (OUTPATIENT)
Dept: CARDIAC REHAB | Age: 60
Setting detail: THERAPIES SERIES
Discharge: HOME | End: 2025-07-22
Payer: COMMERCIAL

## 2025-07-22 PROCEDURE — G0423 INTENS CARDIAC REHAB NO EXER: HCPCS

## 2025-07-22 PROCEDURE — G0422 INTENS CARDIAC REHAB W/EXERC: HCPCS

## 2025-07-22 NOTE — PROGRESS NOTES
Alberto Louielins   :  1965    Acct Number: [id number]   MRN:  [id number]                             ICR NUTRITION 1:1 Counseling             Date: 2025       Session # _______   1:1 Counseling Session    GOALS:  Feel better overall, lose some weight, happy to be alive  2.   Continue to improve HGB/HCT  Alberto reports making some changes since starting Pritikin ICR including eating more fruits and veggies, eating oatmeal every morning     Reported Weight Trends: BMI 28.81, gained weight (170-184#); gained 15#s in a month  Edema: no swelling on ankles/legs; s/p TAVR ? Retaining fluids?  Lab Trends: 25 Tl chol 65, HDL 19, LDL 38, Tga 41; 25 HGB 10.9, HCT 35.3, .6 (H), iron 60 (L) (15 units of blood in the hospital d/t bleeding from colon surgery).  4 sandwiches per week, doesn't eat much toast, unless having a scrambled egg sandwich.  Rate Your Plate: 53    Receptiveness to education/goals:  (x) Agreeable ( ) No Interest   ( ) Refused  Evaluation of education:  (x) Indicates understanding   ( ) Needs reinforcement     () Unsuccessful     Readiness to change:    ( ) Pre-contemplative   ( ) Contemplative - ambivalent about change    (x) Action - ready to set action plan and implement   ( ) Maintenance - has made change and is trying, and or practicing different alternative behaviors     Exercise Habits:  Alberto reports on days off he walks twice a day    Food Recall:  Breakfast:  Oatmeal, OJ (to take pills)  Lunch:  noon at  [employer] hamburger, fries, chicken nuggets, pop  Dinner:  hamburger & veggies w/ salad dressing  Snacks:  all day lon/2 pack of cookies [crackers, junk food] 8pm watermelon  Main Beverages: 4-5 bottles water and OJ, coffee sometimes, 2 beers a month, some Jonathan Bonilla every once in a while    Grocery Shopping: self  Meal Prep/Cooking: self  Dining Out: 2-3 days/wk (fast food), [employer] for breakfast every once in a while. Was eating out every day of the week before he

## 2025-07-23 ENCOUNTER — HOSPITAL ENCOUNTER (OUTPATIENT)
Age: 60
Discharge: HOME | End: 2025-07-23
Payer: COMMERCIAL

## 2025-07-23 ENCOUNTER — OFFICE VISIT (OUTPATIENT)
Dept: CARDIOLOGY CLINIC | Age: 60
End: 2025-07-23
Payer: COMMERCIAL

## 2025-07-23 VITALS
DIASTOLIC BLOOD PRESSURE: 74 MMHG | SYSTOLIC BLOOD PRESSURE: 142 MMHG | WEIGHT: 185.25 LBS | HEART RATE: 68 BPM | BODY MASS INDEX: 29.01 KG/M2 | OXYGEN SATURATION: 99 %

## 2025-07-23 DIAGNOSIS — I50.22 HEART FAILURE WITH MID-RANGE EJECTION FRACTION (HFMEF) (HCC): Primary | ICD-10-CM

## 2025-07-23 DIAGNOSIS — Z79.899 ON POTASSIUM WASTING DIURETIC THERAPY: ICD-10-CM

## 2025-07-23 DIAGNOSIS — I10 PRIMARY HYPERTENSION: ICD-10-CM

## 2025-07-23 DIAGNOSIS — Z95.2 S/P TAVR (TRANSCATHETER AORTIC VALVE REPLACEMENT): ICD-10-CM

## 2025-07-23 DIAGNOSIS — I50.22 HEART FAILURE WITH MID-RANGE EJECTION FRACTION (HFMEF) (HCC): ICD-10-CM

## 2025-07-23 LAB
ANION GAP SERPL CALC-SCNC: 9 MEQ/L (ref 8–16)
BUN SERPL-MCNC: 12 MG/DL (ref 8–23)
CALCIUM SERPL-MCNC: 9.4 MG/DL (ref 8.8–10.2)
CHLORIDE SERPL-SCNC: 104 MEQ/L (ref 98–111)
CO2 SERPL-SCNC: 27 MEQ/L (ref 22–29)
CREAT SERPL-MCNC: 0.9 MG/DL (ref 0.7–1.2)
GFR SERPL CREATININE-BSD FRML MDRD: > 90 ML/MIN/1.73M2
GLUCOSE SERPL-MCNC: 97 MG/DL (ref 74–109)
NT-PROBNP SERPL IA-MCNC: 597 PG/ML (ref 0–124)
POTASSIUM SERPL-SCNC: 4.1 MEQ/L (ref 3.5–5.2)
SODIUM SERPL-SCNC: 140 MEQ/L (ref 135–145)

## 2025-07-23 PROCEDURE — 3078F DIAST BP <80 MM HG: CPT | Performed by: NURSE PRACTITIONER

## 2025-07-23 PROCEDURE — 83880 ASSAY OF NATRIURETIC PEPTIDE: CPT

## 2025-07-23 PROCEDURE — 99214 OFFICE O/P EST MOD 30 MIN: CPT | Performed by: NURSE PRACTITIONER

## 2025-07-23 PROCEDURE — 3077F SYST BP >= 140 MM HG: CPT | Performed by: NURSE PRACTITIONER

## 2025-07-23 PROCEDURE — 36415 COLL VENOUS BLD VENIPUNCTURE: CPT

## 2025-07-23 PROCEDURE — 80048 BASIC METABOLIC PNL TOTAL CA: CPT

## 2025-07-23 RX ORDER — FOLIC ACID 1 MG/1
1000 TABLET ORAL DAILY
COMMUNITY
Start: 2025-07-16

## 2025-07-23 RX ORDER — CLOBETASOL PROPIONATE 0.5 MG/G
CREAM TOPICAL
COMMUNITY
Start: 2025-07-10

## 2025-07-23 RX ORDER — BUMETANIDE 0.5 MG/1
0.5 TABLET ORAL DAILY
Qty: 90 TABLET | Refills: 3 | Status: SHIPPED | OUTPATIENT
Start: 2025-07-23

## 2025-07-23 RX ORDER — METHOTREXATE 2.5 MG/1
TABLET ORAL
COMMUNITY
Start: 2025-07-10

## 2025-07-23 RX ORDER — ESOMEPRAZOLE MAGNESIUM 40 MG/1
CAPSULE, DELAYED RELEASE ORAL
COMMUNITY
Start: 2025-07-16

## 2025-07-23 RX ORDER — BUMETANIDE 0.5 MG/1
0.5 TABLET ORAL DAILY
Qty: 90 TABLET | Refills: 3
Start: 2025-07-23 | End: 2025-07-23

## 2025-07-23 NOTE — PATIENT INSTRUCTIONS
Take 1/2 tablet of the Bumex yet today.    Restart Bumex at 0.5 mg daily starting on Thursday 724/2025. Continue to take daily.   Continue to weigh daily.If weight not coming down then call.    Will check in on you in 1 week to see how things ar going.     Try to limit water to only 4 or 5 bottles a day - total fluids should be about 64 ounces a day.     Have the labs drawn today as ordered.     Continue other current medications as prescribed.    Stay as active as you can.     Eat heart healthy diet.     Follow-up with your PCP as scheduled.    Follow-up with Amanda in CHF clinic on 8/27/2025 with echo at that time as scheduled or sooner if need.       Office hours:   Mon-Thurs 8-4:30  Friday 8-12  Phone: [phone]    Continue:  Continue current medications  Daily weights and record  Fluid restriction of 2 Liters per day  Limit sodium in diet to around 8683-0366 mg/day  Monitor BP    Call the [facility] for any of the following symptoms:   Weight gain of 3 pounds in 1 day or 5 pounds in 1 week  Increased shortness of breath  Shortness of breath while laying down  Chest pain  Swelling in feet, ankles or legs  Bloating in abdomen  Fatigue

## 2025-07-23 NOTE — PROGRESS NOTES
Pt here for check up - sob -wt gain    Pt c/o increased sob, gained 15 lbs, bloated, nauseated more not been taking bumex     Pt not sure if taking protonix both on list has just started nexium    Pt did restart asa 81 mg  
approximately 5 to 6 bottles of water daily. He occasionally notices facial puffiness. He does not consume soda and limits his coffee intake to 2 cups in the morning. He has not smoked since his hospital discharge. He is currently taking aspirin again.    He has noticed a slight increase in his blood pressure.    He believes his hemoglobin level is around 10.1. He continues to have black stools and takes iron supplements twice daily. He reports a foul smell in his stools and gas. His gastroenterologist plans to perform an endoscopy to check a previously repaired spot in his esophagus.    SOCIAL HISTORY  Exercise: Participates in cardiac rehab twice a week, on Tuesdays and Thursdays.  Diet: Consumes one main meal a day with light snacks for lunch and breakfast.  Tobacco: Has not smoked since being discharged from the hospital.  Coffee/Tea/Caffeine-containing Drinks: Drinks up to two cups of coffee in the morning.    Review of Systems   Chest Pain: no  Skipping or irregular heartbeats: no  Shortness of breath at rest: no  Shortness of breath with activity: yes  Cough: no  Waking up in the middle of the night gasping for air: no  Lightheadedness or dizziness: no  Feeling like you are going to pass out: no  Actually passing out: no  Poor energy level: no  Unintentional weight gain: no  Unintentional weight loss: no  Swelling in your legs,feet, abdomen: yes - around abdomen  Filling up quickly when you eat: no       Objective   Blood pressure (!) 142/74, pulse 68, weight 84 kg (185 lb 4 oz), SpO2 99%.  Physical Exam  Blood Pressure: Normal  Heart: strond, regular, S1, S2 with Grade II/VI JESUS, no JVD  Lungs: Clear; good aeration  Abdomen: soft upper quads; firmer lower quads, mild distention  Extremities: no peripheral edema        - - - - - - - Patient contacted at 1725 hours and updated on labs. Continue with current plan. Will repeat labs in one week. Labs ordered.  Patient verbalized understanding.         The patient

## 2025-07-24 ENCOUNTER — HOSPITAL ENCOUNTER (OUTPATIENT)
Dept: CARDIAC REHAB | Age: 60
Setting detail: THERAPIES SERIES
Discharge: HOME | End: 2025-07-24
Payer: COMMERCIAL

## 2025-07-24 PROCEDURE — G0423 INTENS CARDIAC REHAB NO EXER: HCPCS

## 2025-07-24 PROCEDURE — G0422 INTENS CARDIAC REHAB W/EXERC: HCPCS

## 2025-07-24 NOTE — PROGRESS NOTES
Alberto GARCIA.:  1965    MRN:  [id number]    Date: 2025      Session Length:  40 min   Session # ___5____    EXERCISE WORKSHOP:  Balance Training and Fall Prevention                        Today’s class addressed the importance of patient's sensorimotor skills (vision, proprioception, and the vestibular system) in maintaining their ability to balance at any age.  Reviewed a variety of balancing and strength training exercises that are appropriate for patient's current level of function.  Reviewed common causes of poor balance, possible solutions to these problems, and ways to modify the physical environment in order to minimize fall risks.    Readiness to change:    ( ) Pre-contemplative   (x) Contemplative - ambivalent about change    ( ) Action - ready to set action plan and implement   ( ) Maintenance - has made change and is trying, and or practicing different alternative behaviors     Additional Notes:      Alberto was in the Workshop with the Exercise Physiologist for 40 minutes.  The content was presented via Powerpoint, lecture, and patient participation based format.  Motivational interviewing was utilized when needed, to promote change.  Patient voiced understanding.    Electronically signed by AGUSTIN Frye on 2025 at 10:54 AM

## 2025-07-29 ENCOUNTER — HOSPITAL ENCOUNTER (OUTPATIENT)
Dept: CARDIAC REHAB | Age: 60
Setting detail: THERAPIES SERIES
Discharge: HOME | End: 2025-07-29
Payer: COMMERCIAL

## 2025-07-29 ENCOUNTER — CARE COORDINATION (OUTPATIENT)
Dept: CARE COORDINATION | Age: 60
End: 2025-07-29

## 2025-07-29 ENCOUNTER — TELEPHONE (OUTPATIENT)
Dept: CARDIOLOGY CLINIC | Age: 60
End: 2025-07-29

## 2025-07-29 PROCEDURE — G0423 INTENS CARDIAC REHAB NO EXER: HCPCS

## 2025-07-29 PROCEDURE — G0422 INTENS CARDIAC REHAB W/EXERC: HCPCS

## 2025-07-29 RX ORDER — FERROUS SULFATE 325(65) MG
325 TABLET ORAL 2 TIMES DAILY WITH MEALS
Qty: 180 TABLET | Refills: 3 | Status: SHIPPED | OUTPATIENT
Start: 2025-07-29

## 2025-07-29 RX ORDER — PANTOPRAZOLE SODIUM 40 MG/1
40 TABLET, DELAYED RELEASE ORAL
Qty: 90 TABLET | Refills: 3 | Status: SHIPPED | OUTPATIENT
Start: 2025-07-29

## 2025-07-29 ASSESSMENT — ENCOUNTER SYMPTOMS: DYSPNEA ASSOCIATED WITH: EXERTION

## 2025-07-29 NOTE — TELEPHONE ENCOUNTER
The patient called in requesting refills on his ferrous sulfate and pantoprazole to be sent to [facility].  Orders pended for your signature.      The patient will check with his pharmacy tomorrow after 10:00am.

## 2025-07-29 NOTE — CARE COORDINATION
[facility] [phone]    9/16/2025 11:00 AM [facility] Card[facility] [phone]    9/18/2025 10:00 AM STR CARDIAC EXERCISE [facility] [phone]    9/18/2025 11:00 AM [facility] [phone]    9/23/2025 10:00 AM [facility] CARDIAC EXERCISE [facility] [phone]    9/23/2025 11:00 AM [facility] [phone]    9/25/2025 10:00 AM [facility] C[facility]IAC EXERCISE [facility] [phone]    9/25/2025 11:00 AM [facility] [phone]    9/30/2025 10:00 AM [facility] CARDIAC EXERCISE [facility] [phone]    9/30/2025 11:00 AM [facility] [phone]    10/2/2025 10:00 AM [facility] CARDIAC EXERCISE [facility] [phone]    10/2/2025 11:00 AM [facility] [phone]    10/7/2025 10:00 AM [facility] CARDIAC EXERC[facility] [phone]    10/7/2025 11:00 AM [facility] [phone]    10/9/2025 10:00 AM [facility] CARDIAC EXERCISE [facility] [phone]    10/9/2025 11:00 AM [facility] [phone]    10/14/2025 10:00 AM STR CARDIAC EXERCISE [facility] [phone]    10/14/2025 11:00 AM [facility] [phone]    10/16/2025 9:30 AM Ivy De Leon MD Oncology [phone]    10/16/2025 10:00 AM STR CARDIAC EXERCISE [facility] [phone]    10/16/2025 11:00 AM [facility] [phone]    10/20/2025 11:00 AM Tanisha Hathaway MD Cardiology [phone]    10/21/2025 10:00 AM STR CARDIAC EXERCISE [facility] [phone]    10/21/2025 11:00 AM [facility] Card[facility] [phone]    10/23/2025 10:00 AM STR CARDIAC EXERCISE RM

## 2025-07-29 NOTE — CARE COORDINATION
Remote Patient Monitoring Note      Date/Time:  7/29/2025 10:04 AM    LPN attempted to contact patient by telephone regarding red alert received for weight increase (185.2lb).     Background:  COPD, High Blood-Pressure, CHF     Clinical Interventions: HIPAA compliant message left on  requesting a return call.    Plan/Follow Up: Will continue to review, monitor and address alerts with follow up based on severity of symptoms and risk factors.       GERALD Gallego [facility]/ CTN/ Remote Patient Monitoring  [phone]

## 2025-07-29 NOTE — CARE COORDINATION
Remote Patient Monitoring Note    2nd Attempt  Date/Time:  7/29/2025 12:07 PM    LPN attempted to contact patient by telephone regarding red alert received for weight increase (185.2lb).      Background:  COPD, High Blood-Pressure, CHF      Clinical Interventions: HIPAA compliant message left on VM requesting a return call. Contacted EC and she states she does not live near him, but will have him return call when able.  ACM informed.     Plan/Follow Up: Will continue to review, monitor and address alerts with follow up based on severity of symptoms and risk factors.       GERALD Gallego [facility]/ CTN/ Remote Patient Monitoring  [phone]

## 2025-07-29 NOTE — PROGRESS NOTES
Alberto GARCIA.:  1965    Acct Number: [id number]   MRN:  [id number]                             [facility] NUTRITION WORKSHOP             Date: 2025        Session # _______    Today’s class covered:    []   Fueling a Healthy Body  []   Mindful Eating  []   Targeting Nutrition Priorities  [x]   Dining Out :  Making the Most of a Menu  []   Label Reading    Readiness to change:    []  Pre-contemplative   []  Contemplative - ambivalent about change    []  Action - ready to set action plan and implement   [x]  Maintenance - has made change and is trying, and or practicing different alternative         behaviors     Alberto was in the Workshop with the Dietitian for 45 minutes.  The content was presented via Powerpoint, lecture, and patient participation based format.  Motivational interviewing was utilized when needed, to promote change.  Patient voiced understanding.    Electronically signed by Ngozi Vitale RD on 2025 at 12:01 PM

## 2025-07-31 ENCOUNTER — HOSPITAL ENCOUNTER (OUTPATIENT)
Dept: OTHER | Age: 60
Discharge: HOME | End: 2025-07-31
Payer: COMMERCIAL

## 2025-07-31 ENCOUNTER — HOSPITAL ENCOUNTER (OUTPATIENT)
Dept: CARDIAC REHAB | Age: 60
Setting detail: THERAPIES SERIES
Discharge: HOME | End: 2025-07-31
Payer: COMMERCIAL

## 2025-07-31 ENCOUNTER — HOSPITAL ENCOUNTER (OUTPATIENT)
Dept: CARDIAC REHAB | Age: 60
Setting detail: THERAPIES SERIES
End: 2025-07-31
Payer: COMMERCIAL

## 2025-07-31 DIAGNOSIS — Z79.899 ON POTASSIUM WASTING DIURETIC THERAPY: ICD-10-CM

## 2025-07-31 DIAGNOSIS — I50.22 HEART FAILURE WITH MID-RANGE EJECTION FRACTION (HFMEF) (HCC): ICD-10-CM

## 2025-07-31 LAB
ANION GAP SERPL CALC-SCNC: 12 MEQ/L (ref 8–16)
BUN SERPL-MCNC: 17 MG/DL (ref 8–23)
CALCIUM SERPL-MCNC: 9.9 MG/DL (ref 8.8–10.2)
CHLORIDE SERPL-SCNC: 102 MEQ/L (ref 98–111)
CO2 SERPL-SCNC: 25 MEQ/L (ref 22–29)
CREAT SERPL-MCNC: 1.1 MG/DL (ref 0.7–1.2)
GFR SERPL CREATININE-BSD FRML MDRD: 76 ML/MIN/1.73M2
GLUCOSE SERPL-MCNC: 104 MG/DL (ref 74–109)
MAGNESIUM SERPL-MCNC: 2.4 MG/DL (ref 1.6–2.6)
NT-PROBNP SERPL IA-MCNC: 728 PG/ML (ref 0–124)
POTASSIUM SERPL-SCNC: 4 MEQ/L (ref 3.5–5.2)
SODIUM SERPL-SCNC: 139 MEQ/L (ref 135–145)

## 2025-07-31 PROCEDURE — 80048 BASIC METABOLIC PNL TOTAL CA: CPT

## 2025-07-31 PROCEDURE — 83880 ASSAY OF NATRIURETIC PEPTIDE: CPT

## 2025-07-31 PROCEDURE — G0422 INTENS CARDIAC REHAB W/EXERC: HCPCS

## 2025-07-31 PROCEDURE — 36415 COLL VENOUS BLD VENIPUNCTURE: CPT

## 2025-07-31 PROCEDURE — 83735 ASSAY OF MAGNESIUM: CPT

## 2025-08-05 ENCOUNTER — HOSPITAL ENCOUNTER (OUTPATIENT)
Dept: CARDIAC REHAB | Age: 60
Setting detail: THERAPIES SERIES
Discharge: HOME | End: 2025-08-05
Payer: COMMERCIAL

## 2025-08-05 PROCEDURE — G0422 INTENS CARDIAC REHAB W/EXERC: HCPCS

## 2025-08-05 PROCEDURE — G0423 INTENS CARDIAC REHAB NO EXER: HCPCS

## 2025-08-06 ENCOUNTER — CARE COORDINATION (OUTPATIENT)
Dept: CARE COORDINATION | Age: 60
End: 2025-08-06

## 2025-08-07 ENCOUNTER — HOSPITAL ENCOUNTER (OUTPATIENT)
Dept: CARDIAC REHAB | Age: 60
Setting detail: THERAPIES SERIES
Discharge: HOME | End: 2025-08-07
Payer: COMMERCIAL

## 2025-08-07 ENCOUNTER — CARE COORDINATION (OUTPATIENT)
Dept: CARE COORDINATION | Age: 60
End: 2025-08-07

## 2025-08-07 PROCEDURE — G0423 INTENS CARDIAC REHAB NO EXER: HCPCS

## 2025-08-07 PROCEDURE — G0422 INTENS CARDIAC REHAB W/EXERC: HCPCS

## 2025-08-07 ASSESSMENT — ENCOUNTER SYMPTOMS: DYSPNEA ASSOCIATED WITH: EXERTION

## 2025-08-10 DIAGNOSIS — F41.9 ANXIETY: Primary | ICD-10-CM

## 2025-08-11 ENCOUNTER — TELEPHONE (OUTPATIENT)
Dept: CARDIOLOGY CLINIC | Age: 60
End: 2025-08-11

## 2025-08-11 ENCOUNTER — CARE COORDINATION (OUTPATIENT)
Dept: CARE COORDINATION | Age: 60
End: 2025-08-11

## 2025-08-11 DIAGNOSIS — I50.22 CHRONIC SYSTOLIC (CONGESTIVE) HEART FAILURE (HCC): Primary | ICD-10-CM

## 2025-08-11 RX ORDER — ALPRAZOLAM 0.5 MG
TABLET ORAL
Qty: 30 TABLET | Refills: 0 | Status: SHIPPED | OUTPATIENT
Start: 2025-08-11 | End: 2025-11-09

## 2025-08-12 ENCOUNTER — APPOINTMENT (OUTPATIENT)
Dept: CARDIAC REHAB | Age: 60
End: 2025-08-12
Payer: COMMERCIAL

## 2025-08-12 ENCOUNTER — HOSPITAL ENCOUNTER (OUTPATIENT)
Dept: CARDIAC REHAB | Age: 60
Setting detail: THERAPIES SERIES
Discharge: HOME | End: 2025-08-12
Payer: COMMERCIAL

## 2025-08-12 PROCEDURE — G0422 INTENS CARDIAC REHAB W/EXERC: HCPCS

## 2025-08-13 ENCOUNTER — HOSPITAL ENCOUNTER (OUTPATIENT)
Dept: OTHER | Age: 60
Discharge: HOME | End: 2025-08-13
Payer: COMMERCIAL

## 2025-08-13 LAB
ALT SERPL W/O P-5'-P-CCNC: 13 U/L (ref 10–50)
AST SERPL-CCNC: 21 U/L (ref 10–50)
BUN SERPL-MCNC: 13 MG/DL (ref 8–23)
CREAT SERPL-MCNC: 1.1 MG/DL (ref 0.7–1.2)
DEPRECATED RDW RBC AUTO: 53.1 FL (ref 35–45)
ERYTHROCYTE [DISTWIDTH] IN BLOOD BY AUTOMATED COUNT: 14.8 % (ref 11.5–14.5)
GFR SERPL CREATININE-BSD FRML MDRD: 76 ML/MIN/1.73M2
HCT VFR BLD AUTO: 38.8 % (ref 42–52)
HGB BLD-MCNC: 12.6 GM/DL (ref 14–18)
MCH RBC QN AUTO: 31.8 PG (ref 26–33)
MCHC RBC AUTO-ENTMCNC: 32.5 GM/DL (ref 32.2–35.5)
MCV RBC AUTO: 98 FL (ref 80–94)
PLATELET # BLD AUTO: 243 THOU/MM3 (ref 130–400)
PMV BLD AUTO: 10.4 FL (ref 9.4–12.4)
RBC # BLD AUTO: 3.96 MILL/MM3 (ref 4.7–6.1)
WBC # BLD AUTO: 6.7 THOU/MM3 (ref 4.8–10.8)

## 2025-08-13 PROCEDURE — 84450 TRANSFERASE (AST) (SGOT): CPT

## 2025-08-13 PROCEDURE — 82565 ASSAY OF CREATININE: CPT

## 2025-08-13 PROCEDURE — 84520 ASSAY OF UREA NITROGEN: CPT

## 2025-08-13 PROCEDURE — 84460 ALANINE AMINO (ALT) (SGPT): CPT

## 2025-08-13 PROCEDURE — 85027 COMPLETE CBC AUTOMATED: CPT

## 2025-08-13 PROCEDURE — 36415 COLL VENOUS BLD VENIPUNCTURE: CPT

## 2025-08-14 ENCOUNTER — HOSPITAL ENCOUNTER (OUTPATIENT)
Dept: CARDIAC REHAB | Age: 60
Setting detail: THERAPIES SERIES
Discharge: HOME | End: 2025-08-14
Payer: COMMERCIAL

## 2025-08-14 DIAGNOSIS — I50.22 HEART FAILURE WITH MID-RANGE EJECTION FRACTION (HFMEF) (HCC): ICD-10-CM

## 2025-08-14 DIAGNOSIS — I10 PRIMARY HYPERTENSION: Primary | ICD-10-CM

## 2025-08-14 DIAGNOSIS — I50.22 CHRONIC SYSTOLIC (CONGESTIVE) HEART FAILURE (HCC): ICD-10-CM

## 2025-08-14 PROCEDURE — G0423 INTENS CARDIAC REHAB NO EXER: HCPCS

## 2025-08-14 PROCEDURE — G0422 INTENS CARDIAC REHAB W/EXERC: HCPCS

## 2025-08-14 RX ORDER — VALSARTAN 80 MG/1
80 TABLET ORAL 2 TIMES DAILY
Qty: 90 TABLET | Refills: 3
Start: 2025-08-14

## 2025-08-14 RX ORDER — BUMETANIDE 0.5 MG/1
1 TABLET ORAL DAILY
Qty: 90 TABLET | Refills: 3
Start: 2025-08-14

## 2025-08-15 ENCOUNTER — CARE COORDINATION (OUTPATIENT)
Dept: CARE COORDINATION | Age: 60
End: 2025-08-15

## 2025-08-19 ENCOUNTER — TELEPHONE (OUTPATIENT)
Dept: CARDIOLOGY CLINIC | Age: 60
End: 2025-08-19

## 2025-08-19 ENCOUNTER — CARE COORDINATION (OUTPATIENT)
Dept: CARE COORDINATION | Age: 60
End: 2025-08-19

## 2025-08-19 ENCOUNTER — HOSPITAL ENCOUNTER (OUTPATIENT)
Dept: CARDIAC REHAB | Age: 60
Setting detail: THERAPIES SERIES
Discharge: HOME | End: 2025-08-19
Payer: COMMERCIAL

## 2025-08-19 ENCOUNTER — HOSPITAL ENCOUNTER (OUTPATIENT)
Dept: OTHER | Age: 60
Discharge: HOME | End: 2025-08-19
Payer: COMMERCIAL

## 2025-08-19 DIAGNOSIS — I50.22 CHRONIC SYSTOLIC (CONGESTIVE) HEART FAILURE (HCC): ICD-10-CM

## 2025-08-19 LAB
ANION GAP SERPL CALC-SCNC: 14 MEQ/L (ref 8–16)
BUN SERPL-MCNC: 18 MG/DL (ref 8–23)
CALCIUM SERPL-MCNC: 10.2 MG/DL (ref 8.5–10.5)
CHLORIDE SERPL-SCNC: 102 MEQ/L (ref 98–111)
CO2 SERPL-SCNC: 25 MEQ/L (ref 22–29)
CREAT SERPL-MCNC: 1.2 MG/DL (ref 0.7–1.2)
GFR SERPL CREATININE-BSD FRML MDRD: 68 ML/MIN/1.73M2
GLUCOSE SERPL-MCNC: 106 MG/DL (ref 74–109)
MAGNESIUM SERPL-MCNC: 2.4 MG/DL (ref 1.6–2.6)
NT-PROBNP SERPL IA-MCNC: 569 PG/ML (ref 0–124)
POTASSIUM SERPL-SCNC: 3.9 MEQ/L (ref 3.5–5.2)
SODIUM SERPL-SCNC: 141 MEQ/L (ref 135–145)

## 2025-08-19 PROCEDURE — 83735 ASSAY OF MAGNESIUM: CPT

## 2025-08-19 PROCEDURE — G0422 INTENS CARDIAC REHAB W/EXERC: HCPCS

## 2025-08-19 PROCEDURE — 36415 COLL VENOUS BLD VENIPUNCTURE: CPT

## 2025-08-19 PROCEDURE — 80048 BASIC METABOLIC PNL TOTAL CA: CPT

## 2025-08-19 PROCEDURE — 83880 ASSAY OF NATRIURETIC PEPTIDE: CPT

## 2025-08-20 ENCOUNTER — CARE COORDINATION (OUTPATIENT)
Dept: CARE COORDINATION | Age: 60
End: 2025-08-20

## 2025-08-21 ENCOUNTER — HOSPITAL ENCOUNTER (OUTPATIENT)
Dept: CARDIAC REHAB | Age: 60
Setting detail: THERAPIES SERIES
Discharge: HOME | End: 2025-08-21
Payer: COMMERCIAL

## 2025-08-21 ENCOUNTER — CARE COORDINATION (OUTPATIENT)
Dept: CARE COORDINATION | Age: 60
End: 2025-08-21

## 2025-08-21 PROCEDURE — G0422 INTENS CARDIAC REHAB W/EXERC: HCPCS

## 2025-08-21 PROCEDURE — G0423 INTENS CARDIAC REHAB NO EXER: HCPCS

## 2025-08-26 ENCOUNTER — HOSPITAL ENCOUNTER (OUTPATIENT)
Dept: CARDIAC REHAB | Age: 60
Setting detail: THERAPIES SERIES
Discharge: HOME | End: 2025-08-26
Payer: COMMERCIAL

## 2025-08-26 PROCEDURE — G0423 INTENS CARDIAC REHAB NO EXER: HCPCS

## 2025-08-26 PROCEDURE — G0422 INTENS CARDIAC REHAB W/EXERC: HCPCS

## 2025-08-27 ENCOUNTER — OFFICE VISIT (OUTPATIENT)
Dept: CARDIOLOGY CLINIC | Age: 60
End: 2025-08-27
Payer: COMMERCIAL

## 2025-08-27 ENCOUNTER — HOSPITAL ENCOUNTER (OUTPATIENT)
Age: 60
Discharge: HOME | End: 2025-08-29
Payer: COMMERCIAL

## 2025-08-27 VITALS
HEIGHT: 67 IN | DIASTOLIC BLOOD PRESSURE: 72 MMHG | WEIGHT: 189.8 LBS | BODY MASS INDEX: 29.79 KG/M2 | OXYGEN SATURATION: 99 % | HEART RATE: 80 BPM | SYSTOLIC BLOOD PRESSURE: 136 MMHG

## 2025-08-27 DIAGNOSIS — I50.22 CHRONIC SYSTOLIC CONGESTIVE HEART FAILURE, NYHA CLASS 2 (HCC): Primary | ICD-10-CM

## 2025-08-27 DIAGNOSIS — I42.8 CARDIOMYOPATHY, NONISCHEMIC (HCC): ICD-10-CM

## 2025-08-27 DIAGNOSIS — I10 PRIMARY HYPERTENSION: ICD-10-CM

## 2025-08-27 DIAGNOSIS — Z95.2 S/P TAVR (TRANSCATHETER AORTIC VALVE REPLACEMENT): ICD-10-CM

## 2025-08-27 DIAGNOSIS — Z91.89 AT RISK FOR FLUID VOLUME OVERLOAD: ICD-10-CM

## 2025-08-27 LAB
ECHO AO ASC DIAM: 3.1 CM
ECHO AV ACCELERATION TIME: 74 MS
ECHO AV AREA PEAK VELOCITY: 0.8 CM2
ECHO AV AREA VTI: 0.8 CM2
ECHO AV MEAN GRADIENT: 9 MMHG
ECHO AV MEAN VELOCITY: 1.4 M/S
ECHO AV PEAK GRADIENT: 15 MMHG
ECHO AV PEAK VELOCITY: 2 M/S
ECHO AV VELOCITY RATIO: 0.35
ECHO AV VTI: 35.9 CM
ECHO EST RA PRESSURE: 3 MMHG
ECHO LA AREA 2C: 15.5 CM2
ECHO LA AREA 4C: 17 CM2
ECHO LA DIAMETER: 4.3 CM
ECHO LA MAJOR AXIS: 5.5 CM
ECHO LA MINOR AXIS: 5.1 CM
ECHO LA VOL BP: 43 ML (ref 18–58)
ECHO LA VOL MOD A2C: 40 ML (ref 18–58)
ECHO LA VOL MOD A4C: 44 ML (ref 18–58)
ECHO LV E' LATERAL VELOCITY: 7 CM/S
ECHO LV E' SEPTAL VELOCITY: 6.1 CM/S
ECHO LV EDV A2C: 80 ML
ECHO LV EDV A4C: 149 ML
ECHO LV EF PHYSICIAN: 45 %
ECHO LV EJECTION FRACTION A2C: 47 %
ECHO LV EJECTION FRACTION A4C: 44 %
ECHO LV EJECTION FRACTION BIPLANE: 45 % (ref 55–100)
ECHO LV ESV A2C: 42 ML
ECHO LV ESV A4C: 84 ML
ECHO LV FRACTIONAL SHORTENING: 24 % (ref 28–44)
ECHO LV INTERNAL DIMENSION DIASTOLIC: 5.1 CM (ref 4.2–5.9)
ECHO LV INTERNAL DIMENSION SYSTOLIC: 3.9 CM
ECHO LV ISOVOLUMETRIC RELAXATION TIME (IVRT): 102 MS
ECHO LV IVSD: 1.3 CM (ref 0.6–1)
ECHO LV MASS 2D: 285.1 G (ref 88–224)
ECHO LV POSTERIOR WALL DIASTOLIC: 1.4 CM (ref 0.6–1)
ECHO LV RELATIVE WALL THICKNESS RATIO: 0.55
ECHO LVOT AREA: 2.5 CM2
ECHO LVOT AV VTI INDEX: 0.32
ECHO LVOT DIAM: 1.8 CM
ECHO LVOT MEAN GRADIENT: 1 MMHG
ECHO LVOT PEAK GRADIENT: 2 MMHG
ECHO LVOT PEAK VELOCITY: 0.7 M/S
ECHO LVOT SV: 29.5 ML
ECHO LVOT VTI: 11.6 CM
ECHO MV A VELOCITY: 1.01 M/S
ECHO MV E DECELERATION TIME (DT): 271 MS
ECHO MV E VELOCITY: 0.9 M/S
ECHO MV E/A RATIO: 0.89
ECHO MV E/E' LATERAL: 12.86
ECHO MV E/E' RATIO (AVERAGED): 13.81
ECHO MV E/E' SEPTAL: 14.75
ECHO PV MAX VELOCITY: 1 M/S
ECHO PV PEAK GRADIENT: 4 MMHG
ECHO RV INTERNAL DIMENSION: 3.2 CM
ECHO RV TAPSE: 1.3 CM (ref 1.7–?)
ECHO TV E WAVE: 0.4 M/S

## 2025-08-27 PROCEDURE — 3075F SYST BP GE 130 - 139MM HG: CPT | Performed by: NURSE PRACTITIONER

## 2025-08-27 PROCEDURE — 99214 OFFICE O/P EST MOD 30 MIN: CPT | Performed by: NURSE PRACTITIONER

## 2025-08-27 PROCEDURE — 3078F DIAST BP <80 MM HG: CPT | Performed by: NURSE PRACTITIONER

## 2025-08-27 PROCEDURE — 93306 TTE W/DOPPLER COMPLETE: CPT

## 2025-08-27 PROCEDURE — 93306 TTE W/DOPPLER COMPLETE: CPT | Performed by: INTERNAL MEDICINE

## 2025-08-27 RX ORDER — SPIRONOLACTONE 25 MG/1
25 TABLET ORAL DAILY
Qty: 90 TABLET | Refills: 3 | Status: SHIPPED | OUTPATIENT
Start: 2025-08-27

## 2025-08-27 ASSESSMENT — ENCOUNTER SYMPTOMS
VOMITING: 0
ABDOMINAL DISTENTION: 0
NAUSEA: 0
COUGH: 0
SHORTNESS OF BREATH: 0

## 2025-08-28 ENCOUNTER — CARE COORDINATION (OUTPATIENT)
Dept: CARE COORDINATION | Age: 60
End: 2025-08-28

## 2025-08-29 ENCOUNTER — CARE COORDINATION (OUTPATIENT)
Dept: CARE COORDINATION | Age: 60
End: 2025-08-29

## 2025-08-29 RX ORDER — SODIUM CHLORIDE 0.9 % (FLUSH) 0.9 %
5-40 SYRINGE (ML) INJECTION PRN
Status: DISCONTINUED | OUTPATIENT
Start: 2025-08-29 | End: 2025-09-02 | Stop reason: HOSPADM

## 2025-08-29 RX ORDER — SODIUM CHLORIDE 9 MG/ML
25 INJECTION, SOLUTION INTRAVENOUS PRN
Status: DISCONTINUED | OUTPATIENT
Start: 2025-08-29 | End: 2025-09-02 | Stop reason: HOSPADM

## 2025-08-29 RX ORDER — SODIUM CHLORIDE 0.9 % (FLUSH) 0.9 %
5-40 SYRINGE (ML) INJECTION EVERY 12 HOURS SCHEDULED
Status: DISCONTINUED | OUTPATIENT
Start: 2025-08-29 | End: 2025-09-02 | Stop reason: HOSPADM

## 2025-08-29 RX ORDER — SODIUM CHLORIDE, SODIUM LACTATE, POTASSIUM CHLORIDE, CALCIUM CHLORIDE 600; 310; 30; 20 MG/100ML; MG/100ML; MG/100ML; MG/100ML
INJECTION, SOLUTION INTRAVENOUS CONTINUOUS
Status: DISCONTINUED | OUTPATIENT
Start: 2025-08-29 | End: 2025-09-02 | Stop reason: HOSPADM

## 2025-09-02 ENCOUNTER — HOSPITAL ENCOUNTER (OUTPATIENT)
Age: 60
Setting detail: OUTPATIENT SURGERY
Discharge: HOME | End: 2025-09-02
Attending: INTERNAL MEDICINE | Admitting: INTERNAL MEDICINE
Payer: COMMERCIAL

## 2025-09-02 ENCOUNTER — ANESTHESIA (OUTPATIENT)
Dept: ENDOSCOPY | Age: 60
End: 2025-09-02
Payer: COMMERCIAL

## 2025-09-02 ENCOUNTER — ANESTHESIA EVENT (OUTPATIENT)
Dept: ENDOSCOPY | Age: 60
End: 2025-09-02
Payer: COMMERCIAL

## 2025-09-02 ENCOUNTER — APPOINTMENT (OUTPATIENT)
Dept: ENDOSCOPY | Age: 60
End: 2025-09-02
Attending: INTERNAL MEDICINE
Payer: COMMERCIAL

## 2025-09-02 ENCOUNTER — CARE COORDINATION (OUTPATIENT)
Dept: CARE COORDINATION | Age: 60
End: 2025-09-02

## 2025-09-02 ENCOUNTER — HOSPITAL ENCOUNTER (OUTPATIENT)
Dept: CARDIAC REHAB | Age: 60
Setting detail: THERAPIES SERIES
Discharge: HOME | End: 2025-09-02
Payer: COMMERCIAL

## 2025-09-02 VITALS
RESPIRATION RATE: 18 BRPM | HEIGHT: 67 IN | WEIGHT: 191.6 LBS | BODY MASS INDEX: 30.07 KG/M2 | OXYGEN SATURATION: 94 % | SYSTOLIC BLOOD PRESSURE: 133 MMHG | TEMPERATURE: 98.1 F | DIASTOLIC BLOOD PRESSURE: 88 MMHG | HEART RATE: 84 BPM

## 2025-09-02 PROCEDURE — 3700000000 HC ANESTHESIA ATTENDED CARE: Performed by: INTERNAL MEDICINE

## 2025-09-02 PROCEDURE — 2580000003 HC RX 258: Performed by: INTERNAL MEDICINE

## 2025-09-02 PROCEDURE — 6360000002 HC RX W HCPCS: Performed by: NURSE ANESTHETIST, CERTIFIED REGISTERED

## 2025-09-02 PROCEDURE — 2580000003 HC RX 258: Performed by: NURSE ANESTHETIST, CERTIFIED REGISTERED

## 2025-09-02 PROCEDURE — 7100000010 HC PHASE II RECOVERY - FIRST 15 MIN: Performed by: INTERNAL MEDICINE

## 2025-09-02 PROCEDURE — G0422 INTENS CARDIAC REHAB W/EXERC: HCPCS

## 2025-09-02 PROCEDURE — 3700000001 HC ADD 15 MINUTES (ANESTHESIA): Performed by: INTERNAL MEDICINE

## 2025-09-02 PROCEDURE — 7100000011 HC PHASE II RECOVERY - ADDTL 15 MIN: Performed by: INTERNAL MEDICINE

## 2025-09-02 PROCEDURE — 3609018500 HC EGD US SCOPE W/ADJACENT STRUCTURES: Performed by: INTERNAL MEDICINE

## 2025-09-02 RX ORDER — SODIUM CHLORIDE 9 MG/ML
INJECTION, SOLUTION INTRAVENOUS
Status: DISCONTINUED | OUTPATIENT
Start: 2025-09-02 | End: 2025-09-02 | Stop reason: SDUPTHER

## 2025-09-02 RX ORDER — LIDOCAINE HYDROCHLORIDE 20 MG/ML
INJECTION, SOLUTION EPIDURAL; INFILTRATION; INTRACAUDAL; PERINEURAL
Status: DISCONTINUED | OUTPATIENT
Start: 2025-09-02 | End: 2025-09-02 | Stop reason: SDUPTHER

## 2025-09-02 RX ORDER — PROPOFOL 10 MG/ML
INJECTION, EMULSION INTRAVENOUS
Status: DISCONTINUED | OUTPATIENT
Start: 2025-09-02 | End: 2025-09-02 | Stop reason: SDUPTHER

## 2025-09-02 RX ADMIN — SODIUM CHLORIDE 25 ML: 0.9 INJECTION, SOLUTION INTRAVENOUS at 12:15

## 2025-09-02 RX ADMIN — PROPOFOL 50 MG: 10 INJECTION, EMULSION INTRAVENOUS at 13:51

## 2025-09-02 RX ADMIN — PROPOFOL 25 MG: 10 INJECTION, EMULSION INTRAVENOUS at 13:50

## 2025-09-02 RX ADMIN — PROPOFOL 25 MG: 10 INJECTION, EMULSION INTRAVENOUS at 13:46

## 2025-09-02 RX ADMIN — PROPOFOL 50 MG: 10 INJECTION, EMULSION INTRAVENOUS at 13:53

## 2025-09-02 RX ADMIN — PROPOFOL 25 MG: 10 INJECTION, EMULSION INTRAVENOUS at 13:44

## 2025-09-02 RX ADMIN — PROPOFOL 25 MG: 10 INJECTION, EMULSION INTRAVENOUS at 13:48

## 2025-09-02 RX ADMIN — SODIUM CHLORIDE: 9 INJECTION, SOLUTION INTRAVENOUS at 13:37

## 2025-09-02 RX ADMIN — LIDOCAINE HYDROCHLORIDE 40 MG: 20 INJECTION, SOLUTION EPIDURAL; INFILTRATION; INTRACAUDAL; PERINEURAL at 13:40

## 2025-09-02 RX ADMIN — PROPOFOL 25 MG: 10 INJECTION, EMULSION INTRAVENOUS at 13:42

## 2025-09-02 ASSESSMENT — PAIN - FUNCTIONAL ASSESSMENT
PAIN_FUNCTIONAL_ASSESSMENT: 0-10

## 2025-09-02 ASSESSMENT — ENCOUNTER SYMPTOMS: SHORTNESS OF BREATH: 1

## 2025-09-02 ASSESSMENT — COPD QUESTIONNAIRES: CAT_SEVERITY: MODERATE

## 2025-09-03 ENCOUNTER — OFFICE VISIT (OUTPATIENT)
Dept: FAMILY MEDICINE CLINIC | Age: 60
End: 2025-09-03
Payer: COMMERCIAL

## 2025-09-03 VITALS
BODY MASS INDEX: 29.55 KG/M2 | SYSTOLIC BLOOD PRESSURE: 126 MMHG | DIASTOLIC BLOOD PRESSURE: 64 MMHG | WEIGHT: 188.7 LBS | RESPIRATION RATE: 16 BRPM | HEART RATE: 92 BPM

## 2025-09-03 DIAGNOSIS — K21.00 GASTRO-ESOPHAGEAL REFLUX DISEASE WITH ESOPHAGITIS, WITHOUT BLEEDING: ICD-10-CM

## 2025-09-03 DIAGNOSIS — I42.8 CARDIOMYOPATHY, NONISCHEMIC (HCC): Primary | ICD-10-CM

## 2025-09-03 DIAGNOSIS — J44.9 CHRONIC OBSTRUCTIVE PULMONARY DISEASE, UNSPECIFIED COPD TYPE (HCC): ICD-10-CM

## 2025-09-03 DIAGNOSIS — E03.9 ACQUIRED HYPOTHYROIDISM: ICD-10-CM

## 2025-09-03 DIAGNOSIS — D50.9 IRON DEFICIENCY ANEMIA, UNSPECIFIED IRON DEFICIENCY ANEMIA TYPE: ICD-10-CM

## 2025-09-03 DIAGNOSIS — I10 HYPERTENSION, UNSPECIFIED TYPE: ICD-10-CM

## 2025-09-03 DIAGNOSIS — R73.01 IFG (IMPAIRED FASTING GLUCOSE): ICD-10-CM

## 2025-09-03 DIAGNOSIS — I50.22 HEART FAILURE WITH MID-RANGE EJECTION FRACTION (HFMEF) (HCC): ICD-10-CM

## 2025-09-03 DIAGNOSIS — I35.0 NONRHEUMATIC AORTIC VALVE STENOSIS: ICD-10-CM

## 2025-09-03 DIAGNOSIS — K76.0 FATTY LIVER: ICD-10-CM

## 2025-09-03 DIAGNOSIS — F34.1 DYSTHYMIA (OR DEPRESSIVE NEUROSIS): ICD-10-CM

## 2025-09-03 PROCEDURE — 99214 OFFICE O/P EST MOD 30 MIN: CPT | Performed by: FAMILY MEDICINE

## 2025-09-03 PROCEDURE — 3074F SYST BP LT 130 MM HG: CPT | Performed by: FAMILY MEDICINE

## 2025-09-03 PROCEDURE — 3078F DIAST BP <80 MM HG: CPT | Performed by: FAMILY MEDICINE

## 2025-09-03 SDOH — ECONOMIC STABILITY: FOOD INSECURITY: WITHIN THE PAST 12 MONTHS, THE FOOD YOU BOUGHT JUST DIDN'T LAST AND YOU DIDN'T HAVE MONEY TO GET MORE.: NEVER TRUE

## 2025-09-03 SDOH — ECONOMIC STABILITY: FOOD INSECURITY: WITHIN THE PAST 12 MONTHS, YOU WORRIED THAT YOUR FOOD WOULD RUN OUT BEFORE YOU GOT MONEY TO BUY MORE.: NEVER TRUE

## 2025-09-03 ASSESSMENT — LIFESTYLE VARIABLES
HOW MANY STANDARD DRINKS CONTAINING ALCOHOL DO YOU HAVE ON A TYPICAL DAY: 5 OR 6
HOW OFTEN DO YOU HAVE A DRINK CONTAINING ALCOHOL: MONTHLY OR LESS

## 2025-09-03 ASSESSMENT — ENCOUNTER SYMPTOMS
RESPIRATORY NEGATIVE: 1
GASTROINTESTINAL NEGATIVE: 1

## 2025-09-04 ENCOUNTER — HOSPITAL ENCOUNTER (OUTPATIENT)
Dept: CARDIAC REHAB | Age: 60
Setting detail: THERAPIES SERIES
Discharge: HOME | End: 2025-09-04
Payer: COMMERCIAL

## 2025-09-04 PROCEDURE — G0422 INTENS CARDIAC REHAB W/EXERC: HCPCS

## 2025-09-04 PROCEDURE — G0423 INTENS CARDIAC REHAB NO EXER: HCPCS

## (undated) DEVICE — FIAPC® PROBE W/ FILTER 3000 A OD 2.3MM/6.9FR; L 3M/9.8FT: Brand: ERBE

## (undated) DEVICE — PAD,EYE,1-5/8X2 5/8,STERILE,LF,1/PK: Brand: MEDLINE

## (undated) DEVICE — DEVICE CLSR COMPR SYS INTEGR MNOMTR INFL TRNSPAR DOME ADJ

## (undated) DEVICE — SYRINGE MED 10ML LUERLOCK TIP W/O SFTY DISP

## (undated) DEVICE — GLOVE ORANGE PI 8   MSG9080

## (undated) DEVICE — Device: Brand: MEDEX

## (undated) DEVICE — HI-TORQUE SUPRA CORE .035 PERIPHERAL GUIDE WIRE .035 X 190 CM: Brand: HI-TORQUE SUPRA CORE

## (undated) DEVICE — GLOVE SURG SZ 65 THK91MIL LTX FREE SYN POLYISOPRENE

## (undated) DEVICE — GENERAL LAPAROSCOPY-LF: Brand: MEDLINE INDUSTRIES, INC.

## (undated) DEVICE — GAUZE SPONGES,USP TYPE VII GAUZE, 12 PLY: Brand: CURITY

## (undated) DEVICE — GLOVE ORANGE PI 7 1/2   MSG9075

## (undated) DEVICE — ARMADA 35 PTA CATHETER 10 MM X 40 MM X 135 CM / OVER-THE-WIRE: Brand: ARMADA

## (undated) DEVICE — GLOVE ORTHO 8   MSG9480

## (undated) DEVICE — 6 ML SYRINGE LUER-LOCK TIP: Brand: MONOJECT

## (undated) DEVICE — TROCAR: Brand: KII SHIELDED BLADED ACCESS SYSTEM

## (undated) DEVICE — SOLUTION IV 1000ML 0.9% SOD CHL PH 5 INJ USP VIAFLX PLAS

## (undated) DEVICE — NEEDLE SPNL 22GA L3.5IN BLK HUB S STL REG WALL FIT STYL W/

## (undated) DEVICE — SHEATH INTRO L12CM DIA6FR W/ LUERLOCK HUB HEMSTAS VLV

## (undated) DEVICE — CANNULA 96670 125 BIO MEDICUS 25FR EA

## (undated) DEVICE — 3 ML SYRINGE LUER-LOCK TIP: Brand: MONOJECT

## (undated) DEVICE — ARMADA 35 PTA CATHETER 10 MM X 60 MM X 80 CM / OVER-THE-WIRE: Brand: ARMADA

## (undated) DEVICE — HYPODERMIC SAFETY NEEDLE: Brand: MAGELLAN

## (undated) DEVICE — CHLORAPREP 26ML CLEAR

## (undated) DEVICE — TOWEL,OR,DSP,ST,BLUE,DLX,4/PK,20PK/CS: Brand: MEDLINE

## (undated) DEVICE — SUTURE V-LOC 180 SZ 0 L9IN ABSRB GRN GS-21 L37MM 1/2 CIR VLOCL0346

## (undated) DEVICE — DILATOR COONS TAPER: Brand: COONS

## (undated) DEVICE — PINNACLE INTRODUCER SHEATH: Brand: PINNACLE

## (undated) DEVICE — NEEDLE SYR 18GA L1.5IN RED PLAS HUB S STL BLNT FILL W/O

## (undated) DEVICE — RADIFOCUS GLIDEWIRE ADVANTAGE GUIDEWIRE: Brand: GLIDEWIRE ADVANTAGE

## (undated) DEVICE — GAUZE,SPONGE,4"X4",12PLY,STERILE,LF,2'S: Brand: MEDLINE

## (undated) DEVICE — CATHETER COR DIAG JUDKINS L 3.5 CRV 6FR 100CM 0 SIDE H

## (undated) DEVICE — Device

## (undated) DEVICE — KIT INTRO 5FR L7CM NDL 21GA L4CM 0018IN NIT COR PLAT TIP

## (undated) DEVICE — TROCAR: Brand: KII FIOS FIRST ENTRY

## (undated) DEVICE — 14F X 24CM SLX HEMO-CATH® DOUBLE LUMEN SET: Brand: SLX HEMO-CATH®

## (undated) DEVICE — GLIDECATH XP: Brand: GLIDECATH

## (undated) DEVICE — GLIDESHEATH SLENDER NITINOL HYDROPHILIC COATED INTRODUCER SHEATH: Brand: GLIDESHEATH SLENDER

## (undated) DEVICE — TIBURON NEONATAL DRAPE: Brand: CONVERTORS

## (undated) DEVICE — RUNTHROUGH NS EXTRA FLOPPY PTCA GUIDEWIRE: Brand: RUNTHROUGH

## (undated) DEVICE — CATHETER ANGIO AMPLATZ LT 1 6 FRX100 CM INFIN

## (undated) DEVICE — CHECK-FLO PERFORMER INTRODUCER: Brand: PERFORMER

## (undated) DEVICE — SHEET,DRAPE,3/4,53X77,STERILE: Brand: MEDLINE

## (undated) DEVICE — CATHETER PULM ART 5FR INFL 0.75CC L110CM BAL DIA8MM SGL WDG

## (undated) DEVICE — GLOVE SURG SZ 7.5 L11.73IN FNGR THK9.8MIL STRW LTX POLYMER

## (undated) DEVICE — DILATOR: Brand: COOK

## (undated) DEVICE — TRUE™ DILATATION BALLOON VALVULOPLASTY CATHETER, 22 MM X 4.5 CM,110 CM CATHETER: Brand: TRUE DILATATION

## (undated) DEVICE — DRESSING QUIKCLOT FEMORAL INTERVENTIONAL 1.5X1.5 IN

## (undated) DEVICE — GUIDEWIRE VASC L150CM DIA0.035IN FLX TIP L7CM PTFE STR FIX

## (undated) DEVICE — KIT RF 4MM ACT TIP 17GA 75MM MULT PRB PROC COMPONENTS MULT

## (undated) DEVICE — INSUFFLATION NEEDLE TO ESTABLISH PNEUMOPERITONEUM.: Brand: INSUFFLATION NEEDLE

## (undated) DEVICE — GUIDEWIRE VASC L80CM DIA0.018IN TIP L5CM 15DEG ANG NIT

## (undated) DEVICE — CATHETER DIAG 6FR L100CM LUMN ID0.056IN JR4 CRV 0 SIDE H

## (undated) DEVICE — CATHETER DIAG AD 6FR L110CM 145DEG COR GRN HYDRPHLC NYL

## (undated) DEVICE — LOADING SYS L-EVOLUTFX-2329: Brand: EVOLUT™ FX

## (undated) DEVICE — KIT MFLD ISOLATN NACL CNTRST PRT TBNG SPIK W/ PRSS TRNSDUC

## (undated) DEVICE — NEEDLE SPNL L3.5IN DIA25GA QNCKE TYP BVL SPINOCAN

## (undated) DEVICE — SET INTRO SHTH 6FR L11CM W/ INTEGR SIDE PRT HEMSTAS VLV 3 W

## (undated) DEVICE — TOWEL,OR,DSP,ST,BLUE,STD,4/PK,20PK/CS: Brand: MEDLINE

## (undated) DEVICE — GUIDEWIRE VASC L260CM DIA0.035IN RAD 3MM J TIP L7CM PTFE

## (undated) DEVICE — GUIDEWIRE VASC 0.035 INX275 CM X SM CRV LUBRIGREEN SS SAFARI

## (undated) DEVICE — SEAL

## (undated) DEVICE — GUIDEWIRE VASC L75CM DIA0.035IN TIP L7CM PTFE COAT S STL

## (undated) DEVICE — CATHETER THRMDIL 7.5FR L110CM PROXIMAL/DISTAL PRT L30CM STD

## (undated) DEVICE — GUIDEWIRE VASC L150CM DIA0.035IN FLX END L7CM J 3MM PTFE

## (undated) DEVICE — TUBING INSUFFLATION SMK EVAC HI FLO SET PNEUMOCLEAR

## (undated) DEVICE — REDUCER: Brand: ENDOWRIST

## (undated) DEVICE — KIT ANGIO W/ AT P65 PREM HND CTRL FOR CNTRST DEL ANGIOTOUCH

## (undated) DEVICE — CATHETER DIAG 5FR L100CM LUMN ID0.047IN JR4 CRV 0 SIDE H

## (undated) DEVICE — 1 X VERSACROSS TRANSSEPTAL SHEATH (INCLUDING  1 X J-TIP GUIDEWIRE); 1 X VERSACROSS RF WIRE (INCLUDING 1 X CONNECTOR CABLE (SINGLE USE)); 1 X DISPERSIVE ELECTRODE: Brand: VERSACROSS ACCESS SOLUTION

## (undated) DEVICE — PERCLOSE™ PROSTYLE™ SUTURE-MEDIATED CLOSURE AND REPAIR SYSTEM: Brand: PERCLOSE™ PROSTYLE™

## (undated) DEVICE — ELECTRODE PACE S STL BPLR BLLN TEMP CATH

## (undated) DEVICE — DELIV SYS D-EVOLUTFX-2329: Brand: EVOLUT™ FX

## (undated) DEVICE — BAND COMPR L24CM REG CLR PLAS HEMSTAT EXT HK AND LOOP RETEN

## (undated) DEVICE — GLIDESHEATH SLENDER ACCESS KIT: Brand: GLIDESHEATH SLENDER

## (undated) DEVICE — UNIVERSAL MONOPOLAR LAPAROSCOPIC CABLE 10FT, 4MM PIN CONNECTOR: Brand: CONMED

## (undated) DEVICE — 4F (1.0MM ID) X 9CM STIFF4F (1.0 MICRO-STICK®INTRODUCER SE WITH NITINOL GUIDEWIREWITH NITIN WITH RADIOPAQUE TIPWITH RADIOPAQ: Brand: MICRO-STICK SETMICRO-STICK SET

## (undated) DEVICE — SUTURE VCRL + SZ 2-0 L27IN ABSRB VLT UR-6 5/8 CIR TAPR PNT VCP602H